# Patient Record
Sex: FEMALE | Race: WHITE | NOT HISPANIC OR LATINO | URBAN - METROPOLITAN AREA
[De-identification: names, ages, dates, MRNs, and addresses within clinical notes are randomized per-mention and may not be internally consistent; named-entity substitution may affect disease eponyms.]

---

## 2017-04-09 ENCOUNTER — EMERGENCY (EMERGENCY)
Facility: HOSPITAL | Age: 77
LOS: 1 days | Discharge: HOME | End: 2017-04-09
Attending: EMERGENCY MEDICINE
Payer: MEDICARE

## 2017-04-09 VITALS
HEART RATE: 54 BPM | DIASTOLIC BLOOD PRESSURE: 66 MMHG | OXYGEN SATURATION: 97 % | HEIGHT: 67 IN | RESPIRATION RATE: 20 BRPM | SYSTOLIC BLOOD PRESSURE: 108 MMHG | WEIGHT: 179.9 LBS

## 2017-04-09 DIAGNOSIS — J44.9 CHRONIC OBSTRUCTIVE PULMONARY DISEASE, UNSPECIFIED: ICD-10-CM
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
## 2017-04-09 DIAGNOSIS — Z98.51 TUBAL LIGATION STATUS: ICD-10-CM

## 2017-04-09 DIAGNOSIS — I10 ESSENTIAL (PRIMARY) HYPERTENSION: ICD-10-CM

## 2017-04-09 DIAGNOSIS — Z88.0 ALLERGY STATUS TO PENICILLIN: ICD-10-CM
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
## 2017-04-09 DIAGNOSIS — S40.811A ABRASION OF RIGHT UPPER ARM, INITIAL ENCOUNTER: ICD-10-CM

## 2017-04-09 DIAGNOSIS — E78.5 HYPERLIPIDEMIA, UNSPECIFIED: ICD-10-CM
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
## 2017-04-09 DIAGNOSIS — Z23 ENCOUNTER FOR IMMUNIZATION: ICD-10-CM

## 2017-04-09 DIAGNOSIS — Z88.5 ALLERGY STATUS TO NARCOTIC AGENT: ICD-10-CM

## 2017-04-09 DIAGNOSIS — Z95.2 PRESENCE OF PROSTHETIC HEART VALVE: Chronic | ICD-10-CM

## 2017-04-09 DIAGNOSIS — I25.10 ATHEROSCLEROTIC HEART DISEASE OF NATIVE CORONARY ARTERY WITHOUT ANGINA PECTORIS: ICD-10-CM

## 2017-04-09 DIAGNOSIS — S51.812A LACERATION WITHOUT FOREIGN BODY OF LEFT FOREARM, INITIAL ENCOUNTER: ICD-10-CM

## 2017-04-09 PROCEDURE — 99284 EMERGENCY DEPT VISIT MOD MDM: CPT

## 2017-04-09 PROCEDURE — 73090 X-RAY EXAM OF FOREARM: CPT | Mod: 26,LT

## 2017-04-09 PROCEDURE — 73060 X-RAY EXAM OF HUMERUS: CPT | Mod: 26,LT

## 2017-04-09 RX ORDER — TETANUS TOXOID, REDUCED DIPHTHERIA TOXOID AND ACELLULAR PERTUSSIS VACCINE, ADSORBED 5; 2.5; 8; 8; 2.5 [IU]/.5ML; [IU]/.5ML; UG/.5ML; UG/.5ML; UG/.5ML
0.5 SUSPENSION INTRAMUSCULAR ONCE
Qty: 0 | Refills: 0 | Status: COMPLETED | OUTPATIENT
Start: 2017-04-09 | End: 2017-04-09

## 2017-04-09 RX ORDER — BACITRACIN ZINC 500 UNIT/G
1 OINTMENT IN PACKET (EA) TOPICAL ONCE
Qty: 0 | Refills: 0 | Status: COMPLETED | OUTPATIENT
Start: 2017-04-09 | End: 2017-04-09

## 2017-04-09 RX ADMIN — Medication 1 APPLICATION(S): at 23:32

## 2017-04-09 RX ADMIN — TETANUS TOXOID, REDUCED DIPHTHERIA TOXOID AND ACELLULAR PERTUSSIS VACCINE, ADSORBED 0.5 MILLILITER(S): 5; 2.5; 8; 8; 2.5 SUSPENSION INTRAMUSCULAR at 23:31

## 2017-04-09 NOTE — ED PROVIDER NOTE - OBJECTIVE STATEMENT
77 y/o F on coumadin presents to ED c/o abrasion to left arm s/p witnessed mechanical fall. Pt states she tripped and fell today at 1927 today. She states she hit her left arm against the counter. Pt denies head trauma and LOC. She denies preceding symptoms but reports a 3 day hx of intermittent epistaxis causing nausea and decreased PO. She denies head trauma. Pt states her INR is ~1.5 and was last checked one month ago. PMHx includes pulmonary HTN. PSHx includes CABG 2012. No further complaints at this time.

## 2017-04-09 NOTE — ED ADULT NURSE NOTE - PMH
Anemia    Atrial fibrillation    CAD (coronary artery disease)    COPD (chronic obstructive pulmonary disease)    Hyperlipidemia    Hypertension    Pneumonia due to organism    Pulmonary hypertension    Sinusitis    Spinal fusion failure, initial encounter    Spinal stenosis    Tubal ligation status

## 2017-04-09 NOTE — ED PROVIDER NOTE - NS ED MD SCRIBE ATTENDING SCRIBE SECTIONS
HISTORY OF PRESENT ILLNESS/REVIEW OF SYSTEMS/PHYSICAL EXAM/HIV/PAST MEDICAL/SURGICAL/SOCIAL HISTORY/VITAL SIGNS( Pullset)/DISPOSITION

## 2017-04-09 NOTE — ED ADULT NURSE NOTE - OBJECTIVE STATEMENT
Pt care assumed at this time, no apparent distress noted at this time. Pt states she felt off balance while walking in the kitchen and fell. Pt is currently on coumadin. Pt denies hitting her head, negative LOC. Pt has 2 skin tears on her left arm, the entire arm is bruised. Pt also states she has a bloody nose for 2 weeks. Lung sounds are clear b/l, abd is soft and nontender with positive bowel sounds in all four quadrants.

## 2017-04-09 NOTE — ED ADULT NURSE NOTE - PSH
Cystocele    H/O aortic valve replacement    H/O cataract    H/O mitral valve repair    H/O spinal fusion    H/O tricuspid valve annuloplasty

## 2017-04-11 ENCOUNTER — INPATIENT (INPATIENT)
Facility: HOSPITAL | Age: 77
LOS: 17 days | Discharge: HOME HEALTH | DRG: 150 | End: 2017-04-29
Attending: HOSPITALIST | Admitting: SURGERY
Payer: MEDICARE

## 2017-04-11 VITALS — WEIGHT: 179.9 LBS
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
## 2017-04-11 DIAGNOSIS — Z95.2 PRESENCE OF PROSTHETIC HEART VALVE: Chronic | ICD-10-CM

## 2017-04-11 LAB
ALBUMIN SERPL ELPH-MCNC: 3.5 G/DL — SIGNIFICANT CHANGE UP (ref 3.3–5.2)
ALP SERPL-CCNC: 113 U/L — SIGNIFICANT CHANGE UP (ref 40–120)
ALT FLD-CCNC: 19 U/L — SIGNIFICANT CHANGE UP
ANION GAP SERPL CALC-SCNC: 29 MMOL/L — HIGH (ref 5–17)
ANION GAP SERPL CALC-SCNC: 31 MMOL/L — HIGH (ref 5–17)
ANION GAP SERPL CALC-SCNC: 32 MMOL/L — HIGH (ref 5–17)
ANISOCYTOSIS BLD QL: SLIGHT — SIGNIFICANT CHANGE UP
APTT BLD: 38.7 SEC — HIGH (ref 27.5–37.4)
APTT BLD: 43.8 SEC — HIGH (ref 27.5–37.4)
APTT BLD: 84.1 SEC — HIGH (ref 27.5–37.4)
AST SERPL-CCNC: 22 U/L — SIGNIFICANT CHANGE UP
BASE EXCESS BLDV CALC-SCNC: -9.1 MMOL/L — LOW (ref -3–3)
BASOPHILS # BLD AUTO: 0 K/UL — SIGNIFICANT CHANGE UP (ref 0–0.2)
BILIRUB SERPL-MCNC: 1.3 MG/DL — SIGNIFICANT CHANGE UP (ref 0.4–2)
BLD GP AB SCN SERPL QL: SIGNIFICANT CHANGE UP
BUN SERPL-MCNC: 190 MG/DL — HIGH (ref 8–20)
BUN SERPL-MCNC: 200 MG/DL — HIGH (ref 8–20)
BUN SERPL-MCNC: 200 MG/DL — HIGH (ref 8–20)
CA-I SERPL-SCNC: 0.7 MMOL/L — LOW (ref 1.15–1.29)
CALCIUM SERPL-MCNC: 5.9 MG/DL — CRITICAL LOW (ref 8.6–10.2)
CALCIUM SERPL-MCNC: 6 MG/DL — CRITICAL LOW (ref 8.6–10.2)
CALCIUM SERPL-MCNC: 6.1 MG/DL — CRITICAL LOW (ref 8.6–10.2)
CHLORIDE BLDV-SCNC: 93 MMOL/L — LOW (ref 98–106)
CHLORIDE SERPL-SCNC: 85 MMOL/L — LOW (ref 98–107)
CHLORIDE SERPL-SCNC: 86 MMOL/L — LOW (ref 98–107)
CHLORIDE SERPL-SCNC: 88 MMOL/L — LOW (ref 98–107)
CO2 SERPL-SCNC: 15 MMOL/L — LOW (ref 22–29)
CREAT SERPL-MCNC: 6.53 MG/DL — HIGH (ref 0.5–1.3)
CREAT SERPL-MCNC: 6.94 MG/DL — HIGH (ref 0.5–1.3)
CREAT SERPL-MCNC: 7.16 MG/DL — HIGH (ref 0.5–1.3)
ELLIPTOCYTES BLD QL SMEAR: SLIGHT — SIGNIFICANT CHANGE UP
EOSINOPHIL # BLD AUTO: 0 K/UL — SIGNIFICANT CHANGE UP (ref 0–0.5)
GAS PNL BLDV: 130 MMOL/L — LOW (ref 136–146)
GAS PNL BLDV: SIGNIFICANT CHANGE UP
GAS PNL BLDV: SIGNIFICANT CHANGE UP
GLUCOSE BLDV-MCNC: 125 MG/DL — HIGH (ref 70–99)
GLUCOSE SERPL-MCNC: 134 MG/DL — HIGH (ref 70–115)
GLUCOSE SERPL-MCNC: 147 MG/DL — HIGH (ref 70–115)
GLUCOSE SERPL-MCNC: 160 MG/DL — HIGH (ref 70–115)
HCO3 BLDV-SCNC: 17 MMOL/L — LOW (ref 20–26)
HCT VFR BLD CALC: 19.5 % — CRITICAL LOW (ref 37–47)
HCT VFR BLD CALC: 21.2 % — LOW (ref 37–47)
HCT VFR BLD CALC: 22.8 % — LOW (ref 37–47)
HCT VFR BLDA CALC: 23 — LOW (ref 39–50)
HGB BLD CALC-MCNC: 7.3 G/DL — LOW (ref 11.5–15.5)
HGB BLD-MCNC: 6.5 G/DL — CRITICAL LOW (ref 12–16)
HGB BLD-MCNC: 7.2 G/DL — LOW (ref 12–16)
HGB BLD-MCNC: 7.9 G/DL — LOW (ref 12–16)
HYPOCHROMIA BLD QL: SLIGHT — SIGNIFICANT CHANGE UP
INR BLD: 1.48 RATIO — HIGH (ref 0.88–1.16)
INR BLD: 1.58 RATIO — HIGH (ref 0.88–1.16)
INR BLD: 15.08 RATIO — CRITICAL HIGH (ref 0.88–1.16)
LACTATE BLDV-MCNC: 2.3 MMOL/L — HIGH (ref 0.5–2)
LYMPHOCYTES # BLD AUTO: 0.4 K/UL — LOW (ref 1–4.8)
LYMPHOCYTES # BLD AUTO: 8 % — LOW (ref 20–55)
MACROCYTES BLD QL: SLIGHT — SIGNIFICANT CHANGE UP
MAGNESIUM SERPL-MCNC: 2.1 MG/DL — SIGNIFICANT CHANGE UP (ref 1.8–2.5)
MAGNESIUM SERPL-MCNC: 2.1 MG/DL — SIGNIFICANT CHANGE UP (ref 1.8–2.5)
MCHC RBC-ENTMCNC: 32.5 PG — HIGH (ref 27–31)
MCHC RBC-ENTMCNC: 32.9 PG — HIGH (ref 27–31)
MCHC RBC-ENTMCNC: 33.3 G/DL — SIGNIFICANT CHANGE UP (ref 32–36)
MCHC RBC-ENTMCNC: 34 G/DL — SIGNIFICANT CHANGE UP (ref 32–36)
MCHC RBC-ENTMCNC: 34.2 PG — HIGH (ref 27–31)
MCHC RBC-ENTMCNC: 34.6 G/DL — SIGNIFICANT CHANGE UP (ref 32–36)
MCV RBC AUTO: 102.6 FL — HIGH (ref 81–99)
MCV RBC AUTO: 93.8 FL — SIGNIFICANT CHANGE UP (ref 81–99)
MCV RBC AUTO: 96.8 FL — SIGNIFICANT CHANGE UP (ref 81–99)
MONOCYTES # BLD AUTO: 0.5 K/UL — SIGNIFICANT CHANGE UP (ref 0–0.8)
MONOCYTES NFR BLD AUTO: 10 % — SIGNIFICANT CHANGE UP (ref 3–10)
NEUTROPHILS # BLD AUTO: 4 K/UL — SIGNIFICANT CHANGE UP (ref 1.8–8)
NEUTROPHILS NFR BLD AUTO: 80 % — HIGH (ref 37–73)
NEUTS BAND # BLD: 2 % — SIGNIFICANT CHANGE UP (ref 0–8)
NT-PROBNP SERPL-SCNC: HIGH PG/ML (ref 0–300)
OTHER CELLS CSF MANUAL: 9 ML/DL — LOW (ref 18–22)
OVALOCYTES BLD QL SMEAR: SLIGHT — SIGNIFICANT CHANGE UP
PCO2 BLDV: 31 MMHG — LOW (ref 35–50)
PH BLDV: 7.32 — LOW (ref 7.35–7.45)
PLAT MORPH BLD: NORMAL — SIGNIFICANT CHANGE UP
PLATELET # BLD AUTO: 68 K/UL — LOW (ref 150–400)
PLATELET # BLD AUTO: 74 K/UL — LOW (ref 150–400)
PLATELET # BLD AUTO: 86 K/UL — LOW (ref 150–400)
PO2 BLDV: 56 MMHG — HIGH (ref 25–45)
POIKILOCYTOSIS BLD QL AUTO: SLIGHT — SIGNIFICANT CHANGE UP
POTASSIUM BLDV-SCNC: 3.2 MMOL/L — LOW (ref 3.4–4.5)
POTASSIUM SERPL-MCNC: 3.2 MMOL/L — LOW (ref 3.5–5.3)
POTASSIUM SERPL-MCNC: 3.3 MMOL/L — LOW (ref 3.5–5.3)
POTASSIUM SERPL-MCNC: 3.5 MMOL/L — SIGNIFICANT CHANGE UP (ref 3.5–5.3)
POTASSIUM SERPL-SCNC: 3.2 MMOL/L — LOW (ref 3.5–5.3)
POTASSIUM SERPL-SCNC: 3.3 MMOL/L — LOW (ref 3.5–5.3)
POTASSIUM SERPL-SCNC: 3.5 MMOL/L — SIGNIFICANT CHANGE UP (ref 3.5–5.3)
PROT SERPL-MCNC: 5.9 G/DL — LOW (ref 6.6–8.7)
PROTHROM AB SERPL-ACNC: 16.4 SEC — HIGH (ref 9.8–12.7)
PROTHROM AB SERPL-ACNC: 17.5 SEC — HIGH (ref 9.8–12.7)
PROTHROM AB SERPL-ACNC: 175.3 SEC — HIGH (ref 9.8–12.7)
RBC # BLD: 1.9 M/UL — LOW (ref 4.4–5.2)
RBC # BLD: 2.19 M/UL — LOW (ref 4.4–5.2)
RBC # BLD: 2.43 M/UL — LOW (ref 4.4–5.2)
RBC # FLD: 14.9 % — SIGNIFICANT CHANGE UP (ref 11–15.6)
RBC # FLD: 16.7 % — HIGH (ref 11–15.6)
RBC # FLD: 18.5 % — HIGH (ref 11–15.6)
RBC BLD AUTO: ABNORMAL
SAO2 % BLDV: 88 % — SIGNIFICANT CHANGE UP (ref 67–88)
SODIUM SERPL-SCNC: 131 MMOL/L — LOW (ref 135–145)
SODIUM SERPL-SCNC: 132 MMOL/L — LOW (ref 135–145)
SODIUM SERPL-SCNC: 133 MMOL/L — LOW (ref 135–145)
TROPONIN T SERPL-MCNC: 0.08 NG/ML — HIGH (ref 0–0.06)
TROPONIN T SERPL-MCNC: 0.08 NG/ML — HIGH (ref 0–0.06)
TYPE + AB SCN PNL BLD: SIGNIFICANT CHANGE UP
WBC # BLD: 4.2 K/UL — LOW (ref 4.8–10.8)
WBC # BLD: 5 K/UL — SIGNIFICANT CHANGE UP (ref 4.8–10.8)
WBC # BLD: 6.1 K/UL — SIGNIFICANT CHANGE UP (ref 4.8–10.8)
WBC # FLD AUTO: 4.2 K/UL — LOW (ref 4.8–10.8)
WBC # FLD AUTO: 5 K/UL — SIGNIFICANT CHANGE UP (ref 4.8–10.8)
WBC # FLD AUTO: 6.1 K/UL — SIGNIFICANT CHANGE UP (ref 4.8–10.8)

## 2017-04-11 PROCEDURE — 71010: CPT | Mod: 26

## 2017-04-11 PROCEDURE — 99291 CRITICAL CARE FIRST HOUR: CPT

## 2017-04-11 PROCEDURE — 99285 EMERGENCY DEPT VISIT HI MDM: CPT

## 2017-04-11 PROCEDURE — 93010 ELECTROCARDIOGRAM REPORT: CPT

## 2017-04-11 PROCEDURE — 70450 CT HEAD/BRAIN W/O DYE: CPT | Mod: 26

## 2017-04-11 PROCEDURE — 74176 CT ABD & PELVIS W/O CONTRAST: CPT | Mod: 26

## 2017-04-11 PROCEDURE — 71010: CPT | Mod: 26,77

## 2017-04-11 RX ORDER — ONDANSETRON 8 MG/1
4 TABLET, FILM COATED ORAL ONCE
Qty: 0 | Refills: 0 | Status: COMPLETED | OUTPATIENT
Start: 2017-04-11 | End: 2017-04-11

## 2017-04-11 RX ORDER — PHYTONADIONE (VIT K1) 5 MG
10 TABLET ORAL ONCE
Qty: 0 | Refills: 0 | Status: DISCONTINUED | OUTPATIENT
Start: 2017-04-11 | End: 2017-04-11

## 2017-04-11 RX ORDER — SODIUM CHLORIDE 9 MG/ML
500 INJECTION INTRAMUSCULAR; INTRAVENOUS; SUBCUTANEOUS ONCE
Qty: 0 | Refills: 0 | Status: DISCONTINUED | OUTPATIENT
Start: 2017-04-11 | End: 2017-04-11

## 2017-04-11 RX ORDER — SODIUM CHLORIDE 9 MG/ML
250 INJECTION INTRAMUSCULAR; INTRAVENOUS; SUBCUTANEOUS ONCE
Qty: 0 | Refills: 0 | Status: COMPLETED | OUTPATIENT
Start: 2017-04-11 | End: 2017-04-11

## 2017-04-11 RX ORDER — PHYTONADIONE (VIT K1) 5 MG
5 TABLET ORAL ONCE
Qty: 0 | Refills: 0 | Status: COMPLETED | OUTPATIENT
Start: 2017-04-11 | End: 2017-04-11

## 2017-04-11 RX ORDER — CALCIUM GLUCONATE 100 MG/ML
2 VIAL (ML) INTRAVENOUS ONCE
Qty: 0 | Refills: 0 | Status: COMPLETED | OUTPATIENT
Start: 2017-04-11 | End: 2017-04-11

## 2017-04-11 RX ORDER — SODIUM CHLORIDE 9 MG/ML
3 INJECTION INTRAMUSCULAR; INTRAVENOUS; SUBCUTANEOUS ONCE
Qty: 0 | Refills: 0 | Status: COMPLETED | OUTPATIENT
Start: 2017-04-11 | End: 2017-04-11

## 2017-04-11 RX ORDER — DESMOPRESSIN ACETATE 0.1 MG/1
30 TABLET ORAL ONCE
Qty: 0 | Refills: 0 | Status: COMPLETED | OUTPATIENT
Start: 2017-04-11 | End: 2017-04-11

## 2017-04-11 RX ORDER — SODIUM CHLORIDE 9 MG/ML
1000 INJECTION INTRAMUSCULAR; INTRAVENOUS; SUBCUTANEOUS
Qty: 0 | Refills: 0 | Status: DISCONTINUED | OUTPATIENT
Start: 2017-04-11 | End: 2017-04-13

## 2017-04-11 RX ORDER — POTASSIUM CHLORIDE 20 MEQ
10 PACKET (EA) ORAL ONCE
Qty: 0 | Refills: 0 | Status: COMPLETED | OUTPATIENT
Start: 2017-04-11 | End: 2017-04-11

## 2017-04-11 RX ADMIN — SODIUM CHLORIDE 3 MILLILITER(S): 9 INJECTION INTRAMUSCULAR; INTRAVENOUS; SUBCUTANEOUS at 12:12

## 2017-04-11 RX ADMIN — DESMOPRESSIN ACETATE 230 MICROGRAM(S): 0.1 TABLET ORAL at 19:03

## 2017-04-11 RX ADMIN — SODIUM CHLORIDE 1000 MILLILITER(S): 9 INJECTION INTRAMUSCULAR; INTRAVENOUS; SUBCUTANEOUS at 23:11

## 2017-04-11 RX ADMIN — Medication 100 MILLIEQUIVALENT(S): at 16:16

## 2017-04-11 RX ADMIN — SODIUM CHLORIDE 75 MILLILITER(S): 9 INJECTION INTRAMUSCULAR; INTRAVENOUS; SUBCUTANEOUS at 23:51

## 2017-04-11 RX ADMIN — SODIUM CHLORIDE 75 MILLILITER(S): 9 INJECTION INTRAMUSCULAR; INTRAVENOUS; SUBCUTANEOUS at 18:16

## 2017-04-11 RX ADMIN — Medication 101 MILLIGRAM(S): at 14:55

## 2017-04-11 RX ADMIN — Medication 200 GRAM(S): at 17:23

## 2017-04-11 RX ADMIN — SODIUM CHLORIDE 1000 MILLILITER(S): 9 INJECTION INTRAMUSCULAR; INTRAVENOUS; SUBCUTANEOUS at 22:02

## 2017-04-11 RX ADMIN — SODIUM CHLORIDE 75 MILLILITER(S): 9 INJECTION INTRAMUSCULAR; INTRAVENOUS; SUBCUTANEOUS at 22:41

## 2017-04-11 RX ADMIN — ONDANSETRON 4 MILLIGRAM(S): 8 TABLET, FILM COATED ORAL at 23:11

## 2017-04-11 RX ADMIN — Medication 5 MILLIGRAM(S): at 12:12

## 2017-04-11 NOTE — ED PROVIDER NOTE - PROGRESS NOTE DETAILS
surgery resident in ed to eval arm lac pts cardiologist called to see pt Dr Ashton Dr Begum in ed and will admit to micu on trauma service and direct further care

## 2017-04-11 NOTE — H&P ADULT - NSHPPHYSICALEXAM_GEN_ALL_CORE
Gen: AAOX3, NAD  Pulm: CTAB  CV: RRR, S1/S2, mid-sternotomy scar  Abd: S/ND/NT  Ext: 4cm and 5cm skin tear distal forearm and proximal arm, respectively, actively oozing, FROM, multiple ecchymosis on bilateral upper and lower extremities   Neuro: grossly intact

## 2017-04-11 NOTE — ED ADULT NURSE NOTE - OBJECTIVE STATEMENT
77yo female presents with complaints of uncontrollable bleeding to skin tear on left arm and epistaxis as well.  Pt was seen in ER sunday for skin tear as well. Patient saw PMD and blood work was sent monday showing elevated INR and low H/H.  Patient complaints of dizziness. Congestive cough noted as well and patient spitting up blood which she says is from the blood clots from her nose. Denies any head trauma or LOC. Left arm with 1+ pulse and bilateral arms with ecchymosis throughout. Large hematoma noted to left arm as well. Skin tears noted to left brachial aspect of arm and additional tear noted below left ac as well. Dressing applied and then reapplied by resident.

## 2017-04-11 NOTE — ED ADULT NURSE REASSESSMENT NOTE - NS ED NURSE REASSESS COMMENT FT1
Trauma MD Begum and Jamari at patient bedside. Unable to obtain oral or axillary temps despite multiple attempts.  As per Dr. Begum it's ok to hang blood without temp and not to do rectal temps due to acuity and likelihood of bleeding.  Right AC IV removed due to bleeding around site. 2 New IV's placed as documented. 1 IV attempt unsuccessful and patient has hematoma noted to right forearm. MD Begum also aware.  Patient stabilized then to be transported to CT scan then to ICU. RN Mitzy in ICU received report.

## 2017-04-11 NOTE — ED PROVIDER NOTE - CARE PLAN
Principal Discharge DX:	Fall, initial encounter  Secondary Diagnosis:	Atrial fibrillation  Secondary Diagnosis:	Anemia

## 2017-04-11 NOTE — CONSULT NOTE ADULT - ASSESSMENT
1. traumatic laceration of left arm with bleeding from fall.  2.epistaxis  3. coumadin toxicity with markedly prolonged INR-hold coumadin-Vit K+ consider FFP to reverse coagulopathy.  4. chronic a-fib  5. pulmonary hypertension w RV pressure overload.  6. Bio AVR with mitral and tricuspid rings and known mod. TR and MR.  7. anemia acute on chronic-transfuse to maintain hgb over 8.  8.chronic renal impairment ( creatinine was 2.5 in July and now is over 6. BUN was 88 and now is 200-some of which may reflect blood in GI tract form nosebleeding.  9. Probable intravasc dehydration-would support bp with careful IV hydration.

## 2017-04-11 NOTE — ED PROVIDER NOTE - MEDICAL DECISION MAKING DETAILS
fall and coumadin toxicity with bleeding from arm and nose with high inr  will reverse with ffp and get ct head and labs and admit and pts cardiologist Dr Jeanine Ashton

## 2017-04-11 NOTE — ED PROVIDER NOTE - OBJECTIVE STATEMENT
75 y/o female she and her daughter state she has a h/o valve replacements bovine and she is on coumadin and fell 2 nights 75 y/o female she and her daughter state she has a h/o valve replacements bovine and she is on coumadin and fell 2 nights ago when she tried to reach for a counter to steady her and she missed and fell and cut her arm and she came here and got an xray and no blood tests and her pmd did inr and it was 9 and she had low hgb 9 and sent back for eval Now pt has bleeding left arm with ecchymosis complete arm and 2 large skin tears and one upper arm and one lower and nosebleed no headache but feels a little dizzy she attributes to not having eaten anything today no cp or sob

## 2017-04-11 NOTE — H&P ADULT - ASSESSMENT
77 yo F s/p fall 3 days ago, had some skin tears in her arm trying to brace herself from the fall  comes to the ED bleeding from her wounds, epistaxis  Found to be anemic with a supratherapeutic INR also in acute renal failure  2uPRBC ordered, KCentra given  Primary survey intact  Secondary survey findings as stated above  HD stable  CXR: R sided effusion  Even thought she had a low-energy trauma, given her INR and age, we elected to do Head CT/Abd/P   Head CT/Abd CT: no bleeding or traumatic injury  -Admit to SICU  -Fluid resuscitation Transfuse 2 u PRBC  -Kcentra, Vit K, repeat INR; continue active reversal PRN  -Wound dressing: adaptic, 4x4, kerlix, ace  -Repeat labs in the afternoon  -Hold pharmacologic DVT ppx  -Med rec  -F/u Cardiology recs  -Will contact nephrologist for information regarding severity of kidney insufficiency and recommendations     Seen and examined with Dr. Begum

## 2017-04-11 NOTE — ED STATDOCS - PROGRESS NOTE DETAILS
75 y/o F pt w/ PMHx of A-fib and anemia presents to the ED c/o epistaxis, intermittent, x2 weeks. Pt also notes L arm bleeding s/p mechanical fall. Pt is on Coumadin, and had labs yesterday showing INR of 9.8, Hg of 8, and HCT of 24. Pt was told to come in by  for possible admission. Focused evaluation, orders entered, placed in main for further evaluation by another provider.

## 2017-04-11 NOTE — CONSULT NOTE ADULT - SUBJECTIVE AND OBJECTIVE BOX
Chief Complaint:skipped and fell/anemia    HPI: 77 yo f with multiple problems - on coumadin for afib- slipped at home and suffered skin tear with bleeding. She's also had a nose bleed for 2-3 weeks. In ER her hgb was markedly decreased and inr>15. PMH+ bio avr with mitral and tricuspid annuloplasty/pulmonary hpt follwed at [facility]/a-fib/hypertension/cri/hyperuricemia/anemia RV failure. Surgery-spinal fusion/ap. allergy to pcn codeine and methylprednisolone/allopurinol. Non smoker/etoh. ROS-very inactive a nd sob , +edema. OP meds include coumadin 2.5 mg qd/atenolol 50 mg qd/revatio 20 tid/albuterol/torsemide 20 bid vit c / fe bid/colchicine.    PAST MEDICAL & SURGICAL HISTORY:  Anemia  Pulmonary hypertension  COPD (chronic obstructive pulmonary disease)  CAD (coronary artery disease)  Atrial fibrillation  Hyperlipidemia  Hypertension  Spinal fusion failure, initial encounter  Tubal ligation status  Sinusitis  Spinal stenosis  Pneumonia due to organism  No pertinent past medical history  H/O aortic valve replacement  H/O spinal fusion  H/O cataract  Cystocele  H/O tricuspid valve annuloplasty  H/O mitral valve repair      PREVIOUS DIAGNOSTIC TESTING:      ECHO  FINDINGS: lvef 55% with dilated RV increased atria mild MS mod MR and TR RV pressure overload.    STRESS  FINDINGS:    CATHETERIZATION  FINDINGS: PASP 90    MEDICATIONS  (STANDING):  phytonadione  IVPB 5milliGRAM(s) IV Intermittent once  phytonadione  IVPB 10milliGRAM(s) IV Intermittent once    MEDICATIONS  (PRN):      FAMILY HISTORY:  CAD (coronary artery disease)      SOCIAL HISTORY: lives w family    CIGARETTES: 0    ALCOHOL:0    ROS: Negative other than as mentioned in HPI.    Vital Signs Last 24 Hrs  T(C): --  T(F): --  HR: 58 (58 - 58)  BP: 101/64 (101/64 - 101/64) No working manual cuffs in the ER  BP(mean): --  RR: 20 (20 - 20)  SpO2: 99% (99% - 99%)    PHYSICAL EXAM:  General: Appears well developed, well nourished alert and cooperative. Chronically ill appearing Female  HEENT: Head; normocephalic, atraumatic. fresh blood around nares.  Eyes;   Pupils reactive, cornea wnl.  Neck; Supple, no nodes adenopathy, no NVD or carotid bruit or thyromegaly.  CARDIOVASCULAR; No murmur, rub, gallop or lift. Normal S1 and S2. Irreg rhythm and distant heart tones  LUNGS; No rales, rhonchi or wheeze. Normal breath sounds bilaterally.  ABDOMEN ; Soft, nontender without mass or organomegaly. bowel sounds normoactive.  EXTREMITIES; No clubbing, cyanosis .. Distal pulses not felt ROM normal. Trace edema  SKIN; warm and dry with normal turgor.  NEURO; Alert/oriented x 3/normal motor exam. No pathologic reflexes.    PSYCH; normal affect.            INTERPRETATION OF TELEMETRY:    ECG: afib/rbbb  cxr: cardiomeg with pvh vs rll infiltrate.  I&O's Detail    pro bnp [id number]  LABS:  INR> 15                   6.5    5.0   )-----------( 86       ( 11 Apr 2017 12:08 )             19.5     04-11    133<L>  |  86<L>  |  200.0<H>  ----------------------------<  160<H>  3.3<L>   |  15.0<L>  |  6.94<H>    Ca    6.1<LL>      11 Apr 2017 12:09    TPro  5.9<L>  /  Alb  3.5  /  TBili  1.3  /  DBili  x   /  AST  22  /  ALT  19  /  AlkPhos  113  04-11    CARDIAC MARKERS ( 11 Apr 2017 12:09 )  x     / 0.08 ng/mL / x     / x     / x          PT/INR - ( 11 Apr 2017 12:09 )   PT: 175.3 sec;   INR: 15.08 ratio         PTT - ( 11 Apr 2017 12:09 )  PTT:84.1 sec    I&O's Summary      RADIOLOGY & ADDITIONAL STUDIES:

## 2017-04-11 NOTE — ED ADULT NURSE NOTE - CHIEF COMPLAINT QUOTE
was here Sunday night, scraped arm and on Coumadin. Had MD follow up yesterday and did labs, received lab report inr 9.8  HGB 8  HCT 24. No chest pain, no dyspnea.

## 2017-04-11 NOTE — ED ADULT NURSE REASSESSMENT NOTE - NS ED NURSE REASSESS COMMENT FT1
Per Dr. Kauffman FFP may not be given and more Catscans may be added. CT made aware of delay and FFP on hold until clarification of orders.

## 2017-04-11 NOTE — ED STATDOCS - DETAILS:
I, Meir Lambert, performed the initial face to face bedside interview with this patient regarding history of present illness, review of symptoms and relevant past medical, social and family history.  I completed an independent physical examination.  I was the initial provider who evaluated this patient.  The history, relevant review of systems, past medical and surgical history, medical decision making, and physical examination was documented by the scribe in my presence and I attest to the accuracy of the documentation.

## 2017-04-11 NOTE — INPATIENT CERTIFICATION FOR MEDICARE PATIENTS - RISKS OF ADVERSE EVENTS
Concern for renal deterioration/Concern for delay in diagnosis and treatment Concern for cardiopulmonary deterioration/Concern for renal deterioration/Concern for delay in diagnosis and treatment

## 2017-04-11 NOTE — CHART NOTE - NSCHARTNOTEFT_GEN_A_CORE
Pt evaluated    Improving chemically  Cr/BUN have peaked and now trending down (still elevated)  AG improving (still high, suspect 2/2 uremia)  Hgb 6.5--->7.2--->7.9  INR 1.5    Pt states she feels better overall  c/o of intermittent nausea, 1 episode vomiting  She attributed this to "the amount of blood I swallowed over past few days"  Given some zofran with relief  Pt reports greatly diminished PO intake over past few days  DENIES CP/SOB, abd pain  Well appearing in NAD  Abd soft NTND  Bleeding stopped from skin tears on extremities    UOP ~ 5-10cc/hr for past 3-4 hours (about 20-30 an hour prior)  Bedside official TTE: (Unofficial) No gross dyskinesia. RV enlarged  -----> this seems largely UNCHANGED from prior echo ~ 1 year prior showing LVEF 45-50%, RV enlargement.    I suspect this LOTTIE likely acute on chronic, suspect largely pre-renal in nature, given decreased PO intake and blood loss through skin tears and acquired hypercoagulable state  However, given Hx of HF (largely diastolic it seems....prior admission for this, on diuretics), will require judicious use of IVF    I will continue IVF @ 75cc/hr, given poor PO intake....given 250cc bolus x 2 with minimal effect on UOP  I will give an additional unit of blood, given high cardiac risk, anemia, need for volume  Serial BMP  Send urine lytes  f/u official echo read    Plan discussed with attending Holman, agrees Pt evaluated    Improving chemically  Cr/BUN have peaked and now trending down (still elevated)  AG improving (still high, suspect 2/2 uremia)  Hgb 6.5--->7.2--->7.9  INR 1.5    Pt states she feels better overall  c/o of intermittent nausea, 1 episode vomiting  She attributed this to "the amount of blood I swallowed over past few days"  Given some zofran with relief  Pt reports greatly diminished PO intake over past few days  DENIES CP/SOB, abd pain  Well appearing in NAD  Abd soft NTND  Bleeding stopped from skin tears on extremities    UOP ~ 5-10cc/hr for past 3-4 hours (about 20-30 an hour prior)  Bedside official TTE: (Unofficial) No gross dyskinesia. RV enlarged  -----> this seems largely UNCHANGED from prior echo ~ 1 year prior showing LVEF 45-50%, RV enlargement.    I suspect this LOTTIE likely acute on chronic, suspect largely pre-renal in nature, given decreased PO intake and blood loss through skin tears and acquired hypercoagulable state...also, uremia likely increased 2/2 intake of blood products. Mental status intact despite uremia.  However, given Hx of HF (largely diastolic it seems....prior admission for this, on diuretics), will require judicious use of IVF    I will continue IVF @ 75cc/hr, given poor PO intake....given 250cc bolus x 2 with minimal effect on UOP  I will give an additional unit of blood, given high cardiac risk, anemia, need for volume  Serial BMP  Send urine lytes  f/u official echo read    Plan discussed with attending Holman, agrees

## 2017-04-11 NOTE — ED ADULT NURSE REASSESSMENT NOTE - NS ED NURSE REASSESS COMMENT FT1
Patient transported to CT scan and then to ICU without incident.  Patient handed off to ILYA Hartmann. Patient stable.

## 2017-04-11 NOTE — ED ADULT TRIAGE NOTE - CHIEF COMPLAINT QUOTE
was here Sunday night, scraped arm and on Coumadin. Had MD follow up yesterday and did labs, received lab report inr 9.8  HGB 8  HCT 24. was here Sunday night, scraped arm and on Coumadin. Had MD follow up yesterday and did labs, received lab report inr 9.8  HGB 8  HCT 24. No chest pain, no dyspnea.

## 2017-04-11 NOTE — H&P ADULT - ATTENDING COMMENTS
Pt seen and examined in ED.  Several bleeding wounds to left upper extremity.  pt c/o dizziness, nausea, fatigue.  Repeated epistaxis  for several weeks.    Lacerations to upper extremity not amenable to sutures as skin too thin to hold a stich.  Significant contusions noted over both arms and upper back.  Poor cap refill.  Good distal pulses.      Given labs as listed above feel pt is in occult hemorrhagic shock from blood loss from wounds and epistaxis.  Suspect lactate will be elevate, however given the severe uremia, the increased gap metabolic acidosis could be explained by this.  Does report some back pain and given the excessively high INR retroperitoneal hematoma is in the differential dx.  Elevated trop and acute on chronic renal failure however does point to poor end organ perfusion.  As such, 2U PRBCs will be transfused.  Given he pt's cardiac hx which includes multiple valvular issues, do not feel she will be able to handle the large volume of FFP required to reverse this warfarin induced coagulopathy.  Will give K-Centra and IV vit K.  At risk for ICH given INR and fall.  Will CT head. Pt seen and examined in ED.  Several bleeding wounds to left upper extremity.  pt c/o dizziness, nausea, fatigue.  Repeated epistaxis  for several weeks.    Lacerations to upper extremity not amenable to sutures as skin too thin to hold a stich.  Significant contusions noted over both arms and upper back.  Poor cap refill.  Good distal pulses.      Given labs as listed above feel pt is in occult hemorrhagic shock from blood loss from wounds and epistaxis.  Suspect lactate will be elevate, however given the severe uremia, the increased gap metabolic acidosis could be explained by this.  Does report some back pain and given the excessively high INR retroperitoneal hematoma is in the differential dx.  Elevated trop and acute on chronic renal failure however does point to poor end organ perfusion and cardiac ischemia likely secondary to the acute anemia and ongoing blood loss.  As such, 2U PRBCs will be transfused.  Given he pt's cardiac hx which includes multiple valvular issues, do not feel she will be able to handle the large volume of FFP required to reverse this warfarin induced coagulopathy.  Will give K-Centra and IV vit K.  At risk for ICH given INR and fall.  Will CT head.

## 2017-04-12 DIAGNOSIS — N17.9 ACUTE KIDNEY FAILURE, UNSPECIFIED: ICD-10-CM

## 2017-04-12 DIAGNOSIS — I27.0 PRIMARY PULMONARY HYPERTENSION: ICD-10-CM

## 2017-04-12 DIAGNOSIS — D64.9 ANEMIA, UNSPECIFIED: ICD-10-CM

## 2017-04-12 DIAGNOSIS — D50.0 IRON DEFICIENCY ANEMIA SECONDARY TO BLOOD LOSS (CHRONIC): ICD-10-CM

## 2017-04-12 DIAGNOSIS — N17.0 ACUTE KIDNEY FAILURE WITH TUBULAR NECROSIS: ICD-10-CM

## 2017-04-12 DIAGNOSIS — E44.0 MODERATE PROTEIN-CALORIE MALNUTRITION: ICD-10-CM

## 2017-04-12 DIAGNOSIS — D68.9 COAGULATION DEFECT, UNSPECIFIED: ICD-10-CM

## 2017-04-12 LAB
ALBUMIN SERPL ELPH-MCNC: 3.1 G/DL — LOW (ref 3.3–5.2)
ALP SERPL-CCNC: 97 U/L — SIGNIFICANT CHANGE UP (ref 40–120)
ALT FLD-CCNC: 16 U/L — SIGNIFICANT CHANGE UP
ANION GAP SERPL CALC-SCNC: 32 MMOL/L — HIGH (ref 5–17)
ANISOCYTOSIS BLD QL: SLIGHT — SIGNIFICANT CHANGE UP
APTT BLD: 39.2 SEC — HIGH (ref 27.5–37.4)
AST SERPL-CCNC: 20 U/L — SIGNIFICANT CHANGE UP
BILIRUB SERPL-MCNC: 3.4 MG/DL — HIGH (ref 0.4–2)
BUN SERPL-MCNC: 190 MG/DL — HIGH (ref 8–20)
CALCIUM SERPL-MCNC: 6.3 MG/DL — CRITICAL LOW (ref 8.6–10.2)
CHLORIDE SERPL-SCNC: 87 MMOL/L — LOW (ref 98–107)
CO2 SERPL-SCNC: 15 MMOL/L — LOW (ref 22–29)
CREAT ?TM UR-MCNC: 126 MG/DL — SIGNIFICANT CHANGE UP
CREAT SERPL-MCNC: 6.93 MG/DL — HIGH (ref 0.5–1.3)
EOSINOPHIL # BLD AUTO: 0 K/UL — SIGNIFICANT CHANGE UP (ref 0–0.5)
EOSINOPHIL NFR BLD AUTO: 0.2 % — SIGNIFICANT CHANGE UP (ref 0–6)
GLUCOSE SERPL-MCNC: 134 MG/DL — HIGH (ref 70–115)
HCT VFR BLD CALC: 24.7 % — LOW (ref 37–47)
HGB BLD-MCNC: 8.5 G/DL — LOW (ref 12–16)
HYPOCHROMIA BLD QL: SIGNIFICANT CHANGE UP
INR BLD: 1.48 RATIO — HIGH (ref 0.88–1.16)
LYMPHOCYTES # BLD AUTO: 0.3 K/UL — LOW (ref 1–4.8)
LYMPHOCYTES # BLD AUTO: 4.4 % — LOW (ref 20–55)
MACROCYTES BLD QL: SLIGHT — SIGNIFICANT CHANGE UP
MAGNESIUM SERPL-MCNC: 2 MG/DL — SIGNIFICANT CHANGE UP (ref 1.8–2.5)
MCHC RBC-ENTMCNC: 31.8 PG — HIGH (ref 27–31)
MCHC RBC-ENTMCNC: 34.4 G/DL — SIGNIFICANT CHANGE UP (ref 32–36)
MCV RBC AUTO: 92.5 FL — SIGNIFICANT CHANGE UP (ref 81–99)
MONOCYTES # BLD AUTO: 0.6 K/UL — SIGNIFICANT CHANGE UP (ref 0–0.8)
MONOCYTES NFR BLD AUTO: 9.9 % — SIGNIFICANT CHANGE UP (ref 3–10)
NEUTROPHILS # BLD AUTO: 5.2 K/UL — SIGNIFICANT CHANGE UP (ref 1.8–8)
NEUTROPHILS NFR BLD AUTO: 84.7 % — HIGH (ref 37–73)
OVALOCYTES BLD QL SMEAR: SLIGHT — SIGNIFICANT CHANGE UP
PHOSPHATE SERPL-MCNC: 10.1 MG/DL — HIGH (ref 2.4–4.7)
PLAT MORPH BLD: NORMAL — SIGNIFICANT CHANGE UP
PLATELET # BLD AUTO: 60 K/UL — LOW (ref 150–400)
PLATELET COUNT - ESTIMATE: ABNORMAL
POIKILOCYTOSIS BLD QL AUTO: SLIGHT — SIGNIFICANT CHANGE UP
POTASSIUM SERPL-MCNC: 3.6 MMOL/L — SIGNIFICANT CHANGE UP (ref 3.5–5.3)
POTASSIUM SERPL-SCNC: 3.6 MMOL/L — SIGNIFICANT CHANGE UP (ref 3.5–5.3)
PROT SERPL-MCNC: 5.3 G/DL — LOW (ref 6.6–8.7)
PROTHROM AB SERPL-ACNC: 16.4 SEC — HIGH (ref 9.8–12.7)
RBC # BLD: 2.67 M/UL — LOW (ref 4.4–5.2)
RBC # FLD: 19.2 % — HIGH (ref 11–15.6)
RBC BLD AUTO: ABNORMAL
SODIUM SERPL-SCNC: 134 MMOL/L — LOW (ref 135–145)
SODIUM UR-SCNC: <30 MMOL/L — SIGNIFICANT CHANGE UP
TROPONIN T SERPL-MCNC: 0.07 NG/ML — HIGH (ref 0–0.06)
TROPONIN T SERPL-MCNC: 0.07 NG/ML — HIGH (ref 0–0.06)
WBC # BLD: 6.2 K/UL — SIGNIFICANT CHANGE UP (ref 4.8–10.8)
WBC # FLD AUTO: 6.2 K/UL — SIGNIFICANT CHANGE UP (ref 4.8–10.8)

## 2017-04-12 RX ORDER — IPRATROPIUM/ALBUTEROL SULFATE 18-103MCG
3 AEROSOL WITH ADAPTER (GRAM) INHALATION EVERY 6 HOURS
Qty: 0 | Refills: 0 | Status: DISCONTINUED | OUTPATIENT
Start: 2017-04-12 | End: 2017-04-18

## 2017-04-12 RX ORDER — IPRATROPIUM/ALBUTEROL SULFATE 18-103MCG
3 AEROSOL WITH ADAPTER (GRAM) INHALATION ONCE
Qty: 0 | Refills: 0 | Status: COMPLETED | OUTPATIENT
Start: 2017-04-12 | End: 2017-04-12

## 2017-04-12 RX ORDER — HEPARIN SODIUM 5000 [USP'U]/ML
5000 INJECTION INTRAVENOUS; SUBCUTANEOUS EVERY 8 HOURS
Qty: 0 | Refills: 0 | Status: DISCONTINUED | OUTPATIENT
Start: 2017-04-12 | End: 2017-04-16

## 2017-04-12 RX ORDER — TUBERCULIN PURIFIED PROTEIN DERIVATIVE 5 [IU]/.1ML
5 INJECTION, SOLUTION INTRADERMAL ONCE
Qty: 0 | Refills: 0 | Status: COMPLETED | OUTPATIENT
Start: 2017-04-12 | End: 2017-04-12

## 2017-04-12 RX ORDER — FOLIC ACID 0.8 MG
1 TABLET ORAL DAILY
Qty: 0 | Refills: 0 | Status: DISCONTINUED | OUTPATIENT
Start: 2017-04-12 | End: 2017-04-29

## 2017-04-12 RX ORDER — ATORVASTATIN CALCIUM 80 MG/1
40 TABLET, FILM COATED ORAL AT BEDTIME
Qty: 0 | Refills: 0 | Status: DISCONTINUED | OUTPATIENT
Start: 2017-04-12 | End: 2017-04-29

## 2017-04-12 RX ORDER — SODIUM CHLORIDE 9 MG/ML
250 INJECTION INTRAMUSCULAR; INTRAVENOUS; SUBCUTANEOUS ONCE
Qty: 0 | Refills: 0 | Status: DISCONTINUED | OUTPATIENT
Start: 2017-04-12 | End: 2017-04-12

## 2017-04-12 RX ORDER — ATENOLOL 25 MG/1
50 TABLET ORAL DAILY
Qty: 0 | Refills: 0 | Status: DISCONTINUED | OUTPATIENT
Start: 2017-04-12 | End: 2017-04-15

## 2017-04-12 RX ORDER — SODIUM CHLORIDE 9 MG/ML
250 INJECTION INTRAMUSCULAR; INTRAVENOUS; SUBCUTANEOUS ONCE
Qty: 0 | Refills: 0 | Status: COMPLETED | OUTPATIENT
Start: 2017-04-12 | End: 2017-04-12

## 2017-04-12 RX ORDER — CALCIUM ACETATE 667 MG
1334 TABLET ORAL
Qty: 0 | Refills: 0 | Status: DISCONTINUED | OUTPATIENT
Start: 2017-04-12 | End: 2017-04-26

## 2017-04-12 RX ORDER — LACTOBACILLUS ACIDOPHILUS 100MM CELL
1 CAPSULE ORAL DAILY
Qty: 0 | Refills: 0 | Status: DISCONTINUED | OUTPATIENT
Start: 2017-04-12 | End: 2017-04-29

## 2017-04-12 RX ORDER — ERYTHROPOIETIN 10000 [IU]/ML
20000 INJECTION, SOLUTION INTRAVENOUS; SUBCUTANEOUS ONCE
Qty: 0 | Refills: 0 | Status: COMPLETED | OUTPATIENT
Start: 2017-04-12 | End: 2017-04-12

## 2017-04-12 RX ADMIN — ERYTHROPOIETIN 20000 UNIT(S): 10000 INJECTION, SOLUTION INTRAVENOUS; SUBCUTANEOUS at 17:14

## 2017-04-12 RX ADMIN — SODIUM CHLORIDE 75 MILLILITER(S): 9 INJECTION INTRAMUSCULAR; INTRAVENOUS; SUBCUTANEOUS at 12:33

## 2017-04-12 RX ADMIN — SODIUM CHLORIDE 500 MILLILITER(S): 9 INJECTION INTRAMUSCULAR; INTRAVENOUS; SUBCUTANEOUS at 09:44

## 2017-04-12 RX ADMIN — Medication 1 MILLIGRAM(S): at 18:35

## 2017-04-12 RX ADMIN — SODIUM CHLORIDE 75 MILLILITER(S): 9 INJECTION INTRAMUSCULAR; INTRAVENOUS; SUBCUTANEOUS at 05:48

## 2017-04-12 RX ADMIN — ATORVASTATIN CALCIUM 40 MILLIGRAM(S): 80 TABLET, FILM COATED ORAL at 22:05

## 2017-04-12 RX ADMIN — TUBERCULIN PURIFIED PROTEIN DERIVATIVE 5 UNIT(S): 5 INJECTION, SOLUTION INTRADERMAL at 22:22

## 2017-04-12 RX ADMIN — Medication 20 MILLIGRAM(S): at 13:33

## 2017-04-12 RX ADMIN — Medication 20 MILLIGRAM(S): at 22:05

## 2017-04-12 RX ADMIN — Medication 200 GRAM(S): at 00:53

## 2017-04-12 NOTE — PROGRESS NOTE ADULT - SUBJECTIVE AND OBJECTIVE BOX
[place] [facility] - [facility], THE HEART CENTER                                   [address] [place] [address]                                                      PHONE: [phone]                                                         FAX: [phone]  [email or web address]  ---------------------------------------------------------------------------------------------------------------------------------    FU for  Pt seen and examined.     Overnight events/patient complaints:    allopurinol (Rash)  codeine (Nausea; Vomiting)  penicillin (Rash)  spironolactone (Unknown)    MEDICATIONS  (STANDING):  sodium chloride 0.9%. 1000milliLiter(s) IV Continuous <Continuous>  sodium chloride 0.9% Bolus 250milliLiter(s) IV Bolus once  sildenafil (REVATIO) 20milliGRAM(s) Oral three times a day  atorvastatin 40milliGRAM(s) Oral at bedtime    MEDICATIONS  (PRN):      Vital Signs Last 24 Hrs  T(C): 36.6, Max: 36.6 (04-12 @ 08:00)  T(F): 97.8, Max: 97.8 (04-12 @ 08:00)  HR: 60 (50 - 65)  BP: 138/62 (101/64 - 155/65)  BP(mean): 89 (80 - 107)  RR: 17 (14 - 21)  SpO2: 96% (93% - 100%)  ICU Vital Signs Last 24 Hrs  I&O's Summary    I & Os for current day (as of 12 Apr 2017 09:23)  =============================================  IN: 2882 ml / OUT: 210 ml / NET: 2672 ml      PHYSICAL EXAM:  HEENT: no JVD  CARDIOVASCULAR: Normal S1 and S2, No murmur, rub, gallop or lift.   LUNGS: No rales, rhonchi or wheeze. Normal breath sounds bilaterally.  ABDOMEN: Soft, nontender without mass or organomegaly. bowel sounds normoactive.  EXTREMITIES: no edema          LABS:                        8.5    6.2   )-----------( 60       ( 12 Apr 2017 05:31 )             24.7     04-12    134<L>  |  87<L>  |  190.0<H>  ----------------------------<  134<H>  3.6   |  15.0<L>  |  6.93<H>    Ca    6.3<LL>      12 Apr 2017 05:31  Phos  10.1     04-12  Mg     2.0     04-12    TPro  5.3<L>  /  Alb  3.1<L>  /  TBili  3.4<H>  /  DBili  x   /  AST  20  /  ALT  16  /  AlkPhos  97  04-12    DUSTIN HERNANDEZ  CARDIAC MARKERS ( 12 Apr 2017 05:31 )  x     / 0.07 ng/mL / x     / x     / x      CARDIAC MARKERS ( 11 Apr 2017 23:18 )  x     / 0.07 ng/mL / x     / x     / x      CARDIAC MARKERS ( 11 Apr 2017 15:57 )  x     / 0.08 ng/mL / x     / x     / x      CARDIAC MARKERS ( 11 Apr 2017 12:09 )  x     / 0.08 ng/mL / x     / x     / x          PT/INR - ( 12 Apr 2017 05:31 )   PT: 16.4 sec;   INR: 1.48 ratio         PTT - ( 12 Apr 2017 05:31 )  PTT:39.2 sec      RADIOLOGY & ADDITIONAL STUDIES:    LABS:                        8.5    6.2   )-----------( 60       ( 12 Apr 2017 05:31 )             24.7     04-12    134<L>  |  87<L>  |  190.0<H>  ----------------------------<  134<H>  3.6   |  15.0<L>  |  6.93<H>    Ca    6.3<LL>      12 Apr 2017 05:31  Phos  10.1     04-12  Mg     2.0     04-12    TPro  5.3<L>  /  Alb  3.1<L>  /  TBili  3.4<H>  /  DBili  x   /  AST  20  /  ALT  16  /  AlkPhos  97  04-12    76y  CARDIAC MARKERS ( 12 Apr 2017 05:31 )  x     / 0.07 ng/mL / x     / x     / x      CARDIAC MARKERS ( 11 Apr 2017 23:18 )  x     / 0.07 ng/mL / x     / x     / x      CARDIAC MARKERS ( 11 Apr 2017 15:57 )  x     / 0.08 ng/mL / x     / x     / x      CARDIAC MARKERS ( 11 Apr 2017 12:09 )  x     / 0.08 ng/mL / x     / x     / x          PT/INR - ( 12 Apr 2017 05:31 )   PT: 16.4 sec;   INR: 1.48 ratio         PTT - ( 12 Apr 2017 05:31 )  PTT:39.2 sec      Assessment and Plan: [place] [facility]ARDI[facility]ASCULAR - [facility], THE HEART CENTER                                   [address] [place] [address]                                                      PHONE: [phone]                                                         FAX: [phone]  [email or web address]  ---------------------------------------------------------------------------------------------------------------------------------    FU for  Pt seen and examined.     Overnight events/patient complaints: patient feeling better    allopurinol (Rash)  codeine (Nausea; Vomiting)  penicillin (Rash)  spironolactone (Unknown)    MEDICATIONS  (STANDING):  sodium chloride 0.9%. 1000milliLiter(s) IV Continuous <Continuous>  sodium chloride 0.9% Bolus 250milliLiter(s) IV Bolus once  sildenafil (REVATIO) 20milliGRAM(s) Oral three times a day  atorvastatin 40milliGRAM(s) Oral at bedtime    MEDICATIONS  (PRN):      Vital Signs Last 24 Hrs  T(C): 36.6, Max: 36.6 (04-12 @ 08:00)  T(F): 97.8, Max: 97.8 (04-12 @ 08:00)  HR: 60 (50 - 65)  BP: 138/62 (101/64 - 155/65)  BP(mean): 89 (80 - 107)  RR: 17 (14 - 21)  SpO2: 96% (93% - 100%)  ICU Vital Signs Last 24 Hrs  I&O's Summary    I & Os for current day (as of 12 Apr 2017 09:23)  =============================================  IN: 2882 ml / OUT: 210 ml / NET: 2672 ml      PHYSICAL EXAM:  HEENT: no JVD  CARDIOVASCULAR: Normal S1 and S2, No murmur, rub, gallop or lift.   LUNGS: No rales, rhonchi or wheeze. Normal breath sounds bilaterally.  ABDOMEN: Soft, nontender without mass or organomegaly. bowel sounds normoactive.  EXTREMITIES: no edema          LABS:                        8.5    6.2   )-----------( 60       ( 12 Apr 2017 05:31 )             24.7     04-12    134<L>  |  87<L>  |  190.0<H>  ----------------------------<  134<H>  3.6   |  15.0<L>  |  6.93<H>    Ca    6.3<LL>      12 Apr 2017 05:31  Phos  10.1     04-12  Mg     2.0     04-12    TPro  5.3<L>  /  Alb  3.1<L>  /  TBili  3.4<H>  /  DBili  x   /  AST  20  /  ALT  16  /  AlkPhos  97  04-12    DUSTIN HERNANDEZ  CARDIAC MARKERS ( 12 Apr 2017 05:31 )  x     / 0.07 ng/mL / x     / x     / x      CARDIAC MARKERS ( 11 Apr 2017 23:18 )  x     / 0.07 ng/mL / x     / x     / x      CARDIAC MARKERS ( 11 Apr 2017 15:57 )  x     / 0.08 ng/mL / x     / x     / x      CARDIAC MARKERS ( 11 Apr 2017 12:09 )  x     / 0.08 ng/mL / x     / x     / x          PT/INR - ( 12 Apr 2017 05:31 )   PT: 16.4 sec;   INR: 1.48 ratio         PTT - ( 12 Apr 2017 05:31 )  PTT:39.2 sec      RADIOLOGY & ADDITIONAL STUDIES:    LABS:                        8.5    6.2   )-----------( 60       ( 12 Apr 2017 05:31 )             24.7     04-12    134<L>  |  87<L>  |  190.0<H>  ----------------------------<  134<H>  3.6   |  15.0<L>  |  6.93<H>    Ca    6.3<LL>      12 Apr 2017 05:31  Phos  10.1     04-12  Mg     2.0     04-12    TPro  5.3<L>  /  Alb  3.1<L>  /  TBili  3.4<H>  /  DBili  x   /  AST  20  /  ALT  16  /  AlkPhos  97  04-12    76y  CARDIAC MARKERS ( 12 Apr 2017 05:31 )  x     / 0.07 ng/mL / x     / x     / x      CARDIAC MARKERS ( 11 Apr 2017 23:18 )  x     / 0.07 ng/mL / x     / x     / x      CARDIAC MARKERS ( 11 Apr 2017 15:57 )  x     / 0.08 ng/mL / x     / x     / x      CARDIAC MARKERS ( 11 Apr 2017 12:09 )  x     / 0.08 ng/mL / x     / x     / x          PT/INR - ( 12 Apr 2017 05:31 )   PT: 16.4 sec;   INR: 1.48 ratio         PTT - ( 12 Apr 2017 05:31 )  PTT:39.2 sec      Assessment and Plan:  77 yo f with multiple problems - on coumadin for afib- slipped at home and suffered skin tear with bleeding. She's also had a nose bleed for 2-3 weeks. In ER her hgb was markedly decreased and inr>15. PMH+ bio avr with mitral and tricuspid annuloplasty/pulmonary [facility]ollwed [facility]/-fib/hypertension/cri/hyperuricemia/anemia RV failure. Surgery-spinal fusion/ap. allergy to pcn codeine and methylprednisolone/allopurinol.   Bleed/coagulopathy/low platelet count: HB stable improved, continue to hold coumadin  AF: rate controlled, check echo to reassess current EF, that will help guide future plan for AC,   Acute on chronic renal failure: recommend renal consult, oliguria despite IV hydration, may need HD

## 2017-04-12 NOTE — CONSULT NOTE ADULT - SUBJECTIVE AND OBJECTIVE BOX
Patient is a 76y old  Female who presents with a chief complaint of s/p fall, supratherapeutic INR (11 Apr 2017 15:16)      HPI:  77 yo f with hx of bovine valve replacement, afib on coumadin, chronic kidney insufficiency, s/p fall from standing 3 days ago. Per patient, she slipped, and as she was bracing her fall went to grab the kitchen counter and missed, causing some skin tears to her LUE. She's also had a nose bleed for 2-3 weeks, now more frequent. She had been in the ED on Sunday, got an XR which showed no fracture and was sent home. She comes today due to worsening epistaxis and bleeding from her skin tears. c/o SOB, headache/dizziness. Denies hitting head or LOC. (11 Apr 2017 15:16)  Interim noted   Noncontrast CT abdomen without hydro  + Severe azotemia   Large bicarb defecit   Component increased BUN related to ingestion of epistaxis  = Anorexia and progressive malaise   In 2016 creat was 3.0   S/P multiple transfusions   Coumadin coagulopathy better     PAST MEDICAL & SURGICAL HISTORY:  Anemia  Pulmonary hypertension  COPD (chronic obstructive pulmonary disease)  CAD (coronary artery disease)  Atrial fibrillation  Hyperlipidemia  Hypertension  Spinal fusion failure, initial encounter  Tubal ligation status  Sinusitis  Spinal stenosis  Pneumonia due to organism  No pertinent past medical history  H/O aortic valve replacement  H/O spinal fusion  H/O cataract  Cystocele  H/O tricuspid valve annuloplasty  H/O mitral valve repair      FAMILY HISTORY:  CAD (coronary artery disease)      Social History:    MEDICATIONS  (STANDING):  sodium chloride 0.9%. 1000milliLiter(s) IV Continuous <Continuous>  sildenafil (REVATIO) 20milliGRAM(s) Oral three times a day  atorvastatin 40milliGRAM(s) Oral at bedtime    MEDICATIONS  (PRN):      Allergies    allopurinol (Rash)  codeine (Nausea; Vomiting)  penicillin (Rash)  spironolactone (Unknown)    Intolerances        Vital Signs Last 24 Hrs  T(C): 36, Max: 36.6 (04-12 @ 08:00)  T(F): 96.8, Max: 97.8 (04-12 @ 08:00)  HR: 57 (54 - 65)  BP: 164/66 (120/57 - 187/80)  BP(mean): 92 (80 - 115)  RR: 17 (13 - 35)  SpO2: 96% (93% - 100%)  Daily Height in cm: 170.18 (12 Apr 2017 05:00)    Daily   I&O's Detail  I & Os for 24h ending 12 Apr 2017 07:00  =============================================  IN:    Packed Red Blood Cells: 982 ml    sodium chloride 0.9%.: 800 ml    Sodium Chloride 0.9% IV Bolus: 750 ml    Solution: 200 ml    Solution: 100 ml    Solution: 50 ml    Total IN: 2882 ml  ---------------------------------------------  OUT:    Indwelling Catheter - Urethral: 210 ml    Total OUT: 210 ml  ---------------------------------------------  Total NET: 2672 ml    I & Os for current day (as of 12 Apr 2017 15:22)  =============================================  IN:    Sodium Chloride 0.9% IV Bolus: 500 ml    sodium chloride 0.9%.: 300 ml    Total IN: 800 ml  ---------------------------------------------  OUT:    Indwelling Catheter - Urethral: 60 ml    Total OUT: 60 ml  ---------------------------------------------  Total NET: 740 ml    I&O's Summary  I & Os for 24h ending 12 Apr 2017 07:00  =============================================  IN: 2882 ml / OUT: 210 ml / NET: 2672 ml    I & Os for current day (as of 12 Apr 2017 15:22)  =============================================  IN: 800 ml / OUT: 60 ml / NET: 740 ml      PHYSICAL EXAM:    GENERAL: NAD, Comfortable flat   HEAD:  No edema   NECK: Supple, No JVD,   CHEST/LUNG:EAE , No wheeze   HEART: Regular rate and rhythm; No rub   ABDOMEN: Soft, No rigidity   EXTREMITIES: min edema           LABS:                        8.5    6.2   )-----------( 60       ( 12 Apr 2017 05:31 )             24.7     04-12    134<L>  |  87<L>  |  190.0<H>  ----------------------------<  134<H>  3.6   |  15.0<L>  |  6.93<H>    Ca    6.3<LL>      12 Apr 2017 05:31  Phos  10.1     04-12  Mg     2.0     04-12    TPro  5.3<L>  /  Alb  3.1<L>  /  TBili  3.4<H>  /  DBili  x   /  AST  20  /  ALT  16  /  AlkPhos  97  04-12    PT/INR - ( 12 Apr 2017 05:31 )   PT: 16.4 sec;   INR: 1.48 ratio         PTT - ( 12 Apr 2017 05:31 )  PTT:39.2 sec    Magnesium, Serum: 2.0 mg/dL (04-12 @ 05:31)  Phosphorus Level, Serum: 10.1 mg/dL (04-12 @ 05:31)  Magnesium, Serum: 2.1 mg/dL (04-11 @ 23:18)  Magnesium, Serum: 2.1 mg/dL (04-11 @ 15:57)          RADIOLOGY & ADDITIONAL TESTS:

## 2017-04-12 NOTE — PROGRESS NOTE ADULT - SUBJECTIVE AND OBJECTIVE BOX
INTERVAL HPI/OVERNIGHT EVENTS: Received Kcentra and Vit K.  2 units PRBC for anemia secondary to blood loss.  Repeat INR 1.43.  R IJ TLC placed.  Grullon placed.  Pt still oozing heavily from skin tear sites.  DDAVP added for platelet dysfunction. O/N: TTE done at bedside, LVEF grossly normal, large dilated right ventricle; this was present on prior echo. Oliguria, given [id number] bolus with minimal improvement. Nausea, 1 episode vomiting, states she coughed up a blood clot; no further hemoptysis or nausea/vomiting, given zofran with resolustion.  Pt feels this is 2/2 to swallowing a good deal of blood past few days. Cr improving, Hgb 7.9; additional 1u pRBC given. Bleeding largely stopped from skin tears.     PRESSORS: [ ] YES [x] NO  WHICH:  DOSE:    ANTIBIOTICS:                  DATE STARTED:  ANTIBIOTICS:                  DATE STARTED:  ANTIBIOTICS:                  DATE STARTED:    MEDICATIONS  (STANDING):  sodium chloride 0.9%. 1000milliLiter(s) IV Continuous <Continuous>    MEDICATIONS  (PRN):      Drug Dosing Weight  Height (cm): 170.2 (09 Apr 2017 21:32)  Weight (kg): 81.6 (11 Apr 2017 10:09)  BMI (kg/m2): 28.2 (11 Apr 2017 10:09)  BSA (m2): 1.93 (11 Apr 2017 10:09)    CENTRAL LINE: [ ] YES [ ] NO  LOCATION:   DATE INSERTED:  REMOVE: [ ] YES [ ] NO  EXPLAIN:    GRULLON: [ ] YES [ ] NO    DATE INSERTED:  REMOVE: [ ] YES [ ] NO  EXPLAIN:    A-LINE: [ ] YES [ ] NO  LOCATION:   DATE INSERTED:  REMOVE: [ ] YES [ ] NO  EXPLAIN:    PAST MEDICAL & SURGICAL HISTORY:  Anemia  Pulmonary hypertension  COPD (chronic obstructive pulmonary disease)  CAD (coronary artery disease)  Atrial fibrillation  Hyperlipidemia  Hypertension  Spinal fusion failure, initial encounter  Tubal ligation status  Sinusitis  Spinal stenosis  Pneumonia due to organism  No pertinent past medical history  H/O aortic valve replacement  H/O spinal fusion  H/O cataract  Cystocele  H/O tricuspid valve annuloplasty  H/O mitral valve repair      ICU Vital Signs Last 24 Hrs  T(C): 36.4, Max: 36.4 (04-11 @ 16:09)  T(F): 97.5, Max: 97.5 (04-11 @ 16:09)  HR: 63 (50 - 65)  BP: 155/65 (101/64 - 155/65)  BP(mean): 94 (80 - 107)  ABP: --  ABP(mean): --  RR: 14 (14 - 21)  SpO2: 94% (93% - 100%)          I&O's Detail    I & Os for current day (as of 12 Apr 2017 05:40)  =============================================  IN:    Packed Red Blood Cells: 982 ml    Sodium Chloride 0.9% IV Bolus: 750 ml    sodium chloride 0.9%.: 725 ml    Solution: 200 ml    Solution: 100 ml    Solution: 50 ml    Total IN: 2807 ml  ---------------------------------------------  OUT:    Indwelling Catheter - Urethral: 210 ml    Total OUT: 210 ml  ---------------------------------------------  Total NET: 2597 ml          PHYSICAL EXAM:    Constitutional: Well appearing elderly F in NAD.   Neck: No JVD, trachea midline.   Respiratory: CTA B/L good BS B/L no W/R/R.   Cardiovascular: Irregular rhythm S1S2 no murmurs/R/G.  Gastrointestinal: Soft, NTND.  Extremities: No peripheral edema, No cyanosis, clubbing   Neurological: Awake and alert. No focal deficits.   Skin: LUE wrapped in pressure dressing, C/D/I. Multiple, numerous ecchymosis throughout all extremities and torso.          LABS:  CBC Full  -  ( 11 Apr 2017 23:18 )  WBC Count : 6.1 K/uL  Hemoglobin : 7.9 g/dL  Hematocrit : 22.8 %  Platelet Count - Automated : 68 K/uL  Mean Cell Volume : 93.8 fl  Mean Cell Hemoglobin : 32.5 pg  Mean Cell Hemoglobin Concentration : 34.6 g/dL  Auto Neutrophil # : x  Auto Lymphocyte # : x  Auto Monocyte # : x  Auto Eosinophil # : x  Auto Basophil # : x  Auto Neutrophil % : x  Auto Lymphocyte % : x  Auto Monocyte % : x  Auto Eosinophil % : x  Auto Basophil % : x    04-11    132<L>  |  88<L>  |  190.0<H>  ----------------------------<  147<H>  3.5   |  15.0<L>  |  6.53<H>    Ca    6.0<LL>      11 Apr 2017 23:18  Mg     2.1     04-11    TPro  5.9<L>  /  Alb  3.5  /  TBili  1.3  /  DBili  x   /  AST  22  /  ALT  19  /  AlkPhos  113  04-11    PT/INR - ( 11 Apr 2017 23:18 )   PT: 17.5 sec;   INR: 1.58 ratio         PTT - ( 11 Apr 2017 23:18 )  PTT:38.7 sec      Assessment and Plan:    A    76F  HD # 2    Here for:    1. Acute blood loss anemia  2. Acquired coagulapathy s/p reversal  3. ARF (on chronic)  4. Afib  5. Bradycardia  6. Valvular disease  7. Oliguria    P:          Neuro: Analgesia. Avoid deleriogenic agents.    CV: Continue hemodynamic monitoring. f/u echo.    Pulm: Pulmonary toilet.  Continue incentive spirometer.  Chest PT.  Encourage OOB to chair and ambulation     GI/Nutrition: Bowel Regimen, continue diet.    /Renal: Monitor UOP. Monitor BMP.  Replete Lytes as needed. Consider renal consult.    HEME- DVT prophylaxis, SCDs. Start heparin SC for    ID: No issues.    Lines/Tubes: Cont grullon.    Endo: No issues.    Skin: Skin care for skin tears.    Dispo: Cont critical care. f/u labs, endpoints.

## 2017-04-12 NOTE — CONSULT NOTE ADULT - ASSESSMENT
Advanced Oliguric renal failure   No hydro on CT   + Uremic sequelae with contributing diathesis   I had long talk with the pt and daughter Milagro  They consent for initiation of HD today ( consent signed by daughter )   HD today and in am ( see orders , no heparin , UF 1 Kg )   SICU team to place femoral gracia   Vascular Surgery called to place permacath in a few days   Check Hep B and C and PPD     Anemia - Coumadin held   S/P several transfusions   Iron stores and CBC in am   Add epogen with HD today   Add folate     RO - Add Phoslo 2 tid   Check PTH and 25 Vit D   Repeat in am

## 2017-04-13 LAB
ANION GAP SERPL CALC-SCNC: 19 MMOL/L — HIGH (ref 5–17)
ANION GAP SERPL CALC-SCNC: 26 MMOL/L — HIGH (ref 5–17)
ANISOCYTOSIS BLD QL: SLIGHT — SIGNIFICANT CHANGE UP
BLD GP AB SCN SERPL QL: SIGNIFICANT CHANGE UP
BUN SERPL-MCNC: 124 MG/DL — HIGH (ref 8–20)
BUN SERPL-MCNC: 66 MG/DL — HIGH (ref 8–20)
CALCIUM SERPL-MCNC: 6.4 MG/DL — CRITICAL LOW (ref 8.6–10.2)
CALCIUM SERPL-MCNC: 7.4 MG/DL — LOW (ref 8.6–10.2)
CHLORIDE SERPL-SCNC: 94 MMOL/L — LOW (ref 98–107)
CHLORIDE SERPL-SCNC: 97 MMOL/L — LOW (ref 98–107)
CO2 SERPL-SCNC: 18 MMOL/L — LOW (ref 22–29)
CO2 SERPL-SCNC: 23 MMOL/L — SIGNIFICANT CHANGE UP (ref 22–29)
CREAT SERPL-MCNC: 3.63 MG/DL — HIGH (ref 0.5–1.3)
CREAT SERPL-MCNC: 5.51 MG/DL — HIGH (ref 0.5–1.3)
DACRYOCYTES BLD QL SMEAR: SLIGHT — SIGNIFICANT CHANGE UP
ELLIPTOCYTES BLD QL SMEAR: SLIGHT — SIGNIFICANT CHANGE UP
FERRITIN SERPL-MCNC: 200.5 NG/ML — SIGNIFICANT CHANGE UP (ref 11–306)
GLUCOSE SERPL-MCNC: 172 MG/DL — HIGH (ref 70–115)
GLUCOSE SERPL-MCNC: 94 MG/DL — SIGNIFICANT CHANGE UP (ref 70–115)
HBV CORE AB SER-ACNC: SIGNIFICANT CHANGE UP
HBV CORE IGM SER-ACNC: SIGNIFICANT CHANGE UP
HBV SURFACE AB SER-ACNC: SIGNIFICANT CHANGE UP
HBV SURFACE AG SER-ACNC: SIGNIFICANT CHANGE UP
HCT VFR BLD CALC: 22.9 % — LOW (ref 37–47)
HCT VFR BLD CALC: 22.9 % — LOW (ref 37–47)
HCV AB S/CO SERPL IA: 0.06 S/CO — SIGNIFICANT CHANGE UP
HCV AB SERPL-IMP: SIGNIFICANT CHANGE UP
HGB BLD-MCNC: 7.6 G/DL — LOW (ref 12–16)
HGB BLD-MCNC: 7.6 G/DL — LOW (ref 12–16)
IRON SATN MFR SERPL: 121 UG/DL — SIGNIFICANT CHANGE UP (ref 37–145)
IRON SATN MFR SERPL: 35 % — SIGNIFICANT CHANGE UP (ref 14–50)
LYMPHOCYTES # BLD AUTO: 6 % — LOW (ref 20–55)
MACROCYTES BLD QL: SLIGHT — SIGNIFICANT CHANGE UP
MAGNESIUM SERPL-MCNC: 1.8 MG/DL — SIGNIFICANT CHANGE UP (ref 1.8–2.5)
MAGNESIUM SERPL-MCNC: 1.9 MG/DL — SIGNIFICANT CHANGE UP (ref 1.8–2.5)
MCHC RBC-ENTMCNC: 31.1 PG — HIGH (ref 27–31)
MCHC RBC-ENTMCNC: 31.7 PG — HIGH (ref 27–31)
MCHC RBC-ENTMCNC: 33.2 G/DL — SIGNIFICANT CHANGE UP (ref 32–36)
MCHC RBC-ENTMCNC: 33.2 G/DL — SIGNIFICANT CHANGE UP (ref 32–36)
MCV RBC AUTO: 93.9 FL — SIGNIFICANT CHANGE UP (ref 81–99)
MCV RBC AUTO: 95.4 FL — SIGNIFICANT CHANGE UP (ref 81–99)
MICROCYTES BLD QL: SLIGHT — SIGNIFICANT CHANGE UP
MONOCYTES NFR BLD AUTO: 5 % — SIGNIFICANT CHANGE UP (ref 3–10)
NEUTROPHILS NFR BLD AUTO: 88 % — HIGH (ref 37–73)
NEUTS BAND # BLD: 1 % — SIGNIFICANT CHANGE UP (ref 0–8)
OVALOCYTES BLD QL SMEAR: SLIGHT — SIGNIFICANT CHANGE UP
PHOSPHATE SERPL-MCNC: 3.4 MG/DL — SIGNIFICANT CHANGE UP (ref 2.4–4.7)
PHOSPHATE SERPL-MCNC: 6.7 MG/DL — HIGH (ref 2.4–4.7)
PLAT MORPH BLD: SIGNIFICANT CHANGE UP
PLATELET # BLD AUTO: 53 K/UL — LOW (ref 150–400)
PLATELET # BLD AUTO: 59 K/UL — LOW (ref 150–400)
POIKILOCYTOSIS BLD QL AUTO: SLIGHT — SIGNIFICANT CHANGE UP
POTASSIUM SERPL-MCNC: 3.3 MMOL/L — LOW (ref 3.5–5.3)
POTASSIUM SERPL-MCNC: 3.5 MMOL/L — SIGNIFICANT CHANGE UP (ref 3.5–5.3)
POTASSIUM SERPL-SCNC: 3.3 MMOL/L — LOW (ref 3.5–5.3)
POTASSIUM SERPL-SCNC: 3.5 MMOL/L — SIGNIFICANT CHANGE UP (ref 3.5–5.3)
PTH-INTACT FLD-MCNC: 323 PG/ML — HIGH (ref 15–65)
RBC # BLD: 2.4 M/UL — LOW (ref 4.4–5.2)
RBC # BLD: 2.44 M/UL — LOW (ref 4.4–5.2)
RBC # FLD: 20.1 % — HIGH (ref 11–15.6)
RBC # FLD: 20.8 % — HIGH (ref 11–15.6)
RBC BLD AUTO: ABNORMAL
SCHISTOCYTES BLD QL AUTO: SLIGHT — SIGNIFICANT CHANGE UP
SODIUM SERPL-SCNC: 138 MMOL/L — SIGNIFICANT CHANGE UP (ref 135–145)
SODIUM SERPL-SCNC: 139 MMOL/L — SIGNIFICANT CHANGE UP (ref 135–145)
TIBC SERPL-MCNC: 345 UG/DL — SIGNIFICANT CHANGE UP (ref 220–430)
TRANSFERRIN SERPL-MCNC: 241 MG/DL — SIGNIFICANT CHANGE UP (ref 192–382)
TYPE + AB SCN PNL BLD: SIGNIFICANT CHANGE UP
WBC # BLD: 4.4 K/UL — LOW (ref 4.8–10.8)
WBC # BLD: 5.2 K/UL — SIGNIFICANT CHANGE UP (ref 4.8–10.8)
WBC # FLD AUTO: 4.4 K/UL — LOW (ref 4.8–10.8)
WBC # FLD AUTO: 5.2 K/UL — SIGNIFICANT CHANGE UP (ref 4.8–10.8)

## 2017-04-13 PROCEDURE — 99233 SBSQ HOSP IP/OBS HIGH 50: CPT

## 2017-04-13 RX ORDER — ERYTHROPOIETIN 10000 [IU]/ML
10000 INJECTION, SOLUTION INTRAVENOUS; SUBCUTANEOUS
Qty: 0 | Refills: 0 | Status: DISCONTINUED | OUTPATIENT
Start: 2017-04-13 | End: 2017-04-18

## 2017-04-13 RX ORDER — POTASSIUM CHLORIDE 20 MEQ
20 PACKET (EA) ORAL
Qty: 0 | Refills: 0 | Status: COMPLETED | OUTPATIENT
Start: 2017-04-13 | End: 2017-04-13

## 2017-04-13 RX ORDER — POTASSIUM CHLORIDE 20 MEQ
40 PACKET (EA) ORAL EVERY 4 HOURS
Qty: 0 | Refills: 0 | Status: DISCONTINUED | OUTPATIENT
Start: 2017-04-13 | End: 2017-04-13

## 2017-04-13 RX ORDER — POTASSIUM CHLORIDE 20 MEQ
40 PACKET (EA) ORAL ONCE
Qty: 0 | Refills: 0 | Status: COMPLETED | OUTPATIENT
Start: 2017-04-13 | End: 2017-04-13

## 2017-04-13 RX ORDER — CALCIUM GLUCONATE 100 MG/ML
2 VIAL (ML) INTRAVENOUS
Qty: 0 | Refills: 0 | Status: DISCONTINUED | OUTPATIENT
Start: 2017-04-13 | End: 2017-04-13

## 2017-04-13 RX ORDER — ALBUMIN HUMAN 25 %
50 VIAL (ML) INTRAVENOUS ONCE
Qty: 0 | Refills: 0 | Status: COMPLETED | OUTPATIENT
Start: 2017-04-13 | End: 2017-04-13

## 2017-04-13 RX ORDER — CALCIUM GLUCONATE 100 MG/ML
2 VIAL (ML) INTRAVENOUS ONCE
Qty: 0 | Refills: 0 | Status: COMPLETED | OUTPATIENT
Start: 2017-04-13 | End: 2017-04-13

## 2017-04-13 RX ORDER — ALBUMIN HUMAN 25 %
50 VIAL (ML) INTRAVENOUS
Qty: 0 | Refills: 0 | Status: DISCONTINUED | OUTPATIENT
Start: 2017-04-13 | End: 2017-04-29

## 2017-04-13 RX ADMIN — HEPARIN SODIUM 5000 UNIT(S): 5000 INJECTION INTRAVENOUS; SUBCUTANEOUS at 06:08

## 2017-04-13 RX ADMIN — Medication 200 GRAM(S): at 07:09

## 2017-04-13 RX ADMIN — Medication 20 MILLIGRAM(S): at 06:08

## 2017-04-13 RX ADMIN — Medication 100 MILLIEQUIVALENT(S): at 07:10

## 2017-04-13 RX ADMIN — SODIUM CHLORIDE 75 MILLILITER(S): 9 INJECTION INTRAMUSCULAR; INTRAVENOUS; SUBCUTANEOUS at 06:08

## 2017-04-13 RX ADMIN — HEPARIN SODIUM 5000 UNIT(S): 5000 INJECTION INTRAVENOUS; SUBCUTANEOUS at 21:31

## 2017-04-13 RX ADMIN — Medication 3 MILLILITER(S): at 15:14

## 2017-04-13 RX ADMIN — Medication 3 MILLILITER(S): at 09:06

## 2017-04-13 RX ADMIN — Medication 1 MILLIGRAM(S): at 11:59

## 2017-04-13 RX ADMIN — Medication 40 MILLIEQUIVALENT(S): at 21:30

## 2017-04-13 RX ADMIN — Medication 1334 MILLIGRAM(S): at 08:08

## 2017-04-13 RX ADMIN — Medication 1334 MILLIGRAM(S): at 11:59

## 2017-04-13 RX ADMIN — Medication 3 MILLILITER(S): at 21:23

## 2017-04-13 RX ADMIN — Medication 3 MILLILITER(S): at 00:39

## 2017-04-13 RX ADMIN — Medication 100 MILLIEQUIVALENT(S): at 08:14

## 2017-04-13 RX ADMIN — ATORVASTATIN CALCIUM 40 MILLIGRAM(S): 80 TABLET, FILM COATED ORAL at 21:30

## 2017-04-13 RX ADMIN — Medication 20 MILLIGRAM(S): at 21:31

## 2017-04-13 RX ADMIN — Medication 3 MILLILITER(S): at 04:04

## 2017-04-13 RX ADMIN — Medication 1334 MILLIGRAM(S): at 16:43

## 2017-04-13 RX ADMIN — ATENOLOL 50 MILLIGRAM(S): 25 TABLET ORAL at 08:08

## 2017-04-13 RX ADMIN — Medication 100 MILLIEQUIVALENT(S): at 06:08

## 2017-04-13 RX ADMIN — Medication 50 MILLILITER(S): at 10:59

## 2017-04-13 RX ADMIN — HEPARIN SODIUM 5000 UNIT(S): 5000 INJECTION INTRAVENOUS; SUBCUTANEOUS at 00:28

## 2017-04-13 RX ADMIN — Medication 1 TABLET(S): at 11:59

## 2017-04-13 RX ADMIN — HEPARIN SODIUM 5000 UNIT(S): 5000 INJECTION INTRAVENOUS; SUBCUTANEOUS at 14:05

## 2017-04-13 RX ADMIN — Medication 20 MILLIGRAM(S): at 14:06

## 2017-04-13 RX ADMIN — ERYTHROPOIETIN 10000 UNIT(S): 10000 INJECTION, SOLUTION INTRAVENOUS; SUBCUTANEOUS at 11:41

## 2017-04-13 NOTE — PROGRESS NOTE ADULT - SUBJECTIVE AND OBJECTIVE BOX
NEPHROLOGY INTERVAL HPI/OVERNIGHT EVENTS:    Feels much better post first Hd   Possible permacath tomorrow   Cardio followup noted     MEDICATIONS  (STANDING):  sodium chloride 0.9%. 1000milliLiter(s) IV Continuous <Continuous>  sildenafil (REVATIO) 20milliGRAM(s) Oral three times a day  atorvastatin 40milliGRAM(s) Oral at bedtime  calcium acetate 1334milliGRAM(s) Oral three times a day with meals  folic acid 1milliGRAM(s) Oral daily  ALBUTerol/ipratropium for Nebulization 3milliLiter(s) Nebulizer every 6 hours  lactobacillus acidophilus 1Tablet(s) Oral daily  ATENolol  Tablet 50milliGRAM(s) Oral daily  heparin  Injectable 5000Unit(s) SubCutaneous every 8 hours  albumin human 25% IVPB 50milliLiter(s) IV Intermittent once  albumin human 25% IVPB 50milliLiter(s) IV Intermittent every 1 hour    MEDICATIONS  (PRN):      Allergies    allopurinol (Rash)  codeine (Nausea; Vomiting)  penicillin (Rash)  spironolactone (Unknown)    Intolerances          Vital Signs Last 24 Hrs  T(C): 35.9, Max: 36.6 (04-12 @ 16:20)  T(F): 96.6, Max: 97.8 (04-12 @ 16:20)  HR: 72 (53 - 78)  BP: 107/51 (90/50 - 183/78)  BP(mean): 69 (53 - 112)  RR: 18 (11 - 25)  SpO2: 99% (95% - 100%)  Daily     Daily Weight in k.5 (2017 08:50)  I&O's Detail  I & Os for 24h ending 2017 07:00  =============================================  IN:    sodium chloride 0.9%.: 1875 ml    Sodium Chloride 0.9% IV Bolus: 500 ml    Solution: 200 ml    Oral Fluid: 114 ml    Solution: 100 ml    Total IN: 2789 ml  ---------------------------------------------  OUT:    Other: 1000 ml    Indwelling Catheter - Urethral: 310 ml    Total OUT: 1310 ml  ---------------------------------------------  Total NET: 1479 ml    I & Os for current day (as of 2017 11:01)  =============================================  IN:    Oral Fluid: 240 ml    Solution: 100 ml    Total IN: 340 ml  ---------------------------------------------  OUT:    Indwelling Catheter - Urethral: 50 ml    Total OUT: 50 ml  ---------------------------------------------  Total NET: 290 ml    I&O's Summary  I & Os for 24h ending 2017 07:00  =============================================  IN: 2789 ml / OUT: 1310 ml / NET: 1479 ml    I & Os for current day (as of 2017 11:01)  =============================================  IN: 340 ml / OUT: 50 ml / NET: 290 ml      PHYSICAL EXAM:  GENERAL: NAD, Comfortable flat   HEAD:  No edema   NECK: Supple, No JVD,   CHEST/LUNG:EAE , No wheeze   HEART: Regular rate and rhythm; No rub   ABDOMEN: Soft, No rigidity   EXTREMITIES: min edema , + femoral gracia     LABS:                        7.6    5.2   )-----------( 53       ( 2017 04:52 )             22.9         138  |  94<L>  |  124.0<H>  ----------------------------<  94  3.3<L>   |  18.0<L>  |  5.51<H>    Ca    6.4<LL>      2017 04:52  Phos  6.7       Mg     1.9         TPro  5.3<L>  /  Alb  3.1<L>  /  TBili  3.4<H>  /  DBili  x   /  AST  20  /  ALT  16  /  AlkPhos  97  04-12    PT/INR - ( 2017 05:31 )   PT: 16.4 sec;   INR: 1.48 ratio         PTT - ( 2017 05:31 )  PTT:39.2 sec    Magnesium, Serum: 1.9 mg/dL ( @ 04:52)  Phosphorus Level, Serum: 6.7 mg/dL ( @ 04:52)          RADIOLOGY & ADDITIONAL TESTS:

## 2017-04-13 NOTE — PROGRESS NOTE ADULT - SUBJECTIVE AND OBJECTIVE BOX
INTERVAL HPI/OVERNIGHT EVENTS: Pt remains with oliguria. Crea 7 and . Dressing with significant improvement.  Renal consulted:  Recommend dialysis.  Left Femoral Shiley placed.  Vascular consulted and plan to place permacath on 4/14. O/N: 1L removed with HD today. Pt c/o of chest congestion; duonebs, chest PT ordered.     PRESSORS: [ ] YES [x] NO  WHICH:  DOSE:    ANTIBIOTICS:                  DATE STARTED:  ANTIBIOTICS:                  DATE STARTED:  ANTIBIOTICS:                  DATE STARTED:    MEDICATIONS  (STANDING):  sodium chloride 0.9%. 1000milliLiter(s) IV Continuous <Continuous>  sildenafil (REVATIO) 20milliGRAM(s) Oral three times a day  atorvastatin 40milliGRAM(s) Oral at bedtime  calcium acetate 1334milliGRAM(s) Oral three times a day with meals  folic acid 1milliGRAM(s) Oral daily  ALBUTerol/ipratropium for Nebulization 3milliLiter(s) Nebulizer every 6 hours  lactobacillus acidophilus 1Tablet(s) Oral daily  ATENolol  Tablet 50milliGRAM(s) Oral daily  heparin  Injectable 5000Unit(s) SubCutaneous every 8 hours    MEDICATIONS  (PRN):      Drug Dosing Weight  Height (cm): 170.2 (12 Apr 2017 05:00)  Weight (kg): 85.1 (12 Apr 2017 05:00)  BMI (kg/m2): 29.4 (12 Apr 2017 05:00)  BSA (m2): 1.97 (12 Apr 2017 05:00)    CENTRAL LINE: [x] YES [ ] NO  LOCATION:   DATE INSERTED:  REMOVE: [ ] YES [x] NO  EXPLAIN:    GRULLON: [x] YES [ ] NO    DATE INSERTED:  REMOVE: [ ] YES [x] NO  EXPLAIN:    A-LINE: [ ] YES [x] NO  LOCATION:   DATE INSERTED:  REMOVE: [ ] YES [ ] NO  EXPLAIN:    PAST MEDICAL & SURGICAL HISTORY:  Anemia  Pulmonary hypertension  COPD (chronic obstructive pulmonary disease)  CAD (coronary artery disease)  Atrial fibrillation  Hyperlipidemia  Hypertension  Spinal fusion failure, initial encounter  Tubal ligation status  Sinusitis  Spinal stenosis  Pneumonia due to organism  No pertinent past medical history  H/O aortic valve replacement  H/O spinal fusion  H/O cataract  Cystocele  H/O tricuspid valve annuloplasty  H/O mitral valve repair      ICU Vital Signs Last 24 Hrs  T(C): 36.3, Max: 36.6 (04-12 @ 08:00)  T(F): 97.3, Max: 97.8 (04-12 @ 08:00)  HR: 58 (56 - 65)  BP: 96/48 (96/48 - 187/80)  BP(mean): 66 (66 - 115)  ABP: --  ABP(mean): --  RR: 20 (11 - 35)  SpO2: 100% (93% - 100%)          I&O's Detail  I & Os for 24h ending 12 Apr 2017 07:00  =============================================  IN:    Packed Red Blood Cells: 982 ml    sodium chloride 0.9%.: 800 ml    Sodium Chloride 0.9% IV Bolus: 750 ml    Solution: 200 ml    Solution: 100 ml    Solution: 50 ml    Total IN: 2882 ml  ---------------------------------------------  OUT:    Indwelling Catheter - Urethral: 210 ml    Total OUT: 210 ml  ---------------------------------------------  Total NET: 2672 ml    I & Os for current day (as of 13 Apr 2017 01:49)  =============================================  IN:    sodium chloride 0.9%.: 1500 ml    Sodium Chloride 0.9% IV Bolus: 500 ml    Oral Fluid: 114 ml    Total IN: 2114 ml  ---------------------------------------------  OUT:    Other: 1000 ml    Indwelling Catheter - Urethral: 250 ml    Total OUT: 1250 ml  ---------------------------------------------  Total NET: 864 ml          PHYSICAL EXAM:    Constitutional: Well appearing elderly F in NAD.   Neck: No JVD, trachea midline.   Respiratory: CTA B/L good BS B/L no W/R/R.   Cardiovascular: Irregular rhythm S1S2 no murmurs/R/G.  Gastrointestinal: Soft, NTND. L femoral shiley in place.  Extremities: No peripheral edema, No cyanosis, clubbing   Neurological: Awake and alert. No focal deficits.   Skin: LUE wrapped in pressure dressing, C/D/I. Multiple, numerous ecchymosis throughout all extremities and torso.          LABS:  CBC Full  -  ( 12 Apr 2017 05:31 )  WBC Count : 6.2 K/uL  Hemoglobin : 8.5 g/dL  Hematocrit : 24.7 %  Platelet Count - Automated : 60 K/uL  Mean Cell Volume : 92.5 fl  Mean Cell Hemoglobin : 31.8 pg  Mean Cell Hemoglobin Concentration : 34.4 g/dL  Auto Neutrophil # : 5.2 K/uL  Auto Lymphocyte # : 0.3 K/uL  Auto Monocyte # : 0.6 K/uL  Auto Eosinophil # : 0.0 K/uL  Auto Basophil # : x  Auto Neutrophil % : 84.7 %  Auto Lymphocyte % : 4.4 %  Auto Monocyte % : 9.9 %  Auto Eosinophil % : 0.2 %  Auto Basophil % : x    04-12    134<L>  |  87<L>  |  190.0<H>  ----------------------------<  134<H>  3.6   |  15.0<L>  |  6.93<H>    Ca    6.3<LL>      12 Apr 2017 05:31  Phos  10.1     04-12  Mg     2.0     04-12    TPro  5.3<L>  /  Alb  3.1<L>  /  TBili  3.4<H>  /  DBili  x   /  AST  20  /  ALT  16  /  AlkPhos  97  04-12    PT/INR - ( 12 Apr 2017 05:31 )   PT: 16.4 sec;   INR: 1.48 ratio         PTT - ( 12 Apr 2017 05:31 )  PTT:39.2 sec      Assessment and Plan:    A    76F  HD # 3    Here for:    1. Acute blood loss anemia  2. Acquired coagulapathy s/p reversal  3. ARF (on chronic)  4. Afib  5. Bradycardia  6. Valvular disease  7. Oliguria    P:    Neuro: Analgesia. Avoid deleriogenic agents.    CV: Continue hemodynamic monitoring. f/u echo.    Pulm: Pulmonary toilet.  Continue incentive spirometer.  Chest PT.  Encourage OOB to chair and ambulation     GI/Nutrition: Bowel Regimen, continue diet.    /Renal: Monitor UOP. Monitor BMP.  Replete Lytes as needed. Consider renal consult.    HEME- DVT prophylaxis, SCDs. Start heparin SC for    ID: No issues.    Lines/Tubes: Cont grullon.    Endo: No issues.    Skin: Skin care for skin tears.    Dispo: Cont critical care. f/u labs, endpoints. INTERVAL HPI/OVERNIGHT EVENTS: Pt remains with oliguria. Crea 7 and . Dressing with significant improvement.  Renal consulted:  Recommend dialysis.  Left Femoral Shiley placed.  Vascular consulted and plan to place permacath on 4/14. O/N: 1L removed with HD today. Pt c/o of chest congestion; duonebs, chest PT ordered.     PRESSORS: [ ] YES [x] NO  WHICH:  DOSE:    ANTIBIOTICS:                  DATE STARTED:  ANTIBIOTICS:                  DATE STARTED:  ANTIBIOTICS:                  DATE STARTED:    MEDICATIONS  (STANDING):  sodium chloride 0.9%. 1000milliLiter(s) IV Continuous <Continuous>  sildenafil (REVATIO) 20milliGRAM(s) Oral three times a day  atorvastatin 40milliGRAM(s) Oral at bedtime  calcium acetate 1334milliGRAM(s) Oral three times a day with meals  folic acid 1milliGRAM(s) Oral daily  ALBUTerol/ipratropium for Nebulization 3milliLiter(s) Nebulizer every 6 hours  lactobacillus acidophilus 1Tablet(s) Oral daily  ATENolol  Tablet 50milliGRAM(s) Oral daily  heparin  Injectable 5000Unit(s) SubCutaneous every 8 hours    MEDICATIONS  (PRN):      Drug Dosing Weight  Height (cm): 170.2 (12 Apr 2017 05:00)  Weight (kg): 85.1 (12 Apr 2017 05:00)  BMI (kg/m2): 29.4 (12 Apr 2017 05:00)  BSA (m2): 1.97 (12 Apr 2017 05:00)    CENTRAL LINE: [x] YES [ ] NO  LOCATION:   DATE INSERTED:  REMOVE: [ ] YES [x] NO  EXPLAIN:    GRULLON: [x] YES [ ] NO    DATE INSERTED:  REMOVE: [ ] YES [x] NO  EXPLAIN:    A-LINE: [ ] YES [x] NO  LOCATION:   DATE INSERTED:  REMOVE: [ ] YES [ ] NO  EXPLAIN:    PAST MEDICAL & SURGICAL HISTORY:  Anemia  Pulmonary hypertension  COPD (chronic obstructive pulmonary disease)  CAD (coronary artery disease)  Atrial fibrillation  Hyperlipidemia  Hypertension  Spinal fusion failure, initial encounter  Tubal ligation status  Sinusitis  Spinal stenosis  Pneumonia due to organism  No pertinent past medical history  H/O aortic valve replacement  H/O spinal fusion  H/O cataract  Cystocele  H/O tricuspid valve annuloplasty  H/O mitral valve repair      ICU Vital Signs Last 24 Hrs  T(C): 36.3, Max: 36.6 (04-12 @ 08:00)  T(F): 97.3, Max: 97.8 (04-12 @ 08:00)  HR: 58 (56 - 65)  BP: 96/48 (96/48 - 187/80)  BP(mean): 66 (66 - 115)  ABP: --  ABP(mean): --  RR: 20 (11 - 35)  SpO2: 100% (93% - 100%)          I&O's Detail  I & Os for 24h ending 12 Apr 2017 07:00  =============================================  IN:    Packed Red Blood Cells: 982 ml    sodium chloride 0.9%.: 800 ml    Sodium Chloride 0.9% IV Bolus: 750 ml    Solution: 200 ml    Solution: 100 ml    Solution: 50 ml    Total IN: 2882 ml  ---------------------------------------------  OUT:    Indwelling Catheter - Urethral: 210 ml    Total OUT: 210 ml  ---------------------------------------------  Total NET: 2672 ml    I & Os for current day (as of 13 Apr 2017 01:49)  =============================================  IN:    sodium chloride 0.9%.: 1500 ml    Sodium Chloride 0.9% IV Bolus: 500 ml    Oral Fluid: 114 ml    Total IN: 2114 ml  ---------------------------------------------  OUT:    Other: 1000 ml    Indwelling Catheter - Urethral: 250 ml    Total OUT: 1250 ml  ---------------------------------------------  Total NET: 864 ml          PHYSICAL EXAM:    Constitutional: Well appearing elderly F in NAD.   Neck: No JVD, trachea midline.   Respiratory: CTA B/L good BS B/L no W/R/R.   Cardiovascular: Irregular rhythm S1S2 no murmurs/R/G.  Gastrointestinal: Soft, NTND. L femoral shiley in place.  Extremities: No peripheral edema, No cyanosis, clubbing   Neurological: Awake and alert. No focal deficits.   Skin: LUE wrapped in pressure dressing, C/D/I. Multiple, numerous ecchymosis throughout all extremities and torso.          LABS:                        7.6    5.2   )-----------( 53       ( 13 Apr 2017 04:52 )             22.9   04-13    138  |  94<L>  |  124.0<H>  ----------------------------<  94  3.3<L>   |  18.0<L>  |  5.51<H>    Ca    6.4<LL>      13 Apr 2017 04:52  Phos  6.7     04-13  Mg     1.9     04-13    TPro  5.3<L>  /  Alb  3.1<L>  /  TBili  3.4<H>  /  DBili  x   /  AST  20  /  ALT  16  /  AlkPhos  97  04-12   PT/INR - ( 12 Apr 2017 05:31 )   PT: 16.4 sec;   INR: 1.48 ratio         PTT - ( 12 Apr 2017 05:31 )  PTT:39.2 sec      Assessment and Plan:    A    76F  HD # 3    Here for:    1. Acute blood loss anemia  2. Acquired coagulapathy s/p reversal  3. ARF (on chronic)  4. Afib  5. Bradycardia  6. Valvular disease  7. Oliguria    P:    Neuro: Analgesia. Avoid deleriogenic agents.    CV: Continue hemodynamic monitoring. f/u echo.    Pulm: Pulmonary toilet.  Continue incentive spirometer.  Chest PT.  Encourage OOB to chair and ambulation     GI/Nutrition: Bowel Regimen, continue diet.    /Renal: Monitor UOP. Monitor BMP.  Replete Lytes as needed. Consider renal consult.    HEME- DVT prophylaxis, SCDs. Start heparin SC for    ID: No issues.    Lines/Tubes: Cont grullon.    Endo: No issues.    Skin: Skin care for skin tears.    Dispo: Cont critical care. f/u labs, endpoints.

## 2017-04-13 NOTE — PROGRESS NOTE ADULT - ASSESSMENT
This is 76 year old woman with PMH of Afib on coumadin admitted under ACS due to fall and hemorrhagic shock, on admission INR was very high, anemic,  Acute on CKD, resuscitated with PRBC, IVF, PCC, renal function remain abnormal started on HD.     A/P    >ESRD - newly started on HD  appreciate renal input, HD as per renal  schedule for permacath in am     >h/o A.fib  appreciate cardiology input, holding AC as per cardiology, also schedule for permacath in am   cont. atenolol    >Anemia - s/p total 3 unit prbc, hg is still low, transfuse PRBC during hd  f/u cbc, normal ferritin   epo as per renal     >Skin tear in left arm- management as per surgery     >HTN- stable,c ont, home medication    >HLD- cont. statin    >Pulmonary HTN - cont. sildnafil     >DVT PPX - heparin

## 2017-04-13 NOTE — PROGRESS NOTE ADULT - SUBJECTIVE AND OBJECTIVE BOX
Internal Medicine Hospitalist - Dr. Smith - Medicine transfer note     DUSTIN HERNANDEZ    [id number]    76y      Female    Patient is a 76y old  Female who presents with a chief complaint of s/p fall, supratherapeutic INR (11 Apr 2017 15:16)    HPI:  77 yo f with hx of bovine valve replacement, afib on coumadin, chronic kidney insufficiency, s/p fall from standing 3 days ago. Per patient, she slipped, and as she was bracing her fall went to grab the kitchen counter and missed, causing some skin tears to her LUE. She's also had a nose bleed for 2-3 weeks, now more frequent. She had been in the ED on Sunday, got an XR which showed no fracture and was sent home. She comes today due to worsening epistaxis and bleeding from her skin tears. c/o SOB, headache/dizziness. Denies hitting head or LOC. (11 Apr 2017 15:16)    INTERVAL HPI/ OVERNIGHT EVENTS: Patient is seen and examined, this is a 76 year old woman with PMH of Afib on coumadin admitted under ACS due to fall and hemorrhagic shock, on admission INR was very high, anemic,  Acute on CKD, started on HD. Pt report she is feeling better, denied chest pain, SOB, abd. pain, nausea and vomiting     REVIEW OF SYSTEMS:    Denied fever, chills, abd. pain, nausea, vomiting, chest pain, SOB, headache, dizziness    PAST MEDICAL & SURGICAL HISTORY:  Anemia  Pulmonary hypertension  COPD (chronic obstructive pulmonary disease)  CAD (coronary artery disease)  Atrial fibrillation  Hyperlipidemia  Hypertension  Spinal fusion failure, initial encounter  Tubal ligation status  Sinusitis  Spinal stenosis  Pneumonia due to organism  No pertinent past medical history  H/O aortic valve replacement  H/O spinal fusion  H/O cataract  Cystocele  H/O tricuspid valve annuloplasty  H/O mitral valve repair      PHYSICAL EXAM:    Vital Signs Last 24 Hrs  T(C): 37.1, Max: 37.1 (04-13 @ 16:00)  T(F): 98.8, Max: 98.8 (04-13 @ 16:00)  HR: 75 (53 - 78)  BP: 96/46 (90/50 - 124/60)  BP(mean): 66 (53 - 86)  RR: 17 (13 - 30)  SpO2: 97% (96% - 100%)    GENERAL: NAD  HEENT: EOMI  Neck: supple  CHEST/LUNG: positive air entry   HEART: S1S2+ audible  ABDOMEN: Soft, Nontender, Nondistended; Bowel sounds present  EXTREMITIES:  left arm- dressing intact for skin tear   CNS: AAO X 3  Psychiatry: normal mood    LABS:                        7.6    4.4   )-----------( x        ( 13 Apr 2017 18:40 )             22.9     04-13    138  |  94<L>  |  124.0<H>  ----------------------------<  94  3.3<L>   |  18.0<L>  |  5.51<H>    Ca    6.4<LL>      13 Apr 2017 04:52  Phos  6.7     04-13  Mg     1.9     04-13    TPro  5.3<L>  /  Alb  3.1<L>  /  TBili  3.4<H>  /  DBili  x   /  AST  20  /  ALT  16  /  AlkPhos  97  04-12    PT/INR - ( 12 Apr 2017 05:31 )   PT: 16.4 sec;   INR: 1.48 ratio         PTT - ( 12 Apr 2017 05:31 )  PTT:39.2 sec        MEDICATIONS  (STANDING):  sildenafil (REVATIO) 20milliGRAM(s) Oral three times a day  atorvastatin 40milliGRAM(s) Oral at bedtime  calcium acetate 1334milliGRAM(s) Oral three times a day with meals  folic acid 1milliGRAM(s) Oral daily  ALBUTerol/ipratropium for Nebulization 3milliLiter(s) Nebulizer every 6 hours  lactobacillus acidophilus 1Tablet(s) Oral daily  ATENolol  Tablet 50milliGRAM(s) Oral daily  heparin  Injectable 5000Unit(s) SubCutaneous every 8 hours  epoetin jaswinder Injectable 21641Udqi(s) IV Push <User Schedule>    MEDICATIONS  (PRN):  albumin human 25% IVPB 50milliLiter(s) IV Intermittent every 1 hour PRN for sbp < 100      RADIOLOGY & ADDITIONAL TEST  EXAM:  ECHO TRANSTHORACIC COMP W DOPP    PROCEDURE DATE:  Apr 11 2017   Summary:   1. Left ventricular ejection fraction, by visual estimation, is 55 to   60%.   2. Normal global left ventricular systolic function.   3. Spectral Doppler shows restrictive pattern of left ventricular   myocardial filling (Grade III diastolic dysfunction). Elevated left   atrial and left ventricular end-diastolic pressures.   4. Normal left ventricular internal cavity size.   5. Severely enlarged right ventricle. Severely reduced RV systolic   function.   6. Severely enlarged left atrium.   7. Severely dilated right atrium.   8. Status-post mitral annular ring insertion.   9. Thickening and calcification of the anterior and posterior mitral   valve leaflets.  10. Mitral valve mean gradient is 4.4 mmHg consistent with mild mitral   stenosis.  11. Mild to moderate mitral valve regurgitation.  12. Bioprosthesis in the aortic position demonstrating normal function.  13. Mild aortic regurgitation.  14. Status post tricuspid valve repair.  15. Moderate-severe tricuspid regurgitation.  16. Moderate pulmonic valve regurgitation.  17. Estimated pulmonary artery systolic pressure is 56.8 mmHg assuming a   right atrial pressure of 15 mmHg, which is consistent with moderate   pulmonary hypertension.  18. There is no evidence of pericardial effusion.     EXAM:  CT BRAIN                        PROCEDURE DATE:  04/11/2017  IMPRESSION:  Mild chronic microvascular changes without evidence of an   acute transcortical infarction or hemorrhage. MR is a more sensitive   imaging modality for the evaluation of an acute infarction. Moderate   bilateral facial venous air and bilateral cavernous carotid artery air   are noted, etiology unknown, possibly iatrogenic.     EXAM:  CT ABDOMEN AND PELVIS                        PROCEDURE DATE:  04/11/2017    IMPRESSION:  Cholelithiasis.  Moderate right pleural effusion.  Fibroid uterus.  No evidence of intra-abdominal or retroperitoneal bleed..    EXAM:  CT CHEST W O CON                        PROCEDURE DATE:  05/24/2016  IMPRESSION: Bilateral airspace opacities, favor pneumonia, although may   be was superimposed mild pulmonary edema and small effusions. Follow-up   to resolution is recommended. 6 mm right lung base nodule, 3-6 months   follow-up is recommended. Other incidental comments as above.

## 2017-04-13 NOTE — PROGRESS NOTE ADULT - SUBJECTIVE AND OBJECTIVE BOX
[facility]                                   [address] [place] [address]                                                      PHONE: [phone]                                                         FAX: [phone]  [email or web address]  ---------------------------------------------------------------------------------------------------------------------------------    Overnight events/patient complaints:  Pt feels well, currently getting dialysis    allopurinol (Rash)  codeine (Nausea; Vomiting)  penicillin (Rash)  spironolactone (Unknown)    MEDICATIONS  (STANDING):  sodium chloride 0.9%. 1000milliLiter(s) IV Continuous <Continuous>  sildenafil (REVATIO) 20milliGRAM(s) Oral three times a day  atorvastatin 40milliGRAM(s) Oral at bedtime  calcium acetate 1334milliGRAM(s) Oral three times a day with meals  folic acid 1milliGRAM(s) Oral daily  ALBUTerol/ipratropium for Nebulization 3milliLiter(s) Nebulizer every 6 hours  lactobacillus acidophilus 1Tablet(s) Oral daily  ATENolol  Tablet 50milliGRAM(s) Oral daily  heparin  Injectable 5000Unit(s) SubCutaneous every 8 hours    MEDICATIONS  (PRN):      Vital Signs Last 24 Hrs  T(C): 36.2, Max: 36.6 (04-12 @ 16:20)  T(F): 97.2, Max: 97.8 (04-12 @ 16:20)  HR: 61 (53 - 66)  BP: 99/47 (90/50 - 187/80)  BP(mean): 68 (53 - 115)  RR: 16 (11 - 35)  SpO2: 100% (93% - 100%)  ICU Vital Signs Last 24 Hrs  DUSTIN HERNANDEZ  I&O's Detail    I & Os for current day (as of 13 Apr 2017 08:33)  =============================================  IN:    sodium chloride 0.9%.: 1875 ml    Sodium Chloride 0.9% IV Bolus: 500 ml    Solution: 200 ml    Oral Fluid: 114 ml    Solution: 100 ml    Total IN: 2789 ml  ---------------------------------------------  OUT:    Other: 1000 ml    Indwelling Catheter - Urethral: 310 ml    Total OUT: 1310 ml  ---------------------------------------------  Total NET: 1479 ml    Drug Dosing Weight  DUSTIN HERNANDEZ      PHYSICAL EXAM:  General: Appears well developed, well nourished alert and cooperative.  HEENT: Head; normocephalic, atraumatic.  Eyes: Pupils reactive, cornea wnl.  Neck: Supple, no nodes adenopathy, no NVD or carotid bruit or thyromegaly.  CARDIOVASCULAR: Normal S1 and S2, No murmur, rub, gallop or lift.   LUNGS: No rales, rhonchi or wheeze. Normal breath sounds bilaterally.  ABDOMEN: Soft, nontender without mass or organomegaly. bowel sounds normoactive.  EXTREMITIES: No clubbing, cyanosis or edema. Distal pulses wnl.   SKIN: warm and dry with normal turgor.  NEURO: Alert/oriented x 3/normal motor exam. No pathologic reflexes.    PSYCH: normal affect.        LABS:                        7.6    5.2   )-----------( 53       ( 13 Apr 2017 04:52 )             22.9     04-13    138  |  94<L>  |  124.0<H>  ----------------------------<  94  3.3<L>   |  18.0<L>  |  5.51<H>    Ca    6.4<LL>      13 Apr 2017 04:52  Phos  6.7     04-13  Mg     1.9     04-13    TPro  5.3<L>  /  Alb  3.1<L>  /  TBili  3.4<H>  /  DBili  x   /  AST  20  /  ALT  16  /  AlkPhos  97  04-12    DUSTIN HERNANDEZ  CARDIAC MARKERS ( 12 Apr 2017 05:31 )  x     / 0.07 ng/mL / x     / x     / x      CARDIAC MARKERS ( 11 Apr 2017 23:18 )  x     / 0.07 ng/mL / x     / x     / x      CARDIAC MARKERS ( 11 Apr 2017 15:57 )  x     / 0.08 ng/mL / x     / x     / x      CARDIAC MARKERS ( 11 Apr 2017 12:09 )  x     / 0.08 ng/mL / x     / x     / x          PT/INR - ( 12 Apr 2017 05:31 )   PT: 16.4 sec;   INR: 1.48 ratio         PTT - ( 12 Apr 2017 05:31 )  PTT:39.2 sec      RADIOLOGY & ADDITIONAL STUDIES:    INTERPRETATION OF TELEMETRY (personally reviewed): afib      ECHO: Summary:   1. Left ventricular ejection fraction, by visual estimation, is 55 to   60%.   2. Normal global left ventricular systolic function.   3. Spectral Doppler shows restrictive pattern of left ventricular   myocardial filling (Grade III diastolic dysfunction). Elevated left   atrial and left ventricular end-diastolic pressures.   4. Normal left ventricular internal cavity size.   5. Severely enlarged right ventricle. Severely reduced RV systolic   function.   6. Severely enlarged left atrium.   7. Severely dilated right atrium.   8. Status-post mitral annular ring insertion.   9. Thickening and calcification of the anterior and posterior mitral   valve leaflets.  10. Mitral valve mean gradient is 4.4 mmHg consistent with mild mitral   stenosis.  11. Mild to moderate mitral valve regurgitation.  12. Bioprosthesis in the aortic position demonstrating normal function.  13. Mild aortic regurgitation.  14. Status post tricuspid valve repair.  15. Moderate-severe tricuspid regurgitation.  16. Moderate pulmonic valve regurgitation.  17. Estimated pulmonary artery systolic pressure is 56.8 mmHg assuming a   right atrial pressure of 15 mmHg, which is consistent with moderate   pulmonary hypertension.  18. There is no evidence of pericardial effusion.     MD Clara Electronically signed on 4/12/2017 at 9:29:19 AM         ASSESSMENT AND PLAN:  In summary, DUSTIN HERNANDEZ is an 75 yo f with multiple problems - on coumadin for afib- slipped at home and suffered skin tear with bleeding. She's also had a nose bleed for 2-3 weeks. In ER her hgb was markedly decreased and inr>15. PMH+ bio avr with mitral and tricuspid annuloplasty/pulmonary hpt follwed at [facility]/a-fib/hypertension/cri/hyperuricemia/anemia RV failure.     Bleed/coagulopathy/low platelet count: HB stable improved, continue to hold coumadin  Acute on chronic renal failure:getting HD

## 2017-04-13 NOTE — PROGRESS NOTE ADULT - ASSESSMENT
Advanced Oliguric renal failure   No hydro on CT   + Uremic sequelae with contributing diathesis   Vascular Surgery to place permacath possibly tomorrow   F/U Hep B and C and PPD   Add SPA with HD as needed for SBP < 100 during HD     Anemia - Coumadin held   S/P several transfusions   Iron stores OK    Added epogen with HD   Added folate     RO - Added Phoslo 2 tid   Check PTH and 25 Vit D   Repeat in am

## 2017-04-13 NOTE — CHART NOTE - NSCHARTNOTEFT_GEN_A_CORE
Upon Nutritional Assessment by the Registered Dietitian your patient was determined to meet criteria / has evidence of the following diagnosis/diagnoses:          [ ]  Mild Protein Calorie Malnutrition        [x ]  Moderate Protein Calorie Malnutrition        [ ] Severe Protein Calorie Malnutrition        [ ] Unspecified Protein Calorie Malnutrition        [ ] Underweight / BMI <19        [ ] Morbid Obesity / BMI > 40      Findings as based on:  •  Comprehensive nutrition assessment and consultation  •  Calorie counts (nutrient intake analysis)  •  Food acceptance and intake status from observations by staff  •  Follow up  •  Patient education  •  Intervention secondary to interdisciplinary rounds  •   concerns      Treatment:    The following diet has been recommended:  Cristianro BID    PROVIDER Section:     By signing this assessment you are acknowledging and agree with the diagnosis/diagnoses assigned by the Registered Dietitian    Comments:

## 2017-04-14 ENCOUNTER — TRANSCRIPTION ENCOUNTER (OUTPATIENT)
Age: 77
End: 2017-04-14

## 2017-04-14 LAB
ANION GAP SERPL CALC-SCNC: 14 MMOL/L — SIGNIFICANT CHANGE UP (ref 5–17)
ANISOCYTOSIS BLD QL: SLIGHT — SIGNIFICANT CHANGE UP
APTT BLD: 33.7 SEC — SIGNIFICANT CHANGE UP (ref 27.5–37.4)
BUN SERPL-MCNC: 69 MG/DL — HIGH (ref 8–20)
CALCIUM SERPL-MCNC: 6.2 MG/DL — CRITICAL LOW (ref 8.4–10.5)
CALCIUM SERPL-MCNC: 7.6 MG/DL — LOW (ref 8.6–10.2)
CHLORIDE SERPL-SCNC: 99 MMOL/L — SIGNIFICANT CHANGE UP (ref 98–107)
CO2 SERPL-SCNC: 25 MMOL/L — SIGNIFICANT CHANGE UP (ref 22–29)
CREAT SERPL-MCNC: 3.97 MG/DL — HIGH (ref 0.5–1.3)
ELLIPTOCYTES BLD QL SMEAR: SLIGHT — SIGNIFICANT CHANGE UP
EOSINOPHIL NFR BLD AUTO: 2 % — SIGNIFICANT CHANGE UP (ref 0–5)
GLUCOSE SERPL-MCNC: 139 MG/DL — HIGH (ref 70–115)
HCT VFR BLD CALC: 22.1 % — LOW (ref 37–47)
HGB BLD-MCNC: 7.2 G/DL — LOW (ref 12–16)
HYPOCHROMIA BLD QL: SLIGHT — SIGNIFICANT CHANGE UP
INR BLD: 1.18 RATIO — HIGH (ref 0.88–1.16)
LYMPHOCYTES # BLD AUTO: 5 % — LOW (ref 20–55)
MAGNESIUM SERPL-MCNC: 1.8 MG/DL — SIGNIFICANT CHANGE UP (ref 1.8–2.5)
MCHC RBC-ENTMCNC: 31.6 PG — HIGH (ref 27–31)
MCHC RBC-ENTMCNC: 32.6 G/DL — SIGNIFICANT CHANGE UP (ref 32–36)
MCV RBC AUTO: 96.9 FL — SIGNIFICANT CHANGE UP (ref 81–99)
MONOCYTES NFR BLD AUTO: 7 % — SIGNIFICANT CHANGE UP (ref 3–10)
NEUTROPHILS NFR BLD AUTO: 85 % — HIGH (ref 37–73)
NRBC # BLD: 3 /100 — HIGH (ref 0–0)
OVALOCYTES BLD QL SMEAR: SLIGHT — SIGNIFICANT CHANGE UP
PHOSPHATE SERPL-MCNC: 3.3 MG/DL — SIGNIFICANT CHANGE UP (ref 2.4–4.7)
PLAT MORPH BLD: NORMAL — SIGNIFICANT CHANGE UP
PLATELET # BLD AUTO: 55 K/UL — LOW (ref 150–400)
POIKILOCYTOSIS BLD QL AUTO: SLIGHT — SIGNIFICANT CHANGE UP
POLYCHROMASIA BLD QL SMEAR: SLIGHT — SIGNIFICANT CHANGE UP
POTASSIUM SERPL-MCNC: 3.8 MMOL/L — SIGNIFICANT CHANGE UP (ref 3.5–5.3)
POTASSIUM SERPL-SCNC: 3.8 MMOL/L — SIGNIFICANT CHANGE UP (ref 3.5–5.3)
PROTHROM AB SERPL-ACNC: 13 SEC — HIGH (ref 9.8–12.7)
RBC # BLD: 2.28 M/UL — LOW (ref 4.4–5.2)
RBC # FLD: 20.6 % — HIGH (ref 11–15.6)
RBC BLD AUTO: ABNORMAL
SODIUM SERPL-SCNC: 138 MMOL/L — SIGNIFICANT CHANGE UP (ref 135–145)
VARIANT LYMPHS # BLD: 1 % — SIGNIFICANT CHANGE UP (ref 0–6)
WBC # BLD: 4.8 K/UL — SIGNIFICANT CHANGE UP (ref 4.8–10.8)
WBC # FLD AUTO: 4.8 K/UL — SIGNIFICANT CHANGE UP (ref 4.8–10.8)

## 2017-04-14 PROCEDURE — 99233 SBSQ HOSP IP/OBS HIGH 50: CPT

## 2017-04-14 PROCEDURE — 36558 INSERT TUNNELED CV CATH: CPT

## 2017-04-14 PROCEDURE — 99152 MOD SED SAME PHYS/QHP 5/>YRS: CPT

## 2017-04-14 PROCEDURE — 77001 FLUOROGUIDE FOR VEIN DEVICE: CPT | Mod: 26

## 2017-04-14 PROCEDURE — 76937 US GUIDE VASCULAR ACCESS: CPT | Mod: 26

## 2017-04-14 PROCEDURE — 99153 MOD SED SAME PHYS/QHP EA: CPT

## 2017-04-14 RX ADMIN — Medication 1 TABLET(S): at 15:45

## 2017-04-14 RX ADMIN — Medication 1334 MILLIGRAM(S): at 12:28

## 2017-04-14 RX ADMIN — Medication 3 MILLILITER(S): at 08:23

## 2017-04-14 RX ADMIN — Medication 1 MILLIGRAM(S): at 12:33

## 2017-04-14 RX ADMIN — Medication 3 MILLILITER(S): at 20:34

## 2017-04-14 RX ADMIN — Medication 3 MILLILITER(S): at 14:20

## 2017-04-14 RX ADMIN — ATENOLOL 50 MILLIGRAM(S): 25 TABLET ORAL at 05:38

## 2017-04-14 RX ADMIN — Medication 20 MILLIGRAM(S): at 05:38

## 2017-04-14 RX ADMIN — Medication 1334 MILLIGRAM(S): at 17:48

## 2017-04-14 RX ADMIN — Medication 3 MILLILITER(S): at 02:45

## 2017-04-14 RX ADMIN — ATORVASTATIN CALCIUM 40 MILLIGRAM(S): 80 TABLET, FILM COATED ORAL at 22:08

## 2017-04-14 RX ADMIN — Medication 20 MILLIGRAM(S): at 22:08

## 2017-04-14 RX ADMIN — Medication 20 MILLIGRAM(S): at 15:44

## 2017-04-14 RX ADMIN — HEPARIN SODIUM 5000 UNIT(S): 5000 INJECTION INTRAVENOUS; SUBCUTANEOUS at 05:39

## 2017-04-14 NOTE — PHYSICAL THERAPY INITIAL EVALUATION ADULT - GAIT DEVIATIONS NOTED, PT EVAL
decreased step length/decreased cornel/decreased stride length/decreased weight-shifting ability/increased time in double stance

## 2017-04-14 NOTE — PROGRESS NOTE ADULT - SUBJECTIVE AND OBJECTIVE BOX
JORGE Woodward removed  Pressure applied manually x 15 minutes until hemostasis achieved  DSD dressing applied  Pt nora well.

## 2017-04-14 NOTE — PROGRESS NOTE ADULT - ASSESSMENT
Advanced Oliguric renal failure now on HD had two treatments 3rd tommorow  No hydro on CT   + Uremic symptoms prior to initiation of HD  Vascular Surgery to place permacath   F/U Hep B and C and PPD   Add SPA with HD as needed for SBP < 100 during HD     Anemia - Coumadin held   S/P several transfusions   Iron stores OK    Cont epogen with HD   Added folate     RO - Cont Phoslo 2 tabs tid   Check PTH and 25 Vit D   HypoCalcemia replaced IV yest 2gm  Repeat in am

## 2017-04-14 NOTE — PROGRESS NOTE ADULT - SUBJECTIVE AND OBJECTIVE BOX
INTERVAL HPI/OVERNIGHT EVENTS/SUBJECTIVE: 75yo Female presents this morning with no acute complaints last night, pain is well controlled, tolerating current diet, Denies fever, chills, n/v/d, SOB    ICU Vital Signs Last 24 Hrs  T(C): 36.1, Max: 37.1 (04-13 @ 16:00)  T(F): 97, Max: 98.8 (04-13 @ 16:00)  HR: 67 (67 - 77)  BP: 119/65 (92/49 - 119/65)  BP(mean): 74 (62 - 74)  RR: 19 (17 - 34)  SpO2: 97% (96% - 100%)    I&O's Detail  I & Os for 24h ending 14 Apr 2017 07:00  =============================================  IN:    Oral Fluid: 660 ml    Solution: 100 ml    Albumin 5% - 50 mL: 50 ml    Total IN: 810 ml  ---------------------------------------------  OUT:    Other: 1500 ml    Indwelling Catheter - Urethral: 170 ml    Total OUT: 1670 ml  ---------------------------------------------  Total NET: -860 ml    I & Os for current day (as of 14 Apr 2017 15:43)  =============================================  IN:    Total IN: 0 ml  ---------------------------------------------  OUT:    Indwelling Catheter - Urethral: 100 ml    Total OUT: 100 ml  ---------------------------------------------  Total NET: -100 ml    MEDICATIONS  (STANDING):  sildenafil (REVATIO) 20milliGRAM(s) Oral three times a day  atorvastatin 40milliGRAM(s) Oral at bedtime  calcium acetate 1334milliGRAM(s) Oral three times a day with meals  folic acid 1milliGRAM(s) Oral daily  ALBUTerol/ipratropium for Nebulization 3milliLiter(s) Nebulizer every 6 hours  lactobacillus acidophilus 1Tablet(s) Oral daily  ATENolol  Tablet 50milliGRAM(s) Oral daily  heparin  Injectable 5000Unit(s) SubCutaneous every 8 hours  epoetin jaswinder Injectable 11296Vume(s) IV Push <User Schedule>    MEDICATIONS  (PRN):  albumin human 25% IVPB 50milliLiter(s) IV Intermittent every 1 hour PRN for sbp < 100      NUTRITION/IVF: Renal    PHYSICAL EXAM:    Gen: Alert, NAD    Eyes: EOMI    Neurological: A&Ox3, at baseline    Pulmonary: CTA BL, no RRW, no respiratory distress    Cardiovascular: S1S2, RRR, VSS    Gastrointestinal: abdomen is soft NTND    Genitourinary: no grullon    Extremities: no gross deformities skin tear right upper arm and proximal forearm    Skin: skin tear right upper arm and proximal forearm    Musculoskeletal: baseline ROM     LABS:  CBC Full  -  ( 14 Apr 2017 05:25 )  WBC Count : 4.8 K/uL  Hemoglobin : 7.2 g/dL  Hematocrit : 22.1 %  Platelet Count - Automated : 55 K/uL  Mean Cell Volume : 96.9 fl  Mean Cell Hemoglobin : 31.6 pg  Mean Cell Hemoglobin Concentration : 32.6 g/dL  Auto Neutrophil % : 85.0 %  Auto Lymphocyte % : 5.0 %  Auto Monocyte % : 7.0 %  Auto Eosinophil % : 2.0 %    04-14    138  |  99  |  69.0<H>  ----------------------------<  139<H>  3.8   |  25.0  |  3.97<H>    Ca    7.6<L>      14 Apr 2017 05:25  Phos  3.3     04-14  Mg     1.8     04-14    PT/INR - ( 14 Apr 2017 05:25 )   PT: 13.0 sec;   INR: 1.18 ratio       PTT - ( 14 Apr 2017 05:25 )  PTT:33.7 sec    ASSESSMENT/PLAN:  76yFemale presenting with: transfer from the SICU to hospital service. Wound still oozing     Neuro: follow current regimen for pain controll    CV: VSS, continue BP meds    Pulm: encourage incentive spirometry, OOBA    GI/Nutrition: continue renal diet    /Renal: permacath to be placed ?    Skin: daily dress changes with xeroform and curlex    Proph: HSQ    Dispo: Daily dressing changes, possible debridement of necrotic tissue in future.      CRITICAL CARE TIME SPENT:

## 2017-04-14 NOTE — PROGRESS NOTE ADULT - SUBJECTIVE AND OBJECTIVE BOX
NEPHROLOGY INTERVAL HPI/OVERNIGHT EVENTS:    Examined earlier   Feeling better    MEDICATIONS  (STANDING):  sildenafil (REVATIO) 20milliGRAM(s) Oral three times a day  atorvastatin 40milliGRAM(s) Oral at bedtime  calcium acetate 1334milliGRAM(s) Oral three times a day with meals  folic acid 1milliGRAM(s) Oral daily  ALBUTerol/ipratropium for Nebulization 3milliLiter(s) Nebulizer every 6 hours  lactobacillus acidophilus 1Tablet(s) Oral daily  ATENolol  Tablet 50milliGRAM(s) Oral daily  heparin  Injectable 5000Unit(s) SubCutaneous every 8 hours  epoetin jaswinder Injectable 35490Srdg(s) IV Push <User Schedule>    MEDICATIONS  (PRN):  albumin human 25% IVPB 50milliLiter(s) IV Intermittent every 1 hour PRN for sbp < 100      Allergies    allopurinol (Rash)  codeine (Nausea; Vomiting)  penicillin (Rash)  spironolactone (Unknown)    Intolerances        Vital Signs Last 24 Hrs  T(C): 36.1, Max: 37.1 (04-13 @ 16:00)  T(F): 97, Max: 98.8 (04-13 @ 16:00)  HR: 67 (67 - 77)  BP: 119/65 (92/49 - 119/65)  BP(mean): 74 (62 - 74)  RR: 19 (17 - 34)  SpO2: 97% (96% - 100%)      PHYSICAL EXAM:  GENERAL: NAD, Comfortable flat   HEAD:  No edema   NECK: Supple, No JVD,   CHEST/LUNG:EAE , No wheeze   HEART: Regular rate and rhythm; No rub   ABDOMEN: Soft, No rigidity   EXTREMITIES: min edema , + femoral gracia       LABS:                        7.2    4.8   )-----------( 55       ( 14 Apr 2017 05:25 )             22.1     04-14    138  |  99  |  69.0<H>  ----------------------------<  139<H>  3.8   |  25.0  |  3.97<H>    Ca    7.6<L>      14 Apr 2017 05:25  Phos  3.3     04-14  Mg     1.8     04-14      PT/INR - ( 14 Apr 2017 05:25 )   PT: 13.0 sec;   INR: 1.18 ratio         PTT - ( 14 Apr 2017 05:25 )  PTT:33.7 sec    Magnesium, Serum: 1.8 mg/dL (04-14 @ 05:25)  Phosphorus Level, Serum: 3.3 mg/dL (04-14 @ 05:25)  Intact PTH: 323 pg/mL (04-13 @ 20:05)  Magnesium, Serum: 1.8 mg/dL (04-13 @ 18:40)  Phosphorus Level, Serum: 3.4 mg/dL (04-13 @ 18:40)          RADIOLOGY & ADDITIONAL TESTS:

## 2017-04-14 NOTE — PROGRESS NOTE ADULT - ASSESSMENT
This is 76 year old woman with PMH of Afib on coumadin admitted under ACS due to fall and hemorrhagic shock, on admission INR was very high, anemic,  Acute on CKD, resuscitated with PRBC, IVF, PCC, renal function remain abnormal started on HD. Hospitalist team was called to transfer patient care.     A/P    >ESRD - newly started on HD  appreciate renal input, HD as per renal  schedule for permacath today     >h/o A.fib  appreciate cardiology input, holding AC as per cardiology, also schedule for permacath in am   cont. atenolol    >Anemia - s/p total 3 unit prbc, hg is still low, transfuse PRBC during hd - defer to renal   f/u cbc, normal ferritin   epo as per renal     >Skin tear in left arm- management as per surgery     >HTN- stable, cont, home medication    >HLD- cont. statin    >Pulmonary HTN - cont. sildnafil     >DVT PPX - heparin      Pt daughter present bedside updated .

## 2017-04-14 NOTE — PROGRESS NOTE ADULT - SUBJECTIVE AND OBJECTIVE BOX
Internal Medicine Hospitalist - Dr. Sarah HERNANDEZ    [id number]    76y      Female    Patient is a 76y old  Female who presents with a chief complaint of s/p fall, supratherapeutic INR (11 Apr 2017 15:16)    HPI:  75 yo f with hx of bovine valve replacement, afib on coumadin, chronic kidney insufficiency, s/p fall from standing 3 days ago. Per patient, she slipped, and as she was bracing her fall went to grab the kitchen counter and missed, causing some skin tears to her LUE. She's also had a nose bleed for 2-3 weeks, now more frequent. She had been in the ED on Sunday, got an XR which showed no fracture and was sent home. She comes today due to worsening epistaxis and bleeding from her skin tears. c/o SOB, headache/dizziness. Denies hitting head or LOC. (11 Apr 2017 15:16)      INTERVAL HPI/ OVERNIGHT EVENTS: Patient is seen and examined, pt is schedule for permacath today, feeling better, denied chest pain, SOB     REVIEW OF SYSTEMS:    Denied fever, chills, abd. pain, nausea, vomiting, chest pain, headache, dizziness    PHYSICAL EXAM:    Vital Signs Last 24 Hrs  T(C): 36.1, Max: 37.1 (04-13 @ 16:00)  T(F): 97, Max: 98.8 (04-13 @ 16:00)  HR: 67 (67 - 77)  BP: 119/65 (92/49 - 123/55)  BP(mean): 74 (62 - 77)  RR: 19 (17 - 34)  SpO2: 97% (96% - 100%)    GENERAL: NAD  HEENT: EOMI  Neck: supple  CHEST/LUNG: positive air entry   HEART: S1S2+ audible  ABDOMEN: Soft, Nontender, Nondistended; Bowel sounds present  EXTREMITIES:  left arm- dressing intact   CNS: AAO X 3  Psychiatry: normal mood    LABS:                        7.2    4.8   )-----------( 55       ( 14 Apr 2017 05:25 )             22.1     04-14    138  |  99  |  69.0<H>  ----------------------------<  139<H>  3.8   |  25.0  |  3.97<H>    Ca    7.6<L>      14 Apr 2017 05:25  Phos  3.3     04-14  Mg     1.8     04-14      PT/INR - ( 14 Apr 2017 05:25 )   PT: 13.0 sec;   INR: 1.18 ratio         PTT - ( 14 Apr 2017 05:25 )  PTT:33.7 sec        MEDICATIONS  (STANDING):  sildenafil (REVATIO) 20milliGRAM(s) Oral three times a day  atorvastatin 40milliGRAM(s) Oral at bedtime  calcium acetate 1334milliGRAM(s) Oral three times a day with meals  folic acid 1milliGRAM(s) Oral daily  ALBUTerol/ipratropium for Nebulization 3milliLiter(s) Nebulizer every 6 hours  lactobacillus acidophilus 1Tablet(s) Oral daily  ATENolol  Tablet 50milliGRAM(s) Oral daily  heparin  Injectable 5000Unit(s) SubCutaneous every 8 hours  epoetin jaswinder Injectable 86618Apdb(s) IV Push <User Schedule>    MEDICATIONS  (PRN):  albumin human 25% IVPB 50milliLiter(s) IV Intermittent every 1 hour PRN for sbp < 100      RADIOLOGY & ADDITIONAL TEST

## 2017-04-14 NOTE — PROGRESS NOTE ADULT - SUBJECTIVE AND OBJECTIVE BOX
[facility] - [facility]                                   [address] [place] [address]                                                      PHONE: [phone]                                                         FAX: [phone]  [email or web address]  ---------------------------------------------------------------------------------------------------------------------------------    FU for  Pt seen and examined.     Overnight events/patient complaints:    allopurinol (Rash)  codeine (Nausea; Vomiting)  penicillin (Rash)  spironolactone (Unknown)    MEDICATIONS  (STANDING):  sildenafil (REVATIO) 20milliGRAM(s) Oral three times a day  atorvastatin 40milliGRAM(s) Oral at bedtime  calcium acetate 1334milliGRAM(s) Oral three times a day with meals  folic acid 1milliGRAM(s) Oral daily  ALBUTerol/ipratropium for Nebulization 3milliLiter(s) Nebulizer every 6 hours  lactobacillus acidophilus 1Tablet(s) Oral daily  ATENolol  Tablet 50milliGRAM(s) Oral daily  heparin  Injectable 5000Unit(s) SubCutaneous every 8 hours  epoetin jaswinder Injectable 03384Jzxe(s) IV Push <User Schedule>    MEDICATIONS  (PRN):  albumin human 25% IVPB 50milliLiter(s) IV Intermittent every 1 hour PRN for sbp < 100      Vital Signs Last 24 Hrs  T(C): 36.7, Max: 37.1 (04-13 @ 16:00)  T(F): 98, Max: 98.8 (04-13 @ 16:00)  HR: 71 (58 - 78)  BP: 92/49 (92/49 - 123/55)  BP(mean): 74 (62 - 77)  RR: 20 (17 - 34)  SpO2: 97% (96% - 100%)  ICU Vital Signs Last 24 Hrs  I&O's Summary    I & Os for current day (as of 14 Apr 2017 09:06)  =============================================  IN: 810 ml / OUT: 1670 ml / NET: -860 ml      PHYSICAL EXAM:  HEENT: no JVD  CARDIOVASCULAR: Normal S1 and S2, No murmur, rub, gallop or lift.   LUNGS: No rales, rhonchi or wheeze. Normal breath sounds bilaterally.  ABDOMEN: Soft, nontender without mass or organomegaly. bowel sounds normoactive.  EXTREMITIES: no edema          LABS:                        7.2    4.8   )-----------( 55       ( 14 Apr 2017 05:25 )             22.1     04-14    138  |  99  |  69.0<H>  ----------------------------<  139<H>  3.8   |  25.0  |  3.97<H>    Ca    7.6<L>      14 Apr 2017 05:25  Phos  3.3     04-14  Mg     1.8     04-14      DUSTIN HERNANDEZ      PT/INR - ( 14 Apr 2017 05:25 )   PT: 13.0 sec;   INR: 1.18 ratio         PTT - ( 14 Apr 2017 05:25 )  PTT:33.7 sec      RADIOLOGY & ADDITIONAL STUDIES:    LABS:                        7.2    4.8   )-----------( 55       ( 14 Apr 2017 05:25 )             22.1     04-14    138  |  99  |  69.0<H>  ----------------------------<  139<H>  3.8   |  25.0  |  3.97<H>    Ca    7.6<L>      14 Apr 2017 05:25  Phos  3.3     04-14  Mg     1.8     04-14      76y      PT/INR - ( 14 Apr 2017 05:25 )   PT: 13.0 sec;   INR: 1.18 ratio         PTT - ( 14 Apr 2017 05:25 )  PTT:33.7 sec      Assessment and Plan:

## 2017-04-15 LAB
ANION GAP SERPL CALC-SCNC: 14 MMOL/L — SIGNIFICANT CHANGE UP (ref 5–17)
BUN SERPL-MCNC: 73 MG/DL — HIGH (ref 8–20)
CALCIUM SERPL-MCNC: 7.3 MG/DL — LOW (ref 8.6–10.2)
CHLORIDE SERPL-SCNC: 96 MMOL/L — LOW (ref 98–107)
CO2 SERPL-SCNC: 24 MMOL/L — SIGNIFICANT CHANGE UP (ref 22–29)
CREAT SERPL-MCNC: 4.32 MG/DL — HIGH (ref 0.5–1.3)
GLUCOSE SERPL-MCNC: 122 MG/DL — HIGH (ref 70–115)
HCT VFR BLD CALC: 21.1 % — LOW (ref 37–47)
HGB BLD-MCNC: 6.7 G/DL — CRITICAL LOW (ref 12–16)
MCHC RBC-ENTMCNC: 31.8 G/DL — LOW (ref 32–36)
MCHC RBC-ENTMCNC: 31.9 PG — HIGH (ref 27–31)
MCV RBC AUTO: 100.5 FL — HIGH (ref 81–99)
PLATELET # BLD AUTO: 55 K/UL — LOW (ref 150–400)
POTASSIUM SERPL-MCNC: 4 MMOL/L — SIGNIFICANT CHANGE UP (ref 3.5–5.3)
POTASSIUM SERPL-SCNC: 4 MMOL/L — SIGNIFICANT CHANGE UP (ref 3.5–5.3)
RBC # BLD: 2.1 M/UL — LOW (ref 4.4–5.2)
RBC # FLD: 20.5 % — HIGH (ref 11–15.6)
SODIUM SERPL-SCNC: 134 MMOL/L — LOW (ref 135–145)
WBC # BLD: 5.1 K/UL — SIGNIFICANT CHANGE UP (ref 4.8–10.8)
WBC # FLD AUTO: 5.1 K/UL — SIGNIFICANT CHANGE UP (ref 4.8–10.8)

## 2017-04-15 PROCEDURE — 99233 SBSQ HOSP IP/OBS HIGH 50: CPT

## 2017-04-15 RX ORDER — ALBUMIN HUMAN 25 %
100 VIAL (ML) INTRAVENOUS ONCE
Qty: 0 | Refills: 0 | Status: COMPLETED | OUTPATIENT
Start: 2017-04-15 | End: 2017-04-15

## 2017-04-15 RX ORDER — SODIUM CHLORIDE 9 MG/ML
500 INJECTION INTRAMUSCULAR; INTRAVENOUS; SUBCUTANEOUS ONCE
Qty: 0 | Refills: 0 | Status: COMPLETED | OUTPATIENT
Start: 2017-04-15 | End: 2017-04-15

## 2017-04-15 RX ORDER — ATENOLOL 25 MG/1
25 TABLET ORAL DAILY
Qty: 0 | Refills: 0 | Status: DISCONTINUED | OUTPATIENT
Start: 2017-04-16 | End: 2017-04-18

## 2017-04-15 RX ADMIN — ATENOLOL 50 MILLIGRAM(S): 25 TABLET ORAL at 06:21

## 2017-04-15 RX ADMIN — Medication 1334 MILLIGRAM(S): at 13:04

## 2017-04-15 RX ADMIN — Medication 3 MILLILITER(S): at 14:56

## 2017-04-15 RX ADMIN — ATORVASTATIN CALCIUM 40 MILLIGRAM(S): 80 TABLET, FILM COATED ORAL at 21:57

## 2017-04-15 RX ADMIN — ERYTHROPOIETIN 10000 UNIT(S): 10000 INJECTION, SOLUTION INTRAVENOUS; SUBCUTANEOUS at 18:29

## 2017-04-15 RX ADMIN — Medication 1 MILLIGRAM(S): at 13:04

## 2017-04-15 RX ADMIN — Medication 1334 MILLIGRAM(S): at 08:15

## 2017-04-15 RX ADMIN — SODIUM CHLORIDE 500 MILLILITER(S): 9 INJECTION INTRAMUSCULAR; INTRAVENOUS; SUBCUTANEOUS at 01:30

## 2017-04-15 RX ADMIN — Medication 50 MILLILITER(S): at 18:27

## 2017-04-15 RX ADMIN — Medication 20 MILLIGRAM(S): at 06:21

## 2017-04-15 RX ADMIN — Medication 3 MILLILITER(S): at 20:58

## 2017-04-15 RX ADMIN — Medication 3 MILLILITER(S): at 08:07

## 2017-04-15 RX ADMIN — TUBERCULIN PURIFIED PROTEIN DERIVATIVE 5 UNIT(S): 5 INJECTION, SOLUTION INTRADERMAL at 00:04

## 2017-04-15 RX ADMIN — Medication 3 MILLILITER(S): at 03:16

## 2017-04-15 RX ADMIN — Medication 1 TABLET(S): at 13:04

## 2017-04-15 NOTE — PROGRESS NOTE ADULT - SUBJECTIVE AND OBJECTIVE BOX
INTERVAL HPI/OVERNIGHT EVENTS/SUBJECTIVE: No issues overnight, pain is well controlled, tolerating diet, no complaints in am.    ICU Vital Signs Last 24 Hrs  T(C): 36.1, Max: 37.1 (04-13 @ 16:00)  T(F): 97, Max: 98.8 (04-13 @ 16:00)  HR: 67 (67 - 77)  BP: 119/65 (92/49 - 119/65)  BP(mean): 74 (62 - 74)  RR: 19 (17 - 34)  SpO2: 97% (96% - 100%)    I&O's Detail  I & Os for 24h ending 14 Apr 2017 07:00  =============================================  IN:    Oral Fluid: 660 ml    Solution: 100 ml    Albumin 5% - 50 mL: 50 ml    Total IN: 810 ml  ---------------------------------------------  OUT:    Other: 1500 ml    Indwelling Catheter - Urethral: 170 ml    Total OUT: 1670 ml  ---------------------------------------------  Total NET: -860 ml    I & Os for current day (as of 14 Apr 2017 15:43)  =============================================  IN:    Total IN: 0 ml  ---------------------------------------------  OUT:    Indwelling Catheter - Urethral: 100 ml    Total OUT: 100 ml  ---------------------------------------------  Total NET: -100 ml    MEDICATIONS  (STANDING):  sildenafil (REVATIO) 20milliGRAM(s) Oral three times a day  atorvastatin 40milliGRAM(s) Oral at bedtime  calcium acetate 1334milliGRAM(s) Oral three times a day with meals  folic acid 1milliGRAM(s) Oral daily  ALBUTerol/ipratropium for Nebulization 3milliLiter(s) Nebulizer every 6 hours  lactobacillus acidophilus 1Tablet(s) Oral daily  ATENolol  Tablet 50milliGRAM(s) Oral daily  heparin  Injectable 5000Unit(s) SubCutaneous every 8 hours  epoetin jaswinder Injectable 09461Ajqz(s) IV Push <User Schedule>    MEDICATIONS  (PRN):  albumin human 25% IVPB 50milliLiter(s) IV Intermittent every 1 hour PRN for sbp < 100      NUTRITION/IVF: Renal    PHYSICAL EXAM:    Gen: Alert, NAD    Eyes: EOMI    Neurological: A&Ox3, at baseline    Pulmonary: CTA BL, no RRW, no respiratory distress    Cardiovascular: S1S2, RRR, VSS    Gastrointestinal: abdomen is soft NTND    Genitourinary: no grullon    Extremities: no gross deformities skin tear right upper arm and proximal forearm, dressing on top, CDI    Skin: skin tear right upper arm and proximal forearm    Musculoskeletal: baseline ROM     LABS:  CBC Full  -  ( 14 Apr 2017 05:25 )  WBC Count : 4.8 K/uL  Hemoglobin : 7.2 g/dL  Hematocrit : 22.1 %  Platelet Count - Automated : 55 K/uL  Mean Cell Volume : 96.9 fl  Mean Cell Hemoglobin : 31.6 pg  Mean Cell Hemoglobin Concentration : 32.6 g/dL  Auto Neutrophil % : 85.0 %  Auto Lymphocyte % : 5.0 %  Auto Monocyte % : 7.0 %  Auto Eosinophil % : 2.0 %    04-14    138  |  99  |  69.0<H>  ----------------------------<  139<H>  3.8   |  25.0  |  3.97<H>    Ca    7.6<L>      14 Apr 2017 05:25  Phos  3.3     04-14  Mg     1.8     04-14    PT/INR - ( 14 Apr 2017 05:25 )   PT: 13.0 sec;   INR: 1.18 ratio       PTT - ( 14 Apr 2017 05:25 )  PTT:33.7 sec    ASSESSMENT/PLAN:  76yFemale presenting with: transfer from the SICU to hospital service. Wound still oozing     Neuro: follow current regimen for pain controll    CV: VSS, continue BP meds    Pulm: encourage incentive spirometry, OOBA    GI/Nutrition: continue renal diet    /Renal: permacath to be placed ?    Skin: daily dress changes with xeroform and curlex    Proph: HSQ    Dispo: Daily dressing changes, possible debridement of necrotic tissue in future.      CRITICAL CARE TIME SPENT:

## 2017-04-15 NOTE — PROGRESS NOTE ADULT - ASSESSMENT
Advanced Oliguric renal failure   No hydro on CT   + Uremic sequelae with contributing diathesis ----> Much better with HD   HD again today .   F/U Hep B and C and PPD       Anemia - Coumadin held   S/P several transfusions   Iron stores OK    Added epogen with HD   Added folate   Will transfuse 2 units with HD today   Will transfuse again with HD today     RO - Added Phoslo 2 tid   PTH OK   Repeat in am

## 2017-04-15 NOTE — PROGRESS NOTE ADULT - SUBJECTIVE AND OBJECTIVE BOX
Internal Medicine Hospitalist - Dr. Sarah HERNANDEZ    [id number]    76y      Female    Patient is a 76y old  Female who presents with a chief complaint of s/p fall, supratherapeutic INR (11 Apr 2017 15:16)    HPI:  77 yo f with hx of bovine valve replacement, afib on coumadin, chronic kidney insufficiency, s/p fall from standing 3 days ago. Per patient, she slipped, and as she was bracing her fall went to grab the kitchen counter and missed, causing some skin tears to her LUE. She's also had a nose bleed for 2-3 weeks, now more frequent. She had been in the ED on Sunday, got an XR which showed no fracture and was sent home. She comes today due to worsening epistaxis and bleeding from her skin tears. c/o SOB, headache/dizziness. Denies hitting head or LOC. (11 Apr 2017 15:16)      INTERVAL HPI/ OVERNIGHT EVENTS: Patient is seen and examined, d/p permacath yesterday, feeling weak, denied chest pain, SOB, abd. pain, nausea and vomiting.     REVIEW OF SYSTEMS:    Denied fever, chills, abd. pain, nausea, vomiting, chest pain, SOB, headache, dizziness    PHYSICAL EXAM:    Vital Signs Last 24 Hrs  T(C): 36.3, Max: 36.7 (04-14 @ 09:03)  T(F): 97.3, Max: 98 (04-14 @ 09:03)  HR: 68 (60 - 87)  BP: 98/50 (86/40 - 119/65)  BP(mean): --  RR: 20 (19 - 20)  SpO2: 98% (97% - 100%)    GENERAL: NAD  HEENT: EOMI  Neck: supple  CHEST/LUNG: occasional wheezes  HEART: S1S2+ audible  ABDOMEN: Soft, Nontender, Nondistended; Bowel sounds present  EXTREMITIES:  left arm dressing   CNS: AAO X 3  Psychiatry: normal mood    LABS:                        7.2    4.8   )-----------( 55       ( 14 Apr 2017 05:25 )             22.1     04-15    134<L>  |  96<L>  |  73.0<H>  ----------------------------<  122<H>  4.0   |  24.0  |  4.32<H>    Ca    7.3<L>      15 Apr 2017 06:53  Phos  3.3     04-14  Mg     1.8     04-14      PT/INR - ( 14 Apr 2017 05:25 )   PT: 13.0 sec;   INR: 1.18 ratio         PTT - ( 14 Apr 2017 05:25 )  PTT:33.7 sec        MEDICATIONS  (STANDING):  sildenafil (REVATIO) 20milliGRAM(s) Oral three times a day  atorvastatin 40milliGRAM(s) Oral at bedtime  calcium acetate 1334milliGRAM(s) Oral three times a day with meals  folic acid 1milliGRAM(s) Oral daily  ALBUTerol/ipratropium for Nebulization 3milliLiter(s) Nebulizer every 6 hours  lactobacillus acidophilus 1Tablet(s) Oral daily  heparin  Injectable 5000Unit(s) SubCutaneous every 8 hours  epoetin jaswinder Injectable 51201Glbz(s) IV Push <User Schedule>    MEDICATIONS  (PRN):  albumin human 25% IVPB 50milliLiter(s) IV Intermittent every 1 hour PRN for sbp < 100      RADIOLOGY & ADDITIONAL TEST

## 2017-04-15 NOTE — PROGRESS NOTE ADULT - ASSESSMENT
77 yo f s/p fall with lacerations on the upper extremities, wounds with dressings on top, CDI. no issues.    - daily dressing changes  - rest of care per primary team  - will discuss with attending

## 2017-04-15 NOTE — PROGRESS NOTE ADULT - SUBJECTIVE AND OBJECTIVE BOX
NEPHROLOGY INTERVAL HPI/OVERNIGHT EVENTS:    feels better   Permacath placed   Femoral gracia out     MEDICATIONS  (STANDING):  sildenafil (REVATIO) 20milliGRAM(s) Oral three times a day  atorvastatin 40milliGRAM(s) Oral at bedtime  calcium acetate 1334milliGRAM(s) Oral three times a day with meals  folic acid 1milliGRAM(s) Oral daily  ALBUTerol/ipratropium for Nebulization 3milliLiter(s) Nebulizer every 6 hours  lactobacillus acidophilus 1Tablet(s) Oral daily  ATENolol  Tablet 50milliGRAM(s) Oral daily  heparin  Injectable 5000Unit(s) SubCutaneous every 8 hours  epoetin jaswinder Injectable 60359Ropu(s) IV Push <User Schedule>    MEDICATIONS  (PRN):  albumin human 25% IVPB 50milliLiter(s) IV Intermittent every 1 hour PRN for sbp < 100      Allergies    allopurinol (Rash)  codeine (Nausea; Vomiting)  penicillin (Rash)  spironolactone (Unknown)    Intolerances          Vital Signs Last 24 Hrs  T(C): 36.3, Max: 36.7 (04-14 @ 09:03)  T(F): 97.3, Max: 98 (04-14 @ 09:03)  HR: 68 (60 - 87)  BP: 98/50 (86/40 - 119/65)  BP(mean): --  RR: 20 (19 - 20)  SpO2: 98% (97% - 100%)  Daily     Daily   I&O's Detail    I & Os for current day (as of 15 Apr 2017 07:34)  =============================================  IN:    Sodium Chloride 0.9% IV Bolus: 500 ml    Total IN: 500 ml  ---------------------------------------------  OUT:    Indwelling Catheter - Urethral: 250 ml    Voided: 1 ml    Total OUT: 251 ml  ---------------------------------------------  Total NET: 249 ml    I&O's Summary    I & Os for current day (as of 15 Apr 2017 07:34)  =============================================  IN: 500 ml / OUT: 251 ml / NET: 249 ml      PHYSICAL EXAM:    GENERAL: NAD, Comfortable flat   HEAD:  No edema   NECK: Supple, No JVD, right permacath clean   CHEST/LUNG:EAE , No wheeze   HEART: Regular rate and rhythm; No rub   ABDOMEN: Soft, No rigidity   EXTREMITIES: min edema , + femoral gracia out now , no hematoma   LABS:                        7.2    4.8   )-----------( 55       ( 14 Apr 2017 05:25 )             22.1     04-15    134<L>  |  96<L>  |  73.0<H>  ----------------------------<  122<H>  4.0   |  24.0  |  4.32<H>    Ca    7.3<L>      15 Apr 2017 06:53  Phos  3.3     04-14  Mg     1.8     04-14      PT/INR - ( 14 Apr 2017 05:25 )   PT: 13.0 sec;   INR: 1.18 ratio         PTT - ( 14 Apr 2017 05:25 )  PTT:33.7 sec            RADIOLOGY & ADDITIONAL TESTS:     EXAM:  CHEST SINGLE VIEW FRONTAL                          PROCEDURE DATE:  04/11/2017        INTERPRETATION:  EXAMINATION DATE: April 11, 2017 at 1859 hours.  CLINICAL INFORMATION: Line placement.    TECHNIQUE: Frontal view of the chest   COMPARISON: April 11, 2017 at 1211 hours.    FINDINGS:    LINES/TUBES: New right internal jugular central venous catheter with tip   overlying the superior vena cava.  LUNGS/PLEURA: Small right pleural effusion with basilar atelectasis or   pneumonia, unchanged. No pneumothorax.  MEDIASTINUM: Cardiac silhouette is enlarged.  OTHER: Sternotomy. Aortic valve replacement.

## 2017-04-15 NOTE — PROVIDER CONTACT NOTE (CRITICAL VALUE NOTIFICATION) - ACTION/TREATMENT ORDERED:
Ill supplemented.
No new order received
Will supplement with Ca
replacement
pt to receive 2U Prbc at HD today

## 2017-04-15 NOTE — PROGRESS NOTE ADULT - SUBJECTIVE AND OBJECTIVE BOX
[facility]                                   [address] [place] [address]                                                      PHONE: [phone]                                                         FAX: [phone]  [email or web address]  ---------------------------------------------------------------------------------------------------------------------------------    Overnight events/patient complaints:  NAD feeling ok today awaiting PRBC and HD     allopurinol (Rash)  codeine (Nausea; Vomiting)  penicillin (Rash)  spironolactone (Unknown)    MEDICATIONS  (STANDING):  sildenafil (REVATIO) 20milliGRAM(s) Oral three times a day  atorvastatin 40milliGRAM(s) Oral at bedtime  calcium acetate 1334milliGRAM(s) Oral three times a day with meals  folic acid 1milliGRAM(s) Oral daily  ALBUTerol/ipratropium for Nebulization 3milliLiter(s) Nebulizer every 6 hours  lactobacillus acidophilus 1Tablet(s) Oral daily  heparin  Injectable 5000Unit(s) SubCutaneous every 8 hours  epoetin jaswinder Injectable 10756Iygx(s) IV Push <User Schedule>    MEDICATIONS  (PRN):  albumin human 25% IVPB 50milliLiter(s) IV Intermittent every 1 hour PRN for sbp < 100      Vital Signs Last 24 Hrs  T(C): 36.3, Max: 36.7 (04-15 @ 00:58)  T(F): 97.3, Max: 98 (04-15 @ 00:58)  HR: 68 (60 - 87)  BP: 98/50 (86/40 - 102/52)  BP(mean): --  RR: 20 (20 - 20)  SpO2: 98% (98% - 100%)  ICU Vital Signs Last 24 Hrs  DUSTIN HERNANDEZ  I&O's Detail    I & Os for current day (as of 15 Apr 2017 11:49)  =============================================  IN:    Sodium Chloride 0.9% IV Bolus: 500 ml    Total IN: 500 ml  ---------------------------------------------  OUT:    Indwelling Catheter - Urethral: 250 ml    Voided: 1 ml    Total OUT: 251 ml  ---------------------------------------------  Total NET: 249 ml    I&O's Summary    I & Os for current day (as of 15 Apr 2017 11:49)  =============================================  IN: 500 ml / OUT: 251 ml / NET: 249 ml    Drug Dosing Weight  DUSTIN HERNANDEZ      PHYSICAL EXAM:  General: Appears well developed, well nourished alert and cooperative.  HEENT: Head; normocephalic, atraumatic.  Eyes: Pupils reactive, cornea wnl.  Neck: Supple, no nodes adenopathy, no NVD or carotid bruit or thyromegaly.  CARDIOVASCULAR: Normal S1 and S2, 2/6 murmur, rub, gallop or lift.   LUNGS: Decrease BS B/L   ABDOMEN: Soft, nontender without mass or organomegaly. bowel sounds normoactive.  EXTREMITIES: No clubbing, cyanosis  edema. Distal pulses wnl.   SKIN: warm and dry with normal turgor.  NEURO: Alert/oriented x 3/normal motor exam. No pathologic reflexes.    PSYCH: normal affect.        LABS:                        6.7    5.1   )-----------( 55       ( 15 Apr 2017 06:53 )             21.1     04-15    134<L>  |  96<L>  |  73.0<H>  ----------------------------<  122<H>  4.0   |  24.0  |  4.32<H>    Ca    7.3<L>      15 Apr 2017 06:53  Phos  3.3     04-14  Mg     1.8     04-14      DUSTIN HERNANDEZ      PT/INR - ( 14 Apr 2017 05:25 )   PT: 13.0 sec;   INR: 1.18 ratio         PTT - ( 14 Apr 2017 05:25 )  PTT:33.7 sec      RADIOLOGY & ADDITIONAL STUDIES:    INTERPRETATION OF TELEMETRY (personally reviewed):    ECG:  Diagnosis Line a-fib  Right bundle branch block  Possible Inferior infarct ,age undetermined  NSST&T abnl  Abnormal ECG      ECHO:   Summary:   1. Left ventricular ejection fraction, by visual estimation, is 55 to   60%.   2. Normal global left ventricular systolic function.   3. Spectral Doppler shows restrictive pattern of left ventricular   myocardial filling (Grade III diastolic dysfunction). Elevated left   atrial and left ventricular end-diastolic pressures.   4. Normal left ventricular internal cavity size.   5. Severely enlarged right ventricle. Severely reduced RV systolic   function.   6. Severely enlarged left atrium.   7. Severely dilated right atrium.   8. Status-post mitral annular ring insertion.   9. Thickening and calcification of the anterior and posterior mitral   valve leaflets.  10. Mitral valve mean gradient is 4.4 mmHg consistent with mild mitral   stenosis.  11. Mild to moderate mitral valve regurgitation.  12. Bioprosthesis in the aortic position demonstrating normal function.  13. Mild aortic regurgitation.  14. Status post tricuspid valve repair.  15. Moderate-severe tricuspid regurgitation.  16. Moderate pulmonic valve regurgitation.  17. Estimated pulmonary artery systolic pressure is 56.8 mmHg assuming a   right atrial pressure of 15 mmHg, which is consistent with moderate   pulmonary hypertension.  18. There is no evidence of pericardial effusion.    ASSESSMENT AND PLAN:    In summary, DUSTIN HERNANDEZ is an 76y Female with past medical history significant for HTN ESRD on HD anemia s/p fall suffered skin tear with bleeding INR was 15 hx Bio AVR with MV/TV annuloplasty severe PHTN; not an AC candidate due to bleeding and fall risk       Plan  1.  PRBC with HD today   2.  Continue current CV medications

## 2017-04-15 NOTE — PROVIDER CONTACT NOTE (CRITICAL VALUE NOTIFICATION) - NAME OF MD/NP/PA/DO NOTIFIED:
Dr. JENNIFER Arellano
Emerson Alexandra
MARGARITO Purcell
MARGARITO Ram
Sarah
Trauma MARGARITO Faustin
dr joyner
Dr. JENNIFER Arellano
Dr. JENNIFER Arellano.

## 2017-04-15 NOTE — PROGRESS NOTE ADULT - ASSESSMENT
This is 76 year old woman with PMH of Afib on coumadin admitted under ACS due to fall and hemorrhagic shock, on admission INR was very high, anemic,  Acute on CKD, resuscitated with PRBC, IVF, PCC, renal function remain abnormal started on HD. Pt is s/p permacath on 04/14/2017. Hospitalist team was called to transfer patient care. Pt is currently hemodynamically stable.     A/P    >ESRD - newly started on HD  appreciate renal input, s/p permacath - 04/14/2017  schedule for Hd today and will also receive 2 units of prbc   s/p grullon cathter, urine output is very low     >h/o A.fib  appreciate cardiology input, holding AC as per cardiology  cont. atenolol    >Anemia - s/p total 3 unit prbc, hg is still low, will be receiving 2 more units of prbc today with hd  f/u cbc, normal ferritin   epo as per renal     >Skin tear in left arm- improving   management as per surgery     >HTN- low normal   decraese atenolol 25 daily     >HLD- cont. statin    >Pulmonary HTN - cont. sildnafil     >DVT PPX - heparin This is 76 year old woman with PMH of Afib on coumadin admitted under ACS due to fall and hemorrhagic shock, on admission INR was very high, anemic,  Acute on CKD, resuscitated with PRBC, IVF, PCC, renal function remain abnormal started on HD. Pt is s/p permacath on 04/14/2017. Hospitalist team was called to transfer patient care. Pt is currently hemodynamically stable.     A/P    >Acute on CKD -ESRD - newly started on HD  appreciate renal input, s/p permacath - 04/14/2017  schedule for Hd today and will also receive 2 units of prbc   s/p grullon cathter, urine output is very low   hepatitis panel negative     >h/o A.fib  appreciate cardiology input, holding AC as per cardiology  cont. atenolol    >Anemia - s/p total 3 unit prbc, hg is still low, will be receiving 2 more units of prbc today with hd  f/u cbc, normal ferritin   epo as per renal     >Skin tear in left arm- improving   management as per surgery     >HTN- low normal   decraese atenolol 25 daily     >HLD- cont. statin    >Pulmonary HTN - cont. sildnafil     >DVT PPX - heparin

## 2017-04-16 LAB
ANION GAP SERPL CALC-SCNC: 13 MMOL/L — SIGNIFICANT CHANGE UP (ref 5–17)
BUN SERPL-MCNC: 44 MG/DL — HIGH (ref 8–20)
CALCIUM SERPL-MCNC: 7.6 MG/DL — LOW (ref 8.6–10.2)
CHLORIDE SERPL-SCNC: 96 MMOL/L — LOW (ref 98–107)
CO2 SERPL-SCNC: 25 MMOL/L — SIGNIFICANT CHANGE UP (ref 22–29)
CREAT SERPL-MCNC: 3.06 MG/DL — HIGH (ref 0.5–1.3)
GLUCOSE SERPL-MCNC: 112 MG/DL — SIGNIFICANT CHANGE UP (ref 70–115)
HCT VFR BLD CALC: 24.7 % — LOW (ref 37–47)
HGB BLD-MCNC: 8 G/DL — LOW (ref 12–16)
MCHC RBC-ENTMCNC: 31.3 PG — HIGH (ref 27–31)
MCHC RBC-ENTMCNC: 32.4 G/DL — SIGNIFICANT CHANGE UP (ref 32–36)
MCV RBC AUTO: 96.5 FL — SIGNIFICANT CHANGE UP (ref 81–99)
PHOSPHATE SERPL-MCNC: 3.1 MG/DL — SIGNIFICANT CHANGE UP (ref 2.4–4.7)
PLATELET # BLD AUTO: 41 K/UL — LOW (ref 150–400)
POTASSIUM SERPL-MCNC: 4.2 MMOL/L — SIGNIFICANT CHANGE UP (ref 3.5–5.3)
POTASSIUM SERPL-SCNC: 4.2 MMOL/L — SIGNIFICANT CHANGE UP (ref 3.5–5.3)
RBC # BLD: 2.56 M/UL — LOW (ref 4.4–5.2)
RBC # FLD: 21.3 % — HIGH (ref 11–15.6)
SODIUM SERPL-SCNC: 134 MMOL/L — LOW (ref 135–145)
WBC # BLD: 3.8 K/UL — LOW (ref 4.8–10.8)
WBC # FLD AUTO: 3.8 K/UL — LOW (ref 4.8–10.8)

## 2017-04-16 PROCEDURE — 99233 SBSQ HOSP IP/OBS HIGH 50: CPT

## 2017-04-16 RX ADMIN — Medication 1 TABLET(S): at 12:58

## 2017-04-16 RX ADMIN — ATENOLOL 25 MILLIGRAM(S): 25 TABLET ORAL at 05:53

## 2017-04-16 RX ADMIN — Medication 1334 MILLIGRAM(S): at 16:49

## 2017-04-16 RX ADMIN — Medication 20 MILLIGRAM(S): at 22:51

## 2017-04-16 RX ADMIN — Medication 1 MILLIGRAM(S): at 12:58

## 2017-04-16 RX ADMIN — Medication 1334 MILLIGRAM(S): at 08:23

## 2017-04-16 RX ADMIN — Medication 3 MILLILITER(S): at 15:49

## 2017-04-16 RX ADMIN — Medication 3 MILLILITER(S): at 03:53

## 2017-04-16 RX ADMIN — Medication 1334 MILLIGRAM(S): at 12:58

## 2017-04-16 RX ADMIN — Medication 20 MILLIGRAM(S): at 14:27

## 2017-04-16 RX ADMIN — ATORVASTATIN CALCIUM 40 MILLIGRAM(S): 80 TABLET, FILM COATED ORAL at 22:49

## 2017-04-16 RX ADMIN — Medication 3 MILLILITER(S): at 08:17

## 2017-04-16 RX ADMIN — Medication 3 MILLILITER(S): at 20:41

## 2017-04-16 NOTE — PROGRESS NOTE ADULT - ASSESSMENT
76 year old woman with PMH of Afib on coumadin admitted under ACS due to fall and hemorrhagic shock, on admission INR was very high, anemic,  Acute on CKD, resuscitated with PRBC, IVF, PCC, renal function remain abnormal started on HD. Pt is s/p permacath on 04/14/2017. Medicine consulted called to transfer patient care. Pt is currently hemodynamically stable.     A/P    >Acute on CKD -ESRD - newly started on HD  appreciate renal input, s/p permacath - 04/14/2017  received 2 units of prbc for severe anemia  s/p grullon cathter, urine output is very low - ct today - will discuss with renal   hepatitis panel negative     >h/o A.fib  appreciate cardiology input, holding AC as per cardiology  cont. atenolol    >Anemia - s/p total 5 unit prbc,  f/u cbc, normal ferritin   epo as per renal     >Skin tear in left arm- improving   management as per surgery     >HTN- low normal   decrease atenolol 25 daily     >HLD- cont. statin    >Pulmonary HTN - cont. sildnafil     >DVT PPX - heparin

## 2017-04-16 NOTE — PROGRESS NOTE ADULT - SUBJECTIVE AND OBJECTIVE BOX
chief complaint of s/p fall, supratherapeutic INR     HPI:  75 yo f with hx of bovine valve replacement, afib on coumadin, chronic kidney insufficiency, s/p fall from standing 3 days ago. Per patient, she slipped, and as she was bracing her fall went to grab the kitchen counter and missed, causing some skin tears to her LUE. She's also had a nose bleed for 2-3 weeks, now more frequent. She had been in the ED on Sunday, got an XR which showed no fracture and was sent home. She comes today due to worsening epistaxis and bleeding from her skin tears. c/o SOB, headache/dizziness.      INTERVAL HPI/ OVERNIGHT EVENTS: Patient is seen and examined, s/p permacath 4/14,   feeling weak    REVIEW OF SYSTEMS:    Denied fever, chills, abd. pain, nausea, vomiting, chest pain, SOB, headache, dizziness    PHYSICAL EXAM:    Vital Signs Last 24 Hrs  T(C): 36.7, Max: 36.8 (04-15 @ 21:30)  T(F): 98, Max: 98.3 (04-15 @ 21:30)  HR: 68 (61 - 71)  BP: 90/48 (90/48 - 100/36)  BP(mean): --  RR: 18 (16 - 18)  SpO2: 96% (96% - 96%)    GENERAL: NAD  HEENT: EOMI  Neck: supple  CHEST/LUNG: CTAB  HEART: S1S2+ audible  ABDOMEN: Soft, Nontender, Nondistended; Bowel sounds present  EXTREMITIES:  left arm dressing   CNS: AAO X 3  Psychiatry: normal mood    LABS:                                   8.0    3.8   )-----------( 41       ( 16 Apr 2017 06:29 )             24.7   04-16    134<L>  |  96<L>  |  44.0<H>  ----------------------------<  112  4.2   |  25.0  |  3.06<H>    Ca    7.6<L>      16 Apr 2017 06:29  Phos  3.1     04-16            MEDICATIONS  : reviewed     RADIOLOGY & ADDITIONAL TEST

## 2017-04-16 NOTE — PROGRESS NOTE ADULT - ASSESSMENT
Advanced Oliguric renal failure   No hydro on CT   + Uremic sequelae with contributing diathesis ----> Much better with HD   HD again Tuesday   F/U Hep B and C and PPD       Anemia - Coumadin held   S/P several transfusions , last yesterday   Iron stores OK    Added epogen with HD   Added folate  CBC in am   Coumadin held          RO - Added Phoslo 2 tid , phos better   PTH OK

## 2017-04-16 NOTE — PROGRESS NOTE ADULT - SUBJECTIVE AND OBJECTIVE BOX
NEPHROLOGY INTERVAL HPI/OVERNIGHT EVENTS:  Feels better   Cardiology follow up noted   transfused 2 units PRBC with HD yesterday   ECHO noted     MEDICATIONS  (STANDING):  sildenafil (REVATIO) 20milliGRAM(s) Oral three times a day  atorvastatin 40milliGRAM(s) Oral at bedtime  calcium acetate 1334milliGRAM(s) Oral three times a day with meals  folic acid 1milliGRAM(s) Oral daily  ALBUTerol/ipratropium for Nebulization 3milliLiter(s) Nebulizer every 6 hours  lactobacillus acidophilus 1Tablet(s) Oral daily  heparin  Injectable 5000Unit(s) SubCutaneous every 8 hours  epoetin ajswinder Injectable 26562Jecn(s) IV Push <User Schedule>  ATENolol  Tablet 25milliGRAM(s) Oral daily    MEDICATIONS  (PRN):  albumin human 25% IVPB 50milliLiter(s) IV Intermittent every 1 hour PRN for sbp < 100      Allergies    allopurinol (Rash)  codeine (Nausea; Vomiting)  penicillin (Rash)  spironolactone (Unknown)    Intolerances          Vital Signs Last 24 Hrs  T(C): 36.6, Max: 36.8 (-15 @ 21:30)  T(F): 97.9, Max: 98.3 (04-15 @ 21:30)  HR: 71 (61 - 71)  BP: 93/53 (92/45 - 100/36)  BP(mean): --  RR: 16 (16 - 18)  SpO2: 96% (96% - 96%)  Daily     Daily Weight in k.7 (15 Apr 2017 20:06)  I&O's Detail    I & Os for current day (as of 2017 08:38)  =============================================  IN:    Packed Red Blood Cells: 600 ml    Albumin 25%: 100 ml    Total IN: 700 ml  ---------------------------------------------  OUT:    Other: 1800 ml    Indwelling Catheter - Urethral: 250 ml    Total OUT: 2050 ml  ---------------------------------------------  Total NET: -1350 ml    I&O's Summary    I & Os for current day (as of 2017 08:38)  =============================================  IN: 700 ml / OUT: 2050 ml / NET: -1350 ml      PHYSICAL EXAM:  GENERAL: NAD, Comfortable flat   HEAD:  No edema   NECK: Supple, No JVD, right permacath clean   CHEST/LUNG:EAE , No wheeze   HEART: Regular rate and rhythm; No rub   ABDOMEN: Soft, No rigidity   EXTREMITIES: min edema     LABS:                        8.0    3.8   )-----------( 41       ( 2017 06:29 )             24.7     -16    134<L>  |  96<L>  |  44.0<H>  ----------------------------<  112  4.2   |  25.0  |  3.06<H>    Ca    7.6<L>      2017 06:29  Phos  3.1               Phosphorus Level, Serum: 3.1 mg/dL ( @ 06:29)          RADIOLOGY & ADDITIONAL TESTS:    EXAM:  ECHO TRANSTHORACIC COMP W DOPP      PROCEDURE DATE:  2017   .      INTERPRETATION:  REPORT:    TRANSTHORACIC ECHOCARDIOGRAM REPORT           Patient Name:   DUSTIN HERNANDEZ Patient Location: Inpatient  Medical Rec #:  [id number]   Accession #:      [id number]  Account #:                        Height:           66.9 in 170.0 cm  YOB: 1940         Weight:           180.8 lb 82.00 kg  Patient Age:    76 years          BSA:              1.94 m²  Patient Gender: F                BP:               100/70 mmHg        Date of Exam: 2017 9:44:26 PM  Sonographer:  Viviana Abdul     Procedure:     2D Echo/Doppler/Color Doppler Complete.  Indications:   Unspecified combined systolic (congestive) and diastolic                (congestive) heart failure - I50.40  Diagnosis:     Unspecified combined systolic (congestive) and diastolic                 (congestive) heart failure - I50.40  Study Details: Technically adequate study.           2D AND M-MODE MEASUREMENTS (normal ranges within parentheses):  Left Ventricle:                 Normal    Aorta/Left Atrium:              Normal  IVSd (2D):              0.88 cm (0.7-1.1) LA Volume Index    59.8 ml/m²  LVPWd (2D):             0.99 cm (0.7-1.1) Right Ventricle:  LVIDd (2D):             4.68 cm (3.4-5.7) TAPSE:           1.20 cm  LVIDs (2D):             3.00 cm  LV FS (2D):             35.9 %  (>25%)  Relative Wall Thickness 0.42    (<0.42)     LV SYSTOLIC FUNCTION BY 2D PLANIMETRY (MOD):  EF-Biplane: 57.8 %     LV DIASTOLIC FUNCTION:  MV Peak E: 1.50 m/s e', MV Shabana: 0.03 m/s                      E/e' Ratio: 51.86                      Decel Time: 91 msec     SPECTRAL DOPPLER ANALYSIS (where applicable):  Mitral Valve:  MV Mean Grad: 4.4 mmHg MV P1/2Time: 26.50 msec                         MV Area, PHT: 8.30 cm²     Mitral Insufficiency by PISA:  MR Volume: 45.57 ml MR Flow Rate: 136.72 ml/s MR EROA: 40.85 mm²     Aortic Valve: AoV Max Trung: 2.06 m/s AoV Peak P.9 mmHg AoV Mean P.0 mmHg     LVOT Vmax: 0.91 m/s LVOT VTI: 0.221 m        Ao VTI: 0.511  Tricuspid Valve and PA/RV Systolic Pressure: TR Max Velocity: 3.23 m/s RA   Pressure: 15 mmHg RVSP/PASP: 56.8 mmHg        PHYSICIAN INTERPRETATION:  Left Ventricle: The left ventricular internal cavity size is normal. Left   ventricular wall thickness is normal.  Global LV systolic function was normal. Left ventricular ejection   fraction, by visual estimation, is 55 to 60%. The interventricular septum   is flattened in systole and diastole, consistent with right ventricular   pressure and volume overload. Spectral Doppler shows restrictive pattern   of left ventricular myocardial filling (Grade III diastolic dysfunction).   Elevated left atrial and left ventricular end-diastolicpressures.  Right Ventricle: The right ventricular size is severely enlarged. RV   systolic function is severely reduced. TV S' 0.1 m/s.  Left Atrium: Severely enlarged left atrium.  Right Atrium: The right atrium is severely dilated.  Pericardium: There is no evidence of pericardial effusion.  Mitral Valve: Thickening and calcification of the anterior and posterior   mitral valve leaflets. Status-post mitral annular ring insertion. Peak   transmitral mean gradient equals 4.4 mmHg, calculated mitral valve area   by pressure half time equals 8.30 cm² consistent with No evidence of   mitral stenosis. No evidence of mitral stenosis. Mild to moderate mitral   valve regurgitation is seen. The MR jet is centrally-directed. Mitral   valve mean gradient is 4.4 mmHg consistent with mild mitral stenosis.  Tricuspid Valve: Structurally normal tricuspid valve, with normal leaflet   excursion. Moderate-severe tricuspid regurgitation is visualized.   Estimated pulmonary artery systolic pressure is 56.8mmHg assuming a   right atrial pressure of 15 mmHg, which is consistent with moderate   pulmonary hypertension. Status post tricuspid valve repair.  Aortic Valve: Echo findings are consistent with normal structure and   function, allowing for the limitations of transthoracic echo techniques   of the aortic prosthesis. Mild aortic valve regurgitation is seen. The   Dimesionless Index value is 0.45.  Pulmonic Valve: Moderate pulmonic valve regurgitation.  Aorta: The aorta was not well visualized.  Venous: The inferior vena cava was dilated, with respiratory size   variation less than 50%.        Summary:   1. Left ventricular ejection fraction, by visual estimation, is 55 to   60%.   2. Normal global left ventricular systolic function.   3. Spectral Doppler shows restrictive pattern of left ventricular   myocardial filling (Grade III diastolic dysfunction). Elevated left   atrial and left ventricular end-diastolic pressures.   4. Normal left ventricular internal cavity size.   5. Severely enlarged right ventricle. Severely reduced RV systolic   function.   6. Severely enlarged left atrium.   7. Severely dilated right atrium.   8. Status-post mitral annular ring insertion.   9. Thickening and calcification of the anterior and posterior mitral   valve leaflets.  10. Mitral valve mean gradient is 4.4 mmHg consistent with mild mitral   stenosis.  11. Mild to moderate mitral valve regurgitation.  12. Bioprosthesis in the aortic position demonstrating normal function.  13. Mild aortic regurgitation.  14. Status post tricuspid valve repair.  15. Moderate-severe tricuspid regurgitation.  16. Moderate pulmonic valve regurgitation.  17. Estimated pulmonary artery systolic pressure is 56.8 mmHg assuming a   right atrial pressure of 15 mmHg, which is consistent with moderate   pulmonary hypertension.  18. There is no evidence of pericardial effusion.     MD Clara Electronically signed on 2017 at 9:29:19 AM          *** Final ***                  PAZ PICKARD   This document has been electronically signed. 2017  9:44PM

## 2017-04-17 LAB
ANION GAP SERPL CALC-SCNC: 13 MMOL/L — SIGNIFICANT CHANGE UP (ref 5–17)
BUN SERPL-MCNC: 54 MG/DL — HIGH (ref 8–20)
CALCIUM SERPL-MCNC: 7.6 MG/DL — LOW (ref 8.6–10.2)
CHLORIDE SERPL-SCNC: 95 MMOL/L — LOW (ref 98–107)
CO2 SERPL-SCNC: 26 MMOL/L — SIGNIFICANT CHANGE UP (ref 22–29)
CREAT SERPL-MCNC: 3.77 MG/DL — HIGH (ref 0.5–1.3)
GLUCOSE SERPL-MCNC: 124 MG/DL — HIGH (ref 70–115)
HCT VFR BLD CALC: 23.8 % — LOW (ref 37–47)
HGB BLD-MCNC: 7.6 G/DL — LOW (ref 12–16)
MCHC RBC-ENTMCNC: 31.4 PG — HIGH (ref 27–31)
MCHC RBC-ENTMCNC: 31.9 G/DL — LOW (ref 32–36)
MCV RBC AUTO: 98.3 FL — SIGNIFICANT CHANGE UP (ref 81–99)
PHOSPHATE SERPL-MCNC: 3.4 MG/DL — SIGNIFICANT CHANGE UP (ref 2.4–4.7)
PLATELET # BLD AUTO: 48 K/UL — LOW (ref 150–400)
POTASSIUM SERPL-MCNC: 4.3 MMOL/L — SIGNIFICANT CHANGE UP (ref 3.5–5.3)
POTASSIUM SERPL-SCNC: 4.3 MMOL/L — SIGNIFICANT CHANGE UP (ref 3.5–5.3)
RBC # BLD: 2.42 M/UL — LOW (ref 4.4–5.2)
RBC # FLD: 21.7 % — HIGH (ref 11–15.6)
SODIUM SERPL-SCNC: 134 MMOL/L — LOW (ref 135–145)
WBC # BLD: 3.9 K/UL — LOW (ref 4.8–10.8)
WBC # FLD AUTO: 3.9 K/UL — LOW (ref 4.8–10.8)

## 2017-04-17 PROCEDURE — 99233 SBSQ HOSP IP/OBS HIGH 50: CPT

## 2017-04-17 RX ORDER — POLYETHYLENE GLYCOL 3350 17 G/17G
17 POWDER, FOR SOLUTION ORAL DAILY
Qty: 0 | Refills: 0 | Status: DISCONTINUED | OUTPATIENT
Start: 2017-04-17 | End: 2017-04-29

## 2017-04-17 RX ORDER — ALBUMIN HUMAN 25 %
100 VIAL (ML) INTRAVENOUS ONCE
Qty: 0 | Refills: 0 | Status: COMPLETED | OUTPATIENT
Start: 2017-04-17 | End: 2017-04-17

## 2017-04-17 RX ADMIN — Medication 200 MILLILITER(S): at 13:33

## 2017-04-17 RX ADMIN — Medication 3 MILLILITER(S): at 03:32

## 2017-04-17 RX ADMIN — Medication 1 MILLIGRAM(S): at 11:30

## 2017-04-17 RX ADMIN — Medication 20 MILLIGRAM(S): at 06:09

## 2017-04-17 RX ADMIN — Medication 20 MILLIGRAM(S): at 22:31

## 2017-04-17 RX ADMIN — Medication 3 MILLILITER(S): at 08:48

## 2017-04-17 RX ADMIN — Medication 1334 MILLIGRAM(S): at 17:00

## 2017-04-17 RX ADMIN — Medication 1334 MILLIGRAM(S): at 08:50

## 2017-04-17 RX ADMIN — Medication 1 TABLET(S): at 11:30

## 2017-04-17 RX ADMIN — Medication 3 MILLILITER(S): at 21:24

## 2017-04-17 RX ADMIN — ERYTHROPOIETIN 10000 UNIT(S): 10000 INJECTION, SOLUTION INTRAVENOUS; SUBCUTANEOUS at 16:41

## 2017-04-17 RX ADMIN — ATORVASTATIN CALCIUM 40 MILLIGRAM(S): 80 TABLET, FILM COATED ORAL at 22:31

## 2017-04-17 NOTE — PROGRESS NOTE ADULT - SUBJECTIVE AND OBJECTIVE BOX
Patient was seen and evaluated on dialysis.   No c/o CP  NV but still with SOB though less  no F/C  no swelling feet    T(C): 36.2, Max: 36.6 (04-16 @ 19:00)  HR: 75 (64 - 75)  BP: 94/40 (86/54 - 94/48)  Wt(kg): --  PE ;  NAD  lungs - CTA; BS decreased bases  CV gr  murmer, No gallop or rub  Abd : soft, NT BS +, No masses  Ext- tr edema  Neuro : Grossly intact, moving extremities                             7.6    3.9   )-----------( 48       ( 17 Apr 2017 07:45 )             23.8        04-17    134<L>  |  95<L>  |  54.0<H>  ----------------------------<  124<H>  4.3   |  26.0  |  3.77<H>    Ca    7.6<L>      17 Apr 2017 07:45  Phos  3.4     04-17        MEDICATIONS  (STANDING):  sildenafil (REVATIO)  atorvastatin  calcium acetate  folic acid  ALBUTerol/ipratropium for Nebulization  lactobacillus acidophilus  albumin human 25% IVPB PRN  epoetin jaswinder Injectable  ATENolol  Tablet  polyethylene glycol 3350      Patient stable  HD arranged for today  [facility] HD easily  Continue to watch; Has significant PAH

## 2017-04-17 NOTE — PROGRESS NOTE ADULT - ASSESSMENT
76 year old woman with PMH of Afib on coumadin admitted under ACS due to fall and hemorrhagic shock, on admission INR was very high, anemic,  Acute on CKD, resuscitated with PRBC, IVF, PCC, renal function remain abnormal started on HD. Pt is s/p permacath on 04/14/2017. Medicine consulted called to transfer patient care. Pt is currently hemodynamically stable.     A/P    >Acute on CKD -ESRD - newly started on HD  appreciate renal input, s/p permacath - 04/14/2017  received 2 units of prbc for severe anemia  s/p grullon cathter, urine output is very low - ct today - will discuss with renal   hepatitis panel negative     >h/o A.fib  appreciate cardiology input, holding AC as per cardiology  cont. atenolol    >Anemia - acute on chronic - 2/2 hemorrhage    s/p total 5 unit prbc,  f/u cbc, normal ferritin   epo as per renal     >Skin tear in left arm- improving     >HTN- low normal   decrease atenolol 25 daily     >HLD- cont. statin    >Pulmonary HTN - cont. sildnafil     >DVT PPX - heparin

## 2017-04-17 NOTE — PROGRESS NOTE ADULT - SUBJECTIVE AND OBJECTIVE BOX
Chief Complaint: Recent course and chart reviewed.    HPI: Currently started on dialysis. Presented with fall and nose bleeds-found to be significantly anemic. Hx of AVR and mitral and tricuspid valvuloplasties. Known pulmonary hypertension.    PAST MEDICAL & SURGICAL HISTORY:  Anemia  Pulmonary hypertension  COPD (chronic obstructive pulmonary disease)  CAD (coronary artery disease)  Atrial fibrillation  Hyperlipidemia  Hypertension  Spinal fusion failure, initial encounter  Tubal ligation status  Sinusitis  Spinal stenosis  Pneumonia due to organism  No pertinent past medical history  H/O aortic valve replacement  H/O spinal fusion  H/O cataract  Cystocele  H/O tricuspid valve annuloplasty  H/O mitral valve repair      PREVIOUS DIAGNOSTIC TESTING:      ECHO  FINDINGS: LVEF nl with diastolic dysfunction/RV enlargement and hypokinesia. Pulmonary pressure approx 60.    STRESS  FINDINGS:    CATHETERIZATION  FINDINGS:    MEDICATIONS  (STANDING):  sildenafil (REVATIO) 20milliGRAM(s) Oral three times a day  atorvastatin 40milliGRAM(s) Oral at bedtime  calcium acetate 1334milliGRAM(s) Oral three times a day with meals  folic acid 1milliGRAM(s) Oral daily  ALBUTerol/ipratropium for Nebulization 3milliLiter(s) Nebulizer every 6 hours  lactobacillus acidophilus 1Tablet(s) Oral daily  epoetin jaswinder Injectable 73282Gdqn(s) IV Push <User Schedule>  ATENolol  Tablet 25milliGRAM(s) Oral daily  polyethylene glycol 3350 17Gram(s) Oral daily    MEDICATIONS  (PRN):  albumin human 25% IVPB 50milliLiter(s) IV Intermittent every 1 hour PRN for sbp < 100      FAMILY HISTORY:  CAD (coronary artery disease)      ROS: Negative other than as mentioned in HPI.    Vital Signs Last 24 Hrs  T(C): 36.4, Max: 36.6 (04-16 @ 19:00)  T(F): 97.5, Max: 97.9 (04-16 @ 19:00)  HR: 71 (64 - 72)  BP: 90/39 (86/54 - 94/48)  BP(mean): --  RR: 18 (18 - 18)  SpO2: 100% (98% - 100%)    PHYSICAL EXAM:  General: Appears well developed, well nourished alert and cooperative. elderly alert female currently on dialysis.  HEENT: Head; normocephalic, atraumatic.  Eyes;   Pupils reactive, cornea wnl.  Neck; Supple, no nodes adenopathy, no NVD or carotid bruit or thyromegaly.  CARDIOVASCULAR; No murmur, rub, gallop or lift. Normal S1 and S2. Dialysis catheter right chest.  LUNGS; No rales, rhonchi or wheeze. Normal breath sounds bilaterally.  ABDOMEN ; Soft, nontender without mass or organomegaly. bowel sounds normoactive.  EXTREMITIES; No clubbing, cyanosis or edema.   SKIN; warm and dry with normal turgor.  NEURO; Alert/oriented x 3/normal motor exam.   PSYCH; normal affect.            INTERPRETATION OF TELEMETRY:    ECG: Regularized a-fb-RBBB    I&O's Detail    I & Os for current day (as of 17 Apr 2017 13:37)  =============================================  IN:    Oral Fluid: 100 ml    Total IN: 100 ml  ---------------------------------------------  OUT:    Total OUT: 0 ml  ---------------------------------------------  Total NET: 100 ml      LABS:                        7.6    3.9   )-----------( 48       ( 17 Apr 2017 07:45 )             23.8     04-17    134<L>  |  95<L>  |  54.0<H>  ----------------------------<  124<H>  4.3   |  26.0  |  3.77<H>    Ca    7.6<L>      17 Apr 2017 07:45  Phos  3.4     04-17              I&O's Summary    I & Os for current day (as of 17 Apr 2017 13:37)  =============================================  IN: 100 ml / OUT: 0 ml / NET: 100 ml      RADIOLOGY & ADDITIONAL STUDIES:

## 2017-04-17 NOTE — PROGRESS NOTE ADULT - SUBJECTIVE AND OBJECTIVE BOX
chief complaint of s/p fall, supratherapeutic INR     HPI:  75 yo f with hx of bovine valve replacement, afib on coumadin, chronic kidney insufficiency, s/p fall from standing 3 days ago. Per patient, she slipped, and as she was bracing her fall went to grab the kitchen counter and missed, causing some skin tears to her LUE. She's also had a nose bleed for 2-3 weeks, now more frequent. She had been in the ED on Sunday, got an XR which showed no fracture and was sent home. She comes today due to worsening epistaxis and bleeding from her skin tears. c/o SOB, headache/dizziness.      INTERVAL HPI/ OVERNIGHT EVENTS: Patient is seen and examined   feeling weak, no new complaints    REVIEW OF SYSTEMS:    Denied fever, chills, abd. pain, nausea, vomiting, chest pain, SOB, headache, dizziness    PHYSICAL EXAM:    Vital Signs Last 24 Hrs  T(C): 36.4, Max: 36.6 (04-16 @ 19:00)  T(F): 97.5, Max: 97.9 (04-16 @ 19:00)  HR: 71 (64 - 72)  BP: 90/39 (86/54 - 94/48)  BP(mean): --  RR: 18 (18 - 18)  SpO2: 100% (98% - 100%)    GENERAL: NAD  HEENT: EOMI  Neck: supple  CHEST/LUNG: CTAB  HEART: S1S2+ audible  ABDOMEN: Soft, Nontender, Nondistended; Bowel sounds present  EXTREMITIES:  left arm dressing,  ecchymoses noted on rt anterior chest wall extending into RUE.  CNS: AAO X 3  Psychiatry: normal mood    LABS:                                              7.6    3.9   )-----------( 48       ( 17 Apr 2017 07:45 )             23.8   04-17    134<L>  |  95<L>  |  54.0<H>  ----------------------------<  124<H>  4.3   |  26.0  |  3.77<H>    Ca    7.6<L>      17 Apr 2017 07:45  Phos  3.4     04-17                MEDICATIONS  : reviewed     RADIOLOGY & ADDITIONAL TEST

## 2017-04-17 NOTE — PROGRESS NOTE ADULT - ASSESSMENT
1. renal failure-acute on chronic-currently started on dialysis.  2. pulmonary hpt-likely multifactorial but with AVR and mitral and tricuspid repairs left sided heart disease is playing a role.  3. afib-was ac'd  4.anemia-has required transfusions.

## 2017-04-18 LAB
BLD GP AB SCN SERPL QL: SIGNIFICANT CHANGE UP
TYPE + AB SCN PNL BLD: SIGNIFICANT CHANGE UP

## 2017-04-18 PROCEDURE — 99233 SBSQ HOSP IP/OBS HIGH 50: CPT

## 2017-04-18 RX ORDER — ERYTHROPOIETIN 10000 [IU]/ML
10000 INJECTION, SOLUTION INTRAVENOUS; SUBCUTANEOUS
Qty: 0 | Refills: 0 | Status: DISCONTINUED | OUTPATIENT
Start: 2017-04-18 | End: 2017-04-29

## 2017-04-18 RX ORDER — MIDODRINE HYDROCHLORIDE 2.5 MG/1
2.5 TABLET ORAL
Qty: 0 | Refills: 0 | Status: DISCONTINUED | OUTPATIENT
Start: 2017-04-18 | End: 2017-04-23

## 2017-04-18 RX ORDER — SODIUM CHLORIDE 0.65 %
1 AEROSOL, SPRAY (ML) NASAL THREE TIMES A DAY
Qty: 0 | Refills: 0 | Status: DISCONTINUED | OUTPATIENT
Start: 2017-04-18 | End: 2017-04-29

## 2017-04-18 RX ORDER — ALBUTEROL 90 UG/1
2.5 AEROSOL, METERED ORAL EVERY 6 HOURS
Qty: 0 | Refills: 0 | Status: DISCONTINUED | OUTPATIENT
Start: 2017-04-18 | End: 2017-04-29

## 2017-04-18 RX ORDER — ATENOLOL 25 MG/1
25 TABLET ORAL DAILY
Qty: 0 | Refills: 0 | Status: DISCONTINUED | OUTPATIENT
Start: 2017-04-18 | End: 2017-04-19

## 2017-04-18 RX ADMIN — MIDODRINE HYDROCHLORIDE 2.5 MILLIGRAM(S): 2.5 TABLET ORAL at 18:06

## 2017-04-18 RX ADMIN — Medication 3 MILLILITER(S): at 04:04

## 2017-04-18 RX ADMIN — Medication 20 MILLIGRAM(S): at 13:17

## 2017-04-18 RX ADMIN — Medication 1334 MILLIGRAM(S): at 18:06

## 2017-04-18 RX ADMIN — Medication 20 MILLIGRAM(S): at 05:23

## 2017-04-18 RX ADMIN — Medication 1 SPRAY(S): at 13:17

## 2017-04-18 RX ADMIN — Medication 1 MILLIGRAM(S): at 13:16

## 2017-04-18 RX ADMIN — Medication 1334 MILLIGRAM(S): at 13:16

## 2017-04-18 RX ADMIN — Medication 20 MILLIGRAM(S): at 23:00

## 2017-04-18 RX ADMIN — Medication 3 MILLILITER(S): at 08:27

## 2017-04-18 RX ADMIN — Medication 1334 MILLIGRAM(S): at 09:11

## 2017-04-18 RX ADMIN — ATORVASTATIN CALCIUM 40 MILLIGRAM(S): 80 TABLET, FILM COATED ORAL at 22:56

## 2017-04-18 RX ADMIN — Medication 1 TABLET(S): at 13:16

## 2017-04-18 NOTE — PROGRESS NOTE ADULT - ASSESSMENT
76 year old woman with PMH of Afib on coumadin admitted under ACS due to fall and hemorrhagic shock, on admission INR was very high, anemic,  Acute on CKD, resuscitated with PRBC, IVF, PCC, renal function remain abnormal started on HD. Pt is s/p permacath on 04/14/2017. Medicine consulted called to transfer patient care. pt now awaiting for dailysis - ?need for long term . has required transfusion for drop in her hemoglobin.     A/P    >Acute on CKD -ESRD - newly started on HD  appreciate renal input, s/p permacath - 04/14/2017  received 5 units of prbc for severe anemia so far, receiving 2 units more 4/18  Oliguric - DC grullon - Bladder scan 48hrs to ensure complete emptying  hepatitis panel negative     >h/o A.fib  appreciate cardiology input, holding AC as per cardiology  cont. atenolol    >Anemia - acute on chronic - 2/2 hemorrhage + CKD    s/p total 5 unit prbc  f/u cbc, normal ferritin   epo as per renal     >Skin tear in left arm- improving     >HTN- low  now   Started on midodrine per cardio but advised to ct atenolol 25 daily     >HLD- cont. statin    >Pulmonary HTN - cont. sildnafil     >DVT PPX - heparin   PT

## 2017-04-18 NOTE — PROGRESS NOTE ADULT - SUBJECTIVE AND OBJECTIVE BOX
[place] [facility] - [facility], THE HEART CENTER                                   [address] [place] [address]                                                      PHONE: [phone]                                                         FAX: [phone]  [email or web address]  ---------------------------------------------------------------------------------------------------------------------------------    FU for  Pt seen and examined.     Overnight events/patient complaints: denies cp sob    allopurinol (Rash)  codeine (Nausea; Vomiting)  penicillin (Rash)  spironolactone (Unknown)    MEDICATIONS  (STANDING):  sildenafil (REVATIO) 20milliGRAM(s) Oral three times a day  atorvastatin 40milliGRAM(s) Oral at bedtime  calcium acetate 1334milliGRAM(s) Oral three times a day with meals  folic acid 1milliGRAM(s) Oral daily  ALBUTerol/ipratropium for Nebulization 3milliLiter(s) Nebulizer every 6 hours  lactobacillus acidophilus 1Tablet(s) Oral daily  epoetin jaswinder Injectable 69606Kbrm(s) IV Push <User Schedule>  ATENolol  Tablet 25milliGRAM(s) Oral daily  polyethylene glycol 3350 17Gram(s) Oral daily    MEDICATIONS  (PRN):  albumin human 25% IVPB 50milliLiter(s) IV Intermittent every 1 hour PRN for sbp < 100      Vital Signs Last 24 Hrs  T(C): 36.3, Max: 36.8 (04-17 @ 22:22)  T(F): 97.3, Max: 98.2 (04-17 @ 22:22)  HR: 79 (71 - 83)  BP: 86/44 (86/44 - 94/40)  BP(mean): --  RR: 20 (16 - 20)  SpO2: 99% (98% - 100%)  ICU Vital Signs Last 24 Hrs  I&O's Summary    I & Os for current day (as of 18 Apr 2017 08:31)  =============================================  IN: 100 ml / OUT: 300 ml / NET: -200 ml      PHYSICAL EXAM:  HEENT: no JVD  CARDIOVASCULAR: Normal S1 and S2, No murmur, rub, gallop or lift.   LUNGS: decreased air entry B/l  ABDOMEN: Soft, nontender without mass or organomegaly. bowel sounds normoactive.  EXTREMITIES:chronic changes+          LABS:                        7.6    3.9   )-----------( 48       ( 17 Apr 2017 07:45 )             23.8     04-17    134<L>  |  95<L>  |  54.0<H>  ----------------------------<  124<H>  4.3   |  26.0  |  3.77<H>    Ca    7.6<L>      17 Apr 2017 07:45  Phos  3.4     04-17      DUSTIN HERNANDEZ            RADIOLOGY & ADDITIONAL STUDIES:    LABS:                        7.6    3.9   )-----------( 48       ( 17 Apr 2017 07:45 )             23.8     04-17    134<L>  |  95<L>  |  54.0<H>  ----------------------------<  124<H>  4.3   |  26.0  |  3.77<H>    Ca    7.6<L>      17 Apr 2017 07:45  Phos  3.4     04-17    Assessment and Plan:    Patient started on dialysis. Presented with fall and nose bleeds-found to be significantly anemic. Hx of AVR and mitral and tricuspid valvuloplasties. Known pulmonary hypertension.  AFIb: AC on hold due recent events, Hb still low, recommend transfusing one more unit.   Hypotension: asymptomatic, recommend start on midodrine for BP support  ESRD on HD

## 2017-04-18 NOTE — PROGRESS NOTE ADULT - SUBJECTIVE AND OBJECTIVE BOX
chief complaint of s/p fall, supratherapeutic INR     HPI:  75 yo f with hx of bovine valve replacement, afib on coumadin, chronic kidney insufficiency, s/p fall from standing 3 days ago. Per patient, she slipped, and as she was bracing her fall went to grab the kitchen counter and missed, causing some skin tears to her LUE. She's also had a nose bleed for 2-3 weeks, now more frequent. She had been in the ED on Sunday, got an XR which showed no fracture and was sent home. She comes today due to worsening epistaxis and bleeding from her skin tears. c/o SOB, headache/dizziness.         INTERVAL HPI/ OVERNIGHT EVENTS: Patient is seen and examined  feels weak, lethargic.     REVIEW OF SYSTEMS:    Denied fever, chills, abd. pain, nausea, vomiting, chest pain, headache, dizziness    PHYSICAL EXAM:    Vital Signs Last 24 Hrs  T(C): 36.3, Max: 36.8 (04-17 @ 22:22)  T(F): 97.3, Max: 98.2 (04-17 @ 22:22)  HR: 79 (75 - 83)  BP: 86/44 (86/44 - 94/40)  BP(mean): --  RR: 20 (16 - 20)  SpO2: 99% (99% - 100%)    GENERAL: NAD  HEENT: EOMI  Neck: supple  CHEST/LUNG: CTAB  HEART: S1S2+ audible  ABDOMEN: Soft, Nontender, Nondistended; Bowel sounds present  EXTREMITIES:  left arm dressing,  ecchymoses noted on rt anterior chest wall extending into RUE.  CNS: AAO X 3  Psychiatry: normal mood    LABS:                                                         7.6    3.9   )-----------( 48       ( 17 Apr 2017 07:45 )             23.8   04-17    134<L>  |  95<L>  |  54.0<H>  ----------------------------<  124<H>  4.3   |  26.0  |  3.77<H>    Ca    7.6<L>      17 Apr 2017 07:45  Phos  3.4     04-17          MEDICATIONS  : reviewed     RADIOLOGY & ADDITIONAL TEST

## 2017-04-19 LAB
ANION GAP SERPL CALC-SCNC: 15 MMOL/L — SIGNIFICANT CHANGE UP (ref 5–17)
BUN SERPL-MCNC: 48 MG/DL — HIGH (ref 8–20)
CALCIUM SERPL-MCNC: 8 MG/DL — LOW (ref 8.6–10.2)
CHLORIDE SERPL-SCNC: 94 MMOL/L — LOW (ref 98–107)
CO2 SERPL-SCNC: 25 MMOL/L — SIGNIFICANT CHANGE UP (ref 22–29)
CREAT SERPL-MCNC: 3.11 MG/DL — HIGH (ref 0.5–1.3)
GLUCOSE SERPL-MCNC: 135 MG/DL — HIGH (ref 70–115)
HCT VFR BLD CALC: 28.7 % — LOW (ref 37–47)
HGB BLD-MCNC: 9.2 G/DL — LOW (ref 12–16)
MCHC RBC-ENTMCNC: 31.5 PG — HIGH (ref 27–31)
MCHC RBC-ENTMCNC: 32.1 G/DL — SIGNIFICANT CHANGE UP (ref 32–36)
MCV RBC AUTO: 98.3 FL — SIGNIFICANT CHANGE UP (ref 81–99)
PLATELET # BLD AUTO: 26 K/UL — CRITICAL LOW (ref 150–400)
POTASSIUM SERPL-MCNC: 4.3 MMOL/L — SIGNIFICANT CHANGE UP (ref 3.5–5.3)
POTASSIUM SERPL-SCNC: 4.3 MMOL/L — SIGNIFICANT CHANGE UP (ref 3.5–5.3)
RBC # BLD: 2.92 M/UL — LOW (ref 4.4–5.2)
RBC # FLD: 22.4 % — HIGH (ref 11–15.6)
SODIUM SERPL-SCNC: 134 MMOL/L — LOW (ref 135–145)
TSH SERPL-MCNC: 2.43 UIU/ML — SIGNIFICANT CHANGE UP (ref 0.27–4.2)
WBC # BLD: 4.1 K/UL — LOW (ref 4.8–10.8)
WBC # FLD AUTO: 4.1 K/UL — LOW (ref 4.8–10.8)

## 2017-04-19 PROCEDURE — 99233 SBSQ HOSP IP/OBS HIGH 50: CPT

## 2017-04-19 RX ORDER — ALBUMIN HUMAN 25 %
100 VIAL (ML) INTRAVENOUS ONCE
Qty: 0 | Refills: 0 | Status: COMPLETED | OUTPATIENT
Start: 2017-04-19 | End: 2017-04-19

## 2017-04-19 RX ORDER — ALTEPLASE 100 MG
2 KIT INTRAVENOUS ONCE
Qty: 0 | Refills: 0 | Status: COMPLETED | OUTPATIENT
Start: 2017-04-19 | End: 2017-04-19

## 2017-04-19 RX ORDER — ATENOLOL 25 MG/1
12.5 TABLET ORAL DAILY
Qty: 0 | Refills: 0 | Status: DISCONTINUED | OUTPATIENT
Start: 2017-04-19 | End: 2017-04-23

## 2017-04-19 RX ORDER — MIDODRINE HYDROCHLORIDE 2.5 MG/1
5 TABLET ORAL
Qty: 0 | Refills: 0 | Status: CANCELLED | OUTPATIENT
Start: 2018-03-18 | End: 2017-04-29

## 2017-04-19 RX ORDER — MIDODRINE HYDROCHLORIDE 2.5 MG/1
5 TABLET ORAL ONCE
Qty: 0 | Refills: 0 | Status: COMPLETED | OUTPATIENT
Start: 2017-04-19 | End: 2017-04-19

## 2017-04-19 RX ADMIN — MIDODRINE HYDROCHLORIDE 2.5 MILLIGRAM(S): 2.5 TABLET ORAL at 17:34

## 2017-04-19 RX ADMIN — Medication 1 TABLET(S): at 13:28

## 2017-04-19 RX ADMIN — Medication 1 MILLIGRAM(S): at 13:29

## 2017-04-19 RX ADMIN — MIDODRINE HYDROCHLORIDE 5 MILLIGRAM(S): 2.5 TABLET ORAL at 10:31

## 2017-04-19 RX ADMIN — ALTEPLASE 2 MILLIGRAM(S): KIT at 12:13

## 2017-04-19 RX ADMIN — Medication 200 MILLILITER(S): at 09:49

## 2017-04-19 RX ADMIN — Medication 20 MILLIGRAM(S): at 05:30

## 2017-04-19 RX ADMIN — Medication 1 SPRAY(S): at 21:46

## 2017-04-19 RX ADMIN — Medication 20 MILLIGRAM(S): at 21:46

## 2017-04-19 RX ADMIN — Medication 1334 MILLIGRAM(S): at 13:28

## 2017-04-19 RX ADMIN — Medication 1 SPRAY(S): at 05:30

## 2017-04-19 RX ADMIN — ERYTHROPOIETIN 10000 UNIT(S): 10000 INJECTION, SOLUTION INTRAVENOUS; SUBCUTANEOUS at 12:10

## 2017-04-19 RX ADMIN — ATORVASTATIN CALCIUM 40 MILLIGRAM(S): 80 TABLET, FILM COATED ORAL at 21:46

## 2017-04-19 RX ADMIN — ATENOLOL 25 MILLIGRAM(S): 25 TABLET ORAL at 05:30

## 2017-04-19 RX ADMIN — MIDODRINE HYDROCHLORIDE 2.5 MILLIGRAM(S): 2.5 TABLET ORAL at 05:30

## 2017-04-19 RX ADMIN — Medication 20 MILLIGRAM(S): at 13:29

## 2017-04-19 RX ADMIN — Medication 1334 MILLIGRAM(S): at 17:34

## 2017-04-19 RX ADMIN — Medication 1 SPRAY(S): at 13:29

## 2017-04-19 NOTE — PROGRESS NOTE ADULT - ASSESSMENT
76 year old woman with PMH of Afib on coumadin admitted under ACS due to fall and hemorrhagic shock, on admission INR was very high, anemic,  Acute on CKD, resuscitated with PRBC, IVF, PCC, renal function remain abnormal started on HD. Pt is s/p permacath on 04/14/2017. Medicine consulted called to transfer patient care. pt now awaiting for dailysis - ?need for long term . has required transfusion for drop in her hemoglobin.     A/P    >Acute on CKD -ESRD - newly started on HD  appreciate renal input, s/p permacath - 04/14/2017  received 5 units of prbc for severe anemia so far, receiving 2 units more 4/18  Oliguric - DC grullon - Bladder scan 48hrs to ensure complete emptying  hepatitis panel negative     >h/o A.fib  appreciate cardiology input, holding AC as per cardiology  cont. atenolol    >Anemia - acute on chronic - 2/2 hemorrhage + CKD    s/p total 5 unit prbc  f/u cbc, normal ferritin   epo as per renal     >Skin tear in left arm- improving     >HTN- low  now   Started on midodrine per cardio but advised to ct atenolol 25 daily     >HLD- cont. statin    >Pulmonary HTN - cont. sildnafil     >DVT PPX - heparin   PT 76 year old woman with PMH of Afib on coumadin admitted under ACS due to fall and hemorrhagic shock, on admission INR was very high, anemic,  Acute on CKD, resuscitated with PRBC, IVF, PCC, renal function remain abnormal started on HD. Pt is s/p permacatrei on 04/14/2017. Medicine consulted called to transfer patient care. pt now awaiting for dailysis - ?need for long term . has required transfusion for drop in her hemoglobin.  Fluid overloaded, but has been difficult to dialyze 2/2 low BP. Started on midodrine.     A/P    >Acute on CKD -ESRD - newly started on HD  appreciate renal input, s/p permacath - 04/14/2017  received 7 units of prbc for severe anemia so far,   Oliguric - needs long term dilaysis  hepatitis panel negative       > hypotension , not tolerating HD  most likely 2/2 Sildenefil for pulm HTN contributing, CHF   on midodrine  agreee with checking cortisol in am     >thrombocytopenia   ? chronic per daughter - saw hematologist per pt - cannot receollcect name  ?2/2 CKD. not on any Heparin/loenox since last 4 days -[ less liekly HIT  no aspirin          >h/o A.fib  appreciate cardiology input, holding AC as per cardiology  cont. atenolol    >Anemia - acute on chronic - 2/2 hemorrhage + CKD    s/p total 7 unit prbc  f/u cbc, normal ferritin   epo as per renal     >Skin tear in left arm- improving     >HLD- cont. statin    >Pulmonary HTN - cont. sildnafil     >DVT PPX - heparin   PT

## 2017-04-19 NOTE — PROGRESS NOTE ADULT - SUBJECTIVE AND OBJECTIVE BOX
[place] [facility] - [facility], THE HEART CENTER                                   [address] [place] [address]                                                      PHONE: [phone]                                                         FAX: [phone]  [email or web address]  ---------------------------------------------------------------------------------------------------------------------------------    FU for  Pt seen and examined.     Overnight events/patient complaints:    allopurinol (Rash)  codeine (Nausea; Vomiting)  penicillin (Rash)  spironolactone (Unknown)    MEDICATIONS  (STANDING):  sildenafil (REVATIO) 20milliGRAM(s) Oral three times a day  atorvastatin 40milliGRAM(s) Oral at bedtime  calcium acetate 1334milliGRAM(s) Oral three times a day with meals  folic acid 1milliGRAM(s) Oral daily  lactobacillus acidophilus 1Tablet(s) Oral daily  polyethylene glycol 3350 17Gram(s) Oral daily  midodrine 2.5milliGRAM(s) Oral two times a day  sodium chloride 0.65% Nasal 1Spray(s) Both Nostrils three times a day  ATENolol  Tablet 25milliGRAM(s) Oral daily  epoetin jaswinder Injectable 65877Eydt(s) IV Push <User Schedule>    MEDICATIONS  (PRN):  albumin human 25% IVPB 50milliLiter(s) IV Intermittent every 1 hour PRN for sbp < 100  ALBUTerol    0.083% 2.5milliGRAM(s) Nebulizer every 6 hours PRN Bronchospasm      Vital Signs Last 24 Hrs  T(C): 36.6, Max: 36.6 (04-18 @ 22:37)  T(F): 97.8, Max: 97.9 (04-19 @ 04:00)  HR: 66 (66 - 74)  BP: 92/54 (89/55 - 102/50)  BP(mean): --  RR: 18 (18 - 20)  SpO2: 100% (92% - 100%)  ICU Vital Signs Last 24 Hrs  I&O's Summary    I & Os for current day (as of 19 Apr 2017 09:15)  =============================================  IN: 916 ml / OUT: 70 ml / NET: 846 ml      PHYSICAL EXAM:  HEENT: no JVD  CARDIOVASCULAR: Normal S1 and S2, No murmur, rub, gallop or lift.   LUNGS: No rales, rhonchi or wheeze. Normal breath sounds bilaterally.  ABDOMEN: Soft, nontender without mass or organomegaly. bowel sounds normoactive.  EXTREMITIES: no edema          LABS:        Assessment and Plan:  Patient started on dialysis. Presented with fall and nose bleeds-found to be significantly anemic. Hx of AVR and mitral and tricuspid valvuloplasties. Known pulmonary hypertension.  AFIb: AC on hold due recent events, Hb still low, recommend transfusing one more unit.   Hypotension: asymptomatic, recommend start on midodrine for BP support  ESRD on HD [place] [facility] - [facility], THE HEART CENTER                                   [address] [place] [address]                                                      PHONE: [phone]                                                         FAX: [phone]  [email or web address]  ---------------------------------------------------------------------------------------------------------------------------------    FU for  Pt seen and examined.     Overnight events/patient complaints: sob marginal improvement    allopurinol (Rash)  codeine (Nausea; Vomiting)  penicillin (Rash)  spironolactone (Unknown)    MEDICATIONS  (STANDING):  sildenafil (REVATIO) 20milliGRAM(s) Oral three times a day  atorvastatin 40milliGRAM(s) Oral at bedtime  calcium acetate 1334milliGRAM(s) Oral three times a day with meals  folic acid 1milliGRAM(s) Oral daily  lactobacillus acidophilus 1Tablet(s) Oral daily  polyethylene glycol 3350 17Gram(s) Oral daily  midodrine 2.5milliGRAM(s) Oral two times a day  sodium chloride 0.65% Nasal 1Spray(s) Both Nostrils three times a day  ATENolol  Tablet 25milliGRAM(s) Oral daily  epoetin jaswinder Injectable 78912Oooq(s) IV Push <User Schedule>    MEDICATIONS  (PRN):  albumin human 25% IVPB 50milliLiter(s) IV Intermittent every 1 hour PRN for sbp < 100  ALBUTerol    0.083% 2.5milliGRAM(s) Nebulizer every 6 hours PRN Bronchospasm      Vital Signs Last 24 Hrs  T(C): 36.6, Max: 36.6 (04-18 @ 22:37)  T(F): 97.8, Max: 97.9 (04-19 @ 04:00)  HR: 66 (66 - 74)  BP: 92/54 (89/55 - 102/50)  BP(mean): --  RR: 18 (18 - 20)  SpO2: 100% (92% - 100%)  ICU Vital Signs Last 24 Hrs  I&O's Summary    I & Os for current day (as of 19 Apr 2017 09:15)  =============================================  IN: 916 ml / OUT: 70 ml / NET: 846 ml      PHYSICAL EXAM:  HEENT: no JVD  CARDIOVASCULAR: Normal S1 and S2, No murmur, rub, gallop or lift.   LUNGS: decreased air entry at bases  ABDOMEN: Soft, nontender without mass or organomegaly. bowel sounds normoactive.  EXTREMITIES: edema+        LABS:        Assessment and Plan:  Patient started on dialysis. Presented with fall and nose bleeds-found to be significantly anemic. Hx of AVR and mitral and tricuspid valvuloplasties. Known pulmonary hypertension.  AFIb: AC on hold due recent events  Hypotension: bp still low, agree with increasing midodrine and decreasing atenolol  Diastolic HF: unable to achieve negative diuresis during dialysis because of low bp, check cortisol and tsh level  ESRD on HD

## 2017-04-19 NOTE — PROGRESS NOTE ADULT - SUBJECTIVE AND OBJECTIVE BOX
DUSTIN HERNANDEZ    [id number]    76y      Female    INTERVAL HPI/OVERNIGHT EVENTS:    REVIEW OF SYSTEMS:    CONSTITUTIONAL: No fever, weight loss, or fatigue  RESPIRATORY: No cough, wheezing, hemoptysis; No shortness of breath  CARDIOVASCULAR: No chest pain, palpitations  GASTROINTESTINAL: No abdominal or epigastric pain. No nausea, vomiting  NEUROLOGICAL: No headaches, memory loss, loss of strength.  MISCELLANEOUS:      Vital Signs Last 24 Hrs  T(C): 36.7, Max: 36.7 (04-19 @ 12:51)  T(F): 98, Max: 98 (04-19 @ 12:51)  HR: 67 (66 - 74)  BP: 108/56 (89/55 - 108/56)  BP(mean): --  RR: 18 (18 - 20)  SpO2: 100% (92% - 100%)    PHYSICAL EXAM:    GENERAL: NAD, well-groomed  HEENT: PERRL, +EOMI  NECK: soft, Supple, No JVD,   CHEST/LUNG: Clear to percussion bilaterally; No wheezing  HEART: S1S2+, Regular rate and rhythm; No murmurs, rubs, or gallops  ABDOMEN: Soft, Nontender, Nondistended; Bowel sounds present  EXTREMITIES:  2+ Peripheral Pulses, No clubbing, cyanosis, or edema  SKIN: No rashes or lesions  NEURO: AAOX3, no focal deficits, no motor r sensory loss  PSYCH: normal mood      LABS:                        9.2    4.1   )-----------( 26       ( 19 Apr 2017 09:40 )             28.7     04-19    134<L>  |  94<L>  |  48.0<H>  ----------------------------<  135<H>  4.3   |  25.0  |  3.11<H>    Ca    8.0<L>      19 Apr 2017 09:40              MEDICATIONS  (STANDING):  sildenafil (REVATIO) 20milliGRAM(s) Oral three times a day  atorvastatin 40milliGRAM(s) Oral at bedtime  calcium acetate 1334milliGRAM(s) Oral three times a day with meals  folic acid 1milliGRAM(s) Oral daily  lactobacillus acidophilus 1Tablet(s) Oral daily  polyethylene glycol 3350 17Gram(s) Oral daily  midodrine 2.5milliGRAM(s) Oral two times a day  sodium chloride 0.65% Nasal 1Spray(s) Both Nostrils three times a day  ATENolol  Tablet 25milliGRAM(s) Oral daily  epoetin jaswinder Injectable 20111Satc(s) IV Push <User Schedule>  ATENolol  Tablet 12.5milliGRAM(s) Oral daily    MEDICATIONS  (PRN):  albumin human 25% IVPB 50milliLiter(s) IV Intermittent every 1 hour PRN for sbp < 100  ALBUTerol    0.083% 2.5milliGRAM(s) Nebulizer every 6 hours PRN Bronchospasm      RADIOLOGY & ADDITIONAL TESTS: DUSTIN HERNANDEZ    [id number]    76y      Female    INTERVAL HPI/OVERNIGHT EVENTS: no acute events    no complaints. SOB on exertion.     REVIEW OF SYSTEMS:    CONSTITUTIONAL: No fever, weight loss, or fatigue  RESPIRATORY: No cough, wheezing, hemoptysis  CARDIOVASCULAR: No chest pain, palpitations  GASTROINTESTINAL: No abdominal or epigastric pain. No nausea, vomiting  NEUROLOGICAL: No headaches, memory loss, loss of strength.  MISCELLANEOUS:      Vital Signs Last 24 Hrs  T(C): 36.7, Max: 36.7 (04-19 @ 12:51)  T(F): 98, Max: 98 (04-19 @ 12:51)  HR: 67 (66 - 74)  BP: 108/56 (89/55 - 108/56)  BP(mean): --  RR: 18 (18 - 20)  SpO2: 100% (92% - 100%)    PHYSICAL EXAM:    GENERAL: NAD, well-groomed  HEENT: PERRL, +EOMI  NECK: soft, Supple, No JVD,   CHEST/LUNG: Rt LOWER base crackles+ No wheezing  HEART: S1S2+, Regular rate and rhythm; No murmurs, rubs, or gallops  ABDOMEN: Soft, Nontender, Nondistended; Bowel sounds present  EXTREMITIES:  2+ Peripheral Pulses, No clubbing, cyanosis. pedal edema 1+  SKIN: No rashes or lesions  NEURO: AAOX3, no focal deficits, no motor r sensory loss  PSYCH: normal mood      LABS:                        9.2    4.1   )-----------( 26       ( 19 Apr 2017 09:40 )             28.7     04-19    134<L>  |  94<L>  |  48.0<H>  ----------------------------<  135<H>  4.3   |  25.0  |  3.11<H>    Ca    8.0<L>      19 Apr 2017 09:40              MEDICATIONS  (STANDING):  sildenafil (REVATIO) 20milliGRAM(s) Oral three times a day  atorvastatin 40milliGRAM(s) Oral at bedtime  calcium acetate 1334milliGRAM(s) Oral three times a day with meals  folic acid 1milliGRAM(s) Oral daily  lactobacillus acidophilus 1Tablet(s) Oral daily  polyethylene glycol 3350 17Gram(s) Oral daily  midodrine 2.5milliGRAM(s) Oral two times a day  sodium chloride 0.65% Nasal 1Spray(s) Both Nostrils three times a day  ATENolol  Tablet 25milliGRAM(s) Oral daily  epoetin jaswidner Injectable 34950Dfbj(s) IV Push <User Schedule>  ATENolol  Tablet 12.5milliGRAM(s) Oral daily    MEDICATIONS  (PRN):  albumin human 25% IVPB 50milliLiter(s) IV Intermittent every 1 hour PRN for sbp < 100  ALBUTerol    0.083% 2.5milliGRAM(s) Nebulizer every 6 hours PRN Bronchospasm      RADIOLOGY & ADDITIONAL TESTS:

## 2017-04-19 NOTE — PROGRESS NOTE ADULT - ASSESSMENT
ESRD on HD TTS schedule HD today  Low BP on HD will inc midodrine and give albumin  Edematous likely due to HF will give Albumin w HD today  Anemia iron stores ok cont EPO w HD ESRD on HD TTS schedule HD today  Low BP on HD will inc midodrine and give albumin will also dec albumin to 12.5 mg Q day discussed w cardiology  Edematous likely due to HF will give Albumin w HD today  Anemia iron stores ok cont EPO w HD ESRD on HD TTS schedule HD today  Low BP on HD will inc midodrine and give albumin will also dec albumin to 12.5 mg Q day discussed w cardiology  Edematous likely due to HF will give Albumin w HD today  Anemia iron stores ok cont EPO w HD   Discussed plans with pt dtr on phone Milagro [phone] ESRD on HD TTS schedule HD today  Low BP on HD will inc midodrine and give albumin will also dec albumin to 12.5 mg Q day discussed w cardiology  Edematous likely due to HF CKD will give Albumin w HD today will give additional HD estephania primarily for UF  Anemia iron stores ok cont EPO w HD   Discussed plans with pt dtr on phone Milagro [phone] ESRD on HD TTS schedule HD today  Low BP on HD will inc midodrine and give albumin will also dec atenolol to 12.5 mg Q day discussed w cardiology  Edematous likely due to HF CKD will give Albumin w HD today will give additional HD estephania primarily for UF  Anemia iron stores ok cont EPO w HD   Discussed plans with pt dtr on phone Milagro [phone]

## 2017-04-19 NOTE — PROGRESS NOTE ADULT - SUBJECTIVE AND OBJECTIVE BOX
NEPHROLOGY INTERVAL HPI/OVERNIGHT EVENTS:    Examined on HD   BP Low gave albumin   LE edematous    MEDICATIONS  (STANDING):  sildenafil (REVATIO) 20milliGRAM(s) Oral three times a day  atorvastatin 40milliGRAM(s) Oral at bedtime  calcium acetate 1334milliGRAM(s) Oral three times a day with meals  folic acid 1milliGRAM(s) Oral daily  lactobacillus acidophilus 1Tablet(s) Oral daily  polyethylene glycol 3350 17Gram(s) Oral daily  midodrine 2.5milliGRAM(s) Oral two times a day  sodium chloride 0.65% Nasal 1Spray(s) Both Nostrils three times a day  ATENolol  Tablet 25milliGRAM(s) Oral daily  epoetin jaswinder Injectable 01449Pbav(s) IV Push <User Schedule>  albumin human 25% IVPB 100milliLiter(s) IV Intermittent once    MEDICATIONS  (PRN):  albumin human 25% IVPB 50milliLiter(s) IV Intermittent every 1 hour PRN for sbp < 100  ALBUTerol    0.083% 2.5milliGRAM(s) Nebulizer every 6 hours PRN Bronchospasm      Allergies    allopurinol (Rash)  codeine (Nausea; Vomiting)  penicillin (Rash)  spironolactone (Unknown)    Intolerances        Vital Signs Last 24 Hrs  T(C): 36.6, Max: 36.6 (04-18 @ 22:37)  T(F): 97.8, Max: 97.9 (04-19 @ 04:00)  HR: 66 (66 - 74)  BP: 92/54 (89/55 - 102/50)  BP(mean): --  RR: 18 (18 - 20)  SpO2: 100% (92% - 100%)  Daily     Daily Weight in k.3 (2017 09:00)    PHYSICAL EXAM:  Awake/alert; NAD  chest Clear  No JVD  No murmer  abd soft, NT BS +  1-2 + edema linh thighs                        RADIOLOGY & ADDITIONAL TESTS: NEPHROLOGY INTERVAL HPI/OVERNIGHT EVENTS:    Examined on HD   BP Low gave albumin and midodrine  LE edematous    MEDICATIONS  (STANDING):  sildenafil (REVATIO) 20milliGRAM(s) Oral three times a day  atorvastatin 40milliGRAM(s) Oral at bedtime  calcium acetate 1334milliGRAM(s) Oral three times a day with meals  folic acid 1milliGRAM(s) Oral daily  lactobacillus acidophilus 1Tablet(s) Oral daily  polyethylene glycol 3350 17Gram(s) Oral daily  midodrine 2.5milliGRAM(s) Oral two times a day  sodium chloride 0.65% Nasal 1Spray(s) Both Nostrils three times a day  ATENolol  Tablet 25milliGRAM(s) Oral daily  epoetin jaswinder Injectable 14556Dqsm(s) IV Push <User Schedule>  albumin human 25% IVPB 100milliLiter(s) IV Intermittent once    MEDICATIONS  (PRN):  albumin human 25% IVPB 50milliLiter(s) IV Intermittent every 1 hour PRN for sbp < 100  ALBUTerol    0.083% 2.5milliGRAM(s) Nebulizer every 6 hours PRN Bronchospasm      Allergies    allopurinol (Rash)  codeine (Nausea; Vomiting)  penicillin (Rash)  spironolactone (Unknown)    Intolerances        Vital Signs Last 24 Hrs  T(C): 36.6, Max: 36.6 (04-18 @ 22:37)  T(F): 97.8, Max: 97.9 (04-19 @ 04:00)  HR: 66 (66 - 74)  BP: 92/54 (89/55 - 102/50)  BP(mean): --  RR: 18 (18 - 20)  SpO2: 100% (92% - 100%)  Daily     Daily Weight in k.3 (2017 09:00)    PHYSICAL EXAM:  Awake/alert; NAD  chest Clear  No JVD  No murmer  abd soft, NT BS +  1-2 + edema linh thighs                        RADIOLOGY & ADDITIONAL TESTS: NEPHROLOGY INTERVAL HPI/OVERNIGHT EVENTS:    Examined on HD   BP Low gave albumin and midodrine  LE edematous worsening  Has gained approx 19Lbs per dtr past 3 weeks    MEDICATIONS  (STANDING):  sildenafil (REVATIO) 20milliGRAM(s) Oral three times a day  atorvastatin 40milliGRAM(s) Oral at bedtime  calcium acetate 1334milliGRAM(s) Oral three times a day with meals  folic acid 1milliGRAM(s) Oral daily  lactobacillus acidophilus 1Tablet(s) Oral daily  polyethylene glycol 3350 17Gram(s) Oral daily  midodrine 2.5milliGRAM(s) Oral two times a day  sodium chloride 0.65% Nasal 1Spray(s) Both Nostrils three times a day  ATENolol  Tablet 25milliGRAM(s) Oral daily  epoetin jaswinder Injectable 23863Bmzo(s) IV Push <User Schedule>  albumin human 25% IVPB 100milliLiter(s) IV Intermittent once    MEDICATIONS  (PRN):  albumin human 25% IVPB 50milliLiter(s) IV Intermittent every 1 hour PRN for sbp < 100  ALBUTerol    0.083% 2.5milliGRAM(s) Nebulizer every 6 hours PRN Bronchospasm      Allergies    allopurinol (Rash)  codeine (Nausea; Vomiting)  penicillin (Rash)  spironolactone (Unknown)    Intolerances        Vital Signs Last 24 Hrs  T(C): 36.6, Max: 36.6 (04-18 @ 22:37)  T(F): 97.8, Max: 97.9 (04-19 @ 04:00)  HR: 66 (66 - 74)  BP: 92/54 (89/55 - 102/50)  BP(mean): --  RR: 18 (18 - 20)  SpO2: 100% (92% - 100%)  Daily     Daily Weight in k.3 (2017 09:00)    PHYSICAL EXAM:  Awake/alert; NAD  chest Clear  No JVD  No murmer  abd soft, NT BS +  1-2 + edema linh thighs                        RADIOLOGY & ADDITIONAL TESTS:

## 2017-04-20 LAB
ANION GAP SERPL CALC-SCNC: 11 MMOL/L — SIGNIFICANT CHANGE UP (ref 5–17)
ANISOCYTOSIS BLD QL: SLIGHT — SIGNIFICANT CHANGE UP
BUN SERPL-MCNC: 41 MG/DL — HIGH (ref 8–20)
CALCIUM SERPL-MCNC: 8.5 MG/DL — LOW (ref 8.6–10.2)
CHLORIDE SERPL-SCNC: 97 MMOL/L — LOW (ref 98–107)
CO2 SERPL-SCNC: 27 MMOL/L — SIGNIFICANT CHANGE UP (ref 22–29)
CORTIS AM PEAK SERPL-MCNC: 16.9 UG/DL — SIGNIFICANT CHANGE UP (ref 6–18.4)
CREAT SERPL-MCNC: 2.74 MG/DL — HIGH (ref 0.5–1.3)
EOSINOPHIL NFR BLD AUTO: 1 % — SIGNIFICANT CHANGE UP (ref 0–5)
GLUCOSE SERPL-MCNC: 144 MG/DL — HIGH (ref 70–115)
HCT VFR BLD CALC: 29.2 % — LOW (ref 37–47)
HGB BLD-MCNC: 9.1 G/DL — LOW (ref 12–16)
HYPOCHROMIA BLD QL: SLIGHT — SIGNIFICANT CHANGE UP
LYMPHOCYTES # BLD AUTO: 3 % — LOW (ref 20–55)
MACROCYTES BLD QL: SLIGHT — SIGNIFICANT CHANGE UP
MCHC RBC-ENTMCNC: 31.2 G/DL — LOW (ref 32–36)
MCHC RBC-ENTMCNC: 31.4 PG — HIGH (ref 27–31)
MCV RBC AUTO: 100.7 FL — HIGH (ref 81–99)
MICROCYTES BLD QL: SLIGHT — SIGNIFICANT CHANGE UP
MONOCYTES NFR BLD AUTO: 21 % — HIGH (ref 3–10)
NEUTROPHILS NFR BLD AUTO: 75 % — HIGH (ref 37–73)
OVALOCYTES BLD QL SMEAR: SLIGHT — SIGNIFICANT CHANGE UP
PLAT MORPH BLD: NORMAL — SIGNIFICANT CHANGE UP
PLATELET # BLD AUTO: 64 K/UL — LOW (ref 150–400)
POIKILOCYTOSIS BLD QL AUTO: SLIGHT — SIGNIFICANT CHANGE UP
POTASSIUM SERPL-MCNC: 4.2 MMOL/L — SIGNIFICANT CHANGE UP (ref 3.5–5.3)
POTASSIUM SERPL-SCNC: 4.2 MMOL/L — SIGNIFICANT CHANGE UP (ref 3.5–5.3)
RBC # BLD: 2.9 M/UL — LOW (ref 4.4–5.2)
RBC # FLD: 22.6 % — HIGH (ref 11–15.6)
RBC BLD AUTO: ABNORMAL
SODIUM SERPL-SCNC: 135 MMOL/L — SIGNIFICANT CHANGE UP (ref 135–145)
WBC # BLD: 3.4 K/UL — LOW (ref 4.8–10.8)
WBC # FLD AUTO: 3.4 K/UL — LOW (ref 4.8–10.8)

## 2017-04-20 PROCEDURE — 76775 US EXAM ABDO BACK WALL LIM: CPT | Mod: 26

## 2017-04-20 PROCEDURE — 99233 SBSQ HOSP IP/OBS HIGH 50: CPT

## 2017-04-20 RX ORDER — ACETAMINOPHEN 500 MG
650 TABLET ORAL ONCE
Qty: 0 | Refills: 0 | Status: COMPLETED | OUTPATIENT
Start: 2017-04-20 | End: 2017-04-20

## 2017-04-20 RX ADMIN — Medication 1334 MILLIGRAM(S): at 09:16

## 2017-04-20 RX ADMIN — Medication 20 MILLIGRAM(S): at 14:55

## 2017-04-20 RX ADMIN — MIDODRINE HYDROCHLORIDE 2.5 MILLIGRAM(S): 2.5 TABLET ORAL at 05:28

## 2017-04-20 RX ADMIN — ATORVASTATIN CALCIUM 40 MILLIGRAM(S): 80 TABLET, FILM COATED ORAL at 21:37

## 2017-04-20 RX ADMIN — Medication 650 MILLIGRAM(S): at 22:18

## 2017-04-20 RX ADMIN — Medication 20 MILLIGRAM(S): at 21:38

## 2017-04-20 RX ADMIN — Medication 20 MILLIGRAM(S): at 05:28

## 2017-04-20 RX ADMIN — Medication 1 SPRAY(S): at 05:29

## 2017-04-20 RX ADMIN — Medication 650 MILLIGRAM(S): at 23:00

## 2017-04-20 RX ADMIN — MIDODRINE HYDROCHLORIDE 2.5 MILLIGRAM(S): 2.5 TABLET ORAL at 18:35

## 2017-04-20 RX ADMIN — Medication 50 MILLILITER(S): at 11:34

## 2017-04-20 RX ADMIN — Medication 1334 MILLIGRAM(S): at 18:35

## 2017-04-20 RX ADMIN — ATENOLOL 12.5 MILLIGRAM(S): 25 TABLET ORAL at 05:28

## 2017-04-20 RX ADMIN — Medication 1 SPRAY(S): at 21:38

## 2017-04-20 RX ADMIN — Medication 1 SPRAY(S): at 14:55

## 2017-04-20 NOTE — PROGRESS NOTE ADULT - SUBJECTIVE AND OBJECTIVE BOX
NEPHROLOGY INTERVAL HPI/OVERNIGHT EVENTS:    MEDICATIONS  (STANDING):  sildenafil (REVATIO) 20milliGRAM(s) Oral three times a day  atorvastatin 40milliGRAM(s) Oral at bedtime  calcium acetate 1334milliGRAM(s) Oral three times a day with meals  folic acid 1milliGRAM(s) Oral daily  lactobacillus acidophilus 1Tablet(s) Oral daily  polyethylene glycol 3350 17Gram(s) Oral daily  midodrine 2.5milliGRAM(s) Oral two times a day  sodium chloride 0.65% Nasal 1Spray(s) Both Nostrils three times a day  epoetin jaswinder Injectable 20278Thpx(s) IV Push <User Schedule>  ATENolol  Tablet 12.5milliGRAM(s) Oral daily    MEDICATIONS  (PRN):  albumin human 25% IVPB 50milliLiter(s) IV Intermittent every 1 hour PRN for sbp < 100  ALBUTerol    0.083% 2.5milliGRAM(s) Nebulizer every 6 hours PRN Bronchospasm      Allergies    allopurinol (Rash)  codeine (Nausea; Vomiting)  penicillin (Rash)  spironolactone (Unknown)    Intolerances        Vital Signs Last 24 Hrs  T(C): 36.4, Max: 36.9 (04-19 @ 17:00)  T(F): 97.5, Max: 98.5 (04-19 @ 17:00)  HR: 67 (64 - 69)  BP: 113/51 (83/45 - 113/51)  BP(mean): --  RR: 18 (17 - 18)  SpO2: 95% (95% - 100%)  Daily     Daily Weight in k.7 (2017 14:25)    PHYSICAL EXAM:    GENERAL: same  HEAD:    EYES:   ENMT:   NECK:   NERVOUS SYSTEM:  alert, oriented  CHEST/LUNG: no wheezes, nasal 02; right permacath site clean  HEART: no rub  ABDOMEN:   EXTREMITIES:  leg edema better  LYMPH:   SKIN: multiple areas of ecchymosis and erythema    LABS:                        9.1    3.4   )-----------( 64       ( 2017 08:03 )             29.2     04-20    135  |  97<L>  |  41.0<H>  ----------------------------<  144<H>  4.2   |  27.0  |  2.74<H>    Ca    8.5<L>      2017 08:03                  RADIOLOGY & ADDITIONAL TESTS:

## 2017-04-20 NOTE — PROGRESS NOTE ADULT - SUBJECTIVE AND OBJECTIVE BOX
DUSTIN HERNANDEZ    [id number]    76y      Female    INTERVAL HPI/OVERNIGHT EVENTS: no acute events overnight    CC: hemorrhagic shock - SOB, ARF on CKD  - no new complaints today -    REVIEW OF SYSTEMS:    CONSTITUTIONAL: No fever, weight loss, or fatigue  RESPIRATORY: No cough, wheezing, hemoptysis; No shortness of breath  CARDIOVASCULAR: No chest pain, palpitations  GASTROINTESTINAL: No abdominal or epigastric pain. No nausea, vomiting  NEUROLOGICAL: No headaches, memory loss, loss of strength.  MISCELLANEOUS:      Vital Signs Last 24 Hrs  T(C): 36.4, Max: 36.9 (04-19 @ 17:00)  T(F): 97.5, Max: 98.5 (04-19 @ 17:00)  HR: 67 (64 - 69)  BP: 113/51 (83/45 - 113/51)  BP(mean): --  RR: 18 (17 - 18)  SpO2: 95% (95% - 100%)    PHYSICAL EXAM:    GENERAL: NAD, well-groomed  HEENT: PERRL, +EOMI  NECK: soft, Supple, No JVD,   CHEST/LUNG: Clear to percussion bilaterally; No wheezing, b/l basal rales  HEART: S1S2+, Regular rate and rhythm; No murmurs, rubs, or gallops  ABDOMEN: Soft, Nontender, Nondistended; Bowel sounds present  EXTREMITIES:  2+ Peripheral Pulses, No clubbing, cyanosis, or edema  SKIN: No rashes or lesions  NEURO: AAOX3, no focal deficits, no motor r sensory loss  PSYCH: normal mood      LABS:                        9.1    3.4   )-----------( 64       ( 20 Apr 2017 08:03 )             29.2     04-20    135  |  97<L>  |  41.0<H>  ----------------------------<  144<H>  4.2   |  27.0  |  2.74<H>    Ca    8.5<L>      20 Apr 2017 08:03              MEDICATIONS  (STANDING):  sildenafil (REVATIO) 20milliGRAM(s) Oral three times a day  atorvastatin 40milliGRAM(s) Oral at bedtime  calcium acetate 1334milliGRAM(s) Oral three times a day with meals  folic acid 1milliGRAM(s) Oral daily  lactobacillus acidophilus 1Tablet(s) Oral daily  polyethylene glycol 3350 17Gram(s) Oral daily  midodrine 2.5milliGRAM(s) Oral two times a day  sodium chloride 0.65% Nasal 1Spray(s) Both Nostrils three times a day  epoetin jaswinder Injectable 31960Ydry(s) IV Push <User Schedule>  ATENolol  Tablet 12.5milliGRAM(s) Oral daily    MEDICATIONS  (PRN):  albumin human 25% IVPB 50milliLiter(s) IV Intermittent every 1 hour PRN for sbp < 100  ALBUTerol    0.083% 2.5milliGRAM(s) Nebulizer every 6 hours PRN Bronchospasm      RADIOLOGY & ADDITIONAL TESTS:

## 2017-04-20 NOTE — PROGRESS NOTE ADULT - SUBJECTIVE AND OBJECTIVE BOX
[facility] - [facility]                                   [address] [place] [address]                                                      PHONE: [phone]                                                         FAX: [phone]  [email or web address]  ---------------------------------------------------------------------------------------------------------------------------------    FU for  Pt seen and examined.     Overnight events/patient complaints:    allopurinol (Rash)  codeine (Nausea; Vomiting)  penicillin (Rash)  spironolactone (Unknown)    MEDICATIONS  (STANDING):  sildenafil (REVATIO) 20milliGRAM(s) Oral three times a day  atorvastatin 40milliGRAM(s) Oral at bedtime  calcium acetate 1334milliGRAM(s) Oral three times a day with meals  folic acid 1milliGRAM(s) Oral daily  lactobacillus acidophilus 1Tablet(s) Oral daily  polyethylene glycol 3350 17Gram(s) Oral daily  midodrine 2.5milliGRAM(s) Oral two times a day  sodium chloride 0.65% Nasal 1Spray(s) Both Nostrils three times a day  epoetin jaswinder Injectable 26763Kmer(s) IV Push <User Schedule>  ATENolol  Tablet 12.5milliGRAM(s) Oral daily    MEDICATIONS  (PRN):  albumin human 25% IVPB 50milliLiter(s) IV Intermittent every 1 hour PRN for sbp < 100  ALBUTerol    0.083% 2.5milliGRAM(s) Nebulizer every 6 hours PRN Bronchospasm      Vital Signs Last 24 Hrs  T(C): 36.8, Max: 36.9 (04-19 @ 17:00)  T(F): 98.2, Max: 98.5 (04-19 @ 17:00)  HR: 64 (64 - 67)  BP: 110/66 (92/54 - 110/66)  BP(mean): --  RR: 18 (18 - 18)  SpO2: 99% (99% - 100%)  ICU Vital Signs Last 24 Hrs  I&O's Summary    I & Os for current day (as of 20 Apr 2017 08:35)  =============================================  IN: 0 ml / OUT: 1300 ml / NET: -1300 ml      PHYSICAL EXAM:  HEENT: no JVD  CARDIOVASCULAR: Normal S1 and S2, No murmur, rub, gallop or lift.   LUNGS: No rales, rhonchi or wheeze. Normal breath sounds bilaterally.  ABDOMEN: Soft, nontender without mass or organomegaly. bowel sounds normoactive.  EXTREMITIES: no edema          LABS:                        9.2    4.1   )-----------( 26       ( 19 Apr 2017 09:40 )             28.7     04-19    134<L>  |  94<L>  |  48.0<H>  ----------------------------<  135<H>  4.3   |  25.0  |  3.11<H>    Ca    8.0<L>      19 Apr 2017 09:40      DUSTIN HERNANDEZ            RADIOLOGY & ADDITIONAL STUDIES:    LABS:                        9.2    4.1   )-----------( 26       ( 19 Apr 2017 09:40 )             28.7     04-19    134<L>  |  94<L>  |  48.0<H>  ----------------------------<  135<H>  4.3   |  25.0  |  3.11<H>    Ca    8.0<L>      19 Apr 2017 09:40      Assessment and Plan:  Patient started on dialysis. Presented with fall and nose bleeds-found to be significantly anemic. Hx of AVR and mitral and tricuspid valvuloplasties. Known pulmonary hypertension.  AFIb: AC on hold due recent events  Hypotension: bp still low, agree with increasing midodrine and decreasing atenolol  Diastolic HF: unable to achieve negative diuresis during dialysis because of low bp, check cortisol and tsh level  ESRD on HD [facility] - [facility], [facility]                                   [address] [place] [address]                                                      PHONE: [phone]                                                         FAX: [phone]  [email or web address]  ---------------------------------------------------------------------------------------------------------------------------------    FU for  Pt seen and examined.     Overnight events/patient complaints: SOB fluctuating    allopurinol (Rash)  codeine (Nausea; Vomiting)  penicillin (Rash)  spironolactone (Unknown)    MEDICATIONS  (STANDING):  sildenafil (REVATIO) 20milliGRAM(s) Oral three times a day  atorvastatin 40milliGRAM(s) Oral at bedtime  calcium acetate 1334milliGRAM(s) Oral three times a day with meals  folic acid 1milliGRAM(s) Oral daily  lactobacillus acidophilus 1Tablet(s) Oral daily  polyethylene glycol 3350 17Gram(s) Oral daily  midodrine 2.5milliGRAM(s) Oral two times a day  sodium chloride 0.65% Nasal 1Spray(s) Both Nostrils three times a day  epoetin jaswinder Injectable 06893Sseh(s) IV Push <User Schedule>  ATENolol  Tablet 12.5milliGRAM(s) Oral daily    MEDICATIONS  (PRN):  albumin human 25% IVPB 50milliLiter(s) IV Intermittent every 1 hour PRN for sbp < 100  ALBUTerol    0.083% 2.5milliGRAM(s) Nebulizer every 6 hours PRN Bronchospasm      Vital Signs Last 24 Hrs  T(C): 36.8, Max: 36.9 (04-19 @ 17:00)  T(F): 98.2, Max: 98.5 (04-19 @ 17:00)  HR: 64 (64 - 67)  BP: 110/66 (92/54 - 110/66)  BP(mean): --  RR: 18 (18 - 18)  SpO2: 99% (99% - 100%)  ICU Vital Signs Last 24 Hrs  I&O's Summary    I & Os for current day (as of 20 Apr 2017 08:35)  =============================================  IN: 0 ml / OUT: 1300 ml / NET: -1300 ml      PHYSICAL EXAM:  HEENT: no JVD  CARDIOVASCULAR: Normal S1 and S2, No murmur, rub, gallop or lift.   LUNGS: decreased air entry at bases  ABDOMEN: Soft, nontender without mass or organomegaly. bowel sounds normoactive.  EXTREMITIES: no edema          LABS:                        9.2    4.1   )-----------( 26       ( 19 Apr 2017 09:40 )             28.7     04-19    134<L>  |  94<L>  |  48.0<H>  ----------------------------<  135<H>  4.3   |  25.0  |  3.11<H>    Ca    8.0<L>      19 Apr 2017 09:40      DUSTIN HERNANDEZ            RADIOLOGY & ADDITIONAL STUDIES:    LABS:                        9.2    4.1   )-----------( 26       ( 19 Apr 2017 09:40 )             28.7     04-19    134<L>  |  94<L>  |  48.0<H>  ----------------------------<  135<H>  4.3   |  25.0  |  3.11<H>    Ca    8.0<L>      19 Apr 2017 09:40      Assessment and Plan:  Patient started on dialysis. Presented with fall and nose bleeds-found to be significantly anemic. Hx of AVR and mitral and tricuspid valvuloplasties. Known pulmonary hypertension.  AFIb: AC on hold due recent events  Hypotension: bP better on midodrine  Diastolic HF: will attempt negative fluid balance with next HD now that BP improved. check cortisol and tsh level  ESRD on HD

## 2017-04-20 NOTE — PROGRESS NOTE ADULT - ASSESSMENT
76 year old woman with PMH of Afib on coumadin admitted under ACS due to fall and hemorrhagic shock, on admission INR was very high, anemic,  Acute on CKD, resuscitated with PRBC, IVF, PCC, renal function remain abnormal started on HD. Pt is s/p permacatrei on 04/14/2017. Medicine consulted called to transfer patient care. pt now awaiting for dailysis - ?need for long term . has required transfusion for drop in her hemoglobin.  Fluid overloaded, but has been difficult to dialyze 2/2 low BP. Started on midodrine.     A/P    >Acute on CKD -ESRD - newly started on HD  appreciate renal input, s/p permacath - 04/14/2017  received 7 units of prbc for severe anemia so far,   Oliguric - needs long term dilaysis  hepatitis panel negative       > hypotension , not tolerating HD  most likely 2/2 Sildenefil for pulm HTN contributing, CHF   on midodrine  agreee with checking cortisol in am     >thrombocytopenia   ? chronic per daughter - saw hematologist per pt - cannot receollcect name  ?2/2 CKD. not on any Heparin/loenox since last 4 days -[ less liekly HIT  no aspirin          >h/o A.fib  appreciate cardiology input, holding AC as per cardiology  cont. atenolol    >Anemia - acute on chronic - 2/2 hemorrhage + CKD    s/p total 7 unit prbc  f/u cbc, normal ferritin   epo as per renal     >Skin tear in left arm- improving     >HLD- cont. statin    >Pulmonary HTN - cont. sildnafil     >DVT PPX - heparin   PT 76 year old woman with PMH of Afib on coumadin admitted under ACS due to fall and hemorrhagic shock, on admission INR was very high, anemic,  Acute on CKD, resuscitated with PRBC, IVF, PCC, renal function remain abnormal started on HD. Pt is s/p permacath on 04/14/2017. Medicine consulted called to transfer patient care. pt now awaiting for dailysis - ?need for long term . has required transfusion for drop in her hemoglobin.  Fluid overloaded, but has been difficult to dialyze 2/2 low BP. Started on midodrine. BP - some improvement    A/P    >Acute on CKD -ESRD - newly started on HD  appreciate renal input, s/p permacath - 04/14/2017  received 7 units of prbc for severe anemia so far,   Oliguric - needs long term dilaysis  hepatitis panel negative       > hypotension , not tolerating HD  most likely 2/2 Sildenefil for pulm HTN contributing, CHF   on midodrine  agreee with checking cortisol in am     >thrombocytopenia   ? chronic per daughter - saw hematologist per pt - cannot receollcect name  ?2/2 CKD. not on any Heparin/loenox since last 4 days -[ less liekly HIT  no aspirin - plt count improved today - ct to monitor           >h/o A.fib  appreciate cardiology input, holding AC as per cardiology  cont. atenolol    >Anemia - acute on chronic - 2/2 hemorrhage + CKD    s/p total 7 unit prbc  f/u cbc, normal ferritin   epo as per renal     >Skin tear in left arm- improving     >HLD- cont. statin    >Pulmonary HTN - cont. sildnafil     >DVT PPX - heparin   PT - has Dialysis seat in community once stable for DC

## 2017-04-21 LAB
HCT VFR BLD CALC: 30.5 % — LOW (ref 37–47)
HGB BLD-MCNC: 9.4 G/DL — LOW (ref 12–16)
MCHC RBC-ENTMCNC: 30.8 G/DL — LOW (ref 32–36)
MCHC RBC-ENTMCNC: 31.3 PG — HIGH (ref 27–31)
MCV RBC AUTO: 101.7 FL — HIGH (ref 81–99)
PLATELET # BLD AUTO: 68 K/UL — LOW (ref 150–400)
RBC # BLD: 3 M/UL — LOW (ref 4.4–5.2)
RBC # FLD: 23 % — HIGH (ref 11–15.6)
WBC # BLD: 2.6 K/UL — LOW (ref 4.8–10.8)
WBC # FLD AUTO: 2.6 K/UL — LOW (ref 4.8–10.8)

## 2017-04-21 PROCEDURE — 99233 SBSQ HOSP IP/OBS HIGH 50: CPT

## 2017-04-21 RX ORDER — WARFARIN SODIUM 2.5 MG/1
2.5 TABLET ORAL ONCE
Qty: 0 | Refills: 0 | Status: COMPLETED | OUTPATIENT
Start: 2017-04-21 | End: 2017-04-21

## 2017-04-21 RX ADMIN — Medication 20 MILLIGRAM(S): at 05:41

## 2017-04-21 RX ADMIN — Medication 1 SPRAY(S): at 21:17

## 2017-04-21 RX ADMIN — Medication 1334 MILLIGRAM(S): at 18:39

## 2017-04-21 RX ADMIN — Medication 1 MILLIGRAM(S): at 11:47

## 2017-04-21 RX ADMIN — MIDODRINE HYDROCHLORIDE 2.5 MILLIGRAM(S): 2.5 TABLET ORAL at 18:39

## 2017-04-21 RX ADMIN — Medication 1 TABLET(S): at 11:47

## 2017-04-21 RX ADMIN — Medication 50 MILLILITER(S): at 15:05

## 2017-04-21 RX ADMIN — Medication 1 SPRAY(S): at 05:41

## 2017-04-21 RX ADMIN — Medication 1334 MILLIGRAM(S): at 11:47

## 2017-04-21 RX ADMIN — Medication 1334 MILLIGRAM(S): at 09:15

## 2017-04-21 RX ADMIN — ATORVASTATIN CALCIUM 40 MILLIGRAM(S): 80 TABLET, FILM COATED ORAL at 21:15

## 2017-04-21 RX ADMIN — ERYTHROPOIETIN 10000 UNIT(S): 10000 INJECTION, SOLUTION INTRAVENOUS; SUBCUTANEOUS at 15:10

## 2017-04-21 RX ADMIN — MIDODRINE HYDROCHLORIDE 2.5 MILLIGRAM(S): 2.5 TABLET ORAL at 05:41

## 2017-04-21 RX ADMIN — WARFARIN SODIUM 2.5 MILLIGRAM(S): 2.5 TABLET ORAL at 21:15

## 2017-04-21 NOTE — PROGRESS NOTE ADULT - ASSESSMENT
ESRD Severe edema B/L LE on HD TTS schedule HD again today primarily for UF    Low BP on HD cont midodrine and will give albumin w HD   Edematous likely due to HF CKD will give Albumin w HD today   will HD estephania to keep on schedule  Anemia iron stores ok cont EPO w HD

## 2017-04-21 NOTE — PROGRESS NOTE ADULT - SUBJECTIVE AND OBJECTIVE BOX
NEPHROLOGY INTERVAL HPI/OVERNIGHT EVENTS:    Examined earlier  BP Low now on midodrine  LE edematous   Has gained approx 19Lbs per dtr past 3 weeks    MEDICATIONS  (STANDING):  atorvastatin 40milliGRAM(s) Oral at bedtime  calcium acetate 1334milliGRAM(s) Oral three times a day with meals  folic acid 1milliGRAM(s) Oral daily  lactobacillus acidophilus 1Tablet(s) Oral daily  polyethylene glycol 3350 17Gram(s) Oral daily  midodrine 2.5milliGRAM(s) Oral two times a day  sodium chloride 0.65% Nasal 1Spray(s) Both Nostrils three times a day  epoetin jaswinder Injectable 19818Zymc(s) IV Push <User Schedule>  ATENolol  Tablet 12.5milliGRAM(s) Oral daily  sildenafil (REVATIO) 20milliGRAM(s) Oral two times a day  warfarin 2.5milliGRAM(s) Oral once    MEDICATIONS  (PRN):  albumin human 25% IVPB 50milliLiter(s) IV Intermittent every 1 hour PRN for sbp < 100  ALBUTerol    0.083% 2.5milliGRAM(s) Nebulizer every 6 hours PRN Bronchospasm      Allergies    allopurinol (Rash)  codeine (Nausea; Vomiting)  penicillin (Rash)  spironolactone (Unknown)    Intolerances        Vital Signs Last 24 Hrs  T(C): 36.3, Max: 37.2 (04-20 @ 21:56)  T(F): 97.3, Max: 99 (04-20 @ 21:56)  HR: 69 (67 - 77)  BP: 89/52 (89/4 - 113/51)  BP(mean): --  RR: 17 (17 - 18)  SpO2: 98% (95% - 99%)  Daily     Daily Weight in k.7 (2017 14:25)    PHYSICAL EXAM:  Awake/alert; NAD  chest Clear  No JVD  No murmer  abd soft, NT BS +  1-2 + edema B/L LE       LABS:                        9.4    2.6   )-----------( 68       ( 2017 09:10 )             30.5     04-20    135  |  97<L>  |  41.0<H>  ----------------------------<  144<H>  4.2   |  27.0  |  2.74<H>    Ca    8.5<L>      2017 08:03                  RADIOLOGY & ADDITIONAL TESTS:

## 2017-04-21 NOTE — PROGRESS NOTE ADULT - ASSESSMENT
76 year old woman with PMH of Afib on coumadin admitted under ACS due to fall and hemorrhagic shock, on admission INR was very high, anemic,  Acute on CKD, resuscitated with PRBC, IVF, PCC, renal function remain abnormal started on HD. Pt is s/p permacath on 04/14/2017. Medicine consulted called to transfer patient care. pt now awaiting for dialysis - ?need for long term . has required transfusion for drop in her hemoglobin.  Fluid overloaded, but has been difficult to dialyze 2/2 low BP. Started on midodrine. BP - some improvement - reducing sildenefil to assist BP, Resuming warfarin 4/21 as counts stable, needs for Afib and pulm HTN,.  Has outpt dialysis seat if tolerates HD - possible DC in next 2-3 days. PT - May need GIULIANO.     A/P    >Acute on CKD -ESRD - newly started on HD  appreciate renal input, s/p permacath - 04/14/2017  received 7 units of prbc for severe anemia so far,   Oliguric - needs long term dilaysis  hepatitis panel negative       > hypotension , tolerated HD 4/20 for first time   hypotension most likely 2/2 Sildenefil for pulm HTN contributing, CHF   on midodrine  am cortisol - normal   Decrease sildenefil dose 20 tid --> bid today to improve BP.     >thrombocytopenia   ? chronic per daughter - saw hematologist per pt - cannot receollcect name  ?2/2 CKD. not on any Heparin/loenox since last 4 days -[ less liekly HIT  no aspirin - plt count improved  - ct to monitor             >h/o A.fib  appreciate cardiology input - resume AC now as she has been stable from H&H standpoint  cont. atenolol  start warfarin - daughter aware and agreeable    >Anemia - acute on chronic - 2/2 hemorrhage + CKD    s/p total 7 unit prbc  f/u cbc, normal ferritin   epo as per renal     >Skin tear in left arm- improving     >HLD- cont. statin    >Pulmonary HTN - cont. sildnafil     >DVT PPX - heparin   PT - has Dialysis seat in community once stable for DC

## 2017-04-21 NOTE — PROGRESS NOTE ADULT - ASSESSMENT
Assessment  CRF HD initiated with improvement of volume status  AF with CVR on AC, admitted with elevated INR  s/p Bio AVR, MV repair with moderate MR, TVA and normal EF with chronic RH failure and pul htn    Rec  Cont dialysis  Cont current meds  In terms of AC if no evidence of bleeding with therapeutic INR then would maintain on coumadin with INR 2-3 given AF and pul htn  Please recall as needed

## 2017-04-21 NOTE — PROGRESS NOTE ADULT - SUBJECTIVE AND OBJECTIVE BOX
DUSTIN HERNANDEZ    [id number]    76y      Female    INTERVAL HPI/OVERNIGHT EVENTS: no acute events  Tolerated dialysis yeterday.  BP again borderline low today    no new complaints      REVIEW OF SYSTEMS:    CONSTITUTIONAL: No fever, weight loss, or fatigue  RESPIRATORY: No cough, wheezing, hemoptysis  CARDIOVASCULAR: No chest pain, palpitations  GASTROINTESTINAL: No abdominal or epigastric pain. No nausea, vomiting  NEUROLOGICAL: No headaches, memory loss, loss of strength.  MISCELLANEOUS:      Vital Signs Last 24 Hrs  T(C): 36.3, Max: 37.2 (04-20 @ 21:56)  T(F): 97.3, Max: 99 (04-20 @ 21:56)  HR: 69 (65 - 77)  BP: 89/52 (83/45 - 113/51)  BP(mean): --  RR: 17 (17 - 18)  SpO2: 98% (95% - 100%)    PHYSICAL EXAM:    GENERAL: NAD, well-groomed  HEENT: PERRL, +EOMI  NECK: soft, Supple, No JVD,   CHEST/LUNG: B/l basal rales + Right>left  HEART: S1S2+, Regular rate and rhythm; No murmurs, rubs, or gallops  ABDOMEN: Soft, Nontender, Nondistended  EXTREMITIES:  2+ Peripheral Pulses, No clubbing, cyanosis. edema +, b/l arm bruises  Lt arm skin tear in dressing.   SKIN: No rashes or lesions  NEURO: AAOX3, no focal deficits, no motor r sensory loss  PSYCH: normal mood      LABS:                        9.4    2.6   )-----------( 68       ( 21 Apr 2017 09:10 )             30.5     04-20    135  |  97<L>  |  41.0<H>  ----------------------------<  144<H>  4.2   |  27.0  |  2.74<H>    Ca    8.5<L>      20 Apr 2017 08:03              MEDICATIONS  (STANDING):  atorvastatin 40milliGRAM(s) Oral at bedtime  calcium acetate 1334milliGRAM(s) Oral three times a day with meals  folic acid 1milliGRAM(s) Oral daily  lactobacillus acidophilus 1Tablet(s) Oral daily  polyethylene glycol 3350 17Gram(s) Oral daily  midodrine 2.5milliGRAM(s) Oral two times a day  sodium chloride 0.65% Nasal 1Spray(s) Both Nostrils three times a day  epoetin jaswinder Injectable [id number](s) IV Push <User Schedule>  ATENolol  Tablet 12.5milliGRAM(s) Oral daily  sildenafil (REVATIO) 20milliGRAM(s) Oral two times a day    MEDICATIONS  (PRN):  albumin human 25% IVPB 50milliLiter(s) IV Intermittent every 1 hour PRN for sbp < 100  ALBUTerol    0.083% 2.5milliGRAM(s) Nebulizer every 6 hours PRN Bronchospasm      RADIOLOGY & ADDITIONAL TESTS:  US renal - reviewed

## 2017-04-22 LAB
HCT VFR BLD CALC: 29.8 % — LOW (ref 37–47)
HGB BLD-MCNC: 9.1 G/DL — LOW (ref 12–16)
INR BLD: 1.08 RATIO — SIGNIFICANT CHANGE UP (ref 0.88–1.16)
MCHC RBC-ENTMCNC: 30.5 G/DL — LOW (ref 32–36)
MCHC RBC-ENTMCNC: 31.2 PG — HIGH (ref 27–31)
MCV RBC AUTO: 102.1 FL — HIGH (ref 81–99)
PLATELET # BLD AUTO: 76 K/UL — LOW (ref 150–400)
PROTHROM AB SERPL-ACNC: 11.9 SEC — SIGNIFICANT CHANGE UP (ref 9.8–12.7)
RBC # BLD: 2.92 M/UL — LOW (ref 4.4–5.2)
RBC # FLD: 22.7 % — HIGH (ref 11–15.6)
WBC # BLD: 2.6 K/UL — LOW (ref 4.8–10.8)
WBC # FLD AUTO: 2.6 K/UL — LOW (ref 4.8–10.8)

## 2017-04-22 PROCEDURE — 99233 SBSQ HOSP IP/OBS HIGH 50: CPT

## 2017-04-22 RX ORDER — FLUDROCORTISONE ACETATE 0.1 MG/1
0.1 TABLET ORAL DAILY
Qty: 0 | Refills: 0 | Status: DISCONTINUED | OUTPATIENT
Start: 2017-04-22 | End: 2017-04-29

## 2017-04-22 RX ORDER — MIDODRINE HYDROCHLORIDE 2.5 MG/1
5 TABLET ORAL
Qty: 0 | Refills: 0 | Status: DISCONTINUED | OUTPATIENT
Start: 2017-04-22 | End: 2017-04-25

## 2017-04-22 RX ADMIN — Medication 1 SPRAY(S): at 22:22

## 2017-04-22 RX ADMIN — Medication 50 MILLILITER(S): at 19:36

## 2017-04-22 RX ADMIN — ATORVASTATIN CALCIUM 40 MILLIGRAM(S): 80 TABLET, FILM COATED ORAL at 22:22

## 2017-04-22 RX ADMIN — Medication 1 SPRAY(S): at 13:22

## 2017-04-22 RX ADMIN — Medication 1334 MILLIGRAM(S): at 17:36

## 2017-04-22 RX ADMIN — Medication 1334 MILLIGRAM(S): at 12:31

## 2017-04-22 RX ADMIN — Medication 20 MILLIGRAM(S): at 17:35

## 2017-04-22 RX ADMIN — Medication 20 MILLIGRAM(S): at 05:44

## 2017-04-22 RX ADMIN — Medication 1334 MILLIGRAM(S): at 09:25

## 2017-04-22 RX ADMIN — FLUDROCORTISONE ACETATE 0.1 MILLIGRAM(S): 0.1 TABLET ORAL at 15:08

## 2017-04-22 RX ADMIN — Medication 1 TABLET(S): at 12:32

## 2017-04-22 RX ADMIN — MIDODRINE HYDROCHLORIDE 2.5 MILLIGRAM(S): 2.5 TABLET ORAL at 17:36

## 2017-04-22 RX ADMIN — MIDODRINE HYDROCHLORIDE 5 MILLIGRAM(S): 2.5 TABLET ORAL at 15:08

## 2017-04-22 RX ADMIN — Medication 1 SPRAY(S): at 05:48

## 2017-04-22 RX ADMIN — Medication 1 MILLIGRAM(S): at 12:32

## 2017-04-22 RX ADMIN — MIDODRINE HYDROCHLORIDE 2.5 MILLIGRAM(S): 2.5 TABLET ORAL at 05:45

## 2017-04-22 NOTE — PROGRESS NOTE ADULT - ASSESSMENT
76 year old woman with PMH of Afib on coumadin admitted under ACS due to fall and hemorrhagic shock, on admission INR was very high, anemic,  Acute on CKD, resuscitated with PRBC, IVF, PCC, renal function remain abnormal started on HD. Pt is s/p permacath on 04/14/2017. Medicine consulted called to transfer patient care. pt now awaiting for dialysis - ?need for long term . has required transfusion for drop in her hemoglobin.  Fluid overloaded, but has been difficult to dialyze 2/2 low BP. Started on midodrine. BP - some improvement - reducing sildenefil to assist BP, Resuming warfarin 4/21 as counts stable, needs for Afib and pulm HTN,.  Has outpt dialysis seat if tolerates HD - possible DC in next 2-3 days. PT - May need GIULIANO.     A/P    >Acute on CKD -ESRD - newly started on HD  appreciate renal input, s/p permacath - 04/14/2017  received 7 units of prbc for severe anemia so far,   Oliguric - needs long term dialysis, not decided about this yet  hepatitis panel negative; PPD negative      > hypotension , tolerated HD 4/20 for first time   hypotension most likely 2/2 Sildenefil for pulm HTN contributing, CHF   on midodrine  am cortisol - normal   Decrease sildenefil dose 20 tid --> bid today to improve BP.     >thrombocytopenia   ? chronic per daughter - saw hematologist per pt - cannot receollcect name  ?2/2 CKD. not on any Heparin/loenox since last 4 days -[ less liekly HIT  no aspirin - plt count improved  - ct to monitor  but due to pancytopenia will get Heme consult here.    >h/o A.fib  appreciate cardiology input - per Cardiology recommendation resumed AC now as she has been stable from H&H standpoint  cont. atenolol  start warfarin - daughter aware and agreeable    >Anemia - acute on chronic - 2/2 hemorrhage + CKD    s/p total 7 unit prbc  f/u cbc, normal ferritin   epo as per renal     >Skin tear in left arm- improving     >HLD- cont. statin    >Pulmonary HTN - cont. sildnafil     >DVT PPX - heparin   PT - has Dialysis seat in community once stable for DC

## 2017-04-22 NOTE — PROGRESS NOTE ADULT - SUBJECTIVE AND OBJECTIVE BOX
NEPHROLOGY INTERVAL HPI/OVERNIGHT EVENTS:    MEDICATIONS  (STANDING):  atorvastatin 40milliGRAM(s) Oral at bedtime  calcium acetate 1334milliGRAM(s) Oral three times a day with meals  folic acid 1milliGRAM(s) Oral daily  lactobacillus acidophilus 1Tablet(s) Oral daily  polyethylene glycol 3350 17Gram(s) Oral daily  midodrine 2.5milliGRAM(s) Oral two times a day  sodium chloride 0.65% Nasal 1Spray(s) Both Nostrils three times a day  epoetin jaswinder Injectable 42349Huos(s) IV Push <User Schedule>  ATENolol  Tablet 12.5milliGRAM(s) Oral daily  sildenafil (REVATIO) 20milliGRAM(s) Oral two times a day  midodrine 5milliGRAM(s) Oral <User Schedule>    MEDICATIONS  (PRN):  albumin human 25% IVPB 50milliLiter(s) IV Intermittent every 1 hour PRN for sbp < 100  ALBUTerol    0.083% 2.5milliGRAM(s) Nebulizer every 6 hours PRN Bronchospasm      Allergies    allopurinol (Rash)  codeine (Nausea; Vomiting)  penicillin (Rash)  spironolactone (Unknown)    Intolerances        Vital Signs Last 24 Hrs  T(C): 36.1, Max: 36.6 ( @ 14:30)  T(F): 97, Max: 97.8 ( @ 14:30)  HR: 66 (63 - 74)  BP: 87/51 (87/51 - 98/54)  BP(mean): --  RR: 16 (16 - 18)  SpO2: 95% (95% - 100%)  Daily     Daily Weight in k.9 (2017 17:00)    PHYSICAL EXAM:    GENERAL: same  HEAD:    EYES:   ENMT:   NECK: central line for dialysis noted  NERVOUS SYSTEM: same   CHEST/LUNG: decreased bs bases  HEART: no rub  ABDOMEN: soft  EXTREMITIES:  erythema with edema same  LYMPH:   SKIN: no rash    LABS:                        9.1    2.6   )-----------( 76       ( 2017 07:08 )             29.8           PT/INR - ( 2017 07:08 )   PT: 11.9 sec;   INR: 1.08 ratio                     RADIOLOGY & ADDITIONAL TESTS:

## 2017-04-22 NOTE — PROGRESS NOTE ADULT - ASSESSMENT
CRF with low Bp and persistent leg edema; Valvular Heart disease with secondary pulmonary Hypertension

## 2017-04-22 NOTE — PROGRESS NOTE ADULT - SUBJECTIVE AND OBJECTIVE BOX
[facility]                                   [address] [place] [address]                                                      PHONE: [phone]                                                         FAX: [phone]  [email or web address]  ---------------------------------------------------------------------------------------------------------------------------------    FU for  Pt seen and examined.     Overnight events/patient complaints: sob improved    allopurinol (Rash)  codeine (Nausea; Vomiting)  penicillin (Rash)  spironolactone (Unknown)    MEDICATIONS  (STANDING):  atorvastatin 40milliGRAM(s) Oral at bedtime  calcium acetate 1334milliGRAM(s) Oral three times a day with meals  folic acid 1milliGRAM(s) Oral daily  lactobacillus acidophilus 1Tablet(s) Oral daily  polyethylene glycol 3350 17Gram(s) Oral daily  midodrine 2.5milliGRAM(s) Oral two times a day  sodium chloride 0.65% Nasal 1Spray(s) Both Nostrils three times a day  epoetin jaswinder Injectable [id number](s) IV Push <User Schedule>  ATENolol  Tablet 12.5milliGRAM(s) Oral daily  sildenafil (REVATIO) 20milliGRAM(s) Oral two times a day    MEDICATIONS  (PRN):  albumin human 25% IVPB 50milliLiter(s) IV Intermittent every 1 hour PRN for sbp < 100  ALBUTerol    0.083% 2.5milliGRAM(s) Nebulizer every 6 hours PRN Bronchospasm      Vital Signs Last 24 Hrs  T(C): 36.1, Max: 36.6 (04-21 @ 14:30)  T(F): 97, Max: 97.8 (04-21 @ 14:30)  HR: 66 (63 - 74)  BP: 87/51 (87/51 - 98/54)  BP(mean): --  RR: 16 (16 - 18)  SpO2: 95% (95% - 100%)  ICU Vital Signs Last 24 Hrs  I&O's Summary    I & Os for current day (as of 22 Apr 2017 11:31)  =============================================  IN: 640 ml / OUT: 2200 ml / NET: -1560 ml      PHYSICAL EXAM:  HEENT: no JVD  CARDIOVASCULAR: Normal S1 and S2, No murmur, rub, gallop or lift.   LUNGS decreased air entry b/l  ABDOMEN: Soft, nontender without mass or organomegaly. bowel sounds normoactive.  EXTREMITIES: edema+          LABS:                        9.1    2.6   )-----------( 76       ( 22 Apr 2017 07:08 )             29.8           DUSTIN HERNANDEZ      PT/INR - ( 22 Apr 2017 07:08 )   PT: 11.9 sec;   INR: 1.08 ratio               RADIOLOGY & ADDITIONAL STUDIES:    LABS:                        9.1    2.6   )-----------( 76       ( 22 Apr 2017 07:08 )             29.8           76y      PT/INR - ( 22 Apr 2017 07:08 )   PT: 11.9 sec;   INR: 1.08 ratio               Assessment and Plan:  Patient started on dialysis. Presented with fall and nose bleeds-found to be significantly anemic. Hx of AVR and mitral and tricuspid valvuloplasties. Known pulmonary hypertension.  AFIb: AC on hold due recent events  Hypotension:increase midodrine to 5 mg bid  Diastolic HF: will attempt negative fluid balance with dialysis if bp stable  ESRD on HD

## 2017-04-23 DIAGNOSIS — R91.1 SOLITARY PULMONARY NODULE: ICD-10-CM

## 2017-04-23 DIAGNOSIS — G47.30 SLEEP APNEA, UNSPECIFIED: ICD-10-CM

## 2017-04-23 DIAGNOSIS — I27.2 OTHER SECONDARY PULMONARY HYPERTENSION: ICD-10-CM

## 2017-04-23 DIAGNOSIS — E66.9 OBESITY, UNSPECIFIED: ICD-10-CM

## 2017-04-23 DIAGNOSIS — I38 ENDOCARDITIS, VALVE UNSPECIFIED: ICD-10-CM

## 2017-04-23 DIAGNOSIS — N18.6 END STAGE RENAL DISEASE: ICD-10-CM

## 2017-04-23 LAB
FOLATE SERPL-MCNC: >20 NG/ML — SIGNIFICANT CHANGE UP (ref 4–16)
LDH SERPL L TO P-CCNC: 275 U/L — HIGH (ref 98–192)
VIT B12 SERPL-MCNC: 1385 PG/ML — HIGH (ref 180–914)

## 2017-04-23 PROCEDURE — 99233 SBSQ HOSP IP/OBS HIGH 50: CPT

## 2017-04-23 RX ORDER — SODIUM CHLORIDE 9 MG/ML
500 INJECTION INTRAMUSCULAR; INTRAVENOUS; SUBCUTANEOUS ONCE
Qty: 0 | Refills: 0 | Status: COMPLETED | OUTPATIENT
Start: 2017-04-23 | End: 2017-04-23

## 2017-04-23 RX ORDER — WARFARIN SODIUM 2.5 MG/1
5 TABLET ORAL ONCE
Qty: 0 | Refills: 0 | Status: COMPLETED | OUTPATIENT
Start: 2017-04-23 | End: 2017-04-23

## 2017-04-23 RX ADMIN — Medication 1 SPRAY(S): at 06:15

## 2017-04-23 RX ADMIN — Medication 1 MILLIGRAM(S): at 12:13

## 2017-04-23 RX ADMIN — Medication 1334 MILLIGRAM(S): at 08:35

## 2017-04-23 RX ADMIN — Medication 1 TABLET(S): at 12:13

## 2017-04-23 RX ADMIN — Medication 1 SPRAY(S): at 21:27

## 2017-04-23 RX ADMIN — Medication 1334 MILLIGRAM(S): at 17:23

## 2017-04-23 RX ADMIN — Medication 20 MILLIGRAM(S): at 06:19

## 2017-04-23 RX ADMIN — Medication 1334 MILLIGRAM(S): at 12:13

## 2017-04-23 RX ADMIN — ATORVASTATIN CALCIUM 40 MILLIGRAM(S): 80 TABLET, FILM COATED ORAL at 21:27

## 2017-04-23 RX ADMIN — MIDODRINE HYDROCHLORIDE 2.5 MILLIGRAM(S): 2.5 TABLET ORAL at 06:15

## 2017-04-23 RX ADMIN — FLUDROCORTISONE ACETATE 0.1 MILLIGRAM(S): 0.1 TABLET ORAL at 09:29

## 2017-04-23 RX ADMIN — Medication 1 SPRAY(S): at 13:28

## 2017-04-23 NOTE — CONSULT NOTE ADULT - PROBLEM SELECTOR RECOMMENDATION 6
Moderate RIAZ noted on oupt home study  She does not want to pursue further w/u with CPAP  Trying to lose weight  Little else to add

## 2017-04-23 NOTE — CONSULT NOTE ADULT - ASSESSMENT
Ms. Crespo is a very pleasant 77 yo with ESRD on HD this admission, who was found to be pancytopenic.  She likely has MDS, however her counts are good enough that she may not treatment yet.  I would recommend an out patient bone marrow biopsy.  She is already on an ESPERANZA for anemia of chronic renal insufficiency and that is first line treatment for low grade MDS with anemia.  While she is in the hospital she can be supported with transfusions as needed.  Please transfuse platelet >10k or 15k if fever, or bleeding.  Transfuse HGb >7.  Will send hemolysis labs, LDH, haptoblogin, direct bakari.    Would consider antibiotics for possible lower extremity cellulitis.    Please trend daily CBC with diff.    Will follow.    Thank you for allowing me to participate in her care.

## 2017-04-23 NOTE — PROGRESS NOTE ADULT - SUBJECTIVE AND OBJECTIVE BOX
HEALTH ISSUES - PROBLEM Dx:  HPI:  77 yo f with hx of bovine valve replacement, afib on coumadin, chronic kidney insufficiency, s/p fall from standing 3 days ago. Per patient, she slipped, and as she was bracing her fall went to grab the kitchen counter and missed, causing some skin tears to her LUE. She's also had a nose bleed for 2-3 weeks, now more frequent. She had been in the ED on Sunday, got an XR which showed no fracture and was sent home. She comes today due to worsening epistaxis and bleeding from her skin tears. c/o SOB, headache/dizziness. Denies hitting head or LOC. (11 Apr 2017 15:16)      NOW  Pulm HTN, newly initiated HD, complicated by Hypotension      INTERVAL HPI/ OVERNIGHT EVENTS:  sitting OOB to chair with recorded hypotension and refusing IVF; wants to go home on MOnday and go to HD center daily.  denies chest pain, nausea, vomits, cough, SOB, fever, HA    MEDICATIONS  (STANDING):  atorvastatin 40milliGRAM(s) Oral at bedtime  calcium acetate 1334milliGRAM(s) Oral three times a day with meals  folic acid 1milliGRAM(s) Oral daily  lactobacillus acidophilus 1Tablet(s) Oral daily  polyethylene glycol 3350 17Gram(s) Oral daily  sodium chloride 0.65% Nasal 1Spray(s) Both Nostrils three times a day  epoetin jaswinder Injectable [id number](s) IV Push <User Schedule>  sildenafil (REVATIO) 20milliGRAM(s) Oral two times a day  midodrine 5milliGRAM(s) Oral <User Schedule>  fludroCORTISONE 0.1milliGRAM(s) Oral daily    MEDICATIONS  (PRN):  albumin human 25% IVPB 50milliLiter(s) IV Intermittent every 1 hour PRN for sbp < 100  ALBUTerol    0.083% 2.5milliGRAM(s) Nebulizer every 6 hours PRN Bronchospasm      Allergies    allopurinol (Rash)  codeine (Nausea; Vomiting)  penicillin (Rash)  spironolactone (Unknown)    Intolerances        Vital Signs Last 24 Hrs  T(C): 36.1, Max: 37.2 (04-22 @ 22:00)  T(F): 97, Max: 99 (04-22 @ 22:00)  HR: 65 (65 - 77)  BP: 75/47 (75/47 - 97/40)  BP(mean): --  RR: 16 (16 - 18)  SpO2: 99% (99% - 100%)    ACCUCHECKS    PHYSICAL EXAM-  GENERAL: mild SOB on NC oxygen well-groomed, well-developed  HEAD:  Atraumatic, Normocephalic  EYES: EOMI, PERRLA, conjunctiva and sclera clear  ENMT:  Moist mucous membranes,  NECK: Supple, No JVD, Normal thyroid  NERVOUS SYSTEM:  Alert & Oriented X 3, Motor Strength 4/5 B/L upper and lower extremities;   CHEST/LUNG: No rales, rhonchi, wheezing, or rubs  HEART: Regular rate and rhythm; No murmurs, rubs, or gallops  ABDOMEN: Soft, Nontender, Nondistended; Bowel sounds present  EXTREMITIES:  2+ Peripheral Pulses, No clubbing, cyanosis; james LE edema with some erythema, no cellulitis  LYMPH: No lymphadenopathy noted  SKIN: No rashes or lesions      LABS:                        9.1    2.6   )-----------( 76       ( 22 Apr 2017 07:08 )             29.8           PT/INR - ( 22 Apr 2017 07:08 )   PT: 11.9 sec;   INR: 1.08 ratio             RADIOLOGY & ADDITIONAL TESTS:    Assessment and Plan  DVT Prophylaxis    Discussed with: Patient, family, RN, CM, Consultants  Plan of care/ Discharge planning discussed.    Visit Time:

## 2017-04-23 NOTE — CONSULT NOTE ADULT - SUBJECTIVE AND OBJECTIVE BOX
PULMONARY CONSULT NOTE      DUSTIN HERNANDEZ  MRN-[id number]    Patient is a 76y old  Female who presents with a chief complaint of s/p fall, supratherapeutic INR (2017 15:16)      HISTORY OF PRESENT ILLNESS:    77 yo f with hx of bovine valve replacement, afib on coumadin, chronic kidney insufficiency, s/p fall from standing 3 days ago. Per patient, she slipped, and as she was bracing her fall went to grab the kitchen counter and missed, causing some skin tears to her LUE. She's also had a nose bleed for 2-3 weeks, now more frequent. She had been in the ED got an XR which showed no fracture and was sent home. She was subsequently admitted with worsening epistaxis and bleeding from her skin tears. Her renal function has worsened and is now on HD.     She has a h/o pulmonary HTN thought to be due to her underlying VHD s/p AVR and h/o MR. She was followed in our office for pulmonary nodule which was thought to be benign as it has been stable on repeated chest CTs. She also has RIAZ but has not wanted to pursue CPAP therapy and has been trying to lose weight.      MEDICATIONS  (STANDING):  atorvastatin 40milliGRAM(s) Oral at bedtime  calcium acetate 1334milliGRAM(s) Oral three times a day with meals  folic acid 1milliGRAM(s) Oral daily  lactobacillus acidophilus 1Tablet(s) Oral daily  polyethylene glycol 3350 17Gram(s) Oral daily  sodium chloride 0.65% Nasal 1Spray(s) Both Nostrils three times a day  epoetin jaswinder Injectable [id number](s) IV Push <User Schedule>  sildenafil (REVATIO) 20milliGRAM(s) Oral two times a day  midodrine 5milliGRAM(s) Oral <User Schedule>  fludroCORTISONE 0.1milliGRAM(s) Oral daily      MEDICATIONS  (PRN):  albumin human 25% IVPB 50milliLiter(s) IV Intermittent every 1 hour PRN for sbp < 100  ALBUTerol    0.083% 2.5milliGRAM(s) Nebulizer every 6 hours PRN Bronchospasm      Allergies    allopurinol (Rash)  codeine (Nausea; Vomiting)  penicillin (Rash)  spironolactone (Unknown)    Intolerances        PAST MEDICAL & SURGICAL HISTORY:  Anemia  Pulmonary hypertension  COPD (chronic obstructive pulmonary disease)  CAD (coronary artery disease)  Atrial fibrillation  Hyperlipidemia  Hypertension  Spinal fusion failure, initial encounter  Tubal ligation status  Sinusitis  Spinal stenosis  Pneumonia due to organism  No pertinent past medical history  H/O aortic valve replacement  H/O spinal fusion  H/O cataract  Cystocele  H/O tricuspid valve annuloplasty  H/O mitral valve repair      FAMILY HISTORY:  CAD (coronary artery disease)      SOCIAL HISTORY  Smoking History: Remote smoking hx    REVIEW OF SYSTEMS:    CONSTITUTIONAL:  No fevers, chills, sweats    HEENT:  Eyes:  No diplopia or blurred vision. ENT:  No earache, sore throat or runny nose.    CARDIOVASCULAR:  No pressure, squeezing, tightness, or heaviness about the chest; no palpitations.    RESPIRATORY:  Per HPI    GASTROINTESTINAL:  No abdominal pain, nausea, vomiting or diarrhea.    GENITOURINARY:  No dysuria, frequency or urgency.    NEUROLOGIC:  No paresthesias, fasciculations, seizures or weakness.    PSYCHIATRIC:  No disorder of thought or mood.    Vital Signs Last 24 Hrs  T(C): 36.1, Max: 37.2 ( @ 22:00)  T(F): 97, Max: 99 ( @ 22:00)  HR: 65 (65 - 77)  BP: 75/47 (75/47 - 97/40)  BP(mean): --  RR: 16 (16 - 18)  SpO2: 99% (99% - 100%)    PHYSICAL EXAMINATION:    GENERAL: The patient is a well-developed, well-nourished obese female in no apparent distress.     HEENT: Head is normocephalic and atraumatic. Extraocular muscles are intact. Mucous membranes are moist.     NECK: Supple.     LUNGS: Clear to auscultation without wheezing, rales, or rhonchi. Respirations unlabored    HEART: Regular rate and rhythm without murmur.    ABDOMEN: Soft, nontender, and nondistended.  No hepatosplenomegaly is noted.    EXTREMITIES: Without any cyanosis, clubbing, rash, lesions or edema.    NEUROLOGIC: Grossly intact.      LABS:                        9.1    2.6   )-----------( 76       ( 2017 07:08 )             29.8           PT/INR - ( 2017 07:08 )   PT: 11.9 sec;   INR: 1.08 ratio         RADIOLOGY & ADDITIONAL STUDIES:    EXAM:  CHEST SINGLE VIEW FRONTAL                          PROCEDURE DATE:  2017        INTERPRETATION:  EXAMINATION DATE: 2017 at 1859 hours.  CLINICAL INFORMATION: Line placement.    TECHNIQUE: Frontal view of the chest   COMPARISON: 2017 at 1211 hours.    FINDINGS:    LINES/TUBES: New right internal jugular central venous catheter with tip   overlying the superior vena cava.  LUNGS/PLEURA: Small right pleural effusion with basilar atelectasis or   pneumonia, unchanged. No pneumothorax.  MEDIASTINUM: Cardiac silhouette is enlarged.  OTHER: Sternotomy. Aortic valve replacement.    IMPRESSION:     Since 2017, unchanged small right pleural effusion with basilar   atelectasis or pneumonia.    VENKATESH BRAUN M.D., ATTENDING RADIOLOGIST  This document has been electronically signed. 2017 11:12AM      ECHO:    EXAM:  ECHO TRANSTHORACIC COMP W DOPP      PROCEDURE DATE:  2017   .      INTERPRETATION:  REPORT:    TRANSTHORACIC ECHOCARDIOGRAM REPORT           Patient Name:   DUSTIN HERNANDEZ Patient Location: Inpatient  Medical Rec #:  [id number]   Accession #:      [id number]  Account #:                        Height:           66.9 in 170.0 cm  YOB: 1940         Weight:           180.8 lb 82.00 kg  Patient Age:    76 years          BSA:              1.94 m²  Patient Gender: F                BP:               100/70 mmHg        Date of Exam: 2017 9:44:26 PM  Sonographer:  Viviana Abdul     Procedure:     2D Echo/Doppler/Color Doppler Complete.  Indications:   Unspecified combined systolic (congestive) and diastolic                (congestive) heart failure - I50.40  Diagnosis:     Unspecified combined systolic (congestive) and diastolic                 (congestive) heart failure - I50.40  Study Details: Technically adequate study.           2D AND M-MODE MEASUREMENTS (normal ranges within parentheses):  Left Ventricle:                 Normal    Aorta/Left Atrium:              Normal  IVSd (2D):              0.88 cm (0.7-1.1) LA Volume Index    59.8 ml/m²  LVPWd (2D):             0.99 cm (0.7-1.1) Right Ventricle:  LVIDd (2D):             4.68 cm (3.4-5.7) TAPSE:           1.20 cm  LVIDs (2D):             3.00 cm  LV FS (2D):             35.9 %  (>25%)  Relative Wall Thickness 0.42    (<0.42)     LV SYSTOLIC FUNCTION BY 2D PLANIMETRY (MOD):  EF-Biplane: 57.8 %     LV DIASTOLIC FUNCTION:  MV Peak E: 1.50 m/s e', MV Shabana: 0.03 m/s                      E/e' Ratio: 51.86                      Decel Time: 91 msec     SPECTRAL DOPPLER ANALYSIS (where applicable):  Mitral Valve:  MV Mean Grad: 4.4 mmHg MV P1/2Time: 26.50 msec                         MV Area, PHT: 8.30 cm²     Mitral Insufficiency by PISA:  MR Volume: 45.57 ml MR Flow Rate: 136.72 ml/s MR EROA: 40.85 mm²     Aortic Valve: AoV Max Trung: 2.06 m/s AoV Peak P.9 mmHg AoV Mean P.0 mmHg     LVOT Vmax: 0.91 m/s LVOT VTI: 0.221 m        Ao VTI: 0.511  Tricuspid Valve and PA/RV Systolic Pressure: TR Max Velocity: 3.23 m/s RA   Pressure: 15 mmHg RVSP/PASP: 56.8 mmHg        PHYSICIAN INTERPRETATION:  Left Ventricle: The left ventricular internal cavity size is normal. Left   ventricular wall thickness is normal.  Global LV systolic function was normal. Left ventricular ejection   fraction, by visual estimation, is 55 to 60%. The interventricular septum   is flattened in systole and diastole, consistent with right ventricular   pressure and volume overload. Spectral Doppler shows restrictive pattern   of left ventricular myocardial filling (Grade III diastolic dysfunction).   Elevated left atrial and left ventricular end-diastolicpressures.  Right Ventricle: The right ventricular size is severely enlarged. RV   systolic function is severely reduced. TV S' 0.1 m/s.  Left Atrium: Severely enlarged left atrium.  Right Atrium: The right atrium is severely dilated.  Pericardium: There is no evidence of pericardial effusion.  Mitral Valve: Thickening and calcification of the anterior and posterior   mitral valve leaflets. Status-post mitral annular ring insertion. Peak   transmitral mean gradient equals 4.4 mmHg, calculated mitral valve area   by pressure half time equals 8.30 cm² consistent with No evidence of   mitral stenosis. No evidence of mitral stenosis. Mild to moderate mitral   valve regurgitation is seen. The MR jet is centrally-directed. Mitral   valve mean gradient is 4.4 mmHg consistent with mild mitral stenosis.  Tricuspid Valve: Structurally normal tricuspid valve, with normal leaflet   excursion. Moderate-severe tricuspid regurgitation is visualized.   Estimated pulmonary artery systolic pressure is 56.8mmHg assuming a   right atrial pressure of 15 mmHg, which is consistent with moderate   pulmonary hypertension. Status post tricuspid valve repair.  Aortic Valve: Echo findings are consistent with normal structure and   function, allowing for the limitations of transthoracic echo techniques   of the aortic prosthesis. Mild aortic valve regurgitation is seen. The   Dimesionless Index value is 0.45.  Pulmonic Valve: Moderate pulmonic valve regurgitation.  Aorta: The aorta was not well visualized.  Venous: The inferior vena cava was dilated, with respiratory size   variation less than 50%.        Summary:   1. Left ventricular ejection fraction, by visual estimation, is 55 to   60%.   2. Normal global left ventricular systolic function.   3. Spectral Doppler shows restrictive pattern of left ventricular   myocardial filling (Grade III diastolic dysfunction). Elevated left   atrial and left ventricular end-diastolic pressures.   4. Normal left ventricular internal cavity size.   5. Severely enlarged right ventricle. Severely reduced RV systolic   function.   6. Severely enlarged left atrium.   7. Severely dilated right atrium.   8. Status-post mitral annular ring insertion.   9. Thickening and calcification of the anterior and posterior mitral   valve leaflets.  10. Mitral valve mean gradient is 4.4 mmHg consistent with mild mitral   stenosis.  11. Mild to moderate mitral valve regurgitation.  12. Bioprosthesis in the aortic position demonstrating normal function.  13. Mild aortic regurgitation.  14. Status post tricuspid valve repair.  15. Moderate-severe tricuspid regurgitation.  16. Moderate pulmonic valve regurgitation.  17. Estimated pulmonary artery systolic pressure is 56.8 mmHg assuming a   right atrial pressure of 15 mmHg, which is consistent with moderate   pulmonary hypertension.  18. There is no evidence of pericardial effusion.     MD Clara Electronically signed on 2017 at 9:29:19 AM          *** Final ***        PAZ PICKARD   This document has been electronically signed. 2017  9:44PM PULMONARY CONSULT NOTE      DUSTIN HERNANDEZ  MRN-[id number]    Patient is a 76y old  Female who presents with a chief complaint of s/p fall, supratherapeutic INR (2017 15:16)      HISTORY OF PRESENT ILLNESS:    77 yo f with hx of bovine valve replacement, afib on coumadin, chronic kidney insufficiency, s/p fall from standing 3 days ago. Per patient, she slipped, and as she was bracing her fall went to grab the kitchen counter and missed, causing some skin tears to her LUE. She's also had a nose bleed for 2-3 weeks, now more frequent. She had been in the ED got an XR which showed no fracture and was sent home. She was subsequently admitted with worsening epistaxis and bleeding from her skin tears. Her renal function has worsened and is now on HD.     She has a h/o pulmonary HTN thought to be due to her underlying VHD s/p AVR and h/o MR. She was followed in our office for pulmonary nodule which was thought to be benign as it has been stable on repeated chest CTs. She also has RIAZ but has not wanted to pursue CPAP therapy and has been trying to lose weight.      MEDICATIONS  (STANDING):  atorvastatin 40milliGRAM(s) Oral at bedtime  calcium acetate 1334milliGRAM(s) Oral three times a day with meals  folic acid 1milliGRAM(s) Oral daily  lactobacillus acidophilus 1Tablet(s) Oral daily  polyethylene glycol 3350 17Gram(s) Oral daily  sodium chloride 0.65% Nasal 1Spray(s) Both Nostrils three times a day  epoetin jaswinder Injectable [id number](s) IV Push <User Schedule>  sildenafil (REVATIO) 20milliGRAM(s) Oral two times a day  midodrine 5milliGRAM(s) Oral <User Schedule>  fludroCORTISONE 0.1milliGRAM(s) Oral daily      MEDICATIONS  (PRN):  albumin human 25% IVPB 50milliLiter(s) IV Intermittent every 1 hour PRN for sbp < 100  ALBUTerol    0.083% 2.5milliGRAM(s) Nebulizer every 6 hours PRN Bronchospasm      Allergies    allopurinol (Rash)  codeine (Nausea; Vomiting)  penicillin (Rash)  spironolactone (Unknown)    Intolerances        PAST MEDICAL & SURGICAL HISTORY:  Anemia  Pulmonary hypertension  COPD (chronic obstructive pulmonary disease)  CAD (coronary artery disease)  Atrial fibrillation  Hyperlipidemia  Hypertension  Spinal fusion failure, initial encounter  Tubal ligation status  Sinusitis  Spinal stenosis  Pneumonia due to organism  No pertinent past medical history  H/O aortic valve replacement  H/O spinal fusion  H/O cataract  Cystocele  H/O tricuspid valve annuloplasty  H/O mitral valve repair      FAMILY HISTORY:  CAD (coronary artery disease)      SOCIAL HISTORY  Smoking History: Remote smoking hx    REVIEW OF SYSTEMS:    CONSTITUTIONAL:  No fevers, chills, sweats    HEENT:  Eyes:  No diplopia or blurred vision. ENT:  No earache, sore throat or runny nose.    CARDIOVASCULAR:  No pressure, squeezing, tightness, or heaviness about the chest; no palpitations.    RESPIRATORY:  Per HPI    GASTROINTESTINAL:  No abdominal pain, nausea, vomiting or diarrhea.    GENITOURINARY:  No dysuria, frequency or urgency.    NEUROLOGIC:  No paresthesias, fasciculations, seizures or weakness.    PSYCHIATRIC:  No disorder of thought or mood.    Vital Signs Last 24 Hrs  T(C): 36.1, Max: 37.2 ( @ 22:00)  T(F): 97, Max: 99 (- @ 22:00)  HR: 65 (65 - 77)  BP: 75/47 (75/47 - 97/40)  BP(mean): --  RR: 16 (16 - 18)  SpO2: 99% (99% - 100%)    PHYSICAL EXAMINATION:    GENERAL: The patient is a well-developed, well-nourished obese female in no apparent distress.     HEENT: Head is normocephalic and atraumatic. Extraocular muscles are intact. Mucous membranes are moist.     NECK: Supple.     LUNGS: Clear to auscultation without wheezing, rales, or rhonchi. Respirations unlabored    HEART: Regular rate and rhythm without murmur.    ABDOMEN: Soft, nontender, and nondistended.  No hepatosplenomegaly is noted.    EXTREMITIES: Without any cyanosis, clubbing, rash, lesions but with b/l edema.    NEUROLOGIC: Grossly intact.      LABS:                        9.1    2.6   )-----------( 76       ( 2017 07:08 )             29.8           PT/INR - ( 2017 07:08 )   PT: 11.9 sec;   INR: 1.08 ratio         RADIOLOGY & ADDITIONAL STUDIES:    EXAM:  CHEST SINGLE VIEW FRONTAL                          PROCEDURE DATE:  2017        INTERPRETATION:  EXAMINATION DATE: 2017 at 1859 hours.  CLINICAL INFORMATION: Line placement.    TECHNIQUE: Frontal view of the chest   COMPARISON: 2017 at 1211 hours.    FINDINGS:    LINES/TUBES: New right internal jugular central venous catheter with tip   overlying the superior vena cava.  LUNGS/PLEURA: Small right pleural effusion with basilar atelectasis or   pneumonia, unchanged. No pneumothorax.  MEDIASTINUM: Cardiac silhouette is enlarged.  OTHER: Sternotomy. Aortic valve replacement.    IMPRESSION:     Since 2017, unchanged small right pleural effusion with basilar   atelectasis or pneumonia.    VENKATESH BRAUN M.D., ATTENDING RADIOLOGIST  This document has been electronically signed. 2017 11:12AM      ECHO:    EXAM:  ECHO TRANSTHORACIC COMP W DOPP      PROCEDURE DATE:  2017   .      INTERPRETATION:  REPORT:    TRANSTHORACIC ECHOCARDIOGRAM REPORT           Patient Name:   DUSTIN HERNANDEZ Patient Location: [facility] Rec #:  [id number]   Accession #:      [id number]  Account #:                        Height:           66.9 in 170.0 cm  YOB: 1940         Weight:           180.8 lb 82.00 kg  Patient Age:    76 years          BSA:              1.94 m²  Patient Gender: F                BP:               100/70 mmHg        Date of Exam: 2017 9:44:26 PM  Sonographer:  Viviana Abdul     Procedure:     2D Echo/Doppler/Color Doppler Complete.  Indications:   Unspecified combined systolic (congestive) and diastolic                (congestive) heart failure - I50.40  Diagnosis:     Unspecified combined systolic (congestive) and diastolic                 (congestive) heart failure - I50.40  Study Details: Technically adequate study.           2D AND M-MODE MEASUREMENTS (normal ranges within parentheses):  Left Ventricle:                 Normal    Aorta/Left Atrium:              Normal  IVSd (2D):              0.88 cm (0.7-1.1) LA Volume Index    59.8 ml/m²  LVPWd (2D):             0.99 cm (0.7-1.1) Right Ventricle:  LVIDd (2D):             4.68 cm (3.4-5.7) TAPSE:           1.20 cm  LVIDs (2D):             3.00 cm  LV FS (2D):             35.9 %  (>25%)  Relative Wall Thickness 0.42    (<0.42)     LV SYSTOLIC FUNCTION BY 2D PLANIMETRY (MOD):  EF-Biplane: 57.8 %     LV DIASTOLIC FUNCTION:  MV Peak E: 1.50 m/s e', MV Shabana: 0.03 m/s                      E/e' Ratio: 51.86                      Decel Time: 91 msec     SPECTRAL DOPPLER ANALYSIS (where applicable):  Mitral Valve:  MV Mean Grad: 4.4 mmHg MV P1/2Time: 26.50 msec                         MV Area, PHT: 8.30 cm²     Mitral Insufficiency by PISA:  MR Volume: 45.57 ml MR Flow Rate: 136.72 ml/s MR EROA: 40.85 mm²     Aortic Valve: AoV Max Trugn: 2.06 m/s AoV Peak P.9 mmHg AoV Mean P.0 mmHg     LVOT Vmax: 0.91 m/s LVOT VTI: 0.221 m        Ao VTI: 0.511  Tricuspid Valve and PA/RV Systolic Pressure: TR Max Velocity: 3.23 m/s RA   Pressure: 15 mmHg RVSP/PASP: 56.8 mmHg        PHYSICIAN INTERPRETATION:  Left Ventricle: The left ventricular internal cavity size is normal. Left   ventricular wall thickness is normal.  Global LV systolic function was normal. Left ventricular ejection   fraction, by visual estimation, is 55 to 60%. The interventricular septum   is flattened in systole and diastole, consistent with right ventricular   pressure and volume overload. Spectral Doppler shows restrictive pattern   of left ventricular myocardial filling (Grade III diastolic dysfunction).   Elevated left atrial and left ventricular end-diastolicpressures.  Right Ventricle: The right ventricular size is severely enlarged. RV   systolic function is severely reduced. TV S' 0.1 m/s.  Left Atrium: Severely enlarged left atrium.  Right Atrium: The right atrium is severely dilated.  Pericardium: There is no evidence of pericardial effusion.  Mitral Valve: Thickening and calcification of the anterior and posterior   mitral valve leaflets. Status-post mitral annular ring insertion. Peak   transmitral mean gradient equals 4.4 mmHg, calculated mitral valve area   by pressure half time equals 8.30 cm² consistent with No evidence of   mitral stenosis. No evidence of mitral stenosis. Mild to moderate mitral   valve regurgitation is seen. The MR jet is centrally-directed. Mitral   valve mean gradient is 4.4 mmHg consistent with mild mitral stenosis.  Tricuspid Valve: Structurally normal tricuspid valve, with normal leaflet   excursion. Moderate-severe tricuspid regurgitation is visualized.   Estimated pulmonary artery systolic pressure is 56.8mmHg assuming a   right atrial pressure of 15 mmHg, which is consistent with moderate   pulmonary hypertension. Status post tricuspid valve repair.  Aortic Valve: Echo findings are consistent with normal structure and   function, allowing for the limitations of transthoracic echo techniques   of the aortic prosthesis. Mild aortic valve regurgitation is seen. The   Dimesionless Index value is 0.45.  Pulmonic Valve: Moderate pulmonic valve regurgitation.  Aorta: The aorta was not well visualized.  Venous: The inferior vena cava was dilated, with respiratory size   variation less than 50%.        Summary:   1. Left ventricular ejection fraction, by visual estimation, is 55 to   60%.   2. Normal global left ventricular systolic function.   3. Spectral Doppler shows restrictive pattern of left ventricular   myocardial filling (Grade III diastolic dysfunction). Elevated left   atrial and left ventricular end-diastolic pressures.   4. Normal left ventricular internal cavity size.   5. Severely enlarged right ventricle. Severely reduced RV systolic   function.   6. Severely enlarged left atrium.   7. Severely dilated right atrium.   8. Status-post mitral annular ring insertion.   9. Thickening and calcification of the anterior and posterior mitral   valve leaflets.  10. Mitral valve mean gradient is 4.4 mmHg consistent with mild mitral   stenosis.  11. Mild to moderate mitral valve regurgitation.  12. Bioprosthesis in the aortic position demonstrating normal function.  13. Mild aortic regurgitation.  14. Status post tricuspid valve repair.  15. Moderate-severe tricuspid regurgitation.  16. Moderate pulmonic valve regurgitation.  17. Estimated pulmonary artery systolic pressure is 56.8 mmHg assuming a   right atrial pressure of 15 mmHg, which is consistent with moderate   pulmonary hypertension.  18. There is no evidence of pericardial effusion.     MD Clara Electronically signed on 2017 at 9:29:19 AM          *** Final ***        PAZ PICKARD   This document has been electronically signed. 2017  9:44PM

## 2017-04-23 NOTE — PROGRESS NOTE ADULT - SUBJECTIVE AND OBJECTIVE BOX
NEPHROLOGY INTERVAL HPI/OVERNIGHT EVENTS: No new events.    MEDICATIONS  (STANDING):  atorvastatin 40milliGRAM(s) Oral at bedtime  calcium acetate 1334milliGRAM(s) Oral three times a day with meals  folic acid 1milliGRAM(s) Oral daily  lactobacillus acidophilus 1Tablet(s) Oral daily  polyethylene glycol 3350 17Gram(s) Oral daily  sodium chloride 0.65% Nasal 1Spray(s) Both Nostrils three times a day  epoetin jaswinder Injectable [id number](s) IV Push <User Schedule>  midodrine 5milliGRAM(s) Oral <User Schedule>  fludroCORTISONE 0.1milliGRAM(s) Oral daily  warfarin 5milliGRAM(s) Oral once    MEDICATIONS  (PRN):  albumin human 25% IVPB 50milliLiter(s) IV Intermittent every 1 hour PRN for sbp < 100  ALBUTerol    0.083% 2.5milliGRAM(s) Nebulizer every 6 hours PRN Bronchospasm      Allergies    allopurinol (Rash)  codeine (Nausea; Vomiting)  penicillin (Rash)  spironolactone (Unknown)    Intolerances        Vital Signs Last 24 Hrs  T(C): 36.1, Max: 37.2 (04-22 @ 22:00)  T(F): 97, Max: 99 (04-22 @ 22:00)  HR: 65 (65 - 77)  BP: 75/47 (75/47 - 97/40)  BP(mean): --  RR: 16 (16 - 18)  SpO2: 99% (99% - 100%)  Daily     Daily Weight in k (2017 22:00)    PHYSICAL EXAM:    GENERAL: appears chronically ill  HEAD:    EYES: same  ENMT:   NECK: veins  NERVOUS SYSTEM:  awake, oriented  CHEST/LUNG: clear  HEART: no rub  ABDOMEN: not tender  EXTREMITIES:  erythema, edema same  LYMPH:   SKIN: erythema   no pain  LABS:                        9.1    2.6   )-----------( 76       ( 2017 07:08 )             29.8           PT/INR - ( 2017 07:08 )   PT: 11.9 sec;   INR: 1.08 ratio                     RADIOLOGY & ADDITIONAL TESTS:

## 2017-04-23 NOTE — PROGRESS NOTE ADULT - SUBJECTIVE AND OBJECTIVE BOX
[place] [facility], THE HEART CENTER                                   [address] [place] [address]                                                      PHONE: [phone]                                                         FAX: [phone]  [email or web address]  ---------------------------------------------------------------------------------------------------------------------------------    FU for  Pt seen and examined.     Overnight events/patient complaints: sob, denies dizziness    allopurinol (Rash)  codeine (Nausea; Vomiting)  penicillin (Rash)  spironolactone (Unknown)    MEDICATIONS  (STANDING):  atorvastatin 40milliGRAM(s) Oral at bedtime  calcium acetate 1334milliGRAM(s) Oral three times a day with meals  folic acid 1milliGRAM(s) Oral daily  lactobacillus acidophilus 1Tablet(s) Oral daily  polyethylene glycol 3350 17Gram(s) Oral daily  sodium chloride 0.65% Nasal 1Spray(s) Both Nostrils three times a day  epoetin jaswinder Injectable 45441Zsli(s) IV Push <User Schedule>  sildenafil (REVATIO) 20milliGRAM(s) Oral two times a day  midodrine 5milliGRAM(s) Oral <User Schedule>  fludroCORTISONE 0.1milliGRAM(s) Oral daily    MEDICATIONS  (PRN):  albumin human 25% IVPB 50milliLiter(s) IV Intermittent every 1 hour PRN for sbp < 100  ALBUTerol    0.083% 2.5milliGRAM(s) Nebulizer every 6 hours PRN Bronchospasm      Vital Signs Last 24 Hrs  T(C): 36.1, Max: 37.2 (04-22 @ 22:00)  T(F): 97, Max: 99 (04-22 @ 22:00)  HR: 65 (65 - 77)  BP: 75/47 (75/47 - 97/40)  BP(mean): --  RR: 16 (16 - 18)  SpO2: 99% (99% - 100%)  ICU Vital Signs Last 24 Hrs  I&O's Summary    I & Os for current day (as of 23 Apr 2017 12:07)  =============================================  IN: 0 ml / OUT: 1800 ml / NET: -1800 ml      PHYSICAL EXAM:  HEENT: no JVD  CARDIOVASCULAR: Normal S1 and S2, No murmur, rub, gallop or lift.   LUNGS: No rales, rhonchi or wheeze. Normal breath sounds bilaterally.  ABDOMEN: Soft, nontender without mass or organomegaly. bowel sounds normoactive.  EXTREMITIES: no edema          LABS:                        9.1    2.6   )-----------( 75       ( 22 Apr 2017 07:08 )             29.8           DUSTIN HERNANDEZ      PT/INR - ( 22 Apr 2017 07:08 )   PT: 11.9 sec;   INR: 1.08 ratio               RADIOLOGY & ADDITIONAL STUDIES:    LABS:                        9.1    2.6   )-----------( 75       ( 22 Apr 2017 07:08 )             29.8           76y      PT/INR - ( 22 Apr 2017 07:08 )   PT: 11.9 sec;   INR: 1.08 ratio               Assessment and Plan:  Patient started on dialysis. Presented with fall and nose bleeds-found to be significantly anemic. Hx of AVR and mitral and tricuspid valvuloplasties. Known pulmonary hypertension.  AFIb: AC on hold due recent events  Hypotension: midodrine to 5 mg bid, will d/c revatio, BP in 70's this morning, refusing IVF, advised increased PO intake  Diastolic HF: negative 6 kg since dialysis  ESRD on HD

## 2017-04-23 NOTE — PROGRESS NOTE ADULT - ASSESSMENT
76 year old woman with PMH of Afib on coumadin admitted under ACS due to fall and hemorrhagic shock, on admission INR was very high, anemic,  Acute on CKD, resuscitated with PRBC, IVF, PCC, renal function remain abnormal started on HD. Pt is s/p permacath on 04/14/2017. Medicine consulted called to transfer patient care. pt now initiated on HD. has required transfusion for drop in her hemoglobin.  Fluid overloaded, but has been difficult to dialyze 2/2 low BP. Started on midodrine and albumin infusions. BP - some improvement - reducing sildenefil to assist BP. D/w cardio and agreed to d/c sildanefil. d/w Pulm too. Resumed warfarin 4/21 as counts stable, needs for Afib and pulm HTN,.  Has outpt dialysis seat if tolerates HD.      A/P    >Acute on CKD -ESRD - newly started on HD   s/p permacath - 04/14/2017  received 7 units of prbc for severe anemia so far,   Oliguric - needs long term dialysis, not decided about this yet  hepatitis panel negative; PPD negative  Optimizing HD- still not tolerating full length due to hypotension, and thus needing daily HD.     > hypotension , tolerated HD 4/20 for first time   on midodrine  am cortisol - normal   sildanefil d/chandrika    >thrombocytopenia   ? chronic per daughter - saw hematologist per pt - cannot receollcect name  ?2/2 CKD. not on any Heparin/loenox since last 4 days -[ less liekly HIT  no aspirin - plt count improved  - ct to monitor  but due to pancytopenia will get Heme consult here- await consult    >h/o A.fib  appreciate cardiology input - per Cardiology recommendation resumed AC now as she has been stable from H&H standpoint  cont. atenolol  start warfarin - daughter aware and agreeable    >Anemia - acute on chronic - 2/2 hemorrhage + CKD   s/p total 7 unit prbc  f/u cbc, normal ferritin   epo as per renal     >Skin tear in left arm- improving     >HLD- cont. statin    >Pulmonary HTN - stable    >DVT PPX - heparin     PT - has Dialysis seat in community once stable for DC

## 2017-04-23 NOTE — CONSULT NOTE ADULT - SUBJECTIVE AND OBJECTIVE BOX
Ms. Crespo is a very pleasant 77 yo F with bovine valve replacement, afib on coumadin, CKD, presents s/p fall 3 days ago.  She slipped in her kitchen.  She has been having more frequent nose bleeds.  She did not sustain any fractures and was sent home from the ER.  She then returned for worsening nose bleeds.  Her kidney functions has worsened and is now on HD    PAST MEDICAL & SURGICAL HISTORY:  Anemia  Pulmonary hypertension  COPD (chronic obstructive pulmonary disease)  CAD (coronary artery disease)  Atrial fibrillation  Hyperlipidemia  Hypertension  Spinal fusion failure, initial encounter  Tubal ligation status  Sinusitis  Spinal stenosis  Pneumonia due to organism  No pertinent past medical history  H/O aortic valve replacement  H/O spinal fusion  H/O cataract  Cystocele  H/O tricuspid valve annuloplasty  H/O mitral valve repair    FAMILY HISTORY:  CAD (coronary artery disease)    MEDICATIONS  (STANDING):  atorvastatin 40milliGRAM(s) Oral at bedtime  calcium acetate 1334milliGRAM(s) Oral three times a day with meals  folic acid 1milliGRAM(s) Oral daily  lactobacillus acidophilus 1Tablet(s) Oral daily  polyethylene glycol 3350 17Gram(s) Oral daily  sodium chloride 0.65% Nasal 1Spray(s) Both Nostrils three times a day  epoetin jaswinder Injectable 35752Cpza(s) IV Push <User Schedule>  midodrine 5milliGRAM(s) Oral <User Schedule>  fludroCORTISONE 0.1milliGRAM(s) Oral daily  warfarin 5milliGRAM(s) Oral once    ROS:  As per HPI, otherwise negative in detail    Vital Signs Last 24 Hrs  T(C): 36.1, Max: 37.2 (04-22 @ 22:00)  T(F): 97, Max: 99 (04-22 @ 22:00)  HR: 65 (65 - 77)  BP: 75/47 (75/47 - 97/40)  BP(mean): --  RR: 16 (16 - 18)  SpO2: 99% (99% - 100%)    PHYSICAL EXAM:  Constitutional: NAD  ENMT: NC/AT  Neck: supple  Respiratory: CTAB, no wheezing  Cardiovascular: RRR, no murmurs  Gastrointestinal: soft, NT/ND  Extremities: arms are wrapped in gauze from skin tears from fall.  Lower extremities appear red.  Neurological: Alert  Psychiatric: Normal affect    Labs                        9.1    2.6   )-----------( 75       ( 22 Apr 2017 07:08 )             29.8

## 2017-04-23 NOTE — CONSULT NOTE ADULT - PROBLEM SELECTOR RECOMMENDATION 9
Thought to be secondary to VHD  On Revatio per cardiology  Continue management of pulm HTN per cardiology

## 2017-04-24 LAB
ANION GAP SERPL CALC-SCNC: 14 MMOL/L — SIGNIFICANT CHANGE UP (ref 5–17)
BUN SERPL-MCNC: 58 MG/DL — HIGH (ref 8–20)
CALCIUM SERPL-MCNC: 9.3 MG/DL — SIGNIFICANT CHANGE UP (ref 8.6–10.2)
CHLORIDE SERPL-SCNC: 95 MMOL/L — LOW (ref 98–107)
CO2 SERPL-SCNC: 28 MMOL/L — SIGNIFICANT CHANGE UP (ref 22–29)
CREAT SERPL-MCNC: 3.09 MG/DL — HIGH (ref 0.5–1.3)
GLUCOSE SERPL-MCNC: 148 MG/DL — HIGH (ref 70–115)
INR BLD: 1.04 RATIO — SIGNIFICANT CHANGE UP (ref 0.88–1.16)
POTASSIUM SERPL-MCNC: 4.1 MMOL/L — SIGNIFICANT CHANGE UP (ref 3.5–5.3)
POTASSIUM SERPL-SCNC: 4.1 MMOL/L — SIGNIFICANT CHANGE UP (ref 3.5–5.3)
PROTHROM AB SERPL-ACNC: 11.4 SEC — SIGNIFICANT CHANGE UP (ref 9.8–12.7)
SODIUM SERPL-SCNC: 137 MMOL/L — SIGNIFICANT CHANGE UP (ref 135–145)

## 2017-04-24 PROCEDURE — 99233 SBSQ HOSP IP/OBS HIGH 50: CPT

## 2017-04-24 RX ORDER — ALBUMIN HUMAN 25 %
100 VIAL (ML) INTRAVENOUS ONCE
Qty: 0 | Refills: 0 | Status: COMPLETED | OUTPATIENT
Start: 2017-04-24 | End: 2017-04-24

## 2017-04-24 RX ORDER — WARFARIN SODIUM 2.5 MG/1
5 TABLET ORAL ONCE
Qty: 0 | Refills: 0 | Status: COMPLETED | OUTPATIENT
Start: 2017-04-24 | End: 2017-04-24

## 2017-04-24 RX ADMIN — ERYTHROPOIETIN 10000 UNIT(S): 10000 INJECTION, SOLUTION INTRAVENOUS; SUBCUTANEOUS at 11:21

## 2017-04-24 RX ADMIN — Medication 1 SPRAY(S): at 13:51

## 2017-04-24 RX ADMIN — Medication 1 SPRAY(S): at 21:19

## 2017-04-24 RX ADMIN — Medication 1334 MILLIGRAM(S): at 12:12

## 2017-04-24 RX ADMIN — FLUDROCORTISONE ACETATE 0.1 MILLIGRAM(S): 0.1 TABLET ORAL at 05:49

## 2017-04-24 RX ADMIN — Medication 1 SPRAY(S): at 05:49

## 2017-04-24 RX ADMIN — Medication 1334 MILLIGRAM(S): at 17:59

## 2017-04-24 RX ADMIN — Medication 1 TABLET(S): at 12:12

## 2017-04-24 RX ADMIN — ATORVASTATIN CALCIUM 40 MILLIGRAM(S): 80 TABLET, FILM COATED ORAL at 21:19

## 2017-04-24 RX ADMIN — Medication 1334 MILLIGRAM(S): at 07:52

## 2017-04-24 RX ADMIN — WARFARIN SODIUM 5 MILLIGRAM(S): 2.5 TABLET ORAL at 21:21

## 2017-04-24 RX ADMIN — Medication 50 MILLILITER(S): at 10:19

## 2017-04-24 RX ADMIN — Medication 1 MILLIGRAM(S): at 12:12

## 2017-04-24 NOTE — PROGRESS NOTE ADULT - SUBJECTIVE AND OBJECTIVE BOX
[place] [facility], THE HEART CENTER                                   [address] [place] [address]                                                      PHONE: [phone]                                                         FAX: [phone]  [email or web address]  ---------------------------------------------------------------------------------------------------------------------------------    Overnight events/patient complaints:      PAST MEDICAL & SURGICAL HISTORY:  Anemia  Pulmonary hypertension  COPD (chronic obstructive pulmonary disease)  CAD (coronary artery disease)  Atrial fibrillation  Hyperlipidemia  Hypertension  Spinal fusion failure, initial encounter  Tubal ligation status  Sinusitis  Spinal stenosis  Pneumonia due to organism  No pertinent past medical history  H/O aortic valve replacement  H/O spinal fusion  H/O cataract  Cystocele  H/O tricuspid valve annuloplasty  H/O mitral valve repair      allopurinol (Rash)  codeine (Nausea; Vomiting)  penicillin (Rash)  spironolactone (Unknown)    MEDICATIONS  (STANDING):  atorvastatin 40milliGRAM(s) Oral at bedtime  calcium acetate 1334milliGRAM(s) Oral three times a day with meals  folic acid 1milliGRAM(s) Oral daily  lactobacillus acidophilus 1Tablet(s) Oral daily  polyethylene glycol 3350 17Gram(s) Oral daily  sodium chloride 0.65% Nasal 1Spray(s) Both Nostrils three times a day  epoetin jaswinder Injectable 53600Ggdj(s) IV Push <User Schedule>  midodrine 5milliGRAM(s) Oral <User Schedule>  fludroCORTISONE 0.1milliGRAM(s) Oral daily  albumin human 25% IVPB 100milliLiter(s) IV Intermittent once    MEDICATIONS  (PRN):  albumin human 25% IVPB 50milliLiter(s) IV Intermittent every 1 hour PRN for sbp < 100  ALBUTerol    0.083% 2.5milliGRAM(s) Nebulizer every 6 hours PRN Bronchospasm      Vital Signs Last 24 Hrs  T(C): 37.1, Max: 37.1 (04-24 @ 07:55)  T(F): 98.7, Max: 98.7 (04-24 @ 07:55)  HR: 67 (62 - 75)  BP: 105/50 (75/47 - 105/50)  BP(mean): --  RR: 22 (16 - 22)  SpO2: 97% (94% - 99%)  ICU Vital Signs Last 24 Hrs  DUSTIN HERNANDEZ  I&O's Detail    I&O's Summary    Drug Dosing Weight  DUSTIN HERNANDEZ      PHYSICAL EXAM:  General: Appears well developed, well nourished alert and cooperative.  HEENT: Head; normocephalic, atraumatic.  Eyes: Pupils reactive, cornea wnl.  Neck: Supple, no nodes adenopathy, no NVD or carotid bruit or thyromegaly.  CARDIOVASCULAR: Normal S1 and S2, No murmur, rub, gallop or lift.   LUNGS: No rales, rhonchi or wheeze. Normal breath sounds bilaterally.  ABDOMEN: Soft, nontender without mass or organomegaly. bowel sounds normoactive.  EXTREMITIES: No clubbing, cyanosis or edema. Distal pulses wnl.   SKIN: warm and dry with normal turgor.  NEURO: Alert/oriented x 3/normal motor exam. No pathologic reflexes.    PSYCH: normal affect.        LABS:    04-24    137  |  95<L>  |  58.0<H>  ----------------------------<  148<H>  4.1   |  28.0  |  3.09<H>    Ca    9.3      24 Apr 2017 07:21      DUSTIN HERNANDEZ      PT/INR - ( 24 Apr 2017 07:21 )   PT: 11.4 sec;   INR: 1.04 ratio               RADIOLOGY & ADDITIONAL STUDIES:    INTERPRETATION OF TELEMETRY (personally reviewed):       ASSESSMENT AND PLAN:  In summary, DUSTIN HERNANDEZ is an 76y Female with past medical history significant for         Thank you for allowing [facility] to participate in the care of this patient.  Please feel free to call with any questions or concerns. [place] [facility], THE HEART CENTER                                   [address] [place] [address]                                                      PHONE: [phone]                                                         FAX: [phone]  [email or web address]  ---------------------------------------------------------------------------------------------------------------------------------    Overnight events/patient complaints:  pt seen in dialysis  pt c/o fatigue  no c/o cp, sob    PAST MEDICAL & SURGICAL HISTORY:  Anemia  Pulmonary hypertension  COPD (chronic obstructive pulmonary disease)  CAD (coronary artery disease)  Atrial fibrillation  Hyperlipidemia  Hypertension  Spinal fusion failure, initial encounter  Tubal ligation status  Sinusitis  Spinal stenosis  Pneumonia due to organism  No pertinent past medical history  H/O aortic valve replacement  H/O spinal fusion  H/O cataract  Cystocele  H/O tricuspid valve annuloplasty  H/O mitral valve repair      allopurinol (Rash)  codeine (Nausea; Vomiting)  penicillin (Rash)  spironolactone (Unknown)    MEDICATIONS  (STANDING):  atorvastatin 40milliGRAM(s) Oral at bedtime  calcium acetate 1334milliGRAM(s) Oral three times a day with meals  folic acid 1milliGRAM(s) Oral daily  lactobacillus acidophilus 1Tablet(s) Oral daily  polyethylene glycol 3350 17Gram(s) Oral daily  sodium chloride 0.65% Nasal 1Spray(s) Both Nostrils three times a day  epoetin jaswinder Injectable 46337Tlom(s) IV Push <User Schedule>  midodrine 5milliGRAM(s) Oral <User Schedule>  fludroCORTISONE 0.1milliGRAM(s) Oral daily  albumin human 25% IVPB 100milliLiter(s) IV Intermittent once    MEDICATIONS  (PRN):  albumin human 25% IVPB 50milliLiter(s) IV Intermittent every 1 hour PRN for sbp < 100  ALBUTerol    0.083% 2.5milliGRAM(s) Nebulizer every 6 hours PRN Bronchospasm      Vital Signs Last 24 Hrs  T(C): 37.1, Max: 37.1 (04-24 @ 07:55)  T(F): 98.7, Max: 98.7 (04-24 @ 07:55)  HR: 67 (62 - 75)  BP: 105/50 (75/47 - 105/50)  BP(mean): --  RR: 22 (16 - 22)  SpO2: 97% (94% - 99%)  ICU Vital Signs Last 24 Hrs  DUSTIN HERNANDEZ  I&O's Detail    I&O's Summary    Drug Dosing Weight  DUSTIN HERNANDEZ      PHYSICAL EXAM:  General: Appears well developed, well nourished alert and cooperative.  HEENT: Head; normocephalic, atraumatic.  Eyes: Pupils reactive, cornea wnl.  Neck: Supple, no nodes adenopathy, no NVD or carotid bruit or thyromegaly.  CARDIOVASCULAR: Normal S1 and S2, No murmur, rub, gallop or lift.   LUNGS: No rales, rhonchi or wheeze. Normal breath sounds bilaterally.  ABDOMEN: Soft, nontender without mass or organomegaly. bowel sounds normoactive.  EXTREMITIES: erythematous b/l LE, + eccymosis of right le   SKIN: warm and dry with normal turgor.  NEURO: Alert/oriented x 3/normal motor exam. No pathologic reflexes.    PSYCH: normal affect.        LABS:    04-24    137  |  95<L>  |  58.0<H>  ----------------------------<  148<H>  4.1   |  28.0  |  3.09<H>    Ca    9.3      24 Apr 2017 07:21      DUSTIN HERNANDEZ      PT/INR - ( 24 Apr 2017 07:21 )   PT: 11.4 sec;   INR: 1.04 ratio               RADIOLOGY & ADDITIONAL STUDIES:    INTERPRETATION OF TELEMETRY (personally reviewed):       ASSESSMENT AND PLAN:  In summary, DUSTIN HERNANDEZ is an 76y Female Patient started on dialysis. Presented with fall and nose bleeds-found to be significantly anemic. Hx of AVR and mitral and tricuspid valvuloplasties. Known pulmonary hypertension.    AFIb:   AC on hold due recent events    Hypotension:   -improved  midodrine to 5 mg bid,     Diastolic HF:   negative fluid balance since dialysis  ESRD on HD    Erythematous LE :  etiology- vascular congestion vs early cellulitis  -monitor for now but would have low threshold for starting abx and ID eval for cellulitis.     Thank you for allowing [facility] to participate in the care of this patient.  Please feel free to call with any questions or concerns.

## 2017-04-24 NOTE — PROGRESS NOTE ADULT - SUBJECTIVE AND OBJECTIVE BOX
PULMONARY PROGRESS NOTE      DUSTIN HERNANDEZKADE-[id number]    Patient is a 76y old  Female who presents with a chief complaint of s/p fall, supratherapeutic INR (11 Apr 2017 15:16)  75 yo f with hx of bovine valve replacement, afib on coumadin, chronic kidney insufficiency, s/p fall from standing 3 days ago. Per patient, she slipped, and as she was bracing her fall went to grab the kitchen counter and missed, causing some skin tears to her LUE. She's also had a nose bleed for 2-3 weeks, now more frequent. She had been in the ED got an XR which showed no fracture and was sent home. She was subsequently admitted with worsening epistaxis and bleeding from her skin tears. Her renal function has worsened and is now on HD.     She has a h/o pulmonary HTN thought to be due to her underlying VHD s/p AVR and h/o MR. She was followed in our office for pulmonary nodule which was thought to be benign as it has been stable on repeated chest CTs. She also has RIAZ but has not wanted to pursue CPAP therapy and has been trying to lose weight.      INTERVAL HPI/OVERNIGHT EVENTS:  Improving with HD    MEDICATIONS  (STANDING):  atorvastatin 40milliGRAM(s) Oral at bedtime  calcium acetate 1334milliGRAM(s) Oral three times a day with meals  folic acid 1milliGRAM(s) Oral daily  lactobacillus acidophilus 1Tablet(s) Oral daily  polyethylene glycol 3350 17Gram(s) Oral daily  sodium chloride 0.65% Nasal 1Spray(s) Both Nostrils three times a day  epoetin jaswinder Injectable 12934Bamw(s) IV Push <User Schedule>  midodrine 5milliGRAM(s) Oral <User Schedule>  fludroCORTISONE 0.1milliGRAM(s) Oral daily  albumin human 25% IVPB 100milliLiter(s) IV Intermittent once      MEDICATIONS  (PRN):  albumin human 25% IVPB 50milliLiter(s) IV Intermittent every 1 hour PRN for sbp < 100  ALBUTerol    0.083% 2.5milliGRAM(s) Nebulizer every 6 hours PRN Bronchospasm      Allergies    allopurinol (Rash)  codeine (Nausea; Vomiting)  penicillin (Rash)  spironolactone (Unknown)    Intolerances        PAST MEDICAL & SURGICAL HISTORY:  Anemia  Pulmonary hypertension  COPD (chronic obstructive pulmonary disease)  CAD (coronary artery disease)  Atrial fibrillation  Hyperlipidemia  Hypertension  Spinal fusion failure, initial encounter  Tubal ligation status  Sinusitis  Spinal stenosis  Pneumonia due to organism  No pertinent past medical history  H/O aortic valve replacement  H/O spinal fusion  H/O cataract  Cystocele  H/O tricuspid valve annuloplasty  H/O mitral valve repair      SOCIAL HISTORY  Smoking History:       REVIEW OF SYSTEMS:    CONSTITUTIONAL:  No distress    HEENT:  Eyes:  No diplopia or blurred vision. ENT:  No earache, sore throat or runny nose.    CARDIOVASCULAR:  No pressure, squeezing, tightness, heaviness or aching about the chest; no palpitations.    RESPIRATORY:  No cough, shortness of breath, PND or orthopnea. Mild SOBOE    GASTROINTESTINAL:  No nausea, vomiting or diarrhea.    GENITOURINARY:  No dysuria, frequency or urgency.    NEUROLOGIC:  No paresthesias, fasciculations, seizures or weakness.    PSYCHIATRIC:  No disorder of thought or mood.    Vital Signs Last 24 Hrs  T(C): 36.4, Max: 37.1 (04-24 @ 07:55)  T(F): 97.6, Max: 98.7 (04-24 @ 07:55)  HR: 77 (62 - 77)  BP: 116/64 (94/46 - 116/64)  BP(mean): --  RR: 18 (16 - 22)  SpO2: 94% (94% - 97%)    PHYSICAL EXAMINATION:    GENERAL: The patient is awake and alert in no apparent distress.     HEENT: Head is normocephalic and atraumatic. Extraocular muscles are intact. Mucous membranes are moist.    NECK: Supple.    LUNGS: Clear to auscultation without wheezing, rales or rhonchi; respirations unlabored    HEART: Regular rate and rhythm without murmur.    ABDOMEN: Soft, nontender, and nondistended.      EXTREMITIES: Without any cyanosis, clubbing, rash, lesions or edema.    NEUROLOGIC: Grossly intact.    LABS:    04-24    137  |  95<L>  |  58.0<H>  ----------------------------<  148<H>  4.1   |  28.0  |  3.09<H>    Ca    9.3      24 Apr 2017 07:21      PT/INR - ( 24 Apr 2017 07:21 )   PT: 11.4 sec;   INR: 1.04 ratio             RADIOLOGY & ADDITIONAL STUDIES:    CXR on 4/11/17  IMPRESSION:     Since April 11, 2017, unchanged small right pleural effusion with basilar   atelectasis or pneumonia.    VENKATESH BRAUN M.D., ATTENDING RADIOLOGIST  This document has been electronically signed. Apr 12 2017 11:12AM    Echo on 4/11/17  [id number]   Echo on 4/11/17  Summary:   1. Left ventricular ejection fraction, by visual estimation, is 55 to   60%.   2. Normal global left ventricular systolic function.   3. Spectral Doppler shows restrictive pattern of left ventricular   myocardial filling (Grade III diastolic dysfunction). Elevated left   atrial and left ventricular end-diastolic pressures.   4. Normal left ventricular internal cavity size.   5. Severely enlarged right ventricle. Severely reduced RV systolic   function.  E

## 2017-04-24 NOTE — PROGRESS NOTE ADULT - SUBJECTIVE AND OBJECTIVE BOX
NEPHROLOGY INTERVAL HPI/OVERNIGHT EVENTS:    MEDICATIONS  (STANDING):  atorvastatin 40milliGRAM(s) Oral at bedtime  calcium acetate 1334milliGRAM(s) Oral three times a day with meals  folic acid 1milliGRAM(s) Oral daily  lactobacillus acidophilus 1Tablet(s) Oral daily  polyethylene glycol 3350 17Gram(s) Oral daily  sodium chloride 0.65% Nasal 1Spray(s) Both Nostrils three times a day  epoetin jaswinder Injectable 56085Ftzb(s) IV Push <User Schedule>  midodrine 5milliGRAM(s) Oral <User Schedule>  fludroCORTISONE 0.1milliGRAM(s) Oral daily    MEDICATIONS  (PRN):  albumin human 25% IVPB 50milliLiter(s) IV Intermittent every 1 hour PRN for sbp < 100  ALBUTerol    0.083% 2.5milliGRAM(s) Nebulizer every 6 hours PRN Bronchospasm      Allergies    allopurinol (Rash)  codeine (Nausea; Vomiting)  penicillin (Rash)  spironolactone (Unknown)    Intolerances        Vital Signs Last 24 Hrs  T(C): 36.4, Max: 37.1 ( @ 07:55)  T(F): 97.6, Max: 98.7 ( @ 07:55)  HR: 77 (62 - 77)  BP: 116/64 (94/46 - 116/64)  BP(mean): --  RR: 18 (16 - 22)  SpO2: 94% (94% - 97%)  Daily     Daily Weight in k.4 (2017 08:30)    PHYSICAL EXAM:    GENERAL: awake, alert  HEAD:    EYES: wnl  ENMT:   NECK: right  permacath  NERVOUS SYSTEM: same   CHEST/LUNG: clear  HEART: no rub  ABDOMEN: not tender   EXTREMITIES:  edema legs  LYMPH:   SKIN: ecchymosis diffuse    LABS:        137  |  95<L>  |  58.0<H>  ----------------------------<  148<H>  4.1   |  28.0  |  3.09<H>    Ca    9.3      2017 07:21      PT/INR - ( 2017 07:21 )   PT: 11.4 sec;   INR: 1.04 ratio                     RADIOLOGY & ADDITIONAL TESTS:

## 2017-04-24 NOTE — PROGRESS NOTE ADULT - SUBJECTIVE AND OBJECTIVE BOX
Patient is 75 yo F presently on dialysis with known H/O pancytopenia for > one year now progressive. She has long list of comorbidities including valvular heart disease with bovine replacemnt, COPD, A. Fib, gout, and HTN. Appears stable, presently on daily dialysis for fluid removal. Labs sent to R/O hemolysis. Plan: proceed with ESPERANZA support and transfusions of both RBC and Platelets as clinically required. Will Follow up on recent lab tests.

## 2017-04-24 NOTE — PROGRESS NOTE ADULT - SUBJECTIVE AND OBJECTIVE BOX
HEALTH ISSUES - PROBLEM Dx:  HPI:  77 yo f with hx of bovine valve replacement, afib on coumadin, chronic kidney insufficiency, s/p fall from standing 3 days ago. Per patient, she slipped, and as she was bracing her fall went to grab the kitchen counter and missed, causing some skin tears to her LUE. She's also had a nose bleed for 2-3 weeks, now more frequent. She had been in the ED on Sunday, got an XR which showed no fracture and was sent home. She comes today due to worsening epistaxis and bleeding from her skin tears. c/o SOB, headache/dizziness. Denies hitting head or LOC. (11 Apr 2017 15:16)    NOW  Pulm HTN, newly initiated HD, complicated by Hypotension    INTERVAL HPI/ OVERNIGHT EVENTS:  at HD now, leg edema, hypotension persists.  denies chest pain, nausea, vomits, cough, SOB, fever, HA    MEDICATIONS  (STANDING):  atorvastatin 40milliGRAM(s) Oral at bedtime  calcium acetate 1334milliGRAM(s) Oral three times a day with meals  folic acid 1milliGRAM(s) Oral daily  lactobacillus acidophilus 1Tablet(s) Oral daily  polyethylene glycol 3350 17Gram(s) Oral daily  sodium chloride 0.65% Nasal 1Spray(s) Both Nostrils three times a day  epoetin jaswinder Injectable [id number](s) IV Push <User Schedule>  midodrine 5milliGRAM(s) Oral <User Schedule>  fludroCORTISONE 0.1milliGRAM(s) Oral daily    MEDICATIONS  (PRN):  albumin human 25% IVPB 50milliLiter(s) IV Intermittent every 1 hour PRN for sbp < 100  ALBUTerol    0.083% 2.5milliGRAM(s) Nebulizer every 6 hours PRN Bronchospasm      Allergies    allopurinol (Rash)  codeine (Nausea; Vomiting)  penicillin (Rash)  spironolactone (Unknown)    Intolerances        Vital Signs Last 24 Hrs  T(C): 36.5, Max: 37.1 (04-24 @ 07:55)  T(F): 97.7, Max: 98.7 (04-24 @ 07:55)  HR: 68 (62 - 77)  BP: 102/60 (94/46 - 116/64)  BP(mean): --  RR: 18 (16 - 22)  SpO2: 100% (94% - 100%)    ACCUCHECKS    PHYSICAL EXAM-  GENERAL: mild SOB on NC oxygen well-groomed, well-developed  HEAD:  Atraumatic, Normocephalic  EYES: EOMI, PERRLA, conjunctiva and sclera clear  ENMT:  Moist mucous membranes,  NECK: Supple, No JVD, Normal thyroid  NERVOUS SYSTEM:  Alert & Oriented X 3, Motor Strength 4/5 B/L upper and lower extremities;   CHEST/LUNG: No rales, rhonchi, wheezing, or rubs  HEART: Regular rate and rhythm; No murmurs, rubs, or gallops  ABDOMEN: Soft, Nontender, Nondistended; Bowel sounds present  EXTREMITIES:  2+ Peripheral Pulses, No clubbing, cyanosis; james LE edema with some erythema, no cellulitis  LYMPH: No lymphadenopathy noted  SKIN: No rashes or lesions    LABS:    04-24    137  |  95<L>  |  58.0<H>  ----------------------------<  148<H>  4.1   |  28.0  |  3.09<H>    Ca    9.3      24 Apr 2017 07:21      PT/INR - ( 24 Apr 2017 07:21 )   PT: 11.4 sec;   INR: 1.04 ratio             RADIOLOGY & ADDITIONAL TESTS:    Assessment and Plan  DVT Prophylaxis    Discussed with: Patient, family, RN, CM, Consultants  Plan of care/ Discharge planning discussed.    Visit Time: HEALTH ISSUES - PROBLEM Dx:  HPI:  77 yo f with hx of bovine valve replacement, afib on coumadin, chronic kidney insufficiency, s/p fall from standing 3 days ago. Per patient, she slipped, and as she was bracing her fall went to grab the kitchen counter and missed, causing some skin tears to her LUE. She's also had a nose bleed for 2-3 weeks, now more frequent. She had been in the ED on Sunday, got an XR which showed no fracture and was sent home. She comes today due to worsening epistaxis and bleeding from her skin tears. c/o SOB, headache/dizziness. Denies hitting head or LOC. (11 Apr 2017 15:16)    NOW  Pulm HTN, newly initiated HD, complicated by Hypotension    INTERVAL HPI/ OVERNIGHT EVENTS:  at HD now, leg edema, hypotension persists.  denies chest pain, nausea, vomits, cough, SOB, fever, HA    MEDICATIONS  (STANDING):  atorvastatin 40milliGRAM(s) Oral at bedtime  calcium acetate 1334milliGRAM(s) Oral three times a day with meals  folic acid 1milliGRAM(s) Oral daily  lactobacillus acidophilus 1Tablet(s) Oral daily  polyethylene glycol 3350 17Gram(s) Oral daily  sodium chloride 0.65% Nasal 1Spray(s) Both Nostrils three times a day  epoetin jaswinder Injectable [id number](s) IV Push <User Schedule>  midodrine 5milliGRAM(s) Oral <User Schedule>  fludroCORTISONE 0.1milliGRAM(s) Oral daily    MEDICATIONS  (PRN):  albumin human 25% IVPB 50milliLiter(s) IV Intermittent every 1 hour PRN for sbp < 100  ALBUTerol    0.083% 2.5milliGRAM(s) Nebulizer every 6 hours PRN Bronchospasm      Allergies    allopurinol (Rash)  codeine (Nausea; Vomiting)  penicillin (Rash)  spironolactone (Unknown)    Intolerances        Vital Signs Last 24 Hrs  T(C): 36.5, Max: 37.1 (04-24 @ 07:55)  T(F): 97.7, Max: 98.7 (04-24 @ 07:55)  HR: 68 (62 - 77)  BP: 102/60 (94/46 - 116/64)  BP(mean): --  RR: 18 (16 - 22)  SpO2: 100% (94% - 100%)    ACCUCHECKS    PHYSICAL EXAM-  GENERAL: mild SOB on NC oxygen well-groomed, well-developed  HEAD:  Atraumatic, Normocephalic  EYES: EOMI, PERRLA, conjunctiva and sclera clear  ENMT:  Moist mucous membranes,  NECK: Supple, No JVD, Normal thyroid  NERVOUS SYSTEM:  Alert & Oriented X 3, Motor Strength 4/5 B/L upper and lower extremities;   CHEST/LUNG: No rales, rhonchi, wheezing, or rubs  HEART: Regular rate and rhythm; No murmurs, rubs, or gallops  ABDOMEN: Soft, Nontender, Nondistended; Bowel sounds present  EXTREMITIES:  2+ Peripheral Pulses, No clubbing, cyanosis; james LE edema with some erythema, no cellulitis  LYMPH: No lymphadenopathy noted  SKIN: james leg lower erythema noted since past 2 days without change, non tender, no warmth. pt doesn't know if she had it before. no evidence of infection.     LABS:    04-24    137  |  95<L>  |  58.0<H>  ----------------------------<  148<H>  4.1   |  28.0  |  3.09<H>    Ca    9.3      24 Apr 2017 07:21      PT/INR - ( 24 Apr 2017 07:21 )   PT: 11.4 sec;   INR: 1.04 ratio             RADIOLOGY & ADDITIONAL TESTS:    Assessment and Plan  DVT Prophylaxis    Discussed with: Patient, family, RN, CM, Consultants  Plan of care/ Discharge planning discussed.    Visit Time:

## 2017-04-24 NOTE — PROGRESS NOTE ADULT - ASSESSMENT
ESRD  Post AVR  Myelodysplastic Syndrome  RIAZ on diet and positional Rx alone at her insistence  Multifactorial secondary pulmonary HTN  Likely benign, known, pulmonary nodule

## 2017-04-24 NOTE — PROGRESS NOTE ADULT - ASSESSMENT
76 year old woman with PMH of Afib on coumadin admitted under ACS due to fall and hemorrhagic shock, on admission INR was very high, anemic,  Acute on CKD, resuscitated with PRBC, IVF, PCC, renal function remain abnormal started on HD. Pt is s/p permacath on 04/14/2017. Medicine consulted called to transfer patient care. pt now initiated on HD. has required transfusion for drop in her hemoglobin.  Fluid overloaded, but has been difficult to dialyze 2/2 low BP. Started on midodrine and albumin infusions. BP - some improvement - reducing sildenefil to assist BP. D/w cardio and agreed to d/c sildanefil. d/w Pulm too. Resumed warfarin 4/21 as counts stable, needs for Afib and pulm HTN,.  Has outpt dialysis seat if tolerates HD.      A/P    >Acute on CKD -ESRD - newly started on HD   s/p permacath - 04/14/2017  received 7 units of prbc for severe anemia so far,   Oliguric - needs long term dialysis, not decided about this yet  hepatitis panel negative; PPD negative  Optimizing HD- still not tolerating full length due to hypotension, and thus needing daily HD.     > hypotension , tolerated HD 4/20 for first time   on midodrine  am cortisol - normal   sildanefil d/chandrika    >thrombocytopenia   ? chronic per daughter - saw hematologist per pt - cannot receollcect name  ?2/2 CKD. not on any Heparin/loenox since last 4 days -[ less liekly HIT  no aspirin - plt count improved  - ct to monitor  but due to pancytopenia will get Heme consult here- await consult    >h/o A.fib  appreciate cardiology input - per Cardiology recommendation resumed AC now as she has been stable from H&H standpoint  cont. atenolol  start warfarin - daughter aware and agreeable    >Anemia - acute on chronic - 2/2 hemorrhage + CKD   s/p total 7 unit prbc  f/u cbc, normal ferritin   epo as per renal     >Skin tear in left arm- improving     >HLD- cont. statin    >Pulmonary HTN - stable    >DVT PPX - heparin     Not optimized renally yet. Once optimized- has Dialysis seat in community once stable for DC 76 year old woman with PMH of Afib on coumadin admitted under ACS due to fall and hemorrhagic shock, on admission INR was very high, anemic,  Acute on CKD, resuscitated with PRBC, IVF, PCC, renal function remain abnormal started on HD. Pt is s/p permacath on 04/14/2017. Medicine consulted called to transfer patient care. pt now initiated on HD. has required transfusion for drop in her hemoglobin.  Fluid overloaded, but has been difficult to dialyze 2/2 low BP. Started on midodrine and albumin infusions. BP - some improvement - reducing sildenefil to assist BP. D/w cardio and agreed to d/c sildanefil. d/w Pulm too. Resumed warfarin 4/21 as counts stable, needs for Afib and pulm HTN,.  Has outpt dialysis seat if tolerates HD.      A/P    >Acute on CKD -ESRD - newly started on HD   s/p permacath - 04/14/2017  received 7 units of prbc for severe anemia so far,   Oliguric - needs long term dialysis, not decided about this yet  hepatitis panel negative; PPD negative  Optimizing HD- still not tolerating full length due to hypotension, and thus needing daily HD.     > hypotension , tolerated HD 4/20 for first time   on midodrine  am cortisol - normal   sildanefil d/chandrika    >thrombocytopenia   ? chronic per daughter - saw hematologist per pt - cannot receollcect name  ?2/2 CKD. not on any Heparin/loenox since last 4 days -[ less liekly HIT  no aspirin - plt count improved  - ct to monitor  but due to pancytopenia will get Heme consult here- await consult    >h/o A.fib  appreciate cardiology input - per Cardiology recommendation resumed AC now as she has been stable from H&H standpoint  cont. atenolol  start warfarin - daughter aware and agreeable    >Anemia - acute on chronic - 2/2 hemorrhage + CKD   s/p total 7 unit prbc  f/u cbc, normal ferritin   epo as per renal     >Skin tear in left arm- improving   leg erythema- will monitor if in case it turns to cellulitis.    >HLD- cont. statin    >Pulmonary HTN - stable    >DVT PPX - heparin     Not optimized renally yet. Once optimized- has Dialysis seat in community once stable for DC

## 2017-04-25 LAB
HAPTOGLOB SERPL-MCNC: 52 MG/DL — SIGNIFICANT CHANGE UP (ref 34–200)
INR BLD: 1.04 RATIO — SIGNIFICANT CHANGE UP (ref 0.88–1.16)
PROTHROM AB SERPL-ACNC: 11.5 SEC — SIGNIFICANT CHANGE UP (ref 9.8–12.7)

## 2017-04-25 PROCEDURE — 99233 SBSQ HOSP IP/OBS HIGH 50: CPT

## 2017-04-25 RX ORDER — MIDODRINE HYDROCHLORIDE 2.5 MG/1
5 TABLET ORAL
Qty: 0 | Refills: 0 | Status: DISCONTINUED | OUTPATIENT
Start: 2017-04-25 | End: 2017-04-29

## 2017-04-25 RX ADMIN — Medication 1 TABLET(S): at 11:35

## 2017-04-25 RX ADMIN — Medication 1334 MILLIGRAM(S): at 13:03

## 2017-04-25 RX ADMIN — ATORVASTATIN CALCIUM 40 MILLIGRAM(S): 80 TABLET, FILM COATED ORAL at 21:20

## 2017-04-25 RX ADMIN — Medication 1 SPRAY(S): at 05:09

## 2017-04-25 RX ADMIN — FLUDROCORTISONE ACETATE 0.1 MILLIGRAM(S): 0.1 TABLET ORAL at 05:09

## 2017-04-25 RX ADMIN — Medication 1334 MILLIGRAM(S): at 17:27

## 2017-04-25 RX ADMIN — Medication 1334 MILLIGRAM(S): at 08:45

## 2017-04-25 RX ADMIN — Medication 1 MILLIGRAM(S): at 11:35

## 2017-04-25 RX ADMIN — Medication 1 SPRAY(S): at 21:21

## 2017-04-25 RX ADMIN — Medication 1 SPRAY(S): at 14:40

## 2017-04-25 NOTE — PROGRESS NOTE ADULT - SUBJECTIVE AND OBJECTIVE BOX
NEPHROLOGY INTERVAL HPI/OVERNIGHT EVENTS: No new events.    MEDICATIONS  (STANDING):  atorvastatin 40milliGRAM(s) Oral at bedtime  calcium acetate 1334milliGRAM(s) Oral three times a day with meals  folic acid 1milliGRAM(s) Oral daily  lactobacillus acidophilus 1Tablet(s) Oral daily  polyethylene glycol 3350 17Gram(s) Oral daily  sodium chloride 0.65% Nasal 1Spray(s) Both Nostrils three times a day  epoetin jawsinder Injectable 56677Cjle(s) IV Push <User Schedule>  midodrine 5milliGRAM(s) Oral <User Schedule>  fludroCORTISONE 0.1milliGRAM(s) Oral daily    MEDICATIONS  (PRN):  albumin human 25% IVPB 50milliLiter(s) IV Intermittent every 1 hour PRN for sbp < 100  ALBUTerol    0.083% 2.5milliGRAM(s) Nebulizer every 6 hours PRN Bronchospasm      Allergies    allopurinol (Rash)  codeine (Nausea; Vomiting)  penicillin (Rash)  spironolactone (Unknown)    Intolerances        Vital Signs Last 24 Hrs  T(C): 36.4, Max: 37.1 ( @ 21:17)  T(F): 97.6, Max: 98.7 ( @ 21:17)  HR: 67 (67 - 74)  BP: 96/57 (96/57 - 109/54)  BP(mean): --  RR: 16 (16 - 18)  SpO2: 96% (96% - 100%)  Daily     Daily Weight in k.6 (2017 11:45)    PHYSICAL EXAM:    GENERAL: same  HEAD:    EYES: wnl  ENMT:   NECK:  veins wnl, permacath noted  NERVOUS SYSTEM:  awake, oriented   CHEST/LUNG: clear but for right base  HEART: no rub  ABDOMEN: not tender  EXTREMITIES:  leg edema less, erythema noted  LYMPH:   SKIN: same   neg.  LABS:        137  |  95<L>  |  58.0<H>  ----------------------------<  148<H>  4.1   |  28.0  |  3.09<H>    Ca    9.3      2017 07:21      PT/INR - ( 2017 05:48 )   PT: 11.5 sec;   INR: 1.04 ratio                     RADIOLOGY & ADDITIONAL TESTS:

## 2017-04-25 NOTE — PROGRESS NOTE ADULT - SUBJECTIVE AND OBJECTIVE BOX
Ms. Crespo is a very pleasant 75 yo F with bovine valve replacement, afib on coumadin, CKD, presents s/p fall 3 days ago.  She slipped in her kitchen.  She has been having more frequent nose bleeds.  She did not sustain any fractures and was sent home from the ER.  She then returned for worsening nose bleeds.  Her kidney functions has worsened and is now on HD.    She is feeling better and will be discharged to rehab with HD.    Vital Signs Last 24 Hrs  T(C): 36.4, Max: 37.1 (04-24 @ 21:17)  T(F): 97.6, Max: 98.7 (04-24 @ 21:17)  HR: 67 (67 - 74)  BP: 96/57 (96/57 - 102/62)  BP(mean): --  RR: 16 (16 - 18)  SpO2: 96% (96% - 97%)    PHYSICAL EXAM:  Constitutional: NAD  ENMT: NC/AT  Neck: supple  Respiratory: CTAB, no wheezing  Cardiovascular: RRR, no murmurs  Gastrointestinal: soft, NT/ND  Extremities: arms are wrapped in gauze from skin tears from fall.  Lower extremities appear red.  Neurological: Alert  Psychiatric: Normal affect    Labs  04-24    137  |  95<L>  |  58.0<H>  ----------------------------<  148<H>  4.1   |  28.0  |  3.09<H>    Ca    9.3      24 Apr 2017 07:21    MEDICATIONS  (STANDING):  atorvastatin 40milliGRAM(s) Oral at bedtime  calcium acetate 1334milliGRAM(s) Oral three times a day with meals  folic acid 1milliGRAM(s) Oral daily  lactobacillus acidophilus 1Tablet(s) Oral daily  polyethylene glycol 3350 17Gram(s) Oral daily  sodium chloride 0.65% Nasal 1Spray(s) Both Nostrils three times a day  epoetin jaswinder Injectable 87889Ehho(s) IV Push <User Schedule>  midodrine 5milliGRAM(s) Oral <User Schedule>  fludroCORTISONE 0.1milliGRAM(s) Oral daily

## 2017-04-25 NOTE — PROGRESS NOTE ADULT - ASSESSMENT
Assessment  Diastolic HF improving with HD  Chronic Af with CVR'  s/p Bio AVr with mild AI, MV repair with mild to mod MR, normal EF      Rec  Cont midodrine and HD  Ac on hold with admission bleeding  pls recall as needed Assessment  Diastolic HF improving with HD  Chronic Af with CVR'  s/p Bio AVr with mild AI, MV repair with mild to mod MR, normal EF      Rec  Cont midodrine and HD  Ac on hold with admission bleeding  pls recall as needed    Addendum:  Ac reintroduced however pt had sig epistaxis.  Admitted after fall as well, in light of above would not AC, therefore would rec asa 81 and plavix 75.  Will fu in office

## 2017-04-25 NOTE — PROGRESS NOTE ADULT - SUBJECTIVE AND OBJECTIVE BOX
[facility]                                   [address] [place] [address]                                                      PHONE: [phone]                                                         FAX: [phone]  [email or web address]  ---------------------------------------------------------------------------------------------------------------------------------    Overnight events/patient complaints:tolerating dialysis, BP stable      allopurinol (Rash)  codeine (Nausea; Vomiting)  penicillin (Rash)  spironolactone (Unknown)    MEDICATIONS  (STANDING):  atorvastatin 40milliGRAM(s) Oral at bedtime  calcium acetate 1334milliGRAM(s) Oral three times a day with meals  folic acid 1milliGRAM(s) Oral daily  lactobacillus acidophilus 1Tablet(s) Oral daily  polyethylene glycol 3350 17Gram(s) Oral daily  sodium chloride 0.65% Nasal 1Spray(s) Both Nostrils three times a day  epoetin jaswinder Injectable 86356Ujan(s) IV Push <User Schedule>  midodrine 5milliGRAM(s) Oral <User Schedule>  fludroCORTISONE 0.1milliGRAM(s) Oral daily    MEDICATIONS  (PRN):  albumin human 25% IVPB 50milliLiter(s) IV Intermittent every 1 hour PRN for sbp < 100  ALBUTerol    0.083% 2.5milliGRAM(s) Nebulizer every 6 hours PRN Bronchospasm      Vital Signs Last 24 Hrs  T(C): 37.1, Max: 37.1 (-24 @ 21:17)  T(F): 98.7, Max: 98.7 (-24 @ 21:17)  HR: 74 (68 - 74)  BP: 102/62 (96/58 - 109/54)  BP(mean): --  RR: 17 (17 - 18)  SpO2: 97% (97% - 100%)  Daily     Daily Weight in k.6 (2017 11:45)  ICU Vital Signs Last 24 Hrs  DUSTIN HERNANDEZ  I&O's Detail    I & Os for current day (as of 2017 08:33)  =============================================  IN:    Oral Fluid: 240 ml    Total IN: 240 ml  ---------------------------------------------  OUT:    Total OUT: 0 ml  ---------------------------------------------  Total NET: 240 ml    I&O's Summary    I & Os for current day (as of 2017 08:33)  =============================================  IN: 240 ml / OUT: 0 ml / NET: 240 ml    Drug Dosing Weight  DUSTIN MARY      PHYSICAL EXAM:  General: Appears well developed, well nourished alert and cooperative.  HEENT: Head; normocephalic, atraumatic.  Eyes: Pupils reactive, cornea wnl.  Neck: Supple, no nodes adenopathy, no NVD or carotid bruit or thyromegaly.  CARDIOVASCULAR: Normal S1 and S2, No murmur, rub, gallop or lift.   LUNGS: diminahed BS right base.  ABDOMEN: Soft, nontender without mass or organomegaly. bowel sounds normoactive.  EXTREMITIES: No clubbing, cyanosis 2+ edema. Distal pulses wnl.   SKIN: warm and dry with normal turgor.  NEURO: Alert/oriented x 3/normal motor exam. No pathologic reflexes.    PSYCH: normal affect.        LABS:        137  |  95<L>  |  58.0<H>  ----------------------------<  148<H>  4.1   |  28.0  |  3.09<H>    Ca    9.3      2017 07:21      DUSTIN HERNANDEZ      PT/INR - ( 2017 05:48 )   PT: 11.5 sec;   INR: 1.04 ratio               RADIOLOGY & ADDITIONAL STUDIES:IMPRESSION:     Since 2017, unchanged small right pleural effusion with basilar   atelectasis or pneumonia.    INTERPRETATION OF TELEMETRY (personally reviewed):    ECG:    ECHO: Summary:   1. Left ventricular ejection fraction, by visual estimation, is 55 to   60%.   2. Normal global left ventricular systolic function.   3. Spectral Doppler shows restrictive pattern of left ventricular   myocardial filling (Grade III diastolic dysfunction). Elevated left   atrial and left ventricular end-diastolic pressures.   4. Normal left ventricular internal cavity size.   5. Severely enlarged right ventricle. Severely reduced RV systolic   function.   6. Severely enlarged left atrium.   7. Severely dilated right atrium.   8. Status-post mitral annular ring insertion.   9. Thickening and calcification of the anterior and posterior mitral   valve leaflets.  10. Mitral valve mean gradient is 4.4 mmHg consistent with mild mitral   stenosis.  11. Mild to moderate mitral valve regurgitation.  12. Bioprosthesis in the aortic position demonstrating normal function.  13. Mild aortic regurgitation.  14. Status post tricuspid valve repair.  15. Moderate-severe tricuspid regurgitation.  16. Moderate pulmonic valve regurgitation.  17. Estimated pulmonary artery systolic pressure is 56.8 mmHg assuming a   right atrial pressure of 15 mmHg, which is consistent with moderate   pulmonary hypertension.  18. There is no evidence of pericardial effusion.     MD Clara Electronically signed on 2017 at 9:29:19 AM

## 2017-04-25 NOTE — PROGRESS NOTE ADULT - ASSESSMENT
Patient is 77 yo F presently on dialysis with known H/O pancytopenia for > one year now progressive. She has long list of comorbidities including valvular heart disease with bovine replacemnt, COPD, A. Fib, gout, and HTN. Appears stable, presently on daily dialysis for fluid removal. Labs sent to R/O hemolysis, awaiting haptoglobin, ordered again. Proceed with ESPERANZA support and transfusions of both RBC and Platelets as clinically required.    She should follow up with us as an out patient.

## 2017-04-25 NOTE — PROGRESS NOTE ADULT - SUBJECTIVE AND OBJECTIVE BOX
HEALTH ISSUES - PROBLEM Dx:  HPI:  75 yo f with hx of bovine valve replacement, afib on coumadin, chronic kidney insufficiency, s/p fall from standing 3 days ago. Per patient, she slipped, and as she was bracing her fall went to grab the kitchen counter and missed, causing some skin tears to her LUE. She's also had a nose bleed for 2-3 weeks, now more frequent. She had been in the ED on Sunday, got an XR which showed no fracture and was sent home. She comes today due to worsening epistaxis and bleeding from her skin tears. c/o SOB, headache/dizziness. Denies hitting head or LOC. (11 Apr 2017 15:16)    NOW  Pulm HTN, newly initiated HD, complicated by Hypotension    INTERVAL HPI/ OVERNIGHT EVENTS:  sitting OOB to C, james leg edema unchanged but patient feels its reducing, erythema without cellulitis +,   denies chest pain, nausea, vomits, cough, SOB, fever, HA    MEDICATIONS  (STANDING):  atorvastatin 40milliGRAM(s) Oral at bedtime  calcium acetate 1334milliGRAM(s) Oral three times a day with meals  folic acid 1milliGRAM(s) Oral daily  lactobacillus acidophilus 1Tablet(s) Oral daily  polyethylene glycol 3350 17Gram(s) Oral daily  sodium chloride 0.65% Nasal 1Spray(s) Both Nostrils three times a day  epoetin jaswinder Injectable 93161Qodo(s) IV Push <User Schedule>  midodrine 5milliGRAM(s) Oral <User Schedule>  fludroCORTISONE 0.1milliGRAM(s) Oral daily    MEDICATIONS  (PRN):  albumin human 25% IVPB 50milliLiter(s) IV Intermittent every 1 hour PRN for sbp < 100  ALBUTerol    0.083% 2.5milliGRAM(s) Nebulizer every 6 hours PRN Bronchospasm      Allergies    allopurinol (Rash)  codeine (Nausea; Vomiting)  penicillin (Rash)  spironolactone (Unknown)    Intolerances        Vital Signs Last 24 Hrs  T(C): 36.4, Max: 37.1 (04-24 @ 21:17)  T(F): 97.6, Max: 98.7 (04-24 @ 21:17)  HR: 67 (67 - 74)  BP: 96/57 (96/57 - 109/54)  BP(mean): --  RR: 16 (16 - 18)  SpO2: 96% (96% - 100%)    ACCUCHECKS    PHYSICAL EXAM-  GENERAL: mild SOB on NC oxygen well-groomed, well-developed  HEAD:  Atraumatic, Normocephalic  EYES: EOMI, PERRLA, conjunctiva and sclera clear  ENMT:  Moist mucous membranes,  NECK: Supple, No JVD, Normal thyroid  NERVOUS SYSTEM:  Alert & Oriented X 3, Motor Strength 4/5 B/L upper and lower extremities;   CHEST/LUNG: No rhonchi, wheezing, or rubs; right basal rales +  HEART: Regular rate and rhythm; No murmurs, rubs, or gallops  ABDOMEN: Soft, Nontender, Nondistended; Bowel sounds present  EXTREMITIES:  2+ Peripheral Pulses, No clubbing, cyanosis; james LE edema with some erythema, no cellulitis  LYMPH: No lymphadenopathy noted  SKIN: james leg lower erythema noted since past 2 days without change, non tender, no warmth. pt doesn't know if she had it before. no evidence of infection.     LABS:    04-24    137  |  95<L>  |  58.0<H>  ----------------------------<  148<H>  4.1   |  28.0  |  3.09<H>    Ca    9.3      24 Apr 2017 07:21      PT/INR - ( 25 Apr 2017 05:48 )   PT: 11.5 sec;   INR: 1.04 ratio             RADIOLOGY & ADDITIONAL TESTS:    Assessment and Plan  DVT Prophylaxis    Discussed with: Patient, family, RN, CM, Consultants  Plan of care/ Discharge planning discussed.    Visit Time:

## 2017-04-25 NOTE — PROGRESS NOTE ADULT - ASSESSMENT
76 year old woman with PMH of Afib on coumadin admitted under ACS due to fall and hemorrhagic shock, on admission INR was very high, anemic, Acute on CKD, resuscitated with PRBC, IVF, PCC, renal function remain abnormal started on HD. Pt is s/p permacath on 04/14/2017. Medicine consulted called to transfer patient care. pt now initiated on HD. has required transfusion for drop in her hemoglobin.  Fluid overloaded, but has been difficult to dialyze 2/2 low BP. Started on midodrine and albumin infusions. BP - some improvement - reducing sildenefil to assist BP. D/w cardio and agreed to d/c sildanefil. d/w Pulm too. Resumed warfarin 4/21 as counts stable, needs for Afib and pulm HTN,.  Has outpt dialysis seat if tolerates HD.      A/P    >Acute on CKD -ESRD - newly started on HD   s/p permacath - 04/14/2017  received 7 units of prbc for severe anemia so far,   Oliguric - needs long term dialysis, not decided about this yet  hepatitis panel negative; PPD negative  Optimizing HD- still not tolerating full length due to hypotension, and thus needing daily HD.     > hypotension , tolerated HD 4/20 for first time   on midodrine  am cortisol - normal   sildanefil d/chandrika and coreg d/chandrika too.    >thrombocytopenia   ? chronic per daughter - saw hematologist per pt - cannot receollcect name  ?2/2 CKD. not on any Heparin/loenox since last 4 days -[ less liekly HIT  no aspirin - plt count improved  - ct to monitor  Heme consult appreciated. no further intervention. transfuse PRN    >h/o A.fib  appreciate cardiology input - per Cardiology recommendation resumed AC now as she has been stable from H&H standpoint. Plan- if any bleed noted while here then reassess use of A/C     start warfarin - daughter aware and agreeable    >Anemia - acute on chronic - 2/2 hemorrhage + CKD   s/p total 7 unit prbc  f/u cbc, normal ferritin   epo as per renal     >Skin tear in left arm- improving   leg erythema- will monitor if in case it turns to cellulitis.  multiple ecchymosis and bruises in multiple healing stages    >HLD- cont. statin    >Pulmonary HTN - stable    >DVT PPX - heparin     Not optimized renally yet. Once optimized- has Dialysis seat in community once stable for DC

## 2017-04-26 DIAGNOSIS — D64.9 ANEMIA, UNSPECIFIED: ICD-10-CM

## 2017-04-26 DIAGNOSIS — I25.10 ATHEROSCLEROTIC HEART DISEASE OF NATIVE CORONARY ARTERY WITHOUT ANGINA PECTORIS: ICD-10-CM

## 2017-04-26 DIAGNOSIS — I48.0 PAROXYSMAL ATRIAL FIBRILLATION: ICD-10-CM

## 2017-04-26 DIAGNOSIS — R04.0 EPISTAXIS: ICD-10-CM
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
## 2017-04-26 LAB
ANION GAP SERPL CALC-SCNC: 20 MMOL/L — HIGH (ref 5–17)
BUN SERPL-MCNC: 67 MG/DL — HIGH (ref 8–20)
CALCIUM SERPL-MCNC: 8.7 MG/DL — SIGNIFICANT CHANGE UP (ref 8.6–10.2)
CHLORIDE SERPL-SCNC: 96 MMOL/L — LOW (ref 98–107)
CO2 SERPL-SCNC: 28 MMOL/L — SIGNIFICANT CHANGE UP (ref 22–29)
CREAT SERPL-MCNC: 3.06 MG/DL — HIGH (ref 0.5–1.3)
GLUCOSE SERPL-MCNC: 121 MG/DL — HIGH (ref 70–115)
HCT VFR BLD CALC: 30.6 % — LOW (ref 37–47)
HGB BLD-MCNC: 9.5 G/DL — LOW (ref 12–16)
INR BLD: 1.04 RATIO — SIGNIFICANT CHANGE UP (ref 0.88–1.16)
MCHC RBC-ENTMCNC: 31 G/DL — LOW (ref 32–36)
MCHC RBC-ENTMCNC: 32.1 PG — HIGH (ref 27–31)
MCV RBC AUTO: 103.4 FL — HIGH (ref 81–99)
PHOSPHATE SERPL-MCNC: 1.5 MG/DL — LOW (ref 2.4–4.7)
PLATELET # BLD AUTO: 97 K/UL — LOW (ref 150–400)
POTASSIUM SERPL-MCNC: 3.8 MMOL/L — SIGNIFICANT CHANGE UP (ref 3.5–5.3)
POTASSIUM SERPL-SCNC: 3.8 MMOL/L — SIGNIFICANT CHANGE UP (ref 3.5–5.3)
PROTHROM AB SERPL-ACNC: 11.4 SEC — SIGNIFICANT CHANGE UP (ref 9.8–12.7)
RBC # BLD: 2.96 M/UL — LOW (ref 4.4–5.2)
RBC # FLD: 23.6 % — HIGH (ref 11–15.6)
SODIUM SERPL-SCNC: 144 MMOL/L — SIGNIFICANT CHANGE UP (ref 135–145)
WBC # BLD: 2.4 K/UL — LOW (ref 4.8–10.8)
WBC # FLD AUTO: 2.4 K/UL — LOW (ref 4.8–10.8)

## 2017-04-26 PROCEDURE — 99233 SBSQ HOSP IP/OBS HIGH 50: CPT

## 2017-04-26 RX ORDER — ASPIRIN/CALCIUM CARB/MAGNESIUM 324 MG
81 TABLET ORAL DAILY
Qty: 0 | Refills: 0 | Status: DISCONTINUED | OUTPATIENT
Start: 2017-04-26 | End: 2017-04-29

## 2017-04-26 RX ORDER — ALBUMIN HUMAN 25 %
100 VIAL (ML) INTRAVENOUS ONCE
Qty: 0 | Refills: 0 | Status: COMPLETED | OUTPATIENT
Start: 2017-04-26 | End: 2017-04-26

## 2017-04-26 RX ORDER — CLOPIDOGREL BISULFATE 75 MG/1
75 TABLET, FILM COATED ORAL DAILY
Qty: 0 | Refills: 0 | Status: DISCONTINUED | OUTPATIENT
Start: 2017-04-26 | End: 2017-04-29

## 2017-04-26 RX ORDER — SODIUM,POTASSIUM PHOSPHATES 278-250MG
1 POWDER IN PACKET (EA) ORAL
Qty: 0 | Refills: 0 | Status: COMPLETED | OUTPATIENT
Start: 2017-04-26 | End: 2017-04-27

## 2017-04-26 RX ADMIN — Medication 81 MILLIGRAM(S): at 19:04

## 2017-04-26 RX ADMIN — CLOPIDOGREL BISULFATE 75 MILLIGRAM(S): 75 TABLET, FILM COATED ORAL at 19:04

## 2017-04-26 RX ADMIN — Medication 1 SPRAY(S): at 21:43

## 2017-04-26 RX ADMIN — Medication 1 TABLET(S): at 12:17

## 2017-04-26 RX ADMIN — Medication 1 TABLET(S): at 19:04

## 2017-04-26 RX ADMIN — Medication 1334 MILLIGRAM(S): at 12:18

## 2017-04-26 RX ADMIN — Medication 1 SPRAY(S): at 05:33

## 2017-04-26 RX ADMIN — Medication 1 SPRAY(S): at 12:18

## 2017-04-26 RX ADMIN — Medication 50 MILLILITER(S): at 13:31

## 2017-04-26 RX ADMIN — Medication 1 MILLIGRAM(S): at 12:17

## 2017-04-26 RX ADMIN — MIDODRINE HYDROCHLORIDE 5 MILLIGRAM(S): 2.5 TABLET ORAL at 10:03

## 2017-04-26 RX ADMIN — Medication 1334 MILLIGRAM(S): at 10:03

## 2017-04-26 RX ADMIN — ATORVASTATIN CALCIUM 40 MILLIGRAM(S): 80 TABLET, FILM COATED ORAL at 21:43

## 2017-04-26 RX ADMIN — ERYTHROPOIETIN 10000 UNIT(S): 10000 INJECTION, SOLUTION INTRAVENOUS; SUBCUTANEOUS at 13:31

## 2017-04-26 RX ADMIN — FLUDROCORTISONE ACETATE 0.1 MILLIGRAM(S): 0.1 TABLET ORAL at 05:33

## 2017-04-26 NOTE — PROGRESS NOTE ADULT - ATTENDING COMMENTS
Discussed with daughter Milagro - who is very upset that Melissa is gaining weight , explained this is 2/2 hypotension and she is unable to tolerate HD. she feels that Melissa should be under ICU care as she is getting worse. Tried to explain the role of ICU but she is upset. Asked her about hematologist - She will provide this after she checks later today.
HD per renal  Revatio per cardiology if they want and BP allows  Cardiac regiment as tolerated  O2 as needed  Truncal elevation at night  Weight loss via diet and exercise  I will continue following in the office   Nothing to add
HD today with spa fluid removal. Added florinef.
HD today, Renal sono.
HD today, epogen.
Labs in am, Hematology called to see pt tomorrow.
NAS in Am with labs.
Pt seen and examined.  Reports feeling better today.  Still mild oozing from [place] but no epistaxis.  Tolerating HD.      AAOx4.  Soft NT abdomen.  No expanding hematomas.      Lytes and uremia improving with HD.  Hemoglobin drifting.  Could be continued loss from wounds, could be hemodilution from low UOP.  Will recheck post dialysis.    Eating poorly.  Likely mild to moderate protein calorie malnutrition.  Have counselled regarding need for appropriate nutrition.  Will add protein supplements to diet.    Likely need for long term dialysis.  Vascular involved.  Planned perma cath for tomorrow.  Then remove groin dialysis catheter.  Peripheral acces then remove IJ TLC.  Stable for floor transfer.  Best suited for medicine service.  Will discuss transfer with hospitalist service.  ACS will need to continue to follow for wound care.
PT eval
Awaiting arrangements for GIULIANO
see my amendment to problem list; patient has multiple problems related to coagulopathy, blood loss anemia, acute on chronic kidney failure that now warrants dialysis. Her skin tear on right arm is significant and continues to require aggressive local wound care but I would not recommend STSG.

## 2017-04-26 NOTE — PROGRESS NOTE ADULT - SUBJECTIVE AND OBJECTIVE BOX
Patient was seen and evaluated on dialysis.   No c/o CP; much less SOB, no NV  no F/C  swelling feet better    T(C): 36.4, Max: 36.6 (04-25 @ 21:18)  HR: 75 (64 - 82)  BP: 120/53 (91/64 - 120/53)  Wt(kg): --  PE ;  NAD  lungs - CTA  CV gr  murmer, No gallop or rub  Abd : soft, NT BS +, No masses  Ext- + edema, better; mild erythema  Neuro : Grossly intact, moving extremities                             9.5    2.4   )-----------( 97       ( 26 Apr 2017 08:53 )             30.6        04-26    144  |  96<L>  |  67.0<H>  ----------------------------<  121<H>  3.8   |  28.0  |  3.06<H>    Ca    8.7      26 Apr 2017 13:38  Phos  1.5     04-26        MEDICATIONS  (STANDING):  atorvastatin  calcium acetate  folic acid  lactobacillus acidophilus  albumin human 25% IVPB PRN  polyethylene glycol 3350  sodium chloride 0.65% Nasal  ALBUTerol    0.083% PRN  epoetin jaswinder Injectable  fludroCORTISONE  midodrine  aspirin  chewable  clopidogrel Tablet      Patient stable  Aida HD easily  Phos low- adj meds  Continue

## 2017-04-26 NOTE — PROGRESS NOTE ADULT - SUBJECTIVE AND OBJECTIVE BOX
CC: Fall, epistaxis    HPI: 76 y.o. female with PMHx of bioprosthetic AVR, MV repair, Afib on VKA, CKD s/p recent fall p/w epistaxis with elevated INR and LUE hematoma/laceration.  Hospital course significant for progression of renal dx with need in HD s/p permacath placement. HD was difficult to tolerate due to hypotension - improved with midodrine. Sildenafil for PAH stopped due to hypotension as well. AC was restarted - only one dose of coumadin was given on 4/24 - never continued    INTERVAL HPI/ OVERNIGHT EVENTS: comfortable in the chair, comfortable  Other ROS reviewed and neg     Vital Signs Last 24 Hrs  T(C): 36.4, Max: 36.6 (04-25 @ 16:51)  T(F): 97.5, Max: 97.8 (04-25 @ 16:51)  HR: 75 (64 - 82)  BP: 120/53 (91/64 - 120/53)  RR: 18 (18 - 18)  SpO2: 93% (93% - 99%)                        9.5    2.4   )-----------( 97       ( 26 Apr 2017 08:53 )             30.6     26 Apr 2017 13:38    144    |  96     |  67.0   ----------------------------<  121    3.8     |  28.0   |  3.06     Ca    8.7        26 Apr 2017 13:38  Phos  1.5       26 Apr 2017 13:38    PT/INR - ( 26 Apr 2017 07:59 )   PT: 11.4 sec;   INR: 1.04 ratio      PHYSICAL EXAM:    General: Well developed; well nourished; in no acute distress  Eyes: PERRLA, EOMI; conjunctiva and sclera clear  Head: Normocephalic; atraumatic  ENMT: No nasal discharge; airway clear  Neck: Supple; non tender; no masses  Respiratory: No wheezes, rales or rhonchi, diminished at bases  Cardiovascular: Regular rate and rhythm. S1 and S2 Normal; No murmurs, gallops or rubs, Right upper chest permacath in place - C/D/I  Gastrointestinal: Soft non-tender non-distended; Normal bowel sounds  Genitourinary: No costovertebral angle tenderness  Extremities: Normal range of motion, No clubbing, cyanosis, + LE BL edema +1, LUE healing laceration  Vascular: Peripheral pulses palpable 2+ bilaterally  Neurological: Alert and oriented x4  Skin: Warm and dry.   Musculoskeletal: Normal tone, without deformities  Psychiatric: Cooperative and appropriate    RADIOLOGY & ADDITIONAL TESTS: personally visualized

## 2017-04-26 NOTE — PROGRESS NOTE ADULT - NSHPATTENDINGPLANDISCUSS_GEN_ALL_CORE
cardiology, RN, Family
daughter over phone, Pt at bedside, RN
pt, RN, Cardiology
pt, daughter, RN
pt, family
pt, daughter -Milagro
Dr. Tee

## 2017-04-26 NOTE — PROGRESS NOTE ADULT - ASSESSMENT
76 y.o. female with PMHx of bioprosthetic AVR, MV repair, Afib on VKA, CKD s/p recent fall p/w epistaxis with elevated INR and LUE hematoma/laceration.  Hospital course significant for progression of renal dx with need in HD s/p permacath placement. HD was difficult to tolerate due to hypotension - improved with midodrine. Sildenafil for PAH stopped due to hypotension as well. AC was restarted - only one dose of coumadin was given on 4/24 - never continued

## 2017-04-27 ENCOUNTER — TRANSCRIPTION ENCOUNTER (OUTPATIENT)
Age: 77
End: 2017-04-27

## 2017-04-27 LAB
HAPTOGLOB SERPL-MCNC: 45 MG/DL — SIGNIFICANT CHANGE UP (ref 34–200)
HCT VFR BLD CALC: 32.1 % — LOW (ref 37–47)
HGB BLD-MCNC: 9.4 G/DL — LOW (ref 12–16)
INR BLD: 1.11 RATIO — SIGNIFICANT CHANGE UP (ref 0.88–1.16)
MCHC RBC-ENTMCNC: 29.3 G/DL — LOW (ref 32–36)
MCHC RBC-ENTMCNC: 31.5 PG — HIGH (ref 27–31)
MCV RBC AUTO: 107.7 FL — HIGH (ref 81–99)
PLATELET # BLD AUTO: 112 K/UL — LOW (ref 150–400)
PROTHROM AB SERPL-ACNC: 12.2 SEC — SIGNIFICANT CHANGE UP (ref 9.8–12.7)
RBC # BLD: 2.98 M/UL — LOW (ref 4.4–5.2)
RBC # FLD: 24 % — HIGH (ref 11–15.6)
WBC # BLD: 2.9 K/UL — LOW (ref 4.8–10.8)
WBC # FLD AUTO: 2.9 K/UL — LOW (ref 4.8–10.8)

## 2017-04-27 PROCEDURE — 99233 SBSQ HOSP IP/OBS HIGH 50: CPT

## 2017-04-27 RX ORDER — MIDODRINE HYDROCHLORIDE 2.5 MG/1
1 TABLET ORAL
Qty: 60 | Refills: 0 | OUTPATIENT
Start: 2017-04-27 | End: 2017-05-27

## 2017-04-27 RX ORDER — FOLIC ACID 0.8 MG
1 TABLET ORAL
Qty: 30 | Refills: 0 | OUTPATIENT
Start: 2017-04-27 | End: 2017-05-27

## 2017-04-27 RX ORDER — MIDODRINE HYDROCHLORIDE 2.5 MG/1
1 TABLET ORAL
Qty: 0 | Refills: 0 | COMMUNITY
Start: 2017-04-27

## 2017-04-27 RX ORDER — SODIUM CHLORIDE 0.65 %
1 AEROSOL, SPRAY (ML) NASAL
Qty: 1 | Refills: 0 | OUTPATIENT
Start: 2017-04-27 | End: 2017-05-27

## 2017-04-27 RX ORDER — CLOPIDOGREL BISULFATE 75 MG/1
1 TABLET, FILM COATED ORAL
Qty: 30 | Refills: 0 | OUTPATIENT
Start: 2017-04-27 | End: 2017-05-27

## 2017-04-27 RX ORDER — FLUDROCORTISONE ACETATE 0.1 MG/1
1 TABLET ORAL
Qty: 30 | Refills: 0 | OUTPATIENT
Start: 2017-04-27 | End: 2017-05-27

## 2017-04-27 RX ORDER — ASPIRIN/CALCIUM CARB/MAGNESIUM 324 MG
1 TABLET ORAL
Qty: 30 | Refills: 0 | OUTPATIENT
Start: 2017-04-27 | End: 2017-05-27

## 2017-04-27 RX ADMIN — Medication 81 MILLIGRAM(S): at 13:10

## 2017-04-27 RX ADMIN — ATORVASTATIN CALCIUM 40 MILLIGRAM(S): 80 TABLET, FILM COATED ORAL at 21:29

## 2017-04-27 RX ADMIN — FLUDROCORTISONE ACETATE 0.1 MILLIGRAM(S): 0.1 TABLET ORAL at 05:56

## 2017-04-27 RX ADMIN — Medication 1 SPRAY(S): at 05:56

## 2017-04-27 RX ADMIN — Medication 1 MILLIGRAM(S): at 13:11

## 2017-04-27 RX ADMIN — Medication 1 TABLET(S): at 13:11

## 2017-04-27 RX ADMIN — Medication 1 SPRAY(S): at 13:11

## 2017-04-27 RX ADMIN — CLOPIDOGREL BISULFATE 75 MILLIGRAM(S): 75 TABLET, FILM COATED ORAL at 13:10

## 2017-04-27 RX ADMIN — Medication 1 TABLET(S): at 09:09

## 2017-04-27 NOTE — DISCHARGE NOTE ADULT - MEDICATION SUMMARY - MEDICATIONS TO TAKE
I will START or STAY ON the medications listed below when I get home from the hospital:    fludrocortisone 0.1 mg oral tablet  -- 1 tab(s) by mouth once a day  -- Indication: For Hypotension    aspirin 81 mg oral tablet, chewable  -- 1 tab(s) by mouth once a day  -- Indication: For Afib    Lipitor 40 mg oral tablet  -- 1 tab(s) by mouth once a day (at bedtime)  -- Indication: For HLD    clopidogrel 75 mg oral tablet  -- 1 tab(s) by mouth once a day, hold if epistaxis starts  -- Indication: For Afib    albuterol CFC free 90 mcg/inh inhalation aerosol  -- 1 puff(s) inhaled every 4 hours  -- Indication: For SOB    midodrine 5 mg oral tablet  -- 1 tab(s) by mouth 2 times a day  -- Indication: For Hypotension    sodium chloride 0.65% nasal spray  -- 1 spray(s) into nose 2 times a day  -- Indication: For Mucosal dryness    folic acid 1 mg oral tablet  -- 1 tab(s) by mouth once a day  -- Indication: For Anemia I will START or STAY ON the medications listed below when I get home from the hospital:    fludrocortisone 0.1 mg oral tablet  -- 1 tab(s) by mouth once a day  -- Indication: For Adrena insufficiency    aspirin 81 mg oral tablet, chewable  -- 1 tab(s) by mouth once a day  -- Indication: For Coronary artery disease involving native coronary artery of native heart without angina pectoris    Lipitor 40 mg oral tablet  -- 1 tab(s) by mouth once a day (at bedtime)  -- Indication: For Hyperlipidemia, unspecified hyperlipidemia type    clopidogrel 75 mg oral tablet  -- 1 tab(s) by mouth once a day, hold if epistaxis starts  -- Indication: For Coronary artery disease involving native coronary artery of native heart without angina pectoris    albuterol CFC free 90 mcg/inh inhalation aerosol  -- 1 puff(s) inhaled every 4 hours  -- Indication: For Dyspnea    midodrine 5 mg oral tablet  -- 1 tab(s) by mouth 2 times a day  -- Indication: For Hypotension    sodium chloride 0.65% nasal spray  -- 1 spray(s) into nose 2 times a day  -- Indication: For Nasal spray    folic acid 1 mg oral tablet  -- 1 tab(s) by mouth once a day  -- Indication: For Anemia

## 2017-04-27 NOTE — DISCHARGE NOTE ADULT - PATIENT PORTAL LINK FT
“You can access the [employer] Patient Portal, offered by [facility], by registering with the following website: [email or web address]”

## 2017-04-27 NOTE — DISCHARGE NOTE ADULT - MEDICATION SUMMARY - MEDICATIONS TO STOP TAKING
I will STOP taking the medications listed below when I get home from the hospital:    atenolol 50 mg oral tablet  -- 1 tab(s) by mouth once a day    Coumadin 2.5 mg oral tablet  -- 1 tab(s) by mouth once a day    vitamin c  -- 1000 milligram(s)  once a day    cetrezine  -- 10 milligram(s)  once a day, As Needed    sildenafil 20 mg oral tablet  -- 1 tab(s) by mouth 3 times a day    potassium chloride 20 mEq oral granule, extended release  --  by mouth once a day    torsemide 20 mg oral tablet  -- 1 tab(s) by mouth 2 times a day    predniSONE 5 mg oral tablet  -- 6 tab(s) by mouth once a day x 2 days  5 tab(s) by mouth once a day x 2 days  4 tab(s) by mouth once a day x 2 days  3 tab(s) by mouth once a day x 2 days  2 tab(s) by mouth once a day x 2 days  1 tab(s) by mouth once a day x 2 days  -- It is very important that you take or use this exactly as directed.  Do not skip doses or discontinue unless directed by your doctor.  Obtain medical advice before taking any non-prescription drugs as some may affect the action of this medication.  Take with food or milk.    colchicine  -- 0.6 milligram(s) by mouth every 48 hours    lactobacillus acidophilus oral capsule  -- 1 tab(s) by mouth 2 times a day    levofloxacin 250 mg oral tablet  -- 1 tab(s) by mouth every 24 hours    ferrous sulfate 324 mg (65 mg elemental iron) oral delayed release tablet  -- 1 tab(s) by mouth 2 times a day I will STOP taking the medications listed below when I get home from the hospital:    sildenafil 20 mg oral tablet  -- 1 tab(s) by mouth 3 times a day    torsemide 20 mg oral tablet  -- 1 tab(s) by mouth 2 times a day    predniSONE 5 mg oral tablet  -- 6 tab(s) by mouth once a day x 2 days  5 tab(s) by mouth once a day x 2 days  4 tab(s) by mouth once a day x 2 days  3 tab(s) by mouth once a day x 2 days  2 tab(s) by mouth once a day x 2 days  1 tab(s) by mouth once a day x 2 days  -- It is very important that you take or use this exactly as directed.  Do not skip doses or discontinue unless directed by your doctor.  Obtain medical advice before taking any non-prescription drugs as some may affect the action of this medication.  Take with food or milk.    levofloxacin 250 mg oral tablet  -- 1 tab(s) by mouth every 24 hours

## 2017-04-27 NOTE — PROGRESS NOTE ADULT - SUBJECTIVE AND OBJECTIVE BOX
Patient seen and examined    Feels fine but anxious with d/c plans  no c/o CP; SOB Less; no NV   Much less swelling feet    Vital Signs Last 24 Hrs  T(C): 36.7, Max: 37 (04-27 @ 05:54)  T(F): 98.1, Max: 98.6 (04-27 @ 05:54)  HR: 70 (70 - 82)  BP: 110/59 (101/66 - 110/59)  BP(mean): --  RR: 20 (18 - 20)  SpO2: 97% (94% - 97%)  Awake/alert; NAD  chest Clear; occ rhonchi  No JVD  No murmer  abd soft, NT BS +  Mild edema      26 Apr 2017 13:38    144    |  96     |  67.0   ----------------------------<  121    3.8     |  28.0   |  3.06     Ca    8.7        26 Apr 2017 13:38  Phos  1.5       26 Apr 2017 13:38                            9.4    2.9   )-----------( 112      ( 27 Apr 2017 06:56 )             32.1       Impression  patient stable  HD am  Pt reassured that OK to get HD on  Tuesday after d/c home  D/w Dr. Tristan    Continue same treatment

## 2017-04-27 NOTE — DISCHARGE NOTE ADULT - CARE PLAN
Principal Discharge DX:	Epistaxis  Goal:	prevention  Instructions for follow-up, activity and diet:	no coumadin, only AA + plavix - if recurred stop plavix  Secondary Diagnosis:	Fall, initial encounter  Instructions for follow-up, activity and diet:	prevention  Secondary Diagnosis:	LOTTIE (acute kidney injury)  Instructions for follow-up, activity and diet:	with ESRD now on HD - f/u with renal  Secondary Diagnosis:	Anemia due to blood loss  Instructions for follow-up, activity and diet:	+ anemia of chronic dx with ESRD  + pancytopenia - ESPERANZA, f/u with Hematology, no VKA  Secondary Diagnosis:	Atrial fibrillation, unspecified type  Instructions for follow-up, activity and diet:	no coumadin  Secondary Diagnosis:	Coronary artery disease involving native coronary artery of native heart without angina pectoris  Instructions for follow-up, activity and diet:	medical management  Secondary Diagnosis:	Hyperlipidemia, unspecified hyperlipidemia type  Instructions for follow-up, activity and diet:	statin

## 2017-04-27 NOTE — DISCHARGE NOTE ADULT - SECONDARY DIAGNOSIS.
Fall, initial encounter LOTTIE (acute kidney injury) Anemia due to blood loss Atrial fibrillation, unspecified type Coronary artery disease involving native coronary artery of native heart without angina pectoris Hyperlipidemia, unspecified hyperlipidemia type

## 2017-04-27 NOTE — DISCHARGE NOTE ADULT - CARE PROVIDER_API CALL
Tayo Boyer), Internal Medicine  [address] [place] [id number]  Phone: [phone]  Fax: [phone]    Fabio Reinoso), Internal Medicine; Nephrology  [address] [place] [address]  Phone: [phone]  Fax: [phone]    Nneka Goldsmith), Internal Medicine  [address] [place] [address]  Phone: [phone]  Fax: [phone]    Lashonda Henrdix), Cardiovascular Disease; Internal Medicine  [address] [place] [id number]  Phone: [phone]  Fax: [phone]

## 2017-04-27 NOTE — DISCHARGE NOTE ADULT - PLAN OF CARE
prevention no coumadin, only AA + plavix - if recurred stop plavix with ESRD now on HD - f/u with renal + anemia of chronic dx with ESRD  + pancytopenia - ESPERANZA, f/u with Hematology, no VKA no coumadin medical management statin

## 2017-04-27 NOTE — DISCHARGE NOTE ADULT - HOSPITAL COURSE
CC: Fall, epistaxis  HPI: 76 y.o. female with PMHx of bioprosthetic AVR, MV repair, Afib on VKA, CKD s/p recent fall p/w epistaxis with elevated INR and LUE hematoma/laceration.  Hospital course significant for progression of renal dx with need in HD s/p permacath placement. HD was difficult to tolerate due to hypotension - improved with midodrine. Sildenafil for PAH stopped due to hypotension as well. AC was restarted - only one dose of coumadin was given on 4/24 - never continued    INTERVAL HPI/ OVERNIGHT EVENTS: comfortable in the chair, comfortable  Other ROS reviewed and neg     Vital Signs Last 24 Hrs  T(C): 36.8, Max: 37 (04-27 @ 05:54)  T(F): 98.2, Max: 98.6 (04-27 @ 05:54)  HR: 80 (75 - 82)  BP: 103/64 (101/66 - 120/53)  RR: 18 (16 - 20)  SpO2: 97% (93% - 97%)                       9.4    2.9   )-----------( 112      ( 27 Apr 2017 06:56 )             32.1     26 Apr 2017 13:38    144    |  96     |  67.0   ----------------------------<  121    3.8     |  28.0   |  3.06     Ca    8.7        26 Apr 2017 13:38  Phos  1.5       26 Apr 2017 13:38    PT/INR - ( 27 Apr 2017 06:56 )   PT: 12.2 sec;   INR: 1.11 ratio      PHYSICAL EXAM:    General: Well developed; well nourished; in no acute distress  Eyes: PERRLA, EOMI; conjunctiva and sclera clear  Head: Normocephalic; atraumatic  ENMT: No nasal discharge; airway clear  Neck: Supple; non tender; no masses  Respiratory: No wheezes, rales or rhonchi, diminished at bases  Cardiovascular: Regular rate and rhythm. S1 and S2 Normal; No murmurs, gallops or rubs, Right upper chest permacath in place - C/D/I  Gastrointestinal: Soft non-tender non-distended; Normal bowel sounds  Genitourinary: No costovertebral angle tenderness  Extremities: Normal range of motion, No clubbing, cyanosis, + LE BL edema +1, LUE healing laceration, + venosus stasis dermatosis BL  Vascular: Peripheral pulses palpable 2+ bilaterally  Neurological: Alert and oriented x4  Skin: Warm and dry.   Musculoskeletal: Normal tone, without deformities  Psychiatric: Cooperative and appropriate    RADIOLOGY & ADDITIONAL TESTS: personally visualized  MEDICATIONS reviewed      Assessment and Plan:   · Assessment	  76 y.o. female with PMHx of bioprosthetic AVR, MV repair, Afib on VKA, CKD s/p recent fall p/w epistaxis with elevated INR and LUE hematoma/laceration.  Hospital course significant for progression of renal dx with need in HD s/p permacath placement. HD was difficult to tolerate due to hypotension - improved with midodrine. Sildenafil for PAH stopped due to hypotension as well. AC was restarted - only one dose of coumadin was given on 4/24 - never continued      Problem/Plan - 1:  ·  Problem: Fall, initial encounter.  Plan: mechanical, complicated by skin laceration LUE - stable.     Problem/Plan - 2:  ·  Problem: Epistaxis.  Plan: in the settings of coagulopathy due to not monitoring INR - resolved, off VKA.   - if epistaxis restarts stop plavix  - cont nasal saline     Problem/Plan - 3:  ·  Problem: Coagulopathy.  Plan: off VKA.     Problem/Plan - 4:  ·  Problem: Paroxysmal atrial fibrillation.  Plan: no AC as risk of hemorrhage high based on ATRIA and HAS-BLED criterias - cont ASA + plavix as per my discussion with cardiology.     Problem/Plan - 5:  ·  Problem: ESRD (end stage renal disease).  Plan: on HD s/p permacath, complicated by hypotension - improved with stopped sildenafil and start of midodrine/florinef.     Problem/Plan - 6:  Problem: Primary pulmonary hypertension. Plan: off sildenafil, O2.    Problem/Plan - 7:  ·  Problem: Coronary artery disease involving native coronary artery of native heart without angina pectoris.  Plan: medical management.     Problem/Plan - 8:  ·  Problem: Anemia, unspecified type.  Plan: of chronic Dx with CKD and ABL due to epistaxis with known pancytopenia - followed by hematology - outpt f/u, monitor HH and transfuse as needed + cont ESPERANZA.     Problem/Plan - 9:  ·  Problem: Valvular heart disease.  Plan: s/p bioprosthetic AVR and MV repair.     Attending Attestation: Medically stable for DC home with home care. PMD notified. Time spent 65 min CC: Fall, epistaxis  HPI: 76 y.o. female with PMHx of bioprosthetic AVR, MV repair, Afib on VKA, CKD s/p recent fall p/w epistaxis with elevated INR and LUE hematoma/laceration.  Hospital course significant for progression of renal dx with need in HD s/p permacath placement. HD was difficult to tolerate due to hypotension - improved with midodrine. Sildenafil for PAH stopped due to hypotension as well. AC was restarted - only one dose of coumadin was given on 4/24 - never continued    INTERVAL HPI/ OVERNIGHT EVENTS: comfortable in the chair, comfortable  Other ROS reviewed and neg     Vital Signs Last 24 Hrs  T(C): 36.8, Max: 37 (04-27 @ 05:54)  T(F): 98.2, Max: 98.6 (04-27 @ 05:54)  HR: 80 (75 - 82)  BP: 103/64 (101/66 - 120/53)  RR: 18 (16 - 20)  SpO2: 97% (93% - 97%)                       9.4    2.9   )-----------( 112      ( 27 Apr 2017 06:56 )             32.1     26 Apr 2017 13:38    144    |  96     |  67.0   ----------------------------<  121    3.8     |  28.0   |  3.06     Ca    8.7        26 Apr 2017 13:38  Phos  1.5       26 Apr 2017 13:38    PT/INR - ( 27 Apr 2017 06:56 )   PT: 12.2 sec;   INR: 1.11 ratio      PHYSICAL EXAM:    General: Well developed; well nourished; in no acute distress  Eyes: PERRLA, EOMI; conjunctiva and sclera clear  Head: Normocephalic; atraumatic  ENMT: No nasal discharge; airway clear  Neck: Supple; non tender; no masses  Respiratory: No wheezes, rales or rhonchi, diminished at bases  Cardiovascular: Regular rate and rhythm. S1 and S2 Normal; No murmurs, gallops or rubs, Right upper chest permacath in place - C/D/I  Gastrointestinal: Soft non-tender non-distended; Normal bowel sounds  Genitourinary: No costovertebral angle tenderness  Extremities: Normal range of motion, No clubbing, cyanosis, + LE BL edema +1, LUE healing laceration, + venosus stasis dermatosis BL  Vascular: Peripheral pulses palpable 2+ bilaterally  Neurological: Alert and oriented x4  Skin: Warm and dry.   Musculoskeletal: Normal tone, without deformities  Psychiatric: Cooperative and appropriate    RADIOLOGY & ADDITIONAL TESTS: personally visualized  MEDICATIONS reviewed      Assessment and Plan:   · Assessment	  76 y.o. female with PMHx of bioprosthetic AVR, MV repair, Afib on VKA, CKD s/p recent fall p/w epistaxis with elevated INR and LUE hematoma/laceration.  Hospital course significant for progression of renal dx with need in HD s/p permacath placement. HD was difficult to tolerate due to hypotension - improved with midodrine. Sildenafil for PAH stopped due to hypotension as well. AC was restarted - only one dose of coumadin was given on 4/24 - never continued      Problem/Plan - 1:  ·  Problem: Fall, initial encounter.  Plan: mechanical, complicated by skin laceration LUE - stable.     Problem/Plan - 2:  ·  Problem: Epistaxis.  Plan: in the settings of coagulopathy due to not monitoring INR - resolved, off VKA.   - if epistaxis restarts stop plavix  - cont nasal saline     Problem/Plan - 3:  ·  Problem: Coagulopathy.  Plan: off VKA.     Problem/Plan - 4:  ·  Problem: Paroxysmal atrial fibrillation.  Plan: no AC as risk of hemorrhage high based on ATRIA and HAS-BLED criterias - cont ASA + plavix as per my discussion with cardiology.     Problem/Plan - 5:  ·  Problem: ESRD (end stage renal disease).  Plan: on HD s/p permacath, complicated by hypotension - improved with stopped sildenafil and start of midodrine/florinef.     Problem/Plan - 6:  Problem: Primary pulmonary hypertension. Plan: off sildenafil, O2.    Problem/Plan - 7:  ·  Problem: Coronary artery disease involving native coronary artery of native heart without angina pectoris.  Plan: medical management.     Problem/Plan - 8:  ·  Problem: Anemia, unspecified type.  Plan: of chronic Dx with CKD and ABL due to epistaxis with known pancytopenia - followed by hematology - outpt f/u, monitor HH and transfuse as needed + cont ESPERANZA.     Problem/Plan - 9:  ·  Problem: Valvular heart disease.  Plan: s/p bioprosthetic AVR and MV repair.     Attending Attestation: Medically stable for DC home with home care. PMD notified. Time spent 65 min    Will require home O2 for chronic but stable CHF and end stage renal disease  O2 sats 88% on room air at rest.

## 2017-04-28 LAB
INR BLD: 1.14 RATIO — SIGNIFICANT CHANGE UP (ref 0.88–1.16)
PROTHROM AB SERPL-ACNC: 12.6 SEC — SIGNIFICANT CHANGE UP (ref 9.8–12.7)

## 2017-04-28 PROCEDURE — 99232 SBSQ HOSP IP/OBS MODERATE 35: CPT

## 2017-04-28 RX ADMIN — CLOPIDOGREL BISULFATE 75 MILLIGRAM(S): 75 TABLET, FILM COATED ORAL at 13:12

## 2017-04-28 RX ADMIN — Medication 1 MILLIGRAM(S): at 13:12

## 2017-04-28 RX ADMIN — Medication 1 TABLET(S): at 13:13

## 2017-04-28 RX ADMIN — FLUDROCORTISONE ACETATE 0.1 MILLIGRAM(S): 0.1 TABLET ORAL at 05:58

## 2017-04-28 RX ADMIN — Medication 1 SPRAY(S): at 21:39

## 2017-04-28 RX ADMIN — Medication 50 MILLILITER(S): at 13:59

## 2017-04-28 RX ADMIN — MIDODRINE HYDROCHLORIDE 5 MILLIGRAM(S): 2.5 TABLET ORAL at 13:45

## 2017-04-28 RX ADMIN — ERYTHROPOIETIN 10000 UNIT(S): 10000 INJECTION, SOLUTION INTRAVENOUS; SUBCUTANEOUS at 15:01

## 2017-04-28 RX ADMIN — ATORVASTATIN CALCIUM 40 MILLIGRAM(S): 80 TABLET, FILM COATED ORAL at 21:39

## 2017-04-28 RX ADMIN — Medication 81 MILLIGRAM(S): at 13:13

## 2017-04-28 NOTE — PROGRESS NOTE ADULT - SUBJECTIVE AND OBJECTIVE BOX
NEPHROLOGY INTERVAL HPI/OVERNIGHT EVENTS:    Examined earlier  Looks comfortable    MEDICATIONS  (STANDING):  atorvastatin 40milliGRAM(s) Oral at bedtime  folic acid 1milliGRAM(s) Oral daily  lactobacillus acidophilus 1Tablet(s) Oral daily  polyethylene glycol 3350 17Gram(s) Oral daily  sodium chloride 0.65% Nasal 1Spray(s) Both Nostrils three times a day  epoetin jaswinder Injectable 17371Nwnc(s) IV Push <User Schedule>  fludroCORTISONE 0.1milliGRAM(s) Oral daily  midodrine 5milliGRAM(s) Oral <User Schedule>  aspirin  chewable 81milliGRAM(s) Oral daily  clopidogrel Tablet 75milliGRAM(s) Oral daily    MEDICATIONS  (PRN):  albumin human 25% IVPB 50milliLiter(s) IV Intermittent every 1 hour PRN for sbp < 100  ALBUTerol    0.083% 2.5milliGRAM(s) Nebulizer every 6 hours PRN Bronchospasm      Allergies    allopurinol (Rash)  codeine (Nausea; Vomiting)  penicillin (Rash)  spironolactone (Unknown)    Intolerances        Vital Signs Last 24 Hrs  T(C): 36.4, Max: 36.8 (04-28 @ 01:18)  T(F): 97.5, Max: 98.3 (04-28 @ 01:18)  HR: 81 (70 - 86)  BP: 110/56 (97/65 - 110/59)  BP(mean): --  RR: 18 (18 - 20)  SpO2: 96% (96% - 96%)  Daily     Daily     PHYSICAL EXAM:  Awake/alert; NAD  chest Clear  No JVD  No murmer  abd soft, NT BS +  1-2 + edema B/L LE         LABS:                        9.4    2.9   )-----------( 112      ( 27 Apr 2017 06:56 )             32.1           PT/INR - ( 28 Apr 2017 05:28 )   PT: 12.6 sec;   INR: 1.14 ratio                     RADIOLOGY & ADDITIONAL TESTS:

## 2017-04-28 NOTE — PROGRESS NOTE ADULT - ASSESSMENT
76 y.o. female with PMHx of bioprosthetic AVR, MV repair, Afib on VKA, CKD s/p recent fall p/w epistaxis. Plan for d/c today after HD.

## 2017-04-28 NOTE — PROGRESS NOTE ADULT - ASSESSMENT
ESRD Severe edema B/L LE on HD MWF schedule   HD again today primarily for UF    (note  out pt schedule TTS)  Low BP on HD cont midodrine   Edematous likely due to HF CKD   Anemia 2/2 CKD cont EPO w HD    Anemia iron stores ok cont EPO w HD

## 2017-04-28 NOTE — PROGRESS NOTE ADULT - SUBJECTIVE AND OBJECTIVE BOX
DUSTIN HERNANDEZ    [id number]    76y      Female    INTERVAL HPI/OVERNIGHT EVENTS: No events on. Feels well today. Awaiting dialysis.    Hospital course:  76 y.o. female with PMHx of bioprosthetic AVR, MV repair, Afib on VKA, CKD s/p recent fall p/w epistaxis with elevated INR and LUE hematoma/laceration.  Hospital course significant for progression of renal dx with need in HD s/p permacath placement. HD was difficult to tolerate due to hypotension - improved with midodrine. Sildenafil for PAH stopped due to hypotension as well. AC was restarted - only one dose of coumadin was given on 4/24 - never continued    REVIEW OF SYSTEMS:    CONSTITUTIONAL: No fever   RESPIRATORY:  No shortness of breath  CARDIOVASCULAR: No chest pain     Vital Signs Last 24 Hrs  T(C): 36.8, Max: 36.8 (04-28 @ 01:18)  T(F): 98.3, Max: 98.3 (04-28 @ 01:18)  HR: 86 (70 - 86)  BP: 97/65 (97/65 - 110/59)  BP(mean): --  RR: 20 (20 - 20)  SpO2: --    PHYSICAL EXAM:    GENERAL: NAD, sitting in chair, eating breakfast   HEENT: MMM  NECK: soft, Supple   CHEST/LUNG: Clear to percussion bilaterally; No wheezing  HEART: S1S2+, Regular rate and rhythm   ABDOMEN: Soft, Nontender, Nondistended; Bowel sounds present        LABS:                        9.4    2.9   )-----------( 112      ( 27 Apr 2017 06:56 )             32.1     04-26    144  |  96<L>  |  67.0<H>  ----------------------------<  121<H>  3.8   |  28.0  |  3.06<H>    Ca    8.7      26 Apr 2017 13:38  Phos  1.5     04-26      PT/INR - ( 28 Apr 2017 05:28 )   PT: 12.6 sec;   INR: 1.14 ratio                 MEDICATIONS  (STANDING):  atorvastatin 40milliGRAM(s) Oral at bedtime  folic acid 1milliGRAM(s) Oral daily  lactobacillus acidophilus 1Tablet(s) Oral daily  polyethylene glycol 3350 17Gram(s) Oral daily  sodium chloride 0.65% Nasal 1Spray(s) Both Nostrils three times a day  epoetin jaswinder Injectable 13403Rozy(s) IV Push <User Schedule>  fludroCORTISONE 0.1milliGRAM(s) Oral daily  midodrine 5milliGRAM(s) Oral <User Schedule>  aspirin  chewable 81milliGRAM(s) Oral daily  clopidogrel Tablet 75milliGRAM(s) Oral daily    MEDICATIONS  (PRN):  albumin human 25% IVPB 50milliLiter(s) IV Intermittent every 1 hour PRN for sbp < 100  ALBUTerol    0.083% 2.5milliGRAM(s) Nebulizer every 6 hours PRN Bronchospasm      RADIOLOGY & ADDITIONAL TESTS:

## 2017-04-29 VITALS
DIASTOLIC BLOOD PRESSURE: 58 MMHG | TEMPERATURE: 98 F | OXYGEN SATURATION: 98 % | RESPIRATION RATE: 20 BRPM | HEART RATE: 67 BPM | SYSTOLIC BLOOD PRESSURE: 102 MMHG

## 2017-04-29 LAB
ANION GAP SERPL CALC-SCNC: 12 MMOL/L — SIGNIFICANT CHANGE UP (ref 5–17)
BUN SERPL-MCNC: 47 MG/DL — HIGH (ref 8–20)
CALCIUM SERPL-MCNC: 8.6 MG/DL — SIGNIFICANT CHANGE UP (ref 8.6–10.2)
CHLORIDE SERPL-SCNC: 99 MMOL/L — SIGNIFICANT CHANGE UP (ref 98–107)
CO2 SERPL-SCNC: 29 MMOL/L — SIGNIFICANT CHANGE UP (ref 22–29)
CREAT SERPL-MCNC: 2.7 MG/DL — HIGH (ref 0.5–1.3)
GLUCOSE SERPL-MCNC: 95 MG/DL — SIGNIFICANT CHANGE UP (ref 70–115)
HCT VFR BLD CALC: 29.9 % — LOW (ref 37–47)
HGB BLD-MCNC: 9.1 G/DL — LOW (ref 12–16)
MCHC RBC-ENTMCNC: 30.4 G/DL — LOW (ref 32–36)
MCHC RBC-ENTMCNC: 32 PG — HIGH (ref 27–31)
MCV RBC AUTO: 105.3 FL — HIGH (ref 81–99)
PLATELET # BLD AUTO: 152 K/UL — SIGNIFICANT CHANGE UP (ref 150–400)
POTASSIUM SERPL-MCNC: 4.2 MMOL/L — SIGNIFICANT CHANGE UP (ref 3.5–5.3)
POTASSIUM SERPL-SCNC: 4.2 MMOL/L — SIGNIFICANT CHANGE UP (ref 3.5–5.3)
RBC # BLD: 2.84 M/UL — LOW (ref 4.4–5.2)
RBC # FLD: 23.9 % — HIGH (ref 11–15.6)
SODIUM SERPL-SCNC: 140 MMOL/L — SIGNIFICANT CHANGE UP (ref 135–145)
WBC # BLD: 2.5 K/UL — LOW (ref 4.8–10.8)
WBC # FLD AUTO: 2.5 K/UL — LOW (ref 4.8–10.8)

## 2017-04-29 PROCEDURE — 85014 HEMATOCRIT: CPT

## 2017-04-29 PROCEDURE — 80053 COMPREHEN METABOLIC PANEL: CPT

## 2017-04-29 PROCEDURE — 90471 IMMUNIZATION ADMIN: CPT

## 2017-04-29 PROCEDURE — 83735 ASSAY OF MAGNESIUM: CPT

## 2017-04-29 PROCEDURE — 93306 TTE W/DOPPLER COMPLETE: CPT

## 2017-04-29 PROCEDURE — C1769: CPT

## 2017-04-29 PROCEDURE — 83970 ASSAY OF PARATHORMONE: CPT

## 2017-04-29 PROCEDURE — 83880 ASSAY OF NATRIURETIC PEPTIDE: CPT

## 2017-04-29 PROCEDURE — 85730 THROMBOPLASTIN TIME PARTIAL: CPT

## 2017-04-29 PROCEDURE — 73090 X-RAY EXAM OF FOREARM: CPT

## 2017-04-29 PROCEDURE — 86901 BLOOD TYPING SEROLOGIC RH(D): CPT

## 2017-04-29 PROCEDURE — P9047: CPT

## 2017-04-29 PROCEDURE — 71045 X-RAY EXAM CHEST 1 VIEW: CPT

## 2017-04-29 PROCEDURE — 83605 ASSAY OF LACTIC ACID: CPT

## 2017-04-29 PROCEDURE — 97163 PT EVAL HIGH COMPLEX 45 MIN: CPT

## 2017-04-29 PROCEDURE — 84466 ASSAY OF TRANSFERRIN: CPT

## 2017-04-29 PROCEDURE — 94760 N-INVAS EAR/PLS OXIMETRY 1: CPT

## 2017-04-29 PROCEDURE — 99284 EMERGENCY DEPT VISIT MOD MDM: CPT | Mod: 25

## 2017-04-29 PROCEDURE — P9016: CPT

## 2017-04-29 PROCEDURE — 83010 ASSAY OF HAPTOGLOBIN QUANT: CPT

## 2017-04-29 PROCEDURE — 82310 ASSAY OF CALCIUM: CPT

## 2017-04-29 PROCEDURE — 70450 CT HEAD/BRAIN W/O DYE: CPT

## 2017-04-29 PROCEDURE — 87340 HEPATITIS B SURFACE AG IA: CPT

## 2017-04-29 PROCEDURE — 84300 ASSAY OF URINE SODIUM: CPT

## 2017-04-29 PROCEDURE — 84443 ASSAY THYROID STIM HORMONE: CPT

## 2017-04-29 PROCEDURE — 86704 HEP B CORE ANTIBODY TOTAL: CPT

## 2017-04-29 PROCEDURE — 36415 COLL VENOUS BLD VENIPUNCTURE: CPT

## 2017-04-29 PROCEDURE — 82435 ASSAY OF BLOOD CHLORIDE: CPT

## 2017-04-29 PROCEDURE — C1750: CPT

## 2017-04-29 PROCEDURE — 76775 US EXAM ABDO BACK WALL LIM: CPT

## 2017-04-29 PROCEDURE — 94640 AIRWAY INHALATION TREATMENT: CPT

## 2017-04-29 PROCEDURE — 84100 ASSAY OF PHOSPHORUS: CPT

## 2017-04-29 PROCEDURE — 86920 COMPATIBILITY TEST SPIN: CPT

## 2017-04-29 PROCEDURE — C9132: CPT

## 2017-04-29 PROCEDURE — 85610 PROTHROMBIN TIME: CPT

## 2017-04-29 PROCEDURE — 82607 VITAMIN B-12: CPT

## 2017-04-29 PROCEDURE — 84132 ASSAY OF SERUM POTASSIUM: CPT

## 2017-04-29 PROCEDURE — 82746 ASSAY OF FOLIC ACID SERUM: CPT

## 2017-04-29 PROCEDURE — 36430 TRANSFUSION BLD/BLD COMPNT: CPT

## 2017-04-29 PROCEDURE — 77001 FLUOROGUIDE FOR VEIN DEVICE: CPT

## 2017-04-29 PROCEDURE — 97110 THERAPEUTIC EXERCISES: CPT

## 2017-04-29 PROCEDURE — 90715 TDAP VACCINE 7 YRS/> IM: CPT

## 2017-04-29 PROCEDURE — 73060 X-RAY EXAM OF HUMERUS: CPT

## 2017-04-29 PROCEDURE — 99285 EMERGENCY DEPT VISIT HI MDM: CPT | Mod: 25

## 2017-04-29 PROCEDURE — 85027 COMPLETE CBC AUTOMATED: CPT

## 2017-04-29 PROCEDURE — 80048 BASIC METABOLIC PNL TOTAL CA: CPT

## 2017-04-29 PROCEDURE — 99261: CPT

## 2017-04-29 PROCEDURE — 99239 HOSP IP/OBS DSCHRG MGMT >30: CPT

## 2017-04-29 PROCEDURE — 86803 HEPATITIS C AB TEST: CPT

## 2017-04-29 PROCEDURE — 93005 ELECTROCARDIOGRAM TRACING: CPT

## 2017-04-29 PROCEDURE — 82728 ASSAY OF FERRITIN: CPT

## 2017-04-29 PROCEDURE — 86706 HEP B SURFACE ANTIBODY: CPT

## 2017-04-29 PROCEDURE — 74176 CT ABD & PELVIS W/O CONTRAST: CPT

## 2017-04-29 PROCEDURE — 86850 RBC ANTIBODY SCREEN: CPT

## 2017-04-29 PROCEDURE — C1887: CPT

## 2017-04-29 PROCEDURE — 80162 ASSAY OF DIGOXIN TOTAL: CPT

## 2017-04-29 PROCEDURE — 83550 IRON BINDING TEST: CPT

## 2017-04-29 PROCEDURE — 82533 TOTAL CORTISOL: CPT

## 2017-04-29 PROCEDURE — 82947 ASSAY GLUCOSE BLOOD QUANT: CPT

## 2017-04-29 PROCEDURE — 86900 BLOOD TYPING SEROLOGIC ABO: CPT

## 2017-04-29 PROCEDURE — C1894: CPT

## 2017-04-29 PROCEDURE — 83615 LACTATE (LD) (LDH) ENZYME: CPT

## 2017-04-29 PROCEDURE — 97116 GAIT TRAINING THERAPY: CPT

## 2017-04-29 PROCEDURE — 84484 ASSAY OF TROPONIN QUANT: CPT

## 2017-04-29 PROCEDURE — 84295 ASSAY OF SERUM SODIUM: CPT

## 2017-04-29 PROCEDURE — 86705 HEP B CORE ANTIBODY IGM: CPT

## 2017-04-29 PROCEDURE — 82570 ASSAY OF URINE CREATININE: CPT

## 2017-04-29 PROCEDURE — 82330 ASSAY OF CALCIUM: CPT

## 2017-04-29 PROCEDURE — 82803 BLOOD GASES ANY COMBINATION: CPT

## 2017-04-29 RX ADMIN — Medication 1 SPRAY(S): at 06:23

## 2017-04-29 RX ADMIN — Medication 1 SPRAY(S): at 13:44

## 2017-04-29 RX ADMIN — Medication 1 TABLET(S): at 12:39

## 2017-04-29 RX ADMIN — Medication 1 MILLIGRAM(S): at 12:39

## 2017-04-29 RX ADMIN — Medication 81 MILLIGRAM(S): at 12:39

## 2017-04-29 RX ADMIN — FLUDROCORTISONE ACETATE 0.1 MILLIGRAM(S): 0.1 TABLET ORAL at 06:23

## 2017-04-29 RX ADMIN — CLOPIDOGREL BISULFATE 75 MILLIGRAM(S): 75 TABLET, FILM COATED ORAL at 12:39

## 2017-04-29 NOTE — PROGRESS NOTE ADULT - ASSESSMENT
ESRD Severe edema B/L LE on HD MWF schedule had HD yest will cont on TTS   as out pt therefore will HD today short treatment   (note  out pt schedule TTS)  Low BP on HD cont midodrine   Edematous likely due to HF CKD   Anemia 2/2 CKD cont EPO w HD    Anemia iron stores ok cont EPO w HD

## 2017-04-29 NOTE — PROGRESS NOTE ADULT - PROBLEM SELECTOR PLAN 2
in the settings of coagulopathy due to not monitoring INR - resolved, off VKA

## 2017-04-29 NOTE — PROGRESS NOTE ADULT - PROBLEM SELECTOR PLAN 1
mechanical, complicated by skin laceration LUE - stable

## 2017-04-29 NOTE — PROGRESS NOTE ADULT - PROBLEM SELECTOR PROBLEM 6
Primary pulmonary hypertension
Primary pulmonary hypertension
Anemia due to blood loss
Primary pulmonary hypertension

## 2017-04-29 NOTE — PROGRESS NOTE ADULT - PROBLEM SELECTOR PLAN 9
s/p bioprosthetic AVR and MV repair

## 2017-04-29 NOTE — PROGRESS NOTE ADULT - SUBJECTIVE AND OBJECTIVE BOX
NEPHROLOGY INTERVAL HPI/OVERNIGHT EVENTS:    Examined earlier looks comfortable  Short HD treatment today    MEDICATIONS  (STANDING):  atorvastatin 40milliGRAM(s) Oral at bedtime  folic acid 1milliGRAM(s) Oral daily  lactobacillus acidophilus 1Tablet(s) Oral daily  polyethylene glycol 3350 17Gram(s) Oral daily  sodium chloride 0.65% Nasal 1Spray(s) Both Nostrils three times a day  epoetin jaswinder Injectable 76048Wyst(s) IV Push <User Schedule>  fludroCORTISONE 0.1milliGRAM(s) Oral daily  midodrine 5milliGRAM(s) Oral <User Schedule>  aspirin  chewable 81milliGRAM(s) Oral daily  clopidogrel Tablet 75milliGRAM(s) Oral daily    MEDICATIONS  (PRN):  albumin human 25% IVPB 50milliLiter(s) IV Intermittent every 1 hour PRN for sbp < 100  ALBUTerol    0.083% 2.5milliGRAM(s) Nebulizer every 6 hours PRN Bronchospasm      Allergies    allopurinol (Rash)  codeine (Nausea; Vomiting)  penicillin (Rash)  spironolactone (Unknown)    Intolerances        Vital Signs Last 24 Hrs  T(C): 36.6, Max: 36.9 ( @ 06:21)  T(F): 97.8, Max: 98.4 ( @ 06:21)  HR: 77 (70 - 87)  BP: 95/43 (95/43 - 112/49)  BP(mean): --  RR: 20 (18 - 20)  SpO2: 96% (94% - 100%)  Daily     Daily Weight in k.7 (2017 10:30)    PHYSICAL EXAM:  Awake/alert; NAD  chest Clear  No JVD  No murmer  abd soft, NT BS +  1-2 + edema B/L LE       LABS:                        9.1    2.5   )-----------( 152      ( 2017 09:39 )             29.9     -    140  |  99  |  47.0<H>  ----------------------------<  95  4.2   |  29.0  |  2.70<H>    Ca    8.6      2017 09:39      PT/INR - ( 2017 05:28 )   PT: 12.6 sec;   INR: 1.14 ratio                     RADIOLOGY & ADDITIONAL TESTS:

## 2017-04-29 NOTE — PROGRESS NOTE ADULT - PROBLEM SELECTOR PLAN 5
on HD s/p permacath, complicated by hypotension - improved with stopped sildenafil and start of midodrine
on HD s/p permacath, complicated by hypotension - improved with stopped sildenafil and start of midodrine  Will require home O2 for chronic but stable CHF and end stage renal disease  O2 sats 88% on room air at rest
on HD s/p permacath, complicated by hypotension - improved with stopped sildenafil and start of midodrine

## 2017-04-29 NOTE — PROGRESS NOTE ADULT - PROVIDER SPECIALTY LIST ADULT
Cardiology
Heme/Onc
Heme/Onc
Hospitalist
Internal Medicine
Nephrology
Pulmonology
SICU
SICU
Trauma Surgery
Trauma Surgery
Vascular Surgery
Hospitalist
Nephrology

## 2017-04-29 NOTE — PROGRESS NOTE ADULT - PROBLEM SELECTOR PROBLEM 1
Fall, initial encounter
Fall, initial encounter
Primary pulmonary hypertension
Fall, initial encounter

## 2017-04-29 NOTE — PROGRESS NOTE ADULT - PROBLEM SELECTOR PLAN 4
no AC as risk of hemorrhage high based on ATRIA and HAS-BLED criterias   C/w asa and plavix
no AC as risk of hemorrhage high based on ATRIA and HAS-BLED criterias   C/w asa and plavix
no AC as risk of hemorrhage high based on ATRIA and HAS-BLED criterias - cont ASA + plavix as per my discussion with cardiology

## 2017-04-29 NOTE — PROGRESS NOTE ADULT - PROBLEM SELECTOR PLAN 8
of chronic Dx with CKD and ABL due to epistaxis with known pancytopenia - followed by hematology - outpt f/u, monitor HH and transfuse as needed + cont ESPERANZA

## 2017-04-29 NOTE — PROGRESS NOTE ADULT - SUBJECTIVE AND OBJECTIVE BOX
DUSTIN HERNANDEZ    [id number]    76y      Female    INTERVAL HPI/OVERNIGHT EVENTS: No events on. Doing well today.    Hospital course:  76 y.o. female with PMHx of bioprosthetic AVR, MV repair, Afib on VKA, CKD s/p recent fall p/w epistaxis with elevated INR and LUE hematoma/laceration.  Hospital course significant for progression of renal dx with need in HD s/p permacath placement. HD was difficult to tolerate due to hypotension - improved with midodrine. Sildenafil for PAH stopped due to hypotension as well. AC was restarted - only one dose of coumadin was given on 4/24 - never continued    REVIEW OF SYSTEMS:    CONSTITUTIONAL: No fever   RESPIRATORY: No cough; No shortness of breath  CARDIOVASCULAR: No chest pain      Vital Signs Last 24 Hrs  T(C): 36.6, Max: 36.9 (04-29 @ 06:21)  T(F): 97.8, Max: 98.4 (04-29 @ 06:21)  HR: 77 (70 - 87)  BP: 95/43 (95/43 - 112/49)  BP(mean): --  RR: 20 (18 - 20)  SpO2: 96% (94% - 100%) RA    PHYSICAL EXAM:    GENERAL: NAD   HEENT: PERRL, +EOMI, MMM  CHEST/LUNG: Clear to percussion bilaterally  HEART: S1S2+, Regular rate and rhythm   ABDOMEN: Soft, Nontender, Nondistended; Bowel sounds present      LABS:                        9.1    2.5   )-----------( 152      ( 29 Apr 2017 09:39 )             29.9     04-29    140  |  99  |  47.0<H>  ----------------------------<  95  4.2   |  29.0  |  2.70<H>    Ca    8.6      29 Apr 2017 09:39      PT/INR - ( 28 Apr 2017 05:28 )   PT: 12.6 sec;   INR: 1.14 ratio                 MEDICATIONS  (STANDING):  atorvastatin 40milliGRAM(s) Oral at bedtime  folic acid 1milliGRAM(s) Oral daily  lactobacillus acidophilus 1Tablet(s) Oral daily  polyethylene glycol 3350 17Gram(s) Oral daily  sodium chloride 0.65% Nasal 1Spray(s) Both Nostrils three times a day  epoetin jaswinder Injectable 88919Difj(s) IV Push <User Schedule>  fludroCORTISONE 0.1milliGRAM(s) Oral daily  midodrine 5milliGRAM(s) Oral <User Schedule>  aspirin  chewable 81milliGRAM(s) Oral daily  clopidogrel Tablet 75milliGRAM(s) Oral daily    MEDICATIONS  (PRN):  albumin human 25% IVPB 50milliLiter(s) IV Intermittent every 1 hour PRN for sbp < 100  ALBUTerol    0.083% 2.5milliGRAM(s) Nebulizer every 6 hours PRN Bronchospasm      RADIOLOGY & ADDITIONAL TESTS:

## 2017-04-29 NOTE — PROGRESS NOTE ADULT - PROBLEM SELECTOR PROBLEM 4
Paroxysmal atrial fibrillation
Paroxysmal atrial fibrillation
Moderate protein-calorie malnutrition
Paroxysmal atrial fibrillation

## 2017-04-30 ENCOUNTER — EMERGENCY (EMERGENCY)
Facility: HOSPITAL | Age: 77
LOS: 1 days | Discharge: HOME | End: 2017-04-30
Attending: EMERGENCY MEDICINE
Payer: MEDICARE

## 2017-04-30 VITALS
SYSTOLIC BLOOD PRESSURE: 117 MMHG | HEART RATE: 66 BPM | OXYGEN SATURATION: 96 % | DIASTOLIC BLOOD PRESSURE: 67 MMHG | RESPIRATION RATE: 20 BRPM | WEIGHT: 177.03 LBS | TEMPERATURE: 97 F

## 2017-04-30 DIAGNOSIS — I48.91 UNSPECIFIED ATRIAL FIBRILLATION: ICD-10-CM

## 2017-04-30 DIAGNOSIS — I25.10 ATHEROSCLEROTIC HEART DISEASE OF NATIVE CORONARY ARTERY WITHOUT ANGINA PECTORIS: ICD-10-CM

## 2017-04-30 DIAGNOSIS — Z95.2 PRESENCE OF PROSTHETIC HEART VALVE: ICD-10-CM

## 2017-04-30 DIAGNOSIS — Z95.2 PRESENCE OF PROSTHETIC HEART VALVE: Chronic | ICD-10-CM

## 2017-04-30 DIAGNOSIS — Z88.8 ALLERGY STATUS TO OTHER DRUGS, MEDICAMENTS AND BIOLOGICAL SUBSTANCES STATUS: ICD-10-CM

## 2017-04-30 DIAGNOSIS — J44.9 CHRONIC OBSTRUCTIVE PULMONARY DISEASE, UNSPECIFIED: ICD-10-CM

## 2017-04-30 DIAGNOSIS — R04.0 EPISTAXIS: ICD-10-CM

## 2017-04-30 DIAGNOSIS — Z98.890 OTHER SPECIFIED POSTPROCEDURAL STATES: ICD-10-CM

## 2017-04-30 DIAGNOSIS — I10 ESSENTIAL (PRIMARY) HYPERTENSION: ICD-10-CM

## 2017-04-30 DIAGNOSIS — Z88.0 ALLERGY STATUS TO PENICILLIN: ICD-10-CM

## 2017-04-30 DIAGNOSIS — Z79.82 LONG TERM (CURRENT) USE OF ASPIRIN: ICD-10-CM

## 2017-04-30 DIAGNOSIS — Z88.5 ALLERGY STATUS TO NARCOTIC AGENT: ICD-10-CM

## 2017-04-30 DIAGNOSIS — E78.5 HYPERLIPIDEMIA, UNSPECIFIED: ICD-10-CM

## 2017-04-30 LAB
APTT BLD: 35 SEC — SIGNIFICANT CHANGE UP (ref 27.5–37.4)
HCT VFR BLD CALC: 32.9 % — LOW (ref 37–47)
HGB BLD-MCNC: 10.2 G/DL — LOW (ref 12–16)
INR BLD: 1.07 RATIO — SIGNIFICANT CHANGE UP (ref 0.88–1.16)
MCHC RBC-ENTMCNC: 31 G/DL — LOW (ref 32–36)
MCHC RBC-ENTMCNC: 31.9 PG — HIGH (ref 27–31)
MCV RBC AUTO: 102.8 FL — HIGH (ref 81–99)
PLATELET # BLD AUTO: 174 K/UL — SIGNIFICANT CHANGE UP (ref 150–400)
PROTHROM AB SERPL-ACNC: 11.8 SEC — SIGNIFICANT CHANGE UP (ref 9.8–12.7)
RBC # BLD: 3.2 M/UL — LOW (ref 4.4–5.2)
RBC # FLD: 23.4 % — HIGH (ref 11–15.6)
WBC # BLD: 2.9 K/UL — LOW (ref 4.8–10.8)
WBC # FLD AUTO: 2.9 K/UL — LOW (ref 4.8–10.8)

## 2017-04-30 PROCEDURE — 85730 THROMBOPLASTIN TIME PARTIAL: CPT

## 2017-04-30 PROCEDURE — 99284 EMERGENCY DEPT VISIT MOD MDM: CPT

## 2017-04-30 PROCEDURE — 85610 PROTHROMBIN TIME: CPT

## 2017-04-30 PROCEDURE — 85027 COMPLETE CBC AUTOMATED: CPT

## 2017-04-30 PROCEDURE — 99283 EMERGENCY DEPT VISIT LOW MDM: CPT

## 2017-04-30 NOTE — ED ADULT TRIAGE NOTE - CHIEF COMPLAINT QUOTE
has nose bleed. was Dc' d from [facility] recently. pt now on Plavix and nose bleeding. No pain, no nose injury. Pt had nose bleed on discharge, but pt returns for PT/ INR. Renal patient. no complaints other than nose bleed and wants lab check.

## 2017-04-30 NOTE — ED ADULT NURSE NOTE - CHPI ED SYMPTOMS NEG
no vomiting/no pain/no fever/no tingling/no numbness/no weakness/no nausea/no decreased eating/drinking/no chills/no dizziness

## 2017-04-30 NOTE — ED STATDOCS - MEDICAL DECISION MAKING DETAILS
75 y/o F pt with epistaxis since this morning s/p lengthy hospitalization. PMD requesting PT INR and CBC.

## 2017-04-30 NOTE — ED STATDOCS - PROGRESS NOTE DETAILS
patient re-evaluated c/o nose bleed x 1 day, was seen by home care RN and case d/w PMD who referred patient to ED to obtain labs, patient was recently discharged from [facility] and admitted to ED for R arm injury, in ICU, found to have supratherapuetic INR, CBC reviewed with patient H&H improved from last date, pending coags resulted, spoke to lab informed specimen clotted labs WNL, advised to f/u with ENT, has f/u with [facility] in past

## 2017-04-30 NOTE — ED STATDOCS - ATTENDING CONTRIBUTION TO CARE
I, Debbie Agosto, performed the initial face to face bedside interview with this patient regarding history of present illness, review of symptoms and relevant past medical, social and family history.  I completed an independent physical examination.  I was the initial provider who evaluated this patient. I have signed out the follow up of any pending tests (i.e. labs, radiological studies) to the ACP.  I have communicated the patient’s plan of care and disposition with the ACP.  The history, relevant review of systems, past medical and surgical history, medical decision making, and physical examination was documented by the scribe in my presence and I attest to the accuracy of the documentation.

## 2017-04-30 NOTE — ED STATDOCS - OBJECTIVE STATEMENT
75 y/o F pt with PMHx of anemia, CAD, COPD, AFib, HLD, and HTN presents to ED c/o epistaxis since yesterday. Pt reports she was admitted here for 18 days for a traumatic arm injury and was discharged home last night on oxygen. Her PMD told her to come back to the ED. As per daughter, she was in the ICU for 4 days while she was admitted when she was on coumadin. She is now currently on Plavix and aspirin daily. Pt denies headache, nausea, vomiting, abdominal pain, CP, and SOB. No further complaints at this time. Allergy to penicillin, allopurinol, codeine, and spironolactone.   PMD: Dr. Degroot 77 y/o F pt with PMHx of anemia, CAD, COPD, AFib, HLD, and HTN presents to ED c/o epistaxis since this morning. Pt reports she was admitted here for 18 days for a traumatic arm injury and was discharged home last night on oxygen. Her PMD told her to come back to the ED. As per daughter, she was in the ICU for 4 days while she was admitted when she was on coumadin. She is now currently on Plavix and aspirin daily. Pt denies headache, nausea, vomiting, abdominal pain, CP, and SOB. No further complaints at this time. Allergy to penicillin, allopurinol, codeine, and spironolactone.   PMD: Dr. Degroot

## 2017-05-17 ENCOUNTER — APPOINTMENT (OUTPATIENT)
Dept: VASCULAR SURGERY | Facility: CLINIC | Age: 77
End: 2017-05-17

## 2017-05-17 VITALS
HEART RATE: 76 BPM | RESPIRATION RATE: 16 BRPM | TEMPERATURE: 98.7 F | DIASTOLIC BLOOD PRESSURE: 52 MMHG | OXYGEN SATURATION: 98 % | SYSTOLIC BLOOD PRESSURE: 102 MMHG

## 2017-05-17 RX ORDER — ATENOLOL 25 MG/1
25 TABLET ORAL
Qty: 180 | Refills: 0 | Status: DISCONTINUED | COMMUNITY
Start: 2016-11-23 | End: 2017-05-17

## 2017-05-17 RX ORDER — NAPROXEN AND ESOMEPRAZOLE MAGNESIUM 500; 20 MG/1; MG/1
500-20 TABLET, DELAYED RELEASE ORAL
Refills: 0 | Status: ACTIVE | COMMUNITY
Start: 2017-05-17

## 2017-05-17 RX ORDER — SODIUM POLYSTYRENE SULFONATE 4.1 MEQ/G
POWDER, FOR SUSPENSION ORAL; RECTAL
Qty: 454 | Refills: 0 | Status: DISCONTINUED | COMMUNITY
Start: 2017-01-19 | End: 2017-05-17

## 2017-05-17 RX ORDER — MIDODRINE HYDROCHLORIDE 5 MG/1
5 TABLET ORAL
Qty: 60 | Refills: 0 | Status: ACTIVE | COMMUNITY
Start: 2017-04-29

## 2017-05-17 RX ORDER — METHYLPREDNISOLONE 4 MG/1
4 TABLET ORAL
Qty: 21 | Refills: 0 | Status: DISCONTINUED | COMMUNITY
Start: 2017-03-28 | End: 2017-05-17

## 2017-05-17 RX ORDER — AZITHROMYCIN 250 MG/1
250 TABLET, FILM COATED ORAL
Qty: 6 | Refills: 0 | Status: DISCONTINUED | COMMUNITY
Start: 2017-01-23 | End: 2017-05-17

## 2017-05-17 RX ORDER — ALBUTEROL SULFATE 90 UG/1
108 (90 BASE) AEROSOL, METERED RESPIRATORY (INHALATION)
Qty: 8 | Refills: 0 | Status: ACTIVE | COMMUNITY
Start: 2017-02-08

## 2017-05-17 RX ORDER — FLUDROCORTISONE ACETATE 0.1 MG/1
0.1 TABLET ORAL
Qty: 30 | Refills: 0 | Status: ACTIVE | COMMUNITY
Start: 2017-04-27

## 2017-05-17 RX ORDER — ASPIRIN 81 MG/1
81 TABLET, DELAYED RELEASE ORAL
Refills: 0 | Status: ACTIVE | COMMUNITY
Start: 2017-05-17

## 2017-05-17 RX ORDER — METOLAZONE 5 MG/1
5 TABLET ORAL
Qty: 90 | Refills: 0 | Status: DISCONTINUED | COMMUNITY
Start: 2017-02-20 | End: 2017-05-17

## 2017-05-17 RX ORDER — FOLIC ACID/VIT BCOMP,C/CU/ZINC 5-1.5-25MG
5 TABLET ORAL
Refills: 0 | Status: ACTIVE | COMMUNITY
Start: 2017-05-17

## 2017-05-17 RX ORDER — MULTIVIT-MIN/IRON/FOLIC ACID/K 18-600-40
CAPSULE ORAL
Refills: 0 | Status: ACTIVE | COMMUNITY
Start: 2017-05-17

## 2017-05-18 RX ORDER — ALCOHOL ANTISEPTIC PADS
PADS, MEDICATED (EA) TOPICAL
Refills: 0 | Status: ACTIVE | COMMUNITY

## 2017-05-18 RX ORDER — CETIRIZINE HYDROCHLORIDE 10 MG/1
10 TABLET, COATED ORAL
Refills: 0 | Status: ACTIVE | COMMUNITY

## 2017-05-18 RX ORDER — MULTIVIT-MIN/FOLIC/VIT K/LYCOP 400-300MCG
1000 TABLET ORAL
Refills: 0 | Status: ACTIVE | COMMUNITY

## 2017-05-18 RX ORDER — FOLIC ACID 1 MG/1
1 TABLET ORAL
Refills: 0 | Status: ACTIVE | COMMUNITY

## 2017-05-18 RX ORDER — PNV NO.95/FERROUS FUM/FOLIC AC 28MG-0.8MG
325 TABLET ORAL
Refills: 0 | Status: ACTIVE | COMMUNITY

## 2017-05-25 ENCOUNTER — EMERGENCY (EMERGENCY)
Facility: HOSPITAL | Age: 77
LOS: 1 days | Discharge: HOME | End: 2017-05-25
Attending: EMERGENCY MEDICINE
Payer: MEDICARE

## 2017-05-25 VITALS
DIASTOLIC BLOOD PRESSURE: 64 MMHG | HEART RATE: 75 BPM | TEMPERATURE: 98 F | RESPIRATION RATE: 18 BRPM | OXYGEN SATURATION: 96 % | SYSTOLIC BLOOD PRESSURE: 86 MMHG

## 2017-05-25 VITALS
HEIGHT: 66 IN | DIASTOLIC BLOOD PRESSURE: 51 MMHG | RESPIRATION RATE: 20 BRPM | HEART RATE: 88 BPM | TEMPERATURE: 97 F | WEIGHT: 164.91 LBS | OXYGEN SATURATION: 85 % | SYSTOLIC BLOOD PRESSURE: 85 MMHG

## 2017-05-25 DIAGNOSIS — Z95.2 PRESENCE OF PROSTHETIC HEART VALVE: Chronic | ICD-10-CM

## 2017-05-25 DIAGNOSIS — D64.9 ANEMIA, UNSPECIFIED: ICD-10-CM

## 2017-05-25 DIAGNOSIS — E78.5 HYPERLIPIDEMIA, UNSPECIFIED: ICD-10-CM

## 2017-05-25 DIAGNOSIS — I10 ESSENTIAL (PRIMARY) HYPERTENSION: ICD-10-CM

## 2017-05-25 DIAGNOSIS — Z88.5 ALLERGY STATUS TO NARCOTIC AGENT: ICD-10-CM

## 2017-05-25 DIAGNOSIS — E78.00 PURE HYPERCHOLESTEROLEMIA, UNSPECIFIED: ICD-10-CM

## 2017-05-25 DIAGNOSIS — Z79.82 LONG TERM (CURRENT) USE OF ASPIRIN: ICD-10-CM

## 2017-05-25 DIAGNOSIS — Z79.899 OTHER LONG TERM (CURRENT) DRUG THERAPY: ICD-10-CM

## 2017-05-25 DIAGNOSIS — I25.10 ATHEROSCLEROTIC HEART DISEASE OF NATIVE CORONARY ARTERY WITHOUT ANGINA PECTORIS: ICD-10-CM

## 2017-05-25 DIAGNOSIS — J44.9 CHRONIC OBSTRUCTIVE PULMONARY DISEASE, UNSPECIFIED: ICD-10-CM

## 2017-05-25 DIAGNOSIS — Z88.0 ALLERGY STATUS TO PENICILLIN: ICD-10-CM

## 2017-05-25 LAB
ALBUMIN SERPL ELPH-MCNC: 3.6 G/DL — SIGNIFICANT CHANGE UP (ref 3.3–5.2)
ALLERGY+IMMUNOLOGY DIAG STUDY NOTE: SIGNIFICANT CHANGE UP
ALP SERPL-CCNC: 471 U/L — HIGH (ref 40–120)
ALT FLD-CCNC: 20 U/L — SIGNIFICANT CHANGE UP
ANION GAP SERPL CALC-SCNC: 16 MMOL/L — SIGNIFICANT CHANGE UP (ref 5–17)
ANISOCYTOSIS BLD QL: SLIGHT — SIGNIFICANT CHANGE UP
APTT BLD: 33.5 SEC — SIGNIFICANT CHANGE UP (ref 27.5–37.4)
AST SERPL-CCNC: 23 U/L — SIGNIFICANT CHANGE UP
BASOPHILS # BLD AUTO: 0 K/UL — SIGNIFICANT CHANGE UP (ref 0–0.2)
BILIRUB SERPL-MCNC: 5.4 MG/DL — HIGH (ref 0.4–2)
BLD GP AB SCN SERPL QL: ABNORMAL
BUN SERPL-MCNC: 19 MG/DL — SIGNIFICANT CHANGE UP (ref 8–20)
CALCIUM SERPL-MCNC: 9 MG/DL — SIGNIFICANT CHANGE UP (ref 8.6–10.2)
CHLORIDE SERPL-SCNC: 91 MMOL/L — LOW (ref 98–107)
CO2 SERPL-SCNC: 32 MMOL/L — HIGH (ref 22–29)
CREAT SERPL-MCNC: 1.85 MG/DL — HIGH (ref 0.5–1.3)
DACRYOCYTES BLD QL SMEAR: SLIGHT — SIGNIFICANT CHANGE UP
DIR ANTIGLOB POLYSPECIFIC INTERPRETATION: SIGNIFICANT CHANGE UP
EOSINOPHIL # BLD AUTO: 0.1 K/UL — SIGNIFICANT CHANGE UP (ref 0–0.5)
GLUCOSE SERPL-MCNC: 140 MG/DL — HIGH (ref 70–115)
HCT VFR BLD CALC: 24 % — LOW (ref 37–47)
HGB BLD-MCNC: 7.6 G/DL — LOW (ref 12–16)
HYPOCHROMIA BLD QL: SLIGHT — SIGNIFICANT CHANGE UP
INR BLD: 1.09 RATIO — SIGNIFICANT CHANGE UP (ref 0.88–1.16)
LYMPHOCYTES # BLD AUTO: 0.7 K/UL — LOW (ref 1–4.8)
LYMPHOCYTES # BLD AUTO: 6 % — LOW (ref 20–55)
MACROCYTES BLD QL: SLIGHT — SIGNIFICANT CHANGE UP
MCHC RBC-ENTMCNC: 31.7 G/DL — LOW (ref 32–36)
MCHC RBC-ENTMCNC: 36.4 PG — HIGH (ref 27–31)
MCV RBC AUTO: 114.8 FL — HIGH (ref 81–99)
MONOCYTES # BLD AUTO: 1.1 K/UL — HIGH (ref 0–0.8)
MONOCYTES NFR BLD AUTO: 9 % — SIGNIFICANT CHANGE UP (ref 3–10)
NEUTROPHILS # BLD AUTO: 8.2 K/UL — HIGH (ref 1.8–8)
NEUTROPHILS NFR BLD AUTO: 83 % — HIGH (ref 37–73)
NT-PROBNP SERPL-SCNC: HIGH PG/ML (ref 0–300)
OVALOCYTES BLD QL SMEAR: SLIGHT — SIGNIFICANT CHANGE UP
PLAT MORPH BLD: NORMAL — SIGNIFICANT CHANGE UP
PLATELET # BLD AUTO: 159 K/UL — SIGNIFICANT CHANGE UP (ref 150–400)
POIKILOCYTOSIS BLD QL AUTO: SLIGHT — SIGNIFICANT CHANGE UP
POTASSIUM SERPL-MCNC: 3 MMOL/L — LOW (ref 3.5–5.3)
POTASSIUM SERPL-SCNC: 3 MMOL/L — LOW (ref 3.5–5.3)
PROT SERPL-MCNC: 6.7 G/DL — SIGNIFICANT CHANGE UP (ref 6.6–8.7)
PROTHROM AB SERPL-ACNC: 12 SEC — SIGNIFICANT CHANGE UP (ref 9.8–12.7)
RBC # BLD: 2.09 M/UL — LOW (ref 4.4–5.2)
RBC # FLD: 20.1 % — HIGH (ref 11–15.6)
RBC BLD AUTO: ABNORMAL
SODIUM SERPL-SCNC: 139 MMOL/L — SIGNIFICANT CHANGE UP (ref 135–145)
TROPONIN T SERPL-MCNC: 0.06 NG/ML — SIGNIFICANT CHANGE UP (ref 0–0.06)
TYPE + AB SCN PNL BLD: SIGNIFICANT CHANGE UP
VARIANT LYMPHS # BLD: 2 % — SIGNIFICANT CHANGE UP (ref 0–6)
WBC # BLD: 10.1 K/UL — SIGNIFICANT CHANGE UP (ref 4.8–10.8)
WBC # FLD AUTO: 10.1 K/UL — SIGNIFICANT CHANGE UP (ref 4.8–10.8)

## 2017-05-25 PROCEDURE — 93010 ELECTROCARDIOGRAM REPORT: CPT

## 2017-05-25 PROCEDURE — 99284 EMERGENCY DEPT VISIT MOD MDM: CPT

## 2017-05-25 PROCEDURE — 86077 PHYS BLOOD BANK SERV XMATCH: CPT

## 2017-05-25 RX ORDER — SODIUM CHLORIDE 9 MG/ML
3 INJECTION INTRAMUSCULAR; INTRAVENOUS; SUBCUTANEOUS ONCE
Qty: 0 | Refills: 0 | Status: COMPLETED | OUTPATIENT
Start: 2017-05-25 | End: 2017-05-25

## 2017-05-25 RX ADMIN — SODIUM CHLORIDE 3 MILLILITER(S): 9 INJECTION INTRAMUSCULAR; INTRAVENOUS; SUBCUTANEOUS at 11:39

## 2017-05-25 NOTE — ED ADULT NURSE NOTE - OBJECTIVE STATEMENT
pt AOX3 C/O of labs showing low hemoglobin, pt has a hx of low hemoglobin. placed on cardiac monitor, pt denies any symptoms, no weakness, SOB, chest pain, and type of discomfort. pt AOX3 C/O of labs showing low hemoglobin, pt has a hx of low hemoglobin. placed on cardiac monitor, pt denies any symptoms, no weakness, SOB, chest pain, and type of discomfort. pt has AV fistula in right chest wall.

## 2017-05-25 NOTE — ED ADULT NURSE NOTE - DISCHARGE TEACHING
discharged by MD, patient left with daughter in stable condition ambulated with steady gait, aware to follow up with PMD,

## 2017-05-25 NOTE — ED ADULT NURSE REASSESSMENT NOTE - NS ED NURSE REASSESS COMMENT FT1
Report received from off going RN, chart as noted, pt a&ox3 on cm with 2l/min via nc. Limb restriction noted to RUE, Right chest wall AV Fistula noted. pt states "I want to go home I should have refused the blood and left", pt updated on POC and blood bank status by RN danielle, pt verbalized understanding. Pt reports feeling asymptomatic and states "the oxygen made me feel better". Pt with rosaura bruising to ROSAURA Upper extremities. Resp even and unlabored. MAEx4.  @ bedside, will continue to monitor and reassess. RN to f/u with blood bank regarding PRBC

## 2017-05-25 NOTE — ED ADULT NURSE REASSESSMENT NOTE - NS ED NURSE REASSESS COMMENT FT1
This RN spoke to blood bank @ this time, blood bank currently searching for compatible PRBC's for pt use. Blood bank to notify this RN when PRBC is ready, will continue to monitor and reassess

## 2017-05-25 NOTE — ED PROVIDER NOTE - PROGRESS NOTE DETAILS
case discussed with Dr Caballero of nephrology; pt noted with hct 20 with repeat today of 24 ; pt presently asymptomatic; pt to be discharged home with repeat blood work at dialysis in 3 days discussed with patient, with antibodies  for blood products; pt does not want to wait for transfusion; pt to be discharged home with close follow up renal

## 2017-05-25 NOTE — ED PROVIDER NOTE - OBJECTIVE STATEMENT
77yo female recently started dialysis comes to ed with low hct; pt sent into ed for blood transfusion

## 2017-05-25 NOTE — ED ADULT TRIAGE NOTE - CHIEF COMPLAINT QUOTE
Patient arrived to ED today with c/o low hemoglobin.  Patient states while at hemodialysis today her hemoglobin level was low.  Patient denies complaints or symptoms.  Patient states she always has a low oxygen level.

## 2017-05-27 ENCOUNTER — TRANSCRIPTION ENCOUNTER (OUTPATIENT)
Age: 77
End: 2017-05-27

## 2017-05-27 ENCOUNTER — INPATIENT (INPATIENT)
Facility: HOSPITAL | Age: 77
LOS: 0 days | Discharge: HOME | DRG: 811 | End: 2017-05-27
Attending: HOSPITALIST | Admitting: HOSPITALIST
Payer: MEDICARE

## 2017-05-27 VITALS
TEMPERATURE: 98 F | RESPIRATION RATE: 20 BRPM | HEART RATE: 85 BPM | OXYGEN SATURATION: 91 % | SYSTOLIC BLOOD PRESSURE: 101 MMHG | HEIGHT: 65.35 IN | DIASTOLIC BLOOD PRESSURE: 59 MMHG

## 2017-05-27 VITALS — WEIGHT: 186.51 LBS

## 2017-05-27 DIAGNOSIS — D64.9 ANEMIA, UNSPECIFIED: ICD-10-CM

## 2017-05-27 DIAGNOSIS — Z95.2 PRESENCE OF PROSTHETIC HEART VALVE: Chronic | ICD-10-CM

## 2017-05-27 LAB
ANION GAP SERPL CALC-SCNC: 20 MMOL/L — HIGH (ref 5–17)
APTT BLD: 36.5 SEC — SIGNIFICANT CHANGE UP (ref 27.5–37.4)
BUN SERPL-MCNC: 51 MG/DL — HIGH (ref 8–20)
CALCIUM SERPL-MCNC: 9.5 MG/DL — SIGNIFICANT CHANGE UP (ref 8.6–10.2)
CHLORIDE SERPL-SCNC: 90 MMOL/L — LOW (ref 98–107)
CO2 SERPL-SCNC: 29 MMOL/L — SIGNIFICANT CHANGE UP (ref 22–29)
CREAT SERPL-MCNC: 4.2 MG/DL — HIGH (ref 0.5–1.3)
GLUCOSE SERPL-MCNC: 93 MG/DL — SIGNIFICANT CHANGE UP (ref 70–115)
HCT VFR BLD CALC: 24.9 % — LOW (ref 37–47)
HGB BLD-MCNC: 7.6 G/DL — LOW (ref 12–16)
INR BLD: 1.06 RATIO — SIGNIFICANT CHANGE UP (ref 0.88–1.16)
MCHC RBC-ENTMCNC: 30.5 G/DL — LOW (ref 32–36)
MCHC RBC-ENTMCNC: 35.8 PG — HIGH (ref 27–31)
MCV RBC AUTO: 117.5 FL — HIGH (ref 81–99)
PLATELET # BLD AUTO: 164 K/UL — SIGNIFICANT CHANGE UP (ref 150–400)
POTASSIUM SERPL-MCNC: 3.5 MMOL/L — SIGNIFICANT CHANGE UP (ref 3.5–5.3)
POTASSIUM SERPL-SCNC: 3.5 MMOL/L — SIGNIFICANT CHANGE UP (ref 3.5–5.3)
PROTHROM AB SERPL-ACNC: 11.7 SEC — SIGNIFICANT CHANGE UP (ref 9.8–12.7)
RBC # BLD: 2.12 M/UL — LOW (ref 4.4–5.2)
RBC # FLD: 19 % — HIGH (ref 11–15.6)
SODIUM SERPL-SCNC: 139 MMOL/L — SIGNIFICANT CHANGE UP (ref 135–145)
WBC # BLD: 5.8 K/UL — SIGNIFICANT CHANGE UP (ref 4.8–10.8)
WBC # FLD AUTO: 5.8 K/UL — SIGNIFICANT CHANGE UP (ref 4.8–10.8)

## 2017-05-27 PROCEDURE — 99222 1ST HOSP IP/OBS MODERATE 55: CPT | Mod: AI

## 2017-05-27 PROCEDURE — 99285 EMERGENCY DEPT VISIT HI MDM: CPT

## 2017-05-27 RX ORDER — CLOPIDOGREL BISULFATE 75 MG/1
75 TABLET, FILM COATED ORAL DAILY
Qty: 0 | Refills: 0 | Status: DISCONTINUED | OUTPATIENT
Start: 2017-05-27 | End: 2017-05-27

## 2017-05-27 RX ORDER — ATORVASTATIN CALCIUM 80 MG/1
40 TABLET, FILM COATED ORAL AT BEDTIME
Qty: 0 | Refills: 0 | Status: DISCONTINUED | OUTPATIENT
Start: 2017-05-27 | End: 2017-05-27

## 2017-05-27 RX ORDER — ASPIRIN/CALCIUM CARB/MAGNESIUM 324 MG
81 TABLET ORAL DAILY
Qty: 0 | Refills: 0 | Status: DISCONTINUED | OUTPATIENT
Start: 2017-05-27 | End: 2017-05-27

## 2017-05-27 RX ORDER — MIDODRINE HYDROCHLORIDE 2.5 MG/1
5 TABLET ORAL
Qty: 0 | Refills: 0 | Status: DISCONTINUED | OUTPATIENT
Start: 2017-05-27 | End: 2017-05-27

## 2017-05-27 RX ORDER — ALBUTEROL 90 UG/1
1 AEROSOL, METERED ORAL EVERY 4 HOURS
Qty: 0 | Refills: 0 | Status: DISCONTINUED | OUTPATIENT
Start: 2017-05-27 | End: 2017-05-27

## 2017-05-27 RX ORDER — FOLIC ACID 0.8 MG
1 TABLET ORAL DAILY
Qty: 0 | Refills: 0 | Status: DISCONTINUED | OUTPATIENT
Start: 2017-05-27 | End: 2017-05-27

## 2017-05-27 RX ORDER — FLUDROCORTISONE ACETATE 0.1 MG/1
0.1 TABLET ORAL DAILY
Qty: 0 | Refills: 0 | Status: DISCONTINUED | OUTPATIENT
Start: 2017-05-27 | End: 2017-05-27

## 2017-05-27 RX ORDER — SODIUM CHLORIDE 9 MG/ML
3 INJECTION INTRAMUSCULAR; INTRAVENOUS; SUBCUTANEOUS ONCE
Qty: 0 | Refills: 0 | Status: COMPLETED | OUTPATIENT
Start: 2017-05-27 | End: 2017-05-27

## 2017-05-27 RX ADMIN — SODIUM CHLORIDE 3 MILLILITER(S): 9 INJECTION INTRAMUSCULAR; INTRAVENOUS; SUBCUTANEOUS at 09:59

## 2017-05-27 NOTE — ED PROVIDER NOTE - OBJECTIVE STATEMENT
PATIENT PRESENT S WITH FEELING WEAK AND TIRED. PT HERE THURSDAY FOR TRANSFUSION BUT LEFT BECAUSE SHE HAD ANTIBODIES AND IT TOOK TOO LONG TO FIND A COMPATIBLE UNIT. SHE GOT FRUSTRATED AND LEFT. SHE RETURNS TODAY BECAUSE OF ABOVE SYMPTOMS AND BECAUSE SHE CANNOT GET DIALYSIS UNLESS HER H/H IS HIGHER

## 2017-05-27 NOTE — ED ADULT TRIAGE NOTE - CHIEF COMPLAINT QUOTE
pt states she had blood work and her hgb is low . pt unable to have dialysis as a result and was directed to ED by nephrologist DR Nolan. pt with oxygen sat aof 91% in triage

## 2017-05-27 NOTE — H&P ADULT - NSHPPHYSICALEXAM_GEN_ALL_CORE
Vital Signs   T(C): 36.6  HR: 88   BP: 104/62   RR: 22   SpO2: 96%     PHYSICAL EXAM:    GENERAL: Elderly female looking comfortable   HEENT: PERRL, +EOMI  NECK: soft, Supple, No JVD,   CHEST/LUNG: Clear to auscultate bilaterally; No wheezing, right upper chest permacath, no bleeding.   HEART: S1S2+, Regular rate and rhythm; No murmurs  ABDOMEN: Soft, Nontender, Nondistended; Bowel sounds present  EXTREMITIES:  2+ Peripheral Pulses, No clubbing, cyanosis, or edema  SKIN: No rashes or lesions  NEURO: AAOX3, no focal deficits, no motor r sensory loss  PSYCH: normal mood

## 2017-05-27 NOTE — CONSULT NOTE ADULT - SUBJECTIVE AND OBJECTIVE BOX
NEPHROLOGY INTERVAL HPI/OVERNIGHT EVENTS:    77 yo f with hx of bovine valve replacement, afib on coumadin, ESRD on HD TTS at [place] recent low Hg 6.9 at out pt facility today Hg 7.6 no dark stool or   bleeding reported by pt. Last HD at out pt center thurs tolerated ok. I sent pt to ED for transfusion on Thurs she came to [facility] but did not stay grew inpatient.  Now she is back Hg still 7.6 she feels weak.     ROS: +weakness + LINDSAY, denies Melena CP N/V/D no SOB     MEDICATIONS  (STANDING): pending     MEDICATIONS  (PRN): pending      Allergies    allopurinol (Rash)  codeine (Nausea; Vomiting)  penicillin (Rash)  spironolactone (Unknown)    Intolerances        Vital Signs Last 24 Hrs  T(C): 36.4, Max: 36.4 (05-27 @ 07:20)  T(F): 97.5, Max: 97.5 (05-27 @ 07:20)  HR: 85 (85 - 85)  BP: 101/59 (101/59 - 101/59)  BP(mean): --  RR: 20 (20 - 20)  SpO2: 91% (91% - 91%)  Daily Height in cm: 166 (27 May 2017 07:20)    Daily     PHYSICAL EXAM:    GENERAL: NAD  HEAD:  Atraumatic, Normocephalic  EYES: EOMI  NECK: Supple, No JVD  NERVOUS SYSTEM:  Alert & Oriented X3  CHEST/LUNG: CTA B/L  HEART: RRR +S1S2  ABDOMEN: Soft, Nontender, Nondistended; Bowel sounds present      LABS:                        7.6    5.8   )-----------( 164      ( 27 May 2017 08:56 )             24.9     05-27    139  |  90<L>  |  51.0<H>  ----------------------------<  93  3.5   |  29.0  |  4.20<H>    Ca    9.5      27 May 2017 08:56    TPro  6.7  /  Alb  3.6  /  TBili  5.4<H>  /  DBili  x   /  AST  23  /  ALT  20  /  AlkPhos  471<H>  05-25    PT/INR - ( 27 May 2017 08:56 )   PT: 11.7 sec;   INR: 1.06 ratio         PTT - ( 27 May 2017 08:56 )  PTT:36.5 sec            RADIOLOGY & ADDITIONAL TESTS:

## 2017-05-27 NOTE — DISCHARGE NOTE ADULT - CARE PLAN
Principal Discharge DX:	Anemia, unspecified type  Goal:	Got blood transfusion  Instructions for follow-up, activity and diet:	need to get CBC by the primary medical doctor in couple of days.  Secondary Diagnosis:	ESRD (end stage renal disease)  Goal:	HD  as per schedule.

## 2017-05-27 NOTE — DISCHARGE NOTE ADULT - PATIENT PORTAL LINK FT
“You can access the [employer] Patient Portal, offered by [facility], by registering with the following website: [email or web address]”

## 2017-05-27 NOTE — ED PROVIDER NOTE - CHPI ED SYMPTOMS NEG
no nausea/no loss of consciousness/no pain/no back pain/no fever/no decreased eating/drinking/no headache/no vomiting/no chills/no dizziness

## 2017-05-27 NOTE — ED ADULT NURSE NOTE - OBJECTIVE STATEMENT
Pt A&OX3, amb ad sonali, states she was here in ED on Thursday and she was told she was anemic.  Pt needed to receive a blood transfusion but she did not want to wait for transfusion.  Pt went to dialysis and was told she could not receive dialysis because hemoglobin is too low.  Pt states she has been feeling very weak and tired.  Pt appears jaundice.  Clear bsb, abd soft nondistended, nontender, moving all ext well.  CM in place.  NSR.  VSS.  RCW permacath in place, dressing dry and intact.

## 2017-05-27 NOTE — H&P ADULT - HISTORY OF PRESENT ILLNESS
77 y/o f with hx of HTN, CAD, ESRD on HD, anemia requiring frequent transfusion as per patient has been feeling tiredness and some SOB as her blood count is low, she was here in the ER, couple of days ago for the transfsuion but was inpatient nad left ER, comes back as she could not get dialysis with low blood count and come here for getting blood and HD, she has feels little bit dizziness and some sob, denies fever, chills, chest pain, nausea, vomiting, black or bloody stools, she has no bleeding from any where, no black or bloody stools, has no blood in the urine as well.

## 2017-05-27 NOTE — DISCHARGE NOTE ADULT - CARE PROVIDER_API CALL
Dr Flores,   in couple of days, please ask him to check your CBC  Phone: (   )    -  Fax: (   )    -

## 2017-05-27 NOTE — DISCHARGE NOTE ADULT - PROVIDER TOKENS
FREE:[LAST:[Dr Flores],PHONE:[(   )    -],FAX:[(   )    -],ADDRESS:[in couple of days, please ask him to check your CBC]]

## 2017-05-27 NOTE — ED ADULT NURSE REASSESSMENT NOTE - NS ED NURSE REASSESS COMMENT FT1
back from dialysis pt states she is scheduled to go home, pt denies complaints, skin color jaundiced resp shallow denies feeling sob.

## 2017-05-27 NOTE — DISCHARGE NOTE ADULT - MEDICATION SUMMARY - MEDICATIONS TO TAKE
I will START or STAY ON the medications listed below when I get home from the hospital:    Oxygen 2L nasal cannula  -- ICD: I50.22  Dispense: 99  -- Indication: For supplement    Three in one commode  -- Indication: For support    fludrocortisone 0.1 mg oral tablet  -- 1 tab(s) by mouth once a day  -- Indication: For Steriods    aspirin 81 mg oral tablet, chewable  -- 1 tab(s) by mouth once a day  -- Indication: For CAD    Lipitor 40 mg oral tablet  -- 1 tab(s) by mouth once a day (at bedtime)  -- Indication: For HLD    clopidogrel 75 mg oral tablet  -- 1 tab(s) by mouth once a day, hold if epistaxis starts  -- Indication: For CAD    albuterol CFC free 90 mcg/inh inhalation aerosol  -- 1 puff(s) inhaled every 4 hours  -- Indication: For SOB    midodrine 5 mg oral tablet  -- 1 tab(s) by mouth 2 times a day  -- Indication: For Low BP    sodium chloride 0.65% nasal spray  -- 1 spray(s) into nose 2 times a day  -- Indication: For Nasal congestion.     folic acid 1 mg oral tablet  -- 1 tab(s) by mouth once a day  -- Indication: For Supplement

## 2017-05-27 NOTE — H&P ADULT - NSHPLABSRESULTS_GEN_ALL_CORE
7.6    5.8   )-----------( 164      ( 27 May 2017 08:56 )             24.9     05-27    139  |  90<L>  |  51.0<H>  ----------------------------<  93  3.5   |  29.0  |  4.20<H>    Ca    9.5      27 May 2017 08:56      PT/INR - ( 27 May 2017 08:56 )   PT: 11.7 sec;   INR: 1.06 ratio         PTT - ( 27 May 2017 08:56 )  PTT:36.5 sec

## 2017-05-27 NOTE — CONSULT NOTE ADULT - ASSESSMENT
ESRD on HD TTS schedule will HD today   I obtained consent from pt at bedside    Anemia Hg 7.6 etiology CKD ? will check iron stores and transfuse 1U prbc today    BP on low side will be cautious w UF removal

## 2017-05-27 NOTE — H&P ADULT - NSHPREVIEWOFSYSTEMS_GEN_ALL_CORE
CONSTITUTIONAL: No fever, some fatigue  RESPIRATORY: No cough, some shortness of breath  CARDIOVASCULAR: No chest pain, palpitations  GASTROINTESTINAL: No abdominal, No nausea, vomiting  NEUROLOGICAL: No headaches,  loss of strength.  MISCELLANEOUS: No joint swelling or pain

## 2017-05-27 NOTE — DISCHARGE NOTE ADULT - HOSPITAL COURSE
Patient was admitted under medicine, she was typed and screened, Nephrology consulted and got blood transfusion, her Hb was 7.6, she feels improvement in her symptoms after transfusion, she does not wanted to stay for further monitoring.  patient is being discharged home in a stable condition, need to have CBC in couple of days, and she can resume her HD as scheduled.     Vital Signs   T(C): 36.6  HR: 88  BP: 104/62   RR: 22   SpO2: 96%     PHYSICAL EXAM:  GENERAL: Elderly female looking comfortable   HEENT: PERRL, +EOMI  NECK: soft, Supple, No JVD,   CHEST/LUNG: Clear to auscultate bilaterally; No wheezing, right upper chest permacath, no bleeding.   HEART: S1S2+, Regular rate and rhythm; No murmurs  ABDOMEN: Soft, Nontender, Nondistended; Bowel sounds present  EXTREMITIES:  2+ Peripheral Pulses, No clubbing, cyanosis, or edema  SKIN: No rashes or lesions  NEURO: AAOX3, no focal deficits, no motor r sensory loss  PSYCH: normal mood  total time spent 35 minutes.

## 2017-05-27 NOTE — DISCHARGE NOTE ADULT - PLAN OF CARE
Got blood transfusion need to get CBC by the primary medical doctor in couple of days. HD  as per schedule.

## 2017-05-27 NOTE — ED PROVIDER NOTE - MEDICAL DECISION MAKING DETAILS
PT WITH ANEMIA NEEDS TRANSFUSION PRIOR TO DIALYSIS. ADMIT. SEEN BY DR LONGO NEPHROLOGY PT WITH ANEMIA NEEDS TRANSFUSION PRIOR TO DIALYSIS. ADMIT. SEEN BY DR LONGO NEPHROLOGY  DISCUSSED WITH DR OLIVA WHO WILL ADMIT

## 2017-06-01 ENCOUNTER — APPOINTMENT (OUTPATIENT)
Dept: VASCULAR SURGERY | Facility: CLINIC | Age: 77
End: 2017-06-01

## 2017-06-01 VITALS
SYSTOLIC BLOOD PRESSURE: 98 MMHG | OXYGEN SATURATION: 92 % | DIASTOLIC BLOOD PRESSURE: 60 MMHG | HEIGHT: 66 IN | WEIGHT: 183 LBS | BODY MASS INDEX: 29.41 KG/M2 | RESPIRATION RATE: 16 BRPM | TEMPERATURE: 97.9 F | HEART RATE: 85 BPM

## 2017-06-01 DIAGNOSIS — Z87.39 PERSONAL HISTORY OF OTHER DISEASES OF THE MUSCULOSKELETAL SYSTEM AND CONNECTIVE TISSUE: ICD-10-CM

## 2017-06-01 DIAGNOSIS — Z82.49 FAMILY HISTORY OF ISCHEMIC HEART DISEASE AND OTHER DISEASES OF THE CIRCULATORY SYSTEM: ICD-10-CM

## 2017-06-01 DIAGNOSIS — E78.00 PURE HYPERCHOLESTEROLEMIA, UNSPECIFIED: ICD-10-CM

## 2017-06-01 DIAGNOSIS — Z83.42 FAMILY HISTORY OF FAMILIAL HYPERCHOLESTEROLEMIA: ICD-10-CM

## 2017-06-01 DIAGNOSIS — Z87.448 PERSONAL HISTORY OF OTHER DISEASES OF URINARY SYSTEM: ICD-10-CM

## 2017-06-01 DIAGNOSIS — Z86.2 PERSONAL HISTORY OF DISEASES OF THE BLOOD AND BLOOD-FORMING ORGANS AND CERTAIN DISORDERS INVOLVING THE IMMUNE MECHANISM: ICD-10-CM

## 2017-06-01 DIAGNOSIS — Z86.79 PERSONAL HISTORY OF OTHER DISEASES OF THE CIRCULATORY SYSTEM: ICD-10-CM

## 2017-06-02 PROBLEM — E78.00 HIGH CHOLESTEROL: Status: RESOLVED | Noted: 2017-05-17 | Resolved: 2017-06-02

## 2017-06-02 PROBLEM — Z82.49 FAMILY HISTORY OF HEART DISEASE: Status: ACTIVE | Noted: 2017-05-17

## 2017-06-02 PROBLEM — Z86.2 HISTORY OF ANEMIA: Status: RESOLVED | Noted: 2017-05-17 | Resolved: 2017-06-02

## 2017-06-02 PROBLEM — Z87.39 HISTORY OF GOUT: Status: RESOLVED | Noted: 2017-05-17 | Resolved: 2017-06-02

## 2017-06-02 PROBLEM — Z82.49 FAMILY HISTORY OF HYPERTENSION: Status: ACTIVE | Noted: 2017-05-17

## 2017-06-02 PROBLEM — Z87.39 HISTORY OF ARTHRITIS: Status: RESOLVED | Noted: 2017-05-17 | Resolved: 2017-06-02

## 2017-06-02 PROBLEM — Z83.42 FAMILY HISTORY OF HIGH CHOLESTEROL: Status: ACTIVE | Noted: 2017-05-17

## 2017-06-02 PROBLEM — Z87.448 HISTORY OF KIDNEY DISEASE: Status: RESOLVED | Noted: 2017-05-17 | Resolved: 2017-06-02

## 2017-06-02 PROBLEM — Z86.79 HISTORY OF CARDIAC DISORDER: Status: RESOLVED | Noted: 2017-05-17 | Resolved: 2017-06-02

## 2017-06-09 ENCOUNTER — OUTPATIENT (OUTPATIENT)
Dept: OUTPATIENT SERVICES | Facility: HOSPITAL | Age: 77
LOS: 1 days | End: 2017-06-09
Payer: MEDICARE

## 2017-06-09 VITALS
WEIGHT: 167.99 LBS | TEMPERATURE: 99 F | RESPIRATION RATE: 20 BRPM | SYSTOLIC BLOOD PRESSURE: 110 MMHG | HEIGHT: 66 IN | HEART RATE: 74 BPM | DIASTOLIC BLOOD PRESSURE: 50 MMHG

## 2017-06-09 DIAGNOSIS — Z95.2 PRESENCE OF PROSTHETIC HEART VALVE: Chronic | ICD-10-CM

## 2017-06-09 DIAGNOSIS — D64.9 ANEMIA, UNSPECIFIED: ICD-10-CM

## 2017-06-09 DIAGNOSIS — N18.6 END STAGE RENAL DISEASE: ICD-10-CM

## 2017-06-09 DIAGNOSIS — I48.91 UNSPECIFIED ATRIAL FIBRILLATION: ICD-10-CM

## 2017-06-09 DIAGNOSIS — Z90.49 ACQUIRED ABSENCE OF OTHER SPECIFIED PARTS OF DIGESTIVE TRACT: Chronic | ICD-10-CM

## 2017-06-09 DIAGNOSIS — Z90.89 ACQUIRED ABSENCE OF OTHER ORGANS: Chronic | ICD-10-CM

## 2017-06-09 DIAGNOSIS — Z01.818 ENCOUNTER FOR OTHER PREPROCEDURAL EXAMINATION: ICD-10-CM

## 2017-06-09 LAB
ALLERGY+IMMUNOLOGY DIAG STUDY NOTE: SIGNIFICANT CHANGE UP
ANION GAP SERPL CALC-SCNC: 21 MMOL/L — HIGH (ref 5–17)
ANISOCYTOSIS BLD QL: SLIGHT — SIGNIFICANT CHANGE UP
APTT BLD: 42.9 SEC — HIGH (ref 27.5–37.4)
BASOPHILS # BLD AUTO: 0 K/UL — SIGNIFICANT CHANGE UP (ref 0–0.2)
BASOPHILS NFR BLD AUTO: 0.7 % — SIGNIFICANT CHANGE UP (ref 0–2)
BLD GP AB SCN SERPL QL: ABNORMAL
BUN SERPL-MCNC: 60 MG/DL — HIGH (ref 8–20)
CALCIUM SERPL-MCNC: 9.2 MG/DL — SIGNIFICANT CHANGE UP (ref 8.6–10.2)
CHLORIDE SERPL-SCNC: 92 MMOL/L — LOW (ref 98–107)
CO2 SERPL-SCNC: 29 MMOL/L — SIGNIFICANT CHANGE UP (ref 22–29)
COMMENT - BLOOD BANK: SIGNIFICANT CHANGE UP
CREAT SERPL-MCNC: 3.9 MG/DL — HIGH (ref 0.5–1.3)
DIR ANTIGLOB POLYSPECIFIC INTERPRETATION: SIGNIFICANT CHANGE UP
ELLIPTOCYTES BLD QL SMEAR: SLIGHT — SIGNIFICANT CHANGE UP
EOSINOPHIL # BLD AUTO: 0.1 K/UL — SIGNIFICANT CHANGE UP (ref 0–0.5)
EOSINOPHIL NFR BLD AUTO: 2.1 % — SIGNIFICANT CHANGE UP (ref 0–6)
GLUCOSE SERPL-MCNC: 69 MG/DL — LOW (ref 70–115)
HCT VFR BLD CALC: 25.8 % — LOW (ref 37–47)
HGB BLD-MCNC: 8.2 G/DL — LOW (ref 12–16)
INR BLD: 1 RATIO — SIGNIFICANT CHANGE UP (ref 0.88–1.16)
LYMPHOCYTES # BLD AUTO: 0.8 K/UL — LOW (ref 1–4.8)
LYMPHOCYTES # BLD AUTO: 13.5 % — LOW (ref 20–55)
MACROCYTES BLD QL: SLIGHT — SIGNIFICANT CHANGE UP
MCHC RBC-ENTMCNC: 31.8 G/DL — LOW (ref 32–36)
MCHC RBC-ENTMCNC: 38 PG — HIGH (ref 27–31)
MCV RBC AUTO: 119.4 FL — HIGH (ref 81–99)
MONOCYTES # BLD AUTO: 0.9 K/UL — HIGH (ref 0–0.8)
MONOCYTES NFR BLD AUTO: 14.9 % — HIGH (ref 3–10)
NEUTROPHILS # BLD AUTO: 4 K/UL — SIGNIFICANT CHANGE UP (ref 1.8–8)
NEUTROPHILS NFR BLD AUTO: 68.5 % — SIGNIFICANT CHANGE UP (ref 37–73)
PLAT MORPH BLD: NORMAL — SIGNIFICANT CHANGE UP
PLATELET # BLD AUTO: 226 K/UL — SIGNIFICANT CHANGE UP (ref 150–400)
POTASSIUM SERPL-MCNC: 3.5 MMOL/L — SIGNIFICANT CHANGE UP (ref 3.5–5.3)
POTASSIUM SERPL-SCNC: 3.5 MMOL/L — SIGNIFICANT CHANGE UP (ref 3.5–5.3)
PROTHROM AB SERPL-ACNC: 11 SEC — SIGNIFICANT CHANGE UP (ref 9.8–12.7)
RBC # BLD: 2.16 M/UL — LOW (ref 4.4–5.2)
RBC # FLD: 18.9 % — HIGH (ref 11–15.6)
RBC BLD AUTO: ABNORMAL
SODIUM SERPL-SCNC: 142 MMOL/L — SIGNIFICANT CHANGE UP (ref 135–145)
TYPE + AB SCN PNL BLD: SIGNIFICANT CHANGE UP
WBC # BLD: 5.8 K/UL — SIGNIFICANT CHANGE UP (ref 4.8–10.8)
WBC # FLD AUTO: 5.8 K/UL — SIGNIFICANT CHANGE UP (ref 4.8–10.8)

## 2017-06-09 PROCEDURE — 86077 PHYS BLOOD BANK SERV XMATCH: CPT

## 2017-06-09 PROCEDURE — 71020: CPT | Mod: 26

## 2017-06-09 NOTE — H&P PST ADULT - ASSESSMENT
76 year old female with multiple commodities, with extensive cardiac and renal history, Patient presents to PST for right upper extremity arteriovenous fistula, possible graft.

## 2017-06-09 NOTE — H&P PST ADULT - PSH
Cystocele    H/O aortic valve replacement    H/O cataract    H/O mitral valve repair    H/O spinal fusion    H/O tricuspid valve annuloplasty    S/P appendectomy    S/P tonsillectomy

## 2017-06-09 NOTE — H&P PST ADULT - HISTORY OF PRESENT ILLNESS
76 year old female with multiple commodities presents to PST for right upper extremity arteriovenous fistula, possible graft.   HD: [facility] and Sat   nocturnal supplement 02 76 year old female with multiple commodities, with extensive cardiac and renal history Patient presents to PST for right upper extremity arteriovenous fistula, possible graft.   She currently has HD: Tuesday Thursday and Saturday and have nocturnal supplement 02.

## 2017-06-09 NOTE — H&P PST ADULT - NSANTHOSAYNRD_GEN_A_CORE
No. RIAZ screening performed.  STOP BANG Legend: 0-2 = LOW Risk; 3-4 = INTERMEDIATE Risk; 5-8 = HIGH Risk

## 2017-06-09 NOTE — H&P PST ADULT - PMH
Anemia    Atrial fibrillation    CAD (coronary artery disease)    COPD (chronic obstructive pulmonary disease)    Gall stones    Gout    Hyperlipidemia    Hypertension    Pneumonia    Pulmonary hypertension    Sinusitis    Spinal fusion failure, initial encounter    Spinal stenosis    Tubal ligation status

## 2017-06-14 ENCOUNTER — INPATIENT (INPATIENT)
Facility: HOSPITAL | Age: 77
LOS: 12 days | Discharge: NURSING, REHAB OR LONG-TERM CARE | DRG: 82 | End: 2017-06-27
Attending: SURGERY | Admitting: SURGERY
Payer: MEDICARE

## 2017-06-14 VITALS
TEMPERATURE: 99 F | DIASTOLIC BLOOD PRESSURE: 62 MMHG | SYSTOLIC BLOOD PRESSURE: 117 MMHG | HEART RATE: 88 BPM | RESPIRATION RATE: 20 BRPM | OXYGEN SATURATION: 98 %

## 2017-06-14 DIAGNOSIS — N18.6 END STAGE RENAL DISEASE: ICD-10-CM

## 2017-06-14 DIAGNOSIS — I10 ESSENTIAL (PRIMARY) HYPERTENSION: ICD-10-CM

## 2017-06-14 DIAGNOSIS — J44.9 CHRONIC OBSTRUCTIVE PULMONARY DISEASE, UNSPECIFIED: ICD-10-CM

## 2017-06-14 DIAGNOSIS — E78.5 HYPERLIPIDEMIA, UNSPECIFIED: ICD-10-CM

## 2017-06-14 DIAGNOSIS — Z95.2 PRESENCE OF PROSTHETIC HEART VALVE: Chronic | ICD-10-CM
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
## 2017-06-14 DIAGNOSIS — I25.10 ATHEROSCLEROTIC HEART DISEASE OF NATIVE CORONARY ARTERY WITHOUT ANGINA PECTORIS: ICD-10-CM

## 2017-06-14 DIAGNOSIS — I60.9 NONTRAUMATIC SUBARACHNOID HEMORRHAGE, UNSPECIFIED: ICD-10-CM

## 2017-06-14 DIAGNOSIS — J32.9 CHRONIC SINUSITIS, UNSPECIFIED: ICD-10-CM

## 2017-06-14 DIAGNOSIS — M1A.9XX0 CHRONIC GOUT, UNSPECIFIED, WITHOUT TOPHUS (TOPHI): ICD-10-CM

## 2017-06-14 DIAGNOSIS — I27.2 OTHER SECONDARY PULMONARY HYPERTENSION: ICD-10-CM

## 2017-06-14 DIAGNOSIS — Z90.49 ACQUIRED ABSENCE OF OTHER SPECIFIED PARTS OF DIGESTIVE TRACT: Chronic | ICD-10-CM

## 2017-06-14 DIAGNOSIS — Z90.89 ACQUIRED ABSENCE OF OTHER ORGANS: Chronic | ICD-10-CM

## 2017-06-14 LAB
ALBUMIN SERPL ELPH-MCNC: 3.7 G/DL — SIGNIFICANT CHANGE UP (ref 3.3–5.2)
ALLERGY+IMMUNOLOGY DIAG STUDY NOTE: SIGNIFICANT CHANGE UP
ALP SERPL-CCNC: 579 U/L — HIGH (ref 40–120)
ALT FLD-CCNC: 44 U/L — HIGH
ANION GAP SERPL CALC-SCNC: 21 MMOL/L — HIGH (ref 5–17)
ANISOCYTOSIS BLD QL: SLIGHT — SIGNIFICANT CHANGE UP
APTT BLD: 37.8 SEC — HIGH (ref 27.5–37.4)
AST SERPL-CCNC: 33 U/L — HIGH
BASOPHILS # BLD AUTO: 0 K/UL — SIGNIFICANT CHANGE UP (ref 0–0.2)
BILIRUB SERPL-MCNC: 2.1 MG/DL — HIGH (ref 0.4–2)
BLD GP AB SCN SERPL QL: ABNORMAL
BUN SERPL-MCNC: 39 MG/DL — HIGH (ref 8–20)
CALCIUM SERPL-MCNC: 8.9 MG/DL — SIGNIFICANT CHANGE UP (ref 8.6–10.2)
CHLORIDE SERPL-SCNC: 93 MMOL/L — LOW (ref 98–107)
CO2 SERPL-SCNC: 26 MMOL/L — SIGNIFICANT CHANGE UP (ref 22–29)
CREAT SERPL-MCNC: 3.94 MG/DL — HIGH (ref 0.5–1.3)
DACRYOCYTES BLD QL SMEAR: SLIGHT — SIGNIFICANT CHANGE UP
DIR ANTIGLOB POLYSPECIFIC INTERPRETATION: SIGNIFICANT CHANGE UP
ELLIPTOCYTES BLD QL SMEAR: SLIGHT — SIGNIFICANT CHANGE UP
EOSINOPHIL # BLD AUTO: 0.1 K/UL — SIGNIFICANT CHANGE UP (ref 0–0.5)
EOSINOPHIL NFR BLD AUTO: 1 % — SIGNIFICANT CHANGE UP (ref 0–5)
GLUCOSE SERPL-MCNC: 127 MG/DL — HIGH (ref 70–115)
HCT VFR BLD CALC: 25.2 % — LOW (ref 37–47)
HGB BLD-MCNC: 8 G/DL — LOW (ref 12–16)
HYPOCHROMIA BLD QL: SLIGHT — SIGNIFICANT CHANGE UP
INR BLD: 1.14 RATIO — SIGNIFICANT CHANGE UP (ref 0.88–1.16)
LACTATE BLDV-MCNC: 2.3 MMOL/L — HIGH (ref 0.5–2)
LYMPHOCYTES # BLD AUTO: 1 K/UL — SIGNIFICANT CHANGE UP (ref 1–4.8)
LYMPHOCYTES # BLD AUTO: 14 % — LOW (ref 20–55)
MACROCYTES BLD QL: SIGNIFICANT CHANGE UP
MCHC RBC-ENTMCNC: 31.7 G/DL — LOW (ref 32–36)
MCHC RBC-ENTMCNC: 36.4 PG — HIGH (ref 27–31)
MCV RBC AUTO: 114.5 FL — HIGH (ref 81–99)
MONOCYTES # BLD AUTO: 0.8 K/UL — SIGNIFICANT CHANGE UP (ref 0–0.8)
MONOCYTES NFR BLD AUTO: 10 % — SIGNIFICANT CHANGE UP (ref 3–10)
NEUTROPHILS # BLD AUTO: 5.2 K/UL — SIGNIFICANT CHANGE UP (ref 1.8–8)
NEUTROPHILS NFR BLD AUTO: 73 % — SIGNIFICANT CHANGE UP (ref 37–73)
NEUTS BAND # BLD: 2 % — SIGNIFICANT CHANGE UP (ref 0–8)
OVALOCYTES BLD QL SMEAR: SLIGHT — SIGNIFICANT CHANGE UP
PA ADP PRP-ACNC: 142 PRU — SIGNIFICANT CHANGE UP (ref 14–435)
PLAT MORPH BLD: NORMAL — SIGNIFICANT CHANGE UP
PLATELET # BLD AUTO: 182 K/UL — SIGNIFICANT CHANGE UP (ref 150–400)
POIKILOCYTOSIS BLD QL AUTO: SLIGHT — SIGNIFICANT CHANGE UP
POTASSIUM SERPL-MCNC: 3.8 MMOL/L — SIGNIFICANT CHANGE UP (ref 3.5–5.3)
POTASSIUM SERPL-SCNC: 3.8 MMOL/L — SIGNIFICANT CHANGE UP (ref 3.5–5.3)
PROT SERPL-MCNC: 6.7 G/DL — SIGNIFICANT CHANGE UP (ref 6.6–8.7)
PROTHROM AB SERPL-ACNC: 12.6 SEC — SIGNIFICANT CHANGE UP (ref 9.8–12.7)
RBC # BLD: 2.2 M/UL — LOW (ref 4.4–5.2)
RBC # FLD: 18.2 % — HIGH (ref 11–15.6)
RBC BLD AUTO: ABNORMAL
SODIUM SERPL-SCNC: 140 MMOL/L — SIGNIFICANT CHANGE UP (ref 135–145)
TYPE + AB SCN PNL BLD: SIGNIFICANT CHANGE UP
WBC # BLD: 7.1 K/UL — SIGNIFICANT CHANGE UP (ref 4.8–10.8)
WBC # FLD AUTO: 7.1 K/UL — SIGNIFICANT CHANGE UP (ref 4.8–10.8)

## 2017-06-14 PROCEDURE — 71250 CT THORAX DX C-: CPT | Mod: 26

## 2017-06-14 PROCEDURE — 71010: CPT | Mod: 26

## 2017-06-14 PROCEDURE — 70450 CT HEAD/BRAIN W/O DYE: CPT | Mod: 26

## 2017-06-14 PROCEDURE — 86077 PHYS BLOOD BANK SERV XMATCH: CPT

## 2017-06-14 PROCEDURE — 70450 CT HEAD/BRAIN W/O DYE: CPT | Mod: 26,77

## 2017-06-14 PROCEDURE — 99291 CRITICAL CARE FIRST HOUR: CPT

## 2017-06-14 PROCEDURE — 93010 ELECTROCARDIOGRAM REPORT: CPT

## 2017-06-14 PROCEDURE — 72125 CT NECK SPINE W/O DYE: CPT | Mod: 26

## 2017-06-14 PROCEDURE — 74176 CT ABD & PELVIS W/O CONTRAST: CPT | Mod: 26

## 2017-06-14 PROCEDURE — 99284 EMERGENCY DEPT VISIT MOD MDM: CPT

## 2017-06-14 RX ORDER — MIDODRINE HYDROCHLORIDE 2.5 MG/1
5 TABLET ORAL
Qty: 0 | Refills: 0 | Status: DISCONTINUED | OUTPATIENT
Start: 2017-06-14 | End: 2017-06-27

## 2017-06-14 RX ORDER — DESMOPRESSIN ACETATE 0.1 MG/1
23 TABLET ORAL ONCE
Qty: 0 | Refills: 0 | Status: COMPLETED | OUTPATIENT
Start: 2017-06-14 | End: 2017-06-14

## 2017-06-14 RX ORDER — IPRATROPIUM/ALBUTEROL SULFATE 18-103MCG
3 AEROSOL WITH ADAPTER (GRAM) INHALATION EVERY 6 HOURS
Qty: 0 | Refills: 0 | Status: DISCONTINUED | OUTPATIENT
Start: 2017-06-14 | End: 2017-06-25

## 2017-06-14 RX ORDER — ONDANSETRON 8 MG/1
4 TABLET, FILM COATED ORAL ONCE
Qty: 0 | Refills: 0 | Status: COMPLETED | OUTPATIENT
Start: 2017-06-14 | End: 2017-06-14

## 2017-06-14 RX ORDER — TETANUS AND DIPHTHERIA TOXOIDS ADSORBED 2; 2 [LF]/.5ML; [LF]/.5ML
0.5 INJECTION INTRAMUSCULAR ONCE
Qty: 0 | Refills: 0 | Status: DISCONTINUED | OUTPATIENT
Start: 2017-06-14 | End: 2017-06-14

## 2017-06-14 RX ORDER — ACETAMINOPHEN 500 MG
1000 TABLET ORAL ONCE
Qty: 0 | Refills: 0 | Status: COMPLETED | OUTPATIENT
Start: 2017-06-14 | End: 2017-06-14

## 2017-06-14 RX ORDER — LIDOCAINE 4 G/100G
2 CREAM TOPICAL DAILY
Qty: 0 | Refills: 0 | Status: DISCONTINUED | OUTPATIENT
Start: 2017-06-14 | End: 2017-06-27

## 2017-06-14 RX ORDER — ATORVASTATIN CALCIUM 80 MG/1
40 TABLET, FILM COATED ORAL AT BEDTIME
Qty: 0 | Refills: 0 | Status: DISCONTINUED | OUTPATIENT
Start: 2017-06-14 | End: 2017-06-27

## 2017-06-14 RX ORDER — ACETAMINOPHEN 500 MG
650 TABLET ORAL EVERY 6 HOURS
Qty: 0 | Refills: 0 | Status: COMPLETED | OUTPATIENT
Start: 2017-06-14 | End: 2017-06-17

## 2017-06-14 RX ORDER — FOLIC ACID 0.8 MG
1 TABLET ORAL DAILY
Qty: 0 | Refills: 0 | Status: DISCONTINUED | OUTPATIENT
Start: 2017-06-14 | End: 2017-06-27

## 2017-06-14 RX ORDER — TETANUS TOXOID, REDUCED DIPHTHERIA TOXOID AND ACELLULAR PERTUSSIS VACCINE, ADSORBED 5; 2.5; 8; 8; 2.5 [IU]/.5ML; [IU]/.5ML; UG/.5ML; UG/.5ML; UG/.5ML
0.5 SUSPENSION INTRAMUSCULAR ONCE
Qty: 0 | Refills: 0 | Status: COMPLETED | OUTPATIENT
Start: 2017-06-14 | End: 2017-06-14

## 2017-06-14 RX ORDER — GABAPENTIN 400 MG/1
300 CAPSULE ORAL THREE TIMES A DAY
Qty: 0 | Refills: 0 | Status: DISCONTINUED | OUTPATIENT
Start: 2017-06-14 | End: 2017-06-19

## 2017-06-14 RX ORDER — ONDANSETRON 8 MG/1
4 TABLET, FILM COATED ORAL EVERY 4 HOURS
Qty: 0 | Refills: 0 | Status: DISCONTINUED | OUTPATIENT
Start: 2017-06-14 | End: 2017-06-14

## 2017-06-14 RX ADMIN — TETANUS TOXOID, REDUCED DIPHTHERIA TOXOID AND ACELLULAR PERTUSSIS VACCINE, ADSORBED 0.5 MILLILITER(S): 5; 2.5; 8; 8; 2.5 SUSPENSION INTRAMUSCULAR at 18:17

## 2017-06-14 RX ADMIN — ATORVASTATIN CALCIUM 40 MILLIGRAM(S): 80 TABLET, FILM COATED ORAL at 23:11

## 2017-06-14 RX ADMIN — Medication 3 MILLILITER(S): at 20:30

## 2017-06-14 RX ADMIN — Medication 650 MILLIGRAM(S): at 23:13

## 2017-06-14 RX ADMIN — MIDODRINE HYDROCHLORIDE 5 MILLIGRAM(S): 2.5 TABLET ORAL at 18:16

## 2017-06-14 RX ADMIN — DESMOPRESSIN ACETATE 223 MICROGRAM(S): 0.1 TABLET ORAL at 23:14

## 2017-06-14 RX ADMIN — Medication 400 MILLIGRAM(S): at 14:21

## 2017-06-14 RX ADMIN — ONDANSETRON 4 MILLIGRAM(S): 8 TABLET, FILM COATED ORAL at 15:55

## 2017-06-14 RX ADMIN — LIDOCAINE 2 PATCH: 4 CREAM TOPICAL at 18:16

## 2017-06-14 RX ADMIN — GABAPENTIN 300 MILLIGRAM(S): 400 CAPSULE ORAL at 23:12

## 2017-06-14 RX ADMIN — Medication 1000 MILLIGRAM(S): at 15:00

## 2017-06-14 NOTE — H&P ADULT - MUSCULOSKELETAL
detailed exam no joint warmth/normal/no joint swelling/ROM intact/no joint erythema/no calf tenderness

## 2017-06-14 NOTE — ED PROVIDER NOTE - MEDICAL DECISION MAKING DETAILS
head laceration, bruising and ecchymosis s/p fall.  Patient on plavix code trauma called and trauma team at bedside.  Will check CT and care as per trauma team

## 2017-06-14 NOTE — H&P ADULT - HISTORY OF PRESENT ILLNESS
77yo female with extensive medical hx known to surgical service from prior fall BIBEMS after mechanical fall from stand.  Patient reports tripping and falling on the sidewalk while walking. Denies LOC.  Endorses mild pain in right chest.  Denies sternal chest pain, dyspnea.      Primary: Airway intact, trachea midline, CTAB, 2+ Femoral pulses bilaterally, GCS 15/A&Ox3/PERRL 3mm Brisk/Gross motor and sensory function intact, C/T/L spine nontender and without stepoffs   CXR: evidence of middle to lower lobar opacity on right on (consolidation vs atelectasis) though no evidence of PTX     A: PCN, Allopurinol, Spironolactone, Codeine  M: Plavix, others unknown  P: Anemia, Afib, CAD, COPD on home O2 2L at night, HLD, HTN, PNA, PulmHTN, Sinusitis, Spinal Stenosis s/p lumbar fusion, ESRD on HD MWF via R SC Permacath (last 6/13 1.5L off), Aortic Valve replacement/Mitral Valve Repair/Tricuspid Annuloplasty  L: breakfast  E: Denies 75yo female with extensive medical hx known to surgical service from prior fall BIBEMS after mechanical fall from stand.  Patient reports tripping and falling on the sidewalk while walking. Denies LOC.   CO head pain and mild pain in right chest.  Denies sternal chest pain, dyspnea.      Primary: Airway intact, trachea midline, CTAB, 2+ Femoral pulses bilaterally, GCS 15/A&Ox3/PERRL 3mm Brisk/Gross motor and sensory function intact, C/T/L spine nontender and without stepoffs   CXR: evidence of middle to lower lobar opacity on right on (consolidation vs atelectasis) though no evidence of PTX     A: PCN, Allopurinol, Spironolactone, Codeine  M: Plavix, others unknown  P: Anemia, Afib, CAD, COPD on home O2 2L at night, HLD, HTN, PNA, PulmHTN, Sinusitis, Spinal Stenosis s/p lumbar fusion, ESRD on HD MWF via R SC Permacath (last 6/13 1.5L off), Aortic Valve replacement/Mitral Valve Repair/Tricuspid Annuloplasty  L: breakfast  E: Denies

## 2017-06-14 NOTE — H&P ADULT - ASSESSMENT
77yo female with complex medical hx s/p mechanical fall now with L SAH in the temporoparietal region, R pleural effusion (likely chronic)   Admit to SICU, Trauma Surgery, Dr. Begum  NPO at this time, limit IVF due to renal disease  Neurosurgery Dr. Castro consulted  Primary Cardiologist Office of Dr. Mc ([phone]) , Pulmonologist Office of Dr. Ansari ([phone]), Nephrologist Office Dr. Weber ([phone]), PCP Dr. Metcalf ([phone]) all contacted and reported would see patient 6/15/17.    Further Management by ICU team

## 2017-06-14 NOTE — H&P ADULT - SKIN COMMENTS
1cm laceration over right eyebrow, Superficial abrasions to right elbow/bilateral knees, Small scattered hematomas to bilateral forearms/bilateral shins/right breast at 11oclock, R SC permacatheter in place, Ecchymosis of right buttock

## 2017-06-14 NOTE — CONSULT NOTE ADULT - SUBJECTIVE AND OBJECTIVE BOX
HPI:    PAST MEDICAL & SURGICAL HISTORY:  Gout  Gall stones  Pneumonia  Anemia  Pulmonary hypertension  COPD (chronic obstructive pulmonary disease)  CAD (coronary artery disease)  Atrial fibrillation  Hyperlipidemia  Hypertension  Spinal fusion failure, initial encounter  Tubal ligation status  Sinusitis  Spinal stenosis  Pneumonia due to organism  No pertinent past medical history  S/P tonsillectomy  S/P appendectomy  H/O aortic valve replacement  H/O spinal fusion  H/O cataract  Cystocele  H/O tricuspid valve annuloplasty  H/O mitral valve repair      REVIEW OF SYSTEMS:    CONSTITUTIONAL: No fever, weight loss, or fatigue  EYES: No eye pain, visual disturbances, or discharge  ENMT:  No difficulty hearing, tinnitus, vertigo; No sinus or throat pain  NECK: No pain or stiffness  BREASTS: No pain, masses, or nipple discharge  RESPIRATORY: No cough, wheezing, chills or hemoptysis; No shortness of breath  CARDIOVASCULAR: No chest pain, palpitations, dizziness, or leg swelling  GASTROINTESTINAL: No abdominal or epigastric pain. No nausea, vomiting, or hematemesis; No diarrhea or constipation. No melena or hematochezia.  GENITOURINARY: No dysuria, frequency, hematuria, or incontinence  NEUROLOGICAL: No headaches, memory loss, loss of strength, numbness, or tremors  SKIN: No itching, burning, rashes, or lesions   LYMPH NODES: No enlarged glands  ENDOCRINE: No heat or cold intolerance; No hair loss  MUSCULOSKELETAL: No joint pain or swelling; No muscle, back, or extremity pain  PSYCHIATRIC: No depression, anxiety, mood swings, or difficulty sleeping  HEME/LYMPH: No easy bruising, or bleeding gums  ALLERY AND IMMUNOLOGIC: No hives or eczema    Allergies    allopurinol (Rash)  codeine (Nausea; Vomiting)  penicillin (Rash)  spironolactone (Unknown)    Intolerances      MEDICATIONS  (STANDING):  ondansetron Injectable 4milliGRAM(s) IV Push once  acetaminophen   Tablet. 650milliGRAM(s) Oral every 6 hours  gabapentin 300milliGRAM(s) Oral three times a day  lidocaine   Patch 2Patch Transdermal daily    MEDICATIONS  (PRN):    SOCIAL HISTORY:  FAMILY HISTORY:  CAD (coronary artery disease)    Vital Signs Last 24 Hrs  T(C): 36.7, Max: 37.1 (06-14 @ 13:10)  T(F): 98, Max: 98.7 (06-14 @ 13:10)  HR: 98 (88 - 98)  BP: 107/67 (107/67 - 117/62)  BP(mean): 82 (82 - 82)  RR: 25 (20 - 25)  SpO2: 98% (98% - 98%)    PHYSICAL EXAM:    GENERAL: NAD, well-groomed, well-developed  HEAD:  Atraumatic, Normocephalic  EYES: EOMI, PERRLA, conjunctiva and sclera clear  ENMT: No tonsillar erythema, exudates, or enlargement; Moist mucous membranes, Good dentition, No lesions  NECK: Supple, No JVD, Normal thyroid  NERVOUS SYSTEM:  Alert & Oriented X3, Good concentration; Motor Strength 5/5 B/L upper and lower extremities; DTRs 2+ intact and symmetric  CHEST/LUNG: Clear to percussion bilaterally; No rales, rhonchi, wheezing, or rubs  HEART: Regular rate and rhythm; No murmurs, rubs, or gallops  ABDOMEN: Soft, Nontender, Nondistended; Bowel sounds present  EXTREMITIES:  2+ Peripheral Pulses, No clubbing, cyanosis, or edema  LYMPH: No lymphadenopathy noted  SKIN: No rashes or lesions    LABS:                        8.0    7.1   )-----------( 182      ( 14 Jun 2017 12:50 )             25.2     06-14    140  |  93<L>  |  39.0<H>  ----------------------------<  127<H>  3.8   |  26.0  |  3.94<H>    Ca    8.9      14 Jun 2017 12:50    TPro  6.7  /  Alb  3.7  /  TBili  2.1<H>  /  DBili  x   /  AST  33<H>  /  ALT  44<H>  /  AlkPhos  579<H>  06-14    PT/INR - ( 14 Jun 2017 12:50 )   PT: 12.6 sec;   INR: 1.14 ratio         PTT - ( 14 Jun 2017 12:50 )  PTT:37.8 sec      RADIOLOGY & ADDITIONAL STUDIES:  ~~~~~~~~~~~~~~~~~~~~~~~~~~~~~~   EXAM:  CT BRAIN                          PROCEDURE DATE:  06/14/2017  IMPRESSION:      Mild to moderate posttraumatic subarachnoidhemorrhage.    EXAM:  CT CERVICAL SPINE                          PROCEDURE DATE:  06/14/2017  IMPRESSION:        Mild to moderate multilevel spondylosis, as above. No acute fracture   dislocation involving the cervical spine     EXAM:  CT ABDOMEN AND PELVIS                         EXAM:  CT CHEST HPI:  · HPI Objective Statement: This patient is a 76 year old woman hx of HTN, ESRD on dialysis who presents to the ED with head wound and bruising s/p mechanical fall.  Patient denies LOC.  Patient c/o pain at site of head injury.  She denies chest pain, SOB, and abdominal pain.    PAST MEDICAL & SURGICAL HISTORY:  Gout  Gall stones  Pneumonia  Anemia  Pulmonary hypertension  COPD (chronic obstructive pulmonary disease)  CAD (coronary artery disease)  Atrial fibrillation  Hyperlipidemia  Hypertension  Spinal fusion failure, initial encounter  Tubal ligation status  Sinusitis  Spinal stenosis  Pneumonia due to organism  No pertinent past medical history  S/P tonsillectomy  S/P appendectomy  H/O aortic valve replacement  H/O spinal fusion  H/O cataract  Cystocele  H/O tricuspid valve annuloplasty  H/O mitral valve repair      REVIEW OF SYSTEMS:    CONSTITUTIONAL: No fever, weight loss, or fatigue  EYES: No eye pain, visual disturbances, or discharge  ENMT:  No difficulty hearing, tinnitus, vertigo; No sinus or throat pain  NECK: No pain or stiffness  BREASTS: No pain, masses, or nipple discharge  RESPIRATORY: No cough, wheezing, chills or hemoptysis; No shortness of breath  CARDIOVASCULAR: No chest pain, palpitations, dizziness, or leg swelling  GASTROINTESTINAL: No abdominal or epigastric pain. No nausea, vomiting, or hematemesis; No diarrhea or constipation. No melena or hematochezia.  GENITOURINARY: No dysuria, frequency, hematuria, or incontinence  NEUROLOGICAL: No headaches, memory loss, loss of strength, numbness, or tremors  SKIN: No itching, burning, rashes, or lesions   LYMPH NODES: No enlarged glands  ENDOCRINE: No heat or cold intolerance; No hair loss  MUSCULOSKELETAL: No joint pain or swelling; No muscle, back, or extremity pain  PSYCHIATRIC: No depression, anxiety, mood swings, or difficulty sleeping  HEME/LYMPH: No easy bruising, or bleeding gums  ALLERY AND IMMUNOLOGIC: No hives or eczema    Allergies    allopurinol (Rash)  codeine (Nausea; Vomiting)  penicillin (Rash)  spironolactone (Unknown)    Intolerances      MEDICATIONS  (STANDING):  ondansetron Injectable 4milliGRAM(s) IV Push once  acetaminophen   Tablet. 650milliGRAM(s) Oral every 6 hours  gabapentin 300milliGRAM(s) Oral three times a day  lidocaine   Patch 2Patch Transdermal daily    MEDICATIONS  (PRN):    SOCIAL HISTORY:  FAMILY HISTORY:  CAD (coronary artery disease)    Vital Signs Last 24 Hrs  T(C): 36.7, Max: 37.1 (06-14 @ 13:10)  T(F): 98, Max: 98.7 (06-14 @ 13:10)  HR: 98 (88 - 98)  BP: 107/67 (107/67 - 117/62)  BP(mean): 82 (82 - 82)  RR: 25 (20 - 25)  SpO2: 98% (98% - 98%)    PHYSICAL EXAM:    GENERAL: NAD, well-groomed, well-developed  HEAD:  Atraumatic, Normocephalic  EYES: EOMI, PERRLA, conjunctiva and sclera clear  ENMT: No tonsillar erythema, exudates, or enlargement; Moist mucous membranes, Good dentition, No lesions  NECK: Supple, No JVD, Normal thyroid  NERVOUS SYSTEM:  Alert & Oriented X3, Good concentration; Motor Strength 5/5 B/L upper and lower extremities; DTRs 2+ intact and symmetric  CHEST/LUNG: Clear to percussion bilaterally; No rales, rhonchi, wheezing, or rubs  HEART: Regular rate and rhythm; No murmurs, rubs, or gallops  ABDOMEN: Soft, Nontender, Nondistended; Bowel sounds present  EXTREMITIES:  2+ Peripheral Pulses, No clubbing, cyanosis, or edema  LYMPH: No lymphadenopathy noted  SKIN: No rashes or lesions    LABS:                        8.0    7.1   )-----------( 182      ( 14 Jun 2017 12:50 )             25.2     06-14    140  |  93<L>  |  39.0<H>  ----------------------------<  127<H>  3.8   |  26.0  |  3.94<H>    Ca    8.9      14 Jun 2017 12:50    TPro  6.7  /  Alb  3.7  /  TBili  2.1<H>  /  DBili  x   /  AST  33<H>  /  ALT  44<H>  /  AlkPhos  579<H>  06-14    PT/INR - ( 14 Jun 2017 12:50 )   PT: 12.6 sec;   INR: 1.14 ratio         PTT - ( 14 Jun 2017 12:50 )  PTT:37.8 sec      RADIOLOGY & ADDITIONAL STUDIES:  ~~~~~~~~~~~~~~~~~~~~~~~~~~~~~~   EXAM:  CT BRAIN                          PROCEDURE DATE:  06/14/2017  IMPRESSION:      Mild to moderate posttraumatic subarachnoidhemorrhage.    EXAM:  CT CERVICAL SPINE                          PROCEDURE DATE:  06/14/2017  IMPRESSION:        Mild to moderate multilevel spondylosis, as above. No acute fracture   dislocation involving the cervical spine     EXAM:  CT ABDOMEN AND PELVIS                         EXAM:  CT CHEST HPI:  · HPI Objective Statement: This patient is a 76 year old woman hx of HTN, ESRD on dialysis who presents to the ED with head wound and bruising s/p mechanical fall.  Patient denies LOC.  Patient c/o pain at site of head injury.  She denies chest pain, SOB, and abdominal pain.  Pt states that she fell. Pt denies dizziness stating that fall resulted from dragging her feet.  Pt states that she was here in April 2017 post fall at which time she was dx with renal insuff. Pt has a frontal right sided laceration and mild frontal headache.     PAST MEDICAL & SURGICAL HISTORY:  Gout  Gall stones  Pneumonia  Anemia  Pulmonary hypertension  COPD (chronic obstructive pulmonary disease)  CAD (coronary artery disease)  Atrial fibrillation  Hyperlipidemia  Hypertension  Spinal fusion failure, initial encounter  Tubal ligation status  Sinusitis  Spinal stenosis  Pneumonia due to organism  No pertinent past medical history  S/P tonsillectomy  S/P appendectomy  H/O aortic valve replacement  H/O spinal fusion  H/O cataract  Cystocele  H/O tricuspid valve annuloplasty  H/O mitral valve repair      REVIEW OF SYSTEMS:    CONSTITUTIONAL: No fever, weight loss 40lbs since April 2017 attributed to Dialysis, or fatigue  EYES: No eye pain, visual disturbances, or discharge  ENMT:  No difficulty hearing, tinnitus, vertigo; Pos sinus, pos post nasal drip on levaquin due to nonproductive cough or throat pain  NECK: No pain or stiffness  BREASTS: No pain, masses, or nipple discharge  RESPIRATORY: pos cough, No wheezing, chills or hemoptysis; No shortness of breath  CARDIOVASCULAR: No chest pain, palpitations, dizziness, or Pos leg swelling improved with dialysis.  GASTROINTESTINAL: No abdominal or epigastric pain. No nausea, vomiting, or hematemesis; No diarrhea or constipation. No melena or hematochezia.  GENITOURINARY: No dysuria, frequency, hematuria, or incontinence  NEUROLOGICAL: Frontal  headaches since struck head this afternoon, memory loss, loss of strength, numbness, or tremors  SKIN: No itching, burning, rashes, or lesions   LYMPH NODES: No enlarged glands  ENDOCRINE: No heat or cold intolerance; No hair loss  MUSCULOSKELETAL: No joint pain or swelling; No muscle, back, or extremity pain, pos hx of spinal stenosis  PSYCHIATRIC: No depression, anxiety, mood swings, or difficulty sleeping  HEME/LYMPH: No easy bruising, or bleeding gums  ALLERY AND IMMUNOLOGIC: No hives or eczema    Allergies    allopurinol (Rash)  codeine (Nausea; Vomiting)  penicillin (Rash)  spironolactone (Unknown)    Intolerances      MEDICATIONS  (STANDING):  ondansetron Injectable 4milliGRAM(s) IV Push once  acetaminophen   Tablet. 650milliGRAM(s) Oral every 6 hours  gabapentin 300milliGRAM(s) Oral three times a day  lidocaine   Patch 2Patch Transdermal daily    MEDICATIONS  (PRN):    SOCIAL HISTORY:  FAMILY HISTORY:  CAD (coronary artery disease)    Vital Signs Last 24 Hrs  T(C): 36.7, Max: 37.1 (06-14 @ 13:10)  T(F): 98, Max: 98.7 (06-14 @ 13:10)  HR: 98 (88 - 98)  BP: 107/67 (107/67 - 117/62)  BP(mean): 82 (82 - 82)  RR: 25 (20 - 25)  SpO2: 98% (98% - 98%)    PHYSICAL EXAM:    GENERAL: NAD, well-groomed, well-developed  HEAD: Traumatic, Normocephalic, Dressing inplace.   EYES: EOMI, PERRLA, conjunctiva and sclera clear, small pupils 4mm bilat   ENMT:  Moist mucous membranes, wet consistent cough nonproductive.   NECK: Supple, No JVD, Normal thyroid  NERVOUS SYSTEM:  Alert & Oriented X3, Good concentration; Motor Strength 5/5 B/L upper and lower extremities; neg pronators, neg facial, midline tongue. no cerebellar findings  CHEST/LUNG: Coarse BS  bilaterally;   HEART: Regular rate and rhythm; No murmurs, rubs, or gallops  ABDOMEN: Soft, Nontender, Nondistended; Bowel sounds present  EXTREMITIES: Pos  minedema  LYMPH: No lymphadenopathy noted  SKIN: thinned shiny skin easily bruised with ecchymosis noted and tears from fall  LABS:                        8.0    7.1   )-----------( 182      ( 14 Jun 2017 12:50 )             25.2     06-14    140  |  93<L>  |  39.0<H>  ----------------------------<  127<H>  3.8   |  26.0  |  3.94<H>    Ca    8.9      14 Jun 2017 12:50    TPro  6.7  /  Alb  3.7  /  TBili  2.1<H>  /  DBili  x   /  AST  33<H>  /  ALT  44<H>  /  AlkPhos  579<H>  06-14    PT/INR - ( 14 Jun 2017 12:50 )   PT: 12.6 sec;   INR: 1.14 ratio         PTT - ( 14 Jun 2017 12:50 )  PTT:37.8 sec      RADIOLOGY & ADDITIONAL STUDIES:  ~~~~~~~~~~~~~~~~~~~~~~~~~~~~~~   EXAM:  CT BRAIN                          PROCEDURE DATE:  06/14/2017  IMPRESSION:      Mild to moderate posttraumatic subarachnoidhemorrhage.    EXAM:  CT CERVICAL SPINE                          PROCEDURE DATE:  06/14/2017  IMPRESSION:        Mild to moderate multilevel spondylosis, as above. No acute fracture   dislocation involving the cervical spine     EXAM:  CT ABDOMEN AND PELVIS                         EXAM:  CT CHEST

## 2017-06-14 NOTE — H&P ADULT - RS GEN PE MLT RESP DETAILS PC
no intercostal retractions/airway patent/no subcutaneous emphysema/no wheezes/respirations non-labored/rhonchi/good air movement/chest wall tenderness/no rales/breath sounds equal

## 2017-06-14 NOTE — CONSULT NOTE ADULT - ATTENDING COMMENTS
HD tomorrow without heparin, slow flow, cool bath
NSGY Attg:    repeat CT stable  hold ASA, Plavix, and heparin for now

## 2017-06-14 NOTE — H&P ADULT - PMH
Anemia    Atrial fibrillation    CAD (coronary artery disease)    COPD (chronic obstructive pulmonary disease)    ESRD (end stage renal disease) on dialysis  MWF via [facility] Permacath  planned AVF creation  Gall stones    Gout    Hyperlipidemia    Hypertension    Pneumonia    Pulmonary hypertension    Sinusitis    Sinusitis, unspecified chronicity, unspecified location    Spinal fusion failure, initial encounter    Spinal stenosis    Tubal ligation status

## 2017-06-14 NOTE — H&P ADULT - NEUROLOGICAL DETAILS
superficial reflexes intact/normal strength/alert and oriented x 3/responds to verbal commands/responds to pain/sensation intact/cranial nerves intact

## 2017-06-14 NOTE — ED ADULT NURSE NOTE - OBJECTIVE STATEMENT
Code trauma s/p fall on Plavix  Pt A&Ox4 c/o right rib pain at this time. Pt resting comfortably, VSS, no signs of distress at this time, CM in place, #18 LFA, blood sent, Head ct done, Safety maintained, call bell in reach.

## 2017-06-14 NOTE — H&P ADULT - NSHPOUTPATIENTPROVIDERS_GEN_ALL_CORE
Cardiology Cori  CT Surgery - Lo DAMON - Carlo  Nephrology - Li  [facility] - Challengehector  Vascular Surgery - Curry

## 2017-06-14 NOTE — H&P ADULT - ATTENDING COMMENTS
Pt seen and examined in trauma bay.      Mechanical fall.  Head and right chest wall pain.  On plavix.  No LOC.      Exam with lac to forehead, right lateral chest tenderness, abrasion to right elbow.    CT head with small left posterior SAH.  Multiple non displaced right rib fxs.      I/P:  1. Traumatic SAH w/o LOC.  Admit to ICU, close neuro obs.  Repeat head CT.  NS consult.  2. Rib fxs.  Pain control, pulm toilet, IS.

## 2017-06-14 NOTE — CONSULT NOTE ADULT - SUBJECTIVE AND OBJECTIVE BOX
Patient is a 76y old  Female who presents with a chief complaint of Mechanical Fall (14 Jun 2017 14:29)    HPI:  Pt  fell while walking. Denies LOC. Hx of CRF 5 on HD TTS with last treatment yesterday. Hx of recent antibiotic for cough.    PAST MEDICAL & SURGICAL HISTORY:  Gout  Gall stones  Pneumonia  Anemia  Pulmonary hypertension  COPD (chronic obstructive pulmonary disease)  CAD (coronary artery disease)  Atrial fibrillation  Hyperlipidemia  Hypertension  Spinal fusion failure, initial encounter  Tubal ligation status  Sinusitis  Spinal stenosis  Pneumonia due to organism  No pertinent past medical history  S/P tonsillectomy  S/P appendectomy  H/O aortic valve replacement  H/O spinal fusion  H/O cataract  Cystocele  H/O tricuspid valve annuloplasty  H/O mitral valve repair      FAMILY HISTORY:  CAD (coronary artery disease)  [place]    Social History:Non smoker    MEDICATIONS  (STANDING):  acetaminophen   Tablet. 650milliGRAM(s) Oral every 6 hours  gabapentin 300milliGRAM(s) Oral three times a day  lidocaine   Patch 2Patch Transdermal daily  ALBUTerol/ipratropium for Nebulization 3milliLiter(s) Nebulizer every 6 hours  folic acid 1milliGRAM(s) Oral daily  atorvastatin 40milliGRAM(s) Oral at bedtime  midodrine 5milliGRAM(s) Oral two times a day  diphtheria/tetanus/pertussis (acellular) Vaccine (ADAcel) 0.5milliLiter(s) IntraMuscular once    MEDICATIONS  (PRN):   Meds reviewed    Allergies    allopurinol (Rash)  codeine (Nausea; Vomiting)  penicillin (Rash)  spironolactone (Unknown)    Intolerances         REVIEW OF SYSTEMS:    CONSTITUTIONAL:  feels pain head  EYES: No eye pain, visual disturbances, or discharge  ENMT:  No difficulty hearing, tinnitus, vertigo; No sinus or throat pain  NECK: No pain or stiffness  BREASTS: No pain, masses, or nipple discharge  RESPIRATORY: cough, no bleed  CARDIOVASCULAR: chest wall  pain, no palpitations, dizziness,   GASTROINTESTINAL: No abdominal or epigastric pain. No nausea, vomiting, or hematemesis; No diarrhea or constipation. obesity  GENITOURINARY: No dysuria, frequency, hematuria, or incontinence  NEUROLOGICAL: + headaches,  nomemory loss, loss of strength, numbness, or tremors  SKIN: Ecchymosis right arm  LYMPH NODES: No enlarged glands  ENDOCRINE: No heat or cold intolerance; No hair loss  MUSCULOSKELETAL: Chronic leg edema legs   PSYCHIATRIC: No depression, anxiety, mood swings, or difficulty sleeping  HEME/LYMPH: No easy bruising, or bleeding gums  ALLERY AND IMMUNOLOGIC: No hives or eczema      Vital Signs Last 24 Hrs  T(C): 36.7, Max: 37.1 (06-14 @ 13:10)  T(F): 98, Max: 98.7 (06-14 @ 13:10)  HR: 79 (79 - 98)  BP: 112/53 (107/67 - 117/62)  BP(mean): 77 (77 - 82)  RR: 27 (20 - 27)  SpO2: 99% (98% - 99%)  Daily Height in cm: 167.64 (14 Jun 2017 15:00)    Daily     PHYSICAL EXAM:    GENERAL: appears acutely  ill, oriented.  HEAD:  large dressing scalp, abrasion right  EYES: EOMI, PERRLA, conjunctiva and sclera clear  ENMT: No tonsillar erythema, exudates, or enlargement; Moist mucous  membranes,  NECK: Supple, neck  with right permacath  NERVOUS SYSTEM:  Alert & Oriented X3, Good concentration; Motor Strength wnl upper and lower extremities;  CHEST/LUNG: Rhonchi  bilaterally; No rales,  wheezing, or rubs  HEART: Regular rate and rhythm; No  rubs, or gallops; sternal scar  ABDOMEN: Soft, Nontender, Nondistended; Bowel sounds present,   EXTREMITIES:  +2 - leg edema   LYMPH: No lymphadenopathy noted  SKIN: ecchymosis arms,face   neg    LABS:                        8.0    7.1   )-----------( 182      ( 14 Jun 2017 12:50 )             25.2     06-14    140  |  93<L>  |  39.0<H>  ----------------------------<  127<H>  3.8   |  26.0  |  3.94<H>    Ca    8.9      14 Jun 2017 12:50    TPro  6.7  /  Alb  3.7  /  TBili  2.1<H>  /  DBili  x   /  AST  33<H>  /  ALT  44<H>  /  AlkPhos  579<H>  06-14    PT/INR - ( 14 Jun 2017 12:50 )   PT: 12.6 sec;   INR: 1.14 ratio         PTT - ( 14 Jun 2017 12:50 )  PTT:37.8 sec            RADIOLOGY & ADDITIONAL TESTS:

## 2017-06-15 LAB
ANION GAP SERPL CALC-SCNC: 22 MMOL/L — HIGH (ref 5–17)
BUN SERPL-MCNC: 53 MG/DL — HIGH (ref 8–20)
CALCIUM SERPL-MCNC: 8.7 MG/DL — SIGNIFICANT CHANGE UP (ref 8.6–10.2)
CHLORIDE SERPL-SCNC: 92 MMOL/L — LOW (ref 98–107)
CO2 SERPL-SCNC: 29 MMOL/L — SIGNIFICANT CHANGE UP (ref 22–29)
CREAT SERPL-MCNC: 5.09 MG/DL — HIGH (ref 0.5–1.3)
FERRITIN SERPL-MCNC: 922.4 NG/ML — HIGH (ref 11–306)
GLUCOSE SERPL-MCNC: 132 MG/DL — HIGH (ref 70–115)
HCT VFR BLD CALC: 23.3 % — LOW (ref 37–47)
HGB BLD-MCNC: 7.1 G/DL — LOW (ref 12–16)
IRON SATN MFR SERPL: 18 % — SIGNIFICANT CHANGE UP (ref 14–50)
IRON SATN MFR SERPL: 63 UG/DL — SIGNIFICANT CHANGE UP (ref 37–145)
MCHC RBC-ENTMCNC: 30.5 G/DL — LOW (ref 32–36)
MCHC RBC-ENTMCNC: 37.4 PG — HIGH (ref 27–31)
MCV RBC AUTO: 122.6 FL — HIGH (ref 81–99)
PLATELET # BLD AUTO: 153 K/UL — SIGNIFICANT CHANGE UP (ref 150–400)
POTASSIUM SERPL-MCNC: 4 MMOL/L — SIGNIFICANT CHANGE UP (ref 3.5–5.3)
POTASSIUM SERPL-SCNC: 4 MMOL/L — SIGNIFICANT CHANGE UP (ref 3.5–5.3)
RBC # BLD: 1.9 M/UL — LOW (ref 4.4–5.2)
RBC # FLD: 18.2 % — HIGH (ref 11–15.6)
SODIUM SERPL-SCNC: 143 MMOL/L — SIGNIFICANT CHANGE UP (ref 135–145)
TIBC SERPL-MCNC: 352 UG/DL — SIGNIFICANT CHANGE UP (ref 220–430)
TRANSFERRIN SERPL-MCNC: 246 MG/DL — SIGNIFICANT CHANGE UP (ref 192–382)
WBC # BLD: 6.2 K/UL — SIGNIFICANT CHANGE UP (ref 4.8–10.8)
WBC # FLD AUTO: 6.2 K/UL — SIGNIFICANT CHANGE UP (ref 4.8–10.8)

## 2017-06-15 PROCEDURE — 99232 SBSQ HOSP IP/OBS MODERATE 35: CPT

## 2017-06-15 PROCEDURE — 71010: CPT | Mod: 26

## 2017-06-15 RX ORDER — ERYTHROPOIETIN 10000 [IU]/ML
10000 INJECTION, SOLUTION INTRAVENOUS; SUBCUTANEOUS
Qty: 0 | Refills: 0 | Status: DISCONTINUED | OUTPATIENT
Start: 2017-06-15 | End: 2017-06-27

## 2017-06-15 RX ADMIN — Medication 3 MILLILITER(S): at 03:37

## 2017-06-15 RX ADMIN — Medication 650 MILLIGRAM(S): at 06:13

## 2017-06-15 RX ADMIN — GABAPENTIN 300 MILLIGRAM(S): 400 CAPSULE ORAL at 06:13

## 2017-06-15 RX ADMIN — Medication 3 MILLILITER(S): at 19:48

## 2017-06-15 RX ADMIN — Medication 650 MILLIGRAM(S): at 17:10

## 2017-06-15 RX ADMIN — ERYTHROPOIETIN 10000 UNIT(S): 10000 INJECTION, SOLUTION INTRAVENOUS; SUBCUTANEOUS at 15:09

## 2017-06-15 RX ADMIN — MIDODRINE HYDROCHLORIDE 5 MILLIGRAM(S): 2.5 TABLET ORAL at 17:10

## 2017-06-15 RX ADMIN — Medication 650 MILLIGRAM(S): at 07:18

## 2017-06-15 RX ADMIN — Medication 650 MILLIGRAM(S): at 11:28

## 2017-06-15 RX ADMIN — Medication 650 MILLIGRAM(S): at 12:00

## 2017-06-15 RX ADMIN — Medication 3 MILLILITER(S): at 08:23

## 2017-06-15 RX ADMIN — LIDOCAINE 2 PATCH: 4 CREAM TOPICAL at 11:28

## 2017-06-15 RX ADMIN — GABAPENTIN 300 MILLIGRAM(S): 400 CAPSULE ORAL at 22:04

## 2017-06-15 RX ADMIN — MIDODRINE HYDROCHLORIDE 5 MILLIGRAM(S): 2.5 TABLET ORAL at 06:13

## 2017-06-15 RX ADMIN — Medication 650 MILLIGRAM(S): at 00:14

## 2017-06-15 RX ADMIN — Medication 1 MILLIGRAM(S): at 11:28

## 2017-06-15 RX ADMIN — LIDOCAINE 2 PATCH: 4 CREAM TOPICAL at 23:14

## 2017-06-15 RX ADMIN — Medication 3 MILLILITER(S): at 15:25

## 2017-06-15 RX ADMIN — Medication 650 MILLIGRAM(S): at 18:00

## 2017-06-15 RX ADMIN — LIDOCAINE 2 PATCH: 4 CREAM TOPICAL at 07:18

## 2017-06-15 RX ADMIN — ATORVASTATIN CALCIUM 40 MILLIGRAM(S): 80 TABLET, FILM COATED ORAL at 22:04

## 2017-06-15 NOTE — PROGRESS NOTE ADULT - ASSESSMENT
ESRD on HD TTS schedule will HD today  Electrolytes volume status volume status ok  SAH no heparin w HD  Anemia 2/2 CKD will check iron studies today  h/p bio AVR and MV/ TR repair

## 2017-06-15 NOTE — PROGRESS NOTE ADULT - SUBJECTIVE AND OBJECTIVE BOX
INTERVAL HPI/OVERNIGHT EVENTS:  no issues overnight      PRESSORS: [ ] YES [ x] NO  WHICH:  DOSE:    ANTIBIOTICS:        x          DATE STARTED:  ANTIBIOTICS:                  DATE STARTED:  ANTIBIOTICS:                  DATE STARTED:    MEDICATIONS  (STANDING):  acetaminophen   Tablet. 650milliGRAM(s) Oral every 6 hours  gabapentin 300milliGRAM(s) Oral three times a day  lidocaine   Patch 2Patch Transdermal daily  ALBUTerol/ipratropium for Nebulization 3milliLiter(s) Nebulizer every 6 hours  folic acid 1milliGRAM(s) Oral daily  atorvastatin 40milliGRAM(s) Oral at bedtime  midodrine 5milliGRAM(s) Oral two times a day  epoetin jaswinder Injectable [id number](s) IV Push <User Schedule>    MEDICATIONS  (PRN):      Drug Dosing Weight  Height (cm): 167.6 (14 Jun 2017 15:00)  Weight (kg): 75.9 (14 Jun 2017 15:00)  BMI (kg/m2): 27 (14 Jun 2017 15:00)  BSA (m2): 1.85 (14 Jun 2017 15:00)    CENTRAL LINE: [ ] YES [ x] NO  LOCATION:   DATE INSERTED:  REMOVE: [ ] YES [ ] NO  EXPLAIN:    BOOKER: [ ] YES [x ] NO    DATE INSERTED:  REMOVE: [ ] YES [ ] NO  EXPLAIN:    A-LINE: [ ] YES [x ] NO  LOCATION:   DATE INSERTED:  REMOVE: [ ] YES [ ] NO  EXPLAIN:    PAST MEDICAL & SURGICAL HISTORY:  ESRD (end stage renal disease) on dialysis: MWF via R SC Permacath  planned AVF creation  Sinusitis, unspecified chronicity, unspecified location  Gout  Gall stones  Pneumonia  Anemia  Pulmonary hypertension  COPD (chronic obstructive pulmonary disease)  CAD (coronary artery disease)  Atrial fibrillation  Hyperlipidemia  Hypertension  Spinal fusion failure, initial encounter  Tubal ligation status  Sinusitis  Spinal stenosis  Pneumonia due to organism  No pertinent past medical history  S/P tonsillectomy  S/P appendectomy  H/O aortic valve replacement  H/O spinal fusion  H/O cataract  Cystocele  H/O tricuspid valve annuloplasty  H/O mitral valve repair      ICU Vital Signs Last 24 Hrs  T(C): 36.4, Max: 37 (06-15 @ 04:00)  T(F): 97.5, Max: 98.6 (06-15 @ 04:00)  HR: 92 (64 - 99)  BP: 112/71 (81/43 - 123/73)  BP(mean): 69 (56 - 79)  ABP: --  ABP(mean): --  RR: 28 (14 - 48)  SpO2: 96% (92% - 100%)          I&O's Detail  I & Os for 24h ending 15 Magdaleno 2017 07:00  =============================================  IN:    Oral Fluid: 240 ml    Solution: 50 ml    Total IN: 290 ml  ---------------------------------------------  OUT:    Total OUT: 0 ml  ---------------------------------------------  Total NET: 290 ml    I & Os for current day (as of 15 Magdaleno 2017 19:30)  =============================================  IN:    Total IN: 0 ml  ---------------------------------------------  OUT:    Other: 1500 ml    Total OUT: 1500 ml  ---------------------------------------------  Total NET: -1500 ml          Physical Exam:    Neurological:  No sensory/motor deficits    HEENT: PERRLA, EOMI, no drainage or redness    Neck: No bruits; no thyromegaly or nodules,  No JVD    Back: Normal spine flexure, No CVA tenderness, No deformity or limitation of movement    Respiratory: Breath Sounds equal & clear to auscultation, no accessory muscle use    Cardiovascular: Regular rate & rhythm, normal S1, S2; no murmurs, gallops or rubs    Gastrointestinal: Soft, non-tender, normal bowel sounds    Extremities: No peripheral edema, No cyanosis, clubbing     Vascular: Equal and normal pulses: 2+ peripheral pulses throughout    Musculoskeletal: No joint pain, swelling or deformity; no limitation of movement    Skin: No rashes    LABS:  CBC Full  -  ( 15 Magdaleno 2017 13:58 )  WBC Count : 6.2 K/uL  Hemoglobin : 7.1 g/dL  Hematocrit : 23.3 %  Platelet Count - Automated : 153 K/uL  Mean Cell Volume : 122.6 fl  Mean Cell Hemoglobin : 37.4 pg  Mean Cell Hemoglobin Concentration : 30.5 g/dL  Auto Neutrophil # : x  Auto Lymphocyte # : x  Auto Monocyte # : x  Auto Eosinophil # : x  Auto Basophil # : x  Auto Neutrophil % : x  Auto Lymphocyte % : x  Auto Monocyte % : x  Auto Eosinophil % : x  Auto Basophil % : x    06-15    143  |  92<L>  |  53.0<H>  ----------------------------<  132<H>  4.0   |  29.0  |  5.09<H>    Ca    8.7      15 Magdaleno 2017 13:58    TPro  6.7  /  Alb  3.7  /  TBili  2.1<H>  /  DBili  x   /  AST  33<H>  /  ALT  44<H>  /  AlkPhos  579<H>  06-14    PT/INR - ( 14 Jun 2017 12:50 )   PT: 12.6 sec;   INR: 1.14 ratio         PTT - ( 14 Jun 2017 12:50 )  PTT:37.8 sec      Assessment and Plan:    Neuro: GCS 15. Monitor for delirium.  repeat ct head , stable, Serial Neurologic assessments q 2hours.    HEENT: no issues    CV: Continue hemodynamic monitoring    Pulm: Pulmonary toilet.  Continue incentive spirometer.  Chest PT.  Encourage OOB to chair and ambulation     GI/Nutrition: Bowel Regimen    /Renal: HD today      HEME- DVT prophylaxis, SCDs, 1 u prbc today with HDD    ID: x    Lines/Tubes: x    Endo: x  x    Dispo: sicu

## 2017-06-15 NOTE — PROGRESS NOTE ADULT - SUBJECTIVE AND OBJECTIVE BOX
NEPHROLOGY INTERVAL HPI/OVERNIGHT EVENTS:    examined sitting in chair   awake no complaints  HD today    MEDICATIONS  (STANDING):  acetaminophen   Tablet. 650milliGRAM(s) Oral every 6 hours  gabapentin 300milliGRAM(s) Oral three times a day  lidocaine   Patch 2Patch Transdermal daily  ALBUTerol/ipratropium for Nebulization 3milliLiter(s) Nebulizer every 6 hours  folic acid 1milliGRAM(s) Oral daily  atorvastatin 40milliGRAM(s) Oral at bedtime  midodrine 5milliGRAM(s) Oral two times a day    MEDICATIONS  (PRN):      Allergies    allopurinol (Rash)  codeine (Nausea; Vomiting)  penicillin (Rash)  spironolactone (Unknown)    Intolerances        Vital Signs Last 24 Hrs  T(C): 36.4, Max: 37.1 (06-14 @ 13:10)  T(F): 97.6, Max: 98.7 (06-14 @ 13:10)  HR: 80 (75 - 98)  BP: 110/55 (81/43 - 117/62)  BP(mean): 79 (56 - 87)  RR: 21 (14 - 42)  SpO2: 100% (95% - 100%)  Daily Height in cm: 167.64 (14 Jun 2017 15:00)    Daily     PHYSICAL EXAM:  GENERAL: NAD  HEENT: ecchymosis forhead  NECK: Supple.   LUNGS: Good air entry CTA B/L  HEART: Regular rate and rhythm + murmur.  ABDOMEN: Soft, nontender, and nondistended  EXTREMITIES: no LE edema   NEUROLOGIC: Grossly intact.    LABS:                        8.0    7.1   )-----------( 182      ( 14 Jun 2017 12:50 )             25.2     06-14    140  |  93<L>  |  39.0<H>  ----------------------------<  127<H>  3.8   |  26.0  |  3.94<H>    Ca    8.9      14 Jun 2017 12:50    TPro  6.7  /  Alb  3.7  /  TBili  2.1<H>  /  DBili  x   /  AST  33<H>  /  ALT  44<H>  /  AlkPhos  579<H>  06-14    PT/INR - ( 14 Jun 2017 12:50 )   PT: 12.6 sec;   INR: 1.14 ratio         PTT - ( 14 Jun 2017 12:50 )  PTT:37.8 sec            RADIOLOGY & ADDITIONAL TESTS:

## 2017-06-15 NOTE — PROGRESS NOTE ADULT - ASSESSMENT
IMP: 76yf, right hand dominant, presents after fall w tSAH to left parietal/occipital lobes, + plavix/ASA  pt doing well today, has no acute complains   f/u CTB from last night: stable   PLAN: con;t current care  no NSx intervention at this time  reconsult as needed, F.u. as needed   hold ASA/ plavix at this time - will f/u w attending    case and plan d/w Attending

## 2017-06-15 NOTE — CONSULT NOTE ADULT - SUBJECTIVE AND OBJECTIVE BOX
[facility]                                           [facility]                                   [address] [address]                                                      PHONE: [phone]                                                         FAX: [phone]  -------------------------------------------------------------------------------------------------------------------------------    76y Female with past medical history as under presented after mechanical fall from stand.  Patient reports tripping and falling on the sidewalk while walking. Denies LOC. She was most recently admitted in 5/2017 after a fall. Coumadin was discontinued at that time and pt was started on ASA and Plavix. Pt reports significant bruising. Denies chest pain or dyspnea. Tolerating HD well via HD catheter. At the time of evaluation, pt reports no symptoms.    PAST MEDICAL & SURGICAL HISTORY:  ESRD (end stage renal disease) on dialysis: MWF via R SC Permacath  planned AVF creation  Sinusitis, unspecified chronicity, unspecified location  Gout  Gall stones  Pneumonia  Anemia  Pulmonary hypertension  COPD (chronic obstructive pulmonary disease)  CAD (coronary artery disease)  Atrial fibrillation  Hyperlipidemia  Hypertension  Spinal fusion failure, initial encounter  Tubal ligation status  Sinusitis  Spinal stenosis  Pneumonia due to organism  No pertinent past medical history  S/P tonsillectomy  S/P appendectomy  H/O aortic valve replacement  H/O spinal fusion  H/O cataract  Cystocele  H/O tricuspid valve annuloplasty  H/O mitral valve repair      allopurinol (Rash)  codeine (Nausea; Vomiting)  penicillin (Rash)  spironolactone (Unknown)      Review of Systems:   Positive for fall, bruising  Rest of the systems were reviewed and was negative.     Family history reviewed and non-contributory    Social History:  No smoking   No alcohol  No other drug use    Vital Signs Last 24 Hrs  T(C): 36.9, Max: 37.1 (06-14 @ 13:10)  T(F): 98.5, Max: 98.7 (06-14 @ 13:10)  HR: 82 (75 - 98)  BP: 89/48 (81/43 - 117/62)  BP(mean): 64 (56 - 87)  RR: 18 (14 - 32)  SpO2: 96% (95% - 100%)    PHYSICAL EXAM:  Constitutional: Oriented to time, place and person. Appears well developed, well nourished; alert and co-operative  HEENT:     Conjunctiva normal, Normal oral mucosa, No JVD	  Cardiovascular: S1, S2 irregular  Respiratory: Lungs clear to auscultation; no crepitations, no wheeze  Gastrointestinal:  Soft, Non-tender, + BS	  Extremities: No cyanosis, clubbing or edema  Skin: + bruising  Neurologic: Alert oriented to time place and person  Psychiatric: affect was normal        LABS:                        8.0    7.1   )-----------( 182      ( 14 Jun 2017 12:50 )             25.2     06-14    140  |  93<L>  |  39.0<H>  ----------------------------<  127<H>  3.8   |  26.0  |  3.94<H>    Ca    8.9      14 Jun 2017 12:50    TPro  6.7  /  Alb  3.7  /  TBili  2.1<H>  /  DBili  x   /  AST  33<H>  /  ALT  44<H>  /  AlkPhos  579<H>  06-14      PT/INR - ( 14 Jun 2017 12:50 )   PT: 12.6 sec;   INR: 1.14 ratio       PTT - ( 14 Jun 2017 12:50 )  PTT:37.8 sec    RADIOLOGY & ADDITIONAL STUDIES:   EXAM:  CT BRAIN                          PROCEDURE DATE:  06/14/2017        INTERPRETATION:  Head CT without contrast   COMPARISON: 6/14/2017 taken 12:55.  CLINICAL INFORMATION: A subarachnoid hemorrhage. Follow-up..  TECHNIQUE: Contiguous axial 2.5 mm slice thickness images of the head   were obtained without the use of intravenous contrast media.  FINDINGS:  There is a stable left parietal lobe and left temporal occipital lobe   subarachnoid hemorrhage without epidural or subdural hemorrhageor. No   shift midline structures identified.   Moderate cerebral volume loss is present with secondary proportional   prominence of the sulci and ventricles.    The posterior fossa structures and basal cisterns appear normal.    Vascular calcification seen within carotid siphon vessels.  There is no intracranial mass or midline shift.    There is no sulcal effacement to suggest acute stroke.    There are scattered white matter hypodensities consistent with small   vessel disease.    The basal ganglia and thalami appear normal in morphology and attenuation.    The visualized portions of the optic globes and paranasal sinuses are   normal.    There are no skull fractures.    IMPRESSION:  Stable left posterior parietal left temporal occipital lobe subarachnoid   hemorrhage as described. No midline shift. No other interval change.   Continued surveillance recommended.    CARDIOLOGY TESTING:     ECG: AF with controlled response    Echocardiogram:   1. Left ventricular ejection fraction, by visual estimation, is 55 to   60%.   2. Normal global left ventricular systolic function.   3. Spectral Doppler shows restrictive pattern of left ventricular   myocardial filling (Grade III diastolic dysfunction). Elevated left   atrial and left ventricular end-diastolic pressures.   4. Normal left ventricular internal cavity size.   5. Severely enlarged right ventricle. Severely reduced RV systolic   function.   6. Severely enlarged left atrium.   7. Severely dilated right atrium.   8. Status-post mitral annular ring insertion.   9. Thickening and calcification of the anterior and posterior mitral   valve leaflets.  10. Mitral valve mean gradient is 4.4 mmHg consistent with mild mitral   stenosis.  11. Mild to moderate mitral valve regurgitation.  12. Bioprosthesis in the aortic position demonstrating normal function.  13. Mild aortic regurgitation.  14. Status post tricuspid valve repair.  15. Moderate-severe tricuspid regurgitation.  16. Moderate pulmonic valve regurgitation.  17. Estimated pulmonary artery systolic pressure is 56.8 mmHg assuming a   right atrial pressure of 15 mmHg, which is consistent with moderate   pulmonary hypertension.  18. There is no evidence of pericardial effusion.    MEDICATIONS:  MEDICATIONS  (STANDING):  acetaminophen   Tablet. 650milliGRAM(s) Oral every 6 hours  gabapentin 300milliGRAM(s) Oral three times a day  lidocaine   Patch 2Patch Transdermal daily  ALBUTerol/ipratropium for Nebulization 3milliLiter(s) Nebulizer every 6 hours  folic acid 1milliGRAM(s) Oral daily  atorvastatin 40milliGRAM(s) Oral at bedtime  midodrine 5milliGRAM(s) Oral two times a day    MEDICATIONS  (PRN):    ASSESSMENT AND PLAN:    76y Female with prior history of HTN ESRD on HD anemia, hx Bio AVR with MV/TV annuloplasty,  PHTN; not an AC candidate due to bleeding and fall risk maintained on ASA and Plavix who presents after a mechanical fall. CT with  left posterior parietal left temporal occipital lobe subarachnoid  hemorrhage    -  Off ASA and Plavix at this time. Will likely keep off DAPT and restart ASA when OK with neurosurgery  -  Fluid status managed with HD.  -  On midodrine for low BP  -  AF rate controlled

## 2017-06-15 NOTE — CONSULT NOTE ADULT - ASSESSMENT
Patient post op.  Some rhonchi but good gas exchange.  May be related to need for dialysis but does have mild COPD (FEV1/FVC-70%) and there may be some element of reactive airway dysfunction post op.    RIAZ>not on CPAP.      Plan:  1.Discussed with ICU team  2.Titrate O2 but would continue with sleep  3.Albuterol PRN  4.Will f/u PRN  5.Needs f/u with  post d/c
s/p fall without LOC and with SDH left; CRF 5, Anemia, COPD, s/p valve surgeries
76yf presents after fall.  This the 2nd fall that has needed admission since, April 2017. Pt has a hx of CABG with valve replacement of bovine, Pt is normally on plavix and asa. Pt is HTN, ESRD and now with mild resp distress which she is attributing to post nasal drip yet, chest sounds coarse.  Pt will need to be maintained off all anticoag therapy.  Pt will need to have no heparin  during dialysis which normally takes place on Tue, Thursday and Saturday. Pt will need a repeat scan 6 hours post original scan.  Heparin and dialysis d/w Dr Saldivar  All recommendations discussed with Dr Castro

## 2017-06-15 NOTE — CONSULT NOTE ADULT - SUBJECTIVE AND OBJECTIVE BOX
PULMONARY CONSULT NOTE      DUSTIN HERNANDEZDEBBI-[id number]    Patient is a 76y old  Female who presents with a chief complaint of Mechanical Fall (14 Jun 2017 14:29)  Patient suffered a subdural and fall>post op.  Currently awake and alert.  No respiratory distress on nasal O2.  Has very mild COPD on no maintenance therapy>uses albuterol PRN.  Also with mild RIAZ for which she does not use CPAP but has oxygen that she uses with sleep.  Currently with minor cough>due for dialysis today.  Denies dyspnea or chest pain.     INTERVAL HPI/OVERNIGHT EVENTS:    MEDICATIONS  (STANDING):  acetaminophen   Tablet. 650milliGRAM(s) Oral every 6 hours  gabapentin 300milliGRAM(s) Oral three times a day  lidocaine   Patch 2Patch Transdermal daily  ALBUTerol/ipratropium for Nebulization 3milliLiter(s) Nebulizer every 6 hours  folic acid 1milliGRAM(s) Oral daily  atorvastatin 40milliGRAM(s) Oral at bedtime  midodrine 5milliGRAM(s) Oral two times a day      MEDICATIONS  (PRN):      Allergies    allopurinol (Rash)  codeine (Nausea; Vomiting)  penicillin (Rash)  spironolactone (Unknown)    Intolerances        PAST MEDICAL & SURGICAL HISTORY:  ESRD (end stage renal disease) on dialysis: MWF via R SC Permacath  planned AVF creation  Sinusitis, unspecified chronicity, unspecified location  Gout  Gall stones  Pneumonia  Anemia  Pulmonary hypertension  COPD (chronic obstructive pulmonary disease)  CAD (coronary artery disease)  Atrial fibrillation  Hyperlipidemia  Hypertension  Spinal fusion failure, initial encounter  Tubal ligation status  Sinusitis  Spinal stenosis  Pneumonia due to organism  No pertinent past medical history  S/P tonsillectomy  S/P appendectomy  H/O aortic valve replacement  H/O spinal fusion  H/O cataract  Cystocele  H/O tricuspid valve annuloplasty  H/O mitral valve repair      FAMILY HISTORY:  CAD (coronary artery disease)      SOCIAL HISTORY  Smoking History:     REVIEW OF SYSTEMS:    CONSTITUTIONAL:  As per HPI.    HEENT:  Eyes:  No diplopia or blurred vision. ENT:  No earache, sore throat or runny nose.    CARDIOVASCULAR:  No pressure, squeezing, tightness, heaviness or aching about the chest; no palpitations.    RESPIRATORY:  Per HPI    GASTROINTESTINAL:  No nausea, vomiting or diarrhea.    GENITOURINARY:  No dysuria, frequency or urgency.    MUSCULOSKELETAL:  No joint pains    SKIN:  No new lesions.    NEUROLOGIC:  No paresthesias, fasciculations, seizures or weakness.    PSYCHIATRIC:  No disorder of thought or mood.    ENDOCRINE:  No heat or cold intolerance, polyuria or polydipsia.    HEMATOLOGICAL:  No easy bruising or bleeding.     Vital Signs Last 24 Hrs  T(C): 36.9, Max: 37.1 (06-14 @ 13:10)  T(F): 98.5, Max: 98.7 (06-14 @ 13:10)  HR: 80 (75 - 98)  BP: 110/55 (81/43 - 117/62)  BP(mean): 79 (56 - 87)  RR: 21 (14 - 42)  SpO2: 100% (95% - 100%)    PHYSICAL EXAMINATION:    GENERAL: The patient is a well-developed, well-nourished _____in no apparent distress.     HEENT: Head is normocephalic and atraumatic. Extraocular muscles are intact. Mucous membranes are moist.     NECK: Supple.     LUNGS: Good air entry.  Bilateral rhonchi    HEART: Regular rate and rhythm without murmur.    ABDOMEN: Soft, nontender, and nondistended.  No hepatosplenomegaly is noted.    EXTREMITIES: Without any cyanosis, clubbing, rash, lesions or edema.    NEUROLOGIC: Grossly intact.    SKIN: No ulceration or induration present.      LABS:                        8.0    7.1   )-----------( 182      ( 14 Jun 2017 12:50 )             25.2     06-14    140  |  93<L>  |  39.0<H>  ----------------------------<  127<H>  3.8   |  26.0  |  3.94<H>    Ca    8.9      14 Jun 2017 12:50    TPro  6.7  /  Alb  3.7  /  TBili  2.1<H>  /  DBili  x   /  AST  33<H>  /  ALT  44<H>  /  AlkPhos  579<H>  06-14    PT/INR - ( 14 Jun 2017 12:50 )   PT: 12.6 sec;   INR: 1.14 ratio         PTT - ( 14 Jun 2017 12:50 )  PTT:37.8 sec                    MICROBIOLOGY:    RADIOLOGY & ADDITIONAL STUDIES:

## 2017-06-15 NOTE — PROGRESS NOTE ADULT - SUBJECTIVE AND OBJECTIVE BOX
NEUROSURGERY PROGRESS NOTE:     76yf, right hand dominant, presents after fall w tSAH to left parietal/occipital lobes, + plavix/ASA - currently on hold   pt doing well today, has no acute complains   pt c/o + headache   - Nausea / - Vomiting  denies weakness  denies numbness/ tingling  denies visual changes  denies C/T/LS  Spine pain  +  void   - BM  + OOB  + diet  - grullon cath  + venodynes b/l when in bed       MEDICATIONS  (STANDING):  acetaminophen   Tablet. 650milliGRAM(s) Oral every 6 hours  gabapentin 300milliGRAM(s) Oral three times a day  lidocaine   Patch 2Patch Transdermal daily  ALBUTerol/ipratropium for Nebulization 3milliLiter(s) Nebulizer every 6 hours  folic acid 1milliGRAM(s) Oral daily  atorvastatin 40milliGRAM(s) Oral at bedtime  midodrine 5milliGRAM(s) Oral two times a day    MEDICATIONS  (PRN):      Allergies    allopurinol (Rash)  codeine (Nausea; Vomiting)  penicillin (Rash)  spironolactone (Unknown)    Intolerances        Vital Signs Last 24 Hrs  T(C): 36.9, Max: 37.1 (06-14 @ 13:10)  T(F): 98.5, Max: 98.7 (06-14 @ 13:10)  HR: 80 (75 - 98)  BP: 110/55 (81/43 - 117/62)  BP(mean): 79 (56 - 87)  RR: 21 (14 - 42)  SpO2: 100% (95% - 100%)    PHYSICAL EXAM:  GENERAL: NAD, well-groomed, well-developed  HEAD: Normocephalic, + traumatic w periorbital ecchymosis   EYES: b/l EOMI, PERRLA, conjunctiva and sclera clear  NECK: Supple, nontender on palpation   NERVOUS SYSTEM:  Alert & Oriented X3, Good concentration, speech clear;  Motor Strength 5/5 B/L upper and lower extremities; sensation at baseline and no sensory changes reported. No pronators/ frank's appreciated b/l, no ankle clonus appreciated b/l.  EXTREMITIES:  2+ Peripheral Pulses, No clubbing, cyanosis, or edema appreciated b/l         LABS:                        8.0    7.1   )-----------( 182      ( 14 Jun 2017 12:50 )             25.2     06-14    140  |  93<L>  |  39.0<H>  ----------------------------<  127<H>  3.8   |  26.0  |  3.94<H>    Ca    8.9      14 Jun 2017 12:50    TPro  6.7  /  Alb  3.7  /  TBili  2.1<H>  /  DBili  x   /  AST  33<H>  /  ALT  44<H>  /  AlkPhos  579<H>  06-14  PT/INR - ( 14 Jun 2017 12:50 )   PT: 12.6 sec;   INR: 1.14 ratio    PTT - ( 14 Jun 2017 12:50 )  PTT:37.8 sec      RADIOLOGY & ADDITIONAL TESTS:     EXAM:  CT BRAIN                          PROCEDURE DATE:  06/14/2017        INTERPRETATION:  Head CT without contrast   COMPARISON: 6/14/2017 taken 12:55.  CLINICAL INFORMATION: A subarachnoid hemorrhage. Follow-up..  TECHNIQUE: Contiguous axial 2.5 mm slice thickness images of the head   were obtained without the use of intravenous contrast media.  FINDINGS:  There is a stable left parietal lobe and left temporal occipital lobe   subarachnoid hemorrhage without epidural or subdural hemorrhageor. No   shift midline structures identified.   Moderate cerebral volume loss is present with secondary proportional   prominence of the sulci and ventricles.    The posterior fossa structures and basal cisterns appear normal.    Vascular calcification seen within carotid siphon vessels.  There is no intracranial mass or midline shift.    There is no sulcal effacement to suggest acute stroke.    There are scattered white matter hypodensities consistent with small   vessel disease.    The basal ganglia and thalami appear normal in morphology and attenuation.    The visualized portions of the optic globes and paranasal sinuses are   normal.    There are no skull fractures.    IMPRESSION:  Stable left posterior parietal left temporal occipital lobe subarachnoid   hemorrhage as described. No midline shift. No other interval change.   Continued surveillance recommended.  .                MICHAEL SLAUGHTER M.D., ATTENDING RADIOLOGIST  This document has been electronically signed. Jun 14 2017  9:23PM

## 2017-06-16 LAB
ALBUMIN SERPL ELPH-MCNC: 3.5 G/DL — SIGNIFICANT CHANGE UP (ref 3.3–5.2)
ALP SERPL-CCNC: 482 U/L — HIGH (ref 40–120)
ALT FLD-CCNC: 31 U/L — SIGNIFICANT CHANGE UP
ANION GAP SERPL CALC-SCNC: 16 MMOL/L — SIGNIFICANT CHANGE UP (ref 5–17)
ANISOCYTOSIS BLD QL: SLIGHT — SIGNIFICANT CHANGE UP
AST SERPL-CCNC: 27 U/L — SIGNIFICANT CHANGE UP
BILIRUB DIRECT SERPL-MCNC: 0.9 MG/DL — HIGH (ref 0–0.3)
BILIRUB INDIRECT FLD-MCNC: 0.7 MG/DL — SIGNIFICANT CHANGE UP (ref 0.2–1)
BILIRUB SERPL-MCNC: 1.6 MG/DL — SIGNIFICANT CHANGE UP (ref 0.4–2)
BUN SERPL-MCNC: 38 MG/DL — HIGH (ref 8–20)
CALCIUM SERPL-MCNC: 8.7 MG/DL — SIGNIFICANT CHANGE UP (ref 8.6–10.2)
CHLORIDE SERPL-SCNC: 97 MMOL/L — LOW (ref 98–107)
CO2 SERPL-SCNC: 25 MMOL/L — SIGNIFICANT CHANGE UP (ref 22–29)
CREAT SERPL-MCNC: 3.34 MG/DL — HIGH (ref 0.5–1.3)
ELLIPTOCYTES BLD QL SMEAR: SLIGHT — SIGNIFICANT CHANGE UP
EOSINOPHIL NFR BLD AUTO: 2 % — SIGNIFICANT CHANGE UP (ref 0–5)
GLUCOSE SERPL-MCNC: 133 MG/DL — HIGH (ref 70–115)
HCT VFR BLD CALC: 24.8 % — LOW (ref 37–47)
HGB BLD-MCNC: 7.8 G/DL — LOW (ref 12–16)
HYPERCHROMIA BLD QL AUTO: SLIGHT — SIGNIFICANT CHANGE UP
HYPOCHROMIA BLD QL: SLIGHT — SIGNIFICANT CHANGE UP
LACTATE SERPL-SCNC: 0.9 MMOL/L — SIGNIFICANT CHANGE UP (ref 0.5–2)
LYMPHOCYTES # BLD AUTO: 2 % — LOW (ref 20–55)
MACROCYTES BLD QL: SLIGHT — SIGNIFICANT CHANGE UP
MAGNESIUM SERPL-MCNC: 1.9 MG/DL — SIGNIFICANT CHANGE UP (ref 1.6–2.6)
MCHC RBC-ENTMCNC: 31.5 G/DL — LOW (ref 32–36)
MCHC RBC-ENTMCNC: 35.9 PG — HIGH (ref 27–31)
MCV RBC AUTO: 114.3 FL — HIGH (ref 81–99)
MONOCYTES NFR BLD AUTO: 10 % — SIGNIFICANT CHANGE UP (ref 3–10)
NEUTROPHILS NFR BLD AUTO: 86 % — HIGH (ref 37–73)
OVALOCYTES BLD QL SMEAR: SLIGHT — SIGNIFICANT CHANGE UP
PHOSPHATE SERPL-MCNC: 3.9 MG/DL — SIGNIFICANT CHANGE UP (ref 2.4–4.7)
PLAT MORPH BLD: NORMAL — SIGNIFICANT CHANGE UP
PLATELET # BLD AUTO: 168 K/UL — SIGNIFICANT CHANGE UP (ref 150–400)
POIKILOCYTOSIS BLD QL AUTO: SLIGHT — SIGNIFICANT CHANGE UP
POTASSIUM SERPL-MCNC: 4.6 MMOL/L — SIGNIFICANT CHANGE UP (ref 3.5–5.3)
POTASSIUM SERPL-SCNC: 4.6 MMOL/L — SIGNIFICANT CHANGE UP (ref 3.5–5.3)
PROT SERPL-MCNC: 6.3 G/DL — LOW (ref 6.6–8.7)
RBC # BLD: 2.17 M/UL — LOW (ref 4.4–5.2)
RBC # FLD: 22.2 % — HIGH (ref 11–15.6)
RBC BLD AUTO: ABNORMAL
SODIUM SERPL-SCNC: 138 MMOL/L — SIGNIFICANT CHANGE UP (ref 135–145)
WBC # BLD: 7.8 K/UL — SIGNIFICANT CHANGE UP (ref 4.8–10.8)
WBC # FLD AUTO: 7.8 K/UL — SIGNIFICANT CHANGE UP (ref 4.8–10.8)

## 2017-06-16 RX ORDER — ENOXAPARIN SODIUM 100 MG/ML
30 INJECTION SUBCUTANEOUS ONCE
Qty: 0 | Refills: 0 | Status: COMPLETED | OUTPATIENT
Start: 2017-06-16 | End: 2017-06-16

## 2017-06-16 RX ORDER — ENOXAPARIN SODIUM 100 MG/ML
40 INJECTION SUBCUTANEOUS DAILY
Qty: 0 | Refills: 0 | Status: DISCONTINUED | OUTPATIENT
Start: 2017-06-16 | End: 2017-06-16

## 2017-06-16 RX ORDER — IRON SUCROSE 20 MG/ML
200 INJECTION, SOLUTION INTRAVENOUS DAILY
Qty: 0 | Refills: 0 | Status: COMPLETED | OUTPATIENT
Start: 2017-06-16 | End: 2017-06-17

## 2017-06-16 RX ORDER — SODIUM CHLORIDE 0.65 %
1 AEROSOL, SPRAY (ML) NASAL EVERY 6 HOURS
Qty: 0 | Refills: 0 | Status: DISCONTINUED | OUTPATIENT
Start: 2017-06-16 | End: 2017-06-27

## 2017-06-16 RX ORDER — SENNA PLUS 8.6 MG/1
2 TABLET ORAL AT BEDTIME
Qty: 0 | Refills: 0 | Status: DISCONTINUED | OUTPATIENT
Start: 2017-06-16 | End: 2017-06-27

## 2017-06-16 RX ORDER — DOCUSATE SODIUM 100 MG
100 CAPSULE ORAL THREE TIMES A DAY
Qty: 0 | Refills: 0 | Status: DISCONTINUED | OUTPATIENT
Start: 2017-06-16 | End: 2017-06-27

## 2017-06-16 RX ADMIN — MIDODRINE HYDROCHLORIDE 5 MILLIGRAM(S): 2.5 TABLET ORAL at 17:25

## 2017-06-16 RX ADMIN — ATORVASTATIN CALCIUM 40 MILLIGRAM(S): 80 TABLET, FILM COATED ORAL at 22:54

## 2017-06-16 RX ADMIN — ENOXAPARIN SODIUM 30 MILLIGRAM(S): 100 INJECTION SUBCUTANEOUS at 12:21

## 2017-06-16 RX ADMIN — GABAPENTIN 300 MILLIGRAM(S): 400 CAPSULE ORAL at 06:27

## 2017-06-16 RX ADMIN — Medication 100 MILLIGRAM(S): at 22:55

## 2017-06-16 RX ADMIN — Medication 650 MILLIGRAM(S): at 17:25

## 2017-06-16 RX ADMIN — Medication 100 MILLIGRAM(S): at 14:01

## 2017-06-16 RX ADMIN — Medication 3 MILLILITER(S): at 21:01

## 2017-06-16 RX ADMIN — Medication 3 MILLILITER(S): at 03:44

## 2017-06-16 RX ADMIN — Medication 650 MILLIGRAM(S): at 18:10

## 2017-06-16 RX ADMIN — Medication 650 MILLIGRAM(S): at 07:17

## 2017-06-16 RX ADMIN — LIDOCAINE 2 PATCH: 4 CREAM TOPICAL at 12:21

## 2017-06-16 RX ADMIN — Medication 3 MILLILITER(S): at 16:05

## 2017-06-16 RX ADMIN — Medication 650 MILLIGRAM(S): at 06:27

## 2017-06-16 RX ADMIN — SENNA PLUS 2 TABLET(S): 8.6 TABLET ORAL at 22:55

## 2017-06-16 RX ADMIN — Medication 650 MILLIGRAM(S): at 13:21

## 2017-06-16 RX ADMIN — GABAPENTIN 300 MILLIGRAM(S): 400 CAPSULE ORAL at 14:01

## 2017-06-16 RX ADMIN — Medication 100 MILLIGRAM(S): at 06:27

## 2017-06-16 RX ADMIN — Medication 650 MILLIGRAM(S): at 12:21

## 2017-06-16 RX ADMIN — GABAPENTIN 300 MILLIGRAM(S): 400 CAPSULE ORAL at 22:55

## 2017-06-16 RX ADMIN — Medication 1 MILLIGRAM(S): at 12:21

## 2017-06-16 RX ADMIN — IRON SUCROSE 110 MILLIGRAM(S): 20 INJECTION, SOLUTION INTRAVENOUS at 12:21

## 2017-06-16 RX ADMIN — MIDODRINE HYDROCHLORIDE 5 MILLIGRAM(S): 2.5 TABLET ORAL at 06:27

## 2017-06-16 RX ADMIN — Medication 3 MILLILITER(S): at 08:59

## 2017-06-16 NOTE — DISCHARGE NOTE ADULT - INSTRUCTIONS
Follow up: Please call and make an appointment with [facility] for one week after discharge. Also, please call and make an appointment with your primary care physician as per your usual schedule.   Activity: May return to normal activities as tolerated, Please, limit activity and rest until follow up appointment.   Diet: May continue consistent carb, renal diet.  Medications: Please take all home medications as prescribed by your primary care doctor. Pain medication has been prescribed for you. Please, take it as it has been prescribed, do not drive or operate heavy machinery while taking narcotics.   If confusion, altered mental status, fever, chest pain, shortness of breath, new or worsening head pain, vomiting, change or worsening of medical status, please come back to the emergency room, and in case of emergency call 911.

## 2017-06-16 NOTE — DISCHARGE NOTE ADULT - PLAN OF CARE
Alleviation of pain and symptoms Follow up: Please call and make an appointment with [facility] for one week after discharge. Also, please call and make an appointment with your primary care physician as per your usual schedule.   Activity: May return to normal activities as tolerated, Please, limit activity and rest until follow up appointment.   Diet: May continue consistent carb, renal diet.  Medications: Please take all home medications as prescribed by your primary care doctor. Pain medication has been prescribed for you. Please, take it as it has been prescribed, do not drive or operate heavy machinery while taking narcotics.   If confusion, altered mental status, fever, chest pain, shortness of breath, new or worsening head pain, vomiting, change or worsening of medical status, please come back to the emergency room, and in case of emergency call 911. Chest PT  oxygen prn  Mucomyst + albuterol q 6 hours Rate control HD as per nephrology Dr. Desai from opthalmology evaluated patient at bedside on 06/26 @ 12:42  Recommendations:  Pt noted to have 20/50 vision and having diffculty with peripheral view, eye pressure is normal  Pt may follow up as outpatient to Dr. Desai [facility] ([phone]) for further evalaution  -Artifical tears QID  Lacrilube at bedtime

## 2017-06-16 NOTE — DISCHARGE NOTE ADULT - CARE PROVIDER_API CALL
acute care surgery,   [address] [facility] [address]st [place] [address]  Phone: [phone]  Fax: (   )    -

## 2017-06-16 NOTE — DISCHARGE NOTE ADULT - CARE PLAN
Principal Discharge DX:	Subarachnoid hemorrhage  Goal:	Alleviation of pain and symptoms  Instructions for follow-up, activity and diet:	Follow up: Please call and make an appointment with [facility] for one week after discharge. Also, please call and make an appointment with your primary care physician as per your usual schedule.   Activity: May return to normal activities as tolerated, Please, limit activity and rest until follow up appointment.   Diet: May continue consistent carb, renal diet.  Medications: Please take all home medications as prescribed by your primary care doctor. Pain medication has been prescribed for you. Please, take it as it has been prescribed, do not drive or operate heavy machinery while taking narcotics.   If confusion, altered mental status, fever, chest pain, shortness of breath, new or worsening head pain, vomiting, change or worsening of medical status, please come back to the emergency room, and in case of emergency call 911.  Secondary Diagnosis:	Closed fracture of multiple ribs of right side with routine healing  Goal:	Alleviation of pain and symptoms  Instructions for follow-up, activity and diet:	Follow up: Please call and make an appointment with [facility] for one week after discharge. Also, please call and make an appointment with your primary care physician as per your usual schedule.   Activity: May return to normal activities as tolerated, Please, limit activity and rest until follow up appointment.   Diet: May continue consistent carb, renal diet.  Medications: Please take all home medications as prescribed by your primary care doctor. Pain medication has been prescribed for you. Please, take it as it has been prescribed, do not drive or operate heavy machinery while taking narcotics.   If confusion, altered mental status, fever, chest pain, shortness of breath, new or worsening head pain, vomiting, change or worsening of medical status, please come back to the emergency room, and in case of emergency call 911.  Secondary Diagnosis:	Chronic obstructive pulmonary disease, unspecified COPD type  Goal:	Alleviation of pain and symptoms  Instructions for follow-up, activity and diet:	Chest PT  oxygen prn  Mucomyst + albuterol q 6 hours  Secondary Diagnosis:	Atrial fibrillation  Goal:	Alleviation of pain and symptoms  Instructions for follow-up, activity and diet:	Rate control  Secondary Diagnosis:	ESRD (end stage renal disease) on dialysis  Goal:	Alleviation of pain and symptoms  Instructions for follow-up, activity and diet:	HD as per nephrology  Secondary Diagnosis:	Blurry vision, bilateral  Goal:	Alleviation of pain and symptoms  Instructions for follow-up, activity and diet:	Dr. Desai from opthalmology evaluated patient at bedside on 06/26 @ 12:42  Recommendations:  Pt noted to have 20/50 vision and having diffculty with peripheral view, eye pressure is normal  Pt may follow up as outpatient to Dr. Desai clinic ([phone]) for further evalaution  -Artifical tears QID  Lacrilube at bedtime Principal Discharge DX:	Subarachnoid hemorrhage  Goal:	Alleviation of pain and symptoms  Instructions for follow-up, activity and diet:	Follow up: Please call and make an appointment with [facility] for one week after discharge. Also, please call and make an appointment with your primary care physician as per your usual schedule.   Activity: May return to normal activities as tolerated, Please, limit activity and rest until follow up appointment.   Diet: May continue consistent carb, renal diet.  Medications: Please take all home medications as prescribed by your primary care doctor. Pain medication has been prescribed for you. Please, take it as it has been prescribed, do not drive or operate heavy machinery while taking narcotics.   If confusion, altered mental status, fever, chest pain, shortness of breath, new or worsening head pain, vomiting, change or worsening of medical status, please come back to the emergency room, and in case of emergency call 911.  Secondary Diagnosis:	Closed fracture of multiple ribs of right side with routine healing  Goal:	Alleviation of pain and symptoms  Instructions for follow-up, activity and diet:	Follow up: Please call and make an appointment with [facility] for one week after discharge. Also, please call and make an appointment with your primary care physician as per your usual schedule.   Activity: May return to normal activities as tolerated, Please, limit activity and rest until follow up appointment.   Diet: May continue consistent carb, renal diet.  Medications: Please take all home medications as prescribed by your primary care doctor. Pain medication has been prescribed for you. Please, take it as it has been prescribed, do not drive or operate heavy machinery while taking narcotics.   If confusion, altered mental status, fever, chest pain, shortness of breath, new or worsening head pain, vomiting, change or worsening of medical status, please come back to the emergency room, and in case of emergency call 911.  Secondary Diagnosis:	Chronic obstructive pulmonary disease, unspecified COPD type  Goal:	Alleviation of pain and symptoms  Instructions for follow-up, activity and diet:	Chest PT  oxygen prn  Mucomyst + albuterol q 6 hours  Secondary Diagnosis:	Atrial fibrillation  Goal:	Alleviation of pain and symptoms  Instructions for follow-up, activity and diet:	Rate control  Secondary Diagnosis:	ESRD (end stage renal disease) on dialysis  Goal:	Alleviation of pain and symptoms  Instructions for follow-up, activity and diet:	HD as per nephrology  Secondary Diagnosis:	Blurry vision, bilateral  Goal:	Alleviation of pain and symptoms  Instructions for follow-up, activity and diet:	Dr. Desai from opthalmology evaluated patient at bedside on 06/26 @ 12:42  Recommendations:  Pt noted to have 20/50 vision and having diffculty with peripheral view, eye pressure is normal  Pt may follow up as outpatient to Dr. Desai clinic ([phone]) for further evalaution  -Artifical tears QID  Lacrilube at bedtime Principal Discharge DX:	Subarachnoid hemorrhage  Goal:	Alleviation of pain and symptoms  Instructions for follow-up, activity and diet:	Follow up: Please call and make an appointment with [facility] for one week after discharge. Also, please call and make an appointment with your primary care physician as per your usual schedule.   Activity: May return to normal activities as tolerated, Please, limit activity and rest until follow up appointment.   Diet: May continue consistent carb, renal diet.  Medications: Please take all home medications as prescribed by your primary care doctor. Pain medication has been prescribed for you. Please, take it as it has been prescribed, do not drive or operate heavy machinery while taking narcotics.   If confusion, altered mental status, fever, chest pain, shortness of breath, new or worsening head pain, vomiting, change or worsening of medical status, please come back to the emergency room, and in case of emergency call 911.  Secondary Diagnosis:	Closed fracture of multiple ribs of right side with routine healing  Goal:	Alleviation of pain and symptoms  Instructions for follow-up, activity and diet:	Follow up: Please call and make an appointment with [facility] for one week after discharge. Also, please call and make an appointment with your primary care physician as per your usual schedule.   Activity: May return to normal activities as tolerated, Please, limit activity and rest until follow up appointment.   Diet: May continue consistent carb, renal diet.  Medications: Please take all home medications as prescribed by your primary care doctor. Pain medication has been prescribed for you. Please, take it as it has been prescribed, do not drive or operate heavy machinery while taking narcotics.   If confusion, altered mental status, fever, chest pain, shortness of breath, new or worsening head pain, vomiting, change or worsening of medical status, please come back to the emergency room, and in case of emergency call 911.  Secondary Diagnosis:	Chronic obstructive pulmonary disease, unspecified COPD type  Goal:	Alleviation of pain and symptoms  Instructions for follow-up, activity and diet:	Chest PT  oxygen prn  Mucomyst + albuterol q 6 hours  Secondary Diagnosis:	Atrial fibrillation  Goal:	Alleviation of pain and symptoms  Instructions for follow-up, activity and diet:	Rate control  Secondary Diagnosis:	ESRD (end stage renal disease) on dialysis  Goal:	Alleviation of pain and symptoms  Instructions for follow-up, activity and diet:	HD as per nephrology  Secondary Diagnosis:	Blurry vision, bilateral  Goal:	Alleviation of pain and symptoms  Instructions for follow-up, activity and diet:	Dr. Desai from opthalmology evaluated patient at bedside on 06/26 @ 12:42  Recommendations:  Pt noted to have 20/50 vision and having diffculty with peripheral view, eye pressure is normal  Pt may follow up as outpatient to Dr. Desai clinic ([phone]) for further evalaution  -Artifical tears QID  Lacrilube at bedtime Principal Discharge DX:	Subarachnoid hemorrhage  Goal:	Alleviation of pain and symptoms  Instructions for follow-up, activity and diet:	Follow up: Please call and make an appointment with [facility] for one week after discharge. Also, please call and make an appointment with your primary care physician as per your usual schedule.   Activity: May return to normal activities as tolerated, Please, limit activity and rest until follow up appointment.   Diet: May continue consistent carb, renal diet.  Medications: Please take all home medications as prescribed by your primary care doctor. Pain medication has been prescribed for you. Please, take it as it has been prescribed, do not drive or operate heavy machinery while taking narcotics.   If confusion, altered mental status, fever, chest pain, shortness of breath, new or worsening head pain, vomiting, change or worsening of medical status, please come back to the emergency room, and in case of emergency call 911.  Secondary Diagnosis:	Closed fracture of multiple ribs of right side with routine healing  Goal:	Alleviation of pain and symptoms  Instructions for follow-up, activity and diet:	Follow up: Please call and make an appointment with [facility] for one week after discharge. Also, please call and make an appointment with your primary care physician as per your usual schedule.   Activity: May return to normal activities as tolerated, Please, limit activity and rest until follow up appointment.   Diet: May continue consistent carb, renal diet.  Medications: Please take all home medications as prescribed by your primary care doctor. Pain medication has been prescribed for you. Please, take it as it has been prescribed, do not drive or operate heavy machinery while taking narcotics.   If confusion, altered mental status, fever, chest pain, shortness of breath, new or worsening head pain, vomiting, change or worsening of medical status, please come back to the emergency room, and in case of emergency call 911.  Secondary Diagnosis:	Chronic obstructive pulmonary disease, unspecified COPD type  Goal:	Alleviation of pain and symptoms  Instructions for follow-up, activity and diet:	Chest PT  oxygen prn  Mucomyst + albuterol q 6 hours  Secondary Diagnosis:	Atrial fibrillation  Goal:	Alleviation of pain and symptoms  Instructions for follow-up, activity and diet:	Rate control  Secondary Diagnosis:	ESRD (end stage renal disease) on dialysis  Goal:	Alleviation of pain and symptoms  Instructions for follow-up, activity and diet:	HD as per nephrology  Secondary Diagnosis:	Blurry vision, bilateral  Goal:	Alleviation of pain and symptoms  Instructions for follow-up, activity and diet:	Dr. Desai from opthalmology evaluated patient at bedside on 06/26 @ 12:42  Recommendations:  Pt noted to have 20/50 vision and having diffculty with peripheral view, eye pressure is normal  Pt may follow up as outpatient to Dr. Desai clinic ([phone]) for further evalaution  -Artifical tears QID  Lacrilube at bedtime Principal Discharge DX:	Subarachnoid hemorrhage  Goal:	Alleviation of pain and symptoms  Instructions for follow-up, activity and diet:	Follow up: Please call and make an appointment with [facility] for one week after discharge. Also, please call and make an appointment with your primary care physician as per your usual schedule.   Activity: May return to normal activities as tolerated, Please, limit activity and rest until follow up appointment.   Diet: May continue consistent carb, renal diet.  Medications: Please take all home medications as prescribed by your primary care doctor. Pain medication has been prescribed for you. Please, take it as it has been prescribed, do not drive or operate heavy machinery while taking narcotics.   If confusion, altered mental status, fever, chest pain, shortness of breath, new or worsening head pain, vomiting, change or worsening of medical status, please come back to the emergency room, and in case of emergency call 911.  Secondary Diagnosis:	Closed fracture of multiple ribs of right side with routine healing  Goal:	Alleviation of pain and symptoms  Instructions for follow-up, activity and diet:	Follow up: Please call and make an appointment with [facility] for one week after discharge. Also, please call and make an appointment with your primary care physician as per your usual schedule.   Activity: May return to normal activities as tolerated, Please, limit activity and rest until follow up appointment.   Diet: May continue consistent carb, renal diet.  Medications: Please take all home medications as prescribed by your primary care doctor. Pain medication has been prescribed for you. Please, take it as it has been prescribed, do not drive or operate heavy machinery while taking narcotics.   If confusion, altered mental status, fever, chest pain, shortness of breath, new or worsening head pain, vomiting, change or worsening of medical status, please come back to the emergency room, and in case of emergency call 911.  Secondary Diagnosis:	Chronic obstructive pulmonary disease, unspecified COPD type  Goal:	Alleviation of pain and symptoms  Instructions for follow-up, activity and diet:	Chest PT  oxygen prn  Mucomyst + albuterol q 6 hours  Secondary Diagnosis:	Atrial fibrillation  Goal:	Alleviation of pain and symptoms  Instructions for follow-up, activity and diet:	Rate control  Secondary Diagnosis:	ESRD (end stage renal disease) on dialysis  Goal:	Alleviation of pain and symptoms  Instructions for follow-up, activity and diet:	HD as per nephrology  Secondary Diagnosis:	Blurry vision, bilateral  Goal:	Alleviation of pain and symptoms  Instructions for follow-up, activity and diet:	Dr. Desai from opthalmology evaluated patient at bedside on 06/26 @ 12:42  Recommendations:  Pt noted to have 20/50 vision and having diffculty with peripheral view, eye pressure is normal  Pt may follow up as outpatient to Dr. Desai clinic ([phone]) for further evalaution  -Artifical tears QID  Lacrilube at bedtime

## 2017-06-16 NOTE — PROGRESS NOTE ADULT - SUBJECTIVE AND OBJECTIVE BOX
NEPHROLOGY INTERVAL HPI/OVERNIGHT EVENTS: no new events.    MEDICATIONS  (STANDING):  acetaminophen   Tablet. 650milliGRAM(s) Oral every 6 hours  gabapentin 300milliGRAM(s) Oral three times a day  lidocaine   Patch 2Patch Transdermal daily  ALBUTerol/ipratropium for Nebulization 3milliLiter(s) Nebulizer every 6 hours  folic acid 1milliGRAM(s) Oral daily  atorvastatin 40milliGRAM(s) Oral at bedtime  midodrine 5milliGRAM(s) Oral two times a day  epoetin jaswinder Injectable 69457Apzg(s) IV Push <User Schedule>  docusate sodium 100milliGRAM(s) Oral three times a day  senna 2Tablet(s) Oral at bedtime  enoxaparin Injectable 30milliGRAM(s) SubCutaneous once    MEDICATIONS  (PRN):  sodium chloride 0.65% Nasal 1Spray(s) Both Nostrils every 6 hours PRN Nasal Congestion      Allergies    allopurinol (Rash)  codeine (Nausea; Vomiting)  penicillin (Rash)  spironolactone (Unknown)    Intolerances        Vital Signs Last 24 Hrs  T(C): 36.8, Max: 36.8 (06-16 @ 04:00)  T(F): 98.2, Max: 98.2 (06-16 @ 04:00)  HR: 94 (64 - 99)  BP: 94/48 (91/53 - 123/73)  BP(mean): 69 (65 - 79)  RR: 20 (15 - 48)  SpO2: 96% (86% - 100%)  Daily     Daily     PHYSICAL EXAM:    GENERAL: oriented  HEAD:  dressing now off  EYES: same with right ecchymosis  ENMT:   NECK: right permacath site clean  NERVOUS SYSTEM:  no new findings, walking with walker  CHEST/LUNG: no wheezes  HEART: no rub noted  ABDOMEN: not tender  EXTREMITIES:  legs same with pedal edema  LYMPH:   SKIN: pale  ADAMA neg    LABS:                        7.8    7.8   )-----------( 168      ( 16 Jun 2017 05:40 )             24.8     06-16    138  |  97<L>  |  38.0<H>  ----------------------------<  133<H>  4.6   |  25.0  |  3.34<H>    Ca    8.7      16 Jun 2017 05:40  Phos  3.9     06-16  Mg     1.9     06-16    TPro  6.3<L>  /  Alb  3.5  /  TBili  1.6  /  DBili  0.9<H>  /  AST  27  /  ALT  31  /  AlkPhos  482<H>  06-16    PT/INR - ( 14 Jun 2017 12:50 )   PT: 12.6 sec;   INR: 1.14 ratio         PTT - ( 14 Jun 2017 12:50 )  PTT:37.8 sec    Magnesium, Serum: 1.9 mg/dL (06-16 @ 05:40)  Phosphorus Level, Serum: 3.9 mg/dL (06-16 @ 05:40)          RADIOLOGY & ADDITIONAL TESTS:

## 2017-06-16 NOTE — PHYSICAL THERAPY INITIAL EVALUATION ADULT - ADDITIONAL COMMENTS
Pt lives in a private home with 2 steps to enter (+) HR. She owns a cane and uses it occasionally outside the home.

## 2017-06-16 NOTE — DISCHARGE NOTE ADULT - MEDICATION SUMMARY - MEDICATIONS TO TAKE
I will START or STAY ON the medications listed below when I get home from the hospital:    Oxygen 2L nasal cannula  -- ICD: I50.22  Dispense: 99  -- Indication: For Home med    Three in one commode  -- Indication: For Home med    cetrezine  -- 10 milligram(s)  once a day, As Needed  -- Indication: For Home med    iron  -- 3250 milligram(s) by mouth 2 times a day  -- Indication: For Home med    fludrocortisone 0.1 mg oral tablet  -- 1 tab(s) by mouth once a day  -- Indication: For Home med    sildenafil 20 mg oral tablet  -- 1 tab(s) by mouth 3 times a day  -- Indication: For Home med    acetaminophen 325 mg oral tablet  -- 2 tab(s) by mouth every 6 hours, As needed, Mild Pain (1 - 3)  -- Indication: For Home med    Vimovo 500 mg-20 mg oral delayed release tablet  -- 1 tab(s) by mouth once a day  -- Indication: For Home med    heparin  --     -- Indication: For Home med    gabapentin 300 mg oral capsule  -- 1 cap(s) by mouth 3 times a day  -- Indication: For Home med    acetylcysteine 20% inhalation solution  -- 4 milliliter(s) inhaled every 6 hours  -- Indication: For Home med    Lipitor 40 mg oral tablet  -- 1 tab(s) by mouth once a day (at bedtime)  -- Indication: For Home med    albuterol CFC free 90 mcg/inh inhalation aerosol  -- 1 puff(s) inhaled every 4 hours  -- Indication: For Home med    cefepime  --     -- Indication: For Pneumonia    lidocaine 5% topical film  --  on skin   -- Indication: For Pain    epoetin jaswinder  --     -- Indication: For Home med    senna oral tablet  -- 2 tab(s) by mouth once a day (at bedtime)  -- Indication: For COnstipation    docusate sodium 100 mg oral capsule  -- 1 cap(s) by mouth 3 times a day  -- Indication: For COnsitpation    midodrine 5 mg oral tablet  -- 1 tab(s) by mouth 2 times a day  -- Indication: For Home emd    sodium chloride 0.65% nasal spray  -- 1 spray(s) into nose 2 times a day  -- Indication: For ivf    melatonin 3 mg oral tablet  -- 1 tab(s) by mouth once a day (at bedtime)  -- Indication: For insomnia    multivitamin  --   once a day  -- Indication: For Home med    cyanocobalamin 1000 mcg oral tablet  -- 1 tab(s) by mouth once a day  -- Indication: For Home med    Vitamin C 1000 mg oral tablet  -- 1 tab(s) by mouth once a day  -- Indication: For Home med    folic acid 1 mg oral tablet  -- 1 tab(s) by mouth once a day  -- Indication: For Home med I will START or STAY ON the medications listed below when I get home from the hospital:    Oxygen 2L nasal cannula  -- ICD: I50.22  Dispense: 99  -- Indication: For Home med    Three in one commode  -- Indication: For Home med    cetrezine  -- 10 milligram(s)  once a day, As Needed  -- Indication: For Home med    iron  -- 3250 milligram(s) by mouth 2 times a day  -- Indication: For Home med    fludrocortisone 0.1 mg oral tablet  -- 1 tab(s) by mouth once a day  -- Indication: For Home med    sildenafil 20 mg oral tablet  -- 1 tab(s) by mouth 3 times a day  -- Indication: For Home med    acetaminophen 325 mg oral tablet  -- 2 tab(s) by mouth every 6 hours, As needed, Mild Pain (1 - 3)  -- Indication: For Home med    Vimovo 500 mg-20 mg oral delayed release tablet  -- 1 tab(s) by mouth once a day  -- Indication: For Home med    heparin  --     -- Indication: For Home med    gabapentin 300 mg oral capsule  -- 1 cap(s) by mouth 3 times a day  -- Indication: For Home med    acetylcysteine 20% inhalation solution  -- 4 milliliter(s) inhaled every 6 hours  -- Indication: For Home med    Lipitor 40 mg oral tablet  -- 1 tab(s) by mouth once a day (at bedtime)  -- Indication: For Home med    albuterol CFC free 90 mcg/inh inhalation aerosol  -- 1 puff(s) inhaled every 4 hours  -- Indication: For Home med    cefepime  --     -- Indication: For Pneumonia    lidocaine 5% topical film  --  on skin   -- Indication: For Pain    epoetin jaswinder  --     -- Indication: For Home med    senna oral tablet  -- 2 tab(s) by mouth once a day (at bedtime)  -- Indication: For COnstipation    docusate sodium 100 mg oral capsule  -- 1 cap(s) by mouth 3 times a day  -- Indication: For COnsitpation    midodrine 5 mg oral tablet  -- 1 tab(s) by mouth 2 times a day  -- Indication: For Home emd    sodium chloride 0.65% nasal spray  -- 1 spray(s) into nose 2 times a day  -- Indication: For ivf    melatonin 3 mg oral tablet  -- 1 tab(s) by mouth once a day (at bedtime)  -- Indication: For insomnia    calcium acetate 667 mg oral tablet  -- 667 milligram(s) by mouth 3 times a day with meals  -- Indication: For Hyperphosphatemia    multivitamin  --   once a day  -- Indication: For Home med    cyanocobalamin 1000 mcg oral tablet  -- 1 tab(s) by mouth once a day  -- Indication: For Home med    Vitamin C 1000 mg oral tablet  -- 1 tab(s) by mouth once a day  -- Indication: For Home med    folic acid 1 mg oral tablet  -- 1 tab(s) by mouth once a day  -- Indication: For Home med

## 2017-06-16 NOTE — PROGRESS NOTE ADULT - SUBJECTIVE AND OBJECTIVE BOX
INTERVAL HPI/OVERNIGHT EVENTS:  no issues overnight      PRESSORS: [ ] YES [x ] NO  WHICH:  DOSE:    ANTIBIOTICS:         x         DATE STARTED:  ANTIBIOTICS:                  DATE STARTED:  ANTIBIOTICS:                  DATE STARTED:    MEDICATIONS  (STANDING):  acetaminophen   Tablet. 650milliGRAM(s) Oral every 6 hours  gabapentin 300milliGRAM(s) Oral three times a day  lidocaine   Patch 2Patch Transdermal daily  ALBUTerol/ipratropium for Nebulization 3milliLiter(s) Nebulizer every 6 hours  folic acid 1milliGRAM(s) Oral daily  atorvastatin 40milliGRAM(s) Oral at bedtime  midodrine 5milliGRAM(s) Oral two times a day  epoetin jaswinder Injectable [id number](s) IV Push <User Schedule>  docusate sodium 100milliGRAM(s) Oral three times a day  senna 2Tablet(s) Oral at bedtime  iron sucrose IVPB 200milliGRAM(s) IV Intermittent daily    MEDICATIONS  (PRN):  sodium chloride 0.65% Nasal 1Spray(s) Both Nostrils every 6 hours PRN Nasal Congestion      Drug Dosing Weight  Height (cm): 167.6 (14 Jun 2017 15:00)  Weight (kg): 75.9 (14 Jun 2017 15:00)  BMI (kg/m2): 27 (14 Jun 2017 15:00)  BSA (m2): 1.85 (14 Jun 2017 15:00)    CENTRAL LINE: [ ] YES [ x] NO  LOCATION:   DATE INSERTED:  REMOVE: [ ] YES [ ] NO  EXPLAIN:    BOOKER: [ ] YES [ ] NOx   DATE INSERTED:  REMOVE: [ ] YES [ ] NO  EXPLAIN:    A-LINE: [ ] YES [ ] NO x LOCATION:   DATE INSERTED:  REMOVE: [ ] YES [ ] NO  EXPLAIN:    PAST MEDICAL & SURGICAL HISTORY:  ESRD (end stage renal disease) on dialysis: MWF via R SC Permacath  planned AVF creation  Sinusitis, unspecified chronicity, unspecified location  Gout  Gall stones  Pneumonia  Anemia  Pulmonary hypertension  COPD (chronic obstructive pulmonary disease)  CAD (coronary artery disease)  Atrial fibrillation  Hyperlipidemia  Hypertension  Spinal fusion failure, initial encounter  Tubal ligation status  Sinusitis  Spinal stenosis  Pneumonia due to organism  No pertinent past medical history  S/P tonsillectomy  S/P appendectomy  H/O aortic valve replacement  H/O spinal fusion  H/O cataract  Cystocele  H/O tricuspid valve annuloplasty  H/O mitral valve repair      ICU Vital Signs Last 24 Hrs  T(C): 36.8, Max: 36.8 (06-16 @ 04:00)  T(F): 98.3, Max: 98.3 (06-16 @ 16:00)  HR: 85 (83 - 99)  BP: 86/51 (86/51 - 123/73)  BP(mean): 65 (65 - 78)  ABP: --  ABP(mean): --  RR: 20 (15 - 37)  SpO2: 95% (86% - 99%)          I&O's Detail  I & Os for 24h ending 16 Jun 2017 07:00  =============================================  IN:    Total IN: 0 ml  ---------------------------------------------  OUT:    Other: 1500 ml    Total OUT: 1500 ml  ---------------------------------------------  Total NET: -1500 ml    I & Os for current day (as of 16 Jun 2017 16:17)  =============================================  IN:    Oral Fluid: 590 ml    Total IN: 590 ml  ---------------------------------------------  OUT:    Voided: 2 ml    Total OUT: 2 ml  ---------------------------------------------  Total NET: 588 ml          Physical Exam:    Neurological:  No sensory/motor deficits    HEENT: LEIF ALCARAZ, no drainage or redness    Neck: No bruits; no thyromegaly or nodules,  No JVD    Back: Normal spine flexure, No CVA tenderness, No deformity or limitation of movement    Respiratory: Breath Sounds equal & clear to auscultation, no accessory muscle use    Cardiovascular: Regular rate & rhythm, normal S1, S2; no murmurs, gallops or rubs    Gastrointestinal: Soft, non-tender, normal bowel sounds    Extremities: No peripheral edema, No cyanosis, clubbing     Vascular: Equal and normal pulses: 2+ peripheral pulses throughout    Musculoskeletal: No joint pain, swelling or deformity; no limitation of movement    Skin: No rashes    LABS:  CBC Full  -  ( 16 Jun 2017 05:40 )  WBC Count : 7.8 K/uL  Hemoglobin : 7.8 g/dL  Hematocrit : 24.8 %  Platelet Count - Automated : 168 K/uL  Mean Cell Volume : 114.3 fl  Mean Cell Hemoglobin : 35.9 pg  Mean Cell Hemoglobin Concentration : 31.5 g/dL  Auto Neutrophil # : x  Auto Lymphocyte # : x  Auto Monocyte # : x  Auto Eosinophil # : x  Auto Basophil # : x  Auto Neutrophil % : 86.0 %  Auto Lymphocyte % : 2.0 %  Auto Monocyte % : 10.0 %  Auto Eosinophil % : 2.0 %  Auto Basophil % : x    06-16    138  |  97<L>  |  38.0<H>  ----------------------------<  133<H>  4.6   |  25.0  |  3.34<H>    Ca    8.7      16 Jun 2017 05:40  Phos  3.9     06-16  Mg     1.9     06-16    TPro  6.3<L>  /  Alb  3.5  /  TBili  1.6  /  DBili  0.9<H>  /  AST  27  /  ALT  31  /  AlkPhos  482<H>  06-16          Assessment and Plan:    Neuro: GCS 15. Monitor for delirium.  Continue to optimize pain control. Serial Neurologic assessments    HEENT: no issues    CV: Continue hemodynamic monitoring    Pulm: Pulmonary toilet.  Continue incentive spirometer.  Chest PT.  Encourage OOB to chair and ambulation . cont rib fx protocol    GI/Nutrition: Bowel Regimen    /Renal: monitor UOP. Monitor BMP.  Replete Lytes as needed      HEME- DVT prophylaxis, SCDs    ID: x    Lines/Tubes: x  x    Skin:   x  Dispo: may down grade to ASF, PT/OT eval HD on saturday.

## 2017-06-16 NOTE — DISCHARGE NOTE ADULT - PATIENT PORTAL LINK FT
“You can access the [employer] Patient Portal, offered by [facility], by registering with the following website: [email or web address]”

## 2017-06-16 NOTE — DISCHARGE NOTE ADULT - PROVIDER TOKENS
FREE:[LAST:[acute care surgery],PHONE:[phone]],FAX:[(   [phone]    -],ADDRESS:[[address] [place] [address]]]

## 2017-06-16 NOTE — PHYSICAL THERAPY INITIAL EVALUATION ADULT - GAIT DISTANCE, PT EVAL
x 2 with sitting rest break 2/2 SOB, SpO2 84% on RA following ambulation with increased to 94% on 3L NC O2./75 feet

## 2017-06-16 NOTE — DISCHARGE NOTE ADULT - HOSPITAL COURSE
H and P on admission: 75yo female with extensive medical hx known to surgical service from prior fall BIBEMS after mechanical fall from stand.  Patient reports tripping and falling on the sidewalk while walking. Denies LOC.   CO head pain and mild pain in right chest.  Denies sternal chest pain, dyspnea.      Primary: Airway intact, trachea midline, CTAB, 2+ Femoral pulses bilaterally, GCS 15/A&Ox3/PERRL 3mm Brisk/Gross motor and sensory function intact, C/T/L spine nontender and without stepoffs   CXR: evidence of middle to lower lobar opacity on right on (consolidation vs atelectasis) though no evidence of PTX     A: PCN, Allopurinol, Spironolactone, Codeine  M: Plavix, others unknown  P: Anemia, Afib, CAD, COPD on home O2 2L at night, HLD, HTN, PNA, PulmHTN, Sinusitis, Spinal Stenosis s/p lumbar fusion, ESRD on HD MWF via R SC Permacath (last 6/13 1.5L off), Aortic Valve replacement/Mitral Valve Repair/Tricuspid Annuloplasty  L: breakfast  E: Denies    Hospital course:   5 yo F Hx of chronic ESRD on HD TTS.   Admitted for fall with small SDH that was stable on repeat CT. She had transition from being transferred to ICU and floor and back to ICU due to acute respiratory distress and respiratory decompensation due to difficulty breathing secondary to pain from rib fracture and COPD. She was managed with aggressive chest PT, incentive spirometry and nebulizer treatment to stimulate  coughing reflex to facilitate secretion removal. She was evaluated by PT and PM & R who recommend acute inpatient rehab. She had Dr. Desai from ophthalmology evaluate her due to acute onset of blurry vision and problems viewing objects from the periphery on both eyes bilaterally. Recommendation as above discharge instructions.

## 2017-06-16 NOTE — DISCHARGE NOTE ADULT - SECONDARY DIAGNOSIS.
Closed fracture of multiple ribs of right side with routine healing Chronic obstructive pulmonary disease, unspecified COPD type Atrial fibrillation ESRD (end stage renal disease) on dialysis Blurry vision, bilateral

## 2017-06-17 LAB
ALBUMIN SERPL ELPH-MCNC: 3.6 G/DL — SIGNIFICANT CHANGE UP (ref 3.3–5.2)
ALP SERPL-CCNC: 474 U/L — HIGH (ref 40–120)
ALT FLD-CCNC: 29 U/L — SIGNIFICANT CHANGE UP
ANION GAP SERPL CALC-SCNC: 19 MMOL/L — HIGH (ref 5–17)
ANISOCYTOSIS BLD QL: SLIGHT — SIGNIFICANT CHANGE UP
AST SERPL-CCNC: 24 U/L — SIGNIFICANT CHANGE UP
BASO STIPL BLD QL SMEAR: SLIGHT — SIGNIFICANT CHANGE UP
BASOPHILS # BLD AUTO: 0 K/UL — SIGNIFICANT CHANGE UP (ref 0–0.2)
BASOPHILS NFR BLD AUTO: 0.3 % — SIGNIFICANT CHANGE UP (ref 0–2)
BILIRUB SERPL-MCNC: 1.6 MG/DL — SIGNIFICANT CHANGE UP (ref 0.4–2)
BUN SERPL-MCNC: 64 MG/DL — HIGH (ref 8–20)
CALCIUM SERPL-MCNC: 8.9 MG/DL — SIGNIFICANT CHANGE UP (ref 8.6–10.2)
CHLORIDE SERPL-SCNC: 95 MMOL/L — LOW (ref 98–107)
CO2 SERPL-SCNC: 23 MMOL/L — SIGNIFICANT CHANGE UP (ref 22–29)
CREAT SERPL-MCNC: 4.62 MG/DL — HIGH (ref 0.5–1.3)
DACRYOCYTES BLD QL SMEAR: SLIGHT — SIGNIFICANT CHANGE UP
ELLIPTOCYTES BLD QL SMEAR: SLIGHT — SIGNIFICANT CHANGE UP
EOSINOPHIL # BLD AUTO: 0.3 K/UL — SIGNIFICANT CHANGE UP (ref 0–0.5)
EOSINOPHIL NFR BLD AUTO: 3.6 % — SIGNIFICANT CHANGE UP (ref 0–5)
FERRITIN SERPL-MCNC: 900.8 NG/ML — HIGH (ref 11–306)
GLUCOSE SERPL-MCNC: 109 MG/DL — SIGNIFICANT CHANGE UP (ref 70–115)
HCT VFR BLD CALC: 24.8 % — LOW (ref 37–47)
HGB BLD-MCNC: 8 G/DL — LOW (ref 12–16)
HYPERCHROMIA BLD QL AUTO: SLIGHT — SIGNIFICANT CHANGE UP
HYPOCHROMIA BLD QL: SLIGHT — SIGNIFICANT CHANGE UP
IRON SATN MFR SERPL: 25 % — SIGNIFICANT CHANGE UP (ref 14–50)
IRON SATN MFR SERPL: 86 UG/DL — SIGNIFICANT CHANGE UP (ref 37–145)
LYMPHOCYTES # BLD AUTO: 0.8 K/UL — LOW (ref 1–4.8)
LYMPHOCYTES # BLD AUTO: 10.3 % — LOW (ref 20–55)
MACROCYTES BLD QL: SLIGHT — SIGNIFICANT CHANGE UP
MAGNESIUM SERPL-MCNC: 2.1 MG/DL — SIGNIFICANT CHANGE UP (ref 1.8–2.6)
MCHC RBC-ENTMCNC: 32.3 G/DL — SIGNIFICANT CHANGE UP (ref 32–36)
MCHC RBC-ENTMCNC: 36 PG — HIGH (ref 27–31)
MCV RBC AUTO: 111.7 FL — HIGH (ref 81–99)
MICROCYTES BLD QL: SLIGHT — SIGNIFICANT CHANGE UP
MONOCYTES # BLD AUTO: 0.7 K/UL — SIGNIFICANT CHANGE UP (ref 0–0.8)
MONOCYTES NFR BLD AUTO: 9.9 % — SIGNIFICANT CHANGE UP (ref 3–10)
NEUTROPHILS # BLD AUTO: 5.4 K/UL — SIGNIFICANT CHANGE UP (ref 1.8–8)
NEUTROPHILS NFR BLD AUTO: 73.7 % — HIGH (ref 37–73)
OVALOCYTES BLD QL SMEAR: SLIGHT — SIGNIFICANT CHANGE UP
PHOSPHATE SERPL-MCNC: 4.5 MG/DL — SIGNIFICANT CHANGE UP (ref 2.4–4.7)
PLAT MORPH BLD: NORMAL — SIGNIFICANT CHANGE UP
PLATELET # BLD AUTO: 166 K/UL — SIGNIFICANT CHANGE UP (ref 150–400)
POLYCHROMASIA BLD QL SMEAR: SLIGHT — SIGNIFICANT CHANGE UP
POTASSIUM SERPL-MCNC: 4.7 MMOL/L — SIGNIFICANT CHANGE UP (ref 3.5–5.3)
POTASSIUM SERPL-SCNC: 4.7 MMOL/L — SIGNIFICANT CHANGE UP (ref 3.5–5.3)
PROT SERPL-MCNC: 6.5 G/DL — LOW (ref 6.6–8.7)
RBC # BLD: 2.22 M/UL — LOW (ref 4.4–5.2)
RBC # FLD: 20.6 % — HIGH (ref 11–15.6)
RBC BLD AUTO: ABNORMAL
SODIUM SERPL-SCNC: 137 MMOL/L — SIGNIFICANT CHANGE UP (ref 135–145)
TIBC SERPL-MCNC: 347 UG/DL — SIGNIFICANT CHANGE UP (ref 220–430)
TRANSFERRIN SERPL-MCNC: 243 MG/DL — SIGNIFICANT CHANGE UP (ref 192–382)
WBC # BLD: 7.3 K/UL — SIGNIFICANT CHANGE UP (ref 4.8–10.8)
WBC # FLD AUTO: 7.3 K/UL — SIGNIFICANT CHANGE UP (ref 4.8–10.8)

## 2017-06-17 PROCEDURE — 99231 SBSQ HOSP IP/OBS SF/LOW 25: CPT

## 2017-06-17 RX ADMIN — MIDODRINE HYDROCHLORIDE 5 MILLIGRAM(S): 2.5 TABLET ORAL at 16:16

## 2017-06-17 RX ADMIN — LIDOCAINE 2 PATCH: 4 CREAM TOPICAL at 00:32

## 2017-06-17 RX ADMIN — ATORVASTATIN CALCIUM 40 MILLIGRAM(S): 80 TABLET, FILM COATED ORAL at 23:32

## 2017-06-17 RX ADMIN — SENNA PLUS 2 TABLET(S): 8.6 TABLET ORAL at 23:32

## 2017-06-17 RX ADMIN — Medication 3 MILLILITER(S): at 15:12

## 2017-06-17 RX ADMIN — Medication 650 MILLIGRAM(S): at 00:17

## 2017-06-17 RX ADMIN — Medication 3 MILLILITER(S): at 20:45

## 2017-06-17 RX ADMIN — GABAPENTIN 300 MILLIGRAM(S): 400 CAPSULE ORAL at 23:32

## 2017-06-17 RX ADMIN — Medication 3 MILLILITER(S): at 09:02

## 2017-06-17 RX ADMIN — LIDOCAINE 2 PATCH: 4 CREAM TOPICAL at 16:16

## 2017-06-17 RX ADMIN — Medication 3 MILLILITER(S): at 03:26

## 2017-06-17 RX ADMIN — ERYTHROPOIETIN 10000 UNIT(S): 10000 INJECTION, SOLUTION INTRAVENOUS; SUBCUTANEOUS at 12:45

## 2017-06-17 RX ADMIN — Medication 1 MILLIGRAM(S): at 11:37

## 2017-06-17 RX ADMIN — Medication 650 MILLIGRAM(S): at 01:16

## 2017-06-17 RX ADMIN — Medication 100 MILLIGRAM(S): at 06:34

## 2017-06-17 RX ADMIN — GABAPENTIN 300 MILLIGRAM(S): 400 CAPSULE ORAL at 06:34

## 2017-06-17 RX ADMIN — GABAPENTIN 300 MILLIGRAM(S): 400 CAPSULE ORAL at 16:16

## 2017-06-17 RX ADMIN — MIDODRINE HYDROCHLORIDE 5 MILLIGRAM(S): 2.5 TABLET ORAL at 06:34

## 2017-06-17 RX ADMIN — Medication 100 MILLIGRAM(S): at 23:32

## 2017-06-17 RX ADMIN — IRON SUCROSE 110 MILLIGRAM(S): 20 INJECTION, SOLUTION INTRAVENOUS at 12:47

## 2017-06-17 NOTE — PROGRESS NOTE ADULT - SUBJECTIVE AND OBJECTIVE BOX
NEPHROLOGY INTERVAL HPI/OVERNIGHT EVENTS:    Examined earlier  comfortable  HD today    MEDICATIONS  (STANDING):  gabapentin 300milliGRAM(s) Oral three times a day  lidocaine   Patch 2Patch Transdermal daily  ALBUTerol/ipratropium for Nebulization 3milliLiter(s) Nebulizer every 6 hours  folic acid 1milliGRAM(s) Oral daily  atorvastatin 40milliGRAM(s) Oral at bedtime  midodrine 5milliGRAM(s) Oral two times a day  epoetin jaswinder Injectable [id number](s) IV Push <User Schedule>  docusate sodium 100milliGRAM(s) Oral three times a day  senna 2Tablet(s) Oral at bedtime  iron sucrose IVPB 200milliGRAM(s) IV Intermittent daily    MEDICATIONS  (PRN):  sodium chloride 0.65% Nasal 1Spray(s) Both Nostrils every 6 hours PRN Nasal Congestion      Allergies    allopurinol (Rash)  codeine (Nausea; Vomiting)  penicillin (Rash)  spironolactone (Unknown)    Intolerances        Vital Signs Last 24 Hrs  T(C): 36.4, Max: 36.8 (06-16 @ 16:00)  T(F): 97.6, Max: 98.3 (06-16 @ 16:00)  HR: 82 (82 - 86)  BP: 98/64 (86/51 - 110/56)  BP(mean): 65 (65 - 65)  RR: 18 (18 - 20)  SpO2: 92% (92% - 96%)  Daily     Daily     PHYSICAL EXAM:  GENERAL: NAD  HEENT: ecchymosis forhead  NECK: Supple.   LUNGS: Good air entry CTA B/L  HEART: Regular rate and rhythm + murmur.  ABDOMEN: Soft, nontender, and nondistended  EXTREMITIES: no LE edema   NEUROLOGIC: Grossly intact.      LABS:                        8.0    7.3   )-----------( 166      ( 17 Jun 2017 09:11 )             24.8     06-17    137  |  95<L>  |  64.0<H>  ----------------------------<  109  4.7   |  23.0  |  4.62<H>    Ca    8.9      17 Jun 2017 09:11  Phos  4.5     06-17  Mg     2.1     06-17    TPro  6.5<L>  /  Alb  3.6  /  TBili  1.6  /  DBili  x   /  AST  24  /  ALT  29  /  AlkPhos  474<H>  06-17        Magnesium, Serum: 2.1 mg/dL (06-17 @ 09:11)  Phosphorus Level, Serum: 4.5 mg/dL (06-17 @ 09:11)          RADIOLOGY & ADDITIONAL TESTS:

## 2017-06-17 NOTE — PROGRESS NOTE ADULT - SUBJECTIVE AND OBJECTIVE BOX
INTERVAL HPI/OVERNIGHT EVENTS: Patient see mariposa evaluated at bedside, Nio acute overnight issues. Transferred fron the ICU to the floor yesterday. This morning reports feeling better. Se is currently pulling 500 ml on incentive spirometer. Will get HD today. PT recommends home w/ home PT      MEDICATIONS  (STANDING):  gabapentin 300milliGRAM(s) Oral three times a day  lidocaine   Patch 2Patch Transdermal daily  ALBUTerol/ipratropium for Nebulization 3milliLiter(s) Nebulizer every 6 hours  folic acid 1milliGRAM(s) Oral daily  atorvastatin 40milliGRAM(s) Oral at bedtime  midodrine 5milliGRAM(s) Oral two times a day  epoetin jaswinder Injectable [id number](s) IV Push <User Schedule>  docusate sodium 100milliGRAM(s) Oral three times a day  senna 2Tablet(s) Oral at bedtime  iron sucrose IVPB 200milliGRAM(s) IV Intermittent daily    MEDICATIONS  (PRN):  sodium chloride 0.65% Nasal 1Spray(s) Both Nostrils every 6 hours PRN Nasal Congestion      Vital Signs Last 24 Hrs  T(C): 36.8, Max: 36.8 (06-16 @ 08:00)  T(F): 98.3, Max: 98.3 (06-16 @ 16:00)  HR: 84 (83 - 94)  BP: 110/56 (86/51 - 115/58)  BP(mean): 65 (65 - 69)  RR: 18 (18 - 20)  SpO2: 92% (92% - 96%)    Physical Exam:    Neurological:  No sensory/motor deficits    HEENT: PERRLA, EOMI, no drainage or redness    Neck: No bruits; no thyromegaly or nodules,  No JVD    Back: Normal spine flexure, No CVA tenderness, No deformity or limitation of movement    Respiratory: Breath Sounds equal & clear to auscultation, no accessory muscle use    Cardiovascular: Regular rate & rhythm, normal S1, S2; no murmurs, gallops or rubs    Gastrointestinal: Soft, non-tender, normal bowel sounds    Extremities: No peripheral edema, No cyanosis, clubbing     Vascular: Equal and normal pulses: 2+ peripheral pulses throughout    Musculoskeletal: No joint pain, swelling or deformity; no limitation of movement    Skin: No rashes    I&O's Detail    I & Os for current day (as of 17 Jun 2017 07:13)  =============================================  IN:    Oral Fluid: 590 ml    Total IN: 590 ml  ---------------------------------------------  OUT:    Voided: 2 ml    Total OUT: 2 ml  ---------------------------------------------  Total NET: 588 ml      LABS:                        7.8    7.8   )-----------( 168      ( 16 Jun 2017 05:40 )             24.8     06-16    138  |  97<L>  |  38.0<H>  ----------------------------<  133<H>  4.6   |  25.0  |  3.34<H>    Ca    8.7      16 Jun 2017 05:40  Phos  3.9     06-16  Mg     1.9     06-16    TPro  6.3<L>  /  Alb  3.5  /  TBili  1.6  /  DBili  0.9<H>  /  AST  27  /  ALT  31  /  AlkPhos  482<H>  06-16          RADIOLOGY & ADDITIONAL STUDIES:

## 2017-06-17 NOTE — PROGRESS NOTE ADULT - SUBJECTIVE AND OBJECTIVE BOX
[facility]                                   [address] [place] [address]                                                      PHONE: [phone]                                                         FAX: [phone]  [email or web address]  ---------------------------------------------------------------------------------------------------------------------------------    Overnight events/patient complaints:  NAD feeling well today     allopurinol (Rash)  codeine (Nausea; Vomiting)  penicillin (Rash)  spironolactone (Unknown)    MEDICATIONS  (STANDING):  gabapentin 300milliGRAM(s) Oral three times a day  lidocaine   Patch 2Patch Transdermal daily  ALBUTerol/ipratropium for Nebulization 3milliLiter(s) Nebulizer every 6 hours  folic acid 1milliGRAM(s) Oral daily  atorvastatin 40milliGRAM(s) Oral at bedtime  midodrine 5milliGRAM(s) Oral two times a day  epoetin jaswinder Injectable [id number](s) IV Push <User Schedule>  docusate sodium 100milliGRAM(s) Oral three times a day  senna 2Tablet(s) Oral at bedtime  iron sucrose IVPB 200milliGRAM(s) IV Intermittent daily    MEDICATIONS  (PRN):  sodium chloride 0.65% Nasal 1Spray(s) Both Nostrils every 6 hours PRN Nasal Congestion      Vital Signs Last 24 Hrs  T(C): 36.4, Max: 36.8 (06-16 @ 12:00)  T(F): 97.6, Max: 98.3 (06-16 @ 16:00)  HR: 82 (82 - 90)  BP: 98/64 (86/51 - 115/58)  BP(mean): 65 (65 - 65)  RR: 18 (18 - 20)  SpO2: 92% (92% - 96%)  ICU Vital Signs Last 24 Hrs  DUSTIN HERNANDEZ  I&O's Detail    I & Os for current day (as of 17 Jun 2017 10:35)  =============================================  IN:    Oral Fluid: 590 ml    Total IN: 590 ml  ---------------------------------------------  OUT:    Voided: 2 ml    Total OUT: 2 ml  ---------------------------------------------  Total NET: 588 ml    I&O's Summary    I & Os for current day (as of 17 Jun 2017 10:35)  =============================================  IN: 590 ml / OUT: 2 ml / NET: 588 ml    Drug Dosing Weight  DUSTIN MARY      PHYSICAL EXAM:  General: Appears well developed, well nourished alert and cooperative.  HEENT: Head; normocephalic, atraumatic.  Eyes: Pupils reactive, cornea wnl.  Neck: Supple, no nodes adenopathy, no NVD or carotid bruit or thyromegaly.  CARDIOVASCULAR: Normal S1 and S2, 2/6 murmur, rub, gallop or lift.   LUNGS: Decrease BS B/L   ABDOMEN: Soft, nontender without mass or organomegaly. bowel sounds normoactive.  EXTREMITIES: No clubbing, cyanosis or edema. Distal pulses wnl.   SKIN: warm and dry with normal turgor.  NEURO: Alert/oriented x 3/normal motor exam. No pathologic reflexes.    PSYCH: normal affect.        LABS:                        8.0    7.3   )-----------( 166      ( 17 Jun 2017 09:11 )             24.8     06-17    137  |  95<L>  |  64.0<H>  ----------------------------<  109  4.7   |  23.0  |  4.62<H>    Ca    8.9      17 Jun 2017 09:11  Phos  4.5     06-17  Mg     2.1     06-17    TPro  6.5<L>  /  Alb  3.6  /  TBili  1.6  /  DBili  x   /  AST  24  /  ALT  29  /  AlkPhos  474<H>  06-17    DUSTIN HERNANDEZ            RADIOLOGY & ADDITIONAL STUDIES:    INTERPRETATION OF TELEMETRY (personally reviewed):    ECG:    ECHO:   Summary:   1. Left ventricular ejection fraction, by visual estimation, is 55 to   60%.   2. Normal global left ventricular systolic function.   3. Spectral Doppler shows restrictive pattern of left ventricular   myocardial filling (Grade III diastolic dysfunction). Elevated left   atrial and left ventricular end-diastolic pressures.   4. Normal left ventricular internal cavity size.   5. Severely enlarged right ventricle. Severely reduced RV systolic   function.   6. Severely enlarged left atrium.   7. Severely dilated right atrium.   8. Status-post mitral annular ring insertion.   9. Thickening and calcification of the anterior and posterior mitral   valve leaflets.  10. Mitral valve mean gradient is 4.4 mmHg consistent with mild mitral   stenosis.  11. Mild to moderate mitral valve regurgitation.  12. Bioprosthesis in the aortic position demonstrating normal function.  13. Mild aortic regurgitation.  14. Status post tricuspid valve repair.  15. Moderate-severe tricuspid regurgitation.  16. Moderate pulmonic valve regurgitation.  17. Estimated pulmonary artery systolic pressure is 56.8 mmHg assuming a   right atrial pressure of 15 mmHg, which is consistent with moderate   pulmonary hypertension.  18. There is no evidence of pericardial effusion.    ASSESSMENT AND PLAN:    76y Female with prior history of HTN ESRD on HD anemia, hx Bio AVR with MV/TV annuloplasty not an AC candidate due to bleeding and fall risk has been maintained on ASA and Plavix who presents after a mechanical fall. CT with  left posterior parietal left temporal occipital lobe subarachnoid  hemorrhage    Plan  1.  ASA when ok with Neurosurgery   2.  Awaiting HD today   3.  Continue Current CV medications

## 2017-06-17 NOTE — PROGRESS NOTE ADULT - PROBLEM SELECTOR PLAN 1
-Monitor for delirium.  Continue to optimize pain control. Serial Neurologic assessments  HEENT: no issues    CV: Continue hemodynamic monitoring    Pulm: Pulmonary toilet.  Continue incentive spirometer.  Chest PT.  Encourage OOB to chair and ambulation . cont rib fx protocol    GI/Nutrition: Bowel Regimen    /Renal: monitor UOP. Monitor BMP.  Replete Lytes as needed      HEME- DVT prophylaxis, SCDs

## 2017-06-17 NOTE — PROGRESS NOTE ADULT - ASSESSMENT
ESRD on HD TTS schedule   HD today  Electrolytes volume status volume status ok  SAH no heparin w HD  Anemia 2/2 CKD w\TS 18 % cont iV iron  h/p bio AVR and MV/ TR repair

## 2017-06-18 LAB
HBV CORE AB SER-ACNC: SIGNIFICANT CHANGE UP
HBV SURFACE AB SER-ACNC: SIGNIFICANT CHANGE UP
HBV SURFACE AG SER-ACNC: SIGNIFICANT CHANGE UP
HCV AB S/CO SERPL IA: 0.05 S/CO — SIGNIFICANT CHANGE UP
HCV AB SERPL-IMP: SIGNIFICANT CHANGE UP

## 2017-06-18 RX ORDER — ACETAMINOPHEN 500 MG
650 TABLET ORAL ONCE
Qty: 0 | Refills: 0 | Status: COMPLETED | OUTPATIENT
Start: 2017-06-18 | End: 2017-06-18

## 2017-06-18 RX ORDER — ASPIRIN/CALCIUM CARB/MAGNESIUM 324 MG
81 TABLET ORAL DAILY
Qty: 0 | Refills: 0 | Status: DISCONTINUED | OUTPATIENT
Start: 2017-06-18 | End: 2017-06-27

## 2017-06-18 RX ORDER — ACETAMINOPHEN 500 MG
650 TABLET ORAL EVERY 6 HOURS
Qty: 0 | Refills: 0 | Status: DISCONTINUED | OUTPATIENT
Start: 2017-06-18 | End: 2017-06-27

## 2017-06-18 RX ORDER — HEPARIN SODIUM 5000 [USP'U]/ML
5000 INJECTION INTRAVENOUS; SUBCUTANEOUS EVERY 8 HOURS
Qty: 0 | Refills: 0 | Status: DISCONTINUED | OUTPATIENT
Start: 2017-06-18 | End: 2017-06-27

## 2017-06-18 RX ADMIN — LIDOCAINE 2 PATCH: 4 CREAM TOPICAL at 03:58

## 2017-06-18 RX ADMIN — LIDOCAINE 2 PATCH: 4 CREAM TOPICAL at 12:31

## 2017-06-18 RX ADMIN — Medication 650 MILLIGRAM(S): at 04:08

## 2017-06-18 RX ADMIN — SENNA PLUS 2 TABLET(S): 8.6 TABLET ORAL at 21:12

## 2017-06-18 RX ADMIN — ATORVASTATIN CALCIUM 40 MILLIGRAM(S): 80 TABLET, FILM COATED ORAL at 21:12

## 2017-06-18 RX ADMIN — Medication 81 MILLIGRAM(S): at 16:37

## 2017-06-18 RX ADMIN — Medication 3 MILLILITER(S): at 10:03

## 2017-06-18 RX ADMIN — MIDODRINE HYDROCHLORIDE 5 MILLIGRAM(S): 2.5 TABLET ORAL at 17:36

## 2017-06-18 RX ADMIN — Medication 1 MILLIGRAM(S): at 12:31

## 2017-06-18 RX ADMIN — Medication 650 MILLIGRAM(S): at 23:57

## 2017-06-18 RX ADMIN — GABAPENTIN 300 MILLIGRAM(S): 400 CAPSULE ORAL at 13:47

## 2017-06-18 RX ADMIN — Medication 650 MILLIGRAM(S): at 23:27

## 2017-06-18 RX ADMIN — Medication 100 MILLIGRAM(S): at 05:02

## 2017-06-18 RX ADMIN — Medication 650 MILLIGRAM(S): at 03:08

## 2017-06-18 RX ADMIN — HEPARIN SODIUM 5000 UNIT(S): 5000 INJECTION INTRAVENOUS; SUBCUTANEOUS at 21:13

## 2017-06-18 RX ADMIN — GABAPENTIN 300 MILLIGRAM(S): 400 CAPSULE ORAL at 21:12

## 2017-06-18 RX ADMIN — Medication 3 MILLILITER(S): at 20:36

## 2017-06-18 RX ADMIN — Medication 3 MILLILITER(S): at 15:43

## 2017-06-18 RX ADMIN — Medication 650 MILLIGRAM(S): at 15:49

## 2017-06-18 RX ADMIN — GABAPENTIN 300 MILLIGRAM(S): 400 CAPSULE ORAL at 05:01

## 2017-06-18 RX ADMIN — Medication 100 MILLIGRAM(S): at 21:12

## 2017-06-18 RX ADMIN — MIDODRINE HYDROCHLORIDE 5 MILLIGRAM(S): 2.5 TABLET ORAL at 05:01

## 2017-06-18 RX ADMIN — Medication 650 MILLIGRAM(S): at 14:29

## 2017-06-18 RX ADMIN — Medication 100 MILLIGRAM(S): at 13:47

## 2017-06-18 RX ADMIN — Medication 100 MILLIGRAM(S): at 16:40

## 2017-06-18 RX ADMIN — Medication 3 MILLILITER(S): at 03:53

## 2017-06-18 NOTE — PROVIDER CONTACT NOTE (MEDICATION) - SITUATION
Pt complained of increased SOB. Noted increase in expiratory wheezes.  vss  t 98.4  129/71  hr 91   o2 sat 89%n on 4l

## 2017-06-18 NOTE — PROGRESS NOTE ADULT - SUBJECTIVE AND OBJECTIVE BOX
NEPHROLOGY INTERVAL HPI/OVERNIGHT EVENTS:    Examined earlier  Looks comfortable    MEDICATIONS  (STANDING):  gabapentin 300milliGRAM(s) Oral three times a day  lidocaine   Patch 2Patch Transdermal daily  ALBUTerol/ipratropium for Nebulization 3milliLiter(s) Nebulizer every 6 hours  folic acid 1milliGRAM(s) Oral daily  atorvastatin 40milliGRAM(s) Oral at bedtime  midodrine 5milliGRAM(s) Oral two times a day  epoetin jaswinder Injectable [id number](s) IV Push <User Schedule>  docusate sodium 100milliGRAM(s) Oral three times a day  senna 2Tablet(s) Oral at bedtime    MEDICATIONS  (PRN):  sodium chloride 0.65% Nasal 1Spray(s) Both Nostrils every 6 hours PRN Nasal Congestion      Allergies    allopurinol (Rash)  codeine (Nausea; Vomiting)  penicillin (Rash)  spironolactone (Unknown)    Intolerances        Vital Signs Last 24 Hrs  T(C): 37.1, Max: 37.1 ( @ 08:27)  T(F): 98.8, Max: 98.8 ( @ 08:27)  HR: 69 (69 - 85)  BP: 88/50 (88/50 - 99/50)  BP(mean): --  RR: 18 (18 - 19)  SpO2: 98% (95% - 98%)  Daily     Daily Weight in k.9 (2017 15:50)    PHYSICAL EXAM:  GENERAL: NAD  HEENT: ecchymosis forhead  NECK: Supple.   LUNGS: Good air entry CTA B/L  HEART: Regular rate and rhythm + murmur.  ABDOMEN: Soft, nontender, and nondistended  EXTREMITIES: no LE edema   NEUROLOGIC: Grossly intact.      LABS:                        8.0    7.3   )-----------( 166      ( 2017 09:11 )             24.8     -    137  |  95<L>  |  64.0<H>  ----------------------------<  109  4.7   |  23.0  |  4.62<H>    Ca    8.9      2017 09:11  Phos  4.5     -  Mg     2.1     -    TPro  6.5<L>  /  Alb  3.6  /  TBili  1.6  /  DBili  x   /  AST  24  /  ALT  29  /  AlkPhos  474<H>                  RADIOLOGY & ADDITIONAL TESTS:

## 2017-06-18 NOTE — PROGRESS NOTE ADULT - SUBJECTIVE AND OBJECTIVE BOX
SUBJECTIVE: Patient seen and examined. No acute events overnight. Patient states she is feeling better. Pulling 500cc on IS. OOB to chair. Tolerating diet. Had HD yesterday. Pt denies chest pain, shortness of breath, fever, chills, nausea or emesis.      MEDICATIONS  (STANDING):  gabapentin 300milliGRAM(s) Oral three times a day  lidocaine   Patch 2Patch Transdermal daily  ALBUTerol/ipratropium for Nebulization 3milliLiter(s) Nebulizer every 6 hours  folic acid 1milliGRAM(s) Oral daily  atorvastatin 40milliGRAM(s) Oral at bedtime  midodrine 5milliGRAM(s) Oral two times a day  epoetin jaswinder Injectable [id number](s) IV Push <User Schedule>  docusate sodium 100milliGRAM(s) Oral three times a day  senna 2Tablet(s) Oral at bedtime  aspirin  chewable 81milliGRAM(s) Oral daily  heparin  Injectable 5000Unit(s) SubCutaneous every 8 hours    MEDICATIONS  (PRN):  sodium chloride 0.65% Nasal 1Spray(s) Both Nostrils every 6 hours PRN Nasal Congestion  acetaminophen   Tablet. 650milliGRAM(s) Oral every 6 hours PRN Mild Pain (1 - 3)  guaiFENesin   Syrup  (Sugar-Free) 100milliGRAM(s) Oral every 4 hours PRN Cough      Vital Signs Last 24 Hrs  T(C): 37.1, Max: 37.1 (06-18 @ 08:27)  T(F): 98.8, Max: 98.8 (06-18 @ 08:27)  HR: 69 (69 - 85)  BP: 88/50 (88/50 - 99/50)  BP(mean): --  RR: 18 (18 - 18)  SpO2: 98% (98% - 98%)    PE  Gen: NAD, AAOx3  Pulm: CTAB  CV: RRR  Abd: soft, nontender, nondistended  Ext: moving all extremities  Vasc: 2+ peripheral pulses  Neuro: GCS 15, nonfocal      I&O's Detail    I & Os for current day (as of 18 Jun 2017 16:06)  =============================================  IN:    Total IN: 0 ml  ---------------------------------------------  OUT:    Other: 1500 ml    Total OUT: 1500 ml  ---------------------------------------------  Total NET: -1500 ml      LABS:                        8.0    7.3   )-----------( 166      ( 17 Jun 2017 09:11 )             24.8     06-17    137  |  95<L>  |  64.0<H>  ----------------------------<  109  4.7   |  23.0  |  4.62<H>    Ca    8.9      17 Jun 2017 09:11  Phos  4.5     06-17  Mg     2.1     06-17    TPro  6.5<L>  /  Alb  3.6  /  TBili  1.6  /  DBili  x   /  AST  24  /  ALT  29  /  AlkPhos  474<H>  06-17          RADIOLOGY & ADDITIONAL STUDIES:

## 2017-06-18 NOTE — PROGRESS NOTE ADULT - ASSESSMENT
ESRD on HD TTS schedule   HD yest tolerated ok  Electrolytes volume status volume status ok  SAH no heparin w HD  Anemia 2/2 CKD w\TS 18 % cont iV iron  h/p bio AVR and MV/ TR repair

## 2017-06-19 DIAGNOSIS — S22.41XD MULTIPLE FRACTURES OF RIBS, RIGHT SIDE, SUBSEQUENT ENCOUNTER FOR FRACTURE WITH ROUTINE HEALING: ICD-10-CM

## 2017-06-19 LAB
ALBUMIN SERPL ELPH-MCNC: 3.6 G/DL — SIGNIFICANT CHANGE UP (ref 3.3–5.2)
ALP SERPL-CCNC: 465 U/L — HIGH (ref 40–120)
ALP SERPL-CCNC: 528 U/L — HIGH (ref 40–120)
ALP SERPL-CCNC: 542 U/L — HIGH (ref 40–120)
ALT FLD-CCNC: 31 U/L — SIGNIFICANT CHANGE UP
ALT FLD-CCNC: 34 U/L — HIGH
ALT FLD-CCNC: 35 U/L — HIGH
ANION GAP SERPL CALC-SCNC: 18 MMOL/L — HIGH (ref 5–17)
ANION GAP SERPL CALC-SCNC: 18 MMOL/L — HIGH (ref 5–17)
ANISOCYTOSIS BLD QL: SLIGHT — SIGNIFICANT CHANGE UP
AST SERPL-CCNC: 27 U/L — SIGNIFICANT CHANGE UP
AST SERPL-CCNC: 33 U/L — HIGH
AST SERPL-CCNC: 33 U/L — HIGH
BILIRUB DIRECT SERPL-MCNC: 0.8 MG/DL — HIGH (ref 0–0.3)
BILIRUB DIRECT SERPL-MCNC: 0.9 MG/DL — HIGH (ref 0–0.3)
BILIRUB INDIRECT FLD-MCNC: 0.7 MG/DL — SIGNIFICANT CHANGE UP (ref 0.2–1)
BILIRUB INDIRECT FLD-MCNC: 0.7 MG/DL — SIGNIFICANT CHANGE UP (ref 0.2–1)
BILIRUB SERPL-MCNC: 1.5 MG/DL — SIGNIFICANT CHANGE UP (ref 0.4–2)
BILIRUB SERPL-MCNC: 1.6 MG/DL — SIGNIFICANT CHANGE UP (ref 0.4–2)
BILIRUB SERPL-MCNC: 1.6 MG/DL — SIGNIFICANT CHANGE UP (ref 0.4–2)
BUN SERPL-MCNC: 56 MG/DL — HIGH (ref 8–20)
BUN SERPL-MCNC: 61 MG/DL — HIGH (ref 8–20)
CALCIUM SERPL-MCNC: 8.9 MG/DL — SIGNIFICANT CHANGE UP (ref 8.6–10.2)
CALCIUM SERPL-MCNC: 9 MG/DL — SIGNIFICANT CHANGE UP (ref 8.6–10.2)
CHLORIDE SERPL-SCNC: 95 MMOL/L — LOW (ref 98–107)
CHLORIDE SERPL-SCNC: 95 MMOL/L — LOW (ref 98–107)
CO2 SERPL-SCNC: 22 MMOL/L — SIGNIFICANT CHANGE UP (ref 22–29)
CO2 SERPL-SCNC: 22 MMOL/L — SIGNIFICANT CHANGE UP (ref 22–29)
CREAT SERPL-MCNC: 4.5 MG/DL — HIGH (ref 0.5–1.3)
CREAT SERPL-MCNC: 4.82 MG/DL — HIGH (ref 0.5–1.3)
EOSINOPHIL NFR BLD AUTO: 1 % — SIGNIFICANT CHANGE UP (ref 0–5)
GAS PNL BLDA: SIGNIFICANT CHANGE UP
GAS PNL BLDA: SIGNIFICANT CHANGE UP
GLUCOSE SERPL-MCNC: 150 MG/DL — HIGH (ref 70–115)
GLUCOSE SERPL-MCNC: 157 MG/DL — HIGH (ref 70–115)
HCT VFR BLD CALC: 28.5 % — LOW (ref 37–47)
HGB BLD-MCNC: 8.9 G/DL — LOW (ref 12–16)
LACTATE SERPL-SCNC: 1.7 MMOL/L — SIGNIFICANT CHANGE UP (ref 0.5–2)
LYMPHOCYTES # BLD AUTO: 2 % — LOW (ref 20–55)
MACROCYTES BLD QL: SLIGHT — SIGNIFICANT CHANGE UP
MAGNESIUM SERPL-MCNC: 1.8 MG/DL — SIGNIFICANT CHANGE UP (ref 1.6–2.6)
MCHC RBC-ENTMCNC: 31.2 G/DL — LOW (ref 32–36)
MCHC RBC-ENTMCNC: 36 PG — HIGH (ref 27–31)
MCV RBC AUTO: 115.4 FL — HIGH (ref 81–99)
MICROCYTES BLD QL: SLIGHT — SIGNIFICANT CHANGE UP
MONOCYTES NFR BLD AUTO: 7 % — SIGNIFICANT CHANGE UP (ref 3–10)
NEUTROPHILS NFR BLD AUTO: 78 % — HIGH (ref 37–73)
NEUTS BAND # BLD: 12 % — HIGH (ref 0–8)
NT-PROBNP SERPL-SCNC: HIGH PG/ML (ref 0–300)
NT-PROBNP SERPL-SCNC: HIGH PG/ML (ref 0–300)
PHOSPHATE SERPL-MCNC: 4 MG/DL — SIGNIFICANT CHANGE UP (ref 2.4–4.7)
PLAT MORPH BLD: NORMAL — SIGNIFICANT CHANGE UP
PLATELET # BLD AUTO: 198 K/UL — SIGNIFICANT CHANGE UP (ref 150–400)
POLYCHROMASIA BLD QL SMEAR: SLIGHT — SIGNIFICANT CHANGE UP
POTASSIUM SERPL-MCNC: 5.2 MMOL/L — SIGNIFICANT CHANGE UP (ref 3.5–5.3)
POTASSIUM SERPL-MCNC: 5.5 MMOL/L — HIGH (ref 3.5–5.3)
POTASSIUM SERPL-SCNC: 5.2 MMOL/L — SIGNIFICANT CHANGE UP (ref 3.5–5.3)
POTASSIUM SERPL-SCNC: 5.5 MMOL/L — HIGH (ref 3.5–5.3)
PROT SERPL-MCNC: 6.4 G/DL — LOW (ref 6.6–8.7)
PROT SERPL-MCNC: 6.7 G/DL — SIGNIFICANT CHANGE UP (ref 6.6–8.7)
PROT SERPL-MCNC: 6.7 G/DL — SIGNIFICANT CHANGE UP (ref 6.6–8.7)
RBC # BLD: 2.47 M/UL — LOW (ref 4.4–5.2)
RBC # FLD: 20 % — HIGH (ref 11–15.6)
RBC BLD AUTO: ABNORMAL
SODIUM SERPL-SCNC: 135 MMOL/L — SIGNIFICANT CHANGE UP (ref 135–145)
SODIUM SERPL-SCNC: 135 MMOL/L — SIGNIFICANT CHANGE UP (ref 135–145)
WBC # BLD: 22.9 K/UL — HIGH (ref 4.8–10.8)
WBC # FLD AUTO: 22.9 K/UL — HIGH (ref 4.8–10.8)

## 2017-06-19 PROCEDURE — 71010: CPT | Mod: 26

## 2017-06-19 PROCEDURE — 76705 ECHO EXAM OF ABDOMEN: CPT | Mod: 26

## 2017-06-19 PROCEDURE — 99222 1ST HOSP IP/OBS MODERATE 55: CPT

## 2017-06-19 PROCEDURE — 99291 CRITICAL CARE FIRST HOUR: CPT

## 2017-06-19 PROCEDURE — 71250 CT THORAX DX C-: CPT | Mod: 26

## 2017-06-19 RX ORDER — CEFEPIME 1 G/1
500 INJECTION, POWDER, FOR SOLUTION INTRAMUSCULAR; INTRAVENOUS EVERY 24 HOURS
Qty: 0 | Refills: 0 | Status: DISCONTINUED | OUTPATIENT
Start: 2017-06-20 | End: 2017-06-27

## 2017-06-19 RX ORDER — CEFEPIME 1 G/1
500 INJECTION, POWDER, FOR SOLUTION INTRAMUSCULAR; INTRAVENOUS ONCE
Qty: 0 | Refills: 0 | Status: COMPLETED | OUTPATIENT
Start: 2017-06-19 | End: 2017-06-19

## 2017-06-19 RX ORDER — IPRATROPIUM/ALBUTEROL SULFATE 18-103MCG
3 AEROSOL WITH ADAPTER (GRAM) INHALATION ONCE
Qty: 0 | Refills: 0 | Status: COMPLETED | OUTPATIENT
Start: 2017-06-19 | End: 2017-06-19

## 2017-06-19 RX ORDER — FUROSEMIDE 40 MG
20 TABLET ORAL ONCE
Qty: 0 | Refills: 0 | Status: COMPLETED | OUTPATIENT
Start: 2017-06-19 | End: 2017-06-19

## 2017-06-19 RX ORDER — VANCOMYCIN HCL 1 G
1000 VIAL (EA) INTRAVENOUS ONCE
Qty: 0 | Refills: 0 | Status: COMPLETED | OUTPATIENT
Start: 2017-06-19 | End: 2017-06-19

## 2017-06-19 RX ORDER — CEFEPIME 1 G/1
INJECTION, POWDER, FOR SOLUTION INTRAMUSCULAR; INTRAVENOUS
Qty: 0 | Refills: 0 | Status: DISCONTINUED | OUTPATIENT
Start: 2017-06-19 | End: 2017-06-27

## 2017-06-19 RX ORDER — ALBUTEROL 90 UG/1
2.5 AEROSOL, METERED ORAL ONCE
Qty: 0 | Refills: 0 | Status: COMPLETED | OUTPATIENT
Start: 2017-06-19 | End: 2017-06-19

## 2017-06-19 RX ORDER — GABAPENTIN 400 MG/1
300 CAPSULE ORAL THREE TIMES A DAY
Qty: 0 | Refills: 0 | Status: DISCONTINUED | OUTPATIENT
Start: 2017-06-19 | End: 2017-06-27

## 2017-06-19 RX ORDER — GABAPENTIN 400 MG/1
400 CAPSULE ORAL THREE TIMES A DAY
Qty: 0 | Refills: 0 | Status: DISCONTINUED | OUTPATIENT
Start: 2017-06-19 | End: 2017-06-19

## 2017-06-19 RX ADMIN — Medication 81 MILLIGRAM(S): at 13:17

## 2017-06-19 RX ADMIN — Medication 250 MILLIGRAM(S): at 21:15

## 2017-06-19 RX ADMIN — GABAPENTIN 300 MILLIGRAM(S): 400 CAPSULE ORAL at 13:18

## 2017-06-19 RX ADMIN — HEPARIN SODIUM 5000 UNIT(S): 5000 INJECTION INTRAVENOUS; SUBCUTANEOUS at 21:29

## 2017-06-19 RX ADMIN — ALBUTEROL 2.5 MILLIGRAM(S): 90 AEROSOL, METERED ORAL at 23:29

## 2017-06-19 RX ADMIN — Medication 100 MILLIGRAM(S): at 21:29

## 2017-06-19 RX ADMIN — MIDODRINE HYDROCHLORIDE 5 MILLIGRAM(S): 2.5 TABLET ORAL at 05:38

## 2017-06-19 RX ADMIN — Medication 3 MILLILITER(S): at 08:32

## 2017-06-19 RX ADMIN — MIDODRINE HYDROCHLORIDE 5 MILLIGRAM(S): 2.5 TABLET ORAL at 16:07

## 2017-06-19 RX ADMIN — Medication 100 MILLIGRAM(S): at 13:18

## 2017-06-19 RX ADMIN — Medication 3 MILLILITER(S): at 00:25

## 2017-06-19 RX ADMIN — LIDOCAINE 2 PATCH: 4 CREAM TOPICAL at 13:18

## 2017-06-19 RX ADMIN — Medication 3 MILLILITER(S): at 15:46

## 2017-06-19 RX ADMIN — LIDOCAINE 2 PATCH: 4 CREAM TOPICAL at 03:47

## 2017-06-19 RX ADMIN — Medication 3 MILLILITER(S): at 19:54

## 2017-06-19 RX ADMIN — Medication 1 MILLIGRAM(S): at 13:17

## 2017-06-19 RX ADMIN — ATORVASTATIN CALCIUM 40 MILLIGRAM(S): 80 TABLET, FILM COATED ORAL at 21:29

## 2017-06-19 RX ADMIN — ERYTHROPOIETIN 10000 UNIT(S): 10000 INJECTION, SOLUTION INTRAVENOUS; SUBCUTANEOUS at 17:47

## 2017-06-19 RX ADMIN — CEFEPIME 100 MILLIGRAM(S): 1 INJECTION, POWDER, FOR SOLUTION INTRAMUSCULAR; INTRAVENOUS at 09:52

## 2017-06-19 RX ADMIN — GABAPENTIN 300 MILLIGRAM(S): 400 CAPSULE ORAL at 21:29

## 2017-06-19 RX ADMIN — HEPARIN SODIUM 5000 UNIT(S): 5000 INJECTION INTRAVENOUS; SUBCUTANEOUS at 13:18

## 2017-06-19 RX ADMIN — Medication 3 MILLILITER(S): at 03:39

## 2017-06-19 RX ADMIN — Medication 20 MILLIGRAM(S): at 06:26

## 2017-06-19 RX ADMIN — HEPARIN SODIUM 5000 UNIT(S): 5000 INJECTION INTRAVENOUS; SUBCUTANEOUS at 05:38
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
## 2017-06-19 NOTE — PROGRESS NOTE ADULT - SUBJECTIVE AND OBJECTIVE BOX
INTERVAL HPI/OVERNIGHT EVENTS: Patient seen nad evaluated at bedside. Surgery initially called @ approximately 10pm for patient c/o SOB and sating 89% on 4 L NC. Duoneb treatment as well as ABG STAT was ordered. ABG showed hypoxia PO2 55, however no decompensation noted. Rapid response was called around 3AM in which patient c/o increased work of breathing, by this time was on nonrebreather saturating at 93%. Decision was made to transfer patient to ICU for close monitoring and watch for acute respiratory failure.        MEDICATIONS  (STANDING):  gabapentin 300milliGRAM(s) Oral three times a day  lidocaine   Patch 2Patch Transdermal daily  ALBUTerol/ipratropium for Nebulization 3milliLiter(s) Nebulizer every 6 hours  folic acid 1milliGRAM(s) Oral daily  atorvastatin 40milliGRAM(s) Oral at bedtime  midodrine 5milliGRAM(s) Oral two times a day  epoetin jaswinder Injectable 62046Tflx(s) IV Push <User Schedule>  docusate sodium 100milliGRAM(s) Oral three times a day  senna 2Tablet(s) Oral at bedtime  aspirin  chewable 81milliGRAM(s) Oral daily  heparin  Injectable 5000Unit(s) SubCutaneous every 8 hours    MEDICATIONS  (PRN):  sodium chloride 0.65% Nasal 1Spray(s) Both Nostrils every 6 hours PRN Nasal Congestion  acetaminophen   Tablet. 650milliGRAM(s) Oral every 6 hours PRN Mild Pain (1 - 3)  guaiFENesin   Syrup  (Sugar-Free) 100milliGRAM(s) Oral every 4 hours PRN Cough      Vital Signs Last 24 Hrs  T(C): 36.9, Max: 37.1 (06-18 @ 08:27)  T(F): 98.4, Max: 997.3 (06-18 @ 16:00)  HR: 91 (69 - 91)  BP: 129/71 (88/50 - 129/71)  BP(mean): --  RR: 20 (18 - 20)  SpO2: 89% (89% - 92%)    Physical Exam:  General: Increased work of breathing, tachypneic, AAO x 3, in distress  Chest: Normal S1 and S2 sounds  Pulm: expiratory crackles noted in the upper lobes b/l  abd: Soft, nondistended  Ext: No peripheral edema noted     I&O's Detail    I & Os for current day (as of 19 Jun 2017 03:28)  =============================================  IN:    Total IN: 0 ml  ---------------------------------------------  OUT:    Other: 1500 ml    Total OUT: 1500 ml  ---------------------------------------------  Total NET: -1500 ml      LABS:                        8.9    22.9  )-----------( 198      ( 19 Jun 2017 03:01 )             28.5     06-17    137  |  95<L>  |  64.0<H>  ----------------------------<  109  4.7   |  23.0  |  4.62<H>    Ca    8.9      17 Jun 2017 09:11  Phos  4.5     06-17  Mg     2.1     06-17    TPro  6.5<L>  /  Alb  3.6  /  TBili  1.6  /  DBili  x   /  AST  24  /  ALT  29  /  AlkPhos  474<H>  06-17          RADIOLOGY & ADDITIONAL STUDIES:

## 2017-06-19 NOTE — PROGRESS NOTE ADULT - ASSESSMENT
77 y/o F w/ multiple rib fx and stable L SAH no in acute respiratory distress w/ hypoxia and right pleural effusion on CXR

## 2017-06-19 NOTE — PROGRESS NOTE ADULT - SUBJECTIVE AND OBJECTIVE BOX
NEPHROLOGY INTERVAL HPI/OVERNIGHT EVENTS:    Examined earlier  SOB over night on O2    MEDICATIONS  (STANDING):  lidocaine   Patch 2Patch Transdermal daily  ALBUTerol/ipratropium for Nebulization 3milliLiter(s) Nebulizer every 6 hours  folic acid 1milliGRAM(s) Oral daily  atorvastatin 40milliGRAM(s) Oral at bedtime  midodrine 5milliGRAM(s) Oral two times a day  epoetin jaswinder Injectable 67590Hhhd(s) IV Push <User Schedule>  docusate sodium 100milliGRAM(s) Oral three times a day  senna 2Tablet(s) Oral at bedtime  aspirin  chewable 81milliGRAM(s) Oral daily  heparin  Injectable 5000Unit(s) SubCutaneous every 8 hours  gabapentin 300milliGRAM(s) Oral three times a day  cefepime  IVPB  IV Intermittent     MEDICATIONS  (PRN):  sodium chloride 0.65% Nasal 1Spray(s) Both Nostrils every 6 hours PRN Nasal Congestion  acetaminophen   Tablet. 650milliGRAM(s) Oral every 6 hours PRN Mild Pain (1 - 3)      Allergies    allopurinol (Rash)  codeine (Nausea; Vomiting)  penicillin (Rash)  spironolactone (Unknown)    Intolerances        Vital Signs Last 24 Hrs  T(C): 38.1, Max: 38.1 ( @ 08:00)  T(F): 100.6, Max: 997.3 ( @ 16:00)  HR: 95 (79 - 116)  BP: 88/41 (88/41 - 129/71)  BP(mean): 59 (59 - 76)  RR: 27 (20 - 47)  SpO2: 96% (88% - 96%)  Daily     Daily Weight in k.2 (2017 03:46)    PHYSICAL EXAM:  GENERAL: NAD  HEENT: ecchymosis forhead  NECK: Supple.   LUNGS: Good air entry CTA B/L  HEART: Regular rate and rhythm + murmur.  ABDOMEN: Soft, nontender, and nondistended  EXTREMITIES: no LE edema   NEUROLOGIC: Grossly intact.        LABS:                        8.9    22.9  )-----------( 198      ( 2017 03:01 )             28.5     06-    135  |  95<L>  |  61.0<H>  ----------------------------<  150<H>  5.5<H>   |  22.0  |  4.82<H>    Ca    8.9      2017 09:57  Phos  4.0       Mg     1.8         TPro  6.4<L>  /  Alb  3.6  /  TBili  1.5  /  DBili  0.8<H>  /  AST  27  /  ALT  31  /  AlkPhos  465<H>          Magnesium, Serum: 1.8 mg/dL ( @ 03:01)  Phosphorus Level, Serum: 4.0 mg/dL ( @ 03:01)    ABG - ( 2017 05:16 )  pH: 7.37  /  pCO2: 42    /  pO2: 62    / HCO3: 24    / Base Excess: -0.6  /  SaO2: 94                    RADIOLOGY & ADDITIONAL TESTS:

## 2017-06-19 NOTE — CHART NOTE - NSCHARTNOTEFT_GEN_A_CORE
75 yo female pmh CAD, COPD, ESRD on HD, HTN, HLD, pulmonary HTN, admitted with SAH and multiple rib fractures s/p fall.  A rapid response was called for increasing shortness of breath with hypoxia.  Upon arrival of the RRT, the patient was noted to have increased work of breathing, with scalene retractions. She was satting in the mid 80's on 4L NC, and desatted to 76 on room air.  O2 sat increased to high 80's on non-rebreather. Physical exam revealed coarse crackles at bilaterally at lung bases, with transmitted upper airway sounds. She continued to have accessory muscle use on non-rebreather. The decision was made to initiate BIPAP to assist with ventilation and improve hypoxemia. O2 sat improved to 96%, with improvement in work of breathing. ABG performed prior to rapid response being called: 7.32 / pco2 47 / pO2 47.  CXR demonstrated new right sided pleural effusion. Trauma team was at bedside. Rapid response was taken over by trauma attending Dr. Muñoz, who stated medicine team no longer needed. RRT was supervised by SROC Dr. Gaming.

## 2017-06-19 NOTE — PROGRESS NOTE ADULT - SUBJECTIVE AND OBJECTIVE BOX
[facility]                                   [address] [place] [address]                                                      PHONE: [phone]                                                         FAX: [phone]  [email or web address]  ---------------------------------------------------------------------------------------------------------------------------------    Overnight events/patient complaints:events noted, still dyspneic on hi flow O2, positive cough, no urine output with IV lasix      allopurinol (Rash)  codeine (Nausea; Vomiting)  penicillin (Rash)  spironolactone (Unknown)    MEDICATIONS  (STANDING):  lidocaine   Patch 2Patch Transdermal daily  ALBUTerol/ipratropium for Nebulization 3milliLiter(s) Nebulizer every 6 hours  folic acid 1milliGRAM(s) Oral daily  atorvastatin 40milliGRAM(s) Oral at bedtime  midodrine 5milliGRAM(s) Oral two times a day  epoetin jaswinder Injectable 90088Ksqr(s) IV Push <User Schedule>  docusate sodium 100milliGRAM(s) Oral three times a day  senna 2Tablet(s) Oral at bedtime  aspirin  chewable 81milliGRAM(s) Oral daily  heparin  Injectable 5000Unit(s) SubCutaneous every 8 hours  gabapentin 300milliGRAM(s) Oral three times a day  cefepime  IVPB  IV Intermittent   cefepime  IVPB 500milliGRAM(s) IV Intermittent once    MEDICATIONS  (PRN):  sodium chloride 0.65% Nasal 1Spray(s) Both Nostrils every 6 hours PRN Nasal Congestion  acetaminophen   Tablet. 650milliGRAM(s) Oral every 6 hours PRN Mild Pain (1 - 3)      Vital Signs Last 24 Hrs  T(C): 38.1, Max: 38.1 ( @ 08:00)  T(F): 100.6, Max: 997.3 ( @ 16:00)  HR: 99 (79 - 116)  BP: 103/53 (95/47 - 129/71)  BP(mean): 74 (68 - 76)  RR: 47 (20 - 47)  SpO2: 95% (88% - 96%)  Daily     Daily Weight in k.2 (2017 03:46)  ICU Vital Signs Last 24 Hrs  DUSTIN HERNANDEZ  I&O's Detail  I & Os for 24h ending 2017 07:00  =============================================  IN:    Oral Fluid: 50 ml    Total IN: 50 ml  ---------------------------------------------  OUT:    Indwelling Catheter - Urethral: 30 ml    Total OUT: 30 ml  ---------------------------------------------  Total NET: 20 ml    I & Os for current day (as of 2017 09:21)  =============================================  IN:    Total IN: 0 ml  ---------------------------------------------  OUT:    Indwelling Catheter - Urethral: 15 ml    Total OUT: 15 ml  ---------------------------------------------  Total NET: -15 ml    I&O's Summary  I & Os for 24h ending 2017 07:00  =============================================  IN: 50 ml / OUT: 30 ml / NET: 20 ml    I & Os for current day (as of 2017 09:21)  =============================================  IN: 0 ml / OUT: 15 ml / NET: -15 ml    Drug Dosing Weight  DUSTIN HERNANDEZ      PHYSICAL EXAM:  General: Appears well developed, well nourished alert and cooperative.  HEENT: Head; normocephalic, atraumatic.  Eyes: Pupils reactive, cornea wnl.  Neck: Supple, no nodes adenopathy, no NVD or carotid bruit or thyromegaly.  CARDIOVASCULAR:coarse rhonchi bilaterally  LUNGS: No rales, rhonchi or wheeze. Normal breath sounds bilaterally.  ABDOMEN: Soft, nontender without mass or organomegaly. bowel sounds normoactive.  EXTREMITIES: No clubbing, cyanosis or edema. Distal pulses wnl.   SKIN: warm and dry with normal turgor. 2+ edema  NEURO: Alert/oriented x 3/normal motor exam. No pathologic reflexes.    PSYCH: normal affect.        LABS:                        8.9    22.9  )-----------( 198      ( 2017 03:01 )             28.5         135  |  95<L>  |  56.0<H>  ----------------------------<  157<H>  5.2   |  22.0  |  4.50<H>    Ca    9.0      2017 03:01  Phos  4.0       Mg     1.8         TPro  6.7  /  Alb  3.6  /  TBili  1.6  /  DBili  0.9<H>  /  AST  33<H>  /  ALT  35<H>  /  AlkPhos  542<H>      DUSTIN HERNANDEZ            RADIOLOGY & ADDITIONAL STUDIES:IMPRESSION:    Support Devices:  Unchanged  Heart: Cardiomegaly  Mediastinum:  Unremarkable  Lungs/Airways: Small pleural effusions, right perihilar airspace opacity   and mild pulmonary edema, stable. No visible pneumothorax.  Bones/Soft tissues:] Fractures not radiographicallyappreciated                                      HUGO DEVINE M.D., ATTENDING RADIOLOGIST  This document has been electronically signed. Magdaleno 15 2017 11:30AM                INTERPRETATION OF TELEMETRY (personally reviewed):Af with MVR    ECG:    ECHO:   Summary:   1. Left ventricular ejection fraction, by visual estimation, is 55 to   60%.   2. Normal global left ventricular systolic function.   3. Spectral Doppler shows restrictive pattern of left ventricular   myocardial filling (Grade III diastolic dysfunction). Elevated left   atrial and left ventricular end-diastolic pressures.   4. Normal left ventricular internal cavity size.   5. Severely enlarged right ventricle. Severely reduced RV systolic   function.   6. Severely enlarged left atrium.   7. Severely dilated right atrium.   8. Status-post mitral annular ring insertion.   9. Thickening and calcification of the anterior and posterior mitral   valve leaflets.  10. Mitral valve mean gradient is 4.4 mmHg consistent with mild mitral   stenosis.  11. Mild to moderate mitral valve regurgitation.  12. Bioprosthesis in the aortic position demonstrating normal function.  13. Mild aortic regurgitation.  14. Status post tricuspid valve repair.  15. Moderate-severe tricuspid regurgitation.  16. Moderate pulmonic valve regurgitation.  17. Estimated pulmonary artery systolic pressure is 56.8 mmHg assuming a   right atrial pressure of 15 mmHg, which is consistent with moderate   pulmonary hypertension.  18. There is no evidence of pericardial effusion.     MD Clara Electronically signed on 2017 at 9:29:19 AM          *** Final ***                  PAZ PICKARD   This document has been electronically signed. 2017  9:44PM

## 2017-06-19 NOTE — RESPIRATORY CARE EMERGENCY NOTE - [facility] RESPIRATORY COMMENTS
pt B/S Rhonchi sp02 88 on 4lpm, place on an oxymask sp02 94%, pt transferred to [facility]. unable to nasal suction, rhonchi B/S noted pt unable to cough up secretion

## 2017-06-19 NOTE — CONSULT NOTE ADULT - SUBJECTIVE AND OBJECTIVE BOX
76F presented on 6/14/17 after a fall from standing. No reported head injury. She complained of chest pain. GCS in ER=15.     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     Patient was initially seen by PT on 6/16 and was sueprvision, however had a clinical status change and required high flow O2 via NC for hypoxia and BIPAP. Patient with new WBC in 22. BNP 66K. Abdominal US ordered and CT chest to rue out PNA vs. hemo/pneumothorax. Patient also with temp 100.6.       REVIEW OF SYSTEMS  Constitutional - No fever, No weight loss, No fatigue  HEENT - No eye pain, No visual disturbances, No difficulty hearing, No tinnitus, No vertigo, No neck pain  Respiratory - No cough, No wheezing, No shortness of breath  Cardiovascular - No chest pain, No palpitations  Gastrointestinal - No abdominal pain, No nausea, No vomiting, No diarrhea, No constipation  Genitourinary - No dysuria, No frequency, No hematuria, No incontinence  Neurological - No headaches, No memory loss, No loss of strength, No numbness, No tremors  Skin - No itching, No rashes, No lesions   Endocrine - No temperature intolerance  Musculoskeletal - No joint pain, No joint swelling, No muscle pain  Psychiatric - No depression, No anxiety    PAST MEDICAL & SURGICAL HISTORY  ESRD (end stage renal disease) on dialysis  Sinusitis, unspecified chronicity, unspecified location  Gout  Gall stones  Pneumonia  Anemia  Pulmonary hypertension  COPD (chronic obstructive pulmonary disease)  CAD (coronary artery disease)  Atrial fibrillation  Hyperlipidemia  Hypertension  Spinal fusion failure, initial encounter  Tubal ligation status  Sinusitis  Spinal stenosis  Pneumonia due to organism  No pertinent past medical history  S/P tonsillectomy  S/P appendectomy  H/O aortic valve replacement  H/O spinal fusion  H/O cataract  Cystocele  H/O tricuspid valve annuloplasty  H/O mitral valve repair      SOCIAL HISTORY  Smoking - Denied  EtOH - Denied   Drugs - Denied    FUNCTIONAL HISTORY  Lives with spouse, 2STE, uses [place] occaisionally  Independent    CURRENT FUNCTIONAL STATUS  6/19 - PT  Bed Mobility  Bed Mobility Training Rehab Potential : good, to achieve stated therapy goals  Bed Mobility  Bed Mobility Training Supine-to-Sit : moderate assist (50% patient effort);  1 person assist;  bed rails  Bed Mobility  Bed Mobility Training Limitations : decreased strength;  impaired balance  Sit-Stand Transfer Training  Sit-to-Stand Transfer Training Rehab Potential : good, to achieve stated therapy goals  Sit-Stand Transfer Training  Transfer Training Sit-to-Stand Transfer : moderate assist (50% patient effort);  1 person assist;  rolling walker  Sit-Stand Transfer Training  Transfer Training Stand-to-Sit Transfer : moderate assist (50% patient effort);  1 person assist;  rolling walker  Sit-Stand Transfer Training  Sit-to-Stand Transfer Training Transfer Safety Analysis : decreased balance;  impaired balance;  decreased strength;  rolling walker  Gait Training  Gait Training Rehab Potential : good, to achieve stated therapy goals  Gait Training   Gait Training : minimum assist (75% patient effort);  1 person assist;  hand held;  bed to chair;  5 feet  Gait Training   Gait Analysis : 2-point gait   decreased strength;  impaired balance;  Bed to Chair;  5 feet;  hand held  Therapeutic Exercise  Therapeutic Exercise Rehab Effort : good      FAMILY HISTORY   CAD (coronary artery disease)      RECENT LABS/IMAGING  CBC Full  -  ( 19 Jun 2017 03:01 )  WBC Count : 22.9 K/uL  Hemoglobin : 8.9 g/dL  Hematocrit : 28.5 %  Platelet Count - Automated : 198 K/uL  Mean Cell Volume : 115.4 fl  Mean Cell Hemoglobin : 36.0 pg  Mean Cell Hemoglobin Concentration : 31.2 g/dL  Auto Neutrophil # : x  Auto Lymphocyte # : x  Auto Monocyte # : x  Auto Eosinophil # : x  Auto Basophil # : x  Auto Neutrophil % : 78.0 %  Auto Lymphocyte % : 2.0 %  Auto Monocyte % : 7.0 %  Auto Eosinophil % : 1.0 %  Auto Basophil % : x    06-19    135  |  95<L>  |  61.0<H>  ----------------------------<  150<H>  5.5<H>   |  22.0  |  4.82<H>    Ca    8.9      19 Jun 2017 09:57  Phos  4.0     06-19  Mg     1.8     06-19    TPro  6.4<L>  /  Alb  3.6  /  TBili  1.5  /  DBili  0.8<H>  /  AST  27  /  ALT  31  /  AlkPhos  465<H>  06-19        VITALS  T(C): 38.1, Max: 38.1 (06-19 @ 08:00)  HR: 95 (79 - 116)  BP: 88/41 (88/41 - 129/71)  RR: 27 (20 - 47)  SpO2: 96% (88% - 96%)  Wt(kg): --    ALLERGIES  allopurinol (Rash)  codeine (Nausea; Vomiting)  penicillin (Rash)  spironolactone (Unknown)      MEDICATIONS   lidocaine   Patch 2Patch Transdermal daily  ALBUTerol/ipratropium for Nebulization 3milliLiter(s) Nebulizer every 6 hours  folic acid 1milliGRAM(s) Oral daily  atorvastatin 40milliGRAM(s) Oral at bedtime  midodrine 5milliGRAM(s) Oral two times a day  epoetin jaswinder Injectable 31818Ftgf(s) IV Push <User Schedule>  sodium chloride 0.65% Nasal 1Spray(s) Both Nostrils every 6 hours PRN  docusate sodium 100milliGRAM(s) Oral three times a day  senna 2Tablet(s) Oral at bedtime  acetaminophen   Tablet. 650milliGRAM(s) Oral every 6 hours PRN  aspirin  chewable 81milliGRAM(s) Oral daily  heparin  Injectable 5000Unit(s) SubCutaneous every 8 hours  gabapentin 300milliGRAM(s) Oral three times a day  cefepime  IVPB  IV Intermittent       ----------------------------------------------------------------------------------------  PHYSICAL EXAM  Constitutional - NAD, Comfortable  HEENT - NCAT, EOMI  Neck - Supple, No limited ROM  Chest - CTA bilaterally, No wheeze, No rhonchi, No crackles  Cardiovascular - RRR, S1S2, No murmurs  Abdomen - BS+, Soft, NTND  Extremities - No C/C/E, No calf tenderness   Neurologic Exam -                    Cognitive - Awake, Alert, AAO to self, place, date, year, situation     Communication - Fluent, No dysarthria     Cranial Nerves - CN 2-12 intact     Motor - No focal deficits                    LEFT    UE - ShAB 5/5, EF 5/5, EE 5/5, WE 5/5,  5/5                    RIGHT UE - ShAB 5/5, EF 5/5, EE 5/5, WE 5/5,  5/5                    LEFT    LE - HF 5/5, KE 5/5, DF 5/5, PF 5/5                    RIGHT LE - HF 5/5, KE 5/5, DF 5/5, PF 5/5        Sensory - Intact to LT     Reflexes - DTR Intact, No primitive reflexive     Coordination - FTN intact     OculoVestibular - No saccades, No nystagmus, VOR         Balance - WNL Static  Psychiatric - Mood stable, Affect WNL  ----------------------------------------------------------------------------------------  ASSESSMENT/PLAN    Pain -  DVT PPX -   Rehab Dispo -    Recommend ACUTE inpatient rehabilitation for the functional deficits consisting of 3 hours of therapy/day & 24 hour RN/daily PMR physician for comorbid medical management. Will continue to follow for ongoing rehab needs and recommendations.   Recommend [facility], patient DOES NOT meet acute inpatient rehabilitation criteria   Recommend DC HOME with outpatient   Recommend DC HOME with homecare   Follow up with [facility] - Call [phone] for an appointment 76F presented on 6/14/17 after a fall from standing. No reported head injury. She complained of chest pain. GCS in ER=15. 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     Patient was initially seen by PT on 6/16 and was sueprvision, however had a clinical status change and required high flow O2 via NC for hypoxia and BIPAP. Patient with new WBC in 22. BNP 66K. Abdominal US ordered and CT chest to rue out PNA vs. hemo/pneumothorax. Patient also with temp 100.6.       REVIEW OF SYSTEMS  Constitutional - +fever, No weight loss, +fatigue  HEENT - No eye pain, No visual disturbances, No difficulty hearing, No tinnitus, No vertigo, No neck pain  Respiratory - +cough, +wheezing, +shortness of breath  Cardiovascular - No chest pain, No palpitations  Gastrointestinal - No abdominal pain, No nausea, No vomiting, No diarrhea, No constipation  Genitourinary - No dysuria, No frequency, No hematuria, No incontinence  Neurological - No headaches, No memory loss, +loss of strength, No numbness, No tremors  Skin - No itching, No rashes, +lesions   Endocrine - No temperature intolerance  Musculoskeletal - No joint pain, No joint swelling, No muscle pain  Psychiatric - No depression, +anxiety    PAST MEDICAL & SURGICAL HISTORY  ESRD (end stage renal disease) on dialysis  Sinusitis, unspecified chronicity, unspecified location  Gout  Gall stones  Pneumonia  Anemia  Pulmonary hypertension  COPD (chronic obstructive pulmonary disease)  CAD (coronary artery disease)  Atrial fibrillation  Hyperlipidemia  Hypertension  Spinal fusion failure, initial encounter  Tubal ligation status  Sinusitis  Spinal stenosis  Pneumonia due to organism  No pertinent past medical history  S/P tonsillectomy  S/P appendectomy  H/O aortic valve replacement  H/O spinal fusion  H/O cataract  Cystocele  H/O tricuspid valve annuloplasty  H/O mitral valve repair      SOCIAL HISTORY  Smoking - Quit  EtOH - Denied   Drugs - Denied    FUNCTIONAL HISTORY  Lives with spouse, 2STE, uses [place] occaisionally  Independent    CURRENT FUNCTIONAL STATUS  6/19 - PT  Bed Mobility  Bed Mobility Training Rehab Potential : good, to achieve stated therapy goals  Bed Mobility  Bed Mobility Training Supine-to-Sit : moderate assist (50% patient effort);  1 person assist;  bed rails  Bed Mobility  Bed Mobility Training Limitations : decreased strength;  impaired balance  Sit-Stand Transfer Training  Sit-to-Stand Transfer Training Rehab Potential : good, to achieve stated therapy goals  Sit-Stand Transfer Training  Transfer Training Sit-to-Stand Transfer : moderate assist (50% patient effort);  1 person assist;  rolling walker  Sit-Stand Transfer Training  Transfer Training Stand-to-Sit Transfer : moderate assist (50% patient effort);  1 person assist;  rolling walker  Sit-Stand Transfer Training  Sit-to-Stand Transfer Training Transfer Safety Analysis : decreased balance;  impaired balance;  decreased strength;  rolling walker  Gait Training  Gait Training Rehab Potential : good, to achieve stated therapy goals  Gait Training   Gait Training : minimum assist (75% patient effort);  1 person assist;  hand held;  bed to chair;  5 feet  Gait Training   Gait Analysis : 2-point gait   decreased strength;  impaired balance;  Bed to Chair;  5 feet;  hand held  Therapeutic Exercise  Therapeutic Exercise Rehab Effort : good      FAMILY HISTORY   CAD (coronary artery disease)      RECENT LABS/IMAGING  CBC Full  -  ( 19 Jun 2017 03:01 )  WBC Count : 22.9 K/uL  Hemoglobin : 8.9 g/dL  Hematocrit : 28.5 %  Platelet Count - Automated : 198 K/uL  Mean Cell Volume : 115.4 fl  Mean Cell Hemoglobin : 36.0 pg  Mean Cell Hemoglobin Concentration : 31.2 g/dL  Auto Neutrophil # : x  Auto Lymphocyte # : x  Auto Monocyte # : x  Auto Eosinophil # : x  Auto Basophil # : x  Auto Neutrophil % : 78.0 %  Auto Lymphocyte % : 2.0 %  Auto Monocyte % : 7.0 %  Auto Eosinophil % : 1.0 %  Auto Basophil % : x    06-19    135  |  95<L>  |  61.0<H>  ----------------------------<  150<H>  5.5<H>   |  22.0  |  4.82<H>    Ca    8.9      19 Jun 2017 09:57  Phos  4.0     06-19  Mg     1.8     06-19    TPro  6.4<L>  /  Alb  3.6  /  TBili  1.5  /  DBili  0.8<H>  /  AST  27  /  ALT  31  /  AlkPhos  465<H>  06-19        VITALS  T(C): 38.1, Max: 38.1 (06-19 @ 08:00)  HR: 95 (79 - 116)  BP: 88/41 (88/41 - 129/71)  RR: 27 (20 - 47)  SpO2: 96% (88% - 96%)  Wt(kg): --    ALLERGIES  allopurinol (Rash)  codeine (Nausea; Vomiting)  penicillin (Rash)  spironolactone (Unknown)      MEDICATIONS   lidocaine   Patch 2Patch Transdermal daily  ALBUTerol/ipratropium for Nebulization 3milliLiter(s) Nebulizer every 6 hours  folic acid 1milliGRAM(s) Oral daily  atorvastatin 40milliGRAM(s) Oral at bedtime  midodrine 5milliGRAM(s) Oral two times a day  epoetin jaswinder Injectable 46534Dmed(s) IV Push <User Schedule>  sodium chloride 0.65% Nasal 1Spray(s) Both Nostrils every 6 hours PRN  docusate sodium 100milliGRAM(s) Oral three times a day  senna 2Tablet(s) Oral at bedtime  acetaminophen   Tablet. 650milliGRAM(s) Oral every 6 hours PRN  aspirin  chewable 81milliGRAM(s) Oral daily  heparin  Injectable 5000Unit(s) SubCutaneous every 8 hours  gabapentin 300milliGRAM(s) Oral three times a day  cefepime  IVPB  IV Intermittent       ----------------------------------------------------------------------------------------  PHYSICAL EXAM  Constitutional - NAD, Uncomfortable due to bed chest PT  HEENT - Facial ecchymosis  Neck - Supple, No limited ROM  Chest - Coarse breath sounds rhonchi  Cardiovascular - S1S2  Abdomen - Soft  Extremities - No C/C, Edema of the LE, PVD skin changes, No calf tenderness   Neurologic Exam -                    Cognitive - Awake, Alert, AAO to self, place, date, year, situation     Communication - Fluent, No dysarthria     Motor - No focal deficits                    LEFT    UE - ShAB 5/5, EF 5/5, EE 5/5, WE 5/5,  5/5                    RIGHT UE - ShAB 5/5, EF 5/5, EE 5/5, WE 5/5,  5/5                    LEFT    LE - HF 5/5, KE 5/5, DF 5/5, PF 5/5                    RIGHT LE - HF 5/5, KE 5/5, DF 5/5, PF 5/5        Sensory - Intact to LT     Balance - WNL Static  Psychiatric - Mood anxiety  ----------------------------------------------------------------------------------------  ASSESSMENT/PLAN  76F sustaining a TBI, rib fractures after a fall now with pulmonary complications related   Pain - Tylenol, Neurontin, Lidoderm  DVT PPX - Heparin  Rehab Dispo - Patient acutely medically ill. Given she was mobile prior to these complications, may need acute rehab. Will continue to follow for ongoing rehab needs and recommendations.

## 2017-06-19 NOTE — PROGRESS NOTE ADULT - SUBJECTIVE AND OBJECTIVE BOX
INTERVAL HPI/OVERNIGHT EVENTS/SUBJECTIVE:  RRT overnight.  Resp distress/failure with hypoxia.  Return to ICU.      ICU Vital Signs Last 24 Hrs  T(C): 38.1, Max: 38.1 (06-19 @ 08:00)  T(F): 100.6, Max: 997.3 (06-18 @ 16:00)  HR: 95 (79 - 116)  BP: 88/41 (88/41 - 129/71)  BP(mean): 59 (59 - 76)  ABP: --  ABP(mean): --  RR: 27 (20 - 47)  SpO2: 96% (88% - 96%)      I&O's Detail  I & Os for 24h ending 19 Jun 2017 07:00  =============================================  IN:    Oral Fluid: 50 ml    Total IN: 50 ml  ---------------------------------------------  OUT:    Indwelling Catheter - Urethral: 30 ml    Total OUT: 30 ml  ---------------------------------------------  Total NET: 20 ml    I & Os for current day (as of 19 Jun 2017 10:56)  =============================================  IN:    Solution: 50 ml    Total IN: 50 ml  ---------------------------------------------  OUT:    Indwelling Catheter - Urethral: 15 ml    Total OUT: 15 ml  ---------------------------------------------  Total NET: 35 ml        ABG - ( 19 Jun 2017 05:16 )  pH: 7.37  /  pCO2: 42    /  pO2: 62    / HCO3: 24    / Base Excess: -0.6  /  SaO2: 94                  MEDICATIONS  (STANDING):  lidocaine   Patch 2Patch Transdermal daily  ALBUTerol/ipratropium for Nebulization 3milliLiter(s) Nebulizer every 6 hours  folic acid 1milliGRAM(s) Oral daily  atorvastatin 40milliGRAM(s) Oral at bedtime  midodrine 5milliGRAM(s) Oral two times a day  epoetin jaswinder Injectable 10592Qqyy(s) IV Push <User Schedule>  docusate sodium 100milliGRAM(s) Oral three times a day  senna 2Tablet(s) Oral at bedtime  aspirin  chewable 81milliGRAM(s) Oral daily  heparin  Injectable 5000Unit(s) SubCutaneous every 8 hours  gabapentin 300milliGRAM(s) Oral three times a day  cefepime  IVPB  IV Intermittent     MEDICATIONS  (PRN):  sodium chloride 0.65% Nasal 1Spray(s) Both Nostrils every 6 hours PRN Nasal Congestion  acetaminophen   Tablet. 650milliGRAM(s) Oral every 6 hours PRN Mild Pain (1 - 3)      NUTRITION/IVF: PO    CENTRAL LINE: Tunneled dyalisis catheter.       BOOKER: Y         PHYSICAL EXAM:    Gen: Mild distress.      Eyes: PERRLA    Neurological: AAOx4 non focal    Pulmonary: Mild resp distress with accessory muscle use and tachypnea.  BS coarse throughout.     Cardiovascular:  irregular    Gastrointestinal:  Soft NT/ND    Genitourinary: Booker    Extremities: No pitting edema        LABS:  CBC Full  -  ( 19 Jun 2017 03:01 )  WBC Count : 22.9 K/uL  Hemoglobin : 8.9 g/dL  Hematocrit : 28.5 %  Platelet Count - Automated : 198 K/uL  Mean Cell Volume : 115.4 fl  Mean Cell Hemoglobin : 36.0 pg  Mean Cell Hemoglobin Concentration : 31.2 g/dL  Auto Neutrophil # : x  Auto Lymphocyte # : x  Auto Monocyte # : x  Auto Eosinophil # : x  Auto Basophil # : x  Auto Neutrophil % : 78.0 %  Auto Lymphocyte % : 2.0 %  Auto Monocyte % : 7.0 %  Auto Eosinophil % : 1.0 %  Auto Basophil % : x    06-19    135  |  95<L>  |  61.0<H>  ----------------------------<  150<H>  5.5<H>   |  22.0  |  4.82<H>    Ca    8.9      19 Jun 2017 09:57  Phos  4.0     06-19  Mg     1.8     06-19    TPro  6.4<L>  /  Alb  3.6  /  TBili  1.5  /  DBili  0.8<H>  /  AST  27  /  ALT  31  /  AlkPhos  465<H>  06-19        RECENT CULTURES:      LIVER FUNCTIONS - ( 19 Jun 2017 09:57 )  Alb: 3.6 g/dL / Pro: 6.4 g/dL / ALK PHOS: 465 U/L / ALT: 31 U/L / AST: 27 U/L / GGT: x               RADIOLOGY & ADDITIONAL STUDIES:  CXR with increased right basilar density.      ASSESSMENT/PLAN:  76yFemale presenting with:    Neuro:  SAH - stable at last CT, no clinical change.      CV:  Borderline low BP this AM.  Unclear significance.  Closely observe in ICU.      Pulm: Possible HCAP fro rib fxs.  Empiric abx.  Could also be increased effusion/hemothorax.  CT chest today to determine pulmonary vs pleural pathology to direct therapy.  On high flow O2.  Continue supportive therapy.      GI/Nutrition:  Diet.  Question of cholecystitis from initial CT findings.  Possible RUQ tenderness but more likely from rib fxs.  F/u ultrasound.      /Renal: ESRD - hyperkalemic today.  Plan for HD.      ID: High leucocytosis, unclear source but HCAP possible.  Empiric therapy.  BCX, lactate.       Proph: DVT    Dispo: ICU      CRITICAL CARE TIME SPENT:  45 min

## 2017-06-19 NOTE — PROGRESS NOTE ADULT - ASSESSMENT
ESRD on HD TTS schedule   HD sat tolerated ok will HD today off schedule for SOB over night   Hypervolemic/ HyperKalemia HD on 2 K bath  SAH no heparin w HD  Anemia 2/2 CKD w\TS 18 % cont iV iron  h/o bio AVR and MV/ TR repair

## 2017-06-19 NOTE — PROGRESS NOTE ADULT - SUBJECTIVE AND OBJECTIVE BOX
INTERVAL HPI/OVERNIGHT EVENTS/SUBJECTIVE:  Pt RRT for hypoxia and increased WOB.  ABG w/ hypoxia and slight hypercarbia.  Pt is known to have a COPD hx with home O2 dependence of 2L/min.     ICU Vital Signs Last 24 Hrs  T(C): 37.6, Max: 37.6 (06-19 @ 05:00)  T(F): 99.7, Max: 997.3 (06-18 @ 16:00)  HR: 102 (69 - 116)  BP: 97/51 (88/50 - 129/71)  BP(mean): 69 (69 - 76)  ABP: --  ABP(mean): --  RR: 21 (18 - 37)  SpO2: 89% (88% - 92%)      I&O's Detail    I & Os for current day (as of 19 Jun 2017 05:51)  =============================================  IN:    Total IN: 0 ml  ---------------------------------------------  OUT:    Other: 1500 ml    Total OUT: 1500 ml  ---------------------------------------------  Total NET: -1500 ml        ABG - ( 19 Jun 2017 05:16 )  pH: 7.37  /  pCO2: 42    /  pO2: 62    / HCO3: 24    / Base Excess: -0.6  /  SaO2: 94                  MEDICATIONS  (STANDING):  lidocaine   Patch 2Patch Transdermal daily  ALBUTerol/ipratropium for Nebulization 3milliLiter(s) Nebulizer every 6 hours  folic acid 1milliGRAM(s) Oral daily  atorvastatin 40milliGRAM(s) Oral at bedtime  midodrine 5milliGRAM(s) Oral two times a day  epoetin jaswinder Injectable 38322Ihyj(s) IV Push <User Schedule>  docusate sodium 100milliGRAM(s) Oral three times a day  senna 2Tablet(s) Oral at bedtime  aspirin  chewable 81milliGRAM(s) Oral daily  heparin  Injectable 5000Unit(s) SubCutaneous every 8 hours  gabapentin 400milliGRAM(s) Oral three times a day    MEDICATIONS  (PRN):  sodium chloride 0.65% Nasal 1Spray(s) Both Nostrils every 6 hours PRN Nasal Congestion  acetaminophen   Tablet. 650milliGRAM(s) Oral every 6 hours PRN Mild Pain (1 - 3)      NUTRITION/IVF: NPO/IVL      PHYSICAL EXAM:    Gen:  Increased work of breathing noted, moderate respiratory distress    Neurological:  GCS 15    ENMT:  Dry mucous membranes    Neck:  No JVD    Pulmonary:  Coarse BS noted BL with decreased BS noted Rt base, scant wheezes noted BL throughout lung fields.  Able to speak in full sentences w/ difficulty    Cardiovascular:  Sinus tach    Gastrointestinal:  Softly distended, "tender" per pt in RUQ without rebound or guarding.  NEG Bloom's    Genitourinary:  Dialysis, voids    Ext:  No pedal edema BL    Skin:  Pink, dry    Musculoskeletal:  AFROM          LABS:  CBC Full  -  ( 19 Jun 2017 03:01 )  WBC Count : 22.9 K/uL  Hemoglobin : 8.9 g/dL  Hematocrit : 28.5 %  Platelet Count - Automated : 198 K/uL  Mean Cell Volume : 115.4 fl  Mean Cell Hemoglobin : 36.0 pg  Mean Cell Hemoglobin Concentration : 31.2 g/dL  Auto Neutrophil # : x  Auto Lymphocyte # : x  Auto Monocyte # : x  Auto Eosinophil # : x  Auto Basophil # : x  Auto Neutrophil % : 78.0 %  Auto Lymphocyte % : 2.0 %  Auto Monocyte % : 7.0 %  Auto Eosinophil % : 1.0 %  Auto Basophil % : x    06-19    135  |  95<L>  |  56.0<H>  ----------------------------<  157<H>  5.2   |  22.0  |  4.50<H>    Ca    9.0      19 Jun 2017 03:01  Phos  4.0     06-19  Mg     1.8     06-19    TPro  6.7  /  Alb  3.6  /  TBili  1.6  /  DBili  0.9<H>  /  AST  33<H>  /  ALT  35<H>  /  AlkPhos  542<H>  06-19        RECENT CULTURES:      LIVER FUNCTIONS - ( 19 Jun 2017 03:01 )  Alb: 3.6 g/dL / Pro: 6.7 g/dL / ALK PHOS: 542 U/L / ALT: 35 U/L / AST: 33 U/L / GGT: x               CAPILLARY BLOOD GLUCOSE      RADIOLOGY & ADDITIONAL STUDIES:    ASSESSMENT/PLAN:  76yFemale presenting with:  Acute hypoxic resp failure, pleural effusion, mult rib fxs:    Admit to ICU  Placed to Bipap with lessen work of breathing, improvement of PaO2.  Pt intolerant of Bipap, will try high flow nasal cannula.  Low threshold for intubation  Keep NPO, abd US ordered, pt w/ new leukocytosis, afebrile, suggestion of acute cholecystitis on admission CT A/P

## 2017-06-19 NOTE — PROGRESS NOTE ADULT - PROBLEM SELECTOR PLAN 1
Will transfer patient to ICU for close monitoring of respiratory status  Neuro- Neuro-checks q 2 hours, pain control prn  Respi: Continuos pulse ox, c/w Duonebs prn, chest PT, encourage incentive spirometer, if patient decompensates will require endotracheal intubation  Cardio: Remote telemetry, monitor HR and BP  Gastro- consistent carb diet  Genitourinary- Hemodialysis MWF  DVT ppx

## 2017-06-20 LAB
ALLERGY+IMMUNOLOGY DIAG STUDY NOTE: SIGNIFICANT CHANGE UP
ANISOCYTOSIS BLD QL: SLIGHT — SIGNIFICANT CHANGE UP
EOSINOPHIL NFR BLD AUTO: 2 % — SIGNIFICANT CHANGE UP (ref 0–5)
GAS PNL BLDA: SIGNIFICANT CHANGE UP
HCT VFR BLD CALC: 23.9 % — LOW (ref 37–47)
HGB BLD-MCNC: 7.7 G/DL — LOW (ref 12–16)
HYPOCHROMIA BLD QL: SLIGHT — SIGNIFICANT CHANGE UP
LYMPHOCYTES # BLD AUTO: 5 % — LOW (ref 20–55)
MACROCYTES BLD QL: SLIGHT — SIGNIFICANT CHANGE UP
MAGNESIUM SERPL-MCNC: 1.9 MG/DL — SIGNIFICANT CHANGE UP (ref 1.6–2.6)
MCHC RBC-ENTMCNC: 32.2 G/DL — SIGNIFICANT CHANGE UP (ref 32–36)
MCHC RBC-ENTMCNC: 36.7 PG — HIGH (ref 27–31)
MCV RBC AUTO: 113.8 FL — HIGH (ref 81–99)
MICROCYTES BLD QL: SLIGHT — SIGNIFICANT CHANGE UP
MONOCYTES NFR BLD AUTO: 7 % — SIGNIFICANT CHANGE UP (ref 3–10)
NEUTROPHILS NFR BLD AUTO: 86 % — HIGH (ref 37–73)
NT-PROBNP SERPL-SCNC: HIGH PG/ML (ref 0–300)
OVALOCYTES BLD QL SMEAR: SLIGHT — SIGNIFICANT CHANGE UP
PHOSPHATE SERPL-MCNC: 3.9 MG/DL — SIGNIFICANT CHANGE UP (ref 2.4–4.7)
PLAT MORPH BLD: NORMAL — SIGNIFICANT CHANGE UP
PLATELET # BLD AUTO: 153 K/UL — SIGNIFICANT CHANGE UP (ref 150–400)
POIKILOCYTOSIS BLD QL AUTO: SLIGHT — SIGNIFICANT CHANGE UP
POLYCHROMASIA BLD QL SMEAR: SLIGHT — SIGNIFICANT CHANGE UP
RBC # BLD: 2.1 M/UL — LOW (ref 4.4–5.2)
RBC # FLD: 20.1 % — HIGH (ref 11–15.6)
RBC BLD AUTO: ABNORMAL
VANCOMYCIN TROUGH SERPL-MCNC: 14.3 UG/ML — SIGNIFICANT CHANGE UP (ref 10–20)
WBC # BLD: 9.9 K/UL — SIGNIFICANT CHANGE UP (ref 4.8–10.8)
WBC # FLD AUTO: 9.9 K/UL — SIGNIFICANT CHANGE UP (ref 4.8–10.8)

## 2017-06-20 PROCEDURE — 86850 RBC ANTIBODY SCREEN: CPT

## 2017-06-20 PROCEDURE — 85730 THROMBOPLASTIN TIME PARTIAL: CPT

## 2017-06-20 PROCEDURE — 71010: CPT | Mod: 26,76

## 2017-06-20 PROCEDURE — 86900 BLOOD TYPING SEROLOGIC ABO: CPT

## 2017-06-20 PROCEDURE — 85027 COMPLETE CBC AUTOMATED: CPT

## 2017-06-20 PROCEDURE — 86901 BLOOD TYPING SEROLOGIC RH(D): CPT

## 2017-06-20 PROCEDURE — 86870 RBC ANTIBODY IDENTIFICATION: CPT

## 2017-06-20 PROCEDURE — G0463: CPT

## 2017-06-20 PROCEDURE — 80048 BASIC METABOLIC PNL TOTAL CA: CPT

## 2017-06-20 PROCEDURE — 85610 PROTHROMBIN TIME: CPT

## 2017-06-20 PROCEDURE — 71046 X-RAY EXAM CHEST 2 VIEWS: CPT

## 2017-06-20 PROCEDURE — 93306 TTE W/DOPPLER COMPLETE: CPT | Mod: 26

## 2017-06-20 PROCEDURE — 86880 COOMBS TEST DIRECT: CPT

## 2017-06-20 RX ORDER — ALBUTEROL 90 UG/1
1 AEROSOL, METERED ORAL EVERY 4 HOURS
Qty: 0 | Refills: 0 | Status: DISCONTINUED | OUTPATIENT
Start: 2017-06-20 | End: 2017-06-25

## 2017-06-20 RX ORDER — ALBUTEROL 90 UG/1
2.5 AEROSOL, METERED ORAL EVERY 6 HOURS
Qty: 0 | Refills: 0 | Status: DISCONTINUED | OUTPATIENT
Start: 2017-06-20 | End: 2017-06-23

## 2017-06-20 RX ADMIN — Medication 100 MILLIGRAM(S): at 06:40

## 2017-06-20 RX ADMIN — Medication 650 MILLIGRAM(S): at 00:48

## 2017-06-20 RX ADMIN — GABAPENTIN 300 MILLIGRAM(S): 400 CAPSULE ORAL at 22:12

## 2017-06-20 RX ADMIN — GABAPENTIN 300 MILLIGRAM(S): 400 CAPSULE ORAL at 13:10

## 2017-06-20 RX ADMIN — Medication 3 MILLILITER(S): at 19:51

## 2017-06-20 RX ADMIN — ATORVASTATIN CALCIUM 40 MILLIGRAM(S): 80 TABLET, FILM COATED ORAL at 22:12

## 2017-06-20 RX ADMIN — Medication 3 MILLILITER(S): at 08:52

## 2017-06-20 RX ADMIN — HEPARIN SODIUM 5000 UNIT(S): 5000 INJECTION INTRAVENOUS; SUBCUTANEOUS at 13:10

## 2017-06-20 RX ADMIN — LIDOCAINE 2 PATCH: 4 CREAM TOPICAL at 13:10

## 2017-06-20 RX ADMIN — HEPARIN SODIUM 5000 UNIT(S): 5000 INJECTION INTRAVENOUS; SUBCUTANEOUS at 06:40

## 2017-06-20 RX ADMIN — Medication 1 MILLIGRAM(S): at 13:10

## 2017-06-20 RX ADMIN — SENNA PLUS 2 TABLET(S): 8.6 TABLET ORAL at 22:12

## 2017-06-20 RX ADMIN — HEPARIN SODIUM 5000 UNIT(S): 5000 INJECTION INTRAVENOUS; SUBCUTANEOUS at 22:12

## 2017-06-20 RX ADMIN — Medication 3 MILLILITER(S): at 14:50

## 2017-06-20 RX ADMIN — Medication 3 MILLILITER(S): at 03:16

## 2017-06-20 RX ADMIN — GABAPENTIN 300 MILLIGRAM(S): 400 CAPSULE ORAL at 06:40

## 2017-06-20 RX ADMIN — Medication 81 MILLIGRAM(S): at 13:10

## 2017-06-20 RX ADMIN — MIDODRINE HYDROCHLORIDE 5 MILLIGRAM(S): 2.5 TABLET ORAL at 17:48

## 2017-06-20 RX ADMIN — LIDOCAINE 2 PATCH: 4 CREAM TOPICAL at 00:48

## 2017-06-20 RX ADMIN — CEFEPIME 100 MILLIGRAM(S): 1 INJECTION, POWDER, FOR SOLUTION INTRAMUSCULAR; INTRAVENOUS at 10:40

## 2017-06-20 RX ADMIN — MIDODRINE HYDROCHLORIDE 5 MILLIGRAM(S): 2.5 TABLET ORAL at 06:40

## 2017-06-20 RX ADMIN — Medication 650 MILLIGRAM(S): at 01:15

## 2017-06-20 RX ADMIN — Medication 100 MILLIGRAM(S): at 22:12

## 2017-06-20 RX ADMIN — Medication 100 MILLIGRAM(S): at 13:10

## 2017-06-20 NOTE — PROGRESS NOTE ADULT - SUBJECTIVE AND OBJECTIVE BOX
[place] [facility] - [facility], THE [facility]                                   [address] [place] [address]                                                      PHONE: [phone]                                                         FAX: [phone]  [email or web address]  ---------------------------------------------------------------------------------------------------------------------------------    Overnight events/patient complaints: less dyspneic, had HD yesterday      allopurinol (Rash)  codeine (Nausea; Vomiting)  penicillin (Rash)  spironolactone (Unknown)    MEDICATIONS  (STANDING):  lidocaine   Patch 2Patch Transdermal daily  ALBUTerol/ipratropium for Nebulization 3milliLiter(s) Nebulizer every 6 hours  folic acid 1milliGRAM(s) Oral daily  atorvastatin 40milliGRAM(s) Oral at bedtime  midodrine 5milliGRAM(s) Oral two times a day  epoetin jaswinder Injectable 15359Mxca(s) IV Push <User Schedule>  docusate sodium 100milliGRAM(s) Oral three times a day  senna 2Tablet(s) Oral at bedtime  aspirin  chewable 81milliGRAM(s) Oral daily  heparin  Injectable 5000Unit(s) SubCutaneous every 8 hours  gabapentin 300milliGRAM(s) Oral three times a day  cefepime  IVPB  IV Intermittent   cefepime  IVPB 500milliGRAM(s) IV Intermittent every 24 hours  ALBUTerol    90 MICROgram(s) HFA Inhaler 1Puff(s) Inhalation every 4 hours    MEDICATIONS  (PRN):  sodium chloride 0.65% Nasal 1Spray(s) Both Nostrils every 6 hours PRN Nasal Congestion  acetaminophen   Tablet. 650milliGRAM(s) Oral every 6 hours PRN Mild Pain (1 - 3)  ALBUTerol    0.083% 2.5milliGRAM(s) Nebulizer every 6 hours PRN Shortness of Breath and/or Wheezing      Vital Signs Last 24 Hrs  T(C): 36.3, Max: 37.6 (- @ 12:00)  T(F): 97.4, Max: 99.7 ( @ 12:00)  HR: 77 (66 - 110)  BP: 101/58 (84/48 - 111/54)  BP(mean): 72 (59 - 77)  RR: 32 (17 - 32)  SpO2: 99% (78% - 100%)  Daily     Daily Weight in k.6 (2017 16:00)  ICU Vital Signs Last 24 Hrs  DUSTIN HERNANDEZ  I&O's Detail    I & Os for current day (as of 2017 09:24)  =============================================  IN:    Solution: 250 ml    Solution: 50 ml    Total IN: 300 ml  ---------------------------------------------  OUT:    Other: 2000 ml    Indwelling Catheter - Urethral: 45 ml    Total OUT: 2045 ml  ---------------------------------------------  Total NET: -1745 ml    I&O's Summary    I & Os for current day (as of 2017 09:24)  =============================================  IN: 300 ml / OUT: 2045 ml / NET: -1745 ml    Drug Dosing Weight  DUSTIN HERNANDEZ      PHYSICAL EXAM:  General: Appears well developed, well nourished alert and cooperative.  HEENT: Head; normocephalic, atraumatic.  Eyes: Pupils reactive, cornea wnl.  Neck: Supple, no nodes adenopathy, no NVD or carotid bruit or thyromegaly.  CARDIOVASCULAR: Normal S1 and S2, No murmur, rub, gallop or lift.   LUNGS: No rales, rhonchi or wheeze. Normal breath sounds bilaterally.  ABDOMEN: Soft, nontender without mass or organomegaly. bowel sounds normoactive.  EXTREMITIES: No clubbing, cyanosis or edema. Distal pulses wnl.   SKIN: warm and dry with normal turgor.  NEURO: Alert/oriented x 3/normal motor exam. No pathologic reflexes.    PSYCH: normal affect.        LABS:                        7.7    9.9   )-----------( 153      ( 2017 04:51 )             23.9     -    135  |  95<L>  |  61.0<H>  ----------------------------<  150<H>  5.5<H>   |  22.0  |  4.82<H>    Ca    8.9      2017 09:57  Phos  3.9     -  Mg     1.9     -    TPro  6.4<L>  /  Alb  3.6  /  TBili  1.5  /  DBili  0.8<H>  /  AST  27  /  ALT  31  /  AlkPhos  465<H>      DUSTIN HERNANDEZ            RADIOLOGY & ADDITIONAL STUDIES:   IMPRESSION:     Right pleural effusion, larger since 2017. The density is consistent   with serous fluid. No evidence of hemothorax.    Increasing atelectasis of the right lower lobe and right middle lobe.   Consolidation in the right middle lobe and lingula, new findings since   the prior study. Left basilar atelectasis, a new finding.    On the current study acute fractures of the right fourth through eighth   ribs are noted. No displacement of fracture fragments..                 FADIA VALDIVIA M.D., ATTENDING RADIOLOGIST  This document has been electronically signed. 2017  1:16PM    INTERPRETATION OF TELEMETRY (personally reviewed):    ECG:

## 2017-06-20 NOTE — PROGRESS NOTE ADULT - SUBJECTIVE AND OBJECTIVE BOX
NEPHROLOGY INTERVAL HPI/OVERNIGHT EVENTS:    Seen earlier   OOB to chair and interactive   ABG noted   Still requiring high flow nasal O2   Cardio follow up noted     MEDICATIONS  (STANDING):  lidocaine   Patch 2Patch Transdermal daily  ALBUTerol/ipratropium for Nebulization 3milliLiter(s) Nebulizer every 6 hours  folic acid 1milliGRAM(s) Oral daily  atorvastatin 40milliGRAM(s) Oral at bedtime  midodrine 5milliGRAM(s) Oral two times a day  epoetin jaswinder Injectable 67585Vbpb(s) IV Push <User Schedule>  docusate sodium 100milliGRAM(s) Oral three times a day  senna 2Tablet(s) Oral at bedtime  aspirin  chewable 81milliGRAM(s) Oral daily  heparin  Injectable 5000Unit(s) SubCutaneous every 8 hours  gabapentin 300milliGRAM(s) Oral three times a day  cefepime  IVPB  IV Intermittent   cefepime  IVPB 500milliGRAM(s) IV Intermittent every 24 hours  ALBUTerol    90 MICROgram(s) HFA Inhaler 1Puff(s) Inhalation every 4 hours    MEDICATIONS  (PRN):  sodium chloride 0.65% Nasal 1Spray(s) Both Nostrils every 6 hours PRN Nasal Congestion  acetaminophen   Tablet. 650milliGRAM(s) Oral every 6 hours PRN Mild Pain (1 - 3)  ALBUTerol    0.083% 2.5milliGRAM(s) Nebulizer every 6 hours PRN Shortness of Breath and/or Wheezing      Allergies    allopurinol (Rash)  codeine (Nausea; Vomiting)  penicillin (Rash)  spironolactone (Unknown)    Intolerances            Vital Signs Last 24 Hrs  T(C): 36.3, Max: 37.6 (- @ 12:00)  T(F): 97.4, Max: 99.7 (-19 @ 12:00)  HR: 79 (66 - 110)  BP: 88/49 (84/48 - 111/54)  BP(mean): 72 (62 - 77)  RR: 28 (17 - 32)  SpO2: 97% (78% - 100%)  Daily     Daily Weight in k.6 (2017 16:00)  I&O's Detail    I & Os for current day (as of 2017 11:53)  =============================================  IN:    Solution: 250 ml    Solution: 50 ml    Total IN: 300 ml  ---------------------------------------------  OUT:    Other: 2000 ml    Indwelling Catheter - Urethral: 45 ml    Total OUT: 2045 ml  ---------------------------------------------  Total NET: -1745 ml    I&O's Summary    I & Os for current day (as of 2017 11:53)  =============================================  IN: 300 ml / OUT: 2045 ml / NET: -1745 ml      PHYSICAL EXAM:  PHYSICAL EXAM:  GENERAL: NAD  HEENT: ecchymosis forhead  NECK: Supple.   LUNGS: Good air entry CTA B/L  HEART: Regular rate and rhythm + murmur.  ABDOMEN: Soft, nontender, and nondistended  EXTREMITIES: no LE edema     LABS:                        7.7    9.9   )-----------( 153      ( 2017 04:51 )             23.9     -    135  |  95<L>  |  61.0<H>  ----------------------------<  150<H>  5.5<H>   |  22.0  |  4.82<H>    Ca    8.9      2017 09:57  Phos  3.9       Mg     1.9         TPro  6.4<L>  /  Alb  3.6  /  TBili  1.5  /  DBili  0.8<H>  /  AST  27  /  ALT  31  /  AlkPhos  465<H>          Magnesium, Serum: 1.9 mg/dL ( @ 04:51)  Phosphorus Level, Serum: 3.9 mg/dL ( @ 04:51)    ABG - ( 2017 11:15 )  pH: 7.34  /  pCO2: 56    /  pO2: 85    / HCO3: 28    / Base Excess: 3.9   /  SaO2: 98                    RADIOLOGY & ADDITIONAL TESTS:     EXAM:  CHEST SINGLE VIEW FRONTAL                          PROCEDURE DATE:  2017        INTERPRETATION:  TECHNIQUE: Single portable view of the chest.    COMPARISON: 2017    CLINICAL HISTORY: Shortness of breath, CHF, rule out pneumonia.    FINDINGS:    Single frontal view of the chest demonstrates right lower lobe   infiltrate/atelectasis with an adjacent effusion, unchanged. The   cardiomediastinal silhouette is large. No acute osseous abnormalities.   Overlying EKG leads and wires are noted. Right-sided osseous catheter tip   in the proximal SVC. Mediastinal sternotomy wires. The right   hemidiaphragm is moderately elevated, unchanged.    IMPRESSION: No interval change.

## 2017-06-20 NOTE — PROGRESS NOTE ADULT - ASSESSMENT
ESRD - Additional HD for fluid removal as tolerated today     S/O Traumatic SAH - ASA and Plavix held  No Heparin with HD   NeuroSx follow up noted     Anemia - continue iron and Epogen   Transfuse as needed    Right pleural effusion - Assess response to fluid removal with HD   May require drainage
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
## 2017-06-21 LAB
ANION GAP SERPL CALC-SCNC: 15 MMOL/L — SIGNIFICANT CHANGE UP (ref 5–17)
BUN SERPL-MCNC: 47 MG/DL — HIGH (ref 8–20)
CALCIUM SERPL-MCNC: 8.7 MG/DL — SIGNIFICANT CHANGE UP (ref 8.6–10.2)
CHLORIDE SERPL-SCNC: 95 MMOL/L — LOW (ref 98–107)
CO2 SERPL-SCNC: 27 MMOL/L — SIGNIFICANT CHANGE UP (ref 22–29)
CREAT SERPL-MCNC: 4.42 MG/DL — HIGH (ref 0.5–1.3)
GLUCOSE SERPL-MCNC: 116 MG/DL — HIGH (ref 70–115)
HCT VFR BLD CALC: 24.9 % — LOW (ref 37–47)
HGB BLD-MCNC: 7.9 G/DL — LOW (ref 12–16)
MAGNESIUM SERPL-MCNC: 2.1 MG/DL — SIGNIFICANT CHANGE UP (ref 1.6–2.6)
MCHC RBC-ENTMCNC: 31.7 G/DL — LOW (ref 32–36)
MCHC RBC-ENTMCNC: 36.7 PG — HIGH (ref 27–31)
MCV RBC AUTO: 115.8 FL — HIGH (ref 81–99)
NT-PROBNP SERPL-SCNC: HIGH PG/ML (ref 0–300)
PHOSPHATE SERPL-MCNC: 5.3 MG/DL — HIGH (ref 2.4–4.7)
PLATELET # BLD AUTO: 161 K/UL — SIGNIFICANT CHANGE UP (ref 150–400)
POTASSIUM SERPL-MCNC: 4.6 MMOL/L — SIGNIFICANT CHANGE UP (ref 3.5–5.3)
POTASSIUM SERPL-SCNC: 4.6 MMOL/L — SIGNIFICANT CHANGE UP (ref 3.5–5.3)
RBC # BLD: 2.15 M/UL — LOW (ref 4.4–5.2)
RBC # FLD: 20.1 % — HIGH (ref 11–15.6)
SODIUM SERPL-SCNC: 137 MMOL/L — SIGNIFICANT CHANGE UP (ref 135–145)
WBC # BLD: 7.5 K/UL — SIGNIFICANT CHANGE UP (ref 4.8–10.8)
WBC # FLD AUTO: 7.5 K/UL — SIGNIFICANT CHANGE UP (ref 4.8–10.8)

## 2017-06-21 PROCEDURE — 99232 SBSQ HOSP IP/OBS MODERATE 35: CPT

## 2017-06-21 RX ORDER — VANCOMYCIN HCL 1 G
1000 VIAL (EA) INTRAVENOUS ONCE
Qty: 0 | Refills: 0 | Status: COMPLETED | OUTPATIENT
Start: 2017-06-21 | End: 2017-06-21

## 2017-06-21 RX ORDER — LANOLIN ALCOHOL/MO/W.PET/CERES
3 CREAM (GRAM) TOPICAL AT BEDTIME
Qty: 0 | Refills: 0 | Status: DISCONTINUED | OUTPATIENT
Start: 2017-06-21 | End: 2017-06-27

## 2017-06-21 RX ADMIN — Medication 250 MILLIGRAM(S): at 17:09

## 2017-06-21 RX ADMIN — Medication 3 MILLILITER(S): at 16:00

## 2017-06-21 RX ADMIN — GABAPENTIN 300 MILLIGRAM(S): 400 CAPSULE ORAL at 21:52

## 2017-06-21 RX ADMIN — Medication 100 MILLIGRAM(S): at 05:24

## 2017-06-21 RX ADMIN — Medication 3 MILLILITER(S): at 03:28

## 2017-06-21 RX ADMIN — ATORVASTATIN CALCIUM 40 MILLIGRAM(S): 80 TABLET, FILM COATED ORAL at 21:52

## 2017-06-21 RX ADMIN — MIDODRINE HYDROCHLORIDE 5 MILLIGRAM(S): 2.5 TABLET ORAL at 05:24

## 2017-06-21 RX ADMIN — CEFEPIME 100 MILLIGRAM(S): 1 INJECTION, POWDER, FOR SOLUTION INTRAMUSCULAR; INTRAVENOUS at 13:12

## 2017-06-21 RX ADMIN — HEPARIN SODIUM 5000 UNIT(S): 5000 INJECTION INTRAVENOUS; SUBCUTANEOUS at 13:12

## 2017-06-21 RX ADMIN — LIDOCAINE 2 PATCH: 4 CREAM TOPICAL at 13:12

## 2017-06-21 RX ADMIN — GABAPENTIN 300 MILLIGRAM(S): 400 CAPSULE ORAL at 05:24

## 2017-06-21 RX ADMIN — LIDOCAINE 2 PATCH: 4 CREAM TOPICAL at 01:08

## 2017-06-21 RX ADMIN — GABAPENTIN 300 MILLIGRAM(S): 400 CAPSULE ORAL at 13:13

## 2017-06-21 RX ADMIN — SENNA PLUS 2 TABLET(S): 8.6 TABLET ORAL at 21:52

## 2017-06-21 RX ADMIN — Medication 1 MILLIGRAM(S): at 13:13

## 2017-06-21 RX ADMIN — Medication 3 MILLIGRAM(S): at 21:52

## 2017-06-21 RX ADMIN — Medication 650 MILLIGRAM(S): at 10:18

## 2017-06-21 RX ADMIN — Medication 3 MILLILITER(S): at 08:30

## 2017-06-21 RX ADMIN — Medication 81 MILLIGRAM(S): at 13:13

## 2017-06-21 RX ADMIN — Medication 3 MILLILITER(S): at 20:01

## 2017-06-21 RX ADMIN — HEPARIN SODIUM 5000 UNIT(S): 5000 INJECTION INTRAVENOUS; SUBCUTANEOUS at 21:52

## 2017-06-21 RX ADMIN — Medication 100 MILLIGRAM(S): at 13:13

## 2017-06-21 RX ADMIN — Medication 100 MILLIGRAM(S): at 21:52

## 2017-06-21 RX ADMIN — MIDODRINE HYDROCHLORIDE 5 MILLIGRAM(S): 2.5 TABLET ORAL at 17:09

## 2017-06-21 RX ADMIN — Medication 650 MILLIGRAM(S): at 09:19

## 2017-06-21 RX ADMIN — HEPARIN SODIUM 5000 UNIT(S): 5000 INJECTION INTRAVENOUS; SUBCUTANEOUS at 05:24

## 2017-06-21 NOTE — PROGRESS NOTE ADULT - SUBJECTIVE AND OBJECTIVE BOX
Patient seen and examined  oob/ch    I&O's Summary  I & Os for 24h ending 21 Jun 2017 07:00  =============================================  IN: 0 ml / OUT: 1401 ml / NET: -1401 ml    I & Os for current day (as of 21 Jun 2017 21:05)  =============================================  IN: 300 ml / OUT: 0 ml / NET: 300 ml      REVIEW OF SYSTEMS:    CONSTITUTIONAL: No F/C  Feels better  RESPIRATORY: No cough or SOB  CARDIOVASCULAR: No CP/palpitations,    GASTROINTESTINAL: No abdominal pain , NVD   GENITOURINARY: No UTI sx  NEUROLOGICAL: No headaches/wk/numbness  MUSCULOSKELETAL:  No joint pain/swelling; No LBP  EXTREMITIES : no swelling,    Vital Signs Last 24 Hrs  T(C): 36.4, Max: 36.6 (06-21 @ 08:01)  T(F): 97.6, Max: 97.9 (06-21 @ 08:01)  HR: 72 (69 - 85)  BP: 115/55 (80/41 - 115/55)  BP(mean): 79 (57 - 79)  RR: 22 (16 - 32)  SpO2: 96% (94% - 100%)    PHYSICAL EXAM:    GENERAL: NAD,   EYES:  conjunctiva and sclera clear  NECK: Supple, No JVD/Bruit  NERVOUS SYSTEM:  A/O x3,   CHEST:  CTA ,No rales, few rhonchi  HEART:  RRR, No murmurs  ABDOMEN: Soft, NT/ND BS+  EXTREMITIES:  No Edema;  SKIN: No rashes; mild ecchymosis present    LABS:                        7.9    7.5   )-----------( 161      ( 21 Jun 2017 03:50 )             24.9     06-21    137  |  95<L>  |  47.0<H>  ----------------------------<  116<H>  4.6   |  27.0  |  4.42<H>    Ca    8.7      21 Jun 2017 03:50  Phos  5.3     06-21  Mg     2.1     06-21        MEDICATIONS  (STANDING):  lidocaine   Patch  ALBUTerol/ipratropium for Nebulization  folic acid  atorvastatin  midodrine  epoetin jaswinder Injectable  sodium chloride 0.65% Nasal PRN  docusate sodium  senna  acetaminophen   Tablet. PRN  aspirin  chewable  heparin  Injectable  gabapentin  cefepime  IVPB  cefepime  IVPB  ALBUTerol    0.083% PRN  ALBUTerol    90 MICROgram(s) HFA Inhaler  melatonin
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
## 2017-06-21 NOTE — PROGRESS NOTE ADULT - ASSESSMENT
Had HD Monday and UF yesterday  Refusing HD today  labs better and SOB much less  Has SAH- stable  HD am

## 2017-06-21 NOTE — PROGRESS NOTE ADULT - SUBJECTIVE AND OBJECTIVE BOX
Chief Complaint: chart and hx reviewed. Pt admitted after fall and traumatic SAH.    HPI: Pt has hx of recent falling and AC dc'd. Recent fall result in rib fx's and SAH. PMH + Afib/Bio AVR and MV annuloplasty 2012 at [facility]. Hx CRF-on dialysis x 1 month. Prior hx of hpt but no MI/cabg or stents/hepatic/thyroid or prior cva. Surgery includes back/tubal ligation. Hx of pulmonary hpt and remote smoking. Allergy to pcn/zyloprim/codeine and aldactone. Denies cp o edema. + ledbetter. Current meds reviewed.    PAST MEDICAL & SURGICAL HISTORY:  ESRD (end stage renal disease) on dialysis: MWF via [facility] Permacath  planned AVF creation  Sinusitis, unspecified chronicity, unspecified location  Gout  Gall stones  Pneumonia  Anemia  Pulmonary hypertension  COPD (chronic obstructive pulmonary disease)  CAD (coronary artery disease)  Atrial fibrillation  Hyperlipidemia  Hypertension  Spinal fusion failure, initial encounter  Tubal ligation status  Sinusitis  Spinal stenosis  Pneumonia due to organism  No pertinent past medical history  S/P tonsillectomy  S/P appendectomy  H/O aortic valve replacement  H/O spinal fusion  H/O cataract  Cystocele  H/O tricuspid valve annuloplasty  H/O mitral valve repair      PREVIOUS DIAGNOSTIC TESTING:      ECHO  FINDINGS: Nl lvef but dilated and hypo rv with severe TR/bio AVR and MV ring. PASP 56.    STRESS  FINDINGS:    CATHETERIZATION  FINDINGS:    MEDICATIONS  (STANDING):  lidocaine   Patch 2Patch Transdermal daily  ALBUTerol/ipratropium for Nebulization 3milliLiter(s) Nebulizer every 6 hours  folic acid 1milliGRAM(s) Oral daily  atorvastatin 40milliGRAM(s) Oral at bedtime  midodrine 5milliGRAM(s) Oral two times a day  epoetin jaswinder Injectable 79709Slvh(s) IV Push <User Schedule>  docusate sodium 100milliGRAM(s) Oral three times a day  senna 2Tablet(s) Oral at bedtime  aspirin  chewable 81milliGRAM(s) Oral daily  heparin  Injectable 5000Unit(s) SubCutaneous every 8 hours  gabapentin 300milliGRAM(s) Oral three times a day  cefepime  IVPB  IV Intermittent   cefepime  IVPB 500milliGRAM(s) IV Intermittent every 24 hours  ALBUTerol    90 MICROgram(s) HFA Inhaler 1Puff(s) Inhalation every 4 hours    MEDICATIONS  (PRN):  sodium chloride 0.65% Nasal 1Spray(s) Both Nostrils every 6 hours PRN Nasal Congestion  acetaminophen   Tablet. 650milliGRAM(s) Oral every 6 hours PRN Mild Pain (1 - 3)  ALBUTerol    0.083% 2.5milliGRAM(s) Nebulizer every 6 hours PRN Shortness of Breath and/or Wheezing      FAMILY HISTORY:  CAD (coronary artery disease)      ROS: Negative other than as mentioned in HPI.    Vital Signs Last 24 Hrs  T(C): 36.6, Max: 36.8 (06-20 @ 16:00)  T(F): 97.9, Max: 98.3 (06-20 @ 16:00)  HR: 79 (69 - 87)  BP: 103/54 (80/41 - 119/55)  BP(mean): 77 (57 - 79)  RR: 27 (16 - 32) on O2.  SpO2: 95% (94% - 100%)    PHYSICAL EXAM:  General: Appears well developed, well nourished alert and cooperative. Elderly and afebrile  HEENT: Head; normocephalic, atraumatic.  Eyes;   Pupils reactive, cornea wnl.  Neck; Supple, no nodes adenopathy, no NVD or carotid bruit or thyromegaly.  CARDIOVASCULAR; No murmur, rub, gallop or lift. Normal S1 and S2. Distant heart tones and irreg rhythm.  LUNGS; coarse BS bilat with decreased BS right base.  ABDOMEN ; Soft, nontender without mass or organomegaly. bowel sounds normoactive.  EXTREMITIES; No clubbing, cyanosis or edema.  ROM normal.  SKIN; warm and dry with normal turgor.  NEURO; Alert/oriented x 3/normal motor exam. No pathologic reflexes.    PSYCH; normal affect.            INTERPRETATION OF TELEMETRY:    ECG:    I&O's Detail    I & Os for current day (as of 21 Jun 2017 08:45)  =============================================  IN:    Total IN: 0 ml  ---------------------------------------------  OUT:    Other: 1400 ml    Voided: 1 ml    Total OUT: 1401 ml  ---------------------------------------------  Total NET: -1401 ml      LABS:    pro bnp 50,000                    7.9    7.5   )-----------( 161      ( 21 Jun 2017 03:50 )             24.9     06-21    137  |  95<L>  |  47.0<H>  ----------------------------<  116<H>  4.6   |  27.0  |  4.42<H>    Ca    8.7      21 Jun 2017 03:50  Phos  5.3     06-21  Mg     2.1     06-21    TPro  6.4<L>  /  Alb  3.6  /  TBili  1.5  /  DBili  0.8<H>  /  AST  27  /  ALT  31  /  AlkPhos  465<H>  06-19            I&O's Summary    I & Os for current day (as of 21 Jun 2017 08:45)  =============================================  IN: 0 ml / OUT: 1401 ml / NET: -1401 ml      RADIOLOGY & ADDITIONAL STUDIES:

## 2017-06-22 LAB
ANION GAP SERPL CALC-SCNC: 19 MMOL/L — HIGH (ref 5–17)
ANION GAP SERPL CALC-SCNC: 20 MMOL/L — HIGH (ref 5–17)
ANISOCYTOSIS BLD QL: SLIGHT — SIGNIFICANT CHANGE UP
BUN SERPL-MCNC: 66 MG/DL — HIGH (ref 8–20)
BUN SERPL-MCNC: 67 MG/DL — HIGH (ref 8–20)
CALCIUM SERPL-MCNC: 8.6 MG/DL — SIGNIFICANT CHANGE UP (ref 8.6–10.2)
CALCIUM SERPL-MCNC: 8.7 MG/DL — SIGNIFICANT CHANGE UP (ref 8.6–10.2)
CHLORIDE SERPL-SCNC: 90 MMOL/L — LOW (ref 98–107)
CHLORIDE SERPL-SCNC: 93 MMOL/L — LOW (ref 98–107)
CO2 SERPL-SCNC: 24 MMOL/L — SIGNIFICANT CHANGE UP (ref 22–29)
CO2 SERPL-SCNC: 24 MMOL/L — SIGNIFICANT CHANGE UP (ref 22–29)
CREAT SERPL-MCNC: 5.06 MG/DL — HIGH (ref 0.5–1.3)
CREAT SERPL-MCNC: 5.1 MG/DL — HIGH (ref 0.5–1.3)
EOSINOPHIL # BLD AUTO: 0.2 K/UL — SIGNIFICANT CHANGE UP (ref 0–0.5)
EOSINOPHIL NFR BLD AUTO: 2 % — SIGNIFICANT CHANGE UP (ref 0–5)
GLUCOSE SERPL-MCNC: 121 MG/DL — HIGH (ref 70–115)
GLUCOSE SERPL-MCNC: 189 MG/DL — HIGH (ref 70–115)
HCT VFR BLD CALC: 24 % — LOW (ref 37–47)
HGB BLD-MCNC: 7.6 G/DL — LOW (ref 12–16)
HYPOCHROMIA BLD QL: SLIGHT — SIGNIFICANT CHANGE UP
LYMPHOCYTES # BLD AUTO: 0.6 K/UL — LOW (ref 1–4.8)
LYMPHOCYTES # BLD AUTO: 7 % — LOW (ref 20–55)
MACROCYTES BLD QL: SIGNIFICANT CHANGE UP
MAGNESIUM SERPL-MCNC: 2.2 MG/DL — SIGNIFICANT CHANGE UP (ref 1.6–2.6)
MCHC RBC-ENTMCNC: 31.7 G/DL — LOW (ref 32–36)
MCHC RBC-ENTMCNC: 36.4 PG — HIGH (ref 27–31)
MCV RBC AUTO: 114.8 FL — HIGH (ref 81–99)
MONOCYTES # BLD AUTO: 0.8 K/UL — SIGNIFICANT CHANGE UP (ref 0–0.8)
MONOCYTES NFR BLD AUTO: 9 % — SIGNIFICANT CHANGE UP (ref 3–10)
NEUTROPHILS # BLD AUTO: 6.5 K/UL — SIGNIFICANT CHANGE UP (ref 1.8–8)
NEUTROPHILS NFR BLD AUTO: 79 % — HIGH (ref 37–73)
NEUTS BAND # BLD: 3 % — SIGNIFICANT CHANGE UP (ref 0–8)
OVALOCYTES BLD QL SMEAR: SLIGHT — SIGNIFICANT CHANGE UP
PHOSPHATE SERPL-MCNC: 5.4 MG/DL — HIGH (ref 2.4–4.7)
PLAT MORPH BLD: NORMAL — SIGNIFICANT CHANGE UP
PLATELET # BLD AUTO: 165 K/UL — SIGNIFICANT CHANGE UP (ref 150–400)
POIKILOCYTOSIS BLD QL AUTO: SLIGHT — SIGNIFICANT CHANGE UP
POLYCHROMASIA BLD QL SMEAR: SLIGHT — SIGNIFICANT CHANGE UP
POTASSIUM SERPL-MCNC: 4.7 MMOL/L — SIGNIFICANT CHANGE UP (ref 3.5–5.3)
POTASSIUM SERPL-MCNC: 4.9 MMOL/L — SIGNIFICANT CHANGE UP (ref 3.5–5.3)
POTASSIUM SERPL-SCNC: 4.7 MMOL/L — SIGNIFICANT CHANGE UP (ref 3.5–5.3)
POTASSIUM SERPL-SCNC: 4.9 MMOL/L — SIGNIFICANT CHANGE UP (ref 3.5–5.3)
RBC # BLD: 2.09 M/UL — LOW (ref 4.4–5.2)
RBC # FLD: 19.5 % — HIGH (ref 11–15.6)
RBC BLD AUTO: ABNORMAL
SODIUM SERPL-SCNC: 134 MMOL/L — LOW (ref 135–145)
SODIUM SERPL-SCNC: 136 MMOL/L — SIGNIFICANT CHANGE UP (ref 135–145)
WBC # BLD: 8.1 K/UL — SIGNIFICANT CHANGE UP (ref 4.8–10.8)
WBC # FLD AUTO: 8.1 K/UL — SIGNIFICANT CHANGE UP (ref 4.8–10.8)

## 2017-06-22 RX ORDER — PREGABALIN 225 MG/1
1000 CAPSULE ORAL DAILY
Qty: 0 | Refills: 0 | Status: DISCONTINUED | OUTPATIENT
Start: 2017-06-22 | End: 2017-06-27

## 2017-06-22 RX ORDER — ALBUMIN HUMAN 25 %
100 VIAL (ML) INTRAVENOUS ONCE
Qty: 0 | Refills: 0 | Status: COMPLETED | OUTPATIENT
Start: 2017-06-22 | End: 2017-06-22

## 2017-06-22 RX ORDER — VANCOMYCIN HCL 1 G
1000 VIAL (EA) INTRAVENOUS ONCE
Qty: 0 | Refills: 0 | Status: COMPLETED | OUTPATIENT
Start: 2017-06-22 | End: 2017-06-22

## 2017-06-22 RX ADMIN — LIDOCAINE 2 PATCH: 4 CREAM TOPICAL at 08:26

## 2017-06-22 RX ADMIN — Medication 20 MILLIGRAM(S): at 21:53

## 2017-06-22 RX ADMIN — HEPARIN SODIUM 5000 UNIT(S): 5000 INJECTION INTRAVENOUS; SUBCUTANEOUS at 05:26

## 2017-06-22 RX ADMIN — GABAPENTIN 300 MILLIGRAM(S): 400 CAPSULE ORAL at 13:13

## 2017-06-22 RX ADMIN — SENNA PLUS 2 TABLET(S): 8.6 TABLET ORAL at 21:53

## 2017-06-22 RX ADMIN — MIDODRINE HYDROCHLORIDE 5 MILLIGRAM(S): 2.5 TABLET ORAL at 05:26

## 2017-06-22 RX ADMIN — Medication 650 MILLIGRAM(S): at 01:41

## 2017-06-22 RX ADMIN — Medication 3 MILLILITER(S): at 15:19

## 2017-06-22 RX ADMIN — Medication 100 MILLIGRAM(S): at 05:26

## 2017-06-22 RX ADMIN — Medication 100 MILLIGRAM(S): at 13:13

## 2017-06-22 RX ADMIN — MIDODRINE HYDROCHLORIDE 5 MILLIGRAM(S): 2.5 TABLET ORAL at 18:06

## 2017-06-22 RX ADMIN — Medication 650 MILLIGRAM(S): at 08:26

## 2017-06-22 RX ADMIN — LIDOCAINE 2 PATCH: 4 CREAM TOPICAL at 01:00

## 2017-06-22 RX ADMIN — Medication 650 MILLIGRAM(S): at 19:21

## 2017-06-22 RX ADMIN — ATORVASTATIN CALCIUM 40 MILLIGRAM(S): 80 TABLET, FILM COATED ORAL at 21:53

## 2017-06-22 RX ADMIN — Medication 100 MILLIGRAM(S): at 21:53

## 2017-06-22 RX ADMIN — Medication 50 MILLILITER(S): at 09:59

## 2017-06-22 RX ADMIN — HEPARIN SODIUM 5000 UNIT(S): 5000 INJECTION INTRAVENOUS; SUBCUTANEOUS at 13:15

## 2017-06-22 RX ADMIN — Medication 650 MILLIGRAM(S): at 09:30

## 2017-06-22 RX ADMIN — Medication 250 MILLIGRAM(S): at 13:36

## 2017-06-22 RX ADMIN — GABAPENTIN 300 MILLIGRAM(S): 400 CAPSULE ORAL at 21:53

## 2017-06-22 RX ADMIN — GABAPENTIN 300 MILLIGRAM(S): 400 CAPSULE ORAL at 05:26

## 2017-06-22 RX ADMIN — ERYTHROPOIETIN 10000 UNIT(S): 10000 INJECTION, SOLUTION INTRAVENOUS; SUBCUTANEOUS at 09:57

## 2017-06-22 RX ADMIN — HEPARIN SODIUM 5000 UNIT(S): 5000 INJECTION INTRAVENOUS; SUBCUTANEOUS at 21:53

## 2017-06-22 RX ADMIN — Medication 650 MILLIGRAM(S): at 00:21

## 2017-06-22 RX ADMIN — PREGABALIN 1000 MICROGRAM(S): 225 CAPSULE ORAL at 13:15

## 2017-06-22 RX ADMIN — Medication 3 MILLILITER(S): at 03:40

## 2017-06-22 RX ADMIN — Medication 81 MILLIGRAM(S): at 13:14

## 2017-06-22 RX ADMIN — CEFEPIME 100 MILLIGRAM(S): 1 INJECTION, POWDER, FOR SOLUTION INTRAMUSCULAR; INTRAVENOUS at 13:13

## 2017-06-22 RX ADMIN — Medication 3 MILLIGRAM(S): at 21:53

## 2017-06-22 RX ADMIN — Medication 20 MILLIGRAM(S): at 13:14

## 2017-06-22 RX ADMIN — Medication 3 MILLILITER(S): at 08:14

## 2017-06-22 RX ADMIN — Medication 3 MILLILITER(S): at 20:19

## 2017-06-22 RX ADMIN — Medication 1 MILLIGRAM(S): at 13:13

## 2017-06-22 RX ADMIN — Medication 650 MILLIGRAM(S): at 18:25

## 2017-06-22 RX ADMIN — LIDOCAINE 2 PATCH: 4 CREAM TOPICAL at 21:45

## 2017-06-22 NOTE — PROGRESS NOTE ADULT - SUBJECTIVE AND OBJECTIVE BOX
INTERVAL HPI/OVERNIGHT EVENTS:    PRESSORS: [ ] YES [ x] NO  WHICH:  DOSE:    ANTIBIOTICS:                  DATE STARTED:  ANTIBIOTICS:                  DATE STARTED:  ANTIBIOTICS:                  DATE STARTED:    MEDICATIONS  (STANDING):  lidocaine   Patch 2Patch Transdermal daily  ALBUTerol/ipratropium for Nebulization 3milliLiter(s) Nebulizer every 6 hours  folic acid 1milliGRAM(s) Oral daily  atorvastatin 40milliGRAM(s) Oral at bedtime  midodrine 5milliGRAM(s) Oral two times a day  epoetin jaswinder Injectable [id number](s) IV Push <User Schedule>  docusate sodium 100milliGRAM(s) Oral three times a day  senna 2Tablet(s) Oral at bedtime  aspirin  chewable 81milliGRAM(s) Oral daily  heparin  Injectable 5000Unit(s) SubCutaneous every 8 hours  gabapentin 300milliGRAM(s) Oral three times a day  cefepime  IVPB  IV Intermittent   cefepime  IVPB 500milliGRAM(s) IV Intermittent every 24 hours  ALBUTerol    90 MICROgram(s) HFA Inhaler 1Puff(s) Inhalation every 4 hours  melatonin 3milliGRAM(s) Oral at bedtime  sildenafil (REVATIO) 20milliGRAM(s) Oral three times a day  cyanocobalamin 1000MICROGram(s) Oral daily    MEDICATIONS  (PRN):  sodium chloride 0.65% Nasal 1Spray(s) Both Nostrils every 6 hours PRN Nasal Congestion  acetaminophen   Tablet. 650milliGRAM(s) Oral every 6 hours PRN Mild Pain (1 - 3)  ALBUTerol    0.083% 2.5milliGRAM(s) Nebulizer every 6 hours PRN Shortness of Breath and/or Wheezing      Drug Dosing Weight  Height (cm): 167.6 (14 Jun 2017 15:00)  Weight (kg): 75.9 (14 Jun 2017 15:00)  BMI (kg/m2): 27 (14 Jun 2017 15:00)  BSA (m2): 1.85 (14 Jun 2017 15:00)    CENTRAL LINE: [ ] YES [ ] NO  LOCATION:   DATE INSERTED:  REMOVE: [ ] YES [ ] NO  EXPLAIN:    BOOKER: [ ] YES [ ] NO    DATE INSERTED:  REMOVE: [ ] YES [ ] NO  EXPLAIN:    A-LINE: [ ] YES [ ] NO  LOCATION:   DATE INSERTED:  REMOVE: [ ] YES [ ] NO  EXPLAIN:    PAST MEDICAL & SURGICAL HISTORY:  ESRD (end stage renal disease) on dialysis: MWF via R SC Permacath  planned AVF creation  Sinusitis, unspecified chronicity, unspecified location  Gout  Gall stones  Pneumonia  Anemia  Pulmonary hypertension  COPD (chronic obstructive pulmonary disease)  CAD (coronary artery disease)  Atrial fibrillation  Hyperlipidemia  Hypertension  Spinal fusion failure, initial encounter  Tubal ligation status  Sinusitis  Spinal stenosis  Pneumonia due to organism  No pertinent past medical history  S/P tonsillectomy  S/P appendectomy  H/O aortic valve replacement  H/O spinal fusion  H/O cataract  Cystocele  H/O tricuspid valve annuloplasty  H/O mitral valve repair      ICU Vital Signs Last 24 Hrs  T(C): 36.1, Max: 36.6 (06-21 @ 16:00)  T(F): 97, Max: 97.8 (06-21 @ 16:00)  HR: 79 (72 - 85)  BP: 100/50 (83/50 - 115/55)  BP(mean): 75 (63 - 80)  ABP: --  ABP(mean): --  RR: 20 (16 - 27)  SpO2: 100% (92% - 100%)          I&O's Detail  I & Os for 24h ending 22 Jun 2017 07:00  =============================================  IN:    Solution: 250 ml    Solution: 50 ml    Total IN: 300 ml  ---------------------------------------------  OUT:    Voided: 1 ml    Total OUT: 1 ml  ---------------------------------------------  Total NET: 299 ml    I & Os for current day (as of 22 Jun 2017 13:04)  =============================================  IN:    Total IN: 0 ml  ---------------------------------------------  OUT:    Other: 1700 ml    Total OUT: 1700 ml  ---------------------------------------------  Total NET: -1700 ml            LABS:  CBC Full  -  ( 22 Jun 2017 06:01 )  WBC Count : 8.1 K/uL  Hemoglobin : 7.6 g/dL  Hematocrit : 24.0 %  Platelet Count - Automated : 165 K/uL  Mean Cell Volume : 114.8 fl  Mean Cell Hemoglobin : 36.4 pg  Mean Cell Hemoglobin Concentration : 31.7 g/dL  Auto Neutrophil # : 6.5 K/uL  Auto Lymphocyte # : 0.6 K/uL  Auto Monocyte # : 0.8 K/uL  Auto Eosinophil # : 0.2 K/uL  Auto Basophil # : x  Auto Neutrophil % : 79.0 %  Auto Lymphocyte % : 7.0 %  Auto Monocyte % : 9.0 %  Auto Eosinophil % : 2.0 %  Auto Basophil % : x    06-22    134<L>  |  90<L>  |  67.0<H>  ----------------------------<  189<H>  4.7   |  24.0  |  5.06<H>    Ca    8.6      22 Jun 2017 09:22  Phos  5.4     06-22  Mg     2.2     06-22            Assessment and Plan:    Neuro: GCS 15. Monitor for delirium.  Continue to optimize pain control. Serial Neurologic assessments    HEENT: no issues    CV: Continue hemodynamic monitoring    Pulm: Pulmonary toilet.  Continue incentive spirometer.  Chest PT.  Encourage OOB to chair and ambulation.  Need to assure adequate pain management.  Inhaled Beta-agonists.  Can consider a pulmonary vest.  CXR reviewed--pleural effusion noted--will monitor for now    GI/Nutrition: Bowel Regimen    /Renal: HD today.  Repeat labs after dialysis    HEME- DVT prophylaxis, SCDs

## 2017-06-22 NOTE — PROGRESS NOTE ADULT - SUBJECTIVE AND OBJECTIVE BOX
Patient seen and examined  Finished HD earlier; Aida easily  feels much better    I&O's Summary  I & Os for 24h ending 22 Jun 2017 07:00  =============================================  IN: 300 ml / OUT: 1 ml / NET: 299 ml    I & Os for current day (as of 22 Jun 2017 22:30)  =============================================  IN: 0 ml / OUT: 1700 ml / NET: -1700 ml      REVIEW OF SYSTEMS:    CONSTITUTIONAL: No F/C  Facial pain gone  RESPIRATORY: No cough or SOB; rib pain minimal  CARDIOVASCULAR: No CP/palpitations,    GASTROINTESTINAL: No abdominal pain , NVD   GENITOURINARY: No UTI sx  NEUROLOGICAL: No headaches/wk/numbness  MUSCULOSKELETAL:  No joint pain/swelling; No LBP  EXTREMITIES : no swelling,    Vital Signs Last 24 Hrs  T(C): 36.9, Max: 36.9 (06-22 @ 20:00)  T(F): 98.4, Max: 98.4 (06-22 @ 20:00)  HR: 73 (72 - 84)  BP: 108/51 (87/50 - 122/51)  BP(mean): 73 (63 - 79)  RR: 21 (16 - 27)  SpO2: 97% (92% - 100%)    PHYSICAL EXAM:    GENERAL: NAD,   EYES:  conjunctiva and sclera clear  NECK: Supple, No JVD/Bruit  NERVOUS SYSTEM:  A/O x3,   CHEST:  CTA ,No rales or rhonchi  HEART:  RRR, No murmurs  ABDOMEN: Soft, NT/ND BS+  EXTREMITIES:  No Edema;  SKIN: No rashes    LABS:                        7.6    8.1   )-----------( 165      ( 22 Jun 2017 06:01 )             24.0     06-22    134<L>  |  90<L>  |  67.0<H>  ----------------------------<  189<H>  4.7   |  24.0  |  5.06<H>    Ca    8.6      22 Jun 2017 09:22  Phos  5.4     06-22  Mg     2.2     06-22        MEDICATIONS  (STANDING):  lidocaine   Patch  ALBUTerol/ipratropium for Nebulization  folic acid  atorvastatin  midodrine  epoetin jaswinder Injectable  sodium chloride 0.65% Nasal PRN  docusate sodium  senna  acetaminophen   Tablet. PRN  aspirin  chewable  heparin  Injectable  gabapentin  cefepime  IVPB  cefepime  IVPB  ALBUTerol    0.083% PRN  ALBUTerol    90 MICROgram(s) HFA Inhaler  melatonin  sildenafil (REVATIO)  cyanocobalamin

## 2017-06-22 NOTE — PROGRESS NOTE ADULT - ASSESSMENT
Genesis HDeasily  Slowly improving  Hb Low- Procrit given; check stool  check iron panel- 2 doses of Venofer last week

## 2017-06-23 DIAGNOSIS — J90 PLEURAL EFFUSION, NOT ELSEWHERE CLASSIFIED: ICD-10-CM

## 2017-06-23 LAB
ANION GAP SERPL CALC-SCNC: 14 MMOL/L — SIGNIFICANT CHANGE UP (ref 5–17)
BUN SERPL-MCNC: 38 MG/DL — HIGH (ref 8–20)
CALCIUM SERPL-MCNC: 9 MG/DL — SIGNIFICANT CHANGE UP (ref 8.6–10.2)
CHLORIDE SERPL-SCNC: 94 MMOL/L — LOW (ref 98–107)
CO2 SERPL-SCNC: 27 MMOL/L — SIGNIFICANT CHANGE UP (ref 22–29)
CREAT SERPL-MCNC: 3.21 MG/DL — HIGH (ref 0.5–1.3)
FERRITIN SERPL-MCNC: 935.3 NG/ML — HIGH (ref 11–306)
GLUCOSE SERPL-MCNC: 123 MG/DL — HIGH (ref 70–115)
IRON SATN MFR SERPL: 30 % — SIGNIFICANT CHANGE UP (ref 14–50)
IRON SATN MFR SERPL: 97 UG/DL — SIGNIFICANT CHANGE UP (ref 37–145)
MAGNESIUM SERPL-MCNC: 2 MG/DL — SIGNIFICANT CHANGE UP (ref 1.6–2.6)
NT-PROBNP SERPL-SCNC: HIGH PG/ML (ref 0–300)
PHOSPHATE SERPL-MCNC: 4.2 MG/DL — SIGNIFICANT CHANGE UP (ref 2.4–4.7)
POTASSIUM SERPL-MCNC: 4.1 MMOL/L — SIGNIFICANT CHANGE UP (ref 3.5–5.3)
POTASSIUM SERPL-SCNC: 4.1 MMOL/L — SIGNIFICANT CHANGE UP (ref 3.5–5.3)
SODIUM SERPL-SCNC: 135 MMOL/L — SIGNIFICANT CHANGE UP (ref 135–145)
TIBC SERPL-MCNC: 325 UG/DL — SIGNIFICANT CHANGE UP (ref 220–430)
TRANSFERRIN SERPL-MCNC: 227 MG/DL — SIGNIFICANT CHANGE UP (ref 192–382)
VANCOMYCIN TROUGH SERPL-MCNC: 26 UG/ML — CRITICAL HIGH (ref 10–20)

## 2017-06-23 PROCEDURE — 99232 SBSQ HOSP IP/OBS MODERATE 35: CPT

## 2017-06-23 PROCEDURE — 71010: CPT | Mod: 26

## 2017-06-23 RX ORDER — ALBUTEROL 90 UG/1
1.25 AEROSOL, METERED ORAL EVERY 4 HOURS
Qty: 0 | Refills: 0 | Status: DISCONTINUED | OUTPATIENT
Start: 2017-06-23 | End: 2017-06-23

## 2017-06-23 RX ADMIN — Medication 20 MILLIGRAM(S): at 22:30

## 2017-06-23 RX ADMIN — LIDOCAINE 2 PATCH: 4 CREAM TOPICAL at 11:29

## 2017-06-23 RX ADMIN — Medication 20 MILLIGRAM(S): at 13:18

## 2017-06-23 RX ADMIN — MIDODRINE HYDROCHLORIDE 5 MILLIGRAM(S): 2.5 TABLET ORAL at 17:32

## 2017-06-23 RX ADMIN — GABAPENTIN 300 MILLIGRAM(S): 400 CAPSULE ORAL at 13:18

## 2017-06-23 RX ADMIN — Medication 100 MILLIGRAM(S): at 06:24

## 2017-06-23 RX ADMIN — Medication 3 MILLILITER(S): at 08:34

## 2017-06-23 RX ADMIN — Medication 3 MILLIGRAM(S): at 22:29

## 2017-06-23 RX ADMIN — HEPARIN SODIUM 5000 UNIT(S): 5000 INJECTION INTRAVENOUS; SUBCUTANEOUS at 22:29

## 2017-06-23 RX ADMIN — CEFEPIME 100 MILLIGRAM(S): 1 INJECTION, POWDER, FOR SOLUTION INTRAMUSCULAR; INTRAVENOUS at 08:39

## 2017-06-23 RX ADMIN — HEPARIN SODIUM 5000 UNIT(S): 5000 INJECTION INTRAVENOUS; SUBCUTANEOUS at 06:24

## 2017-06-23 RX ADMIN — GABAPENTIN 300 MILLIGRAM(S): 400 CAPSULE ORAL at 22:29

## 2017-06-23 RX ADMIN — MIDODRINE HYDROCHLORIDE 5 MILLIGRAM(S): 2.5 TABLET ORAL at 06:24

## 2017-06-23 RX ADMIN — ATORVASTATIN CALCIUM 40 MILLIGRAM(S): 80 TABLET, FILM COATED ORAL at 22:30

## 2017-06-23 RX ADMIN — Medication 100 MILLIGRAM(S): at 22:30

## 2017-06-23 RX ADMIN — SENNA PLUS 2 TABLET(S): 8.6 TABLET ORAL at 22:30

## 2017-06-23 RX ADMIN — Medication 650 MILLIGRAM(S): at 22:29

## 2017-06-23 RX ADMIN — PREGABALIN 1000 MICROGRAM(S): 225 CAPSULE ORAL at 13:18

## 2017-06-23 RX ADMIN — Medication 3 MILLILITER(S): at 20:33

## 2017-06-23 RX ADMIN — Medication 650 MILLIGRAM(S): at 22:59

## 2017-06-23 RX ADMIN — Medication 3 MILLILITER(S): at 14:19

## 2017-06-23 RX ADMIN — Medication 20 MILLIGRAM(S): at 06:23

## 2017-06-23 RX ADMIN — Medication 81 MILLIGRAM(S): at 11:29

## 2017-06-23 RX ADMIN — Medication 100 MILLIGRAM(S): at 13:19

## 2017-06-23 RX ADMIN — Medication 3 MILLILITER(S): at 03:53

## 2017-06-23 RX ADMIN — HEPARIN SODIUM 5000 UNIT(S): 5000 INJECTION INTRAVENOUS; SUBCUTANEOUS at 13:19

## 2017-06-23 RX ADMIN — GABAPENTIN 300 MILLIGRAM(S): 400 CAPSULE ORAL at 06:24

## 2017-06-23 RX ADMIN — Medication 650 MILLIGRAM(S): at 09:37

## 2017-06-23 RX ADMIN — Medication 650 MILLIGRAM(S): at 10:30

## 2017-06-23 RX ADMIN — Medication 1 MILLIGRAM(S): at 11:29

## 2017-06-23 NOTE — PROGRESS NOTE ADULT - SUBJECTIVE AND OBJECTIVE BOX
Patient doing well today. Laying in bed after being out of bed for 2 hours. Continues to have a cough. Denies SOB, off high flow NC. Has some pain in the chest at her rib fracture locations. Continued on the Cefepime for PNA. CPK down to 59K.    FUNCTIONAL PROGRESS  6/21 - PT  Sit-Stand Transfer Training  Sit-to-Stand Transfer Training Rehab Potential : good, to achieve stated therapy goals  Sit-Stand Transfer Training  Transfer Training Sit-to-Stand Transfer : minimum assist (75% patient effort);  1 person assist;  rolling walker  Sit-Stand Transfer Training  Transfer Training Stand-to-Sit Transfer : contact guard;  1 person assist;  rolling walker  Sit-Stand Transfer Training  Sit-to-Stand Transfer Training Transfer Safety Analysis : decreased balance;  impaired balance;  decreased strength;  rolling walker  Gait Training  Gait Training Rehab Potential : good, to achieve stated therapy goals  Gait Training   Gait Training : minimum assist (75% patient effort);  1 person assist;  rolling walker;  portable O2 ;  50 feet  Gait Training   Gait Analysis : 2-point gait   decreased strength;  impaired balance;  50 feet;  rolling walker  Therapeutic Exercise  Therapeutic Exercise Rehab Effort : good  Therapeutic Exercise  Therapeutic Exercise Detail : knee extension, hip flexion and ankle pumps bilat. in sitting.     REVIEW OF SYSTEMS  Constitutional - No fever,  +fatigue  Neurological - No headaches, No+memory loss, +loss of strength, No numbness, No tremors  Musculoskeletal - +joint pain, No joint swelling, No muscle pain  Psychiatric - +depression, +anxiety    VITALS  T(C): 36.4, Max: 36.9 (06-22 @ 20:00)  HR: 78 (70 - 84)  BP: 159/71 (80/40 - 159/71)  RR: 30 (17 - 42)  SpO2: 91% (81% - 100%)  Wt(kg): --    MEDICATIONS   lidocaine   Patch 2Patch daily  ALBUTerol/ipratropium for Nebulization 3milliLiter(s) every 6 hours  folic acid 1milliGRAM(s) daily  atorvastatin 40milliGRAM(s) at bedtime  midodrine 5milliGRAM(s) two times a day  epoetin jaswinder Injectable [id number](s) <User Schedule>  sodium chloride 0.65% Nasal 1Spray(s) every 6 hours PRN  docusate sodium 100milliGRAM(s) three times a day  senna 2Tablet(s) at bedtime  acetaminophen   Tablet. 650milliGRAM(s) every 6 hours PRN  aspirin  chewable 81milliGRAM(s) daily  heparin  Injectable 5000Unit(s) every 8 hours  gabapentin 300milliGRAM(s) three times a day  cefepime  IVPB    cefepime  IVPB 500milliGRAM(s) every 24 hours  ALBUTerol    0.083% 2.5milliGRAM(s) every 6 hours PRN  ALBUTerol    90 MICROgram(s) HFA Inhaler 1Puff(s) every 4 hours  melatonin 3milliGRAM(s) at bedtime  sildenafil (REVATIO) 20milliGRAM(s) three times a day  cyanocobalamin 1000MICROGram(s) daily      RECENT LABS/IMAGING  CBC Full  -  ( 22 Jun 2017 06:01 )  WBC Count : 8.1 K/uL  Hemoglobin : 7.6 g/dL  Hematocrit : 24.0 %  Platelet Count - Automated : 165 K/uL  Mean Cell Volume : 114.8 fl  Mean Cell Hemoglobin : 36.4 pg  Mean Cell Hemoglobin Concentration : 31.7 g/dL  Auto Neutrophil # : 6.5 K/uL  Auto Lymphocyte # : 0.6 K/uL  Auto Monocyte # : 0.8 K/uL  Auto Eosinophil # : 0.2 K/uL  Auto Basophil # : x  Auto Neutrophil % : 79.0 %  Auto Lymphocyte % : 7.0 %  Auto Monocyte % : 9.0 %  Auto Eosinophil % : 2.0 %  Auto Basophil % : x    06-23    135  |  94<L>  |  38.0<H>  ----------------------------<  123<H>  4.1   |  27.0  |  3.21<H>    Ca    9.0      23 Jun 2017 05:36  Phos  4.2     06-23  Mg     2.0     06-23        ----------------------------------------------------------------------------------------  PHYSICAL EXAM  Constitutional - NAD, Comfortable  HEENT - Facial ecchymosis  Abdomen - Soft, mildly distended  Extremities - No C/C/E, PVD skin changes, No calf tenderness   Neurologic Exam -                    Cognitive - AAOx2, not month and day, but is to day of week and year     Motor - No focal deficits     Sensory - Intact to LT     Coordination - Right inattention, poor fine motor   Psychiatric - Mood depressed  ----------------------------------------------------------------------------------------  ASSESSMENT/PLAN  76F sustaining a TBI, rib fractures after a fall now with pulmonary complications now with functional deficits  CAD - ASA  Sleep wake cycle - Melatonin as per ACS  Pain - Tylenol, Neurontin, Lidoderm  DVT PPX - Heparin  Rehab Dispo - Will continue to follow. Will likely need acute rehab.

## 2017-06-23 NOTE — PROGRESS NOTE ADULT - SUBJECTIVE AND OBJECTIVE BOX
INTERVAL HPI/OVERNIGHT EVENTS:    PRESSORS: [ ] YES [ ] NO  WHICH:  DOSE:    ANTIBIOTICS:                  DATE STARTED:  ANTIBIOTICS:                  DATE STARTED:  ANTIBIOTICS:                  DATE STARTED:    MEDICATIONS  (STANDING):  lidocaine   Patch 2Patch Transdermal daily  ALBUTerol/ipratropium for Nebulization 3milliLiter(s) Nebulizer every 6 hours  folic acid 1milliGRAM(s) Oral daily  atorvastatin 40milliGRAM(s) Oral at bedtime  midodrine 5milliGRAM(s) Oral two times a day  epoetin jaswinder Injectable [id number](s) IV Push <User Schedule>  docusate sodium 100milliGRAM(s) Oral three times a day  senna 2Tablet(s) Oral at bedtime  aspirin  chewable 81milliGRAM(s) Oral daily  heparin  Injectable 5000Unit(s) SubCutaneous every 8 hours  gabapentin 300milliGRAM(s) Oral three times a day  cefepime  IVPB  IV Intermittent   cefepime  IVPB 500milliGRAM(s) IV Intermittent every 24 hours  ALBUTerol    90 MICROgram(s) HFA Inhaler 1Puff(s) Inhalation every 4 hours  melatonin 3milliGRAM(s) Oral at bedtime  sildenafil (REVATIO) 20milliGRAM(s) Oral three times a day  cyanocobalamin 1000MICROGram(s) Oral daily    MEDICATIONS  (PRN):  sodium chloride 0.65% Nasal 1Spray(s) Both Nostrils every 6 hours PRN Nasal Congestion  acetaminophen   Tablet. 650milliGRAM(s) Oral every 6 hours PRN Mild Pain (1 - 3)  ALBUTerol    0.083% 2.5milliGRAM(s) Nebulizer every 6 hours PRN Shortness of Breath and/or Wheezing      Drug Dosing Weight  Height (cm): 167.6 (14 Jun 2017 15:00)  Weight (kg): 75.9 (14 Jun 2017 15:00)  BMI (kg/m2): 27 (14 Jun 2017 15:00)  BSA (m2): 1.85 (14 Jun 2017 15:00)    CENTRAL LINE: [ ] YES [ ] NO  LOCATION:   DATE INSERTED:  REMOVE: [ ] YES [ ] NO  EXPLAIN:    BOOKER: [ ] YES [ ] NO    DATE INSERTED:  REMOVE: [ ] YES [ ] NO  EXPLAIN:    A-LINE: [ ] YES [ ] NO  LOCATION:   DATE INSERTED:  REMOVE: [ ] YES [ ] NO  EXPLAIN:    PAST MEDICAL & SURGICAL HISTORY:  ESRD (end stage renal disease) on dialysis: MWF via R SC Permacath  planned AVF creation  Sinusitis, unspecified chronicity, unspecified location  Gout  Gall stones  Pneumonia  Anemia  Pulmonary hypertension  COPD (chronic obstructive pulmonary disease)  CAD (coronary artery disease)  Atrial fibrillation  Hyperlipidemia  Hypertension  Spinal fusion failure, initial encounter  Tubal ligation status  Sinusitis  Spinal stenosis  Pneumonia due to organism  No pertinent past medical history  S/P tonsillectomy  S/P appendectomy  H/O aortic valve replacement  H/O spinal fusion  H/O cataract  Cystocele  H/O tricuspid valve annuloplasty  H/O mitral valve repair      ICU Vital Signs Last 24 Hrs  T(C): 36.4, Max: 36.9 (06-22 @ 20:00)  T(F): 97.5, Max: 98.4 (06-22 @ 20:00)  HR: 76 (70 - 84)  BP: 103/46 (80/40 - 159/71)  BP(mean): 67 (57 - 102)  ABP: --  ABP(mean): --  RR: 20 (17 - 42)  SpO2: 93% (81% - 100%)          I&O's Detail  I & Os for 24h ending 23 Jun 2017 07:00  =============================================  IN:    Oral Fluid: 240 ml    Total IN: 240 ml  ---------------------------------------------  OUT:    Other: 1700 ml    Voided: 100 ml    Total OUT: 1800 ml  ---------------------------------------------  Total NET: -1560 ml    I & Os for current day (as of 23 Jun 2017 15:25)  =============================================  IN:    Oral Fluid: 720 ml    Solution: 50 ml    Total IN: 770 ml  ---------------------------------------------  OUT:    Voided: 100 ml    Total OUT: 100 ml  ---------------------------------------------  Total NET: 670 ml            LABS:  CBC Full  -  ( 22 Jun 2017 06:01 )  WBC Count : 8.1 K/uL  Hemoglobin : 7.6 g/dL  Hematocrit : 24.0 %  Platelet Count - Automated : 165 K/uL  Mean Cell Volume : 114.8 fl  Mean Cell Hemoglobin : 36.4 pg  Mean Cell Hemoglobin Concentration : 31.7 g/dL  Auto Neutrophil # : 6.5 K/uL  Auto Lymphocyte # : 0.6 K/uL  Auto Monocyte # : 0.8 K/uL  Auto Eosinophil # : 0.2 K/uL  Auto Basophil # : x  Auto Neutrophil % : 79.0 %  Auto Lymphocyte % : 7.0 %  Auto Monocyte % : 9.0 %  Auto Eosinophil % : 2.0 %  Auto Basophil % : x    06-23    135  |  94<L>  |  38.0<H>  ----------------------------<  123<H>  4.1   |  27.0  |  3.21<H>    Ca    9.0      23 Jun 2017 05:36  Phos  4.2     06-23  Mg     2.0     06-23            Assessment and Plan:    Neuro: GCS 15. Monitor for delirium.  Continue to optimize pain control. Serial Neurologic assessments    HEENT: no issues    CV: Continue hemodynamic monitoring    Pulm: Pulmonary toilet.  Continue incentive spirometer.  Chest PT.  Encourage OOB to chair and ambulation.  Respiratory effort much more comfortable     GI/Nutrition: Bowel Regimen    /Renal: monitor UOP. Monitor BMP.  Replete Lytes as needed      HEME- DVT prophylaxis, SCDs      Dispo: Transfer to the floor

## 2017-06-23 NOTE — PROGRESS NOTE ADULT - SUBJECTIVE AND OBJECTIVE BOX
[facility]                                   [address] [place] [address]                                                      PHONE: [phone]                                                         FAX: [phone]  -------------------------------------------------------------------------------------------------------------------------------    Pt seen and examined. FU for AVR    Overnight events/patient complaints: Pt without complains.    Vital Signs Last 24 Hrs  T(C): 36.4, Max: 36.9 (06-22 @ 20:00)  T(F): 97.5, Max: 98.4 (06-22 @ 20:00)  HR: 77 (70 - 84)  BP: 91/46 (80/40 - 122/51)  BP(mean): 66 (57 - 76)  RR: 26 (17 - 42)  SpO2: 93% (81% - 100%)  I&O's Summary  I & Os for 24h ending 23 Jun 2017 07:00  =============================================  IN: 240 ml / OUT: 1800 ml / NET: -1560 ml    I & Os for current day (as of 23 Jun 2017 10:21)  =============================================  IN: 360 ml / OUT: 0 ml / NET: 360 ml      PHYSICAL EXAM:  Constitutional: Oriented to time, place and person. Appears well developed, well nourished; alert and co-operative  HEENT:     Conjunctiva normal, Normal oral mucosa, No JVD	  Cardiovascular: S1, S2 irregular  Respiratory: Lungs clear to auscultation; no crepitations, no wheeze  Gastrointestinal:  Soft, Non-tender, + BS	  Extremities: No cyanosis, clubbing or edema  Skin: + bruising  Neurologic: Alert oriented to time place and person  Psychiatric: affect was normal        LABS:                        7.6    8.1   )-----------( 165      ( 22 Jun 2017 06:01 )             24.0     06-23    135  |  94<L>  |  38.0<H>  ----------------------------<  123<H>  4.1   |  27.0  |  3.21<H>    Ca    9.0      23 Jun 2017 05:36  Phos  4.2     06-23  Mg     2.0     06-23              RADIOLOGY & ADDITIONAL STUDIES:    CARDIOLOGY TESTING:     ECHO: Summary:   1. Left ventricular ejection fraction, by visual estimation, is 60 to   65%.   2. Spectral Doppler shows restrictive pattern of left ventricular   myocardial filling (Grade III diastolic dysfunction).   3. RV failure. Severely dilated right ventricle with severely reduced   systolic function.   4. Right ventricular volume and pressure overload.   5. Status post mitral valve repair with residual mild to moderate mitral   regurgitation. Elevated transmitral gradients (mean 6.7 mmHg at a HR of   81 bpm).   6. S/P tricuspid valve repair with moderate to severe tricuspid   regurgitation.   7. Normally functioning aortic prosthesis.   8. Moderate pulmonic valve regurgitation.   9. Estimated pulmonary artery systolic pressure is 88.4 mmHg assuming a   right atrial pressure of 15 mmHg, which is consistent with severe   pulmonary hypertension.  10. Trace pericardial effusion.  11. Right sided pleural effusion.  12. ** Compared to TTE 4/11/2017, pulmonary pressures are higher.     Kristin Yoder DO Electronically signed on 6/20/2017 at 11:44:12 AM      MEDICATIONS:  MEDICATIONS  (STANDING):  lidocaine   Patch 2Patch Transdermal daily  ALBUTerol/ipratropium for Nebulization 3milliLiter(s) Nebulizer every 6 hours  folic acid 1milliGRAM(s) Oral daily  atorvastatin 40milliGRAM(s) Oral at bedtime  midodrine 5milliGRAM(s) Oral two times a day  epoetin jaswinder Injectable [id number](s) IV Push <User Schedule>  docusate sodium 100milliGRAM(s) Oral three times a day  senna 2Tablet(s) Oral at bedtime  aspirin  chewable 81milliGRAM(s) Oral daily  heparin  Injectable 5000Unit(s) SubCutaneous every 8 hours  gabapentin 300milliGRAM(s) Oral three times a day  cefepime  IVPB  IV Intermittent   cefepime  IVPB 500milliGRAM(s) IV Intermittent every 24 hours  ALBUTerol    90 MICROgram(s) HFA Inhaler 1Puff(s) Inhalation every 4 hours  melatonin 3milliGRAM(s) Oral at bedtime  sildenafil (REVATIO) 20milliGRAM(s) Oral three times a day  cyanocobalamin 1000MICROGram(s) Oral daily    MEDICATIONS  (PRN):  sodium chloride 0.65% Nasal 1Spray(s) Both Nostrils every 6 hours PRN Nasal Congestion  acetaminophen   Tablet. 650milliGRAM(s) Oral every 6 hours PRN Mild Pain (1 - 3)  ALBUTerol    0.083% 2.5milliGRAM(s) Nebulizer every 6 hours PRN Shortness of Breath and/or Wheezing      ASSESSMENT AND PLAN:    76y Female with prior history of  HTN ESRD on HD anemia, hx Bio AVR with MV/TV annuloplasty,  PHTN; not an AC candidate due to bleeding and fall risk maintained on ASA and Plavix who presents after a mechanical fall. CT with  left posterior parietal left temporal occipital lobe subarachnoid  hemorrhage.    -  ASA restarted  -  Fluid status managed with HD  -  On antibiotics for PNA

## 2017-06-23 NOTE — PROGRESS NOTE ADULT - ASSESSMENT
ESRD on HD TTS schedule next HD estephania  Has been tolerating ok  SAH no heparin w HD  Anemia 2/2 CKD cont EPO w\TS 18 % cont iV iron  h/o bio AVR and MV/ TR repair

## 2017-06-23 NOTE — PROGRESS NOTE ADULT - SUBJECTIVE AND OBJECTIVE BOX
NEPHROLOGY INTERVAL HPI/OVERNIGHT EVENTS:    Examined earlier  looks comfortable  next HD estephania  cards f/u noted    MEDICATIONS  (STANDING):  lidocaine   Patch 2Patch Transdermal daily  ALBUTerol/ipratropium for Nebulization 3milliLiter(s) Nebulizer every 6 hours  folic acid 1milliGRAM(s) Oral daily  atorvastatin 40milliGRAM(s) Oral at bedtime  midodrine 5milliGRAM(s) Oral two times a day  epoetin jaswinder Injectable [id number](s) IV Push <User Schedule>  docusate sodium 100milliGRAM(s) Oral three times a day  senna 2Tablet(s) Oral at bedtime  aspirin  chewable 81milliGRAM(s) Oral daily  heparin  Injectable 5000Unit(s) SubCutaneous every 8 hours  gabapentin 300milliGRAM(s) Oral three times a day  cefepime  IVPB  IV Intermittent   cefepime  IVPB 500milliGRAM(s) IV Intermittent every 24 hours  ALBUTerol    90 MICROgram(s) HFA Inhaler 1Puff(s) Inhalation every 4 hours  melatonin 3milliGRAM(s) Oral at bedtime  sildenafil (REVATIO) 20milliGRAM(s) Oral three times a day  cyanocobalamin 1000MICROGram(s) Oral daily    MEDICATIONS  (PRN):  sodium chloride 0.65% Nasal 1Spray(s) Both Nostrils every 6 hours PRN Nasal Congestion  acetaminophen   Tablet. 650milliGRAM(s) Oral every 6 hours PRN Mild Pain (1 - 3)  ALBUTerol    0.083% 2.5milliGRAM(s) Nebulizer every 6 hours PRN Shortness of Breath and/or Wheezing      Allergies    allopurinol (Rash)  codeine (Nausea; Vomiting)  penicillin (Rash)  spironolactone (Unknown)    Intolerances        Vital Signs Last 24 Hrs  T(C): 36.4, Max: 36.9 (- @ 20:00)  T(F): 97.5, Max: 98.4 (- @ 20:00)  HR: 77 (70 - 84)  BP: 91/46 (80/40 - 122/51)  BP(mean): 66 (57 - 76)  RR: 26 (17 - 42)  SpO2: 93% (81% - 100%)  Daily     Daily Weight in k.9 (2017 12:20)    PHYSICAL EXAM:  GENERAL: NAD  HEENT: ecchymosis forhead  NECK: Supple.   LUNGS: Good air entry CTA B/L  HEART: Regular rate and rhythm + murmur.  ABDOMEN: Soft, nontender, and nondistended  EXTREMITIES: no LE edema         LABS:                        7.6    8.1   )-----------( 165      ( 2017 06:01 )             24.0     -23    135  |  94<L>  |  38.0<H>  ----------------------------<  123<H>  4.1   |  27.0  |  3.21<H>    Ca    9.0      2017 05:36  Phos  4.2       Mg     2.0               Magnesium, Serum: 2.0 mg/dL ( @ 05:36)  Phosphorus Level, Serum: 4.2 mg/dL ( @ 05:36)          RADIOLOGY & ADDITIONAL TESTS:

## 2017-06-23 NOTE — PROGRESS NOTE ADULT - SUBJECTIVE AND OBJECTIVE BOX
INTERVAL HPI/OVERNIGHT EVENTS/SUBJECTIVE: 75 yo F Hx of chronic ESRD on HD TTS.   Admitted for fall with small SDH that was stable on repeat CT.  Very recently transferred out of SICU and I am seeing this pt now since the [address] [place] [address] nurse was concerned with O2 sat of 88% upon arrival to [address] [place] [address] while on 3L NC.  Pt since has been placed on venti of 35%.   Pt currently states she has mild R lower rib pain and mild difficulty coughing up phlem but doesn;t know why.  She also states she feels the same she has felt all day and "Maybe a little better than yesterday" when asked. Her  at bedside states she appears "Much better today than yesterday and the other days".  Denies change in chest pain, fever, chills, abd pain, or any current SOB.  Also has HX of dependence on O2 at home.   Due for Dialysis tomorrow.  I placed pt on 2 L NC while examining her and O2 sat stayed 89-90% for ~ 20 min    ICU Vital Signs Last 24 Hrs  T(C): 36.6, Max: 36.9 (06-22 @ 20:00)  T(F): 97.8, Max: 98.4 (06-22 @ 20:00)  HR: 86 (70 - 86)  BP: 104/56 (80/40 - 159/71)  BP(mean): 76 (57 - 102)  ABP: --  ABP(mean): --  RR: 26 (17 - 42)  SpO2: 92% (81% - 99%)      I&O's Detail  I & Os for 24h ending 23 Jun 2017 07:00  =============================================  IN:    Oral Fluid: 240 ml    Total IN: 240 ml  ---------------------------------------------  OUT:    Other: 1700 ml    Voided: 100 ml    Total OUT: 1800 ml  ---------------------------------------------  Total NET: -1560 ml    I & Os for current day (as of 23 Jun 2017 19:37)  =============================================  IN:    Oral Fluid: 960 ml    Solution: 50 ml    Total IN: 1010 ml  ---------------------------------------------  OUT:    Voided: 100 ml    Total OUT: 100 ml  ---------------------------------------------  Total NET: 910 ml            MEDICATIONS  (STANDING):  lidocaine   Patch 2Patch Transdermal daily  ALBUTerol/ipratropium for Nebulization 3milliLiter(s) Nebulizer every 6 hours  folic acid 1milliGRAM(s) Oral daily  atorvastatin 40milliGRAM(s) Oral at bedtime  midodrine 5milliGRAM(s) Oral two times a day  epoetin jaswinder Injectable [id number](s) IV Push <User Schedule>  docusate sodium 100milliGRAM(s) Oral three times a day  senna 2Tablet(s) Oral at bedtime  aspirin  chewable 81milliGRAM(s) Oral daily  heparin  Injectable 5000Unit(s) SubCutaneous every 8 hours  gabapentin 300milliGRAM(s) Oral three times a day  cefepime  IVPB  IV Intermittent   cefepime  IVPB 500milliGRAM(s) IV Intermittent every 24 hours  ALBUTerol    90 MICROgram(s) HFA Inhaler 1Puff(s) Inhalation every 4 hours  melatonin 3milliGRAM(s) Oral at bedtime  sildenafil (REVATIO) 20milliGRAM(s) Oral three times a day  cyanocobalamin 1000MICROGram(s) Oral daily  ALBUTerol   0.042% 1.25milliGRAM(s) Nebulizer every 4 hours    MEDICATIONS  (PRN):  sodium chloride 0.65% Nasal 1Spray(s) Both Nostrils every 6 hours PRN Nasal Congestion  acetaminophen   Tablet. 650milliGRAM(s) Oral every 6 hours PRN Mild Pain (1 - 3)      PHYSICAL EXAM:     Gen: NAD, Well appearing, No cyanosis, Pallor. Varied stages of ecchymosis to face.  35% Venti mask on face.    Eyes: PERRL ~ 3mm, EOMI,     Neurological: A&Ox3, GCS 15, No focal deficit.     ENMT:  clear throat.      Neck: Supple. NT AT, FROM no pain.  No JVD. No meningeal signs    Pulmonary: NAD.  Course L Bases with some scattered rhonchi to L Lung space.  R Base significantly diminished.      Cardiovascular: RRR, S1, S2, No Murmurs, rubs or gallops noted.    Gastrointestinal :ND, Soft, NT.    Extremities: NT, AT, no edema, erythema or palpable cord noted.  FROM, = 2+ pulses throughout.    LABS:  CBC Full  -  ( 22 Jun 2017 06:01 )  WBC Count : 8.1 K/uL  Hemoglobin : 7.6 g/dL  Hematocrit : 24.0 %  Platelet Count - Automated : 165 K/uL  Mean Cell Volume : 114.8 fl  Mean Cell Hemoglobin : 36.4 pg  Mean Cell Hemoglobin Concentration : 31.7 g/dL  Auto Neutrophil # : 6.5 K/uL  Auto Lymphocyte # : 0.6 K/uL  Auto Monocyte # : 0.8 K/uL  Auto Eosinophil # : 0.2 K/uL  Auto Basophil # : x  Auto Neutrophil % : 79.0 %  Auto Lymphocyte % : 7.0 %  Auto Monocyte % : 9.0 %  Auto Eosinophil % : 2.0 %  Auto Basophil % : x    06-23    135  |  94<L>  |  38.0<H>  ----------------------------<  123<H>  4.1   |  27.0  |  3.21<H>    Ca    9.0      23 Jun 2017 05:36  Phos  4.2     06-23  Mg     2.0     06-23          RECENT CULTURES:  06-19 .Blood Blood XXXX XXXX   No growth at 48 hours    06-19 .Blood Blood XXXX XXXX   No growth at 48 hours              CAPILLARY BLOOD GLUCOSE      RADIOLOGY & ADDITIONAL STUDIES: Similar effusion / infiltrate in R LL.    ASSESSMENT/PLAN:  76yFemale presenting with: ICH, Rib fractures, Pulmonary effusion, PNA, hypoxia.  Recently transferred out of SCIU.  Had mild desat on floors.    Appears fairly stable from SICU stay.      P:    1. Will increase frequency of Albuterol  2. Order Vest percusion and Chest PT  3. Avoid heavy narcotics  4. Will get ABG and BNP to see if worsened trend.  5. Will consider possible tap of effusion  6. Dialysis tomorrow as scheduled.       Dispo: If pt remains stable as well as ABG / BNP relatively stable she will not need to return to SICU at this time.  Case discussed with Dr Richardson and plan passed on to Floor ACS team caring for Pt            CRITICAL CARE TIME SPENT:

## 2017-06-23 NOTE — PROGRESS NOTE ADULT - ASSESSMENT
ASSESSMENT/PLAN:  76yFemale presenting with: ICH, Rib fractures, Pulmonary effusion, PNA, hypoxia.  Recently transferred out of SCIU.  Had mild desat on floors.    Appears fairly stable from SICU stay.      P:    1. Will increase frequency of Albuterol  2. Order Vest percusion and Chest PT  3. Avoid heavy narcotics  4. Will get ABG and BNP to see if worsened trend.  5. Will consider possible tap of effusion  6. Dialysis tomorrow as scheduled.       Dispo: If pt remains stable as well as ABG / BNP relatively stable she will not need to return to SICU at this time.  Case discussed with Dr Richardson and plan passed on to Floor ACS team caring for Pt

## 2017-06-24 LAB
ALLERGY+IMMUNOLOGY DIAG STUDY NOTE: SIGNIFICANT CHANGE UP
ANION GAP SERPL CALC-SCNC: 20 MMOL/L — HIGH (ref 5–17)
ANTIBODY INTERPRETATION 2: SIGNIFICANT CHANGE UP
BLD GP AB SCN SERPL QL: ABNORMAL
BUN SERPL-MCNC: 51 MG/DL — HIGH (ref 8–20)
CALCIUM SERPL-MCNC: 9.3 MG/DL — SIGNIFICANT CHANGE UP (ref 8.6–10.2)
CHLORIDE SERPL-SCNC: 93 MMOL/L — LOW (ref 98–107)
CO2 SERPL-SCNC: 24 MMOL/L — SIGNIFICANT CHANGE UP (ref 22–29)
CREAT SERPL-MCNC: 4.07 MG/DL — HIGH (ref 0.5–1.3)
CULTURE RESULTS: SIGNIFICANT CHANGE UP
CULTURE RESULTS: SIGNIFICANT CHANGE UP
DIR ANTIGLOB POLYSPECIFIC INTERPRETATION: SIGNIFICANT CHANGE UP
GLUCOSE SERPL-MCNC: 109 MG/DL — SIGNIFICANT CHANGE UP (ref 70–115)
MAGNESIUM SERPL-MCNC: 2 MG/DL — SIGNIFICANT CHANGE UP (ref 1.8–2.6)
PHOSPHATE SERPL-MCNC: 4.4 MG/DL — SIGNIFICANT CHANGE UP (ref 2.4–4.7)
POTASSIUM SERPL-MCNC: 4.4 MMOL/L — SIGNIFICANT CHANGE UP (ref 3.5–5.3)
POTASSIUM SERPL-SCNC: 4.4 MMOL/L — SIGNIFICANT CHANGE UP (ref 3.5–5.3)
SODIUM SERPL-SCNC: 137 MMOL/L — SIGNIFICANT CHANGE UP (ref 135–145)
SPECIMEN SOURCE: SIGNIFICANT CHANGE UP
SPECIMEN SOURCE: SIGNIFICANT CHANGE UP
VANCOMYCIN TROUGH SERPL-MCNC: 26.7 UG/ML — CRITICAL HIGH (ref 10–20)

## 2017-06-24 PROCEDURE — 86077 PHYS BLOOD BANK SERV XMATCH: CPT

## 2017-06-24 RX ADMIN — LIDOCAINE 2 PATCH: 4 CREAM TOPICAL at 14:59

## 2017-06-24 RX ADMIN — SENNA PLUS 2 TABLET(S): 8.6 TABLET ORAL at 22:34

## 2017-06-24 RX ADMIN — Medication 3 MILLILITER(S): at 15:34

## 2017-06-24 RX ADMIN — GABAPENTIN 300 MILLIGRAM(S): 400 CAPSULE ORAL at 22:34

## 2017-06-24 RX ADMIN — Medication 81 MILLIGRAM(S): at 13:45

## 2017-06-24 RX ADMIN — CEFEPIME 100 MILLIGRAM(S): 1 INJECTION, POWDER, FOR SOLUTION INTRAMUSCULAR; INTRAVENOUS at 10:24

## 2017-06-24 RX ADMIN — Medication 3 MILLIGRAM(S): at 22:34

## 2017-06-24 RX ADMIN — Medication 650 MILLIGRAM(S): at 05:39

## 2017-06-24 RX ADMIN — Medication 20 MILLIGRAM(S): at 17:05

## 2017-06-24 RX ADMIN — ATORVASTATIN CALCIUM 40 MILLIGRAM(S): 80 TABLET, FILM COATED ORAL at 22:33

## 2017-06-24 RX ADMIN — Medication 1 SPRAY(S): at 04:24

## 2017-06-24 RX ADMIN — Medication 100 MILLIGRAM(S): at 22:33

## 2017-06-24 RX ADMIN — HEPARIN SODIUM 5000 UNIT(S): 5000 INJECTION INTRAVENOUS; SUBCUTANEOUS at 17:05

## 2017-06-24 RX ADMIN — Medication 650 MILLIGRAM(S): at 13:45

## 2017-06-24 RX ADMIN — GABAPENTIN 300 MILLIGRAM(S): 400 CAPSULE ORAL at 05:38

## 2017-06-24 RX ADMIN — MIDODRINE HYDROCHLORIDE 5 MILLIGRAM(S): 2.5 TABLET ORAL at 05:38

## 2017-06-24 RX ADMIN — Medication 650 MILLIGRAM(S): at 14:59

## 2017-06-24 RX ADMIN — ERYTHROPOIETIN 10000 UNIT(S): 10000 INJECTION, SOLUTION INTRAVENOUS; SUBCUTANEOUS at 14:24

## 2017-06-24 RX ADMIN — LIDOCAINE 2 PATCH: 4 CREAM TOPICAL at 01:40

## 2017-06-24 RX ADMIN — Medication 20 MILLIGRAM(S): at 22:33

## 2017-06-24 RX ADMIN — Medication 1 MILLIGRAM(S): at 12:42

## 2017-06-24 RX ADMIN — Medication 650 MILLIGRAM(S): at 06:09

## 2017-06-24 RX ADMIN — HEPARIN SODIUM 5000 UNIT(S): 5000 INJECTION INTRAVENOUS; SUBCUTANEOUS at 22:33

## 2017-06-24 RX ADMIN — PREGABALIN 1000 MICROGRAM(S): 225 CAPSULE ORAL at 12:42

## 2017-06-24 RX ADMIN — Medication 3 MILLILITER(S): at 21:19

## 2017-06-24 RX ADMIN — Medication 3 MILLILITER(S): at 03:53

## 2017-06-24 RX ADMIN — Medication 100 MILLIGRAM(S): at 12:42

## 2017-06-24 RX ADMIN — Medication 100 MILLIGRAM(S): at 05:38

## 2017-06-24 RX ADMIN — Medication 20 MILLIGRAM(S): at 05:38

## 2017-06-24 RX ADMIN — GABAPENTIN 300 MILLIGRAM(S): 400 CAPSULE ORAL at 13:45

## 2017-06-24 RX ADMIN — MIDODRINE HYDROCHLORIDE 5 MILLIGRAM(S): 2.5 TABLET ORAL at 17:05

## 2017-06-24 RX ADMIN — Medication 3 MILLILITER(S): at 08:22

## 2017-06-24 NOTE — PROGRESS NOTE ADULT - SUBJECTIVE AND OBJECTIVE BOX
NEPHROLOGY INTERVAL HPI/OVERNIGHT EVENTS:    Feels betetr   Events of last PM noted   Still with persisitent R effusion on CXR   CT head noted , No progression    MEDICATIONS  (STANDING):  lidocaine   Patch 2Patch Transdermal daily  ALBUTerol/ipratropium for Nebulization 3milliLiter(s) Nebulizer every 6 hours  folic acid 1milliGRAM(s) Oral daily  atorvastatin 40milliGRAM(s) Oral at bedtime  midodrine 5milliGRAM(s) Oral two times a day  epoetin jaswinder Injectable 63349Bttc(s) IV Push <User Schedule>  docusate sodium 100milliGRAM(s) Oral three times a day  senna 2Tablet(s) Oral at bedtime  aspirin  chewable 81milliGRAM(s) Oral daily  heparin  Injectable 5000Unit(s) SubCutaneous every 8 hours  gabapentin 300milliGRAM(s) Oral three times a day  cefepime  IVPB  IV Intermittent   cefepime  IVPB 500milliGRAM(s) IV Intermittent every 24 hours  ALBUTerol    90 MICROgram(s) HFA Inhaler 1Puff(s) Inhalation every 4 hours  melatonin 3milliGRAM(s) Oral at bedtime  sildenafil (REVATIO) 20milliGRAM(s) Oral three times a day  cyanocobalamin 1000MICROGram(s) Oral daily    MEDICATIONS  (PRN):  sodium chloride 0.65% Nasal 1Spray(s) Both Nostrils every 6 hours PRN Nasal Congestion  acetaminophen   Tablet. 650milliGRAM(s) Oral every 6 hours PRN Mild Pain (1 - 3)      Allergies    allopurinol (Rash)  codeine (Nausea; Vomiting)  penicillin (Rash)  spironolactone (Unknown)    Intolerances          Vital Signs Last 24 Hrs  T(C): 36.6, Max: 36.9 (06-23 @ 23:44)  T(F): 97.8, Max: 98.5 (06-23 @ 23:44)  HR: 82 (72 - 90)  BP: 107/57 (87/48 - 159/71)  BP(mean): 76 (65 - 102)  RR: 18 (18 - 30)  SpO2: 99% (88% - 100%)  Daily     Daily   I&O's Detail    I & Os for current day (as of 24 Jun 2017 09:31)  =============================================  IN:    Oral Fluid: 960 ml    Solution: 50 ml    Total IN: 1010 ml  ---------------------------------------------  OUT:    Voided: 250 ml    Total OUT: 250 ml  ---------------------------------------------  Total NET: 760 ml    I&O's Summary    I & Os for current day (as of 24 Jun 2017 09:31)  =============================================  IN: 1010 ml / OUT: 250 ml / NET: 760 ml      PHYSICAL EXAM:    GENERAL: NAD,   HEAD:  No edema   NECK: Supple, No JVD,   CHEST/LUNG: EAE . Decreased BS at right base   HEART: Regular rate and rhythm; No rub   ABDOMEN: Soft, Nontender, Nondistended;  EXTREMITIES:  dec edema     Labs   06-23    135  |  94<L>  |  38.0<H>  ----------------------------<  123<H>  4.1   |  27.0  |  3.21<H>    Ca    9.0      23 Jun 2017 05:36  Phos  4.2     06-23  Mg     2.0     06-23                  RADIOLOGY & ADDITIONAL TESTS:     EXAM:  CHEST SINGLE VIEW FRONTAL                          PROCEDURE DATE:  06/20/2017        INTERPRETATION:  TECHNIQUE: Single portable view of the chest.    COMPARISON: 6/20/2017    CLINICAL HISTORY: Shortness of breath.     FINDINGS:    Single frontal view of the chest demonstrates right lower lobe   infiltrate/atelectasis with adjacent effusion, unchanged. Right-sided   dialysis catheter mediastinal sternotomy wires are redemonstrated. The   cardiomediastinal silhouette is enlarged. No acute osseous abnormalities.   Overlying EKG leads and wires are noted.    IMPRESSION: No interval change.

## 2017-06-24 NOTE — PROGRESS NOTE ADULT - ASSESSMENT
ESRD - Heparin free HD today     SOB - Multifactorial - H/O VHD , Prior operative intervention , sig residual Pulm HTN ( PASP 88 )  Pt on Revatio   S/P Mechanical fall with several R rib fractures  baseline sig COPD   Anemia   Chronic R effusion - the same on imaging ( doesn't appear too sig on prior Chest CT ), therefore , probably doesn't need drainage   Will Transfuse with HD today     S/P traumatic SAH - No expansion on follow up CT    Anemia - Continue epogen TIW  Iron stores intact 6-23  + folate   Transfuse 2 units with HD today   CBC in am     RO - Phos OK w/o binders     ? PNA - Blood Cx 6-19 negative , Vanco level 26 yesterday  Need for continuation of Cefepime and Vanco ??  Check Vanco level today

## 2017-06-24 NOTE — PROGRESS NOTE ADULT - SUBJECTIVE AND OBJECTIVE BOX
INTERVAL HPI/OVERNIGHT EVENTS:  Transferred from ICU overnight.  No acute events since transfer.  Tolerating diet.  [place] independently.  Ambulating with max assist.  Reports feeling fine and that her breathing is at baseline.  Satting 92% on 5L NC.    MEDICATIONS  (STANDING):  lidocaine   Patch 2Patch Transdermal daily  ALBUTerol/ipratropium for Nebulization 3milliLiter(s) Nebulizer every 6 hours  folic acid 1milliGRAM(s) Oral daily  atorvastatin 40milliGRAM(s) Oral at bedtime  midodrine 5milliGRAM(s) Oral two times a day  epoetin jaswinder Injectable [id number](s) IV Push <User Schedule>  docusate sodium 100milliGRAM(s) Oral three times a day  senna 2Tablet(s) Oral at bedtime  aspirin  chewable 81milliGRAM(s) Oral daily  heparin  Injectable 5000Unit(s) SubCutaneous every 8 hours  gabapentin 300milliGRAM(s) Oral three times a day  cefepime  IVPB  IV Intermittent   cefepime  IVPB 500milliGRAM(s) IV Intermittent every 24 hours  ALBUTerol    90 MICROgram(s) HFA Inhaler 1Puff(s) Inhalation every 4 hours  melatonin 3milliGRAM(s) Oral at bedtime  sildenafil (REVATIO) 20milliGRAM(s) Oral three times a day  cyanocobalamin 1000MICROGram(s) Oral daily    MEDICATIONS  (PRN):  sodium chloride 0.65% Nasal 1Spray(s) Both Nostrils every 6 hours PRN Nasal Congestion  acetaminophen   Tablet. 650milliGRAM(s) Oral every 6 hours PRN Mild Pain (1 - 3)      Vital Signs Last 24 Hrs  T(C): 36.6, Max: 36.9 (06-23 @ 23:44)  T(F): 97.8, Max: 98.5 (06-23 @ 23:44)  HR: 82 (77 - 90)  BP: 107/57 (93/53 - 124/58)  BP(mean): 76 (76 - 83)  RR: 18 (18 - 30)  SpO2: 99% (88% - 100%)    Constitutional: NAD, well-groomed, well-developed  HEENT: PERRLA, EOMI.  Ecchymosis of right face with mild interval improvement  Respiratory: Breathing comfortably on 5L NC.  Stable crackles on exam.  Cardiovascular: Irregularly irregular rhythm, no murmurs  Gastrointestinal: Soft, non-tender, normal bowel sounds  Extremities: No peripheral edema, No cyanosis, clubbing   Vascular: Equal and normal pulses: 2+ peripheral pulses throughout  Neurological: GCS: 15  A&O x 3; no sensory, motor or coordination deficits, normal reflexes  Psychiatric: Normal mood, normal affect  Musculoskeletal: No joint pain, swelling or deformity; no limitation of movement  Skin: No rashes      I&O's Detail    I & Os for current day (as of 24 Jun 2017 15:35)  =============================================  IN:    Oral Fluid: 960 ml    Solution: 50 ml    Total IN: 1010 ml  ---------------------------------------------  OUT:    Voided: 250 ml    Total OUT: 250 ml  ---------------------------------------------  Total NET: 760 ml      LABS:    06-24    137  |  93<L>  |  51.0<H>  ----------------------------<  109  4.4   |  24.0  |  4.07<H>    Ca    9.3      24 Jun 2017 08:56  Phos  4.4     06-24  Mg     2.0     06-24

## 2017-06-24 NOTE — PROGRESS NOTE ADULT - ASSESSMENT
88yo woman with complex medical hx s/p mechanical fall with multiple traumatic injuries notable for stable SAH and right 4/5/6 rib fx.  Clincially stable and doing well  Continue stringent pulmonary toilet and schedule nebulizer therapy.  Will reassess regularly  Contineu DASH diet, home medications.  Continue PT  Possible dc to acute rehab when medically stable    Discussed and seen with attending physician Dr. Busch whom agrees.

## 2017-06-25 LAB
ANION GAP SERPL CALC-SCNC: 17 MMOL/L — SIGNIFICANT CHANGE UP (ref 5–17)
BUN SERPL-MCNC: 31 MG/DL — HIGH (ref 8–20)
CALCIUM SERPL-MCNC: 9.2 MG/DL — SIGNIFICANT CHANGE UP (ref 8.6–10.2)
CHLORIDE SERPL-SCNC: 94 MMOL/L — LOW (ref 98–107)
CO2 SERPL-SCNC: 25 MMOL/L — SIGNIFICANT CHANGE UP (ref 22–29)
CREAT SERPL-MCNC: 2.98 MG/DL — HIGH (ref 0.5–1.3)
GLUCOSE SERPL-MCNC: 99 MG/DL — SIGNIFICANT CHANGE UP (ref 70–115)
HCT VFR BLD CALC: 30.2 % — LOW (ref 37–47)
HGB BLD-MCNC: 9.5 G/DL — LOW (ref 12–16)
MAGNESIUM SERPL-MCNC: 2.2 MG/DL — SIGNIFICANT CHANGE UP (ref 1.6–2.6)
MCHC RBC-ENTMCNC: 31.5 G/DL — LOW (ref 32–36)
MCHC RBC-ENTMCNC: 34.5 PG — HIGH (ref 27–31)
MCV RBC AUTO: 109.8 FL — HIGH (ref 81–99)
PHOSPHATE SERPL-MCNC: 4.1 MG/DL — SIGNIFICANT CHANGE UP (ref 2.4–4.7)
PLATELET # BLD AUTO: 143 K/UL — LOW (ref 150–400)
POTASSIUM SERPL-MCNC: 4.6 MMOL/L — SIGNIFICANT CHANGE UP (ref 3.5–5.3)
POTASSIUM SERPL-SCNC: 4.6 MMOL/L — SIGNIFICANT CHANGE UP (ref 3.5–5.3)
RBC # BLD: 2.75 M/UL — LOW (ref 4.4–5.2)
RBC # FLD: 24.2 % — HIGH (ref 11–15.6)
SODIUM SERPL-SCNC: 136 MMOL/L — SIGNIFICANT CHANGE UP (ref 135–145)
VANCOMYCIN FLD-MCNC: 23.8 UG/ML — SIGNIFICANT CHANGE UP
WBC # BLD: 8.8 K/UL — SIGNIFICANT CHANGE UP (ref 4.8–10.8)
WBC # FLD AUTO: 8.8 K/UL — SIGNIFICANT CHANGE UP (ref 4.8–10.8)

## 2017-06-25 RX ORDER — ACETYLCYSTEINE 200 MG/ML
2 VIAL (ML) MISCELLANEOUS EVERY 6 HOURS
Qty: 0 | Refills: 0 | Status: DISCONTINUED | OUTPATIENT
Start: 2017-06-25 | End: 2017-06-25

## 2017-06-25 RX ORDER — ACETYLCYSTEINE 200 MG/ML
4 VIAL (ML) MISCELLANEOUS EVERY 6 HOURS
Qty: 0 | Refills: 0 | Status: DISCONTINUED | OUTPATIENT
Start: 2017-06-25 | End: 2017-06-27

## 2017-06-25 RX ORDER — ALBUTEROL 90 UG/1
2.5 AEROSOL, METERED ORAL EVERY 6 HOURS
Qty: 0 | Refills: 0 | Status: DISCONTINUED | OUTPATIENT
Start: 2017-06-25 | End: 2017-06-27

## 2017-06-25 RX ORDER — ACETYLCYSTEINE 200 MG/ML
4 VIAL (ML) MISCELLANEOUS EVERY 6 HOURS
Qty: 0 | Refills: 0 | Status: DISCONTINUED | OUTPATIENT
Start: 2017-06-25 | End: 2017-06-25

## 2017-06-25 RX ADMIN — GABAPENTIN 300 MILLIGRAM(S): 400 CAPSULE ORAL at 05:59

## 2017-06-25 RX ADMIN — Medication 81 MILLIGRAM(S): at 13:38

## 2017-06-25 RX ADMIN — Medication 1 MILLIGRAM(S): at 13:37

## 2017-06-25 RX ADMIN — ALBUTEROL 2.5 MILLIGRAM(S): 90 AEROSOL, METERED ORAL at 21:00

## 2017-06-25 RX ADMIN — LIDOCAINE 2 PATCH: 4 CREAM TOPICAL at 02:20

## 2017-06-25 RX ADMIN — Medication 100 MILLIGRAM(S): at 22:21

## 2017-06-25 RX ADMIN — Medication 100 MILLIGRAM(S): at 05:59

## 2017-06-25 RX ADMIN — GABAPENTIN 300 MILLIGRAM(S): 400 CAPSULE ORAL at 13:37

## 2017-06-25 RX ADMIN — Medication 3 MILLILITER(S): at 08:29

## 2017-06-25 RX ADMIN — HEPARIN SODIUM 5000 UNIT(S): 5000 INJECTION INTRAVENOUS; SUBCUTANEOUS at 05:59

## 2017-06-25 RX ADMIN — SENNA PLUS 2 TABLET(S): 8.6 TABLET ORAL at 22:21

## 2017-06-25 RX ADMIN — HEPARIN SODIUM 5000 UNIT(S): 5000 INJECTION INTRAVENOUS; SUBCUTANEOUS at 22:21

## 2017-06-25 RX ADMIN — Medication 4 MILLILITER(S): at 21:00

## 2017-06-25 RX ADMIN — GABAPENTIN 300 MILLIGRAM(S): 400 CAPSULE ORAL at 22:21

## 2017-06-25 RX ADMIN — PREGABALIN 1000 MICROGRAM(S): 225 CAPSULE ORAL at 13:38

## 2017-06-25 RX ADMIN — Medication 20 MILLIGRAM(S): at 05:59

## 2017-06-25 RX ADMIN — LIDOCAINE 2 PATCH: 4 CREAM TOPICAL at 17:40

## 2017-06-25 RX ADMIN — Medication 100 MILLIGRAM(S): at 13:37

## 2017-06-25 RX ADMIN — Medication 3 MILLIGRAM(S): at 22:21

## 2017-06-25 RX ADMIN — Medication 2 MILLILITER(S): at 16:00

## 2017-06-25 RX ADMIN — MIDODRINE HYDROCHLORIDE 5 MILLIGRAM(S): 2.5 TABLET ORAL at 17:40

## 2017-06-25 RX ADMIN — CEFEPIME 100 MILLIGRAM(S): 1 INJECTION, POWDER, FOR SOLUTION INTRAMUSCULAR; INTRAVENOUS at 08:53

## 2017-06-25 RX ADMIN — ATORVASTATIN CALCIUM 40 MILLIGRAM(S): 80 TABLET, FILM COATED ORAL at 22:21

## 2017-06-25 RX ADMIN — Medication 20 MILLIGRAM(S): at 13:42

## 2017-06-25 RX ADMIN — MIDODRINE HYDROCHLORIDE 5 MILLIGRAM(S): 2.5 TABLET ORAL at 05:59

## 2017-06-25 RX ADMIN — HEPARIN SODIUM 5000 UNIT(S): 5000 INJECTION INTRAVENOUS; SUBCUTANEOUS at 13:42

## 2017-06-25 RX ADMIN — Medication 20 MILLIGRAM(S): at 22:21

## 2017-06-25 RX ADMIN — Medication 3 MILLILITER(S): at 02:43

## 2017-06-25 NOTE — PROGRESS NOTE ADULT - SUBJECTIVE AND OBJECTIVE BOX
NEPHROLOGY INTERVAL HPI/OVERNIGHT EVENTS:    Feels better   Transfused with HD yesterday   (-) 1.5 Kg   SOB better , No CP     MEDICATIONS  (STANDING):  lidocaine   Patch 2Patch Transdermal daily  ALBUTerol/ipratropium for Nebulization 3milliLiter(s) Nebulizer every 6 hours  folic acid 1milliGRAM(s) Oral daily  atorvastatin 40milliGRAM(s) Oral at bedtime  midodrine 5milliGRAM(s) Oral two times a day  epoetin jaswinder Injectable 76851Uuin(s) IV Push <User Schedule>  docusate sodium 100milliGRAM(s) Oral three times a day  senna 2Tablet(s) Oral at bedtime  aspirin  chewable 81milliGRAM(s) Oral daily  heparin  Injectable 5000Unit(s) SubCutaneous every 8 hours  gabapentin 300milliGRAM(s) Oral three times a day  cefepime  IVPB  IV Intermittent   cefepime  IVPB 500milliGRAM(s) IV Intermittent every 24 hours  ALBUTerol    90 MICROgram(s) HFA Inhaler 1Puff(s) Inhalation every 4 hours  melatonin 3milliGRAM(s) Oral at bedtime  sildenafil (REVATIO) 20milliGRAM(s) Oral three times a day  cyanocobalamin 1000MICROGram(s) Oral daily    MEDICATIONS  (PRN):  sodium chloride 0.65% Nasal 1Spray(s) Both Nostrils every 6 hours PRN Nasal Congestion  acetaminophen   Tablet. 650milliGRAM(s) Oral every 6 hours PRN Mild Pain (1 - 3)      Allergies    allopurinol (Rash)  codeine (Nausea; Vomiting)  penicillin (Rash)  spironolactone (Unknown)    Intolerances          Vital Signs Last 24 Hrs  T(C): 36.7, Max: 36.8 (-24 @ 22:28)  T(F): 98, Max: 98.2 (-24 @ 22:28)  HR: 81 (73 - 88)  BP: 104/63 (98/38 - 104/63)  BP(mean): --  RR: 18 (18 - 20)  SpO2: 92% (92% - 97%)  Daily     Daily Weight in k.5 (2017 16:17)  I&O's Detail    I & Os for current day (as of 2017 09:27)  =============================================  IN:    Packed Red Blood Cells: 600 ml    Total IN: 600 ml  ---------------------------------------------  OUT:    Other: 1500 ml    Voided: 50 ml    Total OUT: 1550 ml  ---------------------------------------------  Total NET: -950 ml    I&O's Summary    I & Os for current day (as of 2017 09:27)  =============================================  IN: 600 ml / OUT: 1550 ml / NET: -950 ml      PHYSICAL EXAM:  GENERAL: NAD,   HEAD:  No edema   NECK: Supple, No JVD,   CHEST/LUNG: EAE . Decreased BS at right base   HEART: Regular rate and rhythm; No rub   ABDOMEN: Soft, Nontender, Nondistended;  EXTREMITIES:  dec edema     LABS:        137  |  93<L>  |  51.0<H>  ----------------------------<  109  4.4   |  24.0  |  4.07<H>    Ca    9.3      2017 08:56  Phos  4.4       Mg     2.0                       RADIOLOGY & ADDITIONAL TESTS:

## 2017-06-25 NOTE — PROGRESS NOTE ADULT - ASSESSMENT
88yo woman with complex medical hx s/p mechanical fall with multiple traumatic injuries notable for stable SAH and right 4/5/6 rib fx.  Clincially stable and doing well  Continue stringent pulmonary toilet and schedule nebulizer therapy.  Will reassess regularly  Contineu DASH diet, home medications.  Continue PT  Possible dc to acute rehab when medically stable

## 2017-06-25 NOTE — PROGRESS NOTE ADULT - ASSESSMENT
ESRD - Heparin free HD Tuesday    SOB - Multifactorial - H/O VHD , Prior operative intervention , sig residual Pulm HTN ( PASP 88 )  Pt on Revatio   S/P Mechanical fall with several R rib fractures  baseline sig COPD   Anemia   Chronic R effusion - the same on imaging ( doesn't appear too sig on prior Chest CT ), therefore , probably doesn't need drainage       S/P traumatic SAH - No expansion on follow up CT. Surgery follow up noted     Anemia - Continue epogen TIW  Iron stores intact 6-23  + folate   Transfuse 2 units with HD yesterday   CBC in am     RO - Phos OK w/o binders     ? PNA - Blood Cx 6-19 negative , Vanco level 26 yesterday  Need for continuation of Cefepime and Vanco ??   Vanco level yesterday was 23 , Check in am

## 2017-06-25 NOTE — PROGRESS NOTE ADULT - SUBJECTIVE AND OBJECTIVE BOX
INTERVAL HPI/ OVERNIGHT EVENTS: Patient seen and evaluated at bedside. Pain well controlled. This morning has no complaints. Denies N/V/F/C. Pulling 500 ml on incentive spirometer.       MEDICATIONS  (STANDING):  lidocaine   Patch 2Patch Transdermal daily  ALBUTerol/ipratropium for Nebulization 3milliLiter(s) Nebulizer every 6 hours  folic acid 1milliGRAM(s) Oral daily  atorvastatin 40milliGRAM(s) Oral at bedtime  midodrine 5milliGRAM(s) Oral two times a day  epoetin jaswinder Injectable [id number](s) IV Push <User Schedule>  docusate sodium 100milliGRAM(s) Oral three times a day  senna 2Tablet(s) Oral at bedtime  aspirin  chewable 81milliGRAM(s) Oral daily  heparin  Injectable 5000Unit(s) SubCutaneous every 8 hours  gabapentin 300milliGRAM(s) Oral three times a day  cefepime  IVPB  IV Intermittent   cefepime  IVPB 500milliGRAM(s) IV Intermittent every 24 hours  ALBUTerol    90 MICROgram(s) HFA Inhaler 1Puff(s) Inhalation every 4 hours  melatonin 3milliGRAM(s) Oral at bedtime  sildenafil (REVATIO) 20milliGRAM(s) Oral three times a day  cyanocobalamin 1000MICROGram(s) Oral daily    MEDICATIONS  (PRN):  sodium chloride 0.65% Nasal 1Spray(s) Both Nostrils every 6 hours PRN Nasal Congestion  acetaminophen   Tablet. 650milliGRAM(s) Oral every 6 hours PRN Mild Pain (1 - 3)      Vital Signs Last 24 Hrs  T(C): 36.7, Max: 36.8 (06-24 @ 22:28)  T(F): 98, Max: 98.2 (06-24 @ 22:28)  HR: 81 (73 - 88)  BP: 104/63 (98/38 - 104/63)  BP(mean): --  RR: 18 (18 - 20)  SpO2: 92% (92% - 97%)    Physical Exam:    Constitutional: NAD, well-groomed, well-developed  HEENT: PERRLA, EOMI.  Ecchymosis of right face with mild interval improvement  Respiratory: Breathing comfortably on 5L NC.  Stable crackles on exam.  Cardiovascular: Irregularly irregular rhythm, no murmurs  Gastrointestinal: Soft, non-tender, normal bowel sounds  Extremities: No peripheral edema, No cyanosis, clubbing   Vascular: Equal and normal pulses: 2+ peripheral pulses throughout  Neurological: GCS: 15  A&O x 3; no sensory, motor or coordination deficits, normal reflexes  Psychiatric: Normal mood, normal affect  Musculoskeletal: No joint pain, swelling or deformity; no limitation of movement  Skin: No rashes        I&O's Detail    I & Os for current day (as of 25 Jun 2017 09:45)  =============================================  IN:    Packed Red Blood Cells: 600 ml    Total IN: 600 ml  ---------------------------------------------  OUT:    Other: 1500 ml    Voided: 50 ml    Total OUT: 1550 ml  ---------------------------------------------  Total NET: -950 ml      LABS:                        9.5    8.8   )-----------( 143      ( 25 Jun 2017 09:12 )             30.2     06-24    137  |  93<L>  |  51.0<H>  ----------------------------<  109  4.4   |  24.0  |  4.07<H>    Ca    9.3      24 Jun 2017 08:56  Phos  4.4     06-24  Mg     2.0     06-24            RADIOLOGY & ADDITIONAL STUDIES:

## 2017-06-26 LAB
ANION GAP SERPL CALC-SCNC: 15 MMOL/L — SIGNIFICANT CHANGE UP (ref 5–17)
BUN SERPL-MCNC: 48 MG/DL — HIGH (ref 8–20)
CALCIUM SERPL-MCNC: 9.4 MG/DL — SIGNIFICANT CHANGE UP (ref 8.6–10.2)
CHLORIDE SERPL-SCNC: 94 MMOL/L — LOW (ref 98–107)
CO2 SERPL-SCNC: 26 MMOL/L — SIGNIFICANT CHANGE UP (ref 22–29)
CREAT SERPL-MCNC: 4.36 MG/DL — HIGH (ref 0.5–1.3)
GLUCOSE SERPL-MCNC: 127 MG/DL — HIGH (ref 70–115)
HCT VFR BLD CALC: 30.3 % — LOW (ref 37–47)
HGB BLD-MCNC: 9.3 G/DL — LOW (ref 12–16)
MCHC RBC-ENTMCNC: 30.7 G/DL — LOW (ref 32–36)
MCHC RBC-ENTMCNC: 34.2 PG — HIGH (ref 27–31)
MCV RBC AUTO: 111.4 FL — HIGH (ref 81–99)
PHOSPHATE SERPL-MCNC: 5.1 MG/DL — HIGH (ref 2.4–4.7)
PLATELET # BLD AUTO: 150 K/UL — SIGNIFICANT CHANGE UP (ref 150–400)
POTASSIUM SERPL-MCNC: 4.8 MMOL/L — SIGNIFICANT CHANGE UP (ref 3.5–5.3)
POTASSIUM SERPL-SCNC: 4.8 MMOL/L — SIGNIFICANT CHANGE UP (ref 3.5–5.3)
RBC # BLD: 2.72 M/UL — LOW (ref 4.4–5.2)
RBC # FLD: 22.8 % — HIGH (ref 11–15.6)
SODIUM SERPL-SCNC: 135 MMOL/L — SIGNIFICANT CHANGE UP (ref 135–145)
WBC # BLD: 8.5 K/UL — SIGNIFICANT CHANGE UP (ref 4.8–10.8)
WBC # FLD AUTO: 8.5 K/UL — SIGNIFICANT CHANGE UP (ref 4.8–10.8)

## 2017-06-26 PROCEDURE — 99233 SBSQ HOSP IP/OBS HIGH 50: CPT

## 2017-06-26 RX ORDER — TROPICAMIDE 1 %
3 DROPS OPHTHALMIC (EYE) ONCE
Qty: 0 | Refills: 0 | Status: COMPLETED | OUTPATIENT
Start: 2017-06-26 | End: 2017-06-26

## 2017-06-26 RX ORDER — DOCUSATE SODIUM 100 MG
1 CAPSULE ORAL
Qty: 0 | Refills: 0 | COMMUNITY
Start: 2017-06-26

## 2017-06-26 RX ORDER — CEFEPIME 1 G/1
0 INJECTION, POWDER, FOR SOLUTION INTRAMUSCULAR; INTRAVENOUS
Qty: 0 | Refills: 0 | COMMUNITY
Start: 2017-06-26

## 2017-06-26 RX ORDER — ACETYLCYSTEINE 200 MG/ML
4 VIAL (ML) MISCELLANEOUS
Qty: 0 | Refills: 0 | COMMUNITY
Start: 2017-06-26

## 2017-06-26 RX ORDER — GABAPENTIN 400 MG/1
1 CAPSULE ORAL
Qty: 0 | Refills: 0 | COMMUNITY
Start: 2017-06-26

## 2017-06-26 RX ORDER — SENNA PLUS 8.6 MG/1
2 TABLET ORAL
Qty: 0 | Refills: 0 | COMMUNITY
Start: 2017-06-26

## 2017-06-26 RX ORDER — PREGABALIN 225 MG/1
1 CAPSULE ORAL
Qty: 0 | Refills: 0 | COMMUNITY
Start: 2017-06-26

## 2017-06-26 RX ORDER — ERYTHROPOIETIN 10000 [IU]/ML
0 INJECTION, SOLUTION INTRAVENOUS; SUBCUTANEOUS
Qty: 0 | Refills: 0 | COMMUNITY
Start: 2017-06-26

## 2017-06-26 RX ORDER — LANOLIN ALCOHOL/MO/W.PET/CERES
1 CREAM (GRAM) TOPICAL
Qty: 0 | Refills: 0 | COMMUNITY
Start: 2017-06-26

## 2017-06-26 RX ORDER — PHENYLEPHRINE HCL 2.5 %
3 DROPS OPHTHALMIC (EYE) ONCE
Qty: 0 | Refills: 0 | Status: COMPLETED | OUTPATIENT
Start: 2017-06-26 | End: 2017-06-26

## 2017-06-26 RX ORDER — LIDOCAINE 4 G/100G
0 CREAM TOPICAL
Qty: 0 | Refills: 0 | COMMUNITY
Start: 2017-06-26

## 2017-06-26 RX ORDER — HEPARIN SODIUM 5000 [USP'U]/ML
0 INJECTION INTRAVENOUS; SUBCUTANEOUS
Qty: 0 | Refills: 0 | COMMUNITY
Start: 2017-06-26

## 2017-06-26 RX ORDER — ACETAMINOPHEN 500 MG
2 TABLET ORAL
Qty: 0 | Refills: 0 | COMMUNITY
Start: 2017-06-26

## 2017-06-26 RX ADMIN — ATORVASTATIN CALCIUM 40 MILLIGRAM(S): 80 TABLET, FILM COATED ORAL at 22:37

## 2017-06-26 RX ADMIN — MIDODRINE HYDROCHLORIDE 5 MILLIGRAM(S): 2.5 TABLET ORAL at 17:36

## 2017-06-26 RX ADMIN — ALBUTEROL 2.5 MILLIGRAM(S): 90 AEROSOL, METERED ORAL at 15:25

## 2017-06-26 RX ADMIN — Medication 100 MILLIGRAM(S): at 22:37

## 2017-06-26 RX ADMIN — Medication 650 MILLIGRAM(S): at 13:00

## 2017-06-26 RX ADMIN — Medication 4 MILLILITER(S): at 09:23

## 2017-06-26 RX ADMIN — SENNA PLUS 2 TABLET(S): 8.6 TABLET ORAL at 22:37

## 2017-06-26 RX ADMIN — Medication 1 MILLIGRAM(S): at 12:04

## 2017-06-26 RX ADMIN — Medication 100 MILLIGRAM(S): at 06:52

## 2017-06-26 RX ADMIN — Medication 3 DROP(S): at 12:25

## 2017-06-26 RX ADMIN — Medication 20 MILLIGRAM(S): at 22:37

## 2017-06-26 RX ADMIN — Medication 4 MILLILITER(S): at 15:33

## 2017-06-26 RX ADMIN — Medication 4 MILLILITER(S): at 20:44

## 2017-06-26 RX ADMIN — GABAPENTIN 300 MILLIGRAM(S): 400 CAPSULE ORAL at 22:37

## 2017-06-26 RX ADMIN — Medication 20 MILLIGRAM(S): at 06:52

## 2017-06-26 RX ADMIN — Medication 20 MILLIGRAM(S): at 14:56

## 2017-06-26 RX ADMIN — HEPARIN SODIUM 5000 UNIT(S): 5000 INJECTION INTRAVENOUS; SUBCUTANEOUS at 06:52

## 2017-06-26 RX ADMIN — GABAPENTIN 300 MILLIGRAM(S): 400 CAPSULE ORAL at 06:52

## 2017-06-26 RX ADMIN — Medication 650 MILLIGRAM(S): at 12:04

## 2017-06-26 RX ADMIN — LIDOCAINE 2 PATCH: 4 CREAM TOPICAL at 06:52

## 2017-06-26 RX ADMIN — Medication 3 MILLIGRAM(S): at 22:37

## 2017-06-26 RX ADMIN — PREGABALIN 1000 MICROGRAM(S): 225 CAPSULE ORAL at 12:04

## 2017-06-26 RX ADMIN — GABAPENTIN 300 MILLIGRAM(S): 400 CAPSULE ORAL at 14:56

## 2017-06-26 RX ADMIN — CEFEPIME 100 MILLIGRAM(S): 1 INJECTION, POWDER, FOR SOLUTION INTRAMUSCULAR; INTRAVENOUS at 15:02

## 2017-06-26 RX ADMIN — ALBUTEROL 2.5 MILLIGRAM(S): 90 AEROSOL, METERED ORAL at 20:43

## 2017-06-26 RX ADMIN — ALBUTEROL 2.5 MILLIGRAM(S): 90 AEROSOL, METERED ORAL at 09:17

## 2017-06-26 RX ADMIN — Medication 81 MILLIGRAM(S): at 12:04

## 2017-06-26 RX ADMIN — Medication 100 MILLIGRAM(S): at 14:56

## 2017-06-26 RX ADMIN — LIDOCAINE 2 PATCH: 4 CREAM TOPICAL at 23:46

## 2017-06-26 RX ADMIN — HEPARIN SODIUM 5000 UNIT(S): 5000 INJECTION INTRAVENOUS; SUBCUTANEOUS at 22:37

## 2017-06-26 RX ADMIN — Medication 4 MILLILITER(S): at 03:36

## 2017-06-26 RX ADMIN — LIDOCAINE 2 PATCH: 4 CREAM TOPICAL at 12:05

## 2017-06-26 RX ADMIN — HEPARIN SODIUM 5000 UNIT(S): 5000 INJECTION INTRAVENOUS; SUBCUTANEOUS at 14:56

## 2017-06-26 RX ADMIN — ALBUTEROL 2.5 MILLIGRAM(S): 90 AEROSOL, METERED ORAL at 03:36

## 2017-06-26 RX ADMIN — MIDODRINE HYDROCHLORIDE 5 MILLIGRAM(S): 2.5 TABLET ORAL at 06:52

## 2017-06-26 NOTE — PROGRESS NOTE ADULT - SUBJECTIVE AND OBJECTIVE BOX
Patient out of the ICU. Continues to have ongoing cough, which she cannot expectorate.    at bedside, spoke to him and daughter on phone about current status and rehab plans, answered all questions.     Patient is noticeably a but more confused, having more difficulty to answer questions. Slower in processing. At times, nonsensical response. Notable visual disturbance, liken to a field cut.     FUNCTIONAL PROGRESS  6/25 - PT  Bed Mobility  Bed Mobility Training Rehab Potential : good, to achieve stated therapy goals  Bed Mobility  Bed Mobility Training Symptoms Noted During/After Treatment : shortness of breath  Bed Mobility  Bed Mobility Training Charges :    Bed Mobility  Bed Mobility Training Sit-to-Supine : moderate assist (50% patient effort);  1 person assist;  bed rails  Bed Mobility  Bed Mobility Training Supine-to-Sit : minimum assist (75% patient effort);  1 person assist;  bed rails;  HOB elevated  Bed Mobility  Bed Mobility Training Limitations : decreased ability to use arms for pushing/pulling;  decreased ability to use legs for bridging/pushing;  impaired ability to control trunk for mobility;  decreased strength  Sit-Stand Transfer Training  Sit-to-Stand Transfer Training Rehab Potential : good, to achieve stated therapy goals  Sit-Stand Transfer Training  Sit-to-Stand Transfer Training Symptoms Noted During/After Treatment : shortness of breath  Sit-Stand Transfer Training  Sit-to-Stand Transfer Training Charges :    Sit-Stand Transfer Training  Transfer Training Sit-to-Stand Transfer : minimum assist (75% patient effort);  1 person assist;  full weight-bearing   rolling walker  Sit-Stand Transfer Training  Transfer Training Stand-to-Sit Transfer : minimum assist (75% patient effort);  1 person assist;  full weight-bearing   rolling walker      REVIEW OF SYSTEMS  Constitutional - No fever,  +fatigue  Neurological - No headaches, +memory loss, +loss of strength, No numbness, No tremors  Skin - No rashes, No lesions   Musculoskeletal - No joint pain, No joint swelling, No muscle pain  Psychiatric - No depression, No anxiety    VITALS  T(C): 36.6, Max: 36.7 (06-25 @ 23:37)  HR: 85 (78 - 89)  BP: 89/50 (89/50 - 94/52)  RR: 21 (20 - 21)  SpO2: 96% (85% - 97%)  Wt(kg): --    MEDICATIONS   lidocaine   Patch 2Patch daily  folic acid 1milliGRAM(s) daily  atorvastatin 40milliGRAM(s) at bedtime  midodrine 5milliGRAM(s) two times a day  epoetin jaswinder Injectable 42626Zhtr(s) <User Schedule>  sodium chloride 0.65% Nasal 1Spray(s) every 6 hours PRN  docusate sodium 100milliGRAM(s) three times a day  senna 2Tablet(s) at bedtime  acetaminophen   Tablet. 650milliGRAM(s) every 6 hours PRN  aspirin  chewable 81milliGRAM(s) daily  heparin  Injectable 5000Unit(s) every 8 hours  gabapentin 300milliGRAM(s) three times a day  cefepime  IVPB    cefepime  IVPB 500milliGRAM(s) every 24 hours  melatonin 3milliGRAM(s) at bedtime  sildenafil (REVATIO) 20milliGRAM(s) three times a day  cyanocobalamin 1000MICROGram(s) daily  ALBUTerol    0.083% 2.5milliGRAM(s) every 6 hours  acetylcysteine 20% Inhalation 4milliLiter(s) every 6 hours      RECENT LABS/IMAGING  CBC Full  -  ( 26 Jun 2017 07:21 )  WBC Count : 8.5 K/uL  Hemoglobin : 9.3 g/dL  Hematocrit : 30.3 %  Platelet Count - Automated : 150 K/uL  Mean Cell Volume : 111.4 fl  Mean Cell Hemoglobin : 34.2 pg  Mean Cell Hemoglobin Concentration : 30.7 g/dL  Auto Neutrophil # : x  Auto Lymphocyte # : x  Auto Monocyte # : x  Auto Eosinophil # : x  Auto Basophil # : x  Auto Neutrophil % : x  Auto Lymphocyte % : x  Auto Monocyte % : x  Auto Eosinophil % : x  Auto Basophil % : x    06-26    135  |  94<L>  |  48.0<H>  ----------------------------<  127<H>  4.8   |  26.0  |  4.36<H>    Ca    9.4      26 Jun 2017 07:21  Phos  5.1     06-26  Mg     2.2     06-25        ----------------------------------------------------------------------------------------    PHYSICAL EXAM  Constitutional - NAD, Uncomfortable  HEENT - Facial ecchymosis  Extremities - No C/C, Right UE edema, PVD skin changes, No calf tenderness, Multiple bruising of the BUE  Neurologic Exam - Right field cut                    Cognitive - AAOx self, month, year (with correction) and [facility]     Motor - No focal deficits     Sensory - Intact to LT     Coordination - Right inattention, Poor fine motor   Psychiatric - Mood depressed    ----------------------------------------------------------------------------------------  ASSESSMENT/PLAN  76F sustaining a TBI, rib fractures after a fall now with pulmonary complications now with functional deficits  CAD - ASA  Sleep wake cycle - Melatonin as per ACS  Pain - Tylenol, Neurontin, Lidoderm  DVT PPX - Heparin  Rehab Dispo - Recommend ACUTE inpatient rehabilitation for the functional deficits consisting of 3 hours of therapy/day & 24 hour RN/daily PMR physician for comorbid medical management. Will continue to follow for ongoing rehab needs and recommendations.

## 2017-06-26 NOTE — PROGRESS NOTE ADULT - ATTENDING COMMENTS
HD tomorrow, Epogen
NSGY Attg:    see above  exam nonfocal  repeat CT stable    A/P:  neuro stable  cont sz ppx  hold heparin with dialysis x 2 weeks  need to evaluate risk vs benefit of holding antiplatelet agents  from a neurosurgical perspective, would favor holding ASA x 72 hours and Plavix x 2 weeks  if cardiology feels strongly about restarting agents sooner, will discuss and assess risk versus benefit
Patient says she feels better and breathing is better.  C/o difficulty w/ peripheral vision.  Still unable to do more than 500ml on IS.  Has some coughing but not productive.  Afebrile.  HD normal.  Supplemental O2 as needed.  Lungs w/ good breath sounds but does have some crackles.  On basic eye exam, patient having decreased peripheral vision.  Will contact optho for evalaution.  Continue IS and chest PT.  PMR to evaluate for acute rehab.
Patient seen and examined on ACS rounds.  pain under better control, able to speak in full sentences.  HD today.  PT/OT anticipate D/C on the next 48 hrs.

## 2017-06-26 NOTE — PROGRESS NOTE ADULT - SUBJECTIVE AND OBJECTIVE BOX
NEPHROLOGY INTERVAL HPI/OVERNIGHT EVENTS:    interim noted   optho to see   No SOB or CP       MEDICATIONS  (STANDING):  lidocaine   Patch 2Patch Transdermal daily  folic acid 1milliGRAM(s) Oral daily  atorvastatin 40milliGRAM(s) Oral at bedtime  midodrine 5milliGRAM(s) Oral two times a day  epoetin jaswinder Injectable 45175Nlel(s) IV Push <User Schedule>  docusate sodium 100milliGRAM(s) Oral three times a day  senna 2Tablet(s) Oral at bedtime  aspirin  chewable 81milliGRAM(s) Oral daily  heparin  Injectable 5000Unit(s) SubCutaneous every 8 hours  gabapentin 300milliGRAM(s) Oral three times a day  cefepime  IVPB  IV Intermittent   cefepime  IVPB 500milliGRAM(s) IV Intermittent every 24 hours  melatonin 3milliGRAM(s) Oral at bedtime  sildenafil (REVATIO) 20milliGRAM(s) Oral three times a day  cyanocobalamin 1000MICROGram(s) Oral daily  ALBUTerol    0.083% 2.5milliGRAM(s) Nebulizer every 6 hours  acetylcysteine 20% Inhalation 4milliLiter(s) Inhalation every 6 hours    MEDICATIONS  (PRN):  sodium chloride 0.65% Nasal 1Spray(s) Both Nostrils every 6 hours PRN Nasal Congestion  acetaminophen   Tablet. 650milliGRAM(s) Oral every 6 hours PRN Mild Pain (1 - 3)      Allergies    allopurinol (Rash)  codeine (Nausea; Vomiting)  penicillin (Rash)  spironolactone (Unknown)    Intolerances          Vital Signs Last 24 Hrs  T(C): 36.6, Max: 36.7 (06-25 @ 23:37)  T(F): 97.9, Max: 98 (06-25 @ 23:37)  HR: 85 (78 - 89)  BP: 89/50 (89/50 - 94/52)  BP(mean): --  RR: 21 (20 - 21)  SpO2: 96% (85% - 97%)  Daily     Daily   I&O's Detail    I&O's Summary      PHYSICAL EXAM:  GENERAL: NAD,   HEAD:  No edema   NECK: Supple, No JVD,   CHEST/LUNG: EAE . Decreased BS at right base   HEART: Regular rate and rhythm; No rub   ABDOMEN: Soft, Nontender, Nondistended;  EXTREMITIES:  dec edema     LABS:                        9.3    8.5   )-----------( 150      ( 26 Jun 2017 07:21 )             30.3     06-26    135  |  94<L>  |  48.0<H>  ----------------------------<  127<H>  4.8   |  26.0  |  4.36<H>    Ca    9.4      26 Jun 2017 07:21  Phos  5.1     06-26  Mg     2.2     06-25          Phosphorus Level, Serum: 5.1 mg/dL (06-26 @ 07:21)          RADIOLOGY & ADDITIONAL TESTS:

## 2017-06-26 NOTE — PROGRESS NOTE ADULT - ASSESSMENT
88yo woman with complex medical hx s/p mechanical fall with multiple traumatic injuries notable for stable SAH and right 4/5/6 rib fx.  Clincially stable and doing well  Continue stringent pulmonary toilet and schedule nebulizer therapy.  Will reassess regularly  Contineu DASH diet, home medications.  Continue PT  Aggressive pulm toilet, IS, cough exercises, cont antibiotics  Possible dc to acute rehab when medically stable    Discussed and seen with attending physician Dr. Busch whom agrees.

## 2017-06-26 NOTE — PROGRESS NOTE ADULT - SUBJECTIVE AND OBJECTIVE BOX
INTERVAL HPI/OVERNIGHT EVENTS:  Patient seen and evaluated at bedside. Pain well controlled. This morning has no complaints. Denies N/V/F/C. Pulling 500 ml on incentive spirometer.   Crackles on ascultation remains on IV antibiotics  Afebrile, HD well    MEDICATIONS  (STANDING):  lidocaine   Patch 2Patch Transdermal daily  folic acid 1milliGRAM(s) Oral daily  atorvastatin 40milliGRAM(s) Oral at bedtime  midodrine 5milliGRAM(s) Oral two times a day  epoetin jaswinder Injectable 94297Npwp(s) IV Push <User Schedule>  docusate sodium 100milliGRAM(s) Oral three times a day  senna 2Tablet(s) Oral at bedtime  aspirin  chewable 81milliGRAM(s) Oral daily  heparin  Injectable 5000Unit(s) SubCutaneous every 8 hours  gabapentin 300milliGRAM(s) Oral three times a day  cefepime  IVPB  IV Intermittent   cefepime  IVPB 500milliGRAM(s) IV Intermittent every 24 hours  melatonin 3milliGRAM(s) Oral at bedtime  sildenafil (REVATIO) 20milliGRAM(s) Oral three times a day  cyanocobalamin 1000MICROGram(s) Oral daily  ALBUTerol    0.083% 2.5milliGRAM(s) Nebulizer every 6 hours  acetylcysteine 20% Inhalation 4milliLiter(s) Inhalation every 6 hours    MEDICATIONS  (PRN):  sodium chloride 0.65% Nasal 1Spray(s) Both Nostrils every 6 hours PRN Nasal Congestion  acetaminophen   Tablet. 650milliGRAM(s) Oral every 6 hours PRN Mild Pain (1 - 3)      Vital Signs Last 24 Hrs  T(C): 36.6, Max: 36.7 (06-25 @ 23:37)  T(F): 97.9, Max: 98 (06-25 @ 23:37)  HR: 85 (78 - 89)  BP: 89/50 (89/50 - 94/52)  BP(mean): --  RR: 21 (20 - 21)  SpO2: 96% (85% - 97%)    Physical Exam:    onstitutional: NAD, well-groomed, well-developed  HEENT: PERRLA, EOMI.  Ecchymosis of right face with mild interval improvement  Respiratory: Breathing comfortably on 5L NC.  Stable crackles on exam. Speaking full sentences with no issues.  Cardiovascular: Irregularly irregular rhythm, no murmurs  Gastrointestinal: Soft, non-tender, normal bowel sounds  Extremities: No peripheral edema, No cyanosis, clubbing   Vascular: Equal and normal pulses: 2+ peripheral pulses throughout  Neurological: GCS: 15  A&O x 3; no sensory, motor or coordination deficits, normal reflexes  Psychiatric: Normal mood, normal affect  Musculoskeletal: No joint pain, swelling or deformity; no limitation of movement  Skin: No rashes      I&O's Detail      LABS:                        9.3    8.5   )-----------( 150      ( 26 Jun 2017 07:21 )             30.3     06-26    135  |  94<L>  |  48.0<H>  ----------------------------<  127<H>  4.8   |  26.0  |  4.36<H>    Ca    9.4      26 Jun 2017 07:21  Phos  5.1     06-26  Mg     2.2     06-25            RADIOLOGY & ADDITIONAL STUDIES:

## 2017-06-26 NOTE — PROGRESS NOTE ADULT - SUBJECTIVE AND OBJECTIVE BOX
Trauma surgery:    Dr. Desai from opthalmology was contacted for consult as patient c/o blurry vision and difficulty viewing on her peripheral side bilaterally.     PE:  Eyes: EOMI, eye PERRLA, pt presents with difficulty viewing fingers from the left and right peripheral view and has trouble identifying digits on near sight and far sight which was no present when pt was in ICU or on admission    Recommendation:  As per Dr. Desai, Tropicamide x 3 drops both eyes and phenylephrine x 3 drops on both eyes to be administered at 12pm.  -Will stop by to evaluate at bedside eye exam    Case discussed w/ Dr. Desai and Dr. Michelle

## 2017-06-26 NOTE — PROGRESS NOTE ADULT - ASSESSMENT
ESRD - Heparin free HD Tuesday    SOB - Multifactorial - H/O VHD , Prior operative intervention , sig residual Pulm HTN ( PASP 88 )  Pt on Revatio   S/P Mechanical fall with several R rib fractures  baseline sig COPD   Anemia   Chronic R effusion - the same on imaging ( doesn't appear too sig on prior Chest CT ), therefore , probably doesn't need drainage       S/P traumatic SAH - No expansion on follow up CT. Surgery follow up noted     Anemia - Continue epogen TIW  Iron stores intact 6-23  + folate   Transfused 2 units with last HD   CBC in am     RO - Phos OK w/o binders     ? PNA - Blood Cx 6-19 negative , Vanco level 26 yesterday  Need for continuation of Cefepime and Vanco ??  Vanco level in am

## 2017-06-27 ENCOUNTER — INPATIENT (INPATIENT)
Facility: HOSPITAL | Age: 77
LOS: 28 days | Discharge: NURSING, REHAB OR LONG-TERM CARE | DRG: 949 | End: 2017-07-26
Attending: PHYSICAL MEDICINE & REHABILITATION | Admitting: PHYSICAL MEDICINE & REHABILITATION
Payer: MEDICARE

## 2017-06-27 VITALS
TEMPERATURE: 101 F | HEART RATE: 87 BPM | SYSTOLIC BLOOD PRESSURE: 92 MMHG | RESPIRATION RATE: 20 BRPM | DIASTOLIC BLOOD PRESSURE: 59 MMHG | OXYGEN SATURATION: 90 %

## 2017-06-27 VITALS — RESPIRATION RATE: 19 BRPM | OXYGEN SATURATION: 94 %

## 2017-06-27 DIAGNOSIS — Z90.89 ACQUIRED ABSENCE OF OTHER ORGANS: Chronic | ICD-10-CM

## 2017-06-27 DIAGNOSIS — Z90.49 ACQUIRED ABSENCE OF OTHER SPECIFIED PARTS OF DIGESTIVE TRACT: Chronic | ICD-10-CM

## 2017-06-27 DIAGNOSIS — Z95.2 PRESENCE OF PROSTHETIC HEART VALVE: Chronic | ICD-10-CM

## 2017-06-27 DIAGNOSIS — Z51.89 ENCOUNTER FOR OTHER SPECIFIED AFTERCARE: ICD-10-CM

## 2017-06-27 LAB
ANION GAP SERPL CALC-SCNC: 23 MMOL/L — HIGH (ref 5–17)
ANISOCYTOSIS BLD QL: SLIGHT — SIGNIFICANT CHANGE UP
BUN SERPL-MCNC: 66 MG/DL — HIGH (ref 8–20)
CALCIUM SERPL-MCNC: 8.5 MG/DL — LOW (ref 8.6–10.2)
CHLORIDE SERPL-SCNC: 93 MMOL/L — LOW (ref 98–107)
CO2 SERPL-SCNC: 25 MMOL/L — SIGNIFICANT CHANGE UP (ref 22–29)
CREAT SERPL-MCNC: 4.93 MG/DL — HIGH (ref 0.5–1.3)
EOSINOPHIL NFR BLD AUTO: 3 % — SIGNIFICANT CHANGE UP (ref 0–5)
GLUCOSE SERPL-MCNC: 124 MG/DL — HIGH (ref 70–115)
HCT VFR BLD CALC: 28.7 % — LOW (ref 37–47)
HGB BLD-MCNC: 9 G/DL — LOW (ref 12–16)
HYPOCHROMIA BLD QL: SLIGHT — SIGNIFICANT CHANGE UP
LYMPHOCYTES # BLD AUTO: 7 % — LOW (ref 20–55)
MACROCYTES BLD QL: SLIGHT — SIGNIFICANT CHANGE UP
MAGNESIUM SERPL-MCNC: 2.2 MG/DL — SIGNIFICANT CHANGE UP (ref 1.8–2.6)
MCHC RBC-ENTMCNC: 31.4 G/DL — LOW (ref 32–36)
MCHC RBC-ENTMCNC: 35.2 PG — HIGH (ref 27–31)
MCV RBC AUTO: 112.1 FL — HIGH (ref 81–99)
MONOCYTES NFR BLD AUTO: 13 % — HIGH (ref 3–10)
NEUTROPHILS NFR BLD AUTO: 77 % — HIGH (ref 37–73)
OVALOCYTES BLD QL SMEAR: SLIGHT — SIGNIFICANT CHANGE UP
PHOSPHATE SERPL-MCNC: 5.9 MG/DL — HIGH (ref 2.4–4.7)
PLAT MORPH BLD: NORMAL — SIGNIFICANT CHANGE UP
PLATELET # BLD AUTO: 163 K/UL — SIGNIFICANT CHANGE UP (ref 150–400)
POIKILOCYTOSIS BLD QL AUTO: SLIGHT — SIGNIFICANT CHANGE UP
POTASSIUM SERPL-MCNC: 5.2 MMOL/L — SIGNIFICANT CHANGE UP (ref 3.5–5.3)
POTASSIUM SERPL-SCNC: 5.2 MMOL/L — SIGNIFICANT CHANGE UP (ref 3.5–5.3)
RBC # BLD: 2.56 M/UL — LOW (ref 4.4–5.2)
RBC # FLD: 21.5 % — HIGH (ref 11–15.6)
RBC BLD AUTO: ABNORMAL
SODIUM SERPL-SCNC: 141 MMOL/L — SIGNIFICANT CHANGE UP (ref 135–145)
VANCOMYCIN TROUGH SERPL-MCNC: 16.7 UG/ML — SIGNIFICANT CHANGE UP (ref 10–20)
WBC # BLD: 8.1 K/UL — SIGNIFICANT CHANGE UP (ref 4.8–10.8)
WBC # FLD AUTO: 8.1 K/UL — SIGNIFICANT CHANGE UP (ref 4.8–10.8)

## 2017-06-27 PROCEDURE — 99232 SBSQ HOSP IP/OBS MODERATE 35: CPT

## 2017-06-27 PROCEDURE — 71010: CPT | Mod: 26

## 2017-06-27 PROCEDURE — 73130 X-RAY EXAM OF HAND: CPT | Mod: 26,RT

## 2017-06-27 RX ORDER — PREGABALIN 225 MG/1
1000 CAPSULE ORAL DAILY
Qty: 0 | Refills: 0 | Status: DISCONTINUED | OUTPATIENT
Start: 2017-06-27 | End: 2017-07-26

## 2017-06-27 RX ORDER — FOLIC ACID 0.8 MG
1 TABLET ORAL DAILY
Qty: 0 | Refills: 0 | Status: DISCONTINUED | OUTPATIENT
Start: 2017-06-27 | End: 2017-07-26

## 2017-06-27 RX ORDER — GABAPENTIN 400 MG/1
300 CAPSULE ORAL EVERY 8 HOURS
Qty: 0 | Refills: 0 | Status: DISCONTINUED | OUTPATIENT
Start: 2017-06-27 | End: 2017-07-26

## 2017-06-27 RX ORDER — LIDOCAINE 4 G/100G
2 CREAM TOPICAL DAILY
Qty: 0 | Refills: 0 | Status: DISCONTINUED | OUTPATIENT
Start: 2017-06-27 | End: 2017-07-10

## 2017-06-27 RX ORDER — MIDODRINE HYDROCHLORIDE 2.5 MG/1
5 TABLET ORAL
Qty: 0 | Refills: 0 | Status: DISCONTINUED | OUTPATIENT
Start: 2017-06-27 | End: 2017-06-30

## 2017-06-27 RX ORDER — CALCIUM ACETATE 667 MG
667 TABLET ORAL
Qty: 0 | Refills: 0 | COMMUNITY
Start: 2017-06-27

## 2017-06-27 RX ORDER — ACETAMINOPHEN 500 MG
650 TABLET ORAL EVERY 6 HOURS
Qty: 0 | Refills: 0 | Status: DISCONTINUED | OUTPATIENT
Start: 2017-06-27 | End: 2017-07-26

## 2017-06-27 RX ORDER — ACETYLCYSTEINE 200 MG/ML
4 VIAL (ML) MISCELLANEOUS EVERY 6 HOURS
Qty: 0 | Refills: 0 | Status: DISCONTINUED | OUTPATIENT
Start: 2017-06-27 | End: 2017-07-03

## 2017-06-27 RX ORDER — ACETAMINOPHEN 500 MG
650 TABLET ORAL ONCE
Qty: 0 | Refills: 0 | Status: COMPLETED | OUTPATIENT
Start: 2017-06-27 | End: 2017-06-27

## 2017-06-27 RX ORDER — ALBUTEROL 90 UG/1
2.5 AEROSOL, METERED ORAL EVERY 6 HOURS
Qty: 0 | Refills: 0 | Status: DISCONTINUED | OUTPATIENT
Start: 2017-06-27 | End: 2017-06-29

## 2017-06-27 RX ORDER — CALCIUM ACETATE 667 MG
667 TABLET ORAL
Qty: 0 | Refills: 0 | Status: DISCONTINUED | OUTPATIENT
Start: 2017-06-27 | End: 2017-06-27

## 2017-06-27 RX ORDER — LANOLIN ALCOHOL/MO/W.PET/CERES
3 CREAM (GRAM) TOPICAL AT BEDTIME
Qty: 0 | Refills: 0 | Status: DISCONTINUED | OUTPATIENT
Start: 2017-06-27 | End: 2017-06-28

## 2017-06-27 RX ORDER — CALCIUM ACETATE 667 MG
667 TABLET ORAL
Qty: 0 | Refills: 0 | Status: DISCONTINUED | OUTPATIENT
Start: 2017-06-27 | End: 2017-07-26

## 2017-06-27 RX ORDER — ERYTHROPOIETIN 10000 [IU]/ML
10000 INJECTION, SOLUTION INTRAVENOUS; SUBCUTANEOUS
Qty: 0 | Refills: 0 | Status: DISCONTINUED | OUTPATIENT
Start: 2017-06-27 | End: 2017-07-26

## 2017-06-27 RX ORDER — ASPIRIN/CALCIUM CARB/MAGNESIUM 324 MG
81 TABLET ORAL DAILY
Qty: 0 | Refills: 0 | Status: DISCONTINUED | OUTPATIENT
Start: 2017-06-27 | End: 2017-07-26

## 2017-06-27 RX ORDER — ATORVASTATIN CALCIUM 80 MG/1
40 TABLET, FILM COATED ORAL AT BEDTIME
Qty: 0 | Refills: 0 | Status: DISCONTINUED | OUTPATIENT
Start: 2017-06-27 | End: 2017-07-26

## 2017-06-27 RX ORDER — HEPARIN SODIUM 5000 [USP'U]/ML
5000 INJECTION INTRAVENOUS; SUBCUTANEOUS EVERY 8 HOURS
Qty: 0 | Refills: 0 | Status: DISCONTINUED | OUTPATIENT
Start: 2017-06-27 | End: 2017-07-18

## 2017-06-27 RX ADMIN — ATORVASTATIN CALCIUM 40 MILLIGRAM(S): 80 TABLET, FILM COATED ORAL at 22:17

## 2017-06-27 RX ADMIN — Medication 3 MILLIGRAM(S): at 22:17

## 2017-06-27 RX ADMIN — ERYTHROPOIETIN 10000 UNIT(S): 10000 INJECTION, SOLUTION INTRAVENOUS; SUBCUTANEOUS at 10:41

## 2017-06-27 RX ADMIN — ALBUTEROL 2.5 MILLIGRAM(S): 90 AEROSOL, METERED ORAL at 03:01

## 2017-06-27 RX ADMIN — Medication 650 MILLIGRAM(S): at 22:17

## 2017-06-27 RX ADMIN — Medication 20 MILLIGRAM(S): at 06:45

## 2017-06-27 RX ADMIN — Medication 81 MILLIGRAM(S): at 13:38

## 2017-06-27 RX ADMIN — ALBUTEROL 2.5 MILLIGRAM(S): 90 AEROSOL, METERED ORAL at 15:01

## 2017-06-27 RX ADMIN — Medication 667 MILLIGRAM(S): at 13:38

## 2017-06-27 RX ADMIN — HEPARIN SODIUM 5000 UNIT(S): 5000 INJECTION INTRAVENOUS; SUBCUTANEOUS at 22:17

## 2017-06-27 RX ADMIN — HEPARIN SODIUM 5000 UNIT(S): 5000 INJECTION INTRAVENOUS; SUBCUTANEOUS at 06:45

## 2017-06-27 RX ADMIN — GABAPENTIN 300 MILLIGRAM(S): 400 CAPSULE ORAL at 06:45

## 2017-06-27 RX ADMIN — Medication 100 MILLIGRAM(S): at 06:45

## 2017-06-27 RX ADMIN — Medication 20 MILLIGRAM(S): at 16:45

## 2017-06-27 RX ADMIN — Medication 20 MILLIGRAM(S): at 22:17

## 2017-06-27 RX ADMIN — MIDODRINE HYDROCHLORIDE 5 MILLIGRAM(S): 2.5 TABLET ORAL at 16:46

## 2017-06-27 RX ADMIN — Medication 1 MILLIGRAM(S): at 13:37

## 2017-06-27 RX ADMIN — LIDOCAINE 2 PATCH: 4 CREAM TOPICAL at 13:35

## 2017-06-27 RX ADMIN — Medication 4 MILLILITER(S): at 20:33

## 2017-06-27 RX ADMIN — Medication 667 MILLIGRAM(S): at 16:46

## 2017-06-27 RX ADMIN — GABAPENTIN 300 MILLIGRAM(S): 400 CAPSULE ORAL at 22:17

## 2017-06-27 RX ADMIN — Medication 4 MILLILITER(S): at 03:00

## 2017-06-27 RX ADMIN — GABAPENTIN 300 MILLIGRAM(S): 400 CAPSULE ORAL at 13:38

## 2017-06-27 RX ADMIN — MIDODRINE HYDROCHLORIDE 5 MILLIGRAM(S): 2.5 TABLET ORAL at 06:45

## 2017-06-27 RX ADMIN — ALBUTEROL 2.5 MILLIGRAM(S): 90 AEROSOL, METERED ORAL at 20:33

## 2017-06-27 RX ADMIN — ALBUTEROL 2.5 MILLIGRAM(S): 90 AEROSOL, METERED ORAL at 12:36

## 2017-06-27 RX ADMIN — HEPARIN SODIUM 5000 UNIT(S): 5000 INJECTION INTRAVENOUS; SUBCUTANEOUS at 13:38

## 2017-06-27 RX ADMIN — Medication 100 MILLIGRAM(S): at 13:39

## 2017-06-27 RX ADMIN — PREGABALIN 1000 MICROGRAM(S): 225 CAPSULE ORAL at 13:35

## 2017-06-27 RX ADMIN — Medication 4 MILLILITER(S): at 15:00

## 2017-06-27 NOTE — OCCUPATIONAL THERAPY INITIAL EVALUATION ADULT - LEVEL OF INDEPENDENCE, OT EVAL
secondary to endurance, lines/maximum assist (25% patients effort)
secondary to decreased cognition/maximum assist (25% patients effort)

## 2017-06-27 NOTE — PROGRESS NOTE ADULT - ASSESSMENT
ESRD - Heparin free HD Today    SOB - Multifactorial - H/O VHD , Prior operative intervention , sig residual Pulm HTN ( PASP 88 )  Pt on Revatio   S/P Mechanical fall with several R rib fractures  baseline sig COPD   Anemia   Chronic R effusion - the same on imaging ( doesn't appear too sig on prior Chest CT ), therefore , probably doesn't need drainage       S/P traumatic SAH - No expansion on follow up CT. Surgery follow up noted     Anemia - Continue epogen TIW  Iron stores intact 6-23  + folate   Transfused 2 units with last HD   Hgb stable     RO - Phos OK w/o binders     ? PNA - Blood Cx 6-19 negative , Vanco level 26 yesterday  Need for continuation of Cefepime and Vanco ??  Vanco level in am     RO - Not on binders   Add Phoslo with meals

## 2017-06-27 NOTE — OCCUPATIONAL THERAPY INITIAL EVALUATION ADULT - ADDITIONAL COMMENTS
Pt is a poor historian, has difficulty communicating needs
Pt reports living in private home with 2  steps to enter, +HR.  Pt uses cane occasionally outside the home.  She doesn't wear socks and wears slip on shoes for ease and independence with dressing.  Pt does not drive -  drives.

## 2017-06-27 NOTE — OCCUPATIONAL THERAPY INITIAL EVALUATION ADULT - NS ASR FOLLOW COMMAND OT EVAL
decreased processing speed/able to follow single-step instructions/50% of the time
unable to follow multi-step instructions/75% of the time/fatigue may be impacting ability to follow - continue to assess/able to follow single-step instructions

## 2017-06-27 NOTE — OCCUPATIONAL THERAPY INITIAL EVALUATION ADULT - PLANNED THERAPY INTERVENTIONS, OT EVAL
strengthening/transfer training/bed mobility training/balance training/ADL retraining
transfer training/motor coordination training/neuromuscular re-education/ROM/ADL retraining/bed mobility training/balance training/cognitive, visual perceptual/strengthening/IADL retraining

## 2017-06-27 NOTE — OCCUPATIONAL THERAPY INITIAL EVALUATION ADULT - LEVEL OF INDEPENDENCE: DRESS UPPER BODY, OT EVAL
moderate assist (50% patients effort)/secondary to multiple lines
moderate assist (50% patients effort)/to don gown secondary to decreased cognition

## 2017-06-27 NOTE — OCCUPATIONAL THERAPY INITIAL EVALUATION ADULT - NS ASR OT EQUIP NEEDS DISCH
bathing/dressing/rolling walker (5 inch wheels)/toileting
bathing/toileting/rolling walker (5 inch wheels)

## 2017-06-27 NOTE — PROGRESS NOTE ADULT - SUBJECTIVE AND OBJECTIVE BOX
NEPHROLOGY INTERVAL HPI/OVERNIGHT EVENTS:    Seen at HD   No new events   Vanco level today is 16       MEDICATIONS  (STANDING):  lidocaine   Patch 2 Patch Transdermal daily  folic acid 1 milliGRAM(s) Oral daily  atorvastatin 40 milliGRAM(s) Oral at bedtime  midodrine 5 milliGRAM(s) Oral two times a day  epoetin jaswinder Injectable 16180 Unit(s) IV Push <User Schedule>  docusate sodium 100 milliGRAM(s) Oral three times a day  senna 2 Tablet(s) Oral at bedtime  aspirin  chewable 81 milliGRAM(s) Oral daily  heparin  Injectable 5000 Unit(s) SubCutaneous every 8 hours  gabapentin 300 milliGRAM(s) Oral three times a day  cefepime  IVPB 500 milliGRAM(s) IV Intermittent every 24 hours  cefepime  IVPB   IV Intermittent   melatonin 3 milliGRAM(s) Oral at bedtime  sildenafil (REVATIO) 20 milliGRAM(s) Oral three times a day  cyanocobalamin 1000 MICROGram(s) Oral daily  ALBUTerol    0.083% 2.5 milliGRAM(s) Nebulizer every 6 hours  acetylcysteine 20% Inhalation 4 milliLiter(s) Inhalation every 6 hours    MEDICATIONS  (PRN):  sodium chloride 0.65% Nasal 1 Spray(s) Both Nostrils every 6 hours PRN Nasal Congestion  acetaminophen   Tablet. 650 milliGRAM(s) Oral every 6 hours PRN Mild Pain (1 - 3)      Allergies    allopurinol (Rash)  codeine (Nausea; Vomiting)  penicillin (Rash)  spironolactone (Unknown)    Intolerances            Vital Signs Last 24 Hrs  T(C): 37.2 (2017 08:05), Max: 37.2 (2017 08:05)  T(F): 99 (2017 08:05), Max: 99 (2017 08:05)  HR: 86 (2017 08:05) (85 - 88)  BP: 93/58 (2017 08:05) (93/58 - 96/60)  BP(mean): --  RR: 18 (2017 08:05) (18 - 19)  SpO2: 95% (2017 08:05) (93% - 99%)  Daily     Daily Weight in k.3 (2017 08:05)  I&O's Detail    I&O's Summary        PHYSICAL EXAM:  GENERAL: NAD,   HEAD:  No edema   NECK: Supple, No JVD,   CHEST/LUNG: EAE . Decreased BS at right base   HEART: Regular rate and rhythm; No rub   ABDOMEN: Soft, Nontender, Nondistended;  EXTREMITIES:  dec edema       LABS:                        9.0    8.1   )-----------( 163      ( 2017 08:34 )             28.7     -    141  |  93<L>  |  66.0<H>  ----------------------------<  124<H>  5.2   |  25.0  |  4.93<H>    Ca    8.5<L>      2017 08:34  Phos  5.9       Mg     2.2               Magnesium, Serum: 2.2 mg/dL ( @ 08:34)  Phosphorus Level, Serum: 5.9 mg/dL ( @ 08:34)          RADIOLOGY & ADDITIONAL TESTS:

## 2017-06-27 NOTE — PROGRESS NOTE ADULT - SUBJECTIVE AND OBJECTIVE BOX
Patient at dialysis. Continues to have a cough, which she cannot expectorate. Dialysis nurse will page respiratory to see if they can provide a treatment if due. Patient denies pain. States she slept well. However, overnight there were issues with her skin care as per family requests. Plan is for acute rehab.     FUNCTIONAL PROGRESS  Min-Mod A    REVIEW OF SYSTEMS  Constitutional - No fever,  +fatigue  HEENT - No vertigo, No neck pain  Neurological - No headaches, +memory loss, +loss of strength, No numbness, No tremors  Skin - No rashes, +lesions   Musculoskeletal - No joint pain, No joint swelling, No muscle pain  Psychiatric - No depression, No anxiety    VITALS  T(C): 37.2 (06-27-17 @ 08:05), Max: 37.2 (06-27-17 @ 08:05)  HR: 86 (06-27-17 @ 08:05) (85 - 88)  BP: 93/58 (06-27-17 @ 08:05) (93/58 - 96/60)  RR: 18 (06-27-17 @ 08:05) (18 - 19)  SpO2: 95% (06-27-17 @ 08:05) (93% - 99%)  Wt(kg): --    MEDICATIONS   lidocaine   Patch 2 Patch daily  folic acid 1 milliGRAM(s) daily  atorvastatin 40 milliGRAM(s) at bedtime  midodrine 5 milliGRAM(s) two times a day  epoetin jaswinder Injectable 32429 Unit(s) <User Schedule>  sodium chloride 0.65% Nasal 1 Spray(s) every 6 hours PRN  docusate sodium 100 milliGRAM(s) three times a day  senna 2 Tablet(s) at bedtime  acetaminophen   Tablet. 650 milliGRAM(s) every 6 hours PRN  aspirin  chewable 81 milliGRAM(s) daily  heparin  Injectable 5000 Unit(s) every 8 hours  gabapentin 300 milliGRAM(s) three times a day  cefepime  IVPB 500 milliGRAM(s) every 24 hours  cefepime  IVPB     melatonin 3 milliGRAM(s) at bedtime  sildenafil (REVATIO) 20 milliGRAM(s) three times a day  cyanocobalamin 1000 MICROGram(s) daily  ALBUTerol    0.083% 2.5 milliGRAM(s) every 6 hours  acetylcysteine 20% Inhalation 4 milliLiter(s) every 6 hours  calcium acetate 667 milliGRAM(s) three times a day with meals      RECENT LABS/IMAGING  CBC Full  -  ( 27 Jun 2017 08:34 )  WBC Count : 8.1 K/uL  Hemoglobin : 9.0 g/dL  Hematocrit : 28.7 %  Platelet Count - Automated : 163 K/uL  Mean Cell Volume : 112.1 fl  Mean Cell Hemoglobin : 35.2 pg  Mean Cell Hemoglobin Concentration : 31.4 g/dL  Auto Neutrophil # : x  Auto Lymphocyte # : x  Auto Monocyte # : x  Auto Eosinophil # : x  Auto Basophil # : x  Auto Neutrophil % : 77.0 %  Auto Lymphocyte % : 7.0 %  Auto Monocyte % : 13.0 %  Auto Eosinophil % : 3.0 %  Auto Basophil % : x    06-27    141  |  93<L>  |  66.0<H>  ----------------------------<  124<H>  5.2   |  25.0  |  4.93<H>    Ca    8.5<L>      27 Jun 2017 08:34  Phos  5.9     06-27  Mg     2.2     06-27        ----------------------------------------------------------------------------------------    PHYSICAL EXAM  Constitutional - NAD, Comfortable  HEENT - Facial ecchymosis  Extremities - No C/C, Right UE edema, PVD skin changes, No calf tenderness, Multiple bruising of the BUE  Neurologic Exam - Right field cut                    Cognitive - AAOx self, month, year (with correction) and [facility]     Motor - No focal deficits     Sensory - Intact to LT     Coordination - Right inattention, Poor fine motor   Psychiatric - Mood depressed    ---------------------------------------------------------------------------------------  ASSESSMENT/PLAN  76F sustaining a TBI, rib fractures after a fall now with pulmonary complications now with functional deficits  CAD - ASA  Sleep wake cycle - Melatonin as per ACS  Pain - Tylenol, Neurontin, Lidoderm  DVT PPX - Heparin  Rehab Dispo - Recommend ACUTE inpatient rehabilitation for the functional deficits consisting of 3 hours of therapy/day & 24 hour RN/daily PMR physician for comorbid medical management. Will continue to follow for ongoing rehab needs and recommendations.

## 2017-06-27 NOTE — OCCUPATIONAL THERAPY INITIAL EVALUATION ADULT - LEVEL OF INDEPENDENCE:TOILET, OT EVAL
+grullon/dependent (less than 25% patients effort)
moderate assist (50% patients effort)/for susie hygiene

## 2017-06-28 DIAGNOSIS — I25.10 ATHEROSCLEROTIC HEART DISEASE OF NATIVE CORONARY ARTERY WITHOUT ANGINA PECTORIS: ICD-10-CM

## 2017-06-28 DIAGNOSIS — J44.1 CHRONIC OBSTRUCTIVE PULMONARY DISEASE WITH (ACUTE) EXACERBATION: ICD-10-CM

## 2017-06-28 DIAGNOSIS — J18.9 PNEUMONIA, UNSPECIFIED ORGANISM: ICD-10-CM

## 2017-06-28 DIAGNOSIS — I95.9 HYPOTENSION, UNSPECIFIED: ICD-10-CM

## 2017-06-28 DIAGNOSIS — I27.2 OTHER SECONDARY PULMONARY HYPERTENSION: ICD-10-CM

## 2017-06-28 DIAGNOSIS — N18.6 END STAGE RENAL DISEASE: ICD-10-CM

## 2017-06-28 DIAGNOSIS — I48.91 UNSPECIFIED ATRIAL FIBRILLATION: ICD-10-CM

## 2017-06-28 LAB
ALBUMIN SERPL ELPH-MCNC: 3.6 G/DL — SIGNIFICANT CHANGE UP (ref 3.3–5.2)
ALP SERPL-CCNC: 523 U/L — HIGH (ref 40–120)
ALT FLD-CCNC: 29 U/L — SIGNIFICANT CHANGE UP
ANION GAP SERPL CALC-SCNC: 17 MMOL/L — SIGNIFICANT CHANGE UP (ref 5–17)
AST SERPL-CCNC: 27 U/L — SIGNIFICANT CHANGE UP
BILIRUB SERPL-MCNC: 1.4 MG/DL — SIGNIFICANT CHANGE UP (ref 0.4–2)
BUN SERPL-MCNC: 40 MG/DL — HIGH (ref 8–20)
CALCIUM SERPL-MCNC: 9.1 MG/DL — SIGNIFICANT CHANGE UP (ref 8.6–10.2)
CHLORIDE SERPL-SCNC: 94 MMOL/L — LOW (ref 98–107)
CO2 SERPL-SCNC: 25 MMOL/L — SIGNIFICANT CHANGE UP (ref 22–29)
CREAT SERPL-MCNC: 3.61 MG/DL — HIGH (ref 0.5–1.3)
EOSINOPHIL NFR BLD AUTO: 2 % — SIGNIFICANT CHANGE UP (ref 0–5)
GLUCOSE SERPL-MCNC: 78 MG/DL — SIGNIFICANT CHANGE UP (ref 70–115)
HCT VFR BLD CALC: 30.3 % — LOW (ref 37–47)
HGB BLD-MCNC: 9.3 G/DL — LOW (ref 12–16)
LYMPHOCYTES # BLD AUTO: 3 % — LOW (ref 20–55)
MCHC RBC-ENTMCNC: 30.7 G/DL — LOW (ref 32–36)
MCHC RBC-ENTMCNC: 34.8 PG — HIGH (ref 27–31)
MCV RBC AUTO: 113.5 FL — HIGH (ref 81–99)
MONOCYTES NFR BLD AUTO: 12 % — HIGH (ref 3–10)
NEUTROPHILS NFR BLD AUTO: 83 % — HIGH (ref 37–73)
PLATELET # BLD AUTO: 162 K/UL — SIGNIFICANT CHANGE UP (ref 150–400)
POTASSIUM SERPL-MCNC: 5.2 MMOL/L — SIGNIFICANT CHANGE UP (ref 3.5–5.3)
POTASSIUM SERPL-SCNC: 5.2 MMOL/L — SIGNIFICANT CHANGE UP (ref 3.5–5.3)
PREALB SERPL-MCNC: 16 MG/DL — LOW (ref 18–38)
PROT SERPL-MCNC: 6.7 G/DL — SIGNIFICANT CHANGE UP (ref 6.6–8.7)
RBC # BLD: 2.67 M/UL — LOW (ref 4.4–5.2)
RBC # FLD: 21.6 % — HIGH (ref 11–15.6)
SODIUM SERPL-SCNC: 136 MMOL/L — SIGNIFICANT CHANGE UP (ref 135–145)
WBC # BLD: 6.9 K/UL — SIGNIFICANT CHANGE UP (ref 4.8–10.8)
WBC # FLD AUTO: 6.9 K/UL — SIGNIFICANT CHANGE UP (ref 4.8–10.8)

## 2017-06-28 PROCEDURE — 99223 1ST HOSP IP/OBS HIGH 75: CPT | Mod: AI,GC

## 2017-06-28 PROCEDURE — 99223 1ST HOSP IP/OBS HIGH 75: CPT

## 2017-06-28 RX ORDER — FUROSEMIDE 40 MG
20 TABLET ORAL DAILY
Qty: 0 | Refills: 0 | Status: DISCONTINUED | OUTPATIENT
Start: 2017-06-28 | End: 2017-06-29

## 2017-06-28 RX ORDER — ERYTHROPOIETIN 10000 [IU]/ML
10000 INJECTION, SOLUTION INTRAVENOUS; SUBCUTANEOUS
Qty: 0 | Refills: 0 | Status: DISCONTINUED | OUTPATIENT
Start: 2017-06-28 | End: 2017-06-28

## 2017-06-28 RX ORDER — AZITHROMYCIN 500 MG/1
500 TABLET, FILM COATED ORAL EVERY 24 HOURS
Qty: 0 | Refills: 0 | Status: DISCONTINUED | OUTPATIENT
Start: 2017-06-28 | End: 2017-07-05

## 2017-06-28 RX ORDER — DOCUSATE SODIUM 100 MG
100 CAPSULE ORAL THREE TIMES A DAY
Qty: 0 | Refills: 0 | Status: DISCONTINUED | OUTPATIENT
Start: 2017-06-28 | End: 2017-06-29

## 2017-06-28 RX ORDER — SENNA PLUS 8.6 MG/1
2 TABLET ORAL AT BEDTIME
Qty: 0 | Refills: 0 | Status: DISCONTINUED | OUTPATIENT
Start: 2017-06-28 | End: 2017-06-29

## 2017-06-28 RX ORDER — CEFEPIME 1 G/1
500 INJECTION, POWDER, FOR SOLUTION INTRAMUSCULAR; INTRAVENOUS EVERY 24 HOURS
Qty: 0 | Refills: 0 | Status: COMPLETED | OUTPATIENT
Start: 2017-06-28 | End: 2017-07-05

## 2017-06-28 RX ADMIN — ALBUTEROL 2.5 MILLIGRAM(S): 90 AEROSOL, METERED ORAL at 02:55

## 2017-06-28 RX ADMIN — CEFEPIME 100 MILLIGRAM(S): 1 INJECTION, POWDER, FOR SOLUTION INTRAMUSCULAR; INTRAVENOUS at 07:50

## 2017-06-28 RX ADMIN — HEPARIN SODIUM 5000 UNIT(S): 5000 INJECTION INTRAVENOUS; SUBCUTANEOUS at 13:09

## 2017-06-28 RX ADMIN — Medication 81 MILLIGRAM(S): at 12:10

## 2017-06-28 RX ADMIN — Medication 4 MILLILITER(S): at 21:28

## 2017-06-28 RX ADMIN — HEPARIN SODIUM 5000 UNIT(S): 5000 INJECTION INTRAVENOUS; SUBCUTANEOUS at 05:22

## 2017-06-28 RX ADMIN — GABAPENTIN 300 MILLIGRAM(S): 400 CAPSULE ORAL at 22:02

## 2017-06-28 RX ADMIN — ATORVASTATIN CALCIUM 40 MILLIGRAM(S): 80 TABLET, FILM COATED ORAL at 22:02

## 2017-06-28 RX ADMIN — Medication 1 MILLIGRAM(S): at 12:10

## 2017-06-28 RX ADMIN — Medication 667 MILLIGRAM(S): at 17:21

## 2017-06-28 RX ADMIN — LIDOCAINE 2 PATCH: 4 CREAM TOPICAL at 23:56

## 2017-06-28 RX ADMIN — Medication 20 MILLIGRAM(S): at 14:18

## 2017-06-28 RX ADMIN — Medication 667 MILLIGRAM(S): at 07:50

## 2017-06-28 RX ADMIN — Medication 667 MILLIGRAM(S): at 12:10

## 2017-06-28 RX ADMIN — MIDODRINE HYDROCHLORIDE 5 MILLIGRAM(S): 2.5 TABLET ORAL at 17:21

## 2017-06-28 RX ADMIN — GABAPENTIN 300 MILLIGRAM(S): 400 CAPSULE ORAL at 13:09

## 2017-06-28 RX ADMIN — MIDODRINE HYDROCHLORIDE 5 MILLIGRAM(S): 2.5 TABLET ORAL at 05:22

## 2017-06-28 RX ADMIN — GABAPENTIN 300 MILLIGRAM(S): 400 CAPSULE ORAL at 05:22

## 2017-06-28 RX ADMIN — Medication 40 MILLIGRAM(S): at 17:21

## 2017-06-28 RX ADMIN — PREGABALIN 1000 MICROGRAM(S): 225 CAPSULE ORAL at 12:10

## 2017-06-28 RX ADMIN — Medication 4 MILLILITER(S): at 16:19

## 2017-06-28 RX ADMIN — ALBUTEROL 2.5 MILLIGRAM(S): 90 AEROSOL, METERED ORAL at 16:20

## 2017-06-28 RX ADMIN — Medication 40 MILLIGRAM(S): at 23:57

## 2017-06-28 RX ADMIN — Medication 40 MILLIGRAM(S): at 12:43

## 2017-06-28 RX ADMIN — AZITHROMYCIN 255 MILLIGRAM(S): 500 TABLET, FILM COATED ORAL at 12:43

## 2017-06-28 RX ADMIN — Medication 20 MILLIGRAM(S): at 05:22

## 2017-06-28 RX ADMIN — Medication 20 MILLIGRAM(S): at 22:02

## 2017-06-28 RX ADMIN — ALBUTEROL 2.5 MILLIGRAM(S): 90 AEROSOL, METERED ORAL at 21:28

## 2017-06-28 RX ADMIN — LIDOCAINE 2 PATCH: 4 CREAM TOPICAL at 12:11

## 2017-06-28 RX ADMIN — HEPARIN SODIUM 5000 UNIT(S): 5000 INJECTION INTRAVENOUS; SUBCUTANEOUS at 22:02

## 2017-06-28 NOTE — PROGRESS NOTE ADULT - SUBJECTIVE AND OBJECTIVE BOX
NEPHROLOGY INTERVAL HPI/OVERNIGHT EVENTS:  pt comfortable when seen earlier  no acute distress  still using supplemental O2  no cp, sob, n/v/d    MEDICATIONS  (STANDING):  heparin  Injectable 5000 Unit(s) SubCutaneous every 8 hours  aspirin enteric coated 81 milliGRAM(s) Oral daily  calcium acetate 667 milliGRAM(s) Oral three times a day with meals  acetylcysteine 20% Inhalation 4 milliLiter(s) Inhalation every 6 hours  ALBUTerol    0.083% 2.5 milliGRAM(s) Nebulizer every 6 hours  cyanocobalamin 1000 MICROGram(s) Oral daily  sildenafil (REVATIO) 20 milliGRAM(s) Oral three times a day  melatonin. 3 milliGRAM(s) Oral at bedtime  gabapentin 300 milliGRAM(s) Oral every 8 hours  epoetin jaswinder Injectable 25562 Unit(s) IV Push <User Schedule>  atorvastatin 40 milliGRAM(s) Oral at bedtime  folic acid 1 milliGRAM(s) Oral daily  lidocaine   Patch 2 Patch Transdermal daily  midodrine 5 milliGRAM(s) Oral two times a day  cefepime  IVPB 500 milliGRAM(s) IV Intermittent every 24 hours  furosemide    Tablet 20 milliGRAM(s) Oral daily    MEDICATIONS  (PRN):  acetaminophen   Tablet. 650 milliGRAM(s) Oral every 6 hours PRN Mild Pain (1 - 3)      Allergies    allopurinol (Rash)  codeine (Nausea; Vomiting)  penicillin (Rash)  spironolactone (Unknown)    Intolerances      Vital Signs Last 24 Hrs  T(C): 36.9 (28 Jun 2017 08:38), Max: 38.2 (27 Jun 2017 20:21)  T(F): 98.5 (28 Jun 2017 08:38), Max: 100.7 (27 Jun 2017 20:21)  HR: 79 (28 Jun 2017 08:38) (76 - 89)  BP: 89/52 (28 Jun 2017 08:38) (85/43 - 95/65)  BP(mean): --  RR: 20 (28 Jun 2017 08:38) (18 - 22)  SpO2: 90% (28 Jun 2017 08:38) (90% - 98%)    PHYSICAL EXAM:  GENERAL: NAD, weakness  HEAD:  No edema   NECK: Supple, No JVD  CHEST/LUNG: Diminished BS bilaterally  HEART: Regular rate and rhythm; No rub   ABDOMEN: Soft, Nontender, Nondistended; +BS  EXTREMITIES:  improved edema     LABS:                        9.3    6.9   )-----------( 162      ( 28 Jun 2017 09:11 )             30.3     06-28    136  |  94<L>  |  40.0<H>  ----------------------------<  78  5.2   |  25.0  |  3.61<H>    Ca    9.1      28 Jun 2017 09:11  Phos  5.9     06-27  Mg     2.2     06-27    TPro  6.7  /  Alb  3.6  /  TBili  1.4  /  DBili  x   /  AST  27  /  ALT  29  /  AlkPhos  523<H>  06-28            RADIOLOGY & ADDITIONAL TESTS:

## 2017-06-28 NOTE — PROGRESS NOTE ADULT - SUBJECTIVE AND OBJECTIVE BOX
HISTORY OF PRESENT ILLNESS  Ms. Crespo is a 76 year old female on ASA/Plavix at home, brought in by ambulance to [facility] on 6/14 with head wound and bruising after a mechanical fall from standing position. She reports tripping on the sidewalk while walking. Denies LOC. GCS = 15. CT head showed mild-moderate traumatic left posterior parietal and tempero-occipatal subarachnoid hemorrhage. Repeat CTH stable.  No neuro-surgical intervention.  C-spine CT showed no acute fracture, however notable for mild-moderate mulitlevel spondylosis. CT C/A/P showed nondisplaced right  4-6 ribs anterolateral rib fractures, with emphysematous changes and right pleural effusion and atelectasis of RML and RLL. Cardiology evalauted patient and plans to keep off Plavix but restart ASA when cleared by neurosurgery, restarted on 6/21. Hospital course notable for a rapid response on 6/19 for increasing shortness of breathe with hypoxia --> initiated on BiPAP and traansferred back to ICU for closer monitoring. Started on Cefepime for pneumonia and able to be downgraded to nasal cannula. Completed course of antibiotic therapy. Medically optimized and discharged to acute rehab on 6/27.  PMHx - PMHx: HTN, ESRD on HD, Atrial fibrillation (no full AC due to fall and bleeding risk), CAD, COPD (on home O2 overnight), Choleliathisis, Gout, Hyperlipidemia, Spinal stenosis    PSHx: Aortic valve replacement, Mitral valve repair, Tricupsid valve annuloplasty, Cataracts repair, H/O spinal fusion, Appendectomy, Tonsillectomy,       TODAY'S SUBJECTIVE & REVIEW OF SYMPTOMS  [X] Constitutional WNL     [X] Cardio WNL            [X] Resp WNL           [X] GI WNL                      [X]  WNL                 [X] Heme WNL              [X] Endo WNL                  [X] Skin WNL               [X] MSK WNL            [X] Neuro WNL                  [X] Cognitive WNL        [X] Psych WNL      VITALS  Vital Signs Last 24 Hrs  T(C): 36.9 (28 Jun 2017 08:38), Max: 38.2 (27 Jun 2017 20:21)  T(F): 98.5 (28 Jun 2017 08:38), Max: 100.7 (27 Jun 2017 20:21)  HR: 79 (28 Jun 2017 08:38) (76 - 89)  BP: 89/52 (28 Jun 2017 08:38) (85/43 - 95/65)  BP(mean): --  RR: 20 (28 Jun 2017 08:38) (18 - 22)  SpO2: 96% (28 Jun 2017 10:04) (90% - 98%)      PHYSICAL EXAM  Constitutional - NAD, Comfortable  HEENT - NCAT, EOMI  Neck - Supple, No limited ROM  Chest - CTA bilaterally, No wheeze, No rhonchi, No crackles  Cardiovascular - RRR, S1S2, No murmurs  Abdomen - BS+, Soft, NTND  Extremities - No C/C/E, No calf tenderness   Neurologic Exam -                    Cognitive - Awake, Alert, AAO to self, place, date, year, situation     Communication - Fluent, No dysarthria     Cranial Nerves - CN 2-12 intact     Motor - No focal deficits                    LEFT    UE - ShAB 5/5, EF 5/5, EE 5/5, WE 5/5,  5/5                    RIGHT UE - ShAB 5/5, EF 5/5, EE 5/5, WE 5/5,  5/5                    LEFT    LE - HF 5/5, KE 5/5, DF 5/5, PF 5/5                    RIGHT LE - HF 5/5, KE 5/5, DF 5/5, PF 5/5        Sensory - Intact to LT     Reflexes - DTR Intact, No primitive reflexive     Coordination - FTN intact     OculoVestibular - No saccades, No nystagmus, VOR         Balance - WNL Static  Psychiatric - Mood stable, Affect WNL  Skin -   Wounds -    FUNCTIONAL PROGRESS  Gait - EVAL  ADLs - EVAL   Transfers - EVAL  Functional transfer - EVAL    RECENT LABS                        9.3    6.9   )-----------( 162      ( 28 Jun 2017 09:11 )             30.3     06-28    136  |  94<L>  |  40.0<H>  ----------------------------<  78  5.2   |  25.0  |  3.61<H>    Ca    9.1      28 Jun 2017 09:11  Phos  5.9     06-27  Mg     2.2     06-27    TPro  6.7  /  Alb  3.6  /  TBili  1.4  /  DBili  x   /  AST  27  /  ALT  29  /  AlkPhos  523<H>  06-28            RADIOLOGY/OTHER RESULTS      CURRENT MEDICATIONS      ASSESSMENT & PLAN          GI/Bowel Management - Dulcolax PRN, Fleet PRN   Management - Toilet Q2  Skin - Turn Q2  Pain - Tylenol PRN  DVT PPX - TEDs, SCDs  Diet -     Continue comprehensive acute rehab program consisting of 3hrs/day of OT/PT and SLP. HISTORY OF PRESENT ILLNESS  76F was admitted on 6/14/17 after a mechanical fall while walking outside. She had head trauma but no LOC. In ER, GCS=15. A head CT showed left parietal and temporo-occipital SAH. A repeat head CT was stable. Additional workup showed nondisplaced fractures of the right 4-6 ribs with emphysematous changes, pleural effusion and atelectasis. Hospital course was complicated by shortness of breath/hypoxia and needed BiPAP/high-flow NC. She was started on Cefepime for PNA. Admitted to acute rehab on 6/27.    PMHx - HTN, ESRD on HD, AFIB (no full AC due to fall and bleeding risk), CAD, COPD (on home O2 overnight), Cholelithiasis, Gout, Hyperlipidemia, Spinal stenosis, s/p AVR/MVR/Tricupsid valve annuloplasty, Cataracts repair, H/O spinal fusion, Appendectomy, Tonsillectomy, C-Spine DJD      TODAY'S SUBJECTIVE & REVIEW OF SYMPTOMS  [X] Constitutional WNL     [X] Cardio WNL            [X] Resp WNL           [X] GI WNL                      [X]  WNL                 [X] Heme WNL              [X] Endo WNL                  [X] Skin WNL               [X] MSK WNL            [X] Neuro WNL                  [X] Cognitive WNL        [X] Psych WNL      VITALS  Vital Signs Last 24 Hrs  T(C): 36.9 (28 Jun 2017 08:38), Max: 38.2 (27 Jun 2017 20:21)  T(F): 98.5 (28 Jun 2017 08:38), Max: 100.7 (27 Jun 2017 20:21)  HR: 79 (28 Jun 2017 08:38) (76 - 89)  BP: 89/52 (28 Jun 2017 08:38) (85/43 - 95/65)  BP(mean): --  RR: 20 (28 Jun 2017 08:38) (18 - 22)  SpO2: 96% (28 Jun 2017 10:04) (90% - 98%)      PHYSICAL EXAM  Constitutional - NAD, Comfortable  HEENT - NCAT, EOMI  Neck - Supple, No limited ROM  Chest - CTA bilaterally, No wheeze, No rhonchi, No crackles  Cardiovascular - RRR, S1S2, No murmurs  Abdomen - BS+, Soft, NTND  Extremities - No C/C/E, No calf tenderness   Neurologic Exam -                    Cognitive - Awake, Alert, AAO to self, place, date, year, situation     Communication - Fluent, No dysarthria     Cranial Nerves - CN 2-12 intact     Motor - No focal deficits                    LEFT    UE - ShAB 5/5, EF 5/5, EE 5/5, WE 5/5,  5/5                    RIGHT UE - ShAB 5/5, EF 5/5, EE 5/5, WE 5/5,  5/5                    LEFT    LE - HF 5/5, KE 5/5, DF 5/5, PF 5/5                    RIGHT LE - HF 5/5, KE 5/5, DF 5/5, PF 5/5        Sensory - Intact to LT     Reflexes - DTR Intact, No primitive reflexive     Coordination - FTN intact     OculoVestibular - No saccades, No nystagmus, VOR         Balance - WNL Static  Psychiatric - Mood stable, Affect WNL  Skin -   Wounds -    FUNCTIONAL PROGRESS  Gait - EVAL  ADLs - EVAL   Transfers - EVAL  Functional transfer - EVAL    RECENT LABS                        9.3    6.9   )-----------( 162      ( 28 Jun 2017 09:11 )             30.3     06-28    136  |  94<L>  |  40.0<H>  ----------------------------<  78  5.2   |  25.0  |  3.61<H>    Ca    9.1      28 Jun 2017 09:11  Phos  5.9     06-27  Mg     2.2     06-27    TPro  6.7  /  Alb  3.6  /  TBili  1.4  /  DBili  x   /  AST  27  /  ALT  29  /  AlkPhos  523<H>  06-28            RADIOLOGY/OTHER RESULTS      CURRENT MEDICATIONS      ASSESSMENT & PLAN          GI/Bowel Management - Dulcolax PRN, Fleet PRN   Management - Toilet Q2  Skin - Turn Q2  Pain - Tylenol PRN  DVT PPX - TEDs, SCDs  Diet -     Continue comprehensive acute rehab program consisting of 3hrs/day of OT/PT and SLP. HISTORY OF PRESENT ILLNESS  76F was admitted on 6/14/17 after a mechanical fall while walking outside. She had head trauma but no LOC. In ER, GCS=15. A head CT showed left parietal and temporo-occipital SAH. A repeat head CT was stable. Additional workup showed nondisplaced fractures of the right 4-6 ribs with emphysematous changes, pleural effusion and atelectasis. Hospital course was complicated by shortness of breath/hypoxia and needed BiPAP/high-flow NC. She was started on Cefepime for PNA. Admitted to acute rehab on 6/27.    PMHx - HTN, ESRD on HD, AFIB (no full AC due to fall and bleeding risk), CAD, COPD (on home O2 overnight), Cholelithiasis, Gout, Hyperlipidemia, Spinal stenosis, s/p AVR/MVR/Tricupsid valve annuloplasty, Cataracts repair, H/O spinal fusion, Appendectomy, Tonsillectomy, C-Spine DJD    TODAY'S SUBJECTIVE & REVIEW OF SYMPTOMS  [ ] Constitutional WNL      [X] Cardio WNL            [ ] Resp WNL           [X] GI WNL                      [X]  WNL                [ ] Heme WNL              [X] Endo WNL                  [ ] Skin WNL               [ ] MSK WNL            [ ] Neuro WNL                  [ ] Cognitive WNL        [ ] Psych WNL    Patient had a temp overnight.   Requested IM consultation for her comorbidities.   Continues to have ongoing cough and lung exam is still significantly coarse.  Has increased swelling of the right hand with possibly gouty flare.   may need IV steroids.     VITALS  T(C): 36.9 (28 Jun 2017 08:38), Max: 38.2 (27 Jun 2017 20:21)  T(F): 98.5 (28 Jun 2017 08:38), Max: 100.7 (27 Jun 2017 20:21)  HR: 79 (28 Jun 2017 08:38) (76 - 89)  BP: 89/52 (28 Jun 2017 08:38) (85/43 - 95/65)  BP(mean): --  RR: 20 (28 Jun 2017 08:38) (18 - 22)  SpO2: 96% (28 Jun 2017 10:04) (90% - 98%)    PHYSICAL EXAM  Constitutional - NAD, Comfortable  HEENT - Facial ecchymosis, right supraorbital laceration  Neck - Supple, No limited ROM  Chest - +rhonchi, +crackles  Cardiovascular - S1S2  Abdomen - BS+, Soft, NTND  Extremities - No C/C, No calf tenderness, Right 2nd MCP swelling and pain to palpation  Neurologic Exam - Right field cut                    Cognitive - AAO to self, place, month/year, some of situation     Communication - Expressive deficits     Motor - No focal deficits     Sensory - Intact to LT     Coordination - FTN impaired  Psychiatric - Mood stable, Affect Flat  Skin - Multiple ecchymosis throughout the limbs     FUNCTIONAL PROGRESS  Gait - EVAL  ADLs - EVAL   Transfers - EVAL  Functional transfer - EVAL    RECENT LABS            9.3    6.9   )-----------( 162      ( 28 Jun 2017 09:11 )             30.3     06-28    136  |  94<L>  |  40.0<H>  ----------------------------<  78  5.2   |  25.0  |  3.61<H>    Ca    9.1      28 Jun 2017 09:11  Phos  5.9     06-27  Mg     2.2     06-27    TPro  6.7  /  Alb  3.6  /  TBili  1.4  /  DBili  x   /  AST  27  /  ALT  29  /  AlkPhos  523<H>  06-28    UA - Ordered    RADIOLOGY/OTHER RESULTS  ---    CURRENT MEDICATIONS  MEDICATIONS  (STANDING):  heparin  Injectable 5000 Unit(s) SubCutaneous every 8 hours  aspirin enteric coated 81 milliGRAM(s) Oral daily  calcium acetate 667 milliGRAM(s) Oral three times a day with meals  acetylcysteine 20% Inhalation 4 milliLiter(s) Inhalation every 6 hours  ALBUTerol    0.083% 2.5 milliGRAM(s) Nebulizer every 6 hours  cyanocobalamin 1000 MICROGram(s) Oral daily  sildenafil (REVATIO) 20 milliGRAM(s) Oral three times a day  melatonin. 3 milliGRAM(s) Oral at bedtime  gabapentin 300 milliGRAM(s) Oral every 8 hours  epoetin jaswinder Injectable 94358 Unit(s) IV Push <User Schedule>  atorvastatin 40 milliGRAM(s) Oral at bedtime  folic acid 1 milliGRAM(s) Oral daily  lidocaine   Patch 2 Patch Transdermal daily  midodrine 5 milliGRAM(s) Oral two times a day  cefepime  IVPB 500 milliGRAM(s) IV Intermittent every 24 hours  furosemide    Tablet 20 milliGRAM(s) Oral daily  methylPREDNISolone sodium succinate Injectable 40 milliGRAM(s) IV Push every 6 hours    MEDICATIONS  (PRN):  acetaminophen   Tablet. 650 milliGRAM(s) Oral every 6 hours PRN Mild Pain (1 - 3)    ASSESSMENT & PLAN  76F sustaining a TBI after a fall now with functional deficits  Cardiac/Pulm HTN - Lasix, Midodrine  PNA - Cefepime  Pulm - Mucomyst Q6, Albuterol Q6, O2  CAD - Lipitor, ASA  RF - Phoslo, Epogen, HD T/Th/Sat  Anemia - Vit B, Folate  Sleep - Melatonin 3mg  GI/Bowel Management - Toilet PRN   Management - Toilet Q2  Skin - Turn Q2  Pain - Neurontin, Lididerms to right ribs, Tylenol PRN  DVT PPX - TEDs, SCDs  Diet - Renal & Cardiac/Thins    Continue comprehensive acute rehab program consisting of 3hrs/day of OT/PT and SLP. HISTORY OF PRESENT ILLNESS  76F was admitted on 6/14/17 after a mechanical fall while walking outside. She had head trauma but no LOC. In ER, GCS=15. A head CT showed left parietal and temporo-occipital SAH. A repeat head CT was stable. Additional workup showed nondisplaced fractures of the right 4-6 ribs with emphysematous changes, pleural effusion and atelectasis. Hospital course was complicated by shortness of breath/hypoxia and needed BiPAP/high-flow NC. She was started on Cefepime for PNA. Admitted to acute rehab on 6/27.    PMHx - HTN, ESRD on HD, AFIB (no full AC due to fall and bleeding risk), CAD, COPD (on home O2 overnight), Cholelithiasis, Gout, Hyperlipidemia, Spinal stenosis, s/p AVR/MVR/Tricupsid valve annuloplasty, Cataracts repair, H/O spinal fusion, Appendectomy, Tonsillectomy, C-Spine DJD    TODAY'S SUBJECTIVE & REVIEW OF SYMPTOMS  [ ] Constitutional WNL      [X] Cardio WNL            [ ] Resp WNL           [X] GI WNL                      [X]  WNL                [ ] Heme WNL              [X] Endo WNL                  [ ] Skin WNL               [ ] MSK WNL            [ ] Neuro WNL                  [ ] Cognitive WNL        [ ] Psych WNL    Patient had a temp overnight.   Requested IM consultation for her comorbidities.   Continues to have ongoing cough and lung exam is still significantly coarse.  Has increased swelling of the right hand with possibly gouty flare.   May need IV steroids as gout medications may be precluded in setting of current medical state.     VITALS  T(C): 36.9 (28 Jun 2017 08:38), Max: 38.2 (27 Jun 2017 20:21)  T(F): 98.5 (28 Jun 2017 08:38), Max: 100.7 (27 Jun 2017 20:21)  HR: 79 (28 Jun 2017 08:38) (76 - 89)  BP: 89/52 (28 Jun 2017 08:38) (85/43 - 95/65)  BP(mean): --  RR: 20 (28 Jun 2017 08:38) (18 - 22)  SpO2: 96% (28 Jun 2017 10:04) (90% - 98%)    PHYSICAL EXAM  Constitutional - NAD, Comfortable  HEENT - Facial ecchymosis, right supraorbital laceration  Neck - Supple, No limited ROM  Chest - +rhonchi, +crackles  Cardiovascular - S1S2  Abdomen - BS+, Soft, NTND  Extremities - No C/C, No calf tenderness, Right 2nd MCP swelling and pain to palpation  Neurologic Exam - Right field cut                    Cognitive - AAO to self, place, month/year, some of situation     Communication - Expressive deficits     Motor - No focal deficits     Sensory - Intact to LT     Coordination - FTN impaired  Psychiatric - Mood stable, Affect Flat  Skin - Multiple ecchymosis throughout the limbs     FUNCTIONAL PROGRESS  Gait - EVAL  ADLs - EVAL   Transfers - EVAL  Functional transfer - EVAL    RECENT LABS            9.3    6.9   )-----------( 162      ( 28 Jun 2017 09:11 )             30.3     06-28    136  |  94<L>  |  40.0<H>  ----------------------------<  78  5.2   |  25.0  |  3.61<H>    Ca    9.1      28 Jun 2017 09:11  Phos  5.9     06-27  Mg     2.2     06-27    TPro  6.7  /  Alb  3.6  /  TBili  1.4  /  DBili  x   /  AST  27  /  ALT  29  /  AlkPhos  523<H>  06-28    UA - Ordered    RADIOLOGY/OTHER RESULTS  CXR 6/27 -  Unchanged moderate right pleural effusion with associated atelectasis or consolidation. Bilateral perihilar opacities, probably mild pulmonary edema.    Right hand XR 6/27 - No acute fracture or dislocation of the right hand.    CURRENT MEDICATIONS  MEDICATIONS  (STANDING):  heparin  Injectable 5000 Unit(s) SubCutaneous every 8 hours  aspirin enteric coated 81 milliGRAM(s) Oral daily  calcium acetate 667 milliGRAM(s) Oral three times a day with meals  acetylcysteine 20% Inhalation 4 milliLiter(s) Inhalation every 6 hours  ALBUTerol    0.083% 2.5 milliGRAM(s) Nebulizer every 6 hours  cyanocobalamin 1000 MICROGram(s) Oral daily  sildenafil (REVATIO) 20 milliGRAM(s) Oral three times a day  melatonin. 3 milliGRAM(s) Oral at bedtime  gabapentin 300 milliGRAM(s) Oral every 8 hours  epoetin jaswinder Injectable 26735 Unit(s) IV Push <User Schedule>  atorvastatin 40 milliGRAM(s) Oral at bedtime  folic acid 1 milliGRAM(s) Oral daily  lidocaine   Patch 2 Patch Transdermal daily  midodrine 5 milliGRAM(s) Oral two times a day  cefepime  IVPB 500 milliGRAM(s) IV Intermittent every 24 hours  furosemide    Tablet 20 milliGRAM(s) Oral daily  methylPREDNISolone sodium succinate Injectable 40 milliGRAM(s) IV Push every 6 hours    MEDICATIONS  (PRN):  acetaminophen   Tablet. 650 milliGRAM(s) Oral every 6 hours PRN Mild Pain (1 - 3)    ASSESSMENT & PLAN  76F sustaining a TBI after a fall now with functional deficits  Cardiac/Pulm HTN - Lasix, Midodrine  PNA - Cefepime  Pulm - Mucomyst Q6, Albuterol Q6, O2  CAD - Lipitor, ASA  RF - Phoslo, Epogen, HD T/Th/Sat  Anemia - Vit B, Folate  Sleep - Melatonin 3mg  GI/Bowel Management - Toilet PRN   Management - Toilet Q2  Skin - Turn Q2  Pain - Neurontin, Lididerms to right ribs, Tylenol PRN  DVT PPX - TEDs, SCDs  Diet - Renal & Cardiac/Thins    Continue comprehensive acute rehab program consisting of 3hrs/day of OT/PT and SLP. HISTORY OF PRESENT ILLNESS  76F was admitted on 6/14/17 after a mechanical fall while walking outside. She had head trauma but no LOC. In ER, GCS=15. A head CT showed left parietal and temporo-occipital SAH. A repeat head CT was stable. Additional workup showed nondisplaced fractures of the right 4-6 ribs with emphysematous changes, pleural effusion and atelectasis. Hospital course was complicated by shortness of breath/hypoxia and needed BiPAP/high-flow NC. She was started on Cefepime for PNA. Admitted to acute rehab on 6/27.    PMHx - HTN, ESRD on HD, AFIB (no full AC due to fall and bleeding risk), CAD, COPD (on home O2 overnight), Cholelithiasis, Gout, Hyperlipidemia, Spinal stenosis, s/p AVR/MVR/Tricupsid valve annuloplasty, Cataracts repair, H/O spinal fusion, Appendectomy, Tonsillectomy, C-Spine DJD    TODAY'S SUBJECTIVE & REVIEW OF SYMPTOMS  [ ] Constitutional WNL      [X] Cardio WNL            [ ] Resp WNL           [X] GI WNL                      [X]  WNL                [ ] Heme WNL              [X] Endo WNL                  [ ] Skin WNL               [ ] MSK WNL            [ ] Neuro WNL                  [ ] Cognitive WNL        [ ] Psych WNL    Patient had a temp overnight.   Requested IM consultation for her comorbidities.   Continues to have ongoing cough and lung exam is still significantly coarse.  Has increased swelling of the right hand with possibly gouty flare.   May need IV steroids as gout medications may be precluded in setting of current medical state.     VITALS  T(C): 36.9 (28 Jun 2017 08:38), Max: 38.2 (27 Jun 2017 20:21)  T(F): 98.5 (28 Jun 2017 08:38), Max: 100.7 (27 Jun 2017 20:21)  HR: 79 (28 Jun 2017 08:38) (76 - 89)  BP: 89/52 (28 Jun 2017 08:38) (85/43 - 95/65)  BP(mean): --  RR: 20 (28 Jun 2017 08:38) (18 - 22)  SpO2: 96% (28 Jun 2017 10:04) (90% - 98%)    PHYSICAL EXAM  Constitutional - NAD, Comfortable  HEENT - Facial ecchymosis, right supraorbital laceration  Neck - Supple, No limited ROM  Chest - +rhonchi, +crackles  Cardiovascular - S1S2  Abdomen - BS+, Soft, NTND  Extremities - No C/C, No calf tenderness, Right 2nd MCP swelling and pain to palpation  Neurologic Exam - Right field cut                    Cognitive - AAO to self, place, month/year, some of situation     Communication - Expressive deficits     Motor - No focal deficits     Sensory - Intact to LT     Coordination - FTN impaired  Psychiatric - Mood stable, Affect Flat  Skin - Multiple ecchymosis throughout the limbs     FUNCTIONAL PROGRESS  Gait - EVAL  ADLs - EVAL   Transfers - EVAL  Functional transfer - EVAL    RECENT LABS            9.3    6.9   )-----------( 162      ( 28 Jun 2017 09:11 )             30.3     06-28    136  |  94<L>  |  40.0<H>  ----------------------------<  78  5.2   |  25.0  |  3.61<H>    Ca    9.1      28 Jun 2017 09:11  Phos  5.9     06-27  Mg     2.2     06-27    TPro  6.7  /  Alb  3.6  /  TBili  1.4  /  DBili  x   /  AST  27  /  ALT  29  /  AlkPhos  523<H>  06-28    UA - Ordered    RADIOLOGY/OTHER RESULTS  CXR 6/27 -  Unchanged moderate right pleural effusion with associated atelectasis or consolidation. Bilateral perihilar opacities, probably mild pulmonary edema.    Right hand XR 6/27 - No acute fracture or dislocation of the right hand.    CURRENT MEDICATIONS  MEDICATIONS  (STANDING):  heparin  Injectable 5000 Unit(s) SubCutaneous every 8 hours  aspirin enteric coated 81 milliGRAM(s) Oral daily  calcium acetate 667 milliGRAM(s) Oral three times a day with meals  acetylcysteine 20% Inhalation 4 milliLiter(s) Inhalation every 6 hours  ALBUTerol    0.083% 2.5 milliGRAM(s) Nebulizer every 6 hours  cyanocobalamin 1000 MICROGram(s) Oral daily  sildenafil (REVATIO) 20 milliGRAM(s) Oral three times a day  melatonin. 3 milliGRAM(s) Oral at bedtime  gabapentin 300 milliGRAM(s) Oral every 8 hours  epoetin jaswinder Injectable 13282 Unit(s) IV Push <User Schedule>  atorvastatin 40 milliGRAM(s) Oral at bedtime  folic acid 1 milliGRAM(s) Oral daily  lidocaine   Patch 2 Patch Transdermal daily  midodrine 5 milliGRAM(s) Oral two times a day  cefepime  IVPB 500 milliGRAM(s) IV Intermittent every 24 hours  furosemide    Tablet 20 milliGRAM(s) Oral daily  methylPREDNISolone sodium succinate Injectable 40 milliGRAM(s) IV Push every 6 hours    MEDICATIONS  (PRN):  acetaminophen   Tablet. 650 milliGRAM(s) Oral every 6 hours PRN Mild Pain (1 - 3)    ASSESSMENT & PLAN  76F sustaining a TBI after a fall now with functional deficits  Cardiac/Pulm HTN - Lasix, Midodrine  PNA - Cefepime  Pulm - Mucomyst Q6, Albuterol Q6, O2, Solumedrol x3 doses (6/28)  CAD - Lipitor, ASA  RF - Phoslo, Epogen, HD T/Th/Sat  Anemia - Vit B, Folate  Sleep - Melatonin 3mg  GI/Bowel Management - Toilet PRN   Management - Toilet Q2  Skin - Turn Q2  Pain - Neurontin, Lidoderm to right ribs, Tylenol PRN  DVT PPX - TEDs, SCDs  Diet - Renal & Cardiac/Thins    Continue comprehensive acute rehab program consisting of 3hrs/day of OT/PT and SLP. HISTORY OF PRESENT ILLNESS  76F was admitted on 6/14/17 after a mechanical fall while walking outside. She had head trauma but no LOC. In ER, GCS=15. A head CT showed left parietal and temporo-occipital SAH. A repeat head CT was stable. Additional workup showed nondisplaced fractures of the right 4-6 ribs with emphysematous changes, pleural effusion and atelectasis. Hospital course was complicated by shortness of breath/hypoxia and needed BiPAP/high-flow NC. She was started on Cefepime for PNA. Admitted to acute rehab on 6/27.    PMHx - HTN, ESRD on HD, AFIB (no full AC due to fall and bleeding risk), CAD, COPD (on home O2 overnight), Cholelithiasis, Gout, Hyperlipidemia, Spinal stenosis, s/p AVR/MVR/Tricupsid valve annuloplasty, Cataracts repair, H/O spinal fusion, Appendectomy, Tonsillectomy, C-Spine DJD    TODAY'S SUBJECTIVE & REVIEW OF SYMPTOMS  [ ] Constitutional WNL      [X] Cardio WNL            [ ] Resp WNL           [X] GI WNL                      [X]  WNL                [ ] Heme WNL              [X] Endo WNL                  [ ] Skin WNL               [ ] MSK WNL            [ ] Neuro WNL                  [ ] Cognitive WNL        [ ] Psych WNL    Patient had a temp overnight.   Requested IM consultation for her comorbidities.   Continues to have ongoing cough and lung exam is still significantly coarse.  Has increased swelling of the right hand with possibly gouty flare.   May need IV steroids as gout medications may be precluded in setting of current medical state.     VITALS  T(C): 36.9 (28 Jun 2017 08:38), Max: 38.2 (27 Jun 2017 20:21)  T(F): 98.5 (28 Jun 2017 08:38), Max: 100.7 (27 Jun 2017 20:21)  HR: 79 (28 Jun 2017 08:38) (76 - 89)  BP: 89/52 (28 Jun 2017 08:38) (85/43 - 95/65)  BP(mean): --  RR: 20 (28 Jun 2017 08:38) (18 - 22)  SpO2: 96% (28 Jun 2017 10:04) (90% - 98%)    PHYSICAL EXAM  Constitutional - NAD, Comfortable  HEENT - Facial ecchymosis, right supraorbital laceration  Neck - Supple, No limited ROM  Chest - +rhonchi, +crackles  Cardiovascular - S1S2  Abdomen - BS+, Soft, NTND  Extremities - No C/C, No calf tenderness, Right 2nd MCP swelling and pain to palpation  Neurologic Exam - Right field cut                    Cognitive - AAO to self, place, month/year, some of situation     Communication - Expressive deficits     Motor - No focal deficits     Sensory - Intact to LT     Coordination - FTN impaired  Psychiatric - Mood stable, Affect Flat  Skin - Multiple ecchymosis throughout the limbs     FUNCTIONAL PROGRESS  Gait - EVAL  ADLs - EVAL   Transfers - EVAL  Functional transfer - EVAL    RECENT LABS            9.3    6.9   )-----------( 162      ( 28 Jun 2017 09:11 )             30.3     06-28    136  |  94<L>  |  40.0<H>  ----------------------------<  78  5.2   |  25.0  |  3.61<H>    Ca    9.1      28 Jun 2017 09:11  Phos  5.9     06-27  Mg     2.2     06-27    TPro  6.7  /  Alb  3.6  /  TBili  1.4  /  DBili  x   /  AST  27  /  ALT  29  /  AlkPhos  523<H>  06-28    UA - Ordered    RADIOLOGY/OTHER RESULTS  CXR 6/27 -  Unchanged moderate right pleural effusion with associated atelectasis or consolidation. Bilateral perihilar opacities, probably mild pulmonary edema.    Right hand XR 6/27 - No acute fracture or dislocation of the right hand.    CURRENT MEDICATIONS  MEDICATIONS  (STANDING):  heparin  Injectable 5000 Unit(s) SubCutaneous every 8 hours  aspirin enteric coated 81 milliGRAM(s) Oral daily  calcium acetate 667 milliGRAM(s) Oral three times a day with meals  acetylcysteine 20% Inhalation 4 milliLiter(s) Inhalation every 6 hours  ALBUTerol    0.083% 2.5 milliGRAM(s) Nebulizer every 6 hours  cyanocobalamin 1000 MICROGram(s) Oral daily  sildenafil (REVATIO) 20 milliGRAM(s) Oral three times a day  melatonin. 3 milliGRAM(s) Oral at bedtime  gabapentin 300 milliGRAM(s) Oral every 8 hours  epoetin jaswinder Injectable 36889 Unit(s) IV Push <User Schedule>  atorvastatin 40 milliGRAM(s) Oral at bedtime  folic acid 1 milliGRAM(s) Oral daily  lidocaine   Patch 2 Patch Transdermal daily  midodrine 5 milliGRAM(s) Oral two times a day  cefepime  IVPB 500 milliGRAM(s) IV Intermittent every 24 hours  furosemide    Tablet 20 milliGRAM(s) Oral daily  methylPREDNISolone sodium succinate Injectable 40 milliGRAM(s) IV Push every 6 hours    MEDICATIONS  (PRN):  acetaminophen   Tablet. 650 milliGRAM(s) Oral every 6 hours PRN Mild Pain (1 - 3)    ASSESSMENT & PLAN  76F sustaining a TBI after a fall now with functional deficits  Cardiac/Pulm HTN - Lasix, Midodrine  PNA - Cefepime, Zithromax (6/28-TBD)  Pulm - Mucomyst Q6, Albuterol Q6, O2, Solumedrol x3 doses (6/28)  CAD - Lipitor, ASA  RF - Phoslo, Epogen, HD T/Th/Sat  Anemia - Vit B, Folate  Sleep - Melatonin 3mg  GI/Bowel Management - Toilet PRN   Management - Toilet Q2  Skin - Turn Q2  Pain - Neurontin, Lidoderm to right ribs, Tylenol PRN  DVT PPX - TEDs, SCDs  Diet - Renal & Cardiac/Thins    Continue comprehensive acute rehab program consisting of 3hrs/day of OT/PT and SLP.

## 2017-06-28 NOTE — DIETITIAN INITIAL EVALUATION ADULT. - DIET TYPE
DASH/TLC (sodium and cholesterol restricted diet)/renal replacement pts:no protein restr,no conc K & phos, low sodium

## 2017-06-28 NOTE — PROGRESS NOTE ADULT - ASSESSMENT
ESRD - HD tomorrow  - cont Midodrine    s/p traumatic SAH - No expansion on follow up CT  - no heparin at HD    Anemia - Continue epogen TIW  Iron stores intact 6-23  + folate   s/p 2 units PRBCs with last HD   - monitor H/H    RO - monitor off binders for now

## 2017-06-28 NOTE — DIETITIAN INITIAL EVALUATION ADULT. - OTHER INFO
Pt s/p fall, SAH + fractures. ESRD on HD. Pt on renal, dash diet, intake poor thus far. Pt needs full feeding assistance. Amenable to trying Janak RUIZ notified.

## 2017-06-28 NOTE — CONSULT NOTE ADULT - PROBLEM SELECTOR RECOMMENDATION 9
continue iv antibiotics, CXR reviewed   continue mucomyst and nebulizer for congestion   chest PT and incentive spirometry  check procalcitonin , blood cx if she spikes, sputum cx gram  stain

## 2017-06-28 NOTE — DIETITIAN INITIAL EVALUATION ADULT. - ORAL INTAKE PTA
Strictly follows renal diet at home, very supportive family. Pt takes protein bars and boost at home for many meals/fair

## 2017-06-28 NOTE — CHART NOTE - NSCHARTNOTEFT_GEN_A_CORE
Upon Nutritional Assessment by the Registered Dietitian your patient was determined to meet criteria / has evidence of the following diagnosis/diagnoses:          [ ]  Mild Protein Calorie Malnutrition        [ ]  Moderate Protein Calorie Malnutrition        [ X ] Severe Protein Calorie Malnutrition        [ ] Unspecified Protein Calorie Malnutrition        [ ] Underweight / BMI <19        [ ] Morbid Obesity / BMI > 40      Findings as based on:  •  Comprehensive nutrition assessment and consultation  •  Calorie counts (nutrient intake analysis)  •  Food acceptance and intake status from observations by staff  •  Follow up  •  Patient education  •  Intervention secondary to interdisciplinary rounds  •   concerns      Treatment:    The following diet has been recommended:  Continue renal, dash diet  Rx: NEPRO TID + NEPHRO-DYLLAN/daily      PROVIDER Section:     By signing this assessment you are acknowledging and agree with the diagnosis/diagnoses assigned by the Registered Dietitian    Comments:

## 2017-06-29 DIAGNOSIS — D64.9 ANEMIA, UNSPECIFIED: ICD-10-CM

## 2017-06-29 LAB
ANION GAP SERPL CALC-SCNC: 18 MMOL/L — HIGH (ref 5–17)
BUN SERPL-MCNC: 62 MG/DL — HIGH (ref 8–20)
CALCIUM SERPL-MCNC: 9.2 MG/DL — SIGNIFICANT CHANGE UP (ref 8.6–10.2)
CHLORIDE SERPL-SCNC: 93 MMOL/L — LOW (ref 98–107)
CO2 SERPL-SCNC: 24 MMOL/L — SIGNIFICANT CHANGE UP (ref 22–29)
CREAT SERPL-MCNC: 4.85 MG/DL — HIGH (ref 0.5–1.3)
GLUCOSE SERPL-MCNC: 188 MG/DL — HIGH (ref 70–115)
NT-PROBNP SERPL-SCNC: HIGH PG/ML (ref 0–300)
POTASSIUM SERPL-MCNC: 5.8 MMOL/L — HIGH (ref 3.5–5.3)
POTASSIUM SERPL-SCNC: 5.8 MMOL/L — HIGH (ref 3.5–5.3)
SODIUM SERPL-SCNC: 135 MMOL/L — SIGNIFICANT CHANGE UP (ref 135–145)

## 2017-06-29 PROCEDURE — 99233 SBSQ HOSP IP/OBS HIGH 50: CPT

## 2017-06-29 PROCEDURE — 99232 SBSQ HOSP IP/OBS MODERATE 35: CPT | Mod: GC

## 2017-06-29 RX ORDER — SOD,AMMONIUM,POTASSIUM LACTATE
1 CREAM (GRAM) TOPICAL DAILY
Qty: 0 | Refills: 0 | Status: DISCONTINUED | OUTPATIENT
Start: 2017-06-29 | End: 2017-07-26

## 2017-06-29 RX ORDER — IPRATROPIUM/ALBUTEROL SULFATE 18-103MCG
3 AEROSOL WITH ADAPTER (GRAM) INHALATION EVERY 6 HOURS
Qty: 0 | Refills: 0 | Status: DISCONTINUED | OUTPATIENT
Start: 2017-06-29 | End: 2017-07-26

## 2017-06-29 RX ADMIN — Medication 40 MILLIGRAM(S): at 13:48

## 2017-06-29 RX ADMIN — Medication 1 TABLET(S): at 12:09

## 2017-06-29 RX ADMIN — LIDOCAINE 2 PATCH: 4 CREAM TOPICAL at 12:09

## 2017-06-29 RX ADMIN — GABAPENTIN 300 MILLIGRAM(S): 400 CAPSULE ORAL at 06:08

## 2017-06-29 RX ADMIN — ERYTHROPOIETIN 10000 UNIT(S): 10000 INJECTION, SOLUTION INTRAVENOUS; SUBCUTANEOUS at 16:40

## 2017-06-29 RX ADMIN — Medication 20 MILLIGRAM(S): at 21:49

## 2017-06-29 RX ADMIN — Medication 1 MILLIGRAM(S): at 12:09

## 2017-06-29 RX ADMIN — Medication 4 MILLILITER(S): at 03:27

## 2017-06-29 RX ADMIN — Medication 667 MILLIGRAM(S): at 12:09

## 2017-06-29 RX ADMIN — MIDODRINE HYDROCHLORIDE 5 MILLIGRAM(S): 2.5 TABLET ORAL at 06:08

## 2017-06-29 RX ADMIN — Medication 4 MILLILITER(S): at 20:56

## 2017-06-29 RX ADMIN — GABAPENTIN 300 MILLIGRAM(S): 400 CAPSULE ORAL at 21:49

## 2017-06-29 RX ADMIN — ALBUTEROL 2.5 MILLIGRAM(S): 90 AEROSOL, METERED ORAL at 03:27

## 2017-06-29 RX ADMIN — MIDODRINE HYDROCHLORIDE 5 MILLIGRAM(S): 2.5 TABLET ORAL at 16:40

## 2017-06-29 RX ADMIN — Medication 4 MILLILITER(S): at 15:30

## 2017-06-29 RX ADMIN — GABAPENTIN 300 MILLIGRAM(S): 400 CAPSULE ORAL at 14:20

## 2017-06-29 RX ADMIN — PREGABALIN 1000 MICROGRAM(S): 225 CAPSULE ORAL at 12:09

## 2017-06-29 RX ADMIN — Medication 1 APPLICATION(S): at 13:45

## 2017-06-29 RX ADMIN — Medication 3 MILLILITER(S): at 15:30

## 2017-06-29 RX ADMIN — ALBUTEROL 2.5 MILLIGRAM(S): 90 AEROSOL, METERED ORAL at 09:21

## 2017-06-29 RX ADMIN — Medication 4 MILLILITER(S): at 09:21

## 2017-06-29 RX ADMIN — Medication 81 MILLIGRAM(S): at 12:09

## 2017-06-29 RX ADMIN — ATORVASTATIN CALCIUM 40 MILLIGRAM(S): 80 TABLET, FILM COATED ORAL at 21:49

## 2017-06-29 RX ADMIN — CEFEPIME 100 MILLIGRAM(S): 1 INJECTION, POWDER, FOR SOLUTION INTRAMUSCULAR; INTRAVENOUS at 06:30

## 2017-06-29 RX ADMIN — Medication 667 MILLIGRAM(S): at 08:27

## 2017-06-29 RX ADMIN — HEPARIN SODIUM 5000 UNIT(S): 5000 INJECTION INTRAVENOUS; SUBCUTANEOUS at 06:08

## 2017-06-29 RX ADMIN — Medication 20 MILLIGRAM(S): at 06:08

## 2017-06-29 RX ADMIN — Medication 20 MILLIGRAM(S): at 14:20

## 2017-06-29 RX ADMIN — Medication 3 MILLILITER(S): at 20:57

## 2017-06-29 RX ADMIN — AZITHROMYCIN 255 MILLIGRAM(S): 500 TABLET, FILM COATED ORAL at 13:46

## 2017-06-29 RX ADMIN — HEPARIN SODIUM 5000 UNIT(S): 5000 INJECTION INTRAVENOUS; SUBCUTANEOUS at 14:20

## 2017-06-29 RX ADMIN — HEPARIN SODIUM 5000 UNIT(S): 5000 INJECTION INTRAVENOUS; SUBCUTANEOUS at 21:48

## 2017-06-29 NOTE — PROGRESS NOTE ADULT - SUBJECTIVE AND OBJECTIVE BOX
HISTORY OF PRESENT ILLNESS  76F was admitted on 6/14/17 after a mechanical fall while walking outside. She had head trauma but no LOC. In ER, GCS=15. A head CT showed left parietal and temporo-occipital SAH. A repeat head CT was stable. Additional workup showed nondisplaced fractures of the right 4-6 ribs with emphysematous changes, pleural effusion and atelectasis. Hospital course was complicated by shortness of breath/hypoxia and needed BiPAP/high-flow NC. She was started on Cefepime for PNA. Admitted to acute rehab on 6/27.    PMHx - HTN, ESRD on HD, AFIB (no full AC due to fall and bleeding risk), CAD, COPD (on home O2 overnight), Cholelithiasis, Gout, Hyperlipidemia, Spinal stenosis, s/p AVR/MVR/Tricupsid valve annuloplasty, Cataracts repair, H/O spinal fusion, Appendectomy, Tonsillectomy, C-Spine DJD    TODAY'S SUBJECTIVE & REVIEW OF SYMPTOMS  [ ] Constitutional WNL      [X] Cardio WNL            [ ] Resp WNL           [X] GI WNL                      [X]  WNL                [ ] Heme WNL              [X] Endo WNL                  [ ] Skin WNL               [ ] MSK WNL            [ ] Neuro WNL                  [ ] Cognitive WNL        [ ] Psych WNL    Patient seen and examined.   Continues to have wet cough with inability to cough out sputum.  Pulse ox. remains stable in the low 90's with the use of ventimask.  Will ask patient's pulmonologist to evaluate for further recommendations.  Per medicine, continue another day of IV steroids.  Still on IV antibiotics.   DC Lasix as not meeting BP parameters to give.  Right MCP swelling improved but with trace erythema.  For HD today per nephrology recommendations  Will add Lac-hydrin as well as z-floats to B/L LE.    VITALS  T(C): 36.2 (06-29-17 @ 05:34)  T(F): 97.1 (06-29-17 @ 05:34), Max: 98.7 (06-28-17 @ 20:04)  HR: 70 (06-29-17 @ 05:34) (70 - 83)  BP: 89/46 (06-29-17 @ 05:34) (88/49 - 94/53)  RR:  (18 - 20)  SpO2:  (91% - 99%)  Wt(kg): --    PHYSICAL EXAM  Constitutional - NAD, Comfortable  HEENT - Facial ecchymosis, right supraorbital laceration  Neck - Supple, No limited ROM  Chest - +Diffuse rhonchi, +Diffuse crackles  Cardiovascular - S1S2  Abdomen - BS+, Soft, NTND  Extremities - No C/C, No calf tenderness, Right 2nd MCP swelling and pain to palpation - Improving  Neurologic Exam - Right field cut                    Cognitive - AAO to self, place, month/year, some of situation     Communication - Expressive deficits     Motor - No focal deficits     Sensory - Intact to LT     Coordination - FTN impaired  Psychiatric - Mood stable, Affect Flat  Skin - Multiple ecchymosis throughout the limbs     FUNCTIONAL PROGRESS  Gait - Unsafe at this time  ADLs - total A  Transfers - sit-to-stand mod A x 2  Functional transfer - Bed transfer mod A x 2    RECENT LABS            9.3    6.9   )-----------( 162      ( 28 Jun 2017 09:11 )             30.3     135  |  93     |  62.0  ----------------------------<  188     ( 29 June 2017 )  5.8   |  24.0  |  4.85    Ca    9.2      29 Jun 2017 08:00    Prealb 6/28 - 16    Pro-BNP 6/29 - >05367    UA - Ordered    RADIOLOGY/OTHER RESULTS  CXR 6/27 -  Unchanged moderate right pleural effusion with associated atelectasis or consolidation. Bilateral perihilar opacities, probably mild pulmonary edema.    Right hand XR 6/27 - No acute fracture or dislocation of the right hand.    CURRENT MEDICATIONS  MEDICATIONS  (STANDING):  heparin  Injectable 5000 Unit(s) SubCutaneous every 8 hours  aspirin enteric coated 81 milliGRAM(s) Oral daily  calcium acetate 667 milliGRAM(s) Oral three times a day with meals  acetylcysteine 20% Inhalation 4 milliLiter(s) Inhalation every 6 hours  cyanocobalamin 1000 MICROGram(s) Oral daily  sildenafil (REVATIO) 20 milliGRAM(s) Oral three times a day  gabapentin 300 milliGRAM(s) Oral every 8 hours  epoetin jaswinder Injectable 13404 Unit(s) IV Push <User Schedule>  atorvastatin 40 milliGRAM(s) Oral at bedtime  folic acid 1 milliGRAM(s) Oral daily  lidocaine   Patch 2 Patch Transdermal daily  midodrine 5 milliGRAM(s) Oral two times a day  cefepime  IVPB 500 milliGRAM(s) IV Intermittent every 24 hours  azithromycin  IVPB 500 milliGRAM(s) IV Intermittent every 24 hours  Nephro-toi 1 Tablet(s) Oral daily  ammonium lactate 12% Lotion 1 Application(s) Topical daily  ALBUTerol/ipratropium for Nebulization 3 milliLiter(s) Nebulizer every 6 hours  methylPREDNISolone sodium succinate Injectable 40 milliGRAM(s) IV Push once    MEDICATIONS  (PRN):  acetaminophen   Tablet. 650 milliGRAM(s) Oral every 6 hours PRN Mild Pain (1 - 3)    ASSESSMENT & PLAN  76F sustaining a TBI after a fall now with functional deficits  Cardiac/Pulm HTN - Sildenafil, Midodrine, DC Lasix (6/29)  PNA - Cefepime, Zithromax (6/28-TBD)  Pulm - Mucomyst Q6, Change Albuterol to Duoneb (6/29), Supplemental O2, Solumedrol x4 doses (6/28-6/29)  CAD - Lipitor, ASA  ESRD on HD - Phoslo, HD T/Th/Sat  Anemia - Epogen, Vit B, Folate  Sleep - Melatonin 3mg  GI/Bowel Management - Toilet PRN   Management - Toilet Q2  Skin - Turn Q2  Pain - Neurontin, Lidoderm to right ribs, Tylenol PRN  DVT PPX - Heparin, TEDs, SCDs  Diet - Renal & Cardiac/Thins    Continue comprehensive acute rehab program consisting of 3hrs/day of OT/PT and SLP. HISTORY OF PRESENT ILLNESS  76F was admitted on 6/14/17 after a mechanical fall while walking outside. She had head trauma but no LOC. In ER, GCS=15. A head CT showed left parietal and temporo-occipital SAH. A repeat head CT was stable. Additional workup showed nondisplaced fractures of the right 4-6 ribs with emphysematous changes, pleural effusion and atelectasis. Hospital course was complicated by shortness of breath/hypoxia and needed BiPAP/high-flow NC. She was started on Cefepime for PNA. Admitted to acute rehab on 6/27.    PMHx - HTN, ESRD on HD, AFIB (no full AC due to fall and bleeding risk), CAD, COPD (on home O2 overnight), Cholelithiasis, Gout, Hyperlipidemia, Spinal stenosis, s/p AVR/MVR/Tricupsid valve annuloplasty, Cataracts repair, H/O spinal fusion, Appendectomy, Tonsillectomy, C-Spine DJD    TODAY'S SUBJECTIVE & REVIEW OF SYMPTOMS  [ ] Constitutional WNL      [X] Cardio WNL            [ ] Resp WNL           [X] GI WNL                      [X]  WNL                [ ] Heme WNL              [X] Endo WNL                  [ ] Skin WNL               [ ] MSK WNL            [ ] Neuro WNL                  [ ] Cognitive WNL        [ ] Psych WNL    Patient seen and examined.   Continues to have wet cough with inability to cough out sputum.  Pulse ox. remains stable in the low 90's with the use of ventimask.  Will ask patient's pulmonologist to evaluate for further recommendations.  Per medicine, continue another day of IV steroids.  Still on IV antibiotics.   DC Lasix as not meeting BP parameters to give.  Right MCP swelling improved but with trace erythema.  For HD today per nephrology recommendations  Will add Lac-hydrin as well as z-floats to B/L LE.    VITALS  T(C): 36.2 (06-29-17 @ 05:34)  T(F): 97.1 (06-29-17 @ 05:34), Max: 98.7 (06-28-17 @ 20:04)  HR: 70 (06-29-17 @ 05:34) (70 - 83)  BP: 89/46 (06-29-17 @ 05:34) (88/49 - 94/53)  RR:  (18 - 20)  SpO2:  (91% - 99%)  Wt(kg): --    PHYSICAL EXAM  Constitutional - NAD, Comfortable  HEENT - Facial ecchymosis, right supraorbital laceration  Neck - Supple, No limited ROM  Chest - +Diffuse rhonchi, +Diffuse crackles  Cardiovascular - S1S2  Abdomen - BS+, Soft, NTND  Extremities - No C/C, No calf tenderness, Right 2nd MCP swelling and pain to palpation - Improving  Neurologic Exam - Right field cut                    Cognitive - AAO to self, place, month/year, some of situation     Communication - Expressive deficits     Motor - No focal deficits     Sensory - Intact to LT     Coordination - FTN impaired  Psychiatric - Mood stable, Affect Flat  Skin - Multiple ecchymosis throughout the limbs     FUNCTIONAL PROGRESS  Gait - Unsafe at this time  ADLs - total A  Transfers - sit-to-stand mod A x 2  Functional transfer - Bed transfer mod A x 2    RECENT LABS            9.3    6.9   )-----------( 162      ( 28 Jun 2017 09:11 )             30.3     135  |  93     |  62.0  ----------------------------<  188     ( 29 June 2017 )  5.8   |  24.0  |  4.85    Ca    9.2      29 Jun 2017 08:00    Prealb 6/28 - 16    Pro-BNP 6/29 - >87678    UA - Ordered    RADIOLOGY/OTHER RESULTS  CXR 6/27 -  Unchanged moderate right pleural effusion with associated atelectasis or consolidation. Bilateral perihilar opacities, probably mild pulmonary edema.    Right hand XR 6/27 - No acute fracture or dislocation of the right hand.    CURRENT MEDICATIONS  MEDICATIONS  (STANDING):  heparin  Injectable 5000 Unit(s) SubCutaneous every 8 hours  aspirin enteric coated 81 milliGRAM(s) Oral daily  calcium acetate 667 milliGRAM(s) Oral three times a day with meals  acetylcysteine 20% Inhalation 4 milliLiter(s) Inhalation every 6 hours  cyanocobalamin 1000 MICROGram(s) Oral daily  sildenafil (REVATIO) 20 milliGRAM(s) Oral three times a day  gabapentin 300 milliGRAM(s) Oral every 8 hours  epoetin jaswinder Injectable 06965 Unit(s) IV Push <User Schedule>  atorvastatin 40 milliGRAM(s) Oral at bedtime  folic acid 1 milliGRAM(s) Oral daily  lidocaine   Patch 2 Patch Transdermal daily  midodrine 5 milliGRAM(s) Oral two times a day  cefepime  IVPB 500 milliGRAM(s) IV Intermittent every 24 hours  azithromycin  IVPB 500 milliGRAM(s) IV Intermittent every 24 hours  Nephro-toi 1 Tablet(s) Oral daily  ammonium lactate 12% Lotion 1 Application(s) Topical daily  ALBUTerol/ipratropium for Nebulization 3 milliLiter(s) Nebulizer every 6 hours  methylPREDNISolone sodium succinate Injectable 40 milliGRAM(s) IV Push once    MEDICATIONS  (PRN):  acetaminophen   Tablet. 650 milliGRAM(s) Oral every 6 hours PRN Mild Pain (1 - 3)    ASSESSMENT & PLAN  76F sustaining a TBI after a fall now with functional deficits  Cardiac/Pulm HTN - Sildenafil, Midodrine, DC Lasix (6/29)  PNA - Cefepime (6/19-TBD), Zithromax (6/28-TBD)  Pulm - Mucomyst Q6, Change Albuterol to Duoneb (6/29), Supplemental O2, Solumedrol x4 doses (6/28-6/29)  CAD - Lipitor, ASA  ESRD on HD - Phoslo, HD T/Th/Sat  Anemia - Epogen, Vit B, Folate  Sleep - Melatonin 3mg  GI/Bowel Management - Toilet PRN   Management - Toilet Q2  Skin - Turn Q2, Lac-hydrin BLE  Pain - Neurontin, Lidoderm to right ribs, Tylenol PRN  DVT PPX - Heparin, TEDs, SCDs  Diet - Renal & Cardiac/Thins, Nephro-toi, Nepro TID    Continue comprehensive acute rehab program consisting of 3hrs/day of OT/PT and SLP. HISTORY OF PRESENT ILLNESS  76F was admitted on 6/14/17 after a mechanical fall while walking outside. She had head trauma but no LOC. In ER, GCS=15. A head CT showed left parietal and temporo-occipital SAH. A repeat head CT was stable. Additional workup showed nondisplaced fractures of the right 4-6 ribs with emphysematous changes, pleural effusion and atelectasis. Hospital course was complicated by shortness of breath/hypoxia and needed BiPAP/high-flow NC. She was started on Cefepime for PNA. Admitted to acute rehab on 6/27.    PMHx - HTN, ESRD on HD, AFIB (no full AC due to fall and bleeding risk), CAD, COPD (on home O2 overnight), Cholelithiasis, Gout, Hyperlipidemia, Spinal stenosis, s/p AVR/MVR/Tricupsid valve annuloplasty, Cataracts repair, H/O spinal fusion, Appendectomy, Tonsillectomy, C-Spine DJD    TODAY'S SUBJECTIVE & REVIEW OF SYMPTOMS  [ ] Constitutional WNL      [X] Cardio WNL            [ ] Resp WNL           [X] GI WNL                      [X]  WNL                [ ] Heme WNL              [X] Endo WNL                  [ ] Skin WNL               [ ] MSK WNL            [ ] Neuro WNL                  [ ] Cognitive WNL        [ ] Psych WNL    Patient seen and examined.   Continues to have wet cough with inability to cough out sputum.  Pulse ox. remains stable in the low 90's with the use of ventimask.  Will ask patient's pulmonologist to evaluate for further recommendations.  Per medicine, continue another day of IV steroids.  Still on IV antibiotics. Add Mucinex BID.  DC Lasix as not meeting BP parameters to give.  Right MCP swelling improved but with trace erythema.  For HD today per nephrology recommendations  Will add Lac-hydrin as well as z-floats to B/L LE.    VITALS  T(C): 36.2 (06-29-17 @ 05:34)  T(F): 97.1 (06-29-17 @ 05:34), Max: 98.7 (06-28-17 @ 20:04)  HR: 70 (06-29-17 @ 05:34) (70 - 83)  BP: 89/46 (06-29-17 @ 05:34) (88/49 - 94/53)  RR:  (18 - 20)  SpO2:  (91% - 99%)  Wt(kg): --    PHYSICAL EXAM  Constitutional - NAD, Comfortable  HEENT - Facial ecchymosis, right supraorbital laceration  Neck - Supple, No limited ROM  Chest - +Diffuse rhonchi, +Diffuse crackles  Cardiovascular - S1S2  Abdomen - BS+, Soft, NTND  Extremities - No C/C, No calf tenderness, Right 2nd MCP swelling and pain to palpation - Improving  Neurologic Exam - Right field cut                    Cognitive - AAO to self, place, month/year, some of situation     Communication - Expressive deficits     Motor - No focal deficits     Sensory - Intact to LT     Coordination - FTN impaired  Psychiatric - Mood stable, Affect Flat  Skin - Multiple ecchymosis throughout the limbs     FUNCTIONAL PROGRESS  Gait - Unsafe at this time  ADLs - total A  Transfers - sit-to-stand mod A x 2  Functional transfer - Bed transfer mod A x 2    RECENT LABS            9.3    6.9   )-----------( 162      ( 28 Jun 2017 09:11 )             30.3     135  |  93     |  62.0  ----------------------------<  188     ( 29 June 2017 )  5.8   |  24.0  |  4.85    Ca    9.2      29 Jun 2017 08:00    Prealb 6/28 - 16    Pro-BNP 6/29 - >39633    UA - Ordered    RADIOLOGY/OTHER RESULTS  CXR 6/27 -  Unchanged moderate right pleural effusion with associated atelectasis or consolidation. Bilateral perihilar opacities, probably mild pulmonary edema.    Right hand XR 6/27 - No acute fracture or dislocation of the right hand.    CURRENT MEDICATIONS  MEDICATIONS  (STANDING):  heparin  Injectable 5000 Unit(s) SubCutaneous every 8 hours  aspirin enteric coated 81 milliGRAM(s) Oral daily  calcium acetate 667 milliGRAM(s) Oral three times a day with meals  acetylcysteine 20% Inhalation 4 milliLiter(s) Inhalation every 6 hours  cyanocobalamin 1000 MICROGram(s) Oral daily  sildenafil (REVATIO) 20 milliGRAM(s) Oral three times a day  gabapentin 300 milliGRAM(s) Oral every 8 hours  epoetin jaswinder Injectable 10998 Unit(s) IV Push <User Schedule>  atorvastatin 40 milliGRAM(s) Oral at bedtime  folic acid 1 milliGRAM(s) Oral daily  lidocaine   Patch 2 Patch Transdermal daily  midodrine 5 milliGRAM(s) Oral two times a day  cefepime  IVPB 500 milliGRAM(s) IV Intermittent every 24 hours  azithromycin  IVPB 500 milliGRAM(s) IV Intermittent every 24 hours  Nephro-toi 1 Tablet(s) Oral daily  ammonium lactate 12% Lotion 1 Application(s) Topical daily  ALBUTerol/ipratropium for Nebulization 3 milliLiter(s) Nebulizer every 6 hours  guaiFENesin  milliGRAM(s) Oral every 12 hours  methylPREDNISolone sodium succinate Injectable 40 milliGRAM(s) IV Push once    MEDICATIONS  (PRN):  acetaminophen   Tablet. 650 milliGRAM(s) Oral every 6 hours PRN Mild Pain (1 - 3)    ASSESSMENT & PLAN  76F sustaining a TBI after a fall now with functional deficits  Cardiac/Pulm HTN - Sildenafil, Midodrine, DC Lasix (6/29)  PNA - Cefepime (6/19-TBD), Zithromax (6/28-TBD)  Pulm - Mucomyst Q6, Change Albuterol to Duoneb (6/29), Supplemental O2, Solumedrol x4 doses (6/28-6/29), Guaifenesin BID  CAD - Lipitor, ASA  ESRD on HD - Phoslo, HD T/Th/Sat  Anemia - Epogen, Vit B, Folate  Sleep - Melatonin 3mg  GI/Bowel Management - Toilet PRN   Management - Toilet Q2  Skin - Turn Q2, Lac-hydrin BLE  Pain - Neurontin, Lidoderm to right ribs, Tylenol PRN  DVT PPX - Heparin, TEDs, SCDs  Diet - Renal & Cardiac/Thins, Nephro-toi, Nepro TID    Continue comprehensive acute rehab program consisting of 3hrs/day of OT/PT and SLP.

## 2017-06-29 NOTE — PROGRESS NOTE ADULT - ASSESSMENT
77 yo female with h/o Atrial fibrillation off anticoagulation due to fall risk and bleeding risk , ESRD on HD , admitted s/p fall at home with left SAH , brain injury and developed respiratory distress being treated for pneumonia  , now in rehab . Continues to have chest congestion, weak cough, BP remains low

## 2017-06-29 NOTE — PROGRESS NOTE ADULT - SUBJECTIVE AND OBJECTIVE BOX
NEPHROLOGY INTERVAL HPI/OVERNIGHT EVENTS:    seen earlier   Feels better   SOB slowly better       MEDICATIONS  (STANDING):  heparin  Injectable 5000 Unit(s) SubCutaneous every 8 hours  aspirin enteric coated 81 milliGRAM(s) Oral daily  calcium acetate 667 milliGRAM(s) Oral three times a day with meals  acetylcysteine 20% Inhalation 4 milliLiter(s) Inhalation every 6 hours  ALBUTerol    0.083% 2.5 milliGRAM(s) Nebulizer every 6 hours  cyanocobalamin 1000 MICROGram(s) Oral daily  sildenafil (REVATIO) 20 milliGRAM(s) Oral three times a day  gabapentin 300 milliGRAM(s) Oral every 8 hours  epoetin jaswinder Injectable 66715 Unit(s) IV Push <User Schedule>  atorvastatin 40 milliGRAM(s) Oral at bedtime  folic acid 1 milliGRAM(s) Oral daily  lidocaine   Patch 2 Patch Transdermal daily  midodrine 5 milliGRAM(s) Oral two times a day  cefepime  IVPB 500 milliGRAM(s) IV Intermittent every 24 hours  furosemide    Tablet 20 milliGRAM(s) Oral daily  azithromycin  IVPB 500 milliGRAM(s) IV Intermittent every 24 hours  Nephro-toi 1 Tablet(s) Oral daily    MEDICATIONS  (PRN):  acetaminophen   Tablet. 650 milliGRAM(s) Oral every 6 hours PRN Mild Pain (1 - 3)  senna 2 Tablet(s) Oral at bedtime PRN Constipation  docusate sodium 100 milliGRAM(s) Oral three times a day PRN Constipation      Allergies    allopurinol (Rash)  codeine (Nausea; Vomiting)  penicillin (Rash)  spironolactone (Unknown)    Intolerances          Vital Signs Last 24 Hrs  T(C): 36.2 (2017 05:34), Max: 37.1 (2017 20:04)  T(F): 97.1 (2017 05:34), Max: 98.7 (2017 20:04)  HR: 70 (2017 05:34) (70 - 83)  BP: 89/46 (2017 05:34) (88/49 - 94/53)  BP(mean): --  RR: 20 (2017 05:34) (18 - 20)  SpO2: 91% (2017 09:39) (91% - 99%)  Daily     Daily Weight in k.9 (2017 15:34)  I&O's Detail    2017 07:  -  2017 11:08  --------------------------------------------------------  IN:  Total IN: 0 mL    OUT:    Voided: 1 mL  Total OUT: 1 mL    Total NET: -1 mL        I&O's Summary    2017 07:  -  2017 11:08  --------------------------------------------------------  IN: 0 mL / OUT: 1 mL / NET: -1 mL        PHYSICAL EXAM:  GENERAL: NAD,   HEAD:  No edema   NECK: Supple, No JVD,   CHEST/LUNG: EAE . Decreased BS at right base   HEART: Regular rate and rhythm; No rub   ABDOMEN: Soft, Nontender, Nondistended;  EXTREMITIES:  dec edema     LABS:                        9.3    6.9   )-----------( 162      ( 2017 09:11 )             30.3     06-    135  |  93<L>  |  62.0<H>  ----------------------------<  188<H>  5.8<H>   |  24.0  |  4.85<H>    Ca    9.2      2017 08:00    TPro  6.7  /  Alb  3.6  /  TBili  1.4  /  DBili  x   /  AST  27  /  ALT  29  /  AlkPhos  523<H>                  RADIOLOGY & ADDITIONAL TESTS:

## 2017-06-29 NOTE — PROGRESS NOTE ADULT - ASSESSMENT
ESRD - Heparin free HD Today    SOB - Multifactorial - H/O VHD , Prior operative intervention , sig residual Pulm HTN ( PASP 88 )  Pt on Revatio   S/P Mechanical fall with several R rib fractures  baseline sig COPD   Anemia   Chronic R effusion - the same on imaging ( doesn't appear too sig on prior Chest CT ), therefore , probably doesn't need drainage       S/P traumatic SAH - No expansion on follow up CT. Surgery follow up noted     Anemia - Continue epogen TIW  Iron stores intact 6-23  + folate   Transfused 2 units with prior t HD   Hgb stable     RO - Phos OK w/o binders    PNA - Blood Cx 6-19 negative , Antibiotics noted     RO - Added Phoslo with meals

## 2017-06-29 NOTE — PROGRESS NOTE ADULT - PROBLEM SELECTOR PLAN 1
IV ABX  No florastor due to Permacath  continue mucomyst and nebulizer for congestion   suggest steroid if no improvement  suggest Pulmonary consult if worsens, patient is followed by Dr. Balaji Fernandes  chest PT and incentive spirometry  check procalcitonin , blood cx if she spikes, sputum cx gram  stain. IV ABX  No florastor due to Permacath  continue mucomyst and nebulizer for congestion , suction , ches PT   consider swallow evaluation ? aspiration   suggest steroid if no improvement  suggest Pulmonary consult if worsens, patient is followed by Dr. Balaji Fernandes  chest PT and incentive spirometry  check procalcitonin , blood cx if she spikes, sputum cx gram  stain.

## 2017-06-29 NOTE — PROGRESS NOTE ADULT - SUBJECTIVE AND OBJECTIVE BOX
CC : cough , congestion     HPI :  76F was admitted on 6/14/17 after a mechanical fall while walking outside. She had head trauma but no LOC. In ER, GCS=15. A head CT showed left parietal and temporo-occipital SAH. A repeat head CT was stable. Additional workup showed nondisplaced fractures of the right 4-6 ribs with emphysematous changes, pleural effusion and atelectasis. Hospital course was complicated by shortness of breath/hypoxia and needed BiPAP/high-flow NC. She was started on Cefepime for PNA. Admitted to acute rehab on 6/27.She has been with worsening congestion , and cough ,also she is hypotensive called to evaluate     INTERVAL HPI/OVERNIGHT EVENTS: Weak cough, congested. No fever overnight    Vital Signs Last 24 Hrs  T(C): 36.2 (29 Jun 2017 05:34), Max: 37.1 (28 Jun 2017 20:04)  T(F): 97.1 (29 Jun 2017 05:34), Max: 98.7 (28 Jun 2017 20:04)  HR: 70 (29 Jun 2017 05:34) (70 - 83)  BP: 89/46 (29 Jun 2017 05:34) (88/49 - 94/53)  BP(mean): --  RR: 20 (29 Jun 2017 05:34) (18 - 20)  SpO2: 95% (29 Jun 2017 05:34) (92% - 99%)  I&O's Detail                                9.3    6.9   )-----------( 162      ( 28 Jun 2017 09:11 )             30.3     29 Jun 2017 08:00    135    |  93     |  62.0   ----------------------------<  188    5.8     |  24.0   |  4.85     Ca    9.2        29 Jun 2017 08:00    TPro  6.7    /  Alb  3.6    /  TBili  1.4    /  DBili  x      /  AST  27     /  ALT  29     /  AlkPhos  523    28 Jun 2017 09:11      CAPILLARY BLOOD GLUCOSE        LIVER FUNCTIONS - ( 28 Jun 2017 09:11 )  Alb: 3.6 g/dL / Pro: 6.7 g/dL / ALK PHOS: 523 U/L / ALT: 29 U/L / AST: 27 U/L / GGT: x               MEDICATIONS  (STANDING):  heparin  Injectable 5000 Unit(s) SubCutaneous every 8 hours  aspirin enteric coated 81 milliGRAM(s) Oral daily  calcium acetate 667 milliGRAM(s) Oral three times a day with meals  acetylcysteine 20% Inhalation 4 milliLiter(s) Inhalation every 6 hours  ALBUTerol    0.083% 2.5 milliGRAM(s) Nebulizer every 6 hours  cyanocobalamin 1000 MICROGram(s) Oral daily  sildenafil (REVATIO) 20 milliGRAM(s) Oral three times a day  gabapentin 300 milliGRAM(s) Oral every 8 hours  epoetin jaswinder Injectable 06271 Unit(s) IV Push <User Schedule>  atorvastatin 40 milliGRAM(s) Oral at bedtime  folic acid 1 milliGRAM(s) Oral daily  lidocaine   Patch 2 Patch Transdermal daily  midodrine 5 milliGRAM(s) Oral two times a day  cefepime  IVPB 500 milliGRAM(s) IV Intermittent every 24 hours  furosemide    Tablet 20 milliGRAM(s) Oral daily  azithromycin  IVPB 500 milliGRAM(s) IV Intermittent every 24 hours  Nephro-toi 1 Tablet(s) Oral daily    MEDICATIONS  (PRN):  acetaminophen   Tablet. 650 milliGRAM(s) Oral every 6 hours PRN Mild Pain (1 - 3)  senna 2 Tablet(s) Oral at bedtime PRN Constipation  docusate sodium 100 milliGRAM(s) Oral three times a day PRN Constipation      RADIOLOGY & ADDITIONAL TESTS:    ROS: +cough and congestion, +easy bruising, ecchymosis, +weakness/fatigue  Denies chest pain, dizziness, lightheadedness, nausea, vomiting, fever, chills    PHYSICAL EXAM:    GENERAL: NAD, frail  HEAD:  Atraumatic, Normocephalic  EYES: EOMI, PERRLA, conjunctiva   NECK: Supple, No JVD, Normal thyroid  NERVOUS SYSTEM:  Alert & Oriented X3, Generalized weakness   CHEST/LUNG: B/L diffuse rhonchi  HEART: Regular rate and rhythm; No murmurs, rubs, or gallops  ABDOMEN: Soft, Nontender, Nondistended; Bowel sounds present  EXTREMITIES:  2+ Peripheral Pulses, No clubbing, cyanosis, or edema ,   LYMPH: No lymphadenopathy noted  SKIN: Multiple ecchymotic areas

## 2017-06-30 DIAGNOSIS — G47.33 OBSTRUCTIVE SLEEP APNEA (ADULT) (PEDIATRIC): ICD-10-CM

## 2017-06-30 DIAGNOSIS — I60.9 NONTRAUMATIC SUBARACHNOID HEMORRHAGE, UNSPECIFIED: ICD-10-CM

## 2017-06-30 DIAGNOSIS — R09.02 HYPOXEMIA: ICD-10-CM

## 2017-06-30 DIAGNOSIS — J90 PLEURAL EFFUSION, NOT ELSEWHERE CLASSIFIED: ICD-10-CM

## 2017-06-30 DIAGNOSIS — J44.9 CHRONIC OBSTRUCTIVE PULMONARY DISEASE, UNSPECIFIED: ICD-10-CM

## 2017-06-30 PROCEDURE — 99233 SBSQ HOSP IP/OBS HIGH 50: CPT

## 2017-06-30 PROCEDURE — 99232 SBSQ HOSP IP/OBS MODERATE 35: CPT | Mod: GC

## 2017-06-30 RX ORDER — MIDODRINE HYDROCHLORIDE 2.5 MG/1
10 TABLET ORAL
Qty: 0 | Refills: 0 | Status: DISCONTINUED | OUTPATIENT
Start: 2017-06-30 | End: 2017-07-26

## 2017-06-30 RX ADMIN — Medication 4 MILLILITER(S): at 03:50

## 2017-06-30 RX ADMIN — Medication 4 MILLILITER(S): at 20:45

## 2017-06-30 RX ADMIN — ATORVASTATIN CALCIUM 40 MILLIGRAM(S): 80 TABLET, FILM COATED ORAL at 21:16

## 2017-06-30 RX ADMIN — Medication 1 TABLET(S): at 11:32

## 2017-06-30 RX ADMIN — Medication 600 MILLIGRAM(S): at 17:02

## 2017-06-30 RX ADMIN — Medication 667 MILLIGRAM(S): at 07:25

## 2017-06-30 RX ADMIN — Medication 600 MILLIGRAM(S): at 06:17

## 2017-06-30 RX ADMIN — CEFEPIME 100 MILLIGRAM(S): 1 INJECTION, POWDER, FOR SOLUTION INTRAMUSCULAR; INTRAVENOUS at 06:18

## 2017-06-30 RX ADMIN — HEPARIN SODIUM 5000 UNIT(S): 5000 INJECTION INTRAVENOUS; SUBCUTANEOUS at 06:18

## 2017-06-30 RX ADMIN — Medication 667 MILLIGRAM(S): at 17:03

## 2017-06-30 RX ADMIN — LIDOCAINE 2 PATCH: 4 CREAM TOPICAL at 00:00

## 2017-06-30 RX ADMIN — Medication 4 MILLILITER(S): at 10:10

## 2017-06-30 RX ADMIN — Medication 3 MILLILITER(S): at 03:51

## 2017-06-30 RX ADMIN — Medication 10 MILLIGRAM(S): at 13:18

## 2017-06-30 RX ADMIN — MIDODRINE HYDROCHLORIDE 10 MILLIGRAM(S): 2.5 TABLET ORAL at 17:03

## 2017-06-30 RX ADMIN — Medication 20 MILLIGRAM(S): at 06:18

## 2017-06-30 RX ADMIN — AZITHROMYCIN 255 MILLIGRAM(S): 500 TABLET, FILM COATED ORAL at 11:33

## 2017-06-30 RX ADMIN — Medication 3 MILLILITER(S): at 10:10

## 2017-06-30 RX ADMIN — MIDODRINE HYDROCHLORIDE 5 MILLIGRAM(S): 2.5 TABLET ORAL at 06:18

## 2017-06-30 RX ADMIN — LIDOCAINE 2 PATCH: 4 CREAM TOPICAL at 23:30

## 2017-06-30 RX ADMIN — Medication 3 MILLILITER(S): at 20:45

## 2017-06-30 RX ADMIN — Medication 667 MILLIGRAM(S): at 11:32

## 2017-06-30 RX ADMIN — Medication 1 APPLICATION(S): at 11:32

## 2017-06-30 RX ADMIN — Medication 81 MILLIGRAM(S): at 11:32

## 2017-06-30 RX ADMIN — GABAPENTIN 300 MILLIGRAM(S): 400 CAPSULE ORAL at 06:18

## 2017-06-30 RX ADMIN — LIDOCAINE 2 PATCH: 4 CREAM TOPICAL at 11:34

## 2017-06-30 RX ADMIN — HEPARIN SODIUM 5000 UNIT(S): 5000 INJECTION INTRAVENOUS; SUBCUTANEOUS at 13:18

## 2017-06-30 RX ADMIN — Medication 1 MILLIGRAM(S): at 11:32

## 2017-06-30 RX ADMIN — PREGABALIN 1000 MICROGRAM(S): 225 CAPSULE ORAL at 11:32

## 2017-06-30 RX ADMIN — GABAPENTIN 300 MILLIGRAM(S): 400 CAPSULE ORAL at 21:16

## 2017-06-30 RX ADMIN — GABAPENTIN 300 MILLIGRAM(S): 400 CAPSULE ORAL at 13:18

## 2017-06-30 RX ADMIN — HEPARIN SODIUM 5000 UNIT(S): 5000 INJECTION INTRAVENOUS; SUBCUTANEOUS at 21:16

## 2017-06-30 NOTE — PROGRESS NOTE ADULT - ASSESSMENT
77 yo female with h/o Atrial fibrillation off anticoagulation due to fall risk and bleeding risk , ESRD on HD , admitted s/p fall at home with left SAH , brain injury and developed respiratory distress being treated for pneumonia  , now in rehab . Chest congestion, cough now improved, BP remains low

## 2017-06-30 NOTE — PROGRESS NOTE ADULT - SUBJECTIVE AND OBJECTIVE BOX
Patient without any significant change  no specific c/o  no pains; facial swelling better    Vital Signs Last 24 Hrs  T(C): 36.6 (06-30-17 @ 11:25), Max: 36.6 (06-30-17 @ 11:25)  T(F): 97.8 (06-30-17 @ 11:25), Max: 97.8 (06-30-17 @ 11:25)  HR: 71 (06-30-17 @ 11:25) (61 - 88)  BP: 80/48 (06-30-17 @ 11:25) (80/48 - 99/42)  BP(mean): --  RR: 18 (06-30-17 @ 11:25) (17 - 20)  SpO2: 97% (06-30-17 @ 11:25) (93% - 97%)  Face- ecchymosis fading, NT  Chest   clear  CV   no murmur  Abd   soft, NT BS+  Extr   tr edema  Neuro  grossly iintact motor    Patient overall stable  Labs and Meds reviewed    Continue same treatment  HD am, watch K

## 2017-06-30 NOTE — PROGRESS NOTE ADULT - SUBJECTIVE AND OBJECTIVE BOX
CC : cough , congestion     HPI :  76F was admitted on 6/14/17 after a mechanical fall while walking outside. She had head trauma but no LOC. In ER, GCS=15. A head CT showed left parietal and temporo-occipital SAH. A repeat head CT was stable. Additional workup showed nondisplaced fractures of the right 4-6 ribs with emphysematous changes, pleural effusion and atelectasis. Hospital course was complicated by shortness of breath/hypoxia and needed BiPAP/high-flow NC. She was started on Cefepime for PNA. Admitted to acute rehab on 6/27.She has been with worsening congestion , and cough ,also she is hypotensive called to evaluate     INTERVAL HPI/OVERNIGHT EVENTS: Sitting in chair, breathing improved, placed on nasal canula, tolerating well    Vital Signs Last 24 Hrs  T(C): 36.4 (30 Jun 2017 05:28), Max: 36.5 (29 Jun 2017 20:46)  T(F): 97.5 (30 Jun 2017 05:28), Max: 97.7 (29 Jun 2017 20:46)  HR: 69 (30 Jun 2017 05:28) (61 - 88)  BP: 81/52 (30 Jun 2017 07:27) (81/52 - 100/60)  BP(mean): --  RR: 20 (30 Jun 2017 05:28) (17 - 20)  SpO2: 93% (30 Jun 2017 05:28) (93% - 100%)  I&O's Detail    29 Jun 2017 07:01  -  30 Jun 2017 07:00  --------------------------------------------------------  IN:    IV PiggyBack: 250 mL  Total IN: 250 mL    OUT:    Other: 2500 mL    Voided: 1 mL  Total OUT: 2501 mL    Total NET: -2251 mL                29 Jun 2017 08:00    135    |  93     |  62.0   ----------------------------<  188    5.8     |  24.0   |  4.85     Ca    9.2        29 Jun 2017 08:00        CAPILLARY BLOOD GLUCOSE              MEDICATIONS  (STANDING):  heparin  Injectable 5000 Unit(s) SubCutaneous every 8 hours  aspirin enteric coated 81 milliGRAM(s) Oral daily  calcium acetate 667 milliGRAM(s) Oral three times a day with meals  acetylcysteine 20% Inhalation 4 milliLiter(s) Inhalation every 6 hours  cyanocobalamin 1000 MICROGram(s) Oral daily  sildenafil (REVATIO) 20 milliGRAM(s) Oral three times a day  gabapentin 300 milliGRAM(s) Oral every 8 hours  epoetin jaswinder Injectable 26889 Unit(s) IV Push <User Schedule>  atorvastatin 40 milliGRAM(s) Oral at bedtime  folic acid 1 milliGRAM(s) Oral daily  lidocaine   Patch 2 Patch Transdermal daily  midodrine 5 milliGRAM(s) Oral two times a day  cefepime  IVPB 500 milliGRAM(s) IV Intermittent every 24 hours  azithromycin  IVPB 500 milliGRAM(s) IV Intermittent every 24 hours  Nephro-toi 1 Tablet(s) Oral daily  ammonium lactate 12% Lotion 1 Application(s) Topical daily  ALBUTerol/ipratropium for Nebulization 3 milliLiter(s) Nebulizer every 6 hours  guaiFENesin  milliGRAM(s) Oral every 12 hours    MEDICATIONS  (PRN):  acetaminophen   Tablet. 650 milliGRAM(s) Oral every 6 hours PRN Mild Pain (1 - 3)      RADIOLOGY & ADDITIONAL TESTS:    ROS: +cough and congestion now improving, +easy bruising, ecchymosis, +weakness/fatigue  Denies chest pain, dizziness, lightheadedness, nausea, vomiting, fever, chills    PHYSICAL EXAM:    GENERAL: NAD, frail  HEAD:  Atraumatic, Normocephalic  EYES: EOMI, PERRLA, conjunctiva   NECK: Supple, No JVD, Normal thyroid  NERVOUS SYSTEM:  Alert & Oriented X3, Generalized weakness   CHEST/LUNG: B/L diffuse rhonchi  HEART: irregular rate and rhythm; + murmur  ABDOMEN: Soft, Nontender, Nondistended; Bowel sounds present  EXTREMITIES:  2+ Peripheral Pulses, No clubbing, cyanosis, or edema ,   LYMPH: No lymphadenopathy noted  SKIN: Multiple ecchymotic areas CC : cough , congestion     HPI :  76F was admitted on 6/14/17 after a mechanical fall while walking outside. She had head trauma but no LOC. In ER, GCS=15. A head CT showed left parietal and temporo-occipital SAH. A repeat head CT was stable. Additional workup showed nondisplaced fractures of the right 4-6 ribs with emphysematous changes, pleural effusion and atelectasis. Hospital course was complicated by shortness of breath/hypoxia and needed BiPAP/high-flow NC. She was started on Cefepime for PNA. Admitted to acute rehab on 6/27.She has been with worsening congestion , and cough ,also she is hypotensive called to evaluate     INTERVAL HPI/OVERNIGHT EVENTS: Sitting in chair, breathing improved, placed on nasal canula, tolerating well, c/o constipation no BM for 2-3 days , no nausea     Vital Signs Last 24 Hrs  T(C): 36.4 (30 Jun 2017 05:28), Max: 36.5 (29 Jun 2017 20:46)  T(F): 97.5 (30 Jun 2017 05:28), Max: 97.7 (29 Jun 2017 20:46)  HR: 69 (30 Jun 2017 05:28) (61 - 88)  BP: 81/52 (30 Jun 2017 07:27) (81/52 - 100/60)  BP(mean): --  RR: 20 (30 Jun 2017 05:28) (17 - 20)  SpO2: 93% (30 Jun 2017 05:28) (93% - 100%)  I&O's Detail    29 Jun 2017 07:01  -  30 Jun 2017 07:00  --------------------------------------------------------  IN:    IV PiggyBack: 250 mL  Total IN: 250 mL    OUT:    Other: 2500 mL    Voided: 1 mL  Total OUT: 2501 mL    Total NET: -2251 mL                29 Jun 2017 08:00    135    |  93     |  62.0   ----------------------------<  188    5.8     |  24.0   |  4.85     Ca    9.2        29 Jun 2017 08:00        CAPILLARY BLOOD GLUCOSE              MEDICATIONS  (STANDING):  heparin  Injectable 5000 Unit(s) SubCutaneous every 8 hours  aspirin enteric coated 81 milliGRAM(s) Oral daily  calcium acetate 667 milliGRAM(s) Oral three times a day with meals  acetylcysteine 20% Inhalation 4 milliLiter(s) Inhalation every 6 hours  cyanocobalamin 1000 MICROGram(s) Oral daily  sildenafil (REVATIO) 20 milliGRAM(s) Oral three times a day  gabapentin 300 milliGRAM(s) Oral every 8 hours  epoetin jaswinder Injectable 32156 Unit(s) IV Push <User Schedule>  atorvastatin 40 milliGRAM(s) Oral at bedtime  folic acid 1 milliGRAM(s) Oral daily  lidocaine   Patch 2 Patch Transdermal daily  midodrine 5 milliGRAM(s) Oral two times a day  cefepime  IVPB 500 milliGRAM(s) IV Intermittent every 24 hours  azithromycin  IVPB 500 milliGRAM(s) IV Intermittent every 24 hours  Nephro-toi 1 Tablet(s) Oral daily  ammonium lactate 12% Lotion 1 Application(s) Topical daily  ALBUTerol/ipratropium for Nebulization 3 milliLiter(s) Nebulizer every 6 hours  guaiFENesin  milliGRAM(s) Oral every 12 hours    MEDICATIONS  (PRN):  acetaminophen   Tablet. 650 milliGRAM(s) Oral every 6 hours PRN Mild Pain (1 - 3)      RADIOLOGY & ADDITIONAL TESTS:    ROS: +cough and congestion now improving, +easy bruising, ecchymosis, +weakness/fatigue  Denies chest pain, dizziness, lightheadedness, nausea, vomiting, fever, chills    PHYSICAL EXAM:    GENERAL: NAD, frail  HEAD:  Atraumatic, Normocephalic  EYES: EOMI, PERRLA, conjunctiva   NECK: Supple, No JVD, Normal thyroid  NERVOUS SYSTEM:  Alert & Oriented X3, Generalized weakness   CHEST/LUNG: B/L diffuse rhonchi  HEART: irregular rate and rhythm; + murmur  ABDOMEN: Soft, Nontender, Nondistended; Bowel sounds present  EXTREMITIES:  2+ Peripheral Pulses, No clubbing, cyanosis, or edema ,   LYMPH: No lymphadenopathy noted  SKIN: Multiple ecchymotic areas

## 2017-06-30 NOTE — PROGRESS NOTE ADULT - PROBLEM SELECTOR PLAN 3
discussed with Dr. Babcock, recommends conservative management with HD discussed with Dr. Babcock, recommends conservative management with HD fluid removal

## 2017-06-30 NOTE — CHART NOTE - NSCHARTNOTEFT_GEN_A_CORE
Outpatient consultation with Dr. Rafa Van for LAURA occlusion via Watchman device scheduled for 7/13/17 at 10:30am.   Location:   [facility]   [address] [place] [address]  Phone: [phone]  Fax: [phone]    Please call with questions or to reschedule.

## 2017-06-30 NOTE — PROGRESS NOTE ADULT - SUBJECTIVE AND OBJECTIVE BOX
PULMONARY PROGRESS NOTE      DUSTIN HERNANDEZKADE-[id number]    Patient is a 76y old  Female who presents with a chief complaint of     INTERVAL HPI/OVERNIGHT EVENTS:  denies chest pain or sob  sitting up in chair on nasal 02  MEDICATIONS  (STANDING):  heparin  Injectable 5000 Unit(s) SubCutaneous every 8 hours  aspirin enteric coated 81 milliGRAM(s) Oral daily  calcium acetate 667 milliGRAM(s) Oral three times a day with meals  acetylcysteine 20% Inhalation 4 milliLiter(s) Inhalation every 6 hours  cyanocobalamin 1000 MICROGram(s) Oral daily  sildenafil (REVATIO) 20 milliGRAM(s) Oral three times a day  gabapentin 300 milliGRAM(s) Oral every 8 hours  epoetin jaswinder Injectable 09502 Unit(s) IV Push <User Schedule>  atorvastatin 40 milliGRAM(s) Oral at bedtime  folic acid 1 milliGRAM(s) Oral daily  lidocaine   Patch 2 Patch Transdermal daily  midodrine 5 milliGRAM(s) Oral two times a day  cefepime  IVPB 500 milliGRAM(s) IV Intermittent every 24 hours  azithromycin  IVPB 500 milliGRAM(s) IV Intermittent every 24 hours  Nephro-toi 1 Tablet(s) Oral daily  ammonium lactate 12% Lotion 1 Application(s) Topical daily  ALBUTerol/ipratropium for Nebulization 3 milliLiter(s) Nebulizer every 6 hours  guaiFENesin  milliGRAM(s) Oral every 12 hours      MEDICATIONS  (PRN):  acetaminophen   Tablet. 650 milliGRAM(s) Oral every 6 hours PRN Mild Pain (1 - 3)      Allergies    allopurinol (Rash)  codeine (Nausea; Vomiting)  penicillin (Rash)  spironolactone (Unknown)    Intolerances        PAST MEDICAL & SURGICAL HISTORY:  ESRD (end stage renal disease) on dialysis: MWF via R SC Permacath  planned AVF creation  Sinusitis, unspecified chronicity, unspecified location  Gout  Gall stones  Pneumonia  Anemia  Pulmonary hypertension  COPD (chronic obstructive pulmonary disease)  CAD (coronary artery disease)  Atrial fibrillation  Hyperlipidemia  Hypertension  Spinal fusion failure, initial encounter  Tubal ligation status  Sinusitis  Spinal stenosis  S/P tonsillectomy  S/P appendectomy  H/O aortic valve replacement  H/O spinal fusion  H/O cataract  Cystocele  H/O tricuspid valve annuloplasty  H/O mitral valve repair      SOCIAL HISTORY  Smoking History:       REVIEW OF SYSTEMS:    CONSTITUTIONAL:  No distress    HEENT:  Eyes:  No diplopia or blurred vision. ENT:  No earache, sore throat or runny nose.    CARDIOVASCULAR:  No pressure, squeezing, tightness, heaviness or aching about the chest; no palpitations.    RESPIRATORY:  see hpi    GASTROINTESTINAL:  No nausea, vomiting or diarrhea.    GENITOURINARY:  No dysuria, frequency or urgency.    NEUROLOGIC:  No paresthesias, fasciculations, seizures or weakness.    PSYCHIATRIC:  No disorder of thought or mood.    Vital Signs Last 24 Hrs  T(C): 36.4 (30 Jun 2017 05:28), Max: 36.5 (29 Jun 2017 20:46)  T(F): 97.5 (30 Jun 2017 05:28), Max: 97.7 (29 Jun 2017 20:46)  HR: 69 (30 Jun 2017 05:28) (61 - 88)  BP: 81/52 (30 Jun 2017 07:27) (81/52 - 100/60)  BP(mean): --  RR: 20 (30 Jun 2017 05:28) (17 - 20)  SpO2: 93% (30 Jun 2017 05:28) (93% - 100%)    PHYSICAL EXAMINATION:    GENERAL: The patient is awake and alert in no apparent distress.     HEENT: Head is normocephalic and atraumatic. Extraocular muscles are intact. Mucous membranes are moist.    NECK: Supple.    LUNGS: moderate air entry bilat, mildy decr r base; respirations unlabored    HEART: Regular rate and rhythm without murmur.    ABDOMEN: Soft, nontender, and nondistended.      EXTREMITIES: Without any cyanosis, clubbing, rash, lesions or edema.    NEUROLOGIC: Grossly intact.    LABS:    06-29    135  |  93<L>  |  62.0<H>  ----------------------------<  188<H>  5.8<H>   |  24.0  |  4.85<H>    Ca    9.2      29 Jun 2017 08:00                  Serum Pro-Brain Natriuretic Peptide: >[id number] pg/mL (06-29-17 @ 08:00)          MICROBIOLOGY:    RADIOLOGY & ADDITIONAL STUDIES:  cxr/Ct scan and echo reviewed

## 2017-06-30 NOTE — PROGRESS NOTE ADULT - SUBJECTIVE AND OBJECTIVE BOX
HISTORY OF PRESENT ILLNESS  76F was admitted on 6/14/17 after a mechanical fall while walking outside. She had head trauma but no LOC. In ER, GCS=15. A head CT showed left parietal and temporo-occipital SAH. A repeat head CT was stable. Additional workup showed nondisplaced fractures of the right 4-6 ribs with emphysematous changes, pleural effusion and atelectasis. Hospital course was complicated by shortness of breath/hypoxia and needed BiPAP/high-flow NC. She was started on Cefepime for PNA. Admitted to acute rehab on 6/27.    PMHx - HTN, ESRD on HD, AFIB (no full AC due to fall and bleeding risk), CAD, COPD (on home O2 overnight), Cholelithiasis, Gout, Hyperlipidemia, Spinal stenosis, s/p AVR/MVR/Tricupsid valve annuloplasty, Cataracts repair, H/O spinal fusion, Appendectomy, Tonsillectomy, C-Spine DJD    TODAY'S SUBJECTIVE & REVIEW OF SYMPTOMS  [ ] Constitutional WNL      [X] Cardio WNL            [ ] Resp WNL           [X] GI WNL                      [X]  WNL                [ ] Heme WNL              [X] Endo WNL                  [ ] Skin WNL               [ ] MSK WNL            [ ] Neuro WNL                  [ ] Cognitive WNL        [ ] Psych WNL    Patient seen and examined sitting in chair.  Much more comfortable this AM, eating well.  Breathing seems to be much improved after HD yesterday.  Transition to nasal cannula today.  Per pulmonology, recommend decreasing Sildenafil and increasing Midodrine.  Recommend checking procalcitonin to evaluate need for antibiotics.      VITALS  T(C): 36.2 (06-29-17 @ 05:34)  T(F): 97.1 (06-29-17 @ 05:34), Max: 98.7 (06-28-17 @ 20:04)  HR: 70 (06-29-17 @ 05:34) (70 - 83)  BP: 89/46 (06-29-17 @ 05:34) (88/49 - 94/53)  RR:  (18 - 20)  SpO2:  (91% - 99%)  Wt(kg): --    PHYSICAL EXAM  Constitutional - NAD, Comfortable  HEENT - Facial ecchymosis, right supraorbital laceration  Neck - Supple, No limited ROM  Chest - +Diffuse rhonchi, +Diffuse crackles  Cardiovascular - S1S2  Abdomen - BS+, Soft, NTND  Extremities - No C/C, No calf tenderness, Right 2nd MCP swelling and pain to palpation - Improving  Neurologic Exam - Right field cut                    Cognitive - AAO to self, place, month/year, some of situation     Communication - Expressive deficits     Motor - No focal deficits     Sensory - Intact to LT     Coordination - FTN impaired  Psychiatric - Mood stable, Affect Flat  Skin - Multiple ecchymosis throughout the limbs     FUNCTIONAL PROGRESS  Gait - Unsafe at this time  ADLs - total A  Transfers - sit-to-stand mod A x 2  Functional transfer - Bed transfer mod A x 2    RECENT LABS            9.3    6.9   )-----------( 162      ( 28 Jun 2017 09:11 )             30.3     135  |  93     |  62.0  ----------------------------<  188     ( 29 June 2017 )  5.8   |  24.0  |  4.85    Ca    9.2      29 Jun 2017 08:00    Prealb 6/28 - 16    Pro-BNP 6/29 - >57321    UA - Ordered    RADIOLOGY/OTHER RESULTS  CXR 6/27 -  Unchanged moderate right pleural effusion with associated atelectasis or consolidation. Bilateral perihilar opacities, probably mild pulmonary edema.    Right hand XR 6/27 - No acute fracture or dislocation of the right hand.    CURRENT MEDICATIONS  MEDICATIONS  (STANDING):  heparin  Injectable 5000 Unit(s) SubCutaneous every 8 hours  aspirin enteric coated 81 milliGRAM(s) Oral daily  calcium acetate 667 milliGRAM(s) Oral three times a day with meals  acetylcysteine 20% Inhalation 4 milliLiter(s) Inhalation every 6 hours  cyanocobalamin 1000 MICROGram(s) Oral daily  sildenafil (REVATIO) 20 milliGRAM(s) Oral three times a day  gabapentin 300 milliGRAM(s) Oral every 8 hours  epoetin jaswinder Injectable 10031 Unit(s) IV Push <User Schedule>  atorvastatin 40 milliGRAM(s) Oral at bedtime  folic acid 1 milliGRAM(s) Oral daily  lidocaine   Patch 2 Patch Transdermal daily  midodrine 5 milliGRAM(s) Oral two times a day  cefepime  IVPB 500 milliGRAM(s) IV Intermittent every 24 hours  azithromycin  IVPB 500 milliGRAM(s) IV Intermittent every 24 hours  Nephro-toi 1 Tablet(s) Oral daily  ammonium lactate 12% Lotion 1 Application(s) Topical daily  ALBUTerol/ipratropium for Nebulization 3 milliLiter(s) Nebulizer every 6 hours  guaiFENesin  milliGRAM(s) Oral every 12 hours  methylPREDNISolone sodium succinate Injectable 40 milliGRAM(s) IV Push once    MEDICATIONS  (PRN):  acetaminophen   Tablet. 650 milliGRAM(s) Oral every 6 hours PRN Mild Pain (1 - 3)    ASSESSMENT & PLAN  76F sustaining a TBI after a fall now with functional deficits  Cardiac/Pulm HTN - Sildenafil, Midodrine, DC Lasix (6/29)  PNA - Cefepime (6/19-TBD), Zithromax (6/28-TBD)  Pulm - Mucomyst Q6, Change Albuterol to Duoneb (6/29), Supplemental O2, Solumedrol x4 doses (6/28-6/29), Guaifenesin BID  CAD - Lipitor, ASA  ESRD on HD - Phoslo, HD T/Th/Sat  Anemia - Epogen, Vit B, Folate  Sleep - Melatonin 3mg  GI/Bowel Management - Toilet PRN   Management - Toilet Q2  Skin - Turn Q2, Lac-hydrin BLE  Pain - Neurontin, Lidoderm to right ribs, Tylenol PRN  DVT PPX - Heparin, TEDs, SCDs  Diet - Renal & Cardiac/Thins, Nephro-toi, Nepro TID    Continue comprehensive acute rehab program consisting of 3hrs/day of OT/PT and SLP. HISTORY OF PRESENT ILLNESS  76F was admitted on 6/14/17 after a mechanical fall while walking outside. She had head trauma but no LOC. In ER, GCS=15. A head CT showed left parietal and temporo-occipital SAH. A repeat head CT was stable. Additional workup showed nondisplaced fractures of the right 4-6 ribs with emphysematous changes, pleural effusion and atelectasis. Hospital course was complicated by shortness of breath/hypoxia and needed BiPAP/high-flow NC. She was started on Cefepime for PNA. Admitted to acute rehab on 6/27.    PMHx - HTN, ESRD on HD, AFIB (no full AC due to fall and bleeding risk), CAD, COPD (on home O2 overnight), Cholelithiasis, Gout, Hyperlipidemia, Spinal stenosis, s/p AVR/MVR/Tricupsid valve annuloplasty, Cataracts repair, H/O spinal fusion, Appendectomy, Tonsillectomy, C-Spine DJD    TODAY'S SUBJECTIVE & REVIEW OF SYMPTOMS  [ ] Constitutional WNL      [X] Cardio WNL            [ ] Resp WNL           [X] GI WNL                      [X]  WNL                [ ] Heme WNL              [X] Endo WNL                  [ ] Skin WNL               [ ] MSK WNL            [ ] Neuro WNL                  [ ] Cognitive WNL        [ ] Psych WNL    Patient seen and examined sitting in chair.  Much more comfortable this AM, eating well.  Breathing seems to be much improved after HD yesterday.  Transition to nasal cannula today.  Per pulmonology, recommend decreasing Sildenafil and increasing Midodrine.  Recommend checking procalcitonin to evaluate need for antibiotics. Will draw with HD tomorrow.  Will ask palliative care to follow up on patient as well in terms of resuscitation status.    VITALS  T(C): 36.4 (06-30-17 @ 05:28)  T(F): 97.5 (06-30-17 @ 05:28), Max: 97.7 (06-29-17 @ 20:46)  HR: 69 (06-30-17 @ 05:28) (61 - 88)  BP: 81/52 (06-30-17 @ 07:27) (81/52 - 100/60)  RR:  (17 - 20)  SpO2:  (93% - 100%)  Wt(kg): --    PHYSICAL EXAM  Constitutional - NAD, Comfortable  HEENT - Facial ecchymosis, right supraorbital laceration  Neck - Supple, No limited ROM  Chest - +Diffuse rhonchi - Slightly improved, +Diffuse crackles - Slightly improved  Cardiovascular - S1S2  Abdomen - BS+, Soft, NTND  Extremities - No C/C, No calf tenderness, Right 2nd MCP swelling and pain to palpation - Improving  Neurologic Exam - Right field cut                    Cognitive - AAO to self, place, month/year, some of situation     Communication - Expressive deficits     Motor - No focal deficits     Sensory - Intact to LT     Coordination - FTN impaired  Psychiatric - Mood stable, Affect Flat  Skin - Multiple ecchymosis throughout the limbs     FUNCTIONAL PROGRESS  Gait - Unsafe at this time  ADLs - total A  Transfers - sit-to-stand mod A x 2  Functional transfer - Bed transfer mod A x 2    RECENT LABS            9.3    6.9   )-----------( 162      ( 28 Jun 2017 09:11 )             30.3     135  |  93     |  62.0  ----------------------------<  188     ( 29 June 2017 )  5.8   |  24.0  |  4.85    Ca    9.2      29 Jun 2017 08:00    Prealb 6/28 - 16    Pro-BNP 6/29 - >19393    RADIOLOGY/OTHER RESULTS  CXR 6/27 -  Unchanged moderate right pleural effusion with associated atelectasis or consolidation. Bilateral perihilar opacities, probably mild pulmonary edema.  Right hand XR 6/27 - No acute fracture or dislocation of the right hand.    CURRENT MEDICATIONS  MEDICATIONS  (STANDING):  heparin  Injectable 5000 Unit(s) SubCutaneous every 8 hours  aspirin enteric coated 81 milliGRAM(s) Oral daily  calcium acetate 667 milliGRAM(s) Oral three times a day with meals  acetylcysteine 20% Inhalation 4 milliLiter(s) Inhalation every 6 hours  cyanocobalamin 1000 MICROGram(s) Oral daily  gabapentin 300 milliGRAM(s) Oral every 8 hours  epoetin jaswinder Injectable 77311 Unit(s) IV Push <User Schedule>  atorvastatin 40 milliGRAM(s) Oral at bedtime  folic acid 1 milliGRAM(s) Oral daily  lidocaine   Patch 2 Patch Transdermal daily  cefepime  IVPB 500 milliGRAM(s) IV Intermittent every 24 hours  azithromycin  IVPB 500 milliGRAM(s) IV Intermittent every 24 hours  Nephro-toi 1 Tablet(s) Oral daily  ammonium lactate 12% Lotion 1 Application(s) Topical daily  ALBUTerol/ipratropium for Nebulization 3 milliLiter(s) Nebulizer every 6 hours  guaiFENesin  milliGRAM(s) Oral every 12 hours  midodrine 10 milliGRAM(s) Oral two times a day  sildenafil (REVATIO) 10 milliGRAM(s) Oral every 8 hours    MEDICATIONS  (PRN):  acetaminophen   Tablet. 650 milliGRAM(s) Oral every 6 hours PRN Mild Pain (1 - 3)    ASSESSMENT & PLAN  76F sustaining a TBI after a fall now with functional deficits  Cardiac/Pulm HTN - Sildenafil 10mg Q8 (decreased from 20mg Q8 6/30), Midodrine 10mg BID (increased from 5mg BID 6/30), DC Lasix (6/29)  PNA - Cefepime (6/19-TBD), Zithromax (6/28-TBD)  Pulm - Mucomyst Q6, Change Albuterol to Duoneb (6/29), Transition to NC (6/30), Solumedrol x4 doses (6/28-6/29), Guaifenesin BID  CAD - Lipitor, ASA  ESRD on HD - Phoslo, HD T/Th/Sat  Anemia - Epogen, Vit B, Folate  Sleep - Melatonin 3mg  GI/Bowel Management - Toilet PRN   Management - Toilet Q2  Skin - Turn Q2, Lac-hydrin BLE  Pain - Neurontin, Lidoderm to right ribs, Tylenol PRN  DVT PPX - Heparin, TEDs, SCDs  Diet - Renal & Cardiac/Thins, Nephro-toi, Nepro TID    Continue comprehensive acute rehab program consisting of 3hrs/day of OT/PT and SLP.

## 2017-06-30 NOTE — PROGRESS NOTE ADULT - ASSESSMENT
COPD/mild RIAZ - refuses cpap  s/p fall, a fib, sever pulmonary HTn/RV failure- diastolic dysfunction/valvular heart disease/remodelled  R pleural effusion COPD/mild RIAZ - refuses cpap  s/p fall, a fib, sever pulmonary HTn/RV failure- diastolic dysfunction/valvular heart disease/remodelled  R pleural effusion, worsened - serous on initial CT scan, suspect component of limited dialysis secondary to hypotension  on sildenafil - limited data to support in this setting, would decrease dose to avoid hypotension and allow more complete dialysis  T surg eval if no improvement in pleural fluid or change in status  clinically A-a gradient much improved, tolerating nasal cannula  Watchman being considered pending status  continue nebs/02/refuses cpap  needs palliative eval  discussed with rehab team COPD/mild RIAZ - refuses cpap  s/p fall, a fib, sever pulmonary HTn/RV failure- diastolic dysfunction/valvular heart disease/remodelled  R pleural effusion, worsened - serous on initial CT scan, suspect component of limited dialysis secondary to hypotension  on sildenafil - limited data to support in this setting, would decrease dose to avoid hypotension and allow more complete dialysis  T surg eval if no improvement in pleural fluid or change in status  clinically A-a gradient much improved, tolerating nasal cannula  Watchman being considered pending status  continue nebs/02/refuses cpap  needs palliative eval  discussed with rehab team  ? need for continued abx, check procalcitonin COPD/mild RIAZ - refuses cpap  s/p fall, a fib, sever pulmonary HTn/RV failure- diastolic dysfunction/valvular heart disease/remodelled  R pleural effusion, worsened - serous on initial CT scan, suspect component of limited dialysis secondary to hypotension  on sildenafil - limited data to support in this setting, would decrease dose to avoid hypotension and allow more complete dialysis, discussed with cardiology  T surg eval if no improvement in pleural fluid or change in status  clinically A-a gradient much improved, tolerating nasal cannula  Watchman being considered pending status  continue nebs/02/refuses cpap  needs palliative eval  discussed with rehab team  ? need for continued abx, check procalcitonin  continue pulmonary toilet

## 2017-06-30 NOTE — PROGRESS NOTE ADULT - PROBLEM SELECTOR PLAN 2
Continue IV antibiotics  steroids/nebs  supplemental oxygen  patient refusing nocturnal bipap  pulmonary input appreciated Continue IV antibiotics, pulmonary follow up discussed with Dr Babcock   steroids/nebs  supplemental oxygen  patient refusing nocturnal bipap  pulmonary input appreciated

## 2017-06-30 NOTE — PROGRESS NOTE ADULT - PROBLEM SELECTOR PLAN 4
as per Pulmonary, recommends to decrease dose due to hypotension, will place parameters as per Pulmonary, recommends to decrease dose of sildenafil due to hypotension, will place parameters

## 2017-06-30 NOTE — PROGRESS NOTE ADULT - SUBJECTIVE AND OBJECTIVE BOX
[facility]                                   [address] [place] [address]                                                      PHONE: [phone]                                                         FAX: [phone]  -------------------------------------------------------------------------------------------------------------------------------    Pt seen and examined. FU for AVR    Overnight events/patient complaints: Pt without complains. Low BP noted. No obvious symptoms.     Vital Signs Last 24 Hrs  T(C): 36.4 (30 Jun 2017 05:28), Max: 36.5 (29 Jun 2017 20:46)  T(F): 97.5 (30 Jun 2017 05:28), Max: 97.7 (29 Jun 2017 20:46)  HR: 69 (30 Jun 2017 05:28) (61 - 88)  BP: 81/52 (30 Jun 2017 07:27) (81/52 - 100/60)  BP(mean): --  RR: 20 (30 Jun 2017 05:28) (17 - 20)  SpO2: 93% (30 Jun 2017 05:28) (93% - 100%)  I&O's Summary    29 Jun 2017 07:01  -  30 Jun 2017 07:00  --------------------------------------------------------  IN: 250 mL / OUT: 2501 mL / NET: -2251 mL        PHYSICAL EXAM:  Constitutional: Oriented to time, place and person. Appears well developed, well nourished; alert and co-operative  HEENT:     Conjunctiva normal, Normal oral mucosa, No JVD	  Cardiovascular: S1, S2 irregular  Respiratory: Lungs clear to auscultation; no crepitations, no wheeze  Gastrointestinal:  Soft, Non-tender, + BS	  Extremities: No cyanosis, clubbing or edema  Skin: + bruising  Neurologic: Alert oriented to time place and person  Psychiatric: affect was normal        LABS:    06-29    135  |  93<L>  |  62.0<H>  ----------------------------<  188<H>  5.8<H>   |  24.0  |  4.85<H>    Ca    9.2      29 Jun 2017 08:00              RADIOLOGY & ADDITIONAL STUDIES:    CARDIOLOGY TESTING:     ECHO: Summary:   1. Left ventricular ejection fraction, by visual estimation, is 60 to   65%.   2. Spectral Doppler shows restrictive pattern of left ventricular   myocardial filling (Grade III diastolic dysfunction).   3. RV failure. Severely dilated right ventricle with severely reduced   systolic function.   4. Right ventricular volume and pressure overload.   5. Status post mitral valve repair with residual mild to moderate mitral   regurgitation. Elevated transmitral gradients (mean 6.7 mmHg at a HR of   81 bpm).   6. S/P tricuspid valve repair with moderate to severe tricuspid   regurgitation.   7. Normally functioning aortic prosthesis.   8. Moderate pulmonic valve regurgitation.   9. Estimated pulmonary artery systolic pressure is 88.4 mmHg assuming a   right atrial pressure of 15 mmHg, which is consistent with severe   pulmonary hypertension.  10. Trace pericardial effusion.  11. Right sided pleural effusion.  12. ** Compared to TTE 4/11/2017, pulmonary pressures are higher.     Kristin Yoder DO Electronically signed on 6/20/2017 at 11:44:12 AM      MEDICATIONS:  MEDICATIONS  (STANDING):  heparin  Injectable 5000 Unit(s) SubCutaneous every 8 hours  aspirin enteric coated 81 milliGRAM(s) Oral daily  calcium acetate 667 milliGRAM(s) Oral three times a day with meals  acetylcysteine 20% Inhalation 4 milliLiter(s) Inhalation every 6 hours  cyanocobalamin 1000 MICROGram(s) Oral daily  gabapentin 300 milliGRAM(s) Oral every 8 hours  epoetin jaswinder Injectable 40764 Unit(s) IV Push <User Schedule>  atorvastatin 40 milliGRAM(s) Oral at bedtime  folic acid 1 milliGRAM(s) Oral daily  lidocaine   Patch 2 Patch Transdermal daily  cefepime  IVPB 500 milliGRAM(s) IV Intermittent every 24 hours  azithromycin  IVPB 500 milliGRAM(s) IV Intermittent every 24 hours  Nephro-toi 1 Tablet(s) Oral daily  ammonium lactate 12% Lotion 1 Application(s) Topical daily  ALBUTerol/ipratropium for Nebulization 3 milliLiter(s) Nebulizer every 6 hours  guaiFENesin  milliGRAM(s) Oral every 12 hours  midodrine 10 milliGRAM(s) Oral two times a day  sildenafil (REVATIO) 10 milliGRAM(s) Oral every 8 hours    MEDICATIONS  (PRN):  acetaminophen   Tablet. 650 milliGRAM(s) Oral every 6 hours PRN Mild Pain (1 - 3)      ASSESSMENT AND PLAN:    76y Female with prior history of  HTN ESRD on HD anemia, hx Bio AVR with MV/TV annuloplasty,  PHTN; not an AC candidate due to bleeding and fall risk maintained on ASA and Plavix who presents after a mechanical fall. CT with  left posterior parietal left temporal occipital lobe subarachnoid  hemorrhage.    -  On ASA  -  Fluid status managed with HD  -  Agree with decreasing sildenafil. If BP remains low, can consider d/c as pHTN likely secondary to left heart disease and ESRD

## 2017-07-01 LAB
ANION GAP SERPL CALC-SCNC: 18 MMOL/L — HIGH (ref 5–17)
BUN SERPL-MCNC: 71 MG/DL — HIGH (ref 8–20)
CALCIUM SERPL-MCNC: 9.2 MG/DL — SIGNIFICANT CHANGE UP (ref 8.6–10.2)
CHLORIDE SERPL-SCNC: 92 MMOL/L — LOW (ref 98–107)
CO2 SERPL-SCNC: 26 MMOL/L — SIGNIFICANT CHANGE UP (ref 22–29)
CREAT SERPL-MCNC: 4.32 MG/DL — HIGH (ref 0.5–1.3)
GLUCOSE SERPL-MCNC: 128 MG/DL — HIGH (ref 70–115)
HCT VFR BLD CALC: 31.3 % — LOW (ref 37–47)
HGB BLD-MCNC: 9.7 G/DL — LOW (ref 12–16)
LIDOCAIN IGE QN: 28 U/L — SIGNIFICANT CHANGE UP (ref 22–51)
MCHC RBC-ENTMCNC: 31 G/DL — LOW (ref 32–36)
MCHC RBC-ENTMCNC: 35.1 PG — HIGH (ref 27–31)
MCV RBC AUTO: 113.4 FL — HIGH (ref 81–99)
PLATELET # BLD AUTO: 194 K/UL — SIGNIFICANT CHANGE UP (ref 150–400)
POTASSIUM SERPL-MCNC: 4.3 MMOL/L — SIGNIFICANT CHANGE UP (ref 3.5–5.3)
POTASSIUM SERPL-SCNC: 4.3 MMOL/L — SIGNIFICANT CHANGE UP (ref 3.5–5.3)
PROCALCITONIN SERPL-MCNC: 2.42 NG/ML — HIGH (ref 0–0.04)
RBC # BLD: 2.76 M/UL — LOW (ref 4.4–5.2)
RBC # FLD: 20.4 % — HIGH (ref 11–15.6)
SODIUM SERPL-SCNC: 136 MMOL/L — SIGNIFICANT CHANGE UP (ref 135–145)
WBC # BLD: 7.4 K/UL — SIGNIFICANT CHANGE UP (ref 4.8–10.8)
WBC # FLD AUTO: 7.4 K/UL — SIGNIFICANT CHANGE UP (ref 4.8–10.8)

## 2017-07-01 PROCEDURE — 99233 SBSQ HOSP IP/OBS HIGH 50: CPT

## 2017-07-01 PROCEDURE — 99232 SBSQ HOSP IP/OBS MODERATE 35: CPT | Mod: GC

## 2017-07-01 RX ADMIN — Medication 1 MILLIGRAM(S): at 13:06

## 2017-07-01 RX ADMIN — LIDOCAINE 2 PATCH: 4 CREAM TOPICAL at 01:00

## 2017-07-01 RX ADMIN — GABAPENTIN 300 MILLIGRAM(S): 400 CAPSULE ORAL at 21:27

## 2017-07-01 RX ADMIN — Medication 3 MILLILITER(S): at 03:52

## 2017-07-01 RX ADMIN — ATORVASTATIN CALCIUM 40 MILLIGRAM(S): 80 TABLET, FILM COATED ORAL at 21:27

## 2017-07-01 RX ADMIN — HEPARIN SODIUM 5000 UNIT(S): 5000 INJECTION INTRAVENOUS; SUBCUTANEOUS at 14:23

## 2017-07-01 RX ADMIN — LIDOCAINE 2 PATCH: 4 CREAM TOPICAL at 13:06

## 2017-07-01 RX ADMIN — ERYTHROPOIETIN 10000 UNIT(S): 10000 INJECTION, SOLUTION INTRAVENOUS; SUBCUTANEOUS at 16:08

## 2017-07-01 RX ADMIN — Medication 3 MILLILITER(S): at 09:03

## 2017-07-01 RX ADMIN — CEFEPIME 100 MILLIGRAM(S): 1 INJECTION, POWDER, FOR SOLUTION INTRAMUSCULAR; INTRAVENOUS at 06:00

## 2017-07-01 RX ADMIN — GABAPENTIN 300 MILLIGRAM(S): 400 CAPSULE ORAL at 13:06

## 2017-07-01 RX ADMIN — Medication 1 APPLICATION(S): at 14:23

## 2017-07-01 RX ADMIN — Medication 667 MILLIGRAM(S): at 13:06

## 2017-07-01 RX ADMIN — Medication 600 MILLIGRAM(S): at 05:24

## 2017-07-01 RX ADMIN — MIDODRINE HYDROCHLORIDE 10 MILLIGRAM(S): 2.5 TABLET ORAL at 18:48

## 2017-07-01 RX ADMIN — Medication 4 MILLILITER(S): at 20:02

## 2017-07-01 RX ADMIN — Medication 667 MILLIGRAM(S): at 18:48

## 2017-07-01 RX ADMIN — AZITHROMYCIN 255 MILLIGRAM(S): 500 TABLET, FILM COATED ORAL at 14:23

## 2017-07-01 RX ADMIN — Medication 81 MILLIGRAM(S): at 13:06

## 2017-07-01 RX ADMIN — PREGABALIN 1000 MICROGRAM(S): 225 CAPSULE ORAL at 13:06

## 2017-07-01 RX ADMIN — MIDODRINE HYDROCHLORIDE 10 MILLIGRAM(S): 2.5 TABLET ORAL at 05:24

## 2017-07-01 RX ADMIN — GABAPENTIN 300 MILLIGRAM(S): 400 CAPSULE ORAL at 05:24

## 2017-07-01 RX ADMIN — Medication 1 TABLET(S): at 13:06

## 2017-07-01 RX ADMIN — HEPARIN SODIUM 5000 UNIT(S): 5000 INJECTION INTRAVENOUS; SUBCUTANEOUS at 05:23

## 2017-07-01 RX ADMIN — HEPARIN SODIUM 5000 UNIT(S): 5000 INJECTION INTRAVENOUS; SUBCUTANEOUS at 21:26

## 2017-07-01 RX ADMIN — Medication 3 MILLILITER(S): at 20:02

## 2017-07-01 RX ADMIN — Medication 667 MILLIGRAM(S): at 09:01

## 2017-07-01 RX ADMIN — Medication 4 MILLILITER(S): at 03:50

## 2017-07-01 RX ADMIN — Medication 4 MILLILITER(S): at 09:02

## 2017-07-01 NOTE — PROGRESS NOTE ADULT - SUBJECTIVE AND OBJECTIVE BOX
Patient was seen and evaluated on dialysis.   No c/o CP SOB NV  no F/C  no swelling    T(C): 36.5 (07-01-17 @ 14:35), Max: 36.8 (06-30-17 @ 20:19)  HR: 74 (07-01-17 @ 14:35) (66 - 84)  BP: 95/56 (07-01-17 @ 14:35) (82/53 - 95/56)  Wt(kg): --  PE ;  NAD; ecchymosis better  lungs - CTA  CV gr 1 murmer,  No gallop or rub  Abd : soft, NT BS +, No masses  Ext- tr edema  Neuro : Grossly intact, moving extremities                             9.7    7.4   )-----------( 194      ( 01 Jul 2017 14:09 )             31.3        07-01    136  |  92<L>  |  71.0<H>  ----------------------------<  128<H>  4.3   |  26.0  |  4.32<H>    Ca    9.2      01 Jul 2017 14:09        MEDICATIONS  (STANDING):  heparin  Injectable  acetaminophen   Tablet. PRN  aspirin enteric coated  calcium acetate  acetylcysteine 20% Inhalation  cyanocobalamin  gabapentin  epoetin jaswinder Injectable  atorvastatin  folic acid  lidocaine   Patch  cefepime  IVPB  azithromycin  IVPB  Nephro-toi  ammonium lactate 12% Lotion  ALBUTerol/ipratropium for Nebulization  midodrine  guaiFENesin    Syrup PRN  aluminum hydroxide/magnesium hydroxide/simethicone Suspension PRN      Patient stable  Aida HD easily  slowly improving on Zithromax  Continue

## 2017-07-01 NOTE — PROGRESS NOTE ADULT - PROBLEM SELECTOR PLAN 4
as per Pulmonary, recommends to decrease dose of sildenafil due to hypotension, it was decereased, patient still have Low BP, in 80s systolic, will d/c Sildenafil as per cardiology we can d/c it, will continue to monitor BP.

## 2017-07-01 NOTE — PROGRESS NOTE ADULT - ASSESSMENT
75 yo female with h/o Atrial fibrillation off anticoagulation due to fall risk and bleeding risk , ESRD on HD , admitted s/p fall at home with left SAH , brain injury and developed respiratory distress being treated for pneumonia  , now in rehab . Chest congestion, cough now improved, BP remains low

## 2017-07-01 NOTE — PROGRESS NOTE ADULT - PROBLEM SELECTOR PLAN 3
discussed with Dr. Babcock, recommends conservative management with HD fluid removal, neprology is on board, patient is getting HD with extra fluid extraction.

## 2017-07-01 NOTE — PROGRESS NOTE ADULT - SUBJECTIVE AND OBJECTIVE BOX
HISTORY OF PRESENT ILLNESS  76F was admitted on 6/14/17 after a mechanical fall while walking outside. She had head trauma but no LOC. In ER, GCS=15. A head CT showed left parietal and temporo-occipital SAH. A repeat head CT was stable. Additional workup showed nondisplaced fractures of the right 4-6 ribs with emphysematous changes, pleural effusion and atelectasis. Hospital course was complicated by shortness of breath/hypoxia and needed BiPAP/high-flow NC. She was started on Cefepime for PNA. Admitted to acute rehab on 6/27.    PMHx - HTN, ESRD on HD, AFIB (no full AC due to fall and bleeding risk), CAD, COPD (on home O2 overnight), Cholelithiasis, Gout, Hyperlipidemia, Spinal stenosis, s/p AVR/MVR/Tricupsid valve annuloplasty, Cataracts repair, H/O spinal fusion, Appendectomy, Tonsillectomy, C-Spine DJD    TODAY'S SUBJECTIVE & REVIEW OF SYMPTOMS  [ ] Constitutional WNL      [X] Cardio WNL            [ ] Resp WNL           [X] GI WNL                      [X]  WNL                [ ] Heme WNL              [X] Endo WNL                  [ ] Skin WNL               [ ] MSK WNL            [ ] Neuro WNL                  [ ] Cognitive WNL        [ ] Psych WNL    Patient seen and examined, no acute events overnight.  Reports that she slept well.  To have hemodialysis today.   Breathing stable, tolerating nasal cannula.    VITALS  T(C): 36.8 (07-01-17 @ 05:33)  T(F): 98.2 (07-01-17 @ 05:33), Max: 98.3 (06-30-17 @ 20:19)  HR: 84 (07-01-17 @ 05:33) (61 - 84)  BP: 83/50 (07-01-17 @ 05:33) (80/48 - 89/48)  RR:  (17 - 18)  SpO2:  (91% - 97%)  Wt(kg): --    PHYSICAL EXAM  Constitutional - NAD, Comfortable  HEENT - Facial ecchymosis, right supraorbital laceration  Neck - Supple, No limited ROM  Chest - +Diffuse rhonchi - Slightly improved, +Diffuse crackles - Slightly improved  Cardiovascular - S1S2  Abdomen - BS+, Soft, NTND  Extremities - No C/C, No calf tenderness, Right 2nd MCP swelling and pain to palpation - Improving  Neurologic Exam - Right field cut                    Cognitive - AAO to self, place, month/year, some of situation     Communication - Expressive deficits     Motor - No focal deficits     Sensory - Intact to LT     Coordination - FTN impaired  Psychiatric - Mood stable, Affect Flat  Skin - Multiple ecchymosis throughout the limbs     FUNCTIONAL PROGRESS  Gait - Unsafe at this time  ADLs - total A  Transfers - sit-to-stand mod A x 2  Functional transfer - Bed transfer mod A x 2    RECENT LABS            9.3    6.9   )-----------( 162      ( 28 Jun 2017 09:11 )             30.3     135  |  93     |  62.0  ----------------------------<  188     ( 29 June 2017 )  5.8   |  24.0  |  4.85    Ca    9.2      29 Jun 2017 08:00    Prealb 6/28 - 16    Pro-BNP 6/29 - >81420    RADIOLOGY/OTHER RESULTS  CXR 6/27 -  Unchanged moderate right pleural effusion with associated atelectasis or consolidation. Bilateral perihilar opacities, probably mild pulmonary edema.  Right hand XR 6/27 - No acute fracture or dislocation of the right hand.    CURRENT MEDICATIONS  MEDICATIONS  (STANDING):  heparin  Injectable 5000 Unit(s) SubCutaneous every 8 hours  aspirin enteric coated 81 milliGRAM(s) Oral daily  calcium acetate 667 milliGRAM(s) Oral three times a day with meals  acetylcysteine 20% Inhalation 4 milliLiter(s) Inhalation every 6 hours  cyanocobalamin 1000 MICROGram(s) Oral daily  gabapentin 300 milliGRAM(s) Oral every 8 hours  epoetin jaswinder Injectable 72902 Unit(s) IV Push <User Schedule>  atorvastatin 40 milliGRAM(s) Oral at bedtime  folic acid 1 milliGRAM(s) Oral daily  lidocaine   Patch 2 Patch Transdermal daily  cefepime  IVPB 500 milliGRAM(s) IV Intermittent every 24 hours  azithromycin  IVPB 500 milliGRAM(s) IV Intermittent every 24 hours  Nephro-toi 1 Tablet(s) Oral daily  ammonium lactate 12% Lotion 1 Application(s) Topical daily  ALBUTerol/ipratropium for Nebulization 3 milliLiter(s) Nebulizer every 6 hours  guaiFENesin  milliGRAM(s) Oral every 12 hours  midodrine 10 milliGRAM(s) Oral two times a day  sildenafil (REVATIO) 10 milliGRAM(s) Oral every 8 hours    MEDICATIONS  (PRN):  acetaminophen   Tablet. 650 milliGRAM(s) Oral every 6 hours PRN Mild Pain (1 - 3)    ASSESSMENT & PLAN  76F sustaining a TBI after a fall now with functional deficits  Cardiac/Pulm HTN - Sildenafil 10mg Q8 (decreased from 20mg Q8 6/30), Midodrine 10mg BID (increased from 5mg BID 6/30), DC Lasix (6/29)  PNA - Cefepime (6/19-TBD), Zithromax (6/28-TBD)  Pulm - Mucomyst Q6, Change Albuterol to Duoneb (6/29), Transition to NC (6/30), Solumedrol x4 doses (6/28-6/29), Guaifenesin BID  CAD - Lipitor, ASA  ESRD on HD - Phoslo, HD T/Th/Sat  Anemia - Epogen, Vit B, Folate  Sleep - Melatonin 3mg  GI/Bowel Management - Toilet PRN   Management - Toilet Q2  Skin - Turn Q2, Lac-hydrin BLE  Pain - Neurontin, Lidoderm to right ribs, Tylenol PRN  DVT PPX - Heparin, TEDs, SCDs  Diet - Renal & Cardiac/Thins, Nephro-toi, Nepro TID    Continue comprehensive acute rehab program consisting of 3hrs/day of OT/PT and SLP.

## 2017-07-01 NOTE — PROGRESS NOTE ADULT - PROBLEM SELECTOR PLAN 2
Continue IV antibiotics azithromycin and cefepime, pulmonary follow appreciated, will continue with nebs, supplemental oxygen, patient refusing nocturnal bipap, will provide chest PT and incentive spirometery.

## 2017-07-02 PROCEDURE — 99233 SBSQ HOSP IP/OBS HIGH 50: CPT

## 2017-07-02 PROCEDURE — 99232 SBSQ HOSP IP/OBS MODERATE 35: CPT

## 2017-07-02 RX ADMIN — AZITHROMYCIN 255 MILLIGRAM(S): 500 TABLET, FILM COATED ORAL at 11:35

## 2017-07-02 RX ADMIN — Medication 650 MILLIGRAM(S): at 20:18

## 2017-07-02 RX ADMIN — HEPARIN SODIUM 5000 UNIT(S): 5000 INJECTION INTRAVENOUS; SUBCUTANEOUS at 12:37

## 2017-07-02 RX ADMIN — GABAPENTIN 300 MILLIGRAM(S): 400 CAPSULE ORAL at 22:03

## 2017-07-02 RX ADMIN — LIDOCAINE 2 PATCH: 4 CREAM TOPICAL at 12:37

## 2017-07-02 RX ADMIN — ATORVASTATIN CALCIUM 40 MILLIGRAM(S): 80 TABLET, FILM COATED ORAL at 22:03

## 2017-07-02 RX ADMIN — Medication 4 MILLILITER(S): at 20:32

## 2017-07-02 RX ADMIN — GABAPENTIN 300 MILLIGRAM(S): 400 CAPSULE ORAL at 06:11

## 2017-07-02 RX ADMIN — Medication 4 MILLILITER(S): at 03:20

## 2017-07-02 RX ADMIN — Medication 667 MILLIGRAM(S): at 16:46

## 2017-07-02 RX ADMIN — Medication 3 MILLILITER(S): at 15:18

## 2017-07-02 RX ADMIN — Medication 1 TABLET(S): at 12:37

## 2017-07-02 RX ADMIN — HEPARIN SODIUM 5000 UNIT(S): 5000 INJECTION INTRAVENOUS; SUBCUTANEOUS at 22:03

## 2017-07-02 RX ADMIN — Medication 1 MILLIGRAM(S): at 12:37

## 2017-07-02 RX ADMIN — Medication 650 MILLIGRAM(S): at 22:03

## 2017-07-02 RX ADMIN — GABAPENTIN 300 MILLIGRAM(S): 400 CAPSULE ORAL at 12:37

## 2017-07-02 RX ADMIN — Medication 4 MILLILITER(S): at 15:17

## 2017-07-02 RX ADMIN — HEPARIN SODIUM 5000 UNIT(S): 5000 INJECTION INTRAVENOUS; SUBCUTANEOUS at 06:11

## 2017-07-02 RX ADMIN — Medication 3 MILLILITER(S): at 08:25

## 2017-07-02 RX ADMIN — Medication 1 APPLICATION(S): at 12:37

## 2017-07-02 RX ADMIN — Medication 3 MILLILITER(S): at 03:20

## 2017-07-02 RX ADMIN — Medication 667 MILLIGRAM(S): at 12:37

## 2017-07-02 RX ADMIN — PREGABALIN 1000 MICROGRAM(S): 225 CAPSULE ORAL at 12:37

## 2017-07-02 RX ADMIN — CEFEPIME 100 MILLIGRAM(S): 1 INJECTION, POWDER, FOR SOLUTION INTRAMUSCULAR; INTRAVENOUS at 06:11

## 2017-07-02 RX ADMIN — MIDODRINE HYDROCHLORIDE 10 MILLIGRAM(S): 2.5 TABLET ORAL at 06:11

## 2017-07-02 RX ADMIN — Medication 3 MILLILITER(S): at 20:33

## 2017-07-02 RX ADMIN — Medication 4 MILLILITER(S): at 08:25

## 2017-07-02 RX ADMIN — Medication 667 MILLIGRAM(S): at 09:21

## 2017-07-02 RX ADMIN — Medication 81 MILLIGRAM(S): at 12:37

## 2017-07-02 RX ADMIN — MIDODRINE HYDROCHLORIDE 10 MILLIGRAM(S): 2.5 TABLET ORAL at 16:46

## 2017-07-02 NOTE — PROGRESS NOTE ADULT - PROBLEM SELECTOR PLAN 2
Continue IV antibiotics azithromycin and cefepime, pulmonary following, will continue with nebs, supplemental oxygen, patient refusing nocturnal bipap, will provide agreesive chest PT and incentive spirometer, will get speech therapy.

## 2017-07-02 NOTE — PROGRESS NOTE ADULT - SUBJECTIVE AND OBJECTIVE BOX
DUSTIN HERNANDEZ    [id number]    76y      Female      Patient is a 76y old  Female who presents with a chief complaint of     CC : cough , congestion     HPI :  76F was admitted on 6/14/17 after a mechanical fall while walking outside. She had head trauma but no LOC. In ER, GCS=15. A head CT showed left parietal and temporo-occipital SAH. A repeat head CT was stable. Additional workup showed nondisplaced fractures of the right 4-6 ribs with emphysematous changes, pleural effusion and atelectasis. Hospital course was complicated by shortness of breath/hypoxia and needed BiPAP/high-flow NC. She was started on Cefepime for PNA. Admitted to acute rehab on 6/27.She has been with worsening congestion , and cough ,also she is hypotensive called to evaluate     INTERVAL HPI/OVERNIGHT EVENTS: Patient has some intermittent cough, breathing is improving slowly, on supplemental oxygen with  nasal canula, has constipation no BM for 2-3 days , no nausea, she  is also complaining of central abdominal pain, has no nausea, vomiting, fever, chills, chest pain.       REVIEW OF SYSTEMS:    CONSTITUTIONAL: No fever, some fatigue  RESPIRATORY: has cough and some shortness of breath  CARDIOVASCULAR: No chest pain, palpitations  GASTROINTESTINAL: has abdominal pain, No nausea, vomiting  NEUROLOGICAL: No headaches,  loss of strength.  MISCELLANEOUS: No joint swelling or pain       Vital Signs Last 24 Hrs  T(C): 36 (02 Jul 2017 05:43), Max: 36.7 (01 Jul 2017 18:48)  T(F): 96.8 (02 Jul 2017 05:43), Max: 98.1 (01 Jul 2017 18:48)  HR: 73 (02 Jul 2017 08:26) (67 - 76)  BP: 94/60 (02 Jul 2017 05:43) (94/60 - 101/41)  BP(mean): --  RR: 17 (02 Jul 2017 05:43) (17 - 20)  SpO2: 99% (02 Jul 2017 08:26) (92% - 99%)    PHYSICAL EXAM:    GENERAL: Elderly female looking in mild to moderate respiratory distress.   HEENT: PERRL, +EOMI  NECK: soft, Supple, No JVD,   CHEST/LUNG: Rhonchi bilaterally, decrease air entry B/L, No wheezing  HEART: S1S2+, Regular rate and rhythm; No murmurs.   ABDOMEN: Soft, Nontender, Nondistended; mild tenderness in the middle of abdomen, Bowel sounds present  EXTREMITIES:  1+ Peripheral Pulses, No clubbing, cyanosis, or edema  SKIN: Chronic changes.   NEURO: AAOX3, no focal deficits, no motor r sensory loss      LABS:                        9.7    7.4   )-----------( 194      ( 01 Jul 2017 14:09 )             31.3     07-01    136  |  92<L>  |  71.0<H>  ----------------------------<  128<H>  4.3   |  26.0  |  4.32<H>    Ca    9.2      01 Jul 2017 14:09              I&O's Summary    01 Jul 2017 07:01  -  02 Jul 2017 07:00  --------------------------------------------------------  IN: 800 mL / OUT: 2200 mL / NET: -1400 mL        MEDICATIONS  (STANDING):  heparin  Injectable 5000 Unit(s) SubCutaneous every 8 hours  aspirin enteric coated 81 milliGRAM(s) Oral daily  calcium acetate 667 milliGRAM(s) Oral three times a day with meals  acetylcysteine 20% Inhalation 4 milliLiter(s) Inhalation every 6 hours  cyanocobalamin 1000 MICROGram(s) Oral daily  gabapentin 300 milliGRAM(s) Oral every 8 hours  epoetin jaswinder Injectable 50537 Unit(s) IV Push <User Schedule>  atorvastatin 40 milliGRAM(s) Oral at bedtime  folic acid 1 milliGRAM(s) Oral daily  lidocaine   Patch 2 Patch Transdermal daily  cefepime  IVPB 500 milliGRAM(s) IV Intermittent every 24 hours  azithromycin  IVPB 500 milliGRAM(s) IV Intermittent every 24 hours  Nephro-toi 1 Tablet(s) Oral daily  ammonium lactate 12% Lotion 1 Application(s) Topical daily  ALBUTerol/ipratropium for Nebulization 3 milliLiter(s) Nebulizer every 6 hours  midodrine 10 milliGRAM(s) Oral two times a day    MEDICATIONS  (PRN):  acetaminophen   Tablet. 650 milliGRAM(s) Oral every 6 hours PRN Mild Pain (1 - 3)  guaiFENesin    Syrup 200 milliGRAM(s) Oral every 6 hours PRN Cough  aluminum hydroxide/magnesium hydroxide/simethicone Suspension 30 milliLiter(s) Oral every 4 hours PRN Dyspepsia

## 2017-07-02 NOTE — PROGRESS NOTE ADULT - PROBLEM SELECTOR PLAN 4
as per Pulmonary, recommends to decrease dose of sildenafil due to hypotension, it was decereased, patient still have Low BP, in 80s systolic, d/chandrika Sildenafil as per cardiology we can d/c it, BP is little better today, will continue to monitor BP and also worsening of breathing.

## 2017-07-02 NOTE — PROGRESS NOTE ADULT - SUBJECTIVE AND OBJECTIVE BOX
HISTORY OF PRESENT ILLNESS  76F was admitted on 6/14/17 after a mechanical fall while walking outside. She had head trauma but no LOC. In ER, GCS=15. A head CT showed left parietal and temporo-occipital SAH. A repeat head CT was stable. Additional workup showed nondisplaced fractures of the right 4-6 ribs with emphysematous changes, pleural effusion and atelectasis. Hospital course was complicated by shortness of breath/hypoxia and needed BiPAP/high-flow NC. She was started on Cefepime for PNA. Admitted to acute rehab on 6/27.    PMHx - HTN, ESRD on HD, AFIB (no full AC due to fall and bleeding risk), CAD, COPD (on home O2 overnight), Cholelithiasis, Gout, Hyperlipidemia, Spinal stenosis, s/p AVR/MVR/Tricupsid valve annuloplasty, Cataracts repair, H/O spinal fusion, Appendectomy, Tonsillectomy, C-Spine DJD    TODAY'S SUBJECTIVE & REVIEW OF SYMPTOMS  [ ] Constitutional WNL      [X] Cardio WNL            [ ] Resp WNL           [X] GI WNL                      [X]  WNL                [ ] Heme WNL              [X] Endo WNL                  [ ] Skin WNL               [ ] MSK WNL            [ ] Neuro WNL                  [ ] Cognitive WNL        [ ] Psych WNL    No acute events overnight. Patient tolerated HD yesterday. She feels "alright" and denies any pain. Therapy is good.      Vital Signs Last 24 Hrs  T(C): 36 (02 Jul 2017 05:43), Max: 36.7 (01 Jul 2017 18:48)  T(F): 96.8 (02 Jul 2017 05:43), Max: 98.1 (01 Jul 2017 18:48)  HR: 73 (02 Jul 2017 08:26) (67 - 76)  BP: 94/60 (02 Jul 2017 05:43) (94/60 - 101/41)  BP(mean): --  RR: 17 (02 Jul 2017 05:43) (17 - 20)  SpO2: 99% (02 Jul 2017 08:26) (92% - 99%)    PHYSICAL EXAM  Constitutional - NAD, Comfortable  HEENT - Facial ecchymosis, right supraorbital laceration  Neck - Supple, No limited ROM  Chest - +Diffuse rhonchi, +Diffuse crackles  Cardiovascular - S1S2  Abdomen - BS+, Soft, NTND  Extremities - No C/C, No calf tenderness, Right 2nd MCP swelling and pain to palpation - Improving  Neurologic Exam - Right field cut                    Cognitive - AAO to self, place, month/year, some of situation     Communication - Expressive deficits     Motor - No focal deficits     Sensory - Intact to LT     Coordination - FTN impaired  Psychiatric - Mood stable, Affect Flat  Skin - Multiple ecchymosis throughout the limbs     FUNCTIONAL PROGRESS (7/1/17)  Gait - Unsafe at this time  ADLs - total A  Transfers - sit-to-stand mod A x 2  Functional transfer - Bed transfer mod A x 2    RECENT LABS                  9.7    7.4   )-----------( 194      ( 01 Jul 2017 14:09 )             31.3     07-01    136  |  92<L>  |  71.0<H>  ----------------------------<  128<H>  4.3   |  26.0  |  4.32<H>    Ca    9.2      01 Jul 2017 14:09      RADIOLOGY/OTHER RESULTS  CXR 6/27 -  Unchanged moderate right pleural effusion with associated atelectasis or consolidation. Bilateral perihilar opacities, probably mild pulmonary edema.  Right hand XR 6/27 - No acute fracture or dislocation of the right hand.    MEDICATIONS  (STANDING):  heparin  Injectable 5000 Unit(s) SubCutaneous every 8 hours  aspirin enteric coated 81 milliGRAM(s) Oral daily  calcium acetate 667 milliGRAM(s) Oral three times a day with meals  acetylcysteine 20% Inhalation 4 milliLiter(s) Inhalation every 6 hours  cyanocobalamin 1000 MICROGram(s) Oral daily  gabapentin 300 milliGRAM(s) Oral every 8 hours  epoetin jaswinder Injectable 47573 Unit(s) IV Push <User Schedule>  atorvastatin 40 milliGRAM(s) Oral at bedtime  folic acid 1 milliGRAM(s) Oral daily  lidocaine   Patch 2 Patch Transdermal daily  cefepime  IVPB 500 milliGRAM(s) IV Intermittent every 24 hours  azithromycin  IVPB 500 milliGRAM(s) IV Intermittent every 24 hours  Nephro-toi 1 Tablet(s) Oral daily  ammonium lactate 12% Lotion 1 Application(s) Topical daily  ALBUTerol/ipratropium for Nebulization 3 milliLiter(s) Nebulizer every 6 hours  midodrine 10 milliGRAM(s) Oral two times a day    MEDICATIONS  (PRN):  acetaminophen   Tablet. 650 milliGRAM(s) Oral every 6 hours PRN Mild Pain (1 - 3)  guaiFENesin    Syrup 200 milliGRAM(s) Oral every 6 hours PRN Cough  aluminum hydroxide/magnesium hydroxide/simethicone Suspension 30 milliLiter(s) Oral every 4 hours PRN Dyspepsia      ASSESSMENT & PLAN  76F sustaining a TBI after a fall now with functional deficits    Cardiac/Pulm HTN - Midodrine 10mg BID (increased from 5mg BID 6/30), DC Lasix (6/29). Sildenafil DC'ed by hospitalist 7/1/17  PNA - Cefepime (6/19-TBD), Zithromax (6/28-TBD)  Pulm - Mucomyst Q6, Change Albuterol to Duoneb (6/29), Transition to NC (6/30), Solumedrol x4 doses (6/28-6/29), Guaifenesin BID  CAD - Lipitor, ASA  ESRD on HD - Phoslo, HD T/Th/Sat  Anemia - Epogen, Vit B, Folate  Sleep - Melatonin 3mg  GI/Bowel Management - Toilet PRN   Management - Toilet Q2  Skin - Turn Q2, Lac-hydrin BLE  Pain - Neurontin, Lidoderm to right ribs, Tylenol PRN  DVT PPX - Heparin, TEDs, SCDs  Diet - Renal & Cardiac/Thins, Nephro-toi, Nepro TID    Continue comprehensive acute rehab program consisting of 3hrs/day of OT/PT and SLP.

## 2017-07-03 PROCEDURE — 99221 1ST HOSP IP/OBS SF/LOW 40: CPT

## 2017-07-03 PROCEDURE — 99233 SBSQ HOSP IP/OBS HIGH 50: CPT

## 2017-07-03 RX ORDER — LACTULOSE 10 G/15ML
10 SOLUTION ORAL DAILY
Qty: 0 | Refills: 0 | Status: DISCONTINUED | OUTPATIENT
Start: 2017-07-03 | End: 2017-07-26

## 2017-07-03 RX ADMIN — GABAPENTIN 300 MILLIGRAM(S): 400 CAPSULE ORAL at 06:20

## 2017-07-03 RX ADMIN — Medication 667 MILLIGRAM(S): at 17:31

## 2017-07-03 RX ADMIN — Medication 81 MILLIGRAM(S): at 11:50

## 2017-07-03 RX ADMIN — AZITHROMYCIN 255 MILLIGRAM(S): 500 TABLET, FILM COATED ORAL at 12:04

## 2017-07-03 RX ADMIN — MIDODRINE HYDROCHLORIDE 10 MILLIGRAM(S): 2.5 TABLET ORAL at 17:29

## 2017-07-03 RX ADMIN — Medication 650 MILLIGRAM(S): at 18:50

## 2017-07-03 RX ADMIN — HEPARIN SODIUM 5000 UNIT(S): 5000 INJECTION INTRAVENOUS; SUBCUTANEOUS at 14:17

## 2017-07-03 RX ADMIN — Medication 3 MILLILITER(S): at 21:16

## 2017-07-03 RX ADMIN — Medication 1 MILLIGRAM(S): at 11:51

## 2017-07-03 RX ADMIN — Medication 4 MILLILITER(S): at 08:36

## 2017-07-03 RX ADMIN — HEPARIN SODIUM 5000 UNIT(S): 5000 INJECTION INTRAVENOUS; SUBCUTANEOUS at 21:33

## 2017-07-03 RX ADMIN — Medication 3 MILLILITER(S): at 15:27

## 2017-07-03 RX ADMIN — Medication 200 MILLIGRAM(S): at 14:18

## 2017-07-03 RX ADMIN — Medication 650 MILLIGRAM(S): at 08:24

## 2017-07-03 RX ADMIN — MIDODRINE HYDROCHLORIDE 10 MILLIGRAM(S): 2.5 TABLET ORAL at 06:19

## 2017-07-03 RX ADMIN — Medication 667 MILLIGRAM(S): at 11:52

## 2017-07-03 RX ADMIN — LIDOCAINE 2 PATCH: 4 CREAM TOPICAL at 01:00

## 2017-07-03 RX ADMIN — PREGABALIN 1000 MICROGRAM(S): 225 CAPSULE ORAL at 11:51

## 2017-07-03 RX ADMIN — GABAPENTIN 300 MILLIGRAM(S): 400 CAPSULE ORAL at 14:17

## 2017-07-03 RX ADMIN — CEFEPIME 100 MILLIGRAM(S): 1 INJECTION, POWDER, FOR SOLUTION INTRAMUSCULAR; INTRAVENOUS at 06:19

## 2017-07-03 RX ADMIN — GABAPENTIN 300 MILLIGRAM(S): 400 CAPSULE ORAL at 21:33

## 2017-07-03 RX ADMIN — Medication 650 MILLIGRAM(S): at 09:15

## 2017-07-03 RX ADMIN — LIDOCAINE 2 PATCH: 4 CREAM TOPICAL at 11:51

## 2017-07-03 RX ADMIN — ATORVASTATIN CALCIUM 40 MILLIGRAM(S): 80 TABLET, FILM COATED ORAL at 21:33

## 2017-07-03 RX ADMIN — Medication 650 MILLIGRAM(S): at 17:29

## 2017-07-03 RX ADMIN — Medication 1 APPLICATION(S): at 11:50

## 2017-07-03 RX ADMIN — Medication 3 MILLILITER(S): at 08:36

## 2017-07-03 RX ADMIN — Medication 667 MILLIGRAM(S): at 08:23

## 2017-07-03 RX ADMIN — Medication 1 TABLET(S): at 11:52

## 2017-07-03 RX ADMIN — HEPARIN SODIUM 5000 UNIT(S): 5000 INJECTION INTRAVENOUS; SUBCUTANEOUS at 06:19

## 2017-07-03 NOTE — CONSULT NOTE ADULT - ASSESSMENT
Assess  Bronchospasm with wheeze  Valvular HD with CHF  ESRD  Pulmonary HTN principally 2ndary to HTN and valvular HD  Moderate RIAZ - Doesn't want CPAP  Right hemothorax post fall with rib fracture  Post SDH  Chronic afib  Rec  HD per Renal  DuoNeb  Cardio optimization by [facility]/ [facility]  Consult for eventual Lariat vs Watchman procedure - will notify Dr Van, et.al.  Consider CTS evaluation for possible future VATS Pleurectomy on Right  When on nasal 02 DC would be possible
75 yo female with h/o Atrial fibrillation off anticoagulation due to fall risk and bleeding risk , ESRD on HD , admitted s/p fall at home with left SAH , brain injury and developed respiratory distress being treated for pneumonia  , now in rehab . she is hypotensive
75yo F S/P fall fractured R4-6 Ribs  O2 dependent at home-Pulmonary complications during hospitalization  PNA Pleural effusions Duoneb Q6  SOB Hypoxia Cough unproductive--Mucinex ordered  ESRD on HD  Anxiety depression recommend Cymbalta 20mg HS for anxiety, depression and pain  Constipation senna

## 2017-07-03 NOTE — PROGRESS NOTE ADULT - PROBLEM SELECTOR PLAN 2
Continue IV antibiotics azithromycin and cefepime complete course    pulmonary follow up will continue with nebs, supplemental oxygen, patient refusing nocturnal bipap, will provide agressive chest PT , chest vest and incentive spirometer,

## 2017-07-03 NOTE — PROGRESS NOTE ADULT - ASSESSMENT
75 yo female with h/o Atrial fibrillation off anticoagulation due to fall risk and bleeding risk , ESRD on HD ,diastolic CHF , pulmonary hypertension , valvular haeart disease  admitted s/p fall at home with left SAH , brain injury and developed respiratory distress being treated for pneumonia  , now in rehab . Chest congestion, cough now improving  BP remains low, medications  doses of  sildenafil adjusted by pulmonologist

## 2017-07-03 NOTE — PROGRESS NOTE ADULT - PROBLEM SELECTOR PLAN 3
discussed with Dr Babcock last week   , recommends conservative management with HD fluid removal, nephrology is on board, patient is getting HD with extra fluid removal

## 2017-07-03 NOTE — PROGRESS NOTE ADULT - SUBJECTIVE AND OBJECTIVE BOX
Chart reviewed   CC : cough , congestion tired today     INTERVAL HPI/OVERNIGHT EVENTS: Patient has some intermittent cough, breathing is improving slowly, on supplemental oxygen with  nasal canula, feels tired today       REVIEW OF SYSTEMS:    CONSTITUTIONAL: No fever, + fatigue  RESPIRATORY: has cough and some shortness of breath  CARDIOVASCULAR: No chest pain, palpitations  GASTROINTESTINAL: has abdominal pain, No nausea, vomiting  NEUROLOGICAL: No headaches,  loss of strength.  MISCELLANEOUS: No joint swelling or pain     Vital Signs Last 24 Hrs  T(C): 36.2 (03 Jul 2017 06:03), Max: 36.7 (02 Jul 2017 20:45)  T(F): 97.1 (03 Jul 2017 06:03), Max: 98.1 (02 Jul 2017 20:45)  HR: 84 (03 Jul 2017 06:03) (72 - 88)  BP: 100/52 (03 Jul 2017 06:03) (90/62 - 100/52)  BP(mean): --  RR: 17 (03 Jul 2017 06:03) (17 - 18)  SpO2: 98% (03 Jul 2017 06:03) (94% - 98%)PHYSICAL EXAM:    GENERAL: Elderly female looking in mild to moderate respiratory distress.   HEENT: PERRL, +EOMI  NECK: soft, Supple, No JVD,   CHEST/LUNG: Rhonchi bilaterally, decrease air entry B/L, No wheezing  HEART: S1S2+, Regular rate and rhythm; No murmurs.   ABDOMEN: Soft, Nontender, Nondistended; mild tenderness in the middle of abdomen, Bowel sounds present  EXTREMITIES:  1+ Peripheral Pulses, No clubbing, cyanosis, or edema  SKIN: Chronic changes.   NEURO: AAOX3, no focal deficits, no motor r sensory loss                            9.7    7.4   )-----------( 194      ( 01 Jul 2017 14:09 )             31.3   07-01    136  |  92<L>  |  71.0<H>  ----------------------------<  128<H>  4.3   |  26.0  |  4.32<H>    Ca    9.2      01 Jul 2017 14:09

## 2017-07-03 NOTE — CONSULT NOTE ADULT - CONSULT REASON
cough, congestion , ESRD , s/p brain injury secondary to fall  records reviewed , discussed with Dr Yoon
Fell>Fx ribs>PNA on HD
Hypoxia

## 2017-07-03 NOTE — PROGRESS NOTE ADULT - SUBJECTIVE AND OBJECTIVE BOX
HISTORY OF PRESENT ILLNESS  76F was admitted on 6/14/17 after a mechanical fall while walking outside. She had head trauma but no LOC. In ER, GCS=15. A head CT showed left parietal and temporo-occipital SAH. A repeat head CT was stable. Additional workup showed nondisplaced fractures of the right 4-6 ribs with emphysematous changes, pleural effusion and atelectasis. Hospital course was complicated by shortness of breath/hypoxia and needed BiPAP/high-flow NC. She was started on Cefepime for PNA. Admitted to acute rehab on 6/27.    PMHx - HTN, ESRD on HD, AFIB (no full AC due to fall and bleeding risk), CAD, COPD (on home O2 overnight), Cholelithiasis, Gout, Hyperlipidemia, Spinal stenosis, s/p AVR/MVR/Tricupsid valve annuloplasty, Cataracts repair, H/O spinal fusion, Appendectomy, Tonsillectomy, C-Spine DJD    TODAY'S SUBJECTIVE & REVIEW OF SYMPTOMS  [ ] Constitutional WNL      [X] Cardio WNL            [ ] Resp WNL           [X] GI WNL                      [X]  WNL                [ ] Heme WNL              [X] Endo WNL                  [ ] Skin WNL               [ ] MSK WNL            [ ] Neuro WNL                  [ ] Cognitive WNL        [ ] Psych WNL    No overnight events. Doing well with NC O2. Will suspend continuous monitoring during the day. Belly is a little distended, No pain. Continues to have a cough which is nonproductive, but lung exam is improved. Having some generalized soarness from the weekend therapy. Sleeping well at night.     VITALS  T(C): 36.2 (07-03-17 @ 06:03)  T(F): 97.1 (07-03-17 @ 06:03), Max: 98.1 (07-02-17 @ 20:45)  HR: 84 (07-03-17 @ 06:03) (72 - 88)  BP: 100/52 (07-03-17 @ 06:03) (90/62 - 100/52)  RR:  (17 - 18)  SpO2:  (94% - 98%)  Wt(kg): --    PHYSICAL EXAM  Constitutional - NAD, Comfortable  HEENT - Facial ecchymosis, Right supraorbital laceration  Neck - Supple, No limited ROM  Chest - +Rhonchi - Improved   Cardiovascular - S1S2  Abdomen - BS+, Soft, NT, Mildly Distended  Extremities - No C/C, No calf tenderness, BLE PVD skin changes  Neurologic Exam - Right field cut                    Cognitive - AAO to self, place, month/year, some of situation     Communication - Expressive deficits     Motor - No focal deficits     Sensory - Intact to LT     Coordination - FTN impaired  Psychiatric - Mood stable, Affect Flat  Skin - BUE - Multiple ecchymosis    FUNCTIONAL PROGRESS  Gait - Total in WC  ADLs - total A  Transfers - sit-to-stand mod A x 2  Functional transfer - Bed transfer mod A x 2    RECENT LABS            9.7    7.4   )-----------( 194      ( 01 Jul 2017 14:09 )             31.3     07-01    136  |  92<L>  |  71.0<H>  ----------------------------<  128<H>  4.3   |  26.0  |  4.32<H>    Ca    9.2      01 Jul 2017 14:09    Prealb 6/28 - 16    Procalcitonin 7/1 - 2.42    RADIOLOGY/OTHER RESULTS  CXR 6/27 -  Unchanged moderate right pleural effusion with associated atelectasis or consolidation. Bilateral perihilar opacities, probably mild pulmonary edema.  Right hand XR 6/27 - No acute fracture or dislocation of the right hand.    CURRENT MEDICATIONS  MEDICATIONS  (STANDING):  heparin  Injectable 5000 Unit(s) SubCutaneous every 8 hours  aspirin enteric coated 81 milliGRAM(s) Oral daily  calcium acetate 667 milliGRAM(s) Oral three times a day with meals  acetylcysteine 20% Inhalation 4 milliLiter(s) Inhalation every 6 hours  cyanocobalamin 1000 MICROGram(s) Oral daily  gabapentin 300 milliGRAM(s) Oral every 8 hours  epoetin jaswinder Injectable 07097 Unit(s) IV Push <User Schedule>  atorvastatin 40 milliGRAM(s) Oral at bedtime  folic acid 1 milliGRAM(s) Oral daily  lidocaine   Patch 2 Patch Transdermal daily  cefepime  IVPB 500 milliGRAM(s) IV Intermittent every 24 hours  azithromycin  IVPB 500 milliGRAM(s) IV Intermittent every 24 hours  Nephro-toi 1 Tablet(s) Oral daily  ammonium lactate 12% Lotion 1 Application(s) Topical daily  ALBUTerol/ipratropium for Nebulization 3 milliLiter(s) Nebulizer every 6 hours  guaiFENesin  milliGRAM(s) Oral every 12 hours  midodrine 10 milliGRAM(s) Oral two times a day  sildenafil (REVATIO) 10 milliGRAM(s) Oral every 8 hours    MEDICATIONS  (PRN):  acetaminophen   Tablet. 650 milliGRAM(s) Oral every 6 hours PRN Mild Pain (1 - 3)    ASSESSMENT & PLAN  76F sustaining a TBI after a fall now with functional deficits  Cardiac/Pulm HTN - Midodrine, DC Sildenafil (7/1)  PNA - Cefepime (6/19-TBD), Zithromax (6/28-TBD)  Pulm - DC Mucomyst (7/3), Duoneb (6/29), O2 via NC, Guaifenesin PRN  CAD - Lipitor, ASA  ESRD on HD - Phoslo, HD T/Th/Sat  Anemia - Improved, Epogen, Vit B, Folate  Sleep - DC Melatonin (6/28)  GI/Bowel Management - Toilet PRN, Lactulose PRN   Management - Toilet Q2  Skin - Turn Q2, Lachydrin BLE, Zfloats BLE  Pain - Neurontin, Lidoderm to right ribs, Tylenol PRN  DVT PPX - Heparin, TEDs, SCDs  Diet - Renal & Cardiac/Thins, Nephro-toi, Nepro TID    Continue comprehensive acute rehab program consisting of 3hrs/day of OT/PT and SLP HISTORY OF PRESENT ILLNESS  76F was admitted on 6/14/17 after a mechanical fall while walking outside. She had head trauma but no LOC. In ER, GCS=15. A head CT showed left parietal and temporo-occipital SAH. A repeat head CT was stable. Additional workup showed nondisplaced fractures of the right 4-6 ribs with emphysematous changes, pleural effusion and atelectasis. Hospital course was complicated by shortness of breath/hypoxia and needed BiPAP/high-flow NC. She was started on Cefepime for PNA. Admitted to acute rehab on 6/27.    PMHx - HTN, ESRD on HD, AFIB (no full AC due to fall and bleeding risk), CAD, COPD (on home O2 overnight), Cholelithiasis, Gout, Hyperlipidemia, Spinal stenosis, s/p AVR/MVR/Tricupsid valve annuloplasty, Cataracts repair, H/O spinal fusion, Appendectomy, Tonsillectomy, C-Spine DJD    TODAY'S SUBJECTIVE & REVIEW OF SYMPTOMS  [ ] Constitutional WNL      [X] Cardio WNL            [ ] Resp WNL           [X] GI WNL                      [X]  WNL                [ ] Heme WNL              [X] Endo WNL                  [ ] Skin WNL               [ ] MSK WNL            [ ] Neuro WNL                  [ ] Cognitive WNL        [ ] Psych WNL    No overnight events. Doing well with NC O2. Will suspend continuous monitoring during the day. Belly is a little distended, No pain. Continues to have a cough which is nonproductive, but lung exam is improved. Having some generalized soarness from the weekend therapy. Sleeping well at night.     VITALS  T(C): 36.2 (07-03-17 @ 06:03)  T(F): 97.1 (07-03-17 @ 06:03), Max: 98.1 (07-02-17 @ 20:45)  HR: 84 (07-03-17 @ 06:03) (72 - 88)  BP: 100/52 (07-03-17 @ 06:03) (90/62 - 100/52)  RR:  (17 - 18)  SpO2:  (94% - 98%)  Wt(kg): --    PHYSICAL EXAM  Constitutional - NAD, Comfortable  HEENT - Facial ecchymosis, Right supraorbital laceration  Neck - Supple, No limited ROM  Chest - +Rhonchi - Improved   Cardiovascular - S1S2  Abdomen - BS+, Soft, NT, Mildly Distended  Extremities - No C/C, No calf tenderness, BLE PVD skin changes  Neurologic Exam - Right field cut                    Cognitive - AAO to self, place, month/year, some of situation     Communication - Expressive deficits     Motor - No focal deficits     Sensory - Intact to LT     Coordination - FTN impaired  Psychiatric - Mood stable, Affect Flat  Skin - BUE - Multiple ecchymosis    FUNCTIONAL PROGRESS  Gait - Total in WC  ADLs - total A  Transfers - sit-to-stand mod A x 2  Functional transfer - Bed transfer mod A x 2    RECENT LABS            9.7    7.4   )-----------( 194      ( 01 Jul 2017 14:09 )             31.3     07-01    136  |  92<L>  |  71.0<H>  ----------------------------<  128<H>  4.3   |  26.0  |  4.32<H>    Ca    9.2      01 Jul 2017 14:09    Prealb 6/28 - 16    Procalcitonin 7/1 - 2.42    RADIOLOGY/OTHER RESULTS  CXR 6/27 -  Unchanged moderate right pleural effusion with associated atelectasis or consolidation. Bilateral perihilar opacities, probably mild pulmonary edema.  Right hand XR 6/27 - No acute fracture or dislocation of the right hand.    CURRENT MEDICATIONS  MEDICATIONS  (STANDING):  heparin  Injectable 5000 Unit(s) SubCutaneous every 8 hours  aspirin enteric coated 81 milliGRAM(s) Oral daily  calcium acetate 667 milliGRAM(s) Oral three times a day with meals  acetylcysteine 20% Inhalation 4 milliLiter(s) Inhalation every 6 hours  cyanocobalamin 1000 MICROGram(s) Oral daily  gabapentin 300 milliGRAM(s) Oral every 8 hours  epoetin jaswinder Injectable 65332 Unit(s) IV Push <User Schedule>  atorvastatin 40 milliGRAM(s) Oral at bedtime  folic acid 1 milliGRAM(s) Oral daily  lidocaine   Patch 2 Patch Transdermal daily  cefepime  IVPB 500 milliGRAM(s) IV Intermittent every 24 hours  azithromycin  IVPB 500 milliGRAM(s) IV Intermittent every 24 hours  Nephro-toi 1 Tablet(s) Oral daily  ammonium lactate 12% Lotion 1 Application(s) Topical daily  ALBUTerol/ipratropium for Nebulization 3 milliLiter(s) Nebulizer every 6 hours  guaiFENesin  milliGRAM(s) Oral every 12 hours  midodrine 10 milliGRAM(s) Oral two times a day  sildenafil (REVATIO) 10 milliGRAM(s) Oral every 8 hours    MEDICATIONS  (PRN):  acetaminophen   Tablet. 650 milliGRAM(s) Oral every 6 hours PRN Mild Pain (1 - 3)    ASSESSMENT & PLAN  76F sustaining a TBI after a fall now with functional deficits  Cardiac/Pulm HTN - Midodrine, DC Sildenafil (7/1)  PNA - Cefepime (6/19-TBD), Zithromax (6/28-TBD)  Pulm - DC Mucomyst (7/3), Duoneb (6/29), O2 via NC, Guaifenesin PRN  CAD - Lipitor, ASA  ESRD on HD - Phoslo, HD T/Th/Sat  Anemia - Improved, Epogen, Vit B, Folate  Sleep - DC Melatonin (6/28)  GI/Bowel Management - Toilet PRN, Lactulose PRN   Management - Toilet Q2  Skin - Turn Q2, Lachydrin BLE, Zfloats BLE  Pain - Neurontin, Lidoderm to right ribs, Tylenol PRN   DVT PPX - Heparin, TEDs, SCDs  Diet - Renal & Cardiac/Thins, Nephro-toi, Nepro TID    Continue comprehensive acute rehab program consisting of 3hrs/day of OT/PT and SLP HISTORY OF PRESENT ILLNESS  76F was admitted on 6/14/17 after a mechanical fall while walking outside. She had head trauma but no LOC. In ER, GCS=15. A head CT showed left parietal and temporo-occipital SAH. A repeat head CT was stable. Additional workup showed nondisplaced fractures of the right 4-6 ribs with emphysematous changes, pleural effusion and atelectasis. Hospital course was complicated by shortness of breath/hypoxia and needed BiPAP/high-flow NC. She was started on Cefepime for PNA. Admitted to acute rehab on 6/27.    PMHx - HTN, ESRD on HD, AFIB (no full AC due to fall and bleeding risk), CAD, COPD (on home O2 overnight), Cholelithiasis, Gout, Hyperlipidemia, Spinal stenosis, s/p AVR/MVR/Tricupsid valve annuloplasty, Cataracts repair, H/O spinal fusion, Appendectomy, Tonsillectomy, C-Spine DJD    TODAY'S SUBJECTIVE & REVIEW OF SYMPTOMS  [ ] Constitutional WNL      [X] Cardio WNL            [ ] Resp WNL           [X] GI WNL                      [X]  WNL                [ ] Heme WNL              [X] Endo WNL                  [ ] Skin WNL               [ ] MSK WNL            [ ] Neuro WNL                  [ ] Cognitive WNL        [ ] Psych WNL    No overnight events. Doing well with NC O2. Will suspend continuous monitoring during the day. Belly is a little distended, No pain. Continues to have a cough which is nonproductive, but lung exam is improved. Having some generalized soarness from the weekend therapy. Sleeping well at night.     VITALS  T(C): 36.2 (07-03-17 @ 06:03)  T(F): 97.1 (07-03-17 @ 06:03), Max: 98.1 (07-02-17 @ 20:45)  HR: 84 (07-03-17 @ 06:03) (72 - 88)  BP: 100/52 (07-03-17 @ 06:03) (90/62 - 100/52)  RR:  (17 - 18)  SpO2:  (94% - 98%)  Wt(kg): --    PHYSICAL EXAM  Constitutional - NAD, Comfortable  HEENT - Facial ecchymosis, Right supraorbital laceration  Neck - Supple, No limited ROM  Chest - +Rhonchi - Improved   Cardiovascular - S1S2  Abdomen - BS+, Soft, NT, Mildly Distended  Extremities - No C/C, No calf tenderness, BLE PVD skin changes  Neurologic Exam - Right field cut                    Cognitive - AAO to self, place, month/year, some of situation     Communication - Expressive deficits     Motor - No focal deficits     Sensory - Intact to LT     Coordination - FTN impaired  Psychiatric - Mood stable, Affect Flat  Skin - BUE - Multiple ecchymosis    FUNCTIONAL PROGRESS  Gait - Total in WC  ADLs - total A  Transfers - sit-to-stand mod A x 2  Functional transfer - Bed transfer mod A x 2    RECENT LABS            9.7    7.4   )-----------( 194      ( 01 Jul 2017 14:09 )             31.3     07-01    136  |  92<L>  |  71.0<H>  ----------------------------<  128<H>  4.3   |  26.0  |  4.32<H>    Ca    9.2      01 Jul 2017 14:09    Prealb 6/28 - 16    Procalcitonin 7/1 - 2.42    RADIOLOGY/OTHER RESULTS  CXR 6/27 -  Unchanged moderate right pleural effusion with associated atelectasis or consolidation. Bilateral perihilar opacities, probably mild pulmonary edema.  Right hand XR 6/27 - No acute fracture or dislocation of the right hand.    CURRENT MEDICATIONS  MEDICATIONS  (STANDING):  heparin  Injectable 5000 Unit(s) SubCutaneous every 8 hours  aspirin enteric coated 81 milliGRAM(s) Oral daily  calcium acetate 667 milliGRAM(s) Oral three times a day with meals  acetylcysteine 20% Inhalation 4 milliLiter(s) Inhalation every 6 hours  cyanocobalamin 1000 MICROGram(s) Oral daily  gabapentin 300 milliGRAM(s) Oral every 8 hours  epoetin jaswinder Injectable 68806 Unit(s) IV Push <User Schedule>  atorvastatin 40 milliGRAM(s) Oral at bedtime  folic acid 1 milliGRAM(s) Oral daily  lidocaine   Patch 2 Patch Transdermal daily  cefepime  IVPB 500 milliGRAM(s) IV Intermittent every 24 hours  azithromycin  IVPB 500 milliGRAM(s) IV Intermittent every 24 hours  Nephro-toi 1 Tablet(s) Oral daily  ammonium lactate 12% Lotion 1 Application(s) Topical daily  ALBUTerol/ipratropium for Nebulization 3 milliLiter(s) Nebulizer every 6 hours  guaiFENesin  milliGRAM(s) Oral every 12 hours  midodrine 10 milliGRAM(s) Oral two times a day  sildenafil (REVATIO) 10 milliGRAM(s) Oral every 8 hours    MEDICATIONS  (PRN):  acetaminophen   Tablet. 650 milliGRAM(s) Oral every 6 hours PRN Mild Pain (1 - 3)    ASSESSMENT & PLAN  76F sustaining a TBI after a fall now with functional deficits  Cardiac/Pulm HTN - Midodrine, DC Sildenafil (7/1)  PNA - Cefepime (6/19-TBD), Zithromax (6/28-TBD)  Pulm - DC Mucomyst (7/3), Duoneb (6/29), O2 via NC, Guaifenesin PRN  CAD - Lipitor, ASA  ESRD on HD - Phoslo, HD T/Th/Sat  Anemia - Improved, Epogen, Vit B, Folate  Sleep - DC Melatonin (6/28)  GI/Bowel Management - Toilet PRN, Lactulose PRN   Management - Toilet Q2  Skin - Turn Q2, Lachydrin BLE, Zfloats BLE  Pain - Neurontin, Lidoderm to right ribs, Tylenol PRN, Ice to left hand PRN  DVT PPX - Heparin, TEDs, SCDs  Diet - Renal & Cardiac/Thins, Nephro-toi, Nepro TID    Continue comprehensive acute rehab program consisting of 3hrs/day of OT/PT and SLP

## 2017-07-03 NOTE — PROGRESS NOTE ADULT - SUBJECTIVE AND OBJECTIVE BOX
Patient seen and examined    Feels tired  no c/o CP  NV has cough and some wheezing  No swelling feet      Vital Signs Last 24 Hrs  T(C): 36.2 (07-03-17 @ 06:03), Max: 36.7 (07-02-17 @ 20:45)  T(F): 97.1 (07-03-17 @ 06:03), Max: 98.1 (07-02-17 @ 20:45)  HR: 84 (07-03-17 @ 06:03) (84 - 88)  BP: 100/52 (07-03-17 @ 06:03) (90/62 - 100/52)  BP(mean): --  RR: 17 (07-03-17 @ 06:03) (17 - 18)  SpO2: 98% (07-03-17 @ 06:03) (94% - 98%)    Awake/alert; NAD  chest Clear ant; scattered rhonchi post  No JVD  No murmer  abd soft, NT BS +  Tr edema      Impression  patient stable ex needs drug nebs  slowly getting stronger but didn't do much PT today  HD am    Continue same treatment

## 2017-07-03 NOTE — CONSULT NOTE ADULT - SUBJECTIVE AND OBJECTIVE BOX
HPI:This is a frail 76yoF who just returned from PT session, cut short because daughter agitated patient, "she pushes me too hard.  Admitted after falling caused Rib fractures and pulmonary complications. She has CRF and is maintained on HD  SOB on Nasal O2. Coughing loose but unproductive frequently. CC:Pain in right hand throbbing worse today.  Not sleeping well. Appetite fair bowels slow, Denies N/V/D CP Palpitations dizziness  Is anxious and depressed although she denies she is having trouble with her mood  or coping."      PERTINENT PMH REVIEWED: Yes     PAST MEDICAL & SURGICAL HISTORY:  ESRD (end stage renal disease) on dialysis: MWF via [facility] Permacath  planned AVF creation  Sinusitis, unspecified chronicity, unspecified location  Gout  Gall stones  Pneumonia  Anemia  Pulmonary hypertension  COPD (chronic obstructive pulmonary disease)  CAD (coronary artery disease)  Atrial fibrillation  Hyperlipidemia  Hypertension  Spinal fusion failure, initial encounter  Tubal ligation status  Sinusitis  Spinal stenosis  S/P tonsillectomy  S/P appendectomy  H/O aortic valve replacement  H/O spinal fusion  H/O cataract  Cystocele  H/O tricuspid valve annuloplasty  H/O mitral valve repair      SOCIAL HISTORY:  EtOH   No                                    Drugs  Yes  No                                    smoker  nonsmoker                                    Admitted from: home  HCP Daughter Estephanie Crespo [phone]:    FAMILY HISTORY:  CAD (coronary artery disease)      Allergies    allopurinol (Rash)  codeine (Nausea; Vomiting)  penicillin (Rash)  spironolactone (Unknown)    Intolerances    Baseline ADLs (prior to admission):  Independent    Present Symptoms:     Dyspnea:  2 -3   Nausea/Vomiting:  No  Anxiety:  Yes   Depression: Yes   Fatigue: Yes   Loss of appetite:  No    Pain: In left hand dominant            Character-            Duration-            Effect-            Factors-            Frequency-            Location-            Severity-    Review of Systems: Reviewed                    All others negative    MEDICATIONS  (STANDING):  heparin  Injectable 5000 Unit(s) SubCutaneous every 8 hours  aspirin enteric coated 81 milliGRAM(s) Oral daily  calcium acetate 667 milliGRAM(s) Oral three times a day with meals  cyanocobalamin 1000 MICROGram(s) Oral daily  gabapentin 300 milliGRAM(s) Oral every 8 hours  epoetin jaswinder Injectable 16214 Unit(s) IV Push <User Schedule>  atorvastatin 40 milliGRAM(s) Oral at bedtime  folic acid 1 milliGRAM(s) Oral daily  lidocaine   Patch 2 Patch Transdermal daily  cefepime  IVPB 500 milliGRAM(s) IV Intermittent every 24 hours  azithromycin  IVPB 500 milliGRAM(s) IV Intermittent every 24 hours  Nephro-toi 1 Tablet(s) Oral daily  ammonium lactate 12% Lotion 1 Application(s) Topical daily  ALBUTerol/ipratropium for Nebulization 3 milliLiter(s) Nebulizer every 6 hours  midodrine 10 milliGRAM(s) Oral two times a day    MEDICATIONS  (PRN):  acetaminophen   Tablet. 650 milliGRAM(s) Oral every 6 hours PRN Mild Pain (1 - 3)  guaiFENesin    Syrup 200 milliGRAM(s) Oral every 6 hours PRN Cough  lactulose Syrup 10 Gram(s) Oral daily PRN no bm > 2 days      PHYSICAL EXAM:    Vital Signs Last 24 Hrs  T(C): 36.2 (03 Jul 2017 06:03), Max: 36.7 (02 Jul 2017 20:45)  T(F): 97.1 (03 Jul 2017 06:03), Max: 98.1 (02 Jul 2017 20:45)  HR: 84 (03 Jul 2017 06:03) (84 - 88)  BP: 100/52 (03 Jul 2017 06:03) (90/62 - 100/52)  BP(mean): --  RR: 17 (03 Jul 2017 06:03) (17 - 18)  SpO2: 98% (03 Jul 2017 06:03) (94% - 98%)    General: alert  oriented x _3___ anxious upset with her daughter-                    HEENT: normal  dry mouth     Lungs:  tachypnea/labored breathing      CV: normal      GI: normal  distended                 constipation  last BM:     : normal  incontinent     MSK:   weakness  edema           bedbound/wheelchair bound    Skin: normal  not assessed- in wheelchair in hallway    LABS:          I&O's Summary    RADIOLOGY & ADDITIONAL STUDIES:    ADVANCE DIRECTIVES:   DNR YES NO  Completed on:                     MOLST  YES NO   Completed on:  Living Will  YES NO   Completed on:
PMd : Dr cisneros  Cardio : [facility]     CC : cough , congestion   HPI :  76F was admitted on 6/14/17 after a mechanical fall while walking outside. She had head trauma but no LOC. In ER, GCS=15. A head CT showed left parietal and temporo-occipital SAH. A repeat head CT was stable. Additional workup showed nondisplaced fractures of the right 4-6 ribs with emphysematous changes, pleural effusion and atelectasis. Hospital course was complicated by shortness of breath/hypoxia and needed BiPAP/high-flow NC. She was started on Cefepime for PNA. Admitted to acute rehab on 6/27.She has been with worsening congestion , and cough ,also she is hypotensive called to evaluate     MEDICATIONS  (STANDING):  heparin  Injectable 5000 Unit(s) SubCutaneous every 8 hours  aspirin enteric coated 81 milliGRAM(s) Oral daily  calcium acetate 667 milliGRAM(s) Oral three times a day with meals  acetylcysteine 20% Inhalation 4 milliLiter(s) Inhalation every 6 hours  ALBUTerol    0.083% 2.5 milliGRAM(s) Nebulizer every 6 hours  cyanocobalamin 1000 MICROGram(s) Oral daily  sildenafil (REVATIO) 20 milliGRAM(s) Oral three times a day  melatonin. 3 milliGRAM(s) Oral at bedtime  gabapentin 300 milliGRAM(s) Oral every 8 hours  epoetin jaswinder Injectable 78001 Unit(s) IV Push <User Schedule>  atorvastatin 40 milliGRAM(s) Oral at bedtime  folic acid 1 milliGRAM(s) Oral daily  lidocaine   Patch 2 Patch Transdermal daily  midodrine 5 milliGRAM(s) Oral two times a day  cefepime  IVPB 500 milliGRAM(s) IV Intermittent every 24 hours  furosemide    Tablet 20 milliGRAM(s) Oral daily  methylPREDNISolone sodium succinate Injectable 40 milliGRAM(s) IV Push every 6 hours    MEDICATIONS  (PRN):  acetaminophen   Tablet. 650 milliGRAM(s) Oral every 6 hours PRN Mild Pain (1 - 3)  She is sitting in the chair comfortable awake alert ,  at the bedside , no distress .She denies chest pain , dizziness           PAST MEDICAL & SURGICAL HISTORY:  ESRD (end stage renal disease) on dialysis: MWF via [facility] Permacath  planned AVF creation  Sinusitis, unspecified chronicity, unspecified location  Gout  Gall stones  Pneumonia  Anemia  Pulmonary hypertension  COPD (chronic obstructive pulmonary disease)  CAD (coronary artery disease)  Atrial fibrillation  Hyperlipidemia  Hypertension  Spinal fusion failure, initial encounter  Tubal ligation status  Sinusitis  Spinal stenosis  S/P tonsillectomy  S/P appendectomy  H/O aortic valve replacement  H/O spinal fusion  H/O cataract  Cystocele  H/O tricuspid valve annuloplasty  H/O mitral valve repair      Social History:  Tabacco - ex smoker in the past   ETOH - no alcohol use   Illicit drug abuse - denies    FAMILY HISTORY:  CAD (coronary artery disease)      Allergies    allopurinol (Rash)  codeine (Nausea; Vomiting)  penicillin (Rash)  spironolactone (Unknown)    Intolerances            REVIEW OF SYSTEMS:    CONSTITUTIONAL: No fever, weight loss, or fatigue  EYES: No eye pain, + visual disturbances, no discharge  NECK: No pain or stiffness  RESPIRATORY: + cough,no  wheezing, chills or hemoptysis; No shortness of breath  CARDIOVASCULAR: No chest pain, palpitations, dizziness, or leg swelling  GASTROINTESTINAL: No abdominal or epigastric pain. No nausea, vomiting, or hematemesis; No diarrhea or constipation. No melena or hematochezia.  GENITOURINARY: No dysuria, frequency, hematuria, or incontinence  NEUROLOGICAL: No headaches,+ memory loss, loss of strength, numbness, or tremors  SKIN: No itching, burning, rashes, or lesions   LYMPH NODES: No enlarged glands  ENDOCRINE: No heat or cold intolerance; No hair loss  MUSCULOSKELETAL: no joint pain   PSYCHIATRIC: No depression, anxiety, mood swings, or difficulty sleeping  HEME/LYMPH: No easy bruising, or bleeding gums  ALLERGY AND IMMUNOLOGIC: No hives or eczema    MEDICATIONS  (STANDING):  heparin  Injectable 5000 Unit(s) SubCutaneous every 8 hours  aspirin enteric coated 81 milliGRAM(s) Oral daily  calcium acetate 667 milliGRAM(s) Oral three times a day with meals  acetylcysteine 20% Inhalation 4 milliLiter(s) Inhalation every 6 hours  ALBUTerol    0.083% 2.5 milliGRAM(s) Nebulizer every 6 hours  cyanocobalamin 1000 MICROGram(s) Oral daily  sildenafil (REVATIO) 20 milliGRAM(s) Oral three times a day  melatonin. 3 milliGRAM(s) Oral at bedtime  gabapentin 300 milliGRAM(s) Oral every 8 hours  epoetin jaswinder Injectable 51058 Unit(s) IV Push <User Schedule>  atorvastatin 40 milliGRAM(s) Oral at bedtime  folic acid 1 milliGRAM(s) Oral daily  lidocaine   Patch 2 Patch Transdermal daily  midodrine 5 milliGRAM(s) Oral two times a day  cefepime  IVPB 500 milliGRAM(s) IV Intermittent every 24 hours  furosemide    Tablet 20 milliGRAM(s) Oral daily  methylPREDNISolone sodium succinate Injectable 40 milliGRAM(s) IV Push every 6 hours    MEDICATIONS  (PRN):  acetaminophen   Tablet. 650 milliGRAM(s) Oral every 6 hours PRN Mild Pain (1 - 3)      Vital Signs Last 24 Hrs  T(C): 36.9 (28 Jun 2017 08:38), Max: 38.2 (27 Jun 2017 20:21)  T(F): 98.5 (28 Jun 2017 08:38), Max: 100.7 (27 Jun 2017 20:21)  HR: 79 (28 Jun 2017 08:38) (76 - 89)  BP: 89/52 (28 Jun 2017 08:38) (85/43 - 95/65)  BP(mean): --  RR: 20 (28 Jun 2017 08:38) (18 - 22)  SpO2: 96% (28 Jun 2017 10:04) (90% - 98%)    PHYSICAL EXAM:    GENERAL: NAD, well-groomed, well-developed  HEAD:  Atraumatic, Normocephalic  EYES: EOMI, PERRLA, conjunctiva   NECK: Supple, No JVD, Normal thyroid  NERVOUS SYSTEM:  Alert & Oriented X3, Generalized weakness   CHEST/LUNG: CTA  b/l,  no rales, rhonchi, wheezing, or rubs  HEART: Regular rate and rhythm; No murmurs, rubs, or gallops  ABDOMEN: Soft, Nontender, Nondistended; Bowel sounds present  EXTREMITIES:  2+ Peripheral Pulses, No clubbing, cyanosis, or edema ,   LYMPH: No lymphadenopathy noted  SKIN: No rashes or lesions    LABS:                        9.3    6.9   )-----------( 162      ( 28 Jun 2017 09:11 )             30.3     06-28    136  |  94<L>  |  40.0<H>  ----------------------------<  78  5.2   |  25.0  |  3.61<H>    Ca    9.1      28 Jun 2017 09:11  Phos  5.9     06-27  Mg     2.2     06-27    TPro  6.7  /  Alb  3.6  /  TBili  1.4  /  DBili  x   /  AST  27  /  ALT  29  /  AlkPhos  523<H>  06-28            RADIOLOGY & ADDITIONAL STUDIES:
PULMONARY CONSULT NOTE      DUSTIN HERNANDEZKADE-[id number]    76F was admitted on 6/14/17 after a mechanical fall while walking outside. She had head trauma but no LOC. In ER, GCS=15. A head CT showed left parietal and temporo-occipital SAH. A repeat head CT was stable. Additional workup showed nondisplaced fractures of the right 4-6 ribs with emphysematous changes, pleural effusion and atelectasis. Hospital course was complicated by shortness of breath/hypoxia and needed BiPAP/high-flow NC. She was started on Cefepime for PNA. Admitted to acute rehab on 6/27.    PMHx - HTN, ESRD on HD, AFIB (no full AC due to fall and bleeding risk), CAD, COPD (on home O2 overnight), Cholelithiasis, Gout, Hyperlipidemia, Spinal stenosis, s/p AVR/MVR/Tricupsid valve annuloplasty, Cataracts repair, H/O spinal fusion, Appendectomy, Tonsillectomy, C-Spine DJD    No COPD.  Mild Restriction.  Mild to moderate RIAZ (AHI=16)  Won't use cpap.  Sleeps in chair - noct 02 had been entertained  Followed by me for tiny nodule    Valvular HD (AVR, MVR, TVRepair) and HTN induced pulmonary HTN    Dr Jeanine Ashton ([facility] cardio) was entertaining a WATCHMAN procedure by Dr Van in June      HISTORY OF PRESENT ILLNESS:    MEDICATIONS  (STANDING):  heparin  Injectable 5000 Unit(s) SubCutaneous every 8 hours  aspirin enteric coated 81 milliGRAM(s) Oral daily  calcium acetate 667 milliGRAM(s) Oral three times a day with meals  acetylcysteine 20% Inhalation 4 milliLiter(s) Inhalation every 6 hours  cyanocobalamin 1000 MICROGram(s) Oral daily  sildenafil (REVATIO) 20 milliGRAM(s) Oral three times a day  gabapentin 300 milliGRAM(s) Oral every 8 hours  epoetin jaswinder Injectable 76318 Unit(s) IV Push <User Schedule>  atorvastatin 40 milliGRAM(s) Oral at bedtime  folic acid 1 milliGRAM(s) Oral daily  lidocaine   Patch 2 Patch Transdermal daily  midodrine 5 milliGRAM(s) Oral two times a day  cefepime  IVPB 500 milliGRAM(s) IV Intermittent every 24 hours  azithromycin  IVPB 500 milliGRAM(s) IV Intermittent every 24 hours  Nephro-toi 1 Tablet(s) Oral daily  ammonium lactate 12% Lotion 1 Application(s) Topical daily  ALBUTerol/ipratropium for Nebulization 3 milliLiter(s) Nebulizer every 6 hours  guaiFENesin  milliGRAM(s) Oral every 12 hours      MEDICATIONS  (PRN):  acetaminophen   Tablet. 650 milliGRAM(s) Oral every 6 hours PRN Mild Pain (1 - 3)      Allergies    allopurinol (Rash)  codeine (Nausea; Vomiting)  penicillin (Rash)  spironolactone (Unknown)    Intolerances        PAST MEDICAL & SURGICAL HISTORY:  ESRD (end stage renal disease) on dialysis: MWF via R [facility] Permacath  planned AVF creation  Sinusitis, unspecified chronicity, unspecified location  Gout  Gall stones  Pneumonia  Anemia  Pulmonary hypertension  COPD (chronic obstructive pulmonary disease)  CAD (coronary artery disease)  Atrial fibrillation  Hyperlipidemia  Hypertension  Spinal fusion failure, initial encounter  Tubal ligation status  Sinusitis  Spinal stenosis  S/P tonsillectomy  S/P appendectomy  H/O aortic valve replacement  H/O spinal fusion  H/O cataract  Cystocele  H/O tricuspid valve annuloplasty  H/O mitral valve repair      FAMILY HISTORY:  CAD (coronary artery disease)      SOCIAL HISTORY  Smoking History: Trivial distant smoker    REVIEW OF SYSTEMS:    CONSTITUTIONAL:  No fevers, chills, sweats    HEENT:  Eyes:  No diplopia or blurred vision. ENT:  No earache, sore throat or runny nose.    CARDIOVASCULAR:  No chest pain; no palpitations.    RESPIRATORY:  No  PND or orthopnea. Mild SOBOE    GASTROINTESTINAL:  No abdominal pain, nausea, vomiting or diarrhea.    GENITOURINARY:  No dysuria, frequency or urgency.    NEUROLOGIC:  No paresthesias, fasciculations, seizures or weakness.    PSYCHIATRIC:  No disorder of thought or mood.    Vital Signs Last 24 Hrs  T(C): 36.3 (29 Jun 2017 12:10), Max: 37.1 (28 Jun 2017 20:04)  T(F): 97.4 (29 Jun 2017 12:10), Max: 98.7 (28 Jun 2017 20:04)  HR: 71 (29 Jun 2017 12:10) (70 - 80)  BP: 100/60 (29 Jun 2017 12:10) (89/46 - 100/60)  BP(mean): --  RR: 20 (29 Jun 2017 05:34) (20 - 20)  SpO2: 100% (29 Jun 2017 12:10) (91% - 100%)    PHYSICAL EXAMINATION:    GENERAL: The patient is a well-developed, well-nourished _____in no apparent distress.     HEENT: Head is normocephalic and atraumatic. Extraocular muscles are intact. Mucous membranes are moist.     NECK: Supple.     LUNGS: Exp wheeze to auscultation without wheezing, rales, or rhonchi. Respirations unlabored    HEART: Regular rate and rhythm without murmur.    ABDOMEN: Soft, nontender, and nondistended.  No hepatosplenomegaly is noted.    EXTREMITIES: Without any cyanosis, clubbing, rash, lesions or edema.    NEUROLOGIC: Grossly intact.      LABS:                        9.3    6.9   )-----------( 162      ( 28 Jun 2017 09:11 )             30.3     06-29    135  |  93<L>  |  62.0<H>  ----------------------------<  188<H>  5.8<H>   |  24.0  |  4.85<H>    Ca    9.2      29 Jun 2017 08:00    TPro  6.7  /  Alb  3.6  /  TBili  1.4  /  DBili  x   /  AST  27  /  ALT  29  /  AlkPhos  523<H>  06-28        Serum Pro-Brain Natriuretic Peptide: >58074 pg/mL (06-29-17 @ 08:00)        RADIOLOGY & ADDITIONAL STUDIES:  CXR on 6/23/17  IMPRESSION:     1.  Since June 23, 2017, unchanged moderate right pleural effusion with   associated atelectasis or consolidation.   2.  Bilateral perihilar opacities, probably mild pulmonary edema.    VENKATESH BRAUN M.D., ATTENDING RADIOLOGIST  This document has been electronically signed. Jun 28 2017 11:58AM    CT Chest on 6/19/17  IMPRESSION:     Right pleural effusion, larger since 6/14/2017. The density is consistent   with serous fluid. No evidence of hemothorax.    Increasing atelectasis of the right lower lobe and right middle lobe.   Consolidation in the right middle lobe and lingula, new findings since   the prior study. Left basilar atelectasis, a new finding.    On the current study acute fractures of the right fourth through eighth   ribs are noted. No displacement of fracture fragments..     FADIA VALDIVIA M.D., ATTENDING RADIOLOGIST  This document has been electronically signed. Jun 19 2017  1:16PM      Echo on 6/20/17     1. Left ventricular ejection fraction, by visual estimation, is 60 to   65%.   2. Spectral Doppler shows restrictive pattern of left ventricular   myocardial filling (Grade III diastolic dysfunction).   3. RV failure. Severely dilated right ventricle with severely reduced   systolic function.   4. Right ventricular volume and pressure overload.   5. Status post mitral valve repair with residual mild to moderate mitral   regurgitation. Elevated transmitral gradients (mean 6.7 mmHg at a HR of   81 bpm).   6. S/P tricuspid valve repair with moderate to severe tricuspid   regurgitation.   7. Normally functioning aortic prosthesis.   8. Moderate pulmonic valve regurgitation.   9. Estimated pulmonary artery systolic pressure is 88.4 mmHg assuming a   right atrial pressure of 15 mmHg, which is consistent with severe   pulmonary hypertension.  10. Trace pericardial effusion.  11. Right sided pleural effusion.  12. ** Compared to TTE 4/11/2017, pulmonary pressures are higher.     Kristin Yoder DO Electronically signed on 6/20/2017 at 11:44:12 AM

## 2017-07-03 NOTE — PROGRESS NOTE ADULT - PROBLEM SELECTOR PLAN 4
as per Pulmonary, recommends to decrease dose of sildenafil due to hypotension, it was decreased  patient still have Low BP, in 80s systolic, d/chandrika Sildenafil as per cardiology we can d/c it, BP is little better today, will continue to monitor BP and also worsening of breathing.

## 2017-07-04 LAB
ANION GAP SERPL CALC-SCNC: 18 MMOL/L — HIGH (ref 5–17)
BUN SERPL-MCNC: 84 MG/DL — HIGH (ref 8–20)
CALCIUM SERPL-MCNC: 8.8 MG/DL — SIGNIFICANT CHANGE UP (ref 8.6–10.2)
CHLORIDE SERPL-SCNC: 91 MMOL/L — LOW (ref 98–107)
CO2 SERPL-SCNC: 25 MMOL/L — SIGNIFICANT CHANGE UP (ref 22–29)
CREAT SERPL-MCNC: 5.32 MG/DL — HIGH (ref 0.5–1.3)
GLUCOSE SERPL-MCNC: 148 MG/DL — HIGH (ref 70–115)
HCT VFR BLD CALC: 28.3 % — LOW (ref 37–47)
HGB BLD-MCNC: 8.9 G/DL — LOW (ref 12–16)
MCHC RBC-ENTMCNC: 31.4 G/DL — LOW (ref 32–36)
MCHC RBC-ENTMCNC: 34.8 PG — HIGH (ref 27–31)
MCV RBC AUTO: 110.5 FL — HIGH (ref 81–99)
PHOSPHATE SERPL-MCNC: 4.2 MG/DL — SIGNIFICANT CHANGE UP (ref 2.4–4.7)
PLATELET # BLD AUTO: 179 K/UL — SIGNIFICANT CHANGE UP (ref 150–400)
POTASSIUM SERPL-MCNC: 4.8 MMOL/L — SIGNIFICANT CHANGE UP (ref 3.5–5.3)
POTASSIUM SERPL-SCNC: 4.8 MMOL/L — SIGNIFICANT CHANGE UP (ref 3.5–5.3)
RBC # BLD: 2.56 M/UL — LOW (ref 4.4–5.2)
RBC # FLD: 19.5 % — HIGH (ref 11–15.6)
SODIUM SERPL-SCNC: 134 MMOL/L — LOW (ref 135–145)
WBC # BLD: 8.6 K/UL — SIGNIFICANT CHANGE UP (ref 4.8–10.8)
WBC # FLD AUTO: 8.6 K/UL — SIGNIFICANT CHANGE UP (ref 4.8–10.8)

## 2017-07-04 PROCEDURE — 99232 SBSQ HOSP IP/OBS MODERATE 35: CPT | Mod: GC

## 2017-07-04 PROCEDURE — 99233 SBSQ HOSP IP/OBS HIGH 50: CPT

## 2017-07-04 RX ADMIN — Medication 667 MILLIGRAM(S): at 07:45

## 2017-07-04 RX ADMIN — Medication 3 MILLILITER(S): at 15:27

## 2017-07-04 RX ADMIN — LIDOCAINE 2 PATCH: 4 CREAM TOPICAL at 00:55

## 2017-07-04 RX ADMIN — HEPARIN SODIUM 5000 UNIT(S): 5000 INJECTION INTRAVENOUS; SUBCUTANEOUS at 22:07

## 2017-07-04 RX ADMIN — GABAPENTIN 300 MILLIGRAM(S): 400 CAPSULE ORAL at 13:25

## 2017-07-04 RX ADMIN — Medication 1 TABLET(S): at 11:50

## 2017-07-04 RX ADMIN — AZITHROMYCIN 255 MILLIGRAM(S): 500 TABLET, FILM COATED ORAL at 11:50

## 2017-07-04 RX ADMIN — MIDODRINE HYDROCHLORIDE 10 MILLIGRAM(S): 2.5 TABLET ORAL at 06:33

## 2017-07-04 RX ADMIN — HEPARIN SODIUM 5000 UNIT(S): 5000 INJECTION INTRAVENOUS; SUBCUTANEOUS at 13:25

## 2017-07-04 RX ADMIN — Medication 650 MILLIGRAM(S): at 07:45

## 2017-07-04 RX ADMIN — MIDODRINE HYDROCHLORIDE 10 MILLIGRAM(S): 2.5 TABLET ORAL at 18:31

## 2017-07-04 RX ADMIN — HEPARIN SODIUM 5000 UNIT(S): 5000 INJECTION INTRAVENOUS; SUBCUTANEOUS at 06:28

## 2017-07-04 RX ADMIN — ERYTHROPOIETIN 10000 UNIT(S): 10000 INJECTION, SOLUTION INTRAVENOUS; SUBCUTANEOUS at 15:12

## 2017-07-04 RX ADMIN — PREGABALIN 1000 MICROGRAM(S): 225 CAPSULE ORAL at 11:50

## 2017-07-04 RX ADMIN — Medication 650 MILLIGRAM(S): at 20:19

## 2017-07-04 RX ADMIN — ATORVASTATIN CALCIUM 40 MILLIGRAM(S): 80 TABLET, FILM COATED ORAL at 22:07

## 2017-07-04 RX ADMIN — GABAPENTIN 300 MILLIGRAM(S): 400 CAPSULE ORAL at 22:07

## 2017-07-04 RX ADMIN — GABAPENTIN 300 MILLIGRAM(S): 400 CAPSULE ORAL at 06:33

## 2017-07-04 RX ADMIN — LIDOCAINE 2 PATCH: 4 CREAM TOPICAL at 11:50

## 2017-07-04 RX ADMIN — Medication 3 MILLILITER(S): at 03:43

## 2017-07-04 RX ADMIN — Medication 650 MILLIGRAM(S): at 21:09

## 2017-07-04 RX ADMIN — Medication 81 MILLIGRAM(S): at 11:50

## 2017-07-04 RX ADMIN — CEFEPIME 100 MILLIGRAM(S): 1 INJECTION, POWDER, FOR SOLUTION INTRAMUSCULAR; INTRAVENOUS at 06:28

## 2017-07-04 RX ADMIN — Medication 1 MILLIGRAM(S): at 11:50

## 2017-07-04 RX ADMIN — Medication 1 APPLICATION(S): at 11:50

## 2017-07-04 RX ADMIN — Medication 3 MILLILITER(S): at 21:23

## 2017-07-04 RX ADMIN — Medication 667 MILLIGRAM(S): at 18:31

## 2017-07-04 RX ADMIN — Medication 650 MILLIGRAM(S): at 08:30

## 2017-07-04 RX ADMIN — Medication 3 MILLILITER(S): at 07:52

## 2017-07-04 RX ADMIN — Medication 667 MILLIGRAM(S): at 11:50

## 2017-07-04 NOTE — PROGRESS NOTE ADULT - PROBLEM SELECTOR PLAN 2
Continue IV antibiotics azithromycin and cefepime complete course stop in am    pulmonary follow up will continue with nebs, supplemental oxygen, patient refusing nocturnal bipap, will provide agressive chest PT , chest vest and incentive spirometer,

## 2017-07-04 NOTE — PROGRESS NOTE ADULT - PROBLEM SELECTOR PLAN 4
as per Pulmonary, dose of sildenafil decreased still hypotensive stopped by rehab   due to hypotension as recommended by cardiology

## 2017-07-04 NOTE — PROGRESS NOTE ADULT - SUBJECTIVE AND OBJECTIVE BOX
HISTORY OF PRESENT ILLNESS  76F was admitted on 6/14/17 after a mechanical fall while walking outside. She had head trauma but no LOC. In ER, GCS=15. A head CT showed left parietal and temporo-occipital SAH. A repeat head CT was stable. Additional workup showed nondisplaced fractures of the right 4-6 ribs with emphysematous changes, pleural effusion and atelectasis. Hospital course was complicated by shortness of breath/hypoxia and needed BiPAP/high-flow NC. She was started on Cefepime for PNA. Admitted to acute rehab on 6/27.    PMHx - HTN, ESRD on HD, AFIB (no full AC due to fall and bleeding risk), CAD, COPD (on home O2 overnight), Cholelithiasis, Gout, Hyperlipidemia, Spinal stenosis, s/p AVR/MVR/Tricupsid valve annuloplasty, Cataracts repair, H/O spinal fusion, Appendectomy, Tonsillectomy, C-Spine DJD    TODAY'S SUBJECTIVE & REVIEW OF SYMPTOMS  [ ] Constitutional WNL      [X] Cardio WNL            [ ] Resp WNL           [X] GI WNL                      [X]  WNL                [ ] Heme WNL              [X] Endo WNL                  [ ] Skin WNL               [ ] MSK WNL            [ ] Neuro WNL                  [ ] Cognitive WNL        [ ] Psych WNL    This am, pt continues to have non-productive coughing; continues on NC O2. Lung exam is stable.  Pt reported pain and swelling in the left paretic hand; wearing edema glove, and able to better move fingers since admission.  Pt is scheduled for HD today.     VITALS  Vital Signs Last 24 Hrs  T(C): 36.4 (04 Jul 2017 06:50), Max: 36.4 (04 Jul 2017 06:50)  T(F): 97.6 (04 Jul 2017 06:50), Max: 97.6 (04 Jul 2017 06:50)  HR: 88 (04 Jul 2017 07:53) (80 - 88)  BP: 98/56 (04 Jul 2017 06:50) (90/52 - 103/69)  BP(mean): --  RR: 17 (04 Jul 2017 06:50) (17 - 18)  SpO2: 91% (04 Jul 2017 07:53) (91% - 94%) on 4L NC      PHYSICAL EXAM  Constitutional - NAD, Comfortable  HEENT - Facial ecchymosis, Right supraorbital laceration  Neck - Supple, No limited ROM  Chest - +Rhonchi B/L  Cardiovascular - S1S2  Abdomen - BS+, Soft, NT, Mildly Distended  Extremities - No C/C, No calf tenderness, BLE PVD skin changes  Neurologic Exam - Right field cut                    Cognitive - AAO to self, place, month/year, some of situation     Communication - Expressive deficits     Motor - No focal deficits     Sensory - Intact to LT     Coordination - FTN impaired  Psychiatric - Mood stable, Affect Flat  Skin - BUE - Multiple ecchymosis    FUNCTIONAL PROGRESS  Gait - Total in WC  ADLs - total A  Transfers - sit-to-stand mod A x 2  Functional transfer - Bed transfer mod A x 2    RECENT LABS              9.7    7.4   )-----------( 194      ( 01 Jul 2017 14:09 )             31.3     07-01    136  |  92<L>  |  71.0<H>  ----------------------------<  128<H>  4.3   |  26.0  |  4.32<H>    Ca    9.2      01 Jul 2017 14:09    Prealb 6/28 - 16    Procalcitonin 7/1 - 2.42    RADIOLOGY/OTHER RESULTS  CXR 6/27 -  Unchanged moderate right pleural effusion with associated atelectasis or consolidation. Bilateral perihilar opacities, probably mild pulmonary edema.  Right hand XR 6/27 - No acute fracture or dislocation of the right hand.    CURRENT MEDICATIONS  MEDICATIONS  (STANDING):  heparin  Injectable 5000 Unit(s) SubCutaneous every 8 hours  aspirin enteric coated 81 milliGRAM(s) Oral daily  calcium acetate 667 milliGRAM(s) Oral three times a day with meals  cyanocobalamin 1000 MICROGram(s) Oral daily  gabapentin 300 milliGRAM(s) Oral every 8 hours  epoetin jaswinder Injectable 38404 Unit(s) IV Push <User Schedule>  atorvastatin 40 milliGRAM(s) Oral at bedtime  folic acid 1 milliGRAM(s) Oral daily  lidocaine   Patch 2 Patch Transdermal daily  cefepime  IVPB 500 milliGRAM(s) IV Intermittent every 24 hours  azithromycin  IVPB 500 milliGRAM(s) IV Intermittent every 24 hours  Nephro-toi 1 Tablet(s) Oral daily  ammonium lactate 12% Lotion 1 Application(s) Topical daily  ALBUTerol/ipratropium for Nebulization 3 milliLiter(s) Nebulizer every 6 hours  midodrine 10 milliGRAM(s) Oral two times a day    MEDICATIONS  (PRN):  acetaminophen   Tablet. 650 milliGRAM(s) Oral every 6 hours PRN Mild Pain (1 - 3)  guaiFENesin    Syrup 200 milliGRAM(s) Oral every 6 hours PRN Cough  lactulose Syrup 10 Gram(s) Oral daily PRN no bm > 2 days      ASSESSMENT & PLAN  76F sustaining a TBI after a fall now with functional deficits  Cardiac/Pulm HTN - Midodrine, DC Sildenafil (7/1)  PNA - Cefepime (6/19-TBD), Zithromax (6/28-TBD)  Pulm - DC Mucomyst (7/3), Duoneb (6/29), O2 via NC, Guaifenesin PRN  CAD - Lipitor, ASA  ESRD on HD - Phoslo, HD T/Th/Sat  Anemia - Improved, Epogen, Vit B, Folate  Sleep - DC Melatonin (6/28)  GI/Bowel Management - Toilet PRN, Lactulose PRN   Management - Toilet Q2  Skin - Turn Q2, Lachydrin BLE, Zfloats BLE  Pain - Neurontin, Lidoderm to right ribs, Tylenol PRN, Ice to left hand PRN  DVT PPX - Heparin, TEDs, SCDs  Diet - Renal & Cardiac/Thins, Nephro-toi, Nepro TID    Continue comprehensive acute rehab program consisting of 3hrs/day of OT/PT and SLP HISTORY OF PRESENT ILLNESS  76F was admitted on 6/14/17 after a mechanical fall while walking outside. She had head trauma but no LOC. In ER, GCS=15. A head CT showed left parietal and temporo-occipital SAH. A repeat head CT was stable. Additional workup showed nondisplaced fractures of the right 4-6 ribs with emphysematous changes, pleural effusion and atelectasis. Hospital course was complicated by shortness of breath/hypoxia and needed BiPAP/high-flow NC. She was started on Cefepime for PNA. Admitted to acute rehab on 6/27.    PMHx - HTN, ESRD on HD, AFIB (no full AC due to fall and bleeding risk), CAD, COPD (on home O2 overnight), Cholelithiasis, Gout, Hyperlipidemia, Spinal stenosis, s/p AVR/MVR/Tricupsid valve annuloplasty, Cataracts repair, H/O spinal fusion, Appendectomy, Tonsillectomy, C-Spine DJD    TODAY'S SUBJECTIVE & REVIEW OF SYMPTOMS  [ ] Constitutional WNL      [X] Cardio WNL            [ ] Resp WNL           [X] GI WNL                      [X]  WNL                [ ] Heme WNL              [X] Endo WNL                  [ ] Skin WNL               [ ] MSK WNL            [ ] Neuro WNL                  [ ] Cognitive WNL        [ ] Psych WNL    This am, pt continues to have non-productive coughing; continues on NC O2. Lung exam is stable.  Medicine and Nephrology following pt. Pt is scheduled for HD today; will have additional fluid removal to help w/ pleural effusion.    Pt reported pain and swelling in the left paretic hand; wearing edema glove, and able to better move fingers since admission.  Continue Tylenol and Neurontin for pain management.     VITALS  Vital Signs Last 24 Hrs  T(C): 36.4 (04 Jul 2017 06:50), Max: 36.4 (04 Jul 2017 06:50)  T(F): 97.6 (04 Jul 2017 06:50), Max: 97.6 (04 Jul 2017 06:50)  HR: 88 (04 Jul 2017 07:53) (80 - 88)  BP: 98/56 (04 Jul 2017 06:50) (90/52 - 103/69)  BP(mean): --  RR: 17 (04 Jul 2017 06:50) (17 - 18)  SpO2: 91% (04 Jul 2017 07:53) (91% - 94%) on 4L NC      PHYSICAL EXAM  Constitutional - NAD, Comfortable  HEENT - Facial ecchymosis, Right supraorbital laceration  Neck - Supple, No limited ROM  Chest - +Rhonchi B/L  Cardiovascular - S1S2  Abdomen - BS+, Soft, NT, Mildly Distended  Extremities - No C/C, No calf tenderness, BLE PVD skin changes  Neurologic Exam - Right field cut                    Cognitive - AAO to self, place, month/year, some of situation     Communication - Expressive deficits     Motor - No focal deficits     Sensory - Intact to LT     Coordination - FTN impaired  Psychiatric - Mood stable, Affect Flat  Skin - BUE - Multiple ecchymosis    FUNCTIONAL PROGRESS  Gait - Total in WC  ADLs - total A  Transfers - sit-to-stand mod A x 2  Functional transfer - Bed transfer mod A x 2    RECENT LABS              9.7    7.4   )-----------( 194      ( 01 Jul 2017 14:09 )             31.3     07-01    136  |  92<L>  |  71.0<H>  ----------------------------<  128<H>  4.3   |  26.0  |  4.32<H>    Ca    9.2      01 Jul 2017 14:09    Prealb 6/28 - 16    Procalcitonin 7/1 - 2.42    RADIOLOGY/OTHER RESULTS  CXR 6/27 -  Unchanged moderate right pleural effusion with associated atelectasis or consolidation. Bilateral perihilar opacities, probably mild pulmonary edema.  Right hand XR 6/27 - No acute fracture or dislocation of the right hand.    CURRENT MEDICATIONS  MEDICATIONS  (STANDING):  heparin  Injectable 5000 Unit(s) SubCutaneous every 8 hours  aspirin enteric coated 81 milliGRAM(s) Oral daily  calcium acetate 667 milliGRAM(s) Oral three times a day with meals  cyanocobalamin 1000 MICROGram(s) Oral daily  gabapentin 300 milliGRAM(s) Oral every 8 hours  epoetin jaswinder Injectable 39202 Unit(s) IV Push <User Schedule>  atorvastatin 40 milliGRAM(s) Oral at bedtime  folic acid 1 milliGRAM(s) Oral daily  lidocaine   Patch 2 Patch Transdermal daily  cefepime  IVPB 500 milliGRAM(s) IV Intermittent every 24 hours  azithromycin  IVPB 500 milliGRAM(s) IV Intermittent every 24 hours  Nephro-toi 1 Tablet(s) Oral daily  ammonium lactate 12% Lotion 1 Application(s) Topical daily  ALBUTerol/ipratropium for Nebulization 3 milliLiter(s) Nebulizer every 6 hours  midodrine 10 milliGRAM(s) Oral two times a day    MEDICATIONS  (PRN):  acetaminophen   Tablet. 650 milliGRAM(s) Oral every 6 hours PRN Mild Pain (1 - 3)  guaiFENesin    Syrup 200 milliGRAM(s) Oral every 6 hours PRN Cough  lactulose Syrup 10 Gram(s) Oral daily PRN no bm > 2 days      ASSESSMENT & PLAN  76F sustaining a TBI after a fall now with functional deficits  Cardiac/Pulm HTN - Midodrine, DC Sildenafil (7/1)  PNA - Cefepime (6/19-7/5), Zithromax (6/28-7/5)  Pulm - DC Mucomyst (7/3), Duoneb (6/29), O2 via NC, Guaifenesin PRN, chest PT  CAD - Lipitor, ASA  ESRD on HD - Phoslo, HD T/Th/Sat  Anemia - Improved, Epogen, Vit B, Folate  Sleep - DC Melatonin (6/28)  GI/Bowel Management - Toilet PRN, Lactulose PRN   Management - Toilet Q2  Skin - Turn Q2, Lachydrin BLE, Zfloats BLE  Pain - Neurontin, Lidoderm to right ribs, Tylenol PRN, Ice to left hand PRN  DVT PPX - Heparin, TEDs, SCDs  Diet - Renal & Cardiac/Thins, Nephro-toi, Nepro TID    Continue comprehensive acute rehab program consisting of 3hrs/day of OT/PT and SLP HISTORY OF PRESENT ILLNESS  76F was admitted on 6/14/17 after a mechanical fall while walking outside. She had head trauma but no LOC. In ER, GCS=15. A head CT showed left parietal and temporo-occipital SAH. A repeat head CT was stable. Additional workup showed nondisplaced fractures of the right 4-6 ribs with emphysematous changes, pleural effusion and atelectasis. Hospital course was complicated by shortness of breath/hypoxia and needed BiPAP/high-flow NC. She was started on Cefepime for PNA. Admitted to acute rehab on 6/27.    PMHx - HTN, ESRD on HD, AFIB (no full AC due to fall and bleeding risk), CAD, COPD (on home O2 overnight), Cholelithiasis, Gout, Hyperlipidemia, Spinal stenosis, s/p AVR/MVR/Tricupsid valve annuloplasty, Cataracts repair, H/O spinal fusion, Appendectomy, Tonsillectomy, C-Spine DJD    TODAY'S SUBJECTIVE & REVIEW OF SYMPTOMS  [ ] Constitutional WNL      [X] Cardio WNL            [ ] Resp WNL           [X] GI WNL                      [X]  WNL                [ ] Heme WNL              [X] Endo WNL                  [ ] Skin WNL               [ ] MSK WNL            [ ] Neuro WNL                  [ ] Cognitive WNL        [ ] Psych WNL    This am, pt continues to have non-productive coughing; continues on NC O2. Medicine and Nephrology following pt. Pt is scheduled for HD today; will have additional fluid removal to help w/ pleural effusion.    Pt reported pain and swelling in the left paretic hand; wearing edema glove, and able to better move fingers since admission.  Continue Tylenol and Neurontin for pain management.     VITALS  Vital Signs Last 24 Hrs  T(C): 36.4 (04 Jul 2017 06:50), Max: 36.4 (04 Jul 2017 06:50)  T(F): 97.6 (04 Jul 2017 06:50), Max: 97.6 (04 Jul 2017 06:50)  HR: 88 (04 Jul 2017 07:53) (80 - 88)  BP: 98/56 (04 Jul 2017 06:50) (90/52 - 103/69)  BP(mean): --  RR: 17 (04 Jul 2017 06:50) (17 - 18)  SpO2: 91% (04 Jul 2017 07:53) (91% - 94%) on 4L NC      PHYSICAL EXAM  Constitutional - NAD, Comfortable  HEENT - Facial ecchymosis, Right supraorbital laceration  Neck - Supple, No limited ROM  Chest - +Rhonchi B/L  Cardiovascular - S1S2  Abdomen - BS+, Soft, NT, Mildly Distended  Extremities - No C/C, No calf tenderness, BLE PVD skin changes  Neurologic Exam - Right field cut                    Cognitive - AAO to self, place, month/year, some of situation     Communication - Expressive deficits     Motor - No focal deficits     Sensory - Intact to LT     Coordination - FTN impaired  Psychiatric - Mood stable, Affect Flat  Skin - BUE - Multiple ecchymosis    FUNCTIONAL PROGRESS  Gait - Total in WC  ADLs - total A  Transfers - sit-to-stand mod A x 2  Functional transfer - Bed transfer mod A x 2    RECENT LABS              9.7    7.4   )-----------( 194      ( 01 Jul 2017 14:09 )             31.3     07-01    136  |  92<L>  |  71.0<H>  ----------------------------<  128<H>  4.3   |  26.0  |  4.32<H>    Ca    9.2      01 Jul 2017 14:09    Prealb 6/28 - 16    Procalcitonin 7/1 - 2.42    RADIOLOGY/OTHER RESULTS  CXR 6/27 -  Unchanged moderate right pleural effusion with associated atelectasis or consolidation. Bilateral perihilar opacities, probably mild pulmonary edema.  Right hand XR 6/27 - No acute fracture or dislocation of the right hand.    CURRENT MEDICATIONS  MEDICATIONS  (STANDING):  heparin  Injectable 5000 Unit(s) SubCutaneous every 8 hours  aspirin enteric coated 81 milliGRAM(s) Oral daily  calcium acetate 667 milliGRAM(s) Oral three times a day with meals  cyanocobalamin 1000 MICROGram(s) Oral daily  gabapentin 300 milliGRAM(s) Oral every 8 hours  epoetin jaswinder Injectable 20946 Unit(s) IV Push <User Schedule>  atorvastatin 40 milliGRAM(s) Oral at bedtime  folic acid 1 milliGRAM(s) Oral daily  lidocaine   Patch 2 Patch Transdermal daily  cefepime  IVPB 500 milliGRAM(s) IV Intermittent every 24 hours  azithromycin  IVPB 500 milliGRAM(s) IV Intermittent every 24 hours  Nephro-toi 1 Tablet(s) Oral daily  ammonium lactate 12% Lotion 1 Application(s) Topical daily  ALBUTerol/ipratropium for Nebulization 3 milliLiter(s) Nebulizer every 6 hours  midodrine 10 milliGRAM(s) Oral two times a day    MEDICATIONS  (PRN):  acetaminophen   Tablet. 650 milliGRAM(s) Oral every 6 hours PRN Mild Pain (1 - 3)  guaiFENesin    Syrup 200 milliGRAM(s) Oral every 6 hours PRN Cough  lactulose Syrup 10 Gram(s) Oral daily PRN no bm > 2 days      ASSESSMENT & PLAN  76F sustaining a TBI after a fall now with functional deficits  Cardiac/Pulm HTN - Midodrine, DC Sildenafil (7/1)  PNA - Cefepime (6/19-7/5), Zithromax (6/28-7/5)  Pulm - DC Mucomyst (7/3), Duoneb (6/29), O2 via NC, Guaifenesin PRN, chest PT  CAD - Lipitor, ASA  ESRD on HD - Phoslo, HD T/Th/Sat  Anemia - Improved, Epogen, Vit B, Folate  Sleep - DC Melatonin (6/28)  GI/Bowel Management - Toilet PRN, Lactulose PRN   Management - Toilet Q2  Skin - Turn Q2, Lachydrin BLE, Zfloats BLE  Pain - Neurontin, Lidoderm to right ribs, Tylenol PRN, Ice to left hand PRN  DVT PPX - Heparin, TEDs, SCDs  Diet - Renal & Cardiac/Thins, Nephro-toi, Nepro TID    Continue comprehensive acute rehab program consisting of 3hrs/day of OT/PT and SLP

## 2017-07-04 NOTE — PROGRESS NOTE ADULT - ASSESSMENT
77 yo female with h/o Atrial fibrillation off anticoagulation due to fall risk and bleeding risk , ESRD on HD ,diastolic CHF , pulmonary hypertension , valvular haeart disease  admitted s/p fall at home with left SAH , brain injury and developed respiratory distress being treated for pneumonia  , now in rehab . Chest congestion, cough now improving  BP remains low, medications  doses of  sildenafil adjusted by pulmonologist

## 2017-07-04 NOTE — PROGRESS NOTE ADULT - SUBJECTIVE AND OBJECTIVE BOX
Chart reviewed   CC : cough , congestion no change   breathing is improving slowly, on supplemental oxygen with  nasal canula, feels tired ,      REVIEW OF SYSTEMS:    CONSTITUTIONAL: No fever, + fatigue  RESPIRATORY: has cough and some shortness of breath  CARDIOVASCULAR: No chest pain, palpitations  GASTROINTESTINAL: has abdominal pain, No nausea, vomiting  NEUROLOGICAL: No headaches,  loss of strength.  MISCELLANEOUS: No joint swelling or pain       Vital Signs Last 24 Hrs  T(C): 36.4 (04 Jul 2017 06:50), Max: 36.4 (04 Jul 2017 06:50)  T(F): 97.6 (04 Jul 2017 06:50), Max: 97.6 (04 Jul 2017 06:50)  HR: 88 (04 Jul 2017 07:53) (80 - 88)  BP: 98/56 (04 Jul 2017 06:50) (90/52 - 103/69)  BP(mean): --  RR: 17 (04 Jul 2017 06:50) (17 - 18)  SpO2: 91% (04 Jul 2017 07:53) (91% - 94%)  PHYSICAL EXAM:    GENERAL: Elderly female looking in mild to moderate respiratory distress.   HEENT: PERRL, +EOMI  NECK: soft, Supple, No JVD,   CHEST/LUNG: Rhonchi bilaterally, decrease air entry B/L, No wheezing  HEART: S1S2+, Regular rate and rhythm; No murmurs.   ABDOMEN: Soft, Nontender, Nondistended; mild tenderness in the middle of abdomen, Bowel sounds present  EXTREMITIES:  1+ Peripheral Pulses, No clubbing, cyanosis, or edema  SKIN: Chronic venous statis   NEURO: AAOX3, no focal deficits, no motor r sensory loss, decreased strength

## 2017-07-04 NOTE — PROGRESS NOTE ADULT - PROBLEM SELECTOR PLAN 3
recommends conservative management with HD fluid removal, nephrology is on board, patient is getting HD with extra fluid removal

## 2017-07-04 NOTE — PROGRESS NOTE ADULT - SUBJECTIVE AND OBJECTIVE BOX
Patient was seen and evaluated on dialysis.   No c/o CP SOB NV  no F/C  no swelling    T(C): 36.3 (07-04-17 @ 20:30), Max: 37 (07-04-17 @ 14:30)  HR: 78 (07-04-17 @ 20:30) (68 - 89)  BP: 98/60 (07-04-17 @ 20:30) (92/55 - 98/60)  Wt(kg): --  PE ;  NAD  lungs - CTA  CV gr 1 murmer,  No gallop or rub  Abd : soft, NT BS +, No masses  Ext- No edema  Neuro : Grossly intact, moving extremities                             8.9    8.6   )-----------( 179      ( 04 Jul 2017 14:43 )             28.3        07-04    134<L>  |  91<L>  |  84.0<H>  ----------------------------<  148<H>  4.8   |  25.0  |  5.32<H>    Ca    8.8      04 Jul 2017 14:43  Phos  4.2     07-04        MEDICATIONS  (STANDING):  heparin  Injectable  acetaminophen   Tablet. PRN  aspirin enteric coated  calcium acetate  cyanocobalamin  gabapentin  epoetin jaswinder Injectable  atorvastatin  folic acid  lidocaine   Patch  cefepime  IVPB  azithromycin  IVPB  Nephro-toi  ammonium lactate 12% Lotion  ALBUTerol/ipratropium for Nebulization  midodrine  guaiFENesin    Syrup PRN  lactulose Syrup PRN      Patient stable  Aida HD easily  Continue

## 2017-07-05 DIAGNOSIS — F41.9 ANXIETY DISORDER, UNSPECIFIED: ICD-10-CM

## 2017-07-05 DIAGNOSIS — J18.9 PNEUMONIA, UNSPECIFIED ORGANISM: ICD-10-CM

## 2017-07-05 PROCEDURE — 99233 SBSQ HOSP IP/OBS HIGH 50: CPT

## 2017-07-05 PROCEDURE — 71010: CPT | Mod: 26

## 2017-07-05 PROCEDURE — 99232 SBSQ HOSP IP/OBS MODERATE 35: CPT

## 2017-07-05 RX ORDER — SODIUM CHLORIDE 0.65 %
1 AEROSOL, SPRAY (ML) NASAL THREE TIMES A DAY
Qty: 0 | Refills: 0 | Status: DISCONTINUED | OUTPATIENT
Start: 2017-07-05 | End: 2017-07-26

## 2017-07-05 RX ADMIN — Medication 40 MILLIGRAM(S): at 13:27

## 2017-07-05 RX ADMIN — Medication 1 MILLIGRAM(S): at 12:01

## 2017-07-05 RX ADMIN — ATORVASTATIN CALCIUM 40 MILLIGRAM(S): 80 TABLET, FILM COATED ORAL at 21:50

## 2017-07-05 RX ADMIN — MIDODRINE HYDROCHLORIDE 10 MILLIGRAM(S): 2.5 TABLET ORAL at 06:16

## 2017-07-05 RX ADMIN — GABAPENTIN 300 MILLIGRAM(S): 400 CAPSULE ORAL at 13:27

## 2017-07-05 RX ADMIN — Medication 667 MILLIGRAM(S): at 07:59

## 2017-07-05 RX ADMIN — HEPARIN SODIUM 5000 UNIT(S): 5000 INJECTION INTRAVENOUS; SUBCUTANEOUS at 13:27

## 2017-07-05 RX ADMIN — LIDOCAINE 2 PATCH: 4 CREAM TOPICAL at 12:01

## 2017-07-05 RX ADMIN — PREGABALIN 1000 MICROGRAM(S): 225 CAPSULE ORAL at 12:01

## 2017-07-05 RX ADMIN — Medication 3 MILLILITER(S): at 03:52

## 2017-07-05 RX ADMIN — Medication 667 MILLIGRAM(S): at 17:06

## 2017-07-05 RX ADMIN — Medication 667 MILLIGRAM(S): at 12:01

## 2017-07-05 RX ADMIN — Medication 1 APPLICATION(S): at 12:09

## 2017-07-05 RX ADMIN — GABAPENTIN 300 MILLIGRAM(S): 400 CAPSULE ORAL at 21:50

## 2017-07-05 RX ADMIN — LIDOCAINE 2 PATCH: 4 CREAM TOPICAL at 00:40

## 2017-07-05 RX ADMIN — Medication 3 MILLILITER(S): at 20:45

## 2017-07-05 RX ADMIN — CEFEPIME 100 MILLIGRAM(S): 1 INJECTION, POWDER, FOR SOLUTION INTRAMUSCULAR; INTRAVENOUS at 06:16

## 2017-07-05 RX ADMIN — Medication 3 MILLILITER(S): at 14:52

## 2017-07-05 RX ADMIN — Medication 1 TABLET(S): at 13:27

## 2017-07-05 RX ADMIN — MIDODRINE HYDROCHLORIDE 10 MILLIGRAM(S): 2.5 TABLET ORAL at 17:06

## 2017-07-05 RX ADMIN — HEPARIN SODIUM 5000 UNIT(S): 5000 INJECTION INTRAVENOUS; SUBCUTANEOUS at 06:16

## 2017-07-05 RX ADMIN — GABAPENTIN 300 MILLIGRAM(S): 400 CAPSULE ORAL at 06:16

## 2017-07-05 RX ADMIN — Medication 40 MILLIGRAM(S): at 21:51

## 2017-07-05 RX ADMIN — HEPARIN SODIUM 5000 UNIT(S): 5000 INJECTION INTRAVENOUS; SUBCUTANEOUS at 21:50

## 2017-07-05 RX ADMIN — Medication 81 MILLIGRAM(S): at 12:01

## 2017-07-05 RX ADMIN — Medication 3 MILLILITER(S): at 08:31

## 2017-07-05 NOTE — PROGRESS NOTE ADULT - SUBJECTIVE AND OBJECTIVE BOX
NEPHROLOGY INTERVAL HPI/OVERNIGHT EVENTS: No new events.    MEDICATIONS  (STANDING):  heparin  Injectable 5000 Unit(s) SubCutaneous every 8 hours  aspirin enteric coated 81 milliGRAM(s) Oral daily  calcium acetate 667 milliGRAM(s) Oral three times a day with meals  cyanocobalamin 1000 MICROGram(s) Oral daily  gabapentin 300 milliGRAM(s) Oral every 8 hours  epoetin jaswinder Injectable 43009 Unit(s) IV Push <User Schedule>  atorvastatin 40 milliGRAM(s) Oral at bedtime  folic acid 1 milliGRAM(s) Oral daily  lidocaine   Patch 2 Patch Transdermal daily  cefepime  IVPB 500 milliGRAM(s) IV Intermittent every 24 hours  azithromycin  IVPB 500 milliGRAM(s) IV Intermittent every 24 hours  Nephro-toi 1 Tablet(s) Oral daily  ammonium lactate 12% Lotion 1 Application(s) Topical daily  ALBUTerol/ipratropium for Nebulization 3 milliLiter(s) Nebulizer every 6 hours  midodrine 10 milliGRAM(s) Oral two times a day    MEDICATIONS  (PRN):  acetaminophen   Tablet. 650 milliGRAM(s) Oral every 6 hours PRN Mild Pain (1 - 3)  guaiFENesin    Syrup 200 milliGRAM(s) Oral every 6 hours PRN Cough  lactulose Syrup 10 Gram(s) Oral daily PRN no bm > 2 days      Allergies    allopurinol (Rash)  codeine (Nausea; Vomiting)  penicillin (Rash)  spironolactone (Unknown)    Intolerances        Vital Signs Last 24 Hrs  T(C): 36.4 (2017 05:00), Max: 37 (2017 14:30)  T(F): 97.6 (2017 05:00), Max: 98.6 (2017 14:30)  HR: 72 (2017 05:00) (68 - 89)  BP: 96/58 (2017 05:00) (92/55 - 98/60)  BP(mean): --  RR: 16 (2017 05:00) (16 - 18)  SpO2: 88% (2017 05:00) (88% - 99%)  Daily     Daily Weight in k.2 (2017 14:30)    PHYSICAL EXAM:    GENERAL: appears acutely and chronically ill.  HEAD:  ecchymosis same  EYES:  same  ENMT:   NECK: right permacath site clean  NERVOUS SYSTEM:  awake, alert , verbal  CHEST/LUNG: much clearer with rare crackles left base, 02 by mask  HEART: no rub noted  ABDOMEN: soft not tender  EXTREMITIES:  trace leg edema, muscle wasting noted  LYMPH:   SKIN:  ecchymosis diffuse    LABS:                        8.9    8.6   )-----------( 179      ( 2017 14:43 )             28.3     07-    134<L>  |  91<L>  |  84.0<H>  ----------------------------<  148<H>  4.8   |  25.0  |  5.32<H>    Ca    8.8      2017 14:43  Phos  4.2     -          Phosphorus Level, Serum: 4.2 mg/dL ( @ 14:43)          RADIOLOGY & ADDITIONAL TESTS:

## 2017-07-05 NOTE — PROGRESS NOTE ADULT - PROBLEM SELECTOR PLAN 2
Completed course of abx today     continue with nebs, supplemental oxygen, patient refusing nocturnal bipap, will provide agressive chest PT , chest vest and incentive spirometer,

## 2017-07-05 NOTE — PROGRESS NOTE ADULT - SUBJECTIVE AND OBJECTIVE BOX
PULMONARY PROGRESS NOTE      DUSTIN HERNANDEZKADE-[id number]    Patient is a 76y old  Female who presents with a chief complaint of     INTERVAL HPI/OVERNIGHT EVENTS:  feels better, no fever, chill, chest pain  MEDICATIONS  (STANDING):  heparin  Injectable 5000 Unit(s) SubCutaneous every 8 hours  aspirin enteric coated 81 milliGRAM(s) Oral daily  calcium acetate 667 milliGRAM(s) Oral three times a day with meals  cyanocobalamin 1000 MICROGram(s) Oral daily  gabapentin 300 milliGRAM(s) Oral every 8 hours  epoetin jaswinder Injectable 60369 Unit(s) IV Push <User Schedule>  atorvastatin 40 milliGRAM(s) Oral at bedtime  folic acid 1 milliGRAM(s) Oral daily  lidocaine   Patch 2 Patch Transdermal daily  azithromycin  IVPB 500 milliGRAM(s) IV Intermittent every 24 hours  Nephro-toi 1 Tablet(s) Oral daily  ammonium lactate 12% Lotion 1 Application(s) Topical daily  ALBUTerol/ipratropium for Nebulization 3 milliLiter(s) Nebulizer every 6 hours  midodrine 10 milliGRAM(s) Oral two times a day  methylPREDNISolone sodium succinate Injectable 40 milliGRAM(s) IV Push every 8 hours      MEDICATIONS  (PRN):  acetaminophen   Tablet. 650 milliGRAM(s) Oral every 6 hours PRN Mild Pain (1 - 3)  guaiFENesin    Syrup 200 milliGRAM(s) Oral every 6 hours PRN Cough  lactulose Syrup 10 Gram(s) Oral daily PRN no bm > 2 days      Allergies    allopurinol (Rash)  codeine (Nausea; Vomiting)  penicillin (Rash)  spironolactone (Unknown)    Intolerances        PAST MEDICAL & SURGICAL HISTORY:  ESRD (end stage renal disease) on dialysis: MWF via [facility] Permacath  planned AVF creation  Sinusitis, unspecified chronicity, unspecified location  Gout  Gall stones  Pneumonia  Anemia  Pulmonary hypertension  COPD (chronic obstructive pulmonary disease)  CAD (coronary artery disease)  Atrial fibrillation  Hyperlipidemia  Hypertension  Spinal fusion failure, initial encounter  Tubal ligation status  Sinusitis  Spinal stenosis  S/P tonsillectomy  S/P appendectomy  H/O aortic valve replacement  H/O spinal fusion  H/O cataract  Cystocele  H/O tricuspid valve annuloplasty  H/O mitral valve repair      SOCIAL HISTORY  Smoking History:       REVIEW OF SYSTEMS:    CONSTITUTIONAL:  No distress    HEENT:  Eyes:  No diplopia or blurred vision. ENT:  No earache, sore throat or runny nose.    CARDIOVASCULAR:  No pressure, squeezing, tightness, heaviness or aching about the chest; no palpitations.    RESPIRATORY:  see hpi    GASTROINTESTINAL:  No nausea, vomiting or diarrhea.    GENITOURINARY:  No dysuria, frequency or urgency.    NEUROLOGIC:  No paresthesias, fasciculations, seizures or weakness.    PSYCHIATRIC:  No disorder of thought or mood.    Vital Signs Last 24 Hrs  T(C): 36.4 (05 Jul 2017 05:00), Max: 37 (04 Jul 2017 14:30)  T(F): 97.6 (05 Jul 2017 05:00), Max: 98.6 (04 Jul 2017 14:30)  HR: 72 (05 Jul 2017 05:00) (68 - 89)  BP: 96/58 (05 Jul 2017 05:00) (92/55 - 98/60)  BP(mean): --  RR: 16 (05 Jul 2017 05:00) (16 - 18)  SpO2: 88% (05 Jul 2017 05:00) (88% - 99%)    PHYSICAL EXAMINATION:    GENERAL: The patient is awake and alert in no apparent distress.     HEENT: Head is normocephalic and atraumatic. Extraocular muscles are intact. Mucous membranes are moist.    NECK: Supple.    LUNGS: moderate air entry decreased BS R base; respirations unlabored    HEART: Regular rate and rhythm without murmur.    ABDOMEN: Soft, nontender, and nondistended.      EXTREMITIES: Without any cyanosis, clubbing, rash, lesions or edema.    NEUROLOGIC: Grossly intact.    LABS:                        8.9    8.6   )-----------( 179      ( 04 Jul 2017 14:43 )             28.3     07-04    134<L>  |  91<L>  |  84.0<H>  ----------------------------<  148<H>  4.8   |  25.0  |  5.32<H>    Ca    8.8      04 Jul 2017 14:43  Phos  4.2     07-04                          MICROBIOLOGY:    RADIOLOGY & ADDITIONAL STUDIES:  CXR reviewed and compared

## 2017-07-05 NOTE — PROGRESS NOTE ADULT - ASSESSMENT
Pneumonia and Right pleural effusion  Acute hypoxia this morning- on three dose steroids today.  Dyspnea on exertion Coughing non-productive Refusing nocturnalm BIPAP  Pain Left hand and swelling (chronic)  Depression and Anxiety  Poor appetite

## 2017-07-05 NOTE — PROGRESS NOTE ADULT - ASSESSMENT
77 yo female with h/o Atrial fibrillation off anticoagulation due to fall risk and bleeding risk , ESRD on HD ,diastolic CHF , pulmonary hypertension , valvular haeart disease  admitted s/p fall at home with left SAH , brain injury and developed respiratory distress being treated for pneumonia  , now in rehab . Chest congestion, cough now improving  BP remains low, medications  doses of  sildenafil adjusted by pulmonologist, hypoxic , remains hypotensive stable slightly better

## 2017-07-05 NOTE — PROGRESS NOTE ADULT - SUBJECTIVE AND OBJECTIVE BOX
HISTORY OF PRESENT ILLNESS  76F was admitted on 6/14/17 after a mechanical fall while walking outside. She had head trauma but no LOC. In ER, GCS=15. A head CT showed left parietal and temporo-occipital SAH. A repeat head CT was stable. Additional workup showed nondisplaced fractures of the right 4-6 ribs with emphysematous changes, pleural effusion and atelectasis. Hospital course was complicated by shortness of breath/hypoxia and needed BiPAP/high-flow NC. She was started on Cefepime for PNA. Admitted to acute rehab on 6/27.    PMHx - HTN, ESRD on HD, AFIB (no full AC due to fall and bleeding risk), CAD, COPD (on home O2 overnight), Cholelithiasis, Gout, Hyperlipidemia, Spinal stenosis, s/p AVR/MVR/Tricupsid valve annuloplasty, Cataracts repair, H/O spinal fusion, Appendectomy, Tonsillectomy, C-Spine DJD    TODAY'S SUBJECTIVE & REVIEW OF SYMPTOMS  [X] Constitutional WNL      [X] Cardio WNL            [ ] Resp WNL           [X] GI WNL                      [X]  WNL                [ ] Heme WNL              [X] Endo WNL                  [ ] Skin WNL               [ ] MSK WNL            [ ] Neuro WNL                  [ ] Cognitive WNL        [X] Psych WNL    Patient requiring venti mask again and is unable to hold her O2 sats above 90% with NC.   Patient feels unchanged. Discussed with medicine. Will possibly need pleural effusion to be tapped. Ordering a CXR. Added solumedrol to assist with pulm, but also the left hand swelling. Patient denies pain unless palpated.     VITALS  T(C): 36.4 (07-05-17 @ 05:00)  T(F): 97.6 (07-05-17 @ 05:00), Max: 98.6 (07-04-17 @ 14:30)  HR: 72 (07-05-17 @ 05:00) (68 - 89)  BP: 96/58 (07-05-17 @ 05:00) (92/55 - 98/60)  RR:  (16 - 18)  SpO2:  (88% - 99%)  Wt(kg): --    PHYSICAL EXAM  Constitutional - NAD, Comfortable  HEENT - Facial ecchymosis, Right supraorbital laceration  Neck - Supple, No limited ROM  Chest - +Rhonchi   Cardiovascular - S1S2  Abdomen - BS+, Soft, NTND  Extremities - No C/C, No calf tenderness, BLE PVD skin changes  Neurologic Exam - Right field cut                    Cognitive - AAO to self, place, month/year, some of situation     Communication - Expressive deficits     Motor - No focal deficits     Sensory - Intact to LT     Coordination - FTN impaired  Psychiatric - Mood stable, Affect Flat  Skin - BUE - Multiple ecchymosis, left hand edema/pain to palpation    FUNCTIONAL PROGRESS  Gait - Total in WC  ADLs - Total A  Transfers - sit-to-stand mod A x 2  Functional transfer - Bed transfer mod A x 2    RECENT LABS                         8.9    8.6   )-----------( 179      ( 04 Jul 2017 14:43 )             28.3     07-04    134<L>  |  91<L>  |  84.0<H>  ----------------------------<  148<H>  4.8   |  25.0  |  5.32<H>    Ca    8.8      04 Jul 2017 14:43  Phos  4.2     07-04    Prealb 6/28 - 16    Procalcitonin 7/1 - 2.42    RADIOLOGY/OTHER RESULTS  CXR 7/5 - Ordered  CXR 6/27 -  Unchanged moderate right pleural effusion with associated atelectasis or consolidation. Bilateral perihilar opacities, probably mild pulmonary edema.  Right hand XR 6/27 - No acute fracture or dislocation of the right hand.    CURRENT MEDICATIONS  MEDICATIONS  (STANDING):  heparin  Injectable 5000 Unit(s) SubCutaneous every 8 hours  aspirin enteric coated 81 milliGRAM(s) Oral daily  calcium acetate 667 milliGRAM(s) Oral three times a day with meals  cyanocobalamin 1000 MICROGram(s) Oral daily  gabapentin 300 milliGRAM(s) Oral every 8 hours  epoetin jaswinder Injectable 91401 Unit(s) IV Push <User Schedule>  atorvastatin 40 milliGRAM(s) Oral at bedtime  folic acid 1 milliGRAM(s) Oral daily  lidocaine   Patch 2 Patch Transdermal daily  azithromycin  IVPB 500 milliGRAM(s) IV Intermittent every 24 hours  Nephro-toi 1 Tablet(s) Oral daily  ammonium lactate 12% Lotion 1 Application(s) Topical daily  ALBUTerol/ipratropium for Nebulization 3 milliLiter(s) Nebulizer every 6 hours  midodrine 10 milliGRAM(s) Oral two times a day  methylPREDNISolone sodium succinate Injectable 40 milliGRAM(s) IV Push every 8 hours    MEDICATIONS  (PRN):  acetaminophen   Tablet. 650 milliGRAM(s) Oral every 6 hours PRN Mild Pain (1 - 3)  guaiFENesin    Syrup 200 milliGRAM(s) Oral every 6 hours PRN Cough  lactulose Syrup 10 Gram(s) Oral daily PRN no bm > 2 days    ASSESSMENT & PLAN  76F sustaining a TBI after a fall now with functional deficits  Cardiac/Pulm HTN - Midodrine, DC Sildenafil (7/1)  PNA - Cefepime (6/19-7/5), Zithromax (6/28-7/5  Pulm - Duoneb (6/29), O2 via NC, Guaifenesin PRN, Solumedrol IV x3 doses (7/5)  CAD - Lipitor, ASA  ESRD on HD - Phoslo, HD T/Th/Sat  Anemia - Improved, Epogen, Vit B, Folate  Sleep - DC Melatonin (6/28)  GI/Bowel Management - Toilet PRN, Lactulose PRN   Management - Toilet Q2  Skin - Turn Q2, Lachydrin BLE, Zfloats BLE  Pain - Neurontin, Lidoderm to right ribs, Tylenol PRN, Ice to left hand PRN  DVT PPX - Heparin, TEDs, SCDs  Diet - Renal & Cardiac/Thins, Nephro-toi, Nepro TID    Continue comprehensive acute rehab program consisting of 3hrs/day of OT/PT and SLP HISTORY OF PRESENT ILLNESS  76F was admitted on 6/14/17 after a mechanical fall while walking outside. She had head trauma but no LOC. In ER, GCS=15. A head CT showed left parietal and temporo-occipital SAH. A repeat head CT was stable. Additional workup showed nondisplaced fractures of the right 4-6 ribs with emphysematous changes, pleural effusion and atelectasis. Hospital course was complicated by shortness of breath/hypoxia and needed BiPAP/high-flow NC. She was started on Cefepime for PNA. Admitted to acute rehab on 6/27.    PMHx - HTN, ESRD on HD, AFIB (no full AC due to fall and bleeding risk), CAD, COPD (on home O2 overnight), Cholelithiasis, Gout, Hyperlipidemia, Spinal stenosis, s/p AVR/MVR/Tricupsid valve annuloplasty, Cataracts repair, H/O spinal fusion, Appendectomy, Tonsillectomy, C-Spine DJD    TODAY'S SUBJECTIVE & REVIEW OF SYMPTOMS  [X] Constitutional WNL      [X] Cardio WNL            [ ] Resp WNL           [X] GI WNL                      [X]  WNL                [ ] Heme WNL              [X] Endo WNL                  [ ] Skin WNL               [ ] MSK WNL            [ ] Neuro WNL                  [ ] Cognitive WNL        [X] Psych WNL    Patient requiring venti mask again and is unable to hold her O2 sats above 90% with NC.   Patient feels unchanged. Discussed with medicine. Will possibly need pleural effusion to be tapped. Ordering a CXR. Added solumedrol to assist with pulm, but also the left hand swelling. Patient denies pain unless palpated.   Plan for pigtail drainage of the effusion.     VITALS  T(C): 36.4 (07-05-17 @ 05:00)  T(F): 97.6 (07-05-17 @ 05:00), Max: 98.6 (07-04-17 @ 14:30)  HR: 72 (07-05-17 @ 05:00) (68 - 89)  BP: 96/58 (07-05-17 @ 05:00) (92/55 - 98/60)  RR:  (16 - 18)  SpO2:  (88% - 99%)  Wt(kg): --    PHYSICAL EXAM  Constitutional - NAD, Comfortable  HEENT - Facial ecchymosis, Right supraorbital laceration  Neck - Supple, No limited ROM  Chest - +Rhonchi   Cardiovascular - S1S2  Abdomen - BS+, Soft, NTND  Extremities - No C/C, No calf tenderness, BLE PVD skin changes  Neurologic Exam - Right field cut                    Cognitive - AAO to self, place, month/year, some of situation     Communication - Expressive deficits     Motor - No focal deficits     Sensory - Intact to LT     Coordination - FTN impaired  Psychiatric - Mood stable, Affect Flat  Skin - BUE - Multiple ecchymosis, left hand edema/pain to palpation    FUNCTIONAL PROGRESS  Gait - Total in WC  ADLs - Total A  Transfers - sit-to-stand mod A x 2  Functional transfer - Bed transfer mod A x 2    RECENT LABS                         8.9    8.6   )-----------( 179      ( 04 Jul 2017 14:43 )             28.3     07-04    134<L>  |  91<L>  |  84.0<H>  ----------------------------<  148<H>  4.8   |  25.0  |  5.32<H>    Ca    8.8      04 Jul 2017 14:43  Phos  4.2     07-04    Prealb 6/28 - 16    Procalcitonin 7/1 - 2.42    RADIOLOGY/OTHER RESULTS  CXR 7/5 -  No interval change  CXR 6/27 -  Unchanged moderate right pleural effusion with associated atelectasis or consolidation. Bilateral perihilar opacities, probably mild pulmonary edema.  Right hand XR 6/27 - No acute fracture or dislocation of the right hand.    CURRENT MEDICATIONS  MEDICATIONS  (STANDING):  heparin  Injectable 5000 Unit(s) SubCutaneous every 8 hours  aspirin enteric coated 81 milliGRAM(s) Oral daily  calcium acetate 667 milliGRAM(s) Oral three times a day with meals  cyanocobalamin 1000 MICROGram(s) Oral daily  gabapentin 300 milliGRAM(s) Oral every 8 hours  epoetin jaswinder Injectable 18260 Unit(s) IV Push <User Schedule>  atorvastatin 40 milliGRAM(s) Oral at bedtime  folic acid 1 milliGRAM(s) Oral daily  lidocaine   Patch 2 Patch Transdermal daily  azithromycin  IVPB 500 milliGRAM(s) IV Intermittent every 24 hours  Nephro-toi 1 Tablet(s) Oral daily  ammonium lactate 12% Lotion 1 Application(s) Topical daily  ALBUTerol/ipratropium for Nebulization 3 milliLiter(s) Nebulizer every 6 hours  midodrine 10 milliGRAM(s) Oral two times a day  methylPREDNISolone sodium succinate Injectable 40 milliGRAM(s) IV Push every 8 hours    MEDICATIONS  (PRN):  acetaminophen   Tablet. 650 milliGRAM(s) Oral every 6 hours PRN Mild Pain (1 - 3)  guaiFENesin    Syrup 200 milliGRAM(s) Oral every 6 hours PRN Cough  lactulose Syrup 10 Gram(s) Oral daily PRN no bm > 2 days    ASSESSMENT & PLAN  76F sustaining a TBI after a fall now with functional deficits  Cardiac/Pulm HTN - Midodrine, DC Sildenafil (7/1)  PNA - Cefepime (6/19-7/5), Zithromax (6/28-7/5  Pulm - Duoneb (6/29), O2 via NC, Guaifenesin PRN, Solumedrol IV x3 doses (7/5)  CAD - Lipitor, ASA  ESRD on HD - Phoslo, HD T/Th/Sat  Anemia - Improved, Epogen, Vit B, Folate  Sleep - DC Melatonin (6/28)  GI/Bowel Management - Toilet PRN, Lactulose PRN   Management - Toilet Q2  Skin - Turn Q2, Lachydrin BLE, Zfloats BLE  Pain - Neurontin, Lidoderm to right ribs, Tylenol PRN, Ice to left hand PRN  DVT PPX - Heparin, TEDs, SCDs  Diet - Renal & Cardiac/Thins, Nephro-toi, Nepro TID    Continue comprehensive acute rehab program consisting of 3hrs/day of OT/PT and SLP

## 2017-07-05 NOTE — PROGRESS NOTE ADULT - SUBJECTIVE AND OBJECTIVE BOX
Pt images reviewed. Full consult to follow  Rt effusion, persistent.  Rec IR for drainage at this point.

## 2017-07-05 NOTE — PROGRESS NOTE ADULT - SUBJECTIVE AND OBJECTIVE BOX
Chart reviewed , hypoxic this am on ventimask now, she is lethargic , denies any dyspnea, looks comfortable  breathing is improving slowly, on supplemental oxygen with  nasal canula, feels tired ,      REVIEW OF SYSTEMS:    CONSTITUTIONAL: No fever, + fatigue  RESPIRATORY: has cough and some shortness of breath  CARDIOVASCULAR: No chest pain, palpitations  GASTROINTESTINAL: has abdominal pain, No nausea, vomiting  NEUROLOGICAL: No headaches,  loss of strength.  MISCELLANEOUS: No joint swelling or pain       Vital Signs Last 24 Hrs  T(C): 36.4 (05 Jul 2017 05:00), Max: 37 (04 Jul 2017 14:30)  T(F): 97.6 (05 Jul 2017 05:00), Max: 98.6 (04 Jul 2017 14:30)  HR: 72 (05 Jul 2017 05:00) (68 - 89)  BP: 96/58 (05 Jul 2017 05:00) (92/55 - 98/60)  BP(mean): --  RR: 16 (05 Jul 2017 05:00) (16 - 18)  SpO2: 88% (05 Jul 2017 05:00) (88% - 99%)PHYSICAL EXAM:    GENERAL: Elderly female looking in mild to moderate respiratory distress.   HEENT: PERRL, +EOMI  NECK: soft, Supple, No JVD,   CHEST/LUNG: Rhonchi bilaterally, decrease air entry B/L, No wheezing  HEART: S1S2+, Regular rate and rhythm; No murmurs.   ABDOMEN: Soft, Nontender, Nondistended; mild tenderness in the middle of abdomen, Bowel sounds present  EXTREMITIES:  1+ Peripheral Pulses, No clubbing, cyanosis, or edema  SKIN: Chronic venous statis   NEURO: AAOX3, no focal deficits, no motor r sensory loss, decreased strength                                   8.9    8.6   )-----------( 179      ( 04 Jul 2017 14:43 )             28.3   07-04    134<L>  |  91<L>  |  84.0<H>  ----------------------------<  148<H>  4.8   |  25.0  |  5.32<H>    Ca    8.8      04 Jul 2017 14:43  Phos  4.2     07-04

## 2017-07-05 NOTE — PROGRESS NOTE ADULT - PROBLEM SELECTOR PLAN 3
recommends conservative management with HD fluid removal, nephrology is on board, patient is getting HD with extra fluid removal  repeat CXR if worsens will consider effusion drainage

## 2017-07-05 NOTE — PROGRESS NOTE ADULT - PROBLEM SELECTOR PLAN 4
as per Pulmonary, dose of sildenafil decreased still hypotensive stopped by cardiologist  hypoxic on ventimask , CXR ordered, restart  solumedrol  short course

## 2017-07-05 NOTE — PROGRESS NOTE ADULT - ASSESSMENT
COPD/mild RIAZ - refuses cpap  s/p fall, a fib, sever pulmonary HTn/RV failure- diastolic dysfunction/valvular heart disease/remodelled  R pleural effusion no improvement despite dialysis, BP better off sildenafil  Contacted T surg, Dr Bustos will see, IR called for pleural drainage   tolerating nasal cannula currently no acute distress  prognosis guarded COPD/mild RIAZ - refuses cpap  s/p fall, a fib, sever pulmonary HTn/RV failure- diastolic dysfunction/valvular heart disease/remodelled  R pleural effusion no improvement despite dialysis, BP better off sildenafil  Contacted T surg, Dr Bustos will see, IR called for pleural drainage   tolerating nasal cannula currently no acute distress  procalcitonin noted, would repeat, further eval pending fluid, limited benefit of azithromycin alone  ID eval if repeat elevated  prognosis guarded

## 2017-07-05 NOTE — PROGRESS NOTE ADULT - SUBJECTIVE AND OBJECTIVE BOX
INTERVAL HPI/OVERNIGHT EVENTS: 75yo Female in bed height at 90m degrees coughing, unproductively. Lethargic and  Hypoxic earlier.  SOB, worse with any exertion  She is now fully awake and alert. Continues to c/o Left hand pain. Frustrated and depressed about prolonged hospitalization, lack of progress.  Appetite poor.Denies N/V/D/constipation CP, Palpitations or dizziness      Present Symptoms:     Dyspnea:2- 3   Nausea/Vomiting: No  Anxiety:  Yes  Depression: Yes   Fatigue: Yes   Loss of appetite: Yes    Pain: L Hand throbbing            Character-            Duration-            Effect-            Factors-            Frequency-            Location-            Severity-    Review of Systems: Reviewed                      All others negative    MEDICATIONS  (STANDING):  heparin  Injectable 5000 Unit(s) SubCutaneous every 8 hours  aspirin enteric coated 81 milliGRAM(s) Oral daily  calcium acetate 667 milliGRAM(s) Oral three times a day with meals  cyanocobalamin 1000 MICROGram(s) Oral daily  gabapentin 300 milliGRAM(s) Oral every 8 hours  epoetin jaswinder Injectable 44280 Unit(s) IV Push <User Schedule>  atorvastatin 40 milliGRAM(s) Oral at bedtime  folic acid 1 milliGRAM(s) Oral daily  lidocaine   Patch 2 Patch Transdermal daily  Nephro-toi 1 Tablet(s) Oral daily  ammonium lactate 12% Lotion 1 Application(s) Topical daily  ALBUTerol/ipratropium for Nebulization 3 milliLiter(s) Nebulizer every 6 hours  midodrine 10 milliGRAM(s) Oral two times a day  methylPREDNISolone sodium succinate Injectable 40 milliGRAM(s) IV Push every 8 hours    MEDICATIONS  (PRN):  acetaminophen   Tablet. 650 milliGRAM(s) Oral every 6 hours PRN Mild Pain (1 - 3)  guaiFENesin    Syrup 200 milliGRAM(s) Oral every 6 hours PRN Cough  lactulose Syrup 10 Gram(s) Oral daily PRN no bm > 2 days      PHYSICAL EXAM:    Vital Signs Last 24 Hrs  T(C): 36.4 (05 Jul 2017 05:00), Max: 37 (04 Jul 2017 14:30)  T(F): 97.6 (05 Jul 2017 05:00), Max: 98.6 (04 Jul 2017 14:30)  HR: 77 (05 Jul 2017 08:37) (68 - 89)  BP: 96/58 (05 Jul 2017 05:00) (95/51 - 98/60)  BP(mean): --  RR: 16 (05 Jul 2017 05:00) (16 - 18)  SpO2: 95% (05 Jul 2017 08:37) (88% - 99%)    General: alert  oriented x _3___                    HEENT: dry mouth      Lungs:  tachypnea/labored breathing  on exertion-persistent coughing, unproductive    CV: normal      GI: normal               constipation  last BM:     : voiding     MSK:   weakness             bedbound/wheelchair bound    Skin: normal fragile    LABS:                        8.9    8.6   )-----------( 179      ( 04 Jul 2017 14:43 )             28.3     07-04    134<L>  |  91<L>  |  84.0<H>  ----------------------------<  148<H>  4.8   |  25.0  |  5.32<H>    Ca    8.8      04 Jul 2017 14:43  Phos  4.2     07-04          I&O's Summary    04 Jul 2017 07:01  -  05 Jul 2017 07:00  --------------------------------------------------------  IN: 0 mL / OUT: 1300 mL / NET: -1300 mL        RADIOLOGY & ADDITIONAL STUDIES:    ADVANCE DIRECTIVES:   DNR  YES   Completed on:                     SY  YES    Completed on:  Living Will NO   Completed on:

## 2017-07-06 DIAGNOSIS — J96.91 RESPIRATORY FAILURE, UNSPECIFIED WITH HYPOXIA: ICD-10-CM

## 2017-07-06 DIAGNOSIS — I38 ENDOCARDITIS, VALVE UNSPECIFIED: ICD-10-CM

## 2017-07-06 LAB
ANION GAP SERPL CALC-SCNC: 16 MMOL/L — SIGNIFICANT CHANGE UP (ref 5–17)
APTT BLD: 33.2 SEC — SIGNIFICANT CHANGE UP (ref 27.5–37.4)
APTT BLD: 33.2 SEC — SIGNIFICANT CHANGE UP (ref 27.5–37.4)
B PERT IGG+IGM PNL SER: CLEAR — SIGNIFICANT CHANGE UP
BUN SERPL-MCNC: 57 MG/DL — HIGH (ref 8–20)
CALCIUM SERPL-MCNC: 9 MG/DL — SIGNIFICANT CHANGE UP (ref 8.6–10.2)
CHLORIDE SERPL-SCNC: 94 MMOL/L — LOW (ref 98–107)
CO2 SERPL-SCNC: 27 MMOL/L — SIGNIFICANT CHANGE UP (ref 22–29)
COLOR FLD: YELLOW
CREAT SERPL-MCNC: 3.03 MG/DL — HIGH (ref 0.5–1.3)
FLUID INTAKE SUBSTANCE CLASS: SIGNIFICANT CHANGE UP
FLUID SEGMENTED GRANULOCYTES: 50 % — SIGNIFICANT CHANGE UP
GLUCOSE FLD-MCNC: 213 MG/DL — SIGNIFICANT CHANGE UP
GLUCOSE SERPL-MCNC: 125 MG/DL — HIGH (ref 70–115)
GRAM STN FLD: SIGNIFICANT CHANGE UP
HCT VFR BLD CALC: 31.9 % — LOW (ref 37–47)
HGB BLD-MCNC: 10.1 G/DL — LOW (ref 12–16)
INR BLD: 1 RATIO — SIGNIFICANT CHANGE UP (ref 0.88–1.16)
INR BLD: 1 RATIO — SIGNIFICANT CHANGE UP (ref 0.88–1.16)
LYMPHOCYTES # FLD: 29 % — SIGNIFICANT CHANGE UP
MCHC RBC-ENTMCNC: 31.7 G/DL — LOW (ref 32–36)
MCHC RBC-ENTMCNC: 34.6 PG — HIGH (ref 27–31)
MCV RBC AUTO: 109.2 FL — HIGH (ref 81–99)
MESOTHL CELL # FLD: 1 % — SIGNIFICANT CHANGE UP
MONOS+MACROS # FLD: 20 % — SIGNIFICANT CHANGE UP
PHOSPHATE SERPL-MCNC: 3 MG/DL — SIGNIFICANT CHANGE UP (ref 2.4–4.7)
PLATELET # BLD AUTO: 193 K/UL — SIGNIFICANT CHANGE UP (ref 150–400)
POTASSIUM SERPL-MCNC: 3.8 MMOL/L — SIGNIFICANT CHANGE UP (ref 3.5–5.3)
POTASSIUM SERPL-SCNC: 3.8 MMOL/L — SIGNIFICANT CHANGE UP (ref 3.5–5.3)
PROCALCITONIN SERPL-MCNC: 1.18 NG/ML — HIGH (ref 0–0.04)
PROT FLD-MCNC: 2.7 G/DL — SIGNIFICANT CHANGE UP
PROTHROM AB SERPL-ACNC: 11 SEC — SIGNIFICANT CHANGE UP (ref 9.8–12.7)
PROTHROM AB SERPL-ACNC: 11 SEC — SIGNIFICANT CHANGE UP (ref 9.8–12.7)
RBC # BLD: 2.92 M/UL — LOW (ref 4.4–5.2)
RBC # FLD: 18.4 % — HIGH (ref 11–15.6)
RCV VOL RI: 758 /UL — HIGH (ref 0–5)
SODIUM SERPL-SCNC: 137 MMOL/L — SIGNIFICANT CHANGE UP (ref 135–145)
SPECIMEN SOURCE FLD: SIGNIFICANT CHANGE UP
SPECIMEN SOURCE: SIGNIFICANT CHANGE UP
TOTAL NUCLEATED CELL COUNT, BODY FLUID: 160 /UL — HIGH (ref 0–5)
TUBE TYPE: SIGNIFICANT CHANGE UP
WBC # BLD: 6.2 K/UL — SIGNIFICANT CHANGE UP (ref 4.8–10.8)
WBC # FLD AUTO: 6.2 K/UL — SIGNIFICANT CHANGE UP (ref 4.8–10.8)

## 2017-07-06 PROCEDURE — 99233 SBSQ HOSP IP/OBS HIGH 50: CPT

## 2017-07-06 PROCEDURE — 88112 CYTOPATH CELL ENHANCE TECH: CPT | Mod: 26

## 2017-07-06 PROCEDURE — 88305 TISSUE EXAM BY PATHOLOGIST: CPT | Mod: 26

## 2017-07-06 PROCEDURE — 99232 SBSQ HOSP IP/OBS MODERATE 35: CPT

## 2017-07-06 PROCEDURE — 32555 ASPIRATE PLEURA W/ IMAGING: CPT | Mod: RT

## 2017-07-06 PROCEDURE — 71010: CPT | Mod: 26

## 2017-07-06 RX ADMIN — Medication 3 MILLILITER(S): at 20:59

## 2017-07-06 RX ADMIN — ATORVASTATIN CALCIUM 40 MILLIGRAM(S): 80 TABLET, FILM COATED ORAL at 21:31

## 2017-07-06 RX ADMIN — ERYTHROPOIETIN 10000 UNIT(S): 10000 INJECTION, SOLUTION INTRAVENOUS; SUBCUTANEOUS at 16:43

## 2017-07-06 RX ADMIN — GABAPENTIN 300 MILLIGRAM(S): 400 CAPSULE ORAL at 21:31

## 2017-07-06 RX ADMIN — LIDOCAINE 2 PATCH: 4 CREAM TOPICAL at 00:01

## 2017-07-06 RX ADMIN — Medication 3 MILLILITER(S): at 03:15

## 2017-07-06 RX ADMIN — GABAPENTIN 300 MILLIGRAM(S): 400 CAPSULE ORAL at 06:02

## 2017-07-06 RX ADMIN — Medication 40 MILLIGRAM(S): at 06:02

## 2017-07-06 RX ADMIN — HEPARIN SODIUM 5000 UNIT(S): 5000 INJECTION INTRAVENOUS; SUBCUTANEOUS at 21:31

## 2017-07-06 RX ADMIN — MIDODRINE HYDROCHLORIDE 10 MILLIGRAM(S): 2.5 TABLET ORAL at 17:59

## 2017-07-06 RX ADMIN — MIDODRINE HYDROCHLORIDE 10 MILLIGRAM(S): 2.5 TABLET ORAL at 06:03

## 2017-07-06 RX ADMIN — HEPARIN SODIUM 5000 UNIT(S): 5000 INJECTION INTRAVENOUS; SUBCUTANEOUS at 06:03

## 2017-07-06 NOTE — PROGRESS NOTE ADULT - SUBJECTIVE AND OBJECTIVE BOX
PULMONARY PROGRESS NOTE      DUSTIN HERNANDEZKADE-[id number]    Patient is a 76y old  Female who presents with a chief complaint of     INTERVAL HPI/OVERNIGHT EVENTS:  pt seen earlier today'  sitting up in chair  no fever, chill, chest pain  incr 02 requirement  now on 40%  MEDICATIONS  (STANDING):  heparin  Injectable 5000 Unit(s) SubCutaneous every 8 hours  aspirin enteric coated 81 milliGRAM(s) Oral daily  calcium acetate 667 milliGRAM(s) Oral three times a day with meals  cyanocobalamin 1000 MICROGram(s) Oral daily  gabapentin 300 milliGRAM(s) Oral every 8 hours  epoetin jaswinder Injectable 25404 Unit(s) IV Push <User Schedule>  atorvastatin 40 milliGRAM(s) Oral at bedtime  folic acid 1 milliGRAM(s) Oral daily  lidocaine   Patch 2 Patch Transdermal daily  Nephro-toi 1 Tablet(s) Oral daily  ammonium lactate 12% Lotion 1 Application(s) Topical daily  ALBUTerol/ipratropium for Nebulization 3 milliLiter(s) Nebulizer every 6 hours  midodrine 10 milliGRAM(s) Oral two times a day      MEDICATIONS  (PRN):  acetaminophen   Tablet. 650 milliGRAM(s) Oral every 6 hours PRN Mild Pain (1 - 3)  guaiFENesin    Syrup 200 milliGRAM(s) Oral every 6 hours PRN Cough  lactulose Syrup 10 Gram(s) Oral daily PRN no bm > 2 days  sodium chloride 0.65% Nasal 1 Spray(s) Both Nostrils three times a day PRN nasal congestion/discomfort from O2      Allergies    allopurinol (Rash)  codeine (Nausea; Vomiting)  penicillin (Rash)  spironolactone (Unknown)    Intolerances        PAST MEDICAL & SURGICAL HISTORY:  ESRD (end stage renal disease) on dialysis: MWF via [facility] Permacath  planned AVF creation  Sinusitis, unspecified chronicity, unspecified location  Gout  Gall stones  Pneumonia  Anemia  Pulmonary hypertension  COPD (chronic obstructive pulmonary disease)  CAD (coronary artery disease)  Atrial fibrillation  Hyperlipidemia  Hypertension  Spinal fusion failure, initial encounter  Tubal ligation status  Sinusitis  Spinal stenosis  S/P tonsillectomy  S/P appendectomy  H/O aortic valve replacement  H/O spinal fusion  H/O cataract  Cystocele  H/O tricuspid valve annuloplasty  H/O mitral valve repair      SOCIAL HISTORY  Smoking History:       REVIEW OF SYSTEMS:    CONSTITUTIONAL:  No distress    HEENT:  Eyes:  No diplopia or blurred vision. ENT:  No earache, sore throat or runny nose.    CARDIOVASCULAR:  No pressure, squeezing, tightness, heaviness or aching about the chest; no palpitations.    RESPIRATORY: see hpi    GASTROINTESTINAL:  No nausea, vomiting or diarrhea.    GENITOURINARY:  No dysuria, frequency or urgency.    NEUROLOGIC:  No paresthesias, fasciculations, seizures or weakness.    PSYCHIATRIC:  No disorder of thought or mood.    Vital Signs Last 24 Hrs  T(C): 36.7 (06 Jul 2017 04:53), Max: 36.7 (06 Jul 2017 04:53)  T(F): 98.1 (06 Jul 2017 04:53), Max: 98.1 (06 Jul 2017 04:53)  HR: 73 (06 Jul 2017 04:53) (72 - 81)  BP: 96/58 (05 Jul 2017 20:46) (96/58 - 104/66)  BP(mean): --  RR: 16 (05 Jul 2017 20:46) (16 - 16)  SpO2: 93% (06 Jul 2017 04:53) (92% - 93%)    PHYSICAL EXAMINATION:    GENERAL: The patient is awake and alert in no apparent distress.     HEENT: Head is normocephalic and atraumatic. Extraocular muscles are intact. Mucous membranes are moist.    NECK: Supple.    LUNGS: moderate air entry bilat, improved R base but decreased, respirations unlabored    HEART: Regular rate and rhythm without murmur.    ABDOMEN: Soft, nontender, and nondistended.      EXTREMITIES: Without any cyanosis, clubbing, rash, lesions or edema.    NEUROLOGIC: Grossly intact.    LABS:                        8.9    8.6   )-----------( 179      ( 04 Jul 2017 14:43 )             28.3     07-04    134<L>  |  91<L>  |  84.0<H>  ----------------------------<  148<H>  4.8   |  25.0  |  5.32<H>    Ca    8.8      04 Jul 2017 14:43  Phos  4.2     07-04                          MICROBIOLOGY:    RADIOLOGY & ADDITIONAL STUDIES:  cxr reviewed

## 2017-07-06 NOTE — PROGRESS NOTE ADULT - SUBJECTIVE AND OBJECTIVE BOX
Chart reviewed , hypoxic this am on ventimask now, she is sitting in the chair comfortable , hand finger swollen         REVIEW OF SYSTEMS:    CONSTITUTIONAL: No fever, + fatigue  RESPIRATORY: has cough and some shortness of breath  CARDIOVASCULAR: No chest pain, palpitations  GASTROINTESTINAL: has abdominal pain, No nausea, vomiting  NEUROLOGICAL: No headaches,  loss of strength.  MISCELLANEOUS: No joint swelling or pain   swelling of hands and fingers +      Vital Signs Last 24 Hrs  T(C): 36.7 (06 Jul 2017 04:53), Max: 36.8 (05 Jul 2017 13:05)  T(F): 98.1 (06 Jul 2017 04:53), Max: 98.3 (05 Jul 2017 13:05)  HR: 73 (06 Jul 2017 04:53) (71 - 81)  BP: 96/58 (05 Jul 2017 20:46) (93/58 - 104/66)  BP(mean): --  RR: 16 (05 Jul 2017 20:46) (16 - 17)  SpO2: 93% (06 Jul 2017 04:53) (92% - 93%)  GENERAL: Elderly female looking in mild to moderate respiratory distress.   HEENT: PERRL, +EOMI  NECK: soft, Supple, No JVD,   CHEST/LUNG: Rhonchi bilaterally, decrease air entry B/L, No wheezing  HEART: S1S2+, Regular rate and rhythm; No murmurs.   ABDOMEN: Soft, Nontender, Nondistended; mild tenderness in the middle of abdomen, Bowel sounds present  EXTREMITIES:  1+ Peripheral Pulses, No clubbing, cyanosis, + edema fingers and left hand   SKIN: Chronic venous statis changes                             8.9    8.6   )-----------( 179      ( 04 Jul 2017 14:43 )             28.3   07-04    134<L>  |  91<L>  |  84.0<H>  ----------------------------<  148<H>  4.8   |  25.0  |  5.32<H>    Ca    8.8      04 Jul 2017 14:43  Phos  4.2     07-04                                  8.9    8.6   )-----------( 179      ( 04 Jul 2017 14:43 )             28.3   07-04    134<L>  |  91<L>  |  84.0<H>  ----------------------------<  148<H>  4.8   |  25.0  |  5.32<H>    Ca    8.8      04 Jul 2017 14:43  Phos  4.2     07-04

## 2017-07-06 NOTE — PROGRESS NOTE ADULT - ASSESSMENT
COPD/mild RIAZ - refuses cpap  s/p fall, a fib, sever pulmonary HTn/RV failure- diastolic dysfunction/valvular heart disease/remodelled  R pleural effusion no improvement despite dialysis, BP better off sildenafil  Contacted T surg,  IR contacted for pleural drainage  procalcitonin noted, would repeat, further eval pending fluid, limited benefit of azithromycin alone  ID eval if repeat elevated  await pleural fluid analysis  discussed with rehab team  prognosis guarded

## 2017-07-06 NOTE — PROGRESS NOTE ADULT - ASSESSMENT
75 yo female with h/o Atrial fibrillation off anticoagulation due to fall risk and bleeding risk , ESRD on HD ,diastolic CHF , pulmonary hypertension , valvular haeart disease  admitted s/p fall at home with left SAH , brain injury and developed respiratory distress being treated for pneumonia  , now in rehab . Chest congestion, cough now improving  BP remains low, medications  doses of  sildenafil adjusted by pulmonologist, hypoxic , remains hypotensive stable slightly better

## 2017-07-06 NOTE — PROGRESS NOTE ADULT - PROBLEM SELECTOR PLAN 1
multifactorial, cont ox supplement   discussed with Dr Babcock, fluid removal by IR today   if does not improve may need to transfer to medicine service

## 2017-07-06 NOTE — PROGRESS NOTE ADULT - SUBJECTIVE AND OBJECTIVE BOX
HISTORY OF PRESENT ILLNESS  76F was admitted on 6/14/17 after a mechanical fall while walking outside. She had head trauma but no LOC. In ER, GCS=15. A head CT showed left parietal and temporo-occipital SAH. A repeat head CT was stable. Additional workup showed nondisplaced fractures of the right 4-6 ribs with emphysematous changes, pleural effusion and atelectasis. Hospital course was complicated by shortness of breath/hypoxia and needed BiPAP/high-flow NC. She was started on Cefepime for PNA. Admitted to acute rehab on 6/27.    PMHx - HTN, ESRD on HD, AFIB (no full AC due to fall and bleeding risk), CAD, COPD (on home O2 overnight), Cholelithiasis, Gout, Hyperlipidemia, Spinal stenosis, s/p AVR/MVR/Tricupsid valve annuloplasty, Cataracts repair, H/O spinal fusion, Appendectomy, Tonsillectomy, C-Spine DJD    TODAY'S SUBJECTIVE & REVIEW OF SYMPTOMS  [X] Constitutional WNL      [X] Cardio WNL            [ ] Resp WNL           [X] GI WNL                      [X]  WNL                [ ] Heme WNL              [X] Endo WNL                  [ ] Skin WNL               [ ] MSK WNL            [ ] Neuro WNL                  [ ] Cognitive WNL        [X] Psych WNL    No overnight events.  Awaiting IR to drain pleural effusion today.  Left hand appears more swollen. Pain is about the same.   RN aware and may need to cut ring on 4th digit.   Applied ice and removed the IV proximal to the swelling. Will continue monitor.     VITALS  T(C): 36.7 (07-06-17 @ 04:53)  T(F): 98.1 (07-06-17 @ 04:53), Max: 98.3 (07-05-17 @ 13:05)  HR: 73 (07-06-17 @ 04:53) (71 - 81)  BP: 96/58 (07-05-17 @ 20:46) (93/58 - 104/66)  RR:  (16 - 17)  SpO2:  (92% - 93%)  Wt(kg): --    PHYSICAL EXAM  Constitutional - NAD, Comfortable  HEENT - Facial ecchymosis, Right supraorbital laceration  Neck - Supple, No limited ROM  Chest - +Rhonchi   Cardiovascular - S1S2  Abdomen - BS+, Soft, NTND  Extremities - No C/C, No calf tenderness, BLE PVD skin changes  Neurologic Exam - Right field cut                    Cognitive - AAO to self, place, month/year, some of situation     Communication - Expressive deficits     Motor - No focal deficits     Sensory - Intact to LT     Coordination - FTN impaired  Psychiatric - Mood stable, Affect Flat  Skin - BUE - Multiple ecchymosis, left hand edema/pain to palpation    FUNCTIONAL PROGRESS  Gait - Total in WC  ADLs - Total A  Transfers - sit-to-stand mod A x 2  Functional transfer - Bed transfer mod A x 2    RECENT LABS                         8.9    8.6   )-----------( 179      ( 04 Jul 2017 14:43 )             28.3     07-04    134<L>  |  91<L>  |  84.0<H>  ----------------------------<  148<H>  4.8   |  25.0  |  5.32<H>    Ca    8.8      04 Jul 2017 14:43  Phos  4.2     07-04    Prealb 6/28 - 16    Procalcitonin 7/1 - 2.42    RADIOLOGY/OTHER RESULTS  CXR 7/5 -  No interval change  CXR 6/27 -  Unchanged moderate right pleural effusion with associated atelectasis or consolidation. Bilateral perihilar opacities, probably mild pulmonary edema.  Right hand XR 6/27 - No acute fracture or dislocation of the right hand.    CURRENT MEDICATIONS  MEDICATIONS  (STANDING):  heparin  Injectable 5000 Unit(s) SubCutaneous every 8 hours  aspirin enteric coated 81 milliGRAM(s) Oral daily  calcium acetate 667 milliGRAM(s) Oral three times a day with meals  cyanocobalamin 1000 MICROGram(s) Oral daily  gabapentin 300 milliGRAM(s) Oral every 8 hours  epoetin jaswinder Injectable 28555 Unit(s) IV Push <User Schedule>  atorvastatin 40 milliGRAM(s) Oral at bedtime  folic acid 1 milliGRAM(s) Oral daily  lidocaine   Patch 2 Patch Transdermal daily  Nephro-toi 1 Tablet(s) Oral daily  ammonium lactate 12% Lotion 1 Application(s) Topical daily  ALBUTerol/ipratropium for Nebulization 3 milliLiter(s) Nebulizer every 6 hours  midodrine 10 milliGRAM(s) Oral two times a day    MEDICATIONS  (PRN):  acetaminophen   Tablet. 650 milliGRAM(s) Oral every 6 hours PRN Mild Pain (1 - 3)  guaiFENesin    Syrup 200 milliGRAM(s) Oral every 6 hours PRN Cough  lactulose Syrup 10 Gram(s) Oral daily PRN no bm > 2 days  sodium chloride 0.65% Nasal 1 Spray(s) Both Nostrils three times a day PRN nasal congestion/discomfort from O2    ASSESSMENT & PLAN  76F sustaining a TBI after a fall now with functional deficits  Cardiac/Pulm HTN - Midodrine, DC Sildenafil (7/1)  PNA - Cefepime (6/19-7/5), Zithromax (6/28-7/5  Pulm - Duoneb (6/29), O2 via NC, Guaifenesin PRN, Solumedrol IV x3 doses (7/5), Ocean PRN  CAD - Lipitor, ASA  ESRD on HD - Phoslo, HD T/Th/Sat  Anemia - Improved, Epogen, Vit B, Folate  Sleep - DC Melatonin (6/28)  GI/Bowel Management - Toilet PRN, Lactulose PRN   Management - Toilet Q2  Skin - Turn Q2, Lachydrin BLE, Zfloats BLE  Pain - Neurontin, Lidoderm to right ribs, Tylenol PRN, Ice to left hand PRN  DVT PPX - Heparin, TEDs, SCDs  Diet - Renal & Cardiac/Thins, Nephro-toi, Nepro TID    Continue comprehensive acute rehab program consisting of 3hrs/day of OT/PT and SLP

## 2017-07-06 NOTE — CHART NOTE - NSCHARTNOTEFT_GEN_A_CORE
PA called to remove ring on left 4th finger    Patient seen and examined at the bedside. She appears in no acute distress. Left 4th finger with swelling distal to her ring. As per RN is has improved greatly since this am after using Cold compresses. Pulse, motor, sensory intact. Capillary refill less than 2 sec, FROM of left 4th digit, hand and wrist. Appropriate sensation over left 4th finger. I attempted to remove the ring without cutting it however, the swelling over her proximal phalax joint prevented me from doing so. Surgilube was used but to no avail. There was no emergent reason to remove the ring, however the rehabilitation resident wanted it removed as earlier PA called to remove ring on left 4th finger    Patient seen and examined at the bedside. She appears in no acute distress. Left 4th finger with swelling distal to her ring. As per RN is has improved greatly since this am after using Cold compresses. Pulse, motor, sensory intact. Capillary refill less than 2 sec, FROM of left 4th digit, hand and wrist. Appropriate sensation over left 4th finger. I attempted to remove the ring without cutting it however, the swelling over her proximal phalanx joint prevented me from doing so. Surgilube was used but to no avail. There was no emergent reason to remove the ring, however the rehabilitation resident wanted it removed as earlier the finger was much swollen and she did not want to risk that happening again. Consent was obtained for the ring removal procedure, Risks, benefits, and alternatives were explained. The patient agreed to the procedure. A ring cutter was used to remove the ring. The patient tolerated the procedure well. The ring was placed in a bag with her identification on it and placed into her paper chart. The patient was made aware of this.  After the procedure some erythema was noted where the ring was. RN and patient instructed to place a cold compress on the hand and finger for 15min on 15min off for at least 4 doses.

## 2017-07-07 DIAGNOSIS — S06.6X0S TRAUMATIC SUBARACHNOID HEMORRHAGE WITHOUT LOSS OF CONSCIOUSNESS, SEQUELA: ICD-10-CM

## 2017-07-07 DIAGNOSIS — I50.30 UNSPECIFIED DIASTOLIC (CONGESTIVE) HEART FAILURE: ICD-10-CM

## 2017-07-07 DIAGNOSIS — Z51.5 ENCOUNTER FOR PALLIATIVE CARE: ICD-10-CM

## 2017-07-07 LAB
AMYLASE FLD-CCNC: 29 U/L — SIGNIFICANT CHANGE UP
LDH SERPL L TO P-CCNC: 121 U/L — SIGNIFICANT CHANGE UP
LDH SERPL L TO P-CCNC: 261 U/L — HIGH (ref 98–192)
NIGHT BLUE STAIN TISS: SIGNIFICANT CHANGE UP
SPECIMEN SOURCE FLD: SIGNIFICANT CHANGE UP
SPECIMEN SOURCE FLD: SIGNIFICANT CHANGE UP
SPECIMEN SOURCE: SIGNIFICANT CHANGE UP

## 2017-07-07 PROCEDURE — 99232 SBSQ HOSP IP/OBS MODERATE 35: CPT

## 2017-07-07 PROCEDURE — 99497 ADVNCD CARE PLAN 30 MIN: CPT | Mod: 25

## 2017-07-07 PROCEDURE — 99233 SBSQ HOSP IP/OBS HIGH 50: CPT

## 2017-07-07 RX ADMIN — PREGABALIN 1000 MICROGRAM(S): 225 CAPSULE ORAL at 07:54

## 2017-07-07 RX ADMIN — GABAPENTIN 300 MILLIGRAM(S): 400 CAPSULE ORAL at 05:40

## 2017-07-07 RX ADMIN — MIDODRINE HYDROCHLORIDE 10 MILLIGRAM(S): 2.5 TABLET ORAL at 05:40

## 2017-07-07 RX ADMIN — Medication 667 MILLIGRAM(S): at 11:53

## 2017-07-07 RX ADMIN — Medication 81 MILLIGRAM(S): at 07:53

## 2017-07-07 RX ADMIN — LIDOCAINE 2 PATCH: 4 CREAM TOPICAL at 12:05

## 2017-07-07 RX ADMIN — HEPARIN SODIUM 5000 UNIT(S): 5000 INJECTION INTRAVENOUS; SUBCUTANEOUS at 13:13

## 2017-07-07 RX ADMIN — ATORVASTATIN CALCIUM 40 MILLIGRAM(S): 80 TABLET, FILM COATED ORAL at 21:39

## 2017-07-07 RX ADMIN — MIDODRINE HYDROCHLORIDE 10 MILLIGRAM(S): 2.5 TABLET ORAL at 17:43

## 2017-07-07 RX ADMIN — Medication 1 APPLICATION(S): at 07:53

## 2017-07-07 RX ADMIN — HEPARIN SODIUM 5000 UNIT(S): 5000 INJECTION INTRAVENOUS; SUBCUTANEOUS at 21:39

## 2017-07-07 RX ADMIN — Medication 3 MILLILITER(S): at 08:20

## 2017-07-07 RX ADMIN — GABAPENTIN 300 MILLIGRAM(S): 400 CAPSULE ORAL at 13:13

## 2017-07-07 RX ADMIN — Medication 3 MILLILITER(S): at 21:43

## 2017-07-07 RX ADMIN — Medication 667 MILLIGRAM(S): at 07:53

## 2017-07-07 RX ADMIN — Medication 1 MILLIGRAM(S): at 11:53

## 2017-07-07 RX ADMIN — LIDOCAINE 2 PATCH: 4 CREAM TOPICAL at 23:30

## 2017-07-07 RX ADMIN — Medication 667 MILLIGRAM(S): at 08:31

## 2017-07-07 RX ADMIN — Medication 1 APPLICATION(S): at 11:52

## 2017-07-07 RX ADMIN — Medication 1 TABLET(S): at 07:55

## 2017-07-07 RX ADMIN — Medication 81 MILLIGRAM(S): at 11:53

## 2017-07-07 RX ADMIN — Medication 667 MILLIGRAM(S): at 07:54

## 2017-07-07 RX ADMIN — Medication 1 MILLIGRAM(S): at 07:54

## 2017-07-07 RX ADMIN — GABAPENTIN 300 MILLIGRAM(S): 400 CAPSULE ORAL at 21:39

## 2017-07-07 RX ADMIN — Medication 3 MILLILITER(S): at 15:06

## 2017-07-07 RX ADMIN — HEPARIN SODIUM 5000 UNIT(S): 5000 INJECTION INTRAVENOUS; SUBCUTANEOUS at 05:40

## 2017-07-07 RX ADMIN — Medication 667 MILLIGRAM(S): at 06:57

## 2017-07-07 RX ADMIN — Medication 667 MILLIGRAM(S): at 17:43

## 2017-07-07 RX ADMIN — Medication 1 TABLET(S): at 11:54

## 2017-07-07 RX ADMIN — PREGABALIN 1000 MICROGRAM(S): 225 CAPSULE ORAL at 11:53

## 2017-07-07 NOTE — PROGRESS NOTE ADULT - ASSESSMENT
ESRD on HD TTS schedule next HD este[facility]ia  will use 3 K bath  Anemia 2/2 CKD cont EPO w HD  BP on low side will be cautious w UF removal

## 2017-07-07 NOTE — PROGRESS NOTE ADULT - SUBJECTIVE AND OBJECTIVE BOX
HISTORY OF PRESENT ILLNESS  76F was admitted on 6/14/17 after a mechanical fall while walking outside. She had head trauma but no LOC. In ER, GCS=15. A head CT showed left parietal and temporo-occipital SAH. A repeat head CT was stable. Additional workup showed nondisplaced fractures of the right 4-6 ribs with emphysematous changes, pleural effusion and atelectasis. Hospital course was complicated by shortness of breath/hypoxia and needed BiPAP/high-flow NC. She was started on Cefepime for PNA. Admitted to acute rehab on 6/27.    PMHx - HTN, ESRD on HD, AFIB (no full AC due to fall and bleeding risk), CAD, COPD (on home O2 overnight), Cholelithiasis, Gout, Hyperlipidemia, Spinal stenosis, s/p AVR/MVR/Tricupsid valve annuloplasty, Cataracts repair, H/O spinal fusion, Appendectomy, Tonsillectomy, C-Spine DJD    TODAY'S SUBJECTIVE & REVIEW OF SYMPTOMS  [X] Constitutional WNL      [X] Cardio WNL            [ ] Resp WNL           [X] GI WNL                      [X]  WNL                [ ] Heme WNL              [X] Endo WNL                  [ ] Skin WNL               [ ] MSK WNL            [ ] Neuro WNL                  [ ] Cognitive WNL        [X] Psych WNL    No overnight events. Patient had successful drainage of effusion.   Patient feels that this helped her significantly.     VITALS  T(C): 36.7 (07-07-17 @ 05:00)  T(F): 98 (07-07-17 @ 05:00), Max: 98 (07-06-17 @ 20:32)  HR: 78 (07-07-17 @ 05:00) (73 - 78)  BP: 95/62 (07-07-17 @ 05:00) (92/52 - 96/52)  RR:  (16 - 18)  SpO2:  (94% - 97%)  Wt(kg): --    PHYSICAL EXAM  Constitutional - NAD, Comfortable  HEENT - Facial ecchymosis, Right supraorbital laceration  Neck - Supple, No limited ROM  Chest - +Rhonchi   Cardiovascular - S1S2  Abdomen - BS+, Soft, NTND  Extremities - No C/C, No calf tenderness, BLE PVD skin changes  Neurologic Exam - Right field cut                    Cognitive - AAO to self, place, month/year, some of situation     Communication - Expressive deficits     Motor - No focal deficits     Sensory - Intact to LT     Coordination - FTN impaired  Psychiatric - Mood stable, Affect Flat  Skin - BUE - Multiple ecchymosis, left hand edema/pain to palpation    FUNCTIONAL PROGRESS  Gait - Total in WC  ADLs - Total A  Transfers - sit-to-stand mod A x 2  Functional transfer - Bed transfer mod A x 2    RECENT LABS                         8.9    8.6   )-----------( 179      ( 04 Jul 2017 14:43 )             28.3     07-04    134<L>  |  91<L>  |  84.0<H>  ----------------------------<  148<H>  4.8   |  25.0  |  5.32<H>    Ca    8.8      04 Jul 2017 14:43  Phos  4.2     07-04    Prealb 6/28 - 16    Procalcitonin 7/1 - 2.42, 7/6 - 1.18    RADIOLOGY/OTHER RESULTS  CXR 7/5 -  No interval change  CXR 6/27 -  Unchanged moderate right pleural effusion with associated atelectasis or consolidation. Bilateral perihilar opacities, probably mild pulmonary edema.  Right hand XR 6/27 - No acute fracture or dislocation of the right hand.    CURRENT MEDICATIONS  MEDICATIONS  (STANDING):  heparin  Injectable 5000 Unit(s) SubCutaneous every 8 hours  aspirin enteric coated 81 milliGRAM(s) Oral daily  calcium acetate 667 milliGRAM(s) Oral three times a day with meals  cyanocobalamin 1000 MICROGram(s) Oral daily  gabapentin 300 milliGRAM(s) Oral every 8 hours  epoetin jaswinder Injectable 73327 Unit(s) IV Push <User Schedule>  atorvastatin 40 milliGRAM(s) Oral at bedtime  folic acid 1 milliGRAM(s) Oral daily  lidocaine   Patch 2 Patch Transdermal daily  Nephro-toi 1 Tablet(s) Oral daily  ammonium lactate 12% Lotion 1 Application(s) Topical daily  ALBUTerol/ipratropium for Nebulization 3 milliLiter(s) Nebulizer every 6 hours  midodrine 10 milliGRAM(s) Oral two times a day    MEDICATIONS  (PRN):  acetaminophen   Tablet. 650 milliGRAM(s) Oral every 6 hours PRN Mild Pain (1 - 3)  guaiFENesin    Syrup 200 milliGRAM(s) Oral every 6 hours PRN Cough  lactulose Syrup 10 Gram(s) Oral daily PRN no bm > 2 days  sodium chloride 0.65% Nasal 1 Spray(s) Both Nostrils three times a day PRN nasal congestion/discomfort from O2    ASSESSMENT & PLAN  76F sustaining a TBI after a fall now with functional deficits  Cardiac/Pulm HTN - Midodrine, DC Sildenafil (7/1)  PNA - Cefepime (6/19-7/5), Zithromax (6/28-7/5  Pulm - Duoneb (6/29), O2 via NC, Guaifenesin PRN, Solumedrol IV x3 doses (7/5), Ocean PRN  CAD - Lipitor, ASA  ESRD on HD - Phoslo, HD T/Th/Sat  Anemia - Improved, Epogen, Vit B, Folate  Sleep - DC Melatonin (6/28)  GI/Bowel Management - Toilet PRN, Lactulose PRN   Management - Toilet Q2  Skin - Turn Q2, Lachydrin BLE, Zfloats BLE  Pain - Neurontin, Lidoderm to right ribs, Tylenol PRN, Ice to left hand PRN  DVT PPX - Heparin, TEDs, SCDs  Diet - Renal & Cardiac/Thins, Nephro-toi, Nepro TID    Continue comprehensive acute rehab program consisting of 3hrs/day of OT/PT and SLP HISTORY OF PRESENT ILLNESS  76F was admitted on 6/14/17 after a mechanical fall while walking outside. She had head trauma but no LOC. In ER, GCS=15. A head CT showed left parietal and temporo-occipital SAH. A repeat head CT was stable. Additional workup showed nondisplaced fractures of the right 4-6 ribs with emphysematous changes, pleural effusion and atelectasis. Hospital course was complicated by shortness of breath/hypoxia and needed BiPAP/high-flow NC. She was started on Cefepime for PNA. Admitted to acute rehab on 6/27.    PMHx - HTN, ESRD on HD, AFIB (no full AC due to fall and bleeding risk), CAD, COPD (on home O2 overnight), Cholelithiasis, Gout, Hyperlipidemia, Spinal stenosis, s/p AVR/MVR/Tricupsid valve annuloplasty, Cataracts repair, H/O spinal fusion, Appendectomy, Tonsillectomy, C-Spine DJD    TODAY'S SUBJECTIVE & REVIEW OF SYMPTOMS  [X] Constitutional WNL      [X] Cardio WNL            [ ] Resp WNL           [X] GI WNL                      [X]  WNL                [ ] Heme WNL              [X] Endo WNL                  [ ] Skin WNL               [ ] MSK WNL            [ ] Neuro WNL                  [ ] Cognitive WNL        [X] Psych WNL    No overnight events. Patient had successful drainage of effusion.   Approximately 1760 mL of clear straw-colored fluid was aspirated.   Patient feels that this helped her significantly.   Denies pain.   Slept very well.     VITALS  T(C): 36.7 (07-07-17 @ 05:00)  T(F): 98 (07-07-17 @ 05:00), Max: 98 (07-06-17 @ 20:32)  HR: 78 (07-07-17 @ 05:00) (73 - 78)  BP: 95/62 (07-07-17 @ 05:00) (92/52 - 96/52)  RR:  (16 - 18)  SpO2:  (94% - 97%)  Wt(kg): --    PHYSICAL EXAM  Constitutional - NAD, Comfortable  HEENT - Facial ecchymosis, Right supraorbital laceration  Neck - Supple, No limited ROM  Chest - +Rhonchi   Cardiovascular - S1S2  Abdomen - BS+, Soft, NTND  Extremities - No C/C, No calf tenderness, BLE PVD skin changes  Neurologic Exam - Right field cut                    Cognitive - AAO to self, place, month/year, some of situation     Communication - Expressive deficits     Motor - No focal deficits     Sensory - Intact to LT     Coordination - FTN impaired  Psychiatric - Mood stable, Affect Flat  Skin - BUE - Multiple ecchymosis, left hand edema/pain to palpation    FUNCTIONAL PROGRESS  Gait - Total in WC  ADLs - Total A  Transfers - sit-to-stand mod A x 2  Functional transfer - Bed transfer mod A x 2    RECENT LABS                         8.9    8.6   )-----------( 179      ( 04 Jul 2017 14:43 )             28.3     07-04    134<L>  |  91<L>  |  84.0<H>  ----------------------------<  148<H>  4.8   |  25.0  |  5.32<H>    Ca    8.8      04 Jul 2017 14:43  Phos  4.2     07-04    Prealb 6/28 - 16    Procalcitonin 7/1 - 2.42, 7/6 - 1.18    RADIOLOGY/OTHER RESULTS  CXR 7/5 -  No interval change  CXR 6/27 -  Unchanged moderate right pleural effusion with associated atelectasis or consolidation. Bilateral perihilar opacities, probably mild pulmonary edema.  Right hand XR 6/27 - No acute fracture or dislocation of the right hand.    CURRENT MEDICATIONS  MEDICATIONS  (STANDING):  heparin  Injectable 5000 Unit(s) SubCutaneous every 8 hours  aspirin enteric coated 81 milliGRAM(s) Oral daily  calcium acetate 667 milliGRAM(s) Oral three times a day with meals  cyanocobalamin 1000 MICROGram(s) Oral daily  gabapentin 300 milliGRAM(s) Oral every 8 hours  epoetin jaswinder Injectable 83055 Unit(s) IV Push <User Schedule>  atorvastatin 40 milliGRAM(s) Oral at bedtime  folic acid 1 milliGRAM(s) Oral daily  lidocaine   Patch 2 Patch Transdermal daily  Nephro-toi 1 Tablet(s) Oral daily  ammonium lactate 12% Lotion 1 Application(s) Topical daily  ALBUTerol/ipratropium for Nebulization 3 milliLiter(s) Nebulizer every 6 hours  midodrine 10 milliGRAM(s) Oral two times a day    MEDICATIONS  (PRN):  acetaminophen   Tablet. 650 milliGRAM(s) Oral every 6 hours PRN Mild Pain (1 - 3)  guaiFENesin    Syrup 200 milliGRAM(s) Oral every 6 hours PRN Cough  lactulose Syrup 10 Gram(s) Oral daily PRN no bm > 2 days  sodium chloride 0.65% Nasal 1 Spray(s) Both Nostrils three times a day PRN nasal congestion/discomfort from O2    ASSESSMENT & PLAN  76F sustaining a TBI after a fall now with functional deficits  Cardiac/Pulm HTN - Midodrine, DC Sildenafil (7/1)  PNA - Cefepime (6/19-7/5), Zithromax (6/28-7/5  Pulm - Duoneb (6/29), O2 via NC, Guaifenesin PRN, Solumedrol IV x3 doses (7/5), Ocean PRN  CAD - Lipitor, ASA  ESRD on HD - Phoslo, HD T/Th/Sat  Anemia - Improved, Epogen, Vit B, Folate  Sleep - DC Melatonin (6/28)  GI/Bowel Management - Toilet PRN, Lactulose PRN   Management - Toilet Q2  Skin - Turn Q2, Lachydrin BLE, Zfloats BLE  Pain - Neurontin, Lidoderm to right ribs, Tylenol PRN, Ice to left hand PRN  DVT PPX - Heparin, TEDs, SCDs  Diet - Renal & Cardiac/Thins, Nephro-toi, Nepro TID    Continue comprehensive acute rehab program consisting of 3hrs/day of OT/PT and SLP

## 2017-07-07 NOTE — PROGRESS NOTE ADULT - ASSESSMENT
75yo Frail debilitated Female in Acute respiratory Failure-Hypoxia  Pleural EffusionsFluid overload  Valvular HD with Diastolic Heart Failure,  Severe Dyspnea/ not tolerating increasing activity-feels better today  ESRD on HD  AFIB no anticoagulants safety risk

## 2017-07-07 NOTE — PROGRESS NOTE ADULT - SUBJECTIVE AND OBJECTIVE BOX
INTERVAL HPI/OVERNIGHT EVENTS: This 75yo F is feeling better today;She is alert and oriented x 3  relieved that she got through two procedures yesterday and feeling better for it.   Thoracentesis and HD drained combined total>2000ml. Using nasal O2; continuous Pulse Oximetry alarming at times, when her breathing is shallow otherwise O2 saturation 95%. Denies SOB at rest.  Appetite improved, able to eat better. Denies N/V/D ?constipation no BM today. Weak and was not tolerating  PT before fluid overload addressed.      76y old  Female who presents with a chief complaint of     Present Symptoms:     Dyspnea:  1 -2   Nausea/Vomiting No  Anxiety:  No  Depression: Yes   Fatigue: Yes   Loss of appetite: Yes  Improved today    Pain: Denies            Character-            Duration-            Effect-            Factors-            Frequency-            Location-            Severity-    Review of Systems: Reviewed               :All others negative    MEDICATIONS  (STANDING):  heparin  Injectable 5000 Unit(s) SubCutaneous every 8 hours  aspirin enteric coated 81 milliGRAM(s) Oral daily  calcium acetate 667 milliGRAM(s) Oral three times a day with meals  cyanocobalamin 1000 MICROGram(s) Oral daily  gabapentin 300 milliGRAM(s) Oral every 8 hours  epoetin jaswinder Injectable 55872 Unit(s) IV Push <User Schedule>  atorvastatin 40 milliGRAM(s) Oral at bedtime  folic acid 1 milliGRAM(s) Oral daily  lidocaine   Patch 2 Patch Transdermal daily  Nephro-toi 1 Tablet(s) Oral daily  ammonium lactate 12% Lotion 1 Application(s) Topical daily  ALBUTerol/ipratropium for Nebulization 3 milliLiter(s) Nebulizer every 6 hours  midodrine 10 milliGRAM(s) Oral two times a day    MEDICATIONS  (PRN):  acetaminophen   Tablet. 650 milliGRAM(s) Oral every 6 hours PRN Mild Pain (1 - 3)  guaiFENesin    Syrup 200 milliGRAM(s) Oral every 6 hours PRN Cough  lactulose Syrup 10 Gram(s) Oral daily PRN no bm > 2 days  sodium chloride 0.65% Nasal 1 Spray(s) Both Nostrils three times a day PRN nasal congestion/discomfort from O2      PHYSICAL EXAM:    Vital Signs Last 24 Hrs  T(C): 36.7 (07 Jul 2017 05:00), Max: 36.7 (06 Jul 2017 20:32)  T(F): 98 (07 Jul 2017 05:00), Max: 98 (06 Jul 2017 20:32)  HR: 70 (07 Jul 2017 08:20) (70 - 78)  BP: 95/62 (07 Jul 2017 05:00) (92/52 - 96/52)  BP(mean): --  RR: 16 (07 Jul 2017 05:00) (16 - 18)  SpO2: 96% (07 Jul 2017 08:20) (94% - 97%)    General: alert  oriented x __3__                     HEENT:  dry mouth    Lungs:  tachypnea/labored breathing on exertion    CV: normal      GI:   distended                   constipation  last BM: ?    :   oliguria/anuria   on HD    MSK:  weakness  edema              bedbound/wheelchair bound    Skin: normal  pressure ulcers-fragile skin upper extremities purpura and ecchymosis                      Skin intact no rash  LABS:                        10.1   6.2   )-----------( 193      ( 06 Jul 2017 16:22 )             31.9     07-06    137  |  94<L>  |  57.0<H>  ----------------------------<  125<H>  3.8   |  27.0  |  3.03<H>    Ca    9.0      06 Jul 2017 16:22  Phos  3.0     07-06      PT/INR - ( 06 Jul 2017 21:05 )   PT: 11.0 sec;   INR: 1.00 ratio         PTT - ( 06 Jul 2017 21:05 )  PTT:33.2 sec    I&O's Summary    06 Jul 2017 07:01  -  07 Jul 2017 07:00  --------------------------------------------------------  IN: 0 mL / OUT: 1500 mL / NET: -1500 mL        RADIOLOGY & ADDITIONAL STUDIES:    ADVANCE DIRECTIVES:   DNR NO  Completed on:            HCP at home- asked to bring it in.         SY  NO   Completed on:  Living Will  YES NO   Completed on:

## 2017-07-07 NOTE — GOALS OF CARE CONVERSATION - PERSONAL ADVANCE DIRECTIVE - TREATMENT GUIDELINE COMMENT
Full Resusitation Efforts. Aggressive medical care  Daughter will make decisions if unable to wean off ventilator       Time Spent 30 minutes

## 2017-07-07 NOTE — PROGRESS NOTE ADULT - SUBJECTIVE AND OBJECTIVE BOX
NEPHROLOGY INTERVAL HPI/OVERNIGHT EVENTS:    Examined earlier  Looks comfortable    MEDICATIONS  (STANDING):  heparin  Injectable 5000 Unit(s) SubCutaneous every 8 hours  aspirin enteric coated 81 milliGRAM(s) Oral daily  calcium acetate 667 milliGRAM(s) Oral three times a day with meals  cyanocobalamin 1000 MICROGram(s) Oral daily  gabapentin 300 milliGRAM(s) Oral every 8 hours  epoetin jaswinder Injectable 34324 Unit(s) IV Push <User Schedule>  atorvastatin 40 milliGRAM(s) Oral at bedtime  folic acid 1 milliGRAM(s) Oral daily  lidocaine   Patch 2 Patch Transdermal daily  Nephro-toi 1 Tablet(s) Oral daily  ammonium lactate 12% Lotion 1 Application(s) Topical daily  ALBUTerol/ipratropium for Nebulization 3 milliLiter(s) Nebulizer every 6 hours  midodrine 10 milliGRAM(s) Oral two times a day    MEDICATIONS  (PRN):  acetaminophen   Tablet. 650 milliGRAM(s) Oral every 6 hours PRN Mild Pain (1 - 3)  guaiFENesin    Syrup 200 milliGRAM(s) Oral every 6 hours PRN Cough  lactulose Syrup 10 Gram(s) Oral daily PRN no bm > 2 days  sodium chloride 0.65% Nasal 1 Spray(s) Both Nostrils three times a day PRN nasal congestion/discomfort from O2      Allergies    allopurinol (Rash)  codeine (Nausea; Vomiting)  penicillin (Rash)  spironolactone (Unknown)    Intolerances        Vital Signs Last 24 Hrs  T(C): 36.9 (07 Jul 2017 12:00), Max: 36.9 (07 Jul 2017 12:00)  T(F): 98.4 (07 Jul 2017 12:00), Max: 98.4 (07 Jul 2017 12:00)  HR: 70 (07 Jul 2017 12:00) (70 - 78)  BP: 100/60 (07 Jul 2017 12:00) (92/52 - 100/60)  BP(mean): --  RR: 16 (07 Jul 2017 12:00) (16 - 18)  SpO2: 95% (07 Jul 2017 15:06) (95% - 97%)  Daily     Daily     PHYSICAL EXAM:  NAD  lungs - CTA  CV gr 1 murmer,  No gallop or rub  Abd : soft, NT BS +, No masses  Ext- No edema  Neuro : Grossly intact, moving extremities       LABS:                        10.1   6.2   )-----------( 193      ( 06 Jul 2017 16:22 )             31.9     07-06    137  |  94<L>  |  57.0<H>  ----------------------------<  125<H>  3.8   |  27.0  |  3.03<H>    Ca    9.0      06 Jul 2017 16:22  Phos  3.0     07-06      PT/INR - ( 06 Jul 2017 21:05 )   PT: 11.0 sec;   INR: 1.00 ratio         PTT - ( 06 Jul 2017 21:05 )  PTT:33.2 sec            RADIOLOGY & ADDITIONAL TESTS:

## 2017-07-07 NOTE — PROGRESS NOTE ADULT - SUBJECTIVE AND OBJECTIVE BOX
PULMONARY PROGRESS NOTE      DUSTIN HERNANDEZ  [id number]    Patient is a 76y old  Female who presents with a chief complaint of SOB    INTERVAL HPI/OVERNIGHT EVENTS:    Patient is s/p thoracentesis and HD  Feels better  Decreased SOB    MEDICATIONS  (STANDING):  heparin  Injectable 5000 Unit(s) SubCutaneous every 8 hours  aspirin enteric coated 81 milliGRAM(s) Oral daily  calcium acetate 667 milliGRAM(s) Oral three times a day with meals  cyanocobalamin 1000 MICROGram(s) Oral daily  gabapentin 300 milliGRAM(s) Oral every 8 hours  epoetin jaswinder Injectable 73988 Unit(s) IV Push <User Schedule>  atorvastatin 40 milliGRAM(s) Oral at bedtime  folic acid 1 milliGRAM(s) Oral daily  lidocaine   Patch 2 Patch Transdermal daily  Nephro-toi 1 Tablet(s) Oral daily  ammonium lactate 12% Lotion 1 Application(s) Topical daily  ALBUTerol/ipratropium for Nebulization 3 milliLiter(s) Nebulizer every 6 hours  midodrine 10 milliGRAM(s) Oral two times a day      MEDICATIONS  (PRN):  acetaminophen   Tablet. 650 milliGRAM(s) Oral every 6 hours PRN Mild Pain (1 - 3)  guaiFENesin    Syrup 200 milliGRAM(s) Oral every 6 hours PRN Cough  lactulose Syrup 10 Gram(s) Oral daily PRN no bm > 2 days  sodium chloride 0.65% Nasal 1 Spray(s) Both Nostrils three times a day PRN nasal congestion/discomfort from O2      Allergies    allopurinol (Rash)  codeine (Nausea; Vomiting)  penicillin (Rash)  spironolactone (Unknown)    Intolerances        PAST MEDICAL & SURGICAL HISTORY:  ESRD (end stage renal disease) on dialysis: MWF via R [facility] Permacath  planned AVF creation  Sinusitis, unspecified chronicity, unspecified location  Gout  Gall stones  Pneumonia  Anemia  Pulmonary hypertension  COPD (chronic obstructive pulmonary disease)  CAD (coronary artery disease)  Atrial fibrillation  Hyperlipidemia  Hypertension  Spinal fusion failure, initial encounter  Tubal ligation status  Sinusitis  Spinal stenosis  S/P tonsillectomy  S/P appendectomy  H/O aortic valve replacement  H/O spinal fusion  H/O cataract  Cystocele  H/O tricuspid valve annuloplasty  H/O mitral valve repair        REVIEW OF SYSTEMS:    CONSTITUTIONAL:  No distress    HEENT:  Eyes:  No diplopia or blurred vision. ENT:  No earache, sore throat or runny nose.    CARDIOVASCULAR:  No pressure, squeezing, tightness, heaviness or aching about the chest; no palpitations.    RESPIRATORY:  Improved cough, shortness of breath, no PND or orthopnea. Mild SOBOE    GASTROINTESTINAL:  No nausea, vomiting or diarrhea.    GENITOURINARY:  No dysuria, frequency or urgency.    NEUROLOGIC:  No paresthesias, fasciculations, seizures or weakness.    PSYCHIATRIC:  No disorder of thought or mood.    Vital Signs Last 24 Hrs  T(C): 36.7 (2017 05:00), Max: 36.7 (2017 20:32)  T(F): 98 (2017 05:00), Max: 98 (2017 20:32)  HR: 70 (2017 08:20) (70 - 78)  BP: 95/62 (2017 05:00) (92/52 - 96/52)  BP(mean): --  RR: 16 (2017 05:00) (16 - 18)  SpO2: 96% (2017 08:20) (94% - 97%)    PHYSICAL EXAMINATION:    GENERAL: The patient is awake and alert in no apparent distress.     HEENT: Head is normocephalic and atraumatic. Extraocular muscles are intact. Mucous membranes are moist.    NECK: Supple.    LUNGS: Clear to auscultation without wheezing, rales or rhonchi; respirations unlabored    HEART: Irregular rhythm with mild murmur.    ABDOMEN: Soft, nontender, and nondistended.      EXTREMITIES: Without any cyanosis, clubbing, rash, lesions or edema.    NEUROLOGIC: Grossly intact.    LABS:                        10.1   6.2   )-----------( 193      ( 2017 16:22 )             31.9     07-06    137  |  94<L>  |  57.0<H>  ----------------------------<  125<H>  3.8   |  27.0  |  3.03<H>    Ca    9.0      2017 16:22  Phos  3.0     07-06      PT/INR - ( 2017 21:05 )   PT: 11.0 sec;   INR: 1.00 ratio         PTT - ( 2017 21:05 )  PTT:33.2 sec      Procalcitonin, Serum: 1.18 ng/mL (17 @ 21:06)    Lactate Dehydrogenase, Fluid (17 @ 23:59)    Fluid Source: Pleural    Lactate Dehydrogenase, Fluid: 121: Reference Ranges have NOT been established for analytes in body fluids  because of variability in body fluid composition.  The  has not determined the efficacy of this test when  performed on fluid specimens. The performance [id number]: s of this  test were determined by [facility]. U/L    Protein Total, Fluid (17 @ 15:48)    Fluid Source: Pleural    Protein Total, Fluid: 2.7: Reference Ranges have NOT been established for analytes in body fluids  because of variability in body fluid composition.  The  has not determined the efficacy of this test when  performed on fluid specimens. The performance characteristic2.7: s of this  test were determined by [facility]. g/dL      MICROBIOLOGY:    Culture - Body Fluid with Gram Stain (17 @ 15:39)    Gram Stain:   No White blood cells  No organisms seen    Specimen Source: .Body Fluid R Pleural Fluid    Culture Results:   No growth at 1 day.  Culture in progress        RADIOLOGY & ADDITIONAL STUDIES:    EXAM:  CHEST SINGLE VIEW FRONTAL                          PROCEDURE DATE:  2017          INTERPRETATION:  EXAMINATION DATE: 2017 at 1520 hours.  CLINICAL INFORMATION: Post thoracentesis.    TECHNIQUE: Frontal view of the chest   COMPARISON: 2017.    FINDINGS:    LINES/TUBES: Unchanged right-sided central venous catheter with tip   overlying the superior vena cava.  LUNGS/PLEURA: Decreased size of right pleural effusion, now small. Right   basilar atelectasis or consolidation. No pneumothorax.   MEDIASTINUM: Cardiomegaly. Aortic valve replacement.  OTHER: Sternotomy.    IMPRESSION:     1.  Since 2017, decreased size of right pleural effusion, now   small.  2.  Right basilar atelectasis or consolidation.       VENKATESH BRAUN M.D., ATTENDING RADIOLOGIST  This document has been electronically signed. 2017  3:48PM      ECHO:    EXAM:  ECHO TRANSTHORACIC COMP W DOPP      PROCEDURE DATE:  2017   .      INTERPRETATION:  REPORT:    TRANSTHORACIC ECHOCARDIOGRAM REPORT           Patient Name:   DUSTIN HERNANDEZ Patient Location: Inpatient  Medical Rec #:  [id number]   Accession #:      [id number]  Account #:                        Height:           65.7 in 167.0 cm  YOB: 1940         Weight:           167.5 lb 76.00 kg  Patient Age:    76 years          BSA:              1.85 m²  Patient Gender: F                BP:               161/58 mmHg        Date of Exam: 2017 9:29:21 AM  Sonographer:  Kimberley Franco     Procedure:     2D Echo/Doppler/Color Doppler Complete.  Indications:   Shortness of breath - R06.02  Diagnosis:     Shortness of breath - R06.02  Study Details: Technically suboptimal study. Study quality was adversely                 affected due to study done bedside in SICU.           2D AND M-MODE MEASUREMENTS (normal ranges within parentheses):  Left Ventricle:    Normal    Aorta/Left Atrium:              Normal  IVSd (2D):              0.94 cm (0.7-1.1) LA Volume Index    53.0 ml/m²  LVPWd (2D):             1.29 cm (0.7-1.1)  LVIDd (2D):             3.94 cm (3.4-5.7)  LVIDs (2D):             2.63 cm  LV FS (2D):             33.2 %  (>25%)  LV EF (2D):             62 %    (>55%)  Relative Wall Thickness 0.65    (<0.42)     LV DIASTOLIC FUNCTION:  MV Peak E: 2.09 m/s e', MV Shabana: 0.03 m/s                      E/e' Ratio: 67.53                      Decel Time: 418 msec     SPECTRAL DOPPLER ANALYSIS (where applicable):  Mitral Valve:  MV Mean Grad: 6.7 mmHg MV P1/2 Time: 121.17 msec                         MV Area, PHT: 1.82 cm²     Aortic Valve: AoV Max Trung: 3.27 m/s AoV Peak P.7 mmHg AoV Mean P.9 mmHg     LVOT Vmax: 1.12 m/s LVOT VTI: 0.224 m LVOT Diameter:     AoV Area, Vmax:  AoV Area, VTI:  AoV Area, Vmn:  Ao VTI: 0.716  Tricuspid Valve and PA/RV Systolic Pressure: TR Max Velocity: 4.28 m/s RA   Pressure: 15 mmHg RVSP/PASP: 88.4 mmHg        PHYSICIAN INTERPRETATION:  Left Ventricle: The left ventricular internal cavity size is normal.   Abnormal septal motion is seen consistent with an intraventricular   conduction delay. Left ventricular ejection fraction, by visual   estimation, is 60 to 65%. The interventricular septum is flattened in   systole and diastole, consistent with right ventricular pressure and   volume overload. Spectral Doppler shows restrictive pattern of left   ventricular myocardial filling (Grade III diastolic dysfunction).  Right Ventricle: The right ventricular size is severely enlarged. RV   systolic function is severely reduced.  Left Atrium: Severely enlarged left atrium.  Right Atrium: The right atrium is severely dilated.  Pericardium: Trivial pericardial effusion is present. The pericardial   effusion is localized near the right atrium. There is a moderate pleural   effusion in the right lateral region.  Mitral Valve: Status-post mitral annular ring insertion. Peak transmitral   mean gradient equals 6.7 mmHg, calculated mitral valve area by pressure   half time equals 1.82 cm² consistent with mild mitral stenosis. Mild to   moderate mitral valve regurgitation is seen. Mitral valve mean gradient   is 6.7 mmHg (HR 81 bpm) suggestive of moderate mitral stenosis.  Tricuspid Valve: S/P tricuspid valve repair. Moderate-severe tricuspid   regurgitation is visualized. Estimated pulmonary artery systolic pressure   is 88.4 mmHg assuming a right atrial pressure of 15 mmHg, which is   consistent with severe pulmonary hypertension.  Aortic Valve: A bioprosthetic AoV is visualized. The prosthesis appears   stable without any evidence for rocking motion. Peak velocity = 3.3 m/s.   Peak/mean transvalvular gradients = 43/22 mmHg. Trivial aortic valve   regurgitation is seen.  Pulmonic Valve: The pulmonic valve was not well visualized. Moderate   pulmonic valve regurgitation.  Aorta: The ascending aorta is mildly dilated measuring 3.8 cm.     Venous: The inferior vena cava was dilated, withrespiratory size   variation less than 50%, suggestive of elevated central venous pressure.   Estimated RA pressure = 15 mmHg.        Summary:   1. Left ventricular ejection fraction, by visual estimation, is 60 to   65%.   2. Spectral Doppler shows restrictive pattern of left ventricular   myocardial filling (Grade III diastolic dysfunction).   3. RV failure. Severely dilated right ventricle with severely reduced   systolic function.   4. Right ventricular volume and pressure overload.   5. Status post mitral valve repair with residual mild to moderate mitral   regurgitation. Elevated transmitral gradients (mean 6.7 mmHg at a HR of   81 bpm).   6. S/P tricuspid valve repair with moderate to severe tricuspid   regurgitation.   7. Normally functioning aortic prosthesis.   8. Moderate pulmonic valve regurgitation.   9. Estimated pulmonary artery systolic pressure is 88.4 mmHg assuming a   right atrial pressure of 15 mmHg, which is consistent with severe   pulmonary hypertension.  10. Trace pericardial effusion.  11. Right sided pleural effusion.  12. ** Compared to TTE 2017, pulmonary pressures are higher.     Kristin Yoder DO Electronically signed on 2017 at 11:44:12 AM        *** Final ***    KRISTIN MEZA   This document has been electronically signed. 2017  9:29AM

## 2017-07-08 LAB
ANION GAP SERPL CALC-SCNC: 19 MMOL/L — HIGH (ref 5–17)
BUN SERPL-MCNC: 77 MG/DL — HIGH (ref 8–20)
CALCIUM SERPL-MCNC: 9 MG/DL — SIGNIFICANT CHANGE UP (ref 8.6–10.2)
CHLORIDE SERPL-SCNC: 93 MMOL/L — LOW (ref 98–107)
CO2 SERPL-SCNC: 27 MMOL/L — SIGNIFICANT CHANGE UP (ref 22–29)
CREAT SERPL-MCNC: 4.2 MG/DL — HIGH (ref 0.5–1.3)
GLUCOSE SERPL-MCNC: 91 MG/DL — SIGNIFICANT CHANGE UP (ref 70–115)
HCT VFR BLD CALC: 31.1 % — LOW (ref 37–47)
HGB BLD-MCNC: 9.7 G/DL — LOW (ref 12–16)
MCHC RBC-ENTMCNC: 31.2 G/DL — LOW (ref 32–36)
MCHC RBC-ENTMCNC: 34.3 PG — HIGH (ref 27–31)
MCV RBC AUTO: 109.9 FL — HIGH (ref 81–99)
PLATELET # BLD AUTO: 192 K/UL — SIGNIFICANT CHANGE UP (ref 150–400)
POTASSIUM SERPL-MCNC: 3.8 MMOL/L — SIGNIFICANT CHANGE UP (ref 3.5–5.3)
POTASSIUM SERPL-SCNC: 3.8 MMOL/L — SIGNIFICANT CHANGE UP (ref 3.5–5.3)
RBC # BLD: 2.83 M/UL — LOW (ref 4.4–5.2)
RBC # FLD: 18.8 % — HIGH (ref 11–15.6)
SODIUM SERPL-SCNC: 139 MMOL/L — SIGNIFICANT CHANGE UP (ref 135–145)
WBC # BLD: 6.3 K/UL — SIGNIFICANT CHANGE UP (ref 4.8–10.8)
WBC # FLD AUTO: 6.3 K/UL — SIGNIFICANT CHANGE UP (ref 4.8–10.8)

## 2017-07-08 PROCEDURE — 99232 SBSQ HOSP IP/OBS MODERATE 35: CPT | Mod: GC

## 2017-07-08 PROCEDURE — 99232 SBSQ HOSP IP/OBS MODERATE 35: CPT

## 2017-07-08 PROCEDURE — 99233 SBSQ HOSP IP/OBS HIGH 50: CPT

## 2017-07-08 RX ADMIN — Medication 650 MILLIGRAM(S): at 22:10

## 2017-07-08 RX ADMIN — GABAPENTIN 300 MILLIGRAM(S): 400 CAPSULE ORAL at 06:06

## 2017-07-08 RX ADMIN — Medication 1 TABLET(S): at 18:06

## 2017-07-08 RX ADMIN — PREGABALIN 1000 MICROGRAM(S): 225 CAPSULE ORAL at 18:05

## 2017-07-08 RX ADMIN — ERYTHROPOIETIN 10000 UNIT(S): 10000 INJECTION, SOLUTION INTRAVENOUS; SUBCUTANEOUS at 13:11

## 2017-07-08 RX ADMIN — ATORVASTATIN CALCIUM 40 MILLIGRAM(S): 80 TABLET, FILM COATED ORAL at 21:18

## 2017-07-08 RX ADMIN — Medication 3 MILLILITER(S): at 15:05

## 2017-07-08 RX ADMIN — Medication 1 MILLIGRAM(S): at 18:04

## 2017-07-08 RX ADMIN — MIDODRINE HYDROCHLORIDE 10 MILLIGRAM(S): 2.5 TABLET ORAL at 18:05

## 2017-07-08 RX ADMIN — Medication 3 MILLILITER(S): at 04:13

## 2017-07-08 RX ADMIN — Medication 3 MILLILITER(S): at 10:17

## 2017-07-08 RX ADMIN — HEPARIN SODIUM 5000 UNIT(S): 5000 INJECTION INTRAVENOUS; SUBCUTANEOUS at 21:17

## 2017-07-08 RX ADMIN — Medication 650 MILLIGRAM(S): at 21:18

## 2017-07-08 RX ADMIN — Medication 667 MILLIGRAM(S): at 08:04

## 2017-07-08 RX ADMIN — Medication 667 MILLIGRAM(S): at 18:04

## 2017-07-08 RX ADMIN — MIDODRINE HYDROCHLORIDE 10 MILLIGRAM(S): 2.5 TABLET ORAL at 06:06

## 2017-07-08 RX ADMIN — Medication 3 MILLILITER(S): at 21:43

## 2017-07-08 RX ADMIN — HEPARIN SODIUM 5000 UNIT(S): 5000 INJECTION INTRAVENOUS; SUBCUTANEOUS at 06:06

## 2017-07-08 RX ADMIN — Medication 81 MILLIGRAM(S): at 18:09

## 2017-07-08 RX ADMIN — GABAPENTIN 300 MILLIGRAM(S): 400 CAPSULE ORAL at 21:18

## 2017-07-08 NOTE — PROGRESS NOTE ADULT - PROBLEM SELECTOR PLAN 3
Improve with drainage  Appears to be c/w transudate by TP and LDH criteria  Follow CXR for recurrence; may improve with HD

## 2017-07-08 NOTE — PROGRESS NOTE ADULT - ASSESSMENT
ESRD on HD TTS schedule next HD today  will use 3 K bath  Anemia 2/2 CKD cont EPO w HD  BP on low side will be cautious w UF removal

## 2017-07-08 NOTE — PROGRESS NOTE ADULT - SUBJECTIVE AND OBJECTIVE BOX
PULMONARY PROGRESS NOTE      DUSTIN HERNANDEZ  [id number]    Patient is a 76y old  Female who presents with a chief complaint of SOB    INTERVAL HPI/OVERNIGHT EVENTS:    Patient is awake and alert  Feels slightly better after thoracentesis and HD  For HD today    MEDICATIONS  (STANDING):  heparin  Injectable 5000 Unit(s) SubCutaneous every 8 hours  aspirin enteric coated 81 milliGRAM(s) Oral daily  calcium acetate 667 milliGRAM(s) Oral three times a day with meals  cyanocobalamin 1000 MICROGram(s) Oral daily  gabapentin 300 milliGRAM(s) Oral every 8 hours  epoetin jaswinder Injectable 07247 Unit(s) IV Push <User Schedule>  atorvastatin 40 milliGRAM(s) Oral at bedtime  folic acid 1 milliGRAM(s) Oral daily  lidocaine   Patch 2 Patch Transdermal daily  Nephro-toi 1 Tablet(s) Oral daily  ammonium lactate 12% Lotion 1 Application(s) Topical daily  ALBUTerol/ipratropium for Nebulization 3 milliLiter(s) Nebulizer every 6 hours  midodrine 10 milliGRAM(s) Oral two times a day      MEDICATIONS  (PRN):  acetaminophen   Tablet. 650 milliGRAM(s) Oral every 6 hours PRN Mild Pain (1 - 3)  guaiFENesin    Syrup 200 milliGRAM(s) Oral every 6 hours PRN Cough  lactulose Syrup 10 Gram(s) Oral daily PRN no bm > 2 days  sodium chloride 0.65% Nasal 1 Spray(s) Both Nostrils three times a day PRN nasal congestion/discomfort from O2      Allergies    allopurinol (Rash)  codeine (Nausea; Vomiting)  penicillin (Rash)  spironolactone (Unknown)    Intolerances        PAST MEDICAL & SURGICAL HISTORY:  ESRD (end stage renal disease) on dialysis: MWF via R [facility] Permacath  planned AVF creation  Sinusitis, unspecified chronicity, unspecified location  Gout  Gall stones  Pneumonia  Anemia  Pulmonary hypertension  COPD (chronic obstructive pulmonary disease)  CAD (coronary artery disease)  Atrial fibrillation  Hyperlipidemia  Hypertension  Spinal fusion failure, initial encounter  Tubal ligation status  Sinusitis  Spinal stenosis  S/P tonsillectomy  S/P appendectomy  H/O aortic valve replacement  H/O spinal fusion  H/O cataract  Cystocele  H/O tricuspid valve annuloplasty  H/O mitral valve repair        REVIEW OF SYSTEMS:    CONSTITUTIONAL:  No distress    HEENT:  Eyes:  No diplopia or blurred vision. ENT:  No earache, sore throat or runny nose.    CARDIOVASCULAR:  No pressure, squeezing, tightness, heaviness or aching about the chest; no palpitations.    RESPIRATORY:  Improved cough, shortness of breath, no PND or orthopnea. Mild SOBOE    GASTROINTESTINAL:  No nausea, vomiting or diarrhea.    GENITOURINARY:  No dysuria, frequency or urgency.    NEUROLOGIC:  No paresthesias, fasciculations, seizures or weakness.    PSYCHIATRIC:  No disorder of thought or mood.    Vital Signs Last 24 Hrs  T(C): 36.4 (2017 20:26), Max: 36.9 (2017 12:00)  T(F): 97.6 (2017 20:26), Max: 98.4 (2017 12:00)  HR: 86 (2017 06:02) (70 - 90)  BP: 98/58 (2017 06:02) (98/58 - 106/57)  BP(mean): --  RR: 17 (2017 06:) (16 - 17)  SpO2: 97% (2017 06:02) (94% - 97%)    PHYSICAL EXAMINATION:    GENERAL: The patient is awake and alert in no apparent distress.     HEENT: Head is normocephalic and atraumatic. Extraocular muscles are intact. Mucous membranes are moist.    NECK: Supple.    LUNGS: Mild rhonchi but no wheezing or rales; respirations unlabored    HEART: Normal S1 S2 without murmur.    ABDOMEN: Soft, nontender, and nondistended.      EXTREMITIES: Without any cyanosis, clubbing, rash, lesions or edema.    NEUROLOGIC: Grossly intact.    LABS:                        10.1   6.2   )-----------( 193      ( 2017 16:22 )             31.9     07-    137  |  94<L>  |  57.0<H>  ----------------------------<  125<H>  3.8   |  27.0  |  3.03<H>    Ca    9.0      2017 16:22  Phos  3.0     07-06      PT/INR - ( 2017 21:05 )   PT: 11.0 sec;   INR: 1.00 ratio         PTT - ( 2017 21:05 )  PTT:33.2 sec                Procalcitonin, Serum: 1.18 ng/mL (17 @ 21:06)      MICROBIOLOGY:    Culture - Body Fluid with Gram Stain (17 @ 15:39)    Gram Stain:   No White blood cells  No organisms seen    Specimen Source: .Body Fluid R Pleural Fluid    Culture Results:   No growth at 1 day.  Culture in progress        RADIOLOGY & ADDITIONAL STUDIES:     EXAM:  CHEST SINGLE VIEW FRONTAL                          PROCEDURE DATE:  2017          INTERPRETATION:  EXAMINATION DATE: 2017 at 1520 hours.  CLINICAL INFORMATION: Post thoracentesis.    TECHNIQUE: Frontal view of the chest   COMPARISON: 2017.    FINDINGS:    LINES/TUBES: Unchanged right-sided central venous catheter with tip   overlying the superior vena cava.  LUNGS/PLEURA: Decreased size of right pleural effusion, now small. Right   basilar atelectasis or consolidation. No pneumothorax.   MEDIASTINUM: Cardiomegaly. Aortic valve replacement.  OTHER: Sternotomy.    IMPRESSION:     1.  Since 2017, decreased size of right pleural effusion, now   small.  2.  Right basilar atelectasis or consolidation.       VENKATESH BRAUN M.D., ATTENDING RADIOLOGIST  This document has been electronically signed. 2017  3:48PM      ECHO:    EXAM:  ECHO TRANSTHORACIC COMP W DOPP      PROCEDURE DATE:  2017   .      INTERPRETATION:  REPORT:    TRANSTHORACIC ECHOCARDIOGRAM REPORT           Patient Name:   DUSTIN HERNANDEZ Patient Location: Inpatient  Medical Rec #:  [id number]   Accession #:      [id number]  Account #:                        Height:           65.7 in 167.0 cm  YOB: 1940         Weight:           167.5 lb 76.00 kg  Patient Age:    76 years          BSA:              1.85 m²  Patient Gender: F                BP:               161/58 mmHg        Date of Exam: 2017 9:29:21 AM  Sonographer:  Kimberley Franco     Procedure:     2D Echo/Doppler/Color Doppler Complete.  Indications:   Shortness of breath - R06.02  Diagnosis:     Shortness of breath - R06.02  Study Details: Technically suboptimal study. Study quality was adversely                 affected due to study done bedside in SICU.           2D AND M-MODE MEASUREMENTS (normal ranges within parentheses):  Left Ventricle:    Normal    Aorta/Left Atrium:              Normal  IVSd (2D):              0.94 cm (0.7-1.1) LA Volume Index    53.0 ml/m²  LVPWd (2D):             1.29 cm (0.7-1.1)  LVIDd (2D):             3.94 cm (3.4-5.7)  LVIDs (2D):             2.63 cm  LV FS (2D):             33.2 %  (>25%)  LV EF (2D):             62 %    (>55%)  Relative Wall Thickness 0.65    (<0.42)     LV DIASTOLIC FUNCTION:  MV Peak E: 2.09 m/s e', MV Shabana: 0.03 m/s                      E/e' Ratio: 67.53                      Decel Time: 418 msec     SPECTRAL DOPPLER ANALYSIS (where applicable):  Mitral Valve:  MV Mean Grad: 6.7 mmHg MV P1/2 Time: 121.17 msec                         MV Area, PHT: 1.82 cm²     Aortic Valve: AoV Max Trung: 3.27 m/s AoV Peak P.7 mmHg AoV Mean P.9 mmHg     LVOT Vmax: 1.12 m/s LVOT VTI: 0.224 m LVOT Diameter:     AoV Area, Vmax:  AoV Area, VTI:  AoV Area, Vmn:  Ao VTI: 0.716  Tricuspid Valve and PA/RV Systolic Pressure: TR Max Velocity: 4.28 m/s RA   Pressure: 15 mmHg RVSP/PASP: 88.4 mmHg        PHYSICIAN INTERPRETATION:  Left Ventricle: The left ventricular internal cavity size is normal.   Abnormal septal motion is seen consistent with an intraventricular   conduction delay. Left ventricular ejection fraction, by visual   estimation, is 60 to 65%. The interventricular septum is flattened in   systole and diastole, consistent with right ventricular pressure and   volume overload. Spectral Doppler shows restrictive pattern of left   ventricular myocardial filling (Grade III diastolic dysfunction).  Right Ventricle: The right ventricular size is severely enlarged. RV   systolic function is severely reduced.  Left Atrium: Severely enlarged left atrium.  Right Atrium: The right atrium is severely dilated.  Pericardium: Trivial pericardial effusion is present. The pericardial   effusion is localized near the right atrium. There is a moderate pleural   effusion in the right lateral region.  Mitral Valve: Status-post mitral annular ring insertion. Peak transmitral   mean gradient equals 6.7 mmHg, calculated mitral valve area by pressure   half time equals 1.82 cm² consistent with mild mitral stenosis. Mild to   moderate mitral valve regurgitation is seen. Mitral valve mean gradient   is 6.7 mmHg (HR 81 bpm) suggestive of moderate mitral stenosis.  Tricuspid Valve: S/P tricuspid valve repair. Moderate-severe tricuspid   regurgitation is visualized. Estimated pulmonary artery systolic pressure   is 88.4 mmHg assuming a right atrial pressure of 15 mmHg, which is   consistent with severe pulmonary hypertension.  Aortic Valve: A bioprosthetic AoV is visualized. The prosthesis appears   stable without any evidence for rocking motion. Peak velocity = 3.3 m/s.   Peak/mean transvalvular gradients = 43/22 mmHg. Trivial aortic valve   regurgitation is seen.  Pulmonic Valve: The pulmonic valve was not well visualized. Moderate   pulmonic valve regurgitation.  Aorta: The ascending aorta is mildly dilated measuring 3.8 cm.     Venous: The inferior vena cava was dilated, withrespiratory size   variation less than 50%, suggestive of elevated central venous pressure.   Estimated RA pressure = 15 mmHg.        Summary:   1. Left ventricular ejection fraction, by visual estimation, is 60 to   65%.   2. Spectral Doppler shows restrictive pattern of left ventricular   myocardial filling (Grade III diastolic dysfunction).   3. RV failure. Severely dilated right ventricle with severely reduced   systolic function.   4. Right ventricular volume and pressure overload.   5. Status post mitral valve repair with residual mild to moderate mitral   regurgitation. Elevated transmitral gradients (mean 6.7 mmHg at a HR of   81 bpm).   6. S/P tricuspid valve repair with moderate to severe tricuspid   regurgitation.   7. Normally functioning aortic prosthesis.   8. Moderate pulmonic valve regurgitation.   9. Estimated pulmonary artery systolic pressure is 88.4 mmHg assuming a   right atrial pressure of 15 mmHg, which is consistent with severe   pulmonary hypertension.  10. Trace pericardial effusion.  11. Right sided pleural effusion.  12. ** Compared to TTE 2017, pulmonary pressures are higher.     Kristin Yoder DO Electronically signed on 2017 at 11:44:12 AM

## 2017-07-08 NOTE — PROGRESS NOTE ADULT - SUBJECTIVE AND OBJECTIVE BOX
HISTORY OF PRESENT ILLNESS  76F was admitted on 6/14/17 after a mechanical fall while walking outside. She had head trauma but no LOC. In ER, GCS=15. A head CT showed left parietal and temporo-occipital SAH. A repeat head CT was stable. Additional workup showed nondisplaced fractures of the right 4-6 ribs with emphysematous changes, pleural effusion and atelectasis. Hospital course was complicated by shortness of breath/hypoxia and needed BiPAP/high-flow NC. She was started on Cefepime for PNA. Admitted to acute rehab on 6/27.    PMHx - HTN, ESRD on HD, AFIB (no full AC due to fall and bleeding risk), CAD, COPD (on home O2 overnight), Cholelithiasis, Gout, Hyperlipidemia, Spinal stenosis, s/p AVR/MVR/Tricupsid valve annuloplasty, Cataracts repair, H/O spinal fusion, Appendectomy, Tonsillectomy, C-Spine DJD    TODAY'S SUBJECTIVE & REVIEW OF SYMPTOMS  [X] Constitutional WNL      [X] Cardio WNL            [ ] Resp WNL           [X] GI WNL                      [X]  WNL                [ ] Heme WNL              [X] Endo WNL                  [ ] Skin WNL               [ ] MSK WNL            [ ] Neuro WNL                  [ ] Cognitive WNL        [X] Psych WNL    No overnight events.  Slept well.   Breathing is much improved after drainage.   Continues to have cough which is non-productive.  Planned for HD today.     VITALS  Vital Signs Last 24 Hrs  T(C): 36.4 (07 Jul 2017 20:26), Max: 36.9 (07 Jul 2017 12:00)  T(F): 97.6 (07 Jul 2017 20:26), Max: 98.4 (07 Jul 2017 12:00)  HR: 86 (08 Jul 2017 06:02) (70 - 90)  BP: 98/58 (08 Jul 2017 06:02) (98/58 - 106/57)  BP(mean): --  RR: 17 (08 Jul 2017 06:02) (16 - 17)  SpO2: 97% (08 Jul 2017 06:02) (94% - 97%)    PHYSICAL EXAM  Constitutional - NAD, Comfortable  HEENT - Facial ecchymosis, Right supraorbital laceration  Neck - Supple, No limited ROM  Chest - +Rhonchi   Cardiovascular - S1S2  Abdomen - BS+, Soft, NTND  Extremities - No C/C, No calf tenderness, BLE PVD skin changes  Neurologic Exam - Right field cut                    Cognitive - AAO to self, place, month/year, some of situation     Communication - Expressive deficits     Motor - No focal deficits     Sensory - Intact to LT     Coordination - FTN impaired  Psychiatric - Mood stable, Affect Flat  Skin - BUE - Multiple ecchymosis, left hand edema/pain to palpation    FUNCTIONAL PROGRESS  Gait - Min Assist RW 25'  ADLs - Min A UE dressing  Transfers - sit-to-stand min assist  Functional transfer - Bed transfer min assist    RECENT LABS                         8.9    8.6   )-----------( 179      ( 04 Jul 2017 14:43 )             28.3     07-04    134<L>  |  91<L>  |  84.0<H>  ----------------------------<  148<H>  4.8   |  25.0  |  5.32<H>    Ca    8.8      04 Jul 2017 14:43  Phos  4.2     07-04    Prealb 6/28 - 16    Procalcitonin 7/1 - 2.42, 7/6 - 1.18    Lactate Dehydrogenase, Serum: 261 U/L  Body Fluid R Pleural Fluid    No acid fast bacilli seen by fluorochrome stain    RADIOLOGY/OTHER RESULTS  CXR 7/5 -  No interval change  CXR 6/27 -  Unchanged moderate right pleural effusion with associated atelectasis or consolidation. Bilateral perihilar opacities, probably mild pulmonary edema.  Right hand XR 6/27 - No acute fracture or dislocation of the right hand.    CURRENT MEDICATIONS  MEDICATIONS  (STANDING):  heparin  Injectable 5000 Unit(s) SubCutaneous every 8 hours  aspirin enteric coated 81 milliGRAM(s) Oral daily  calcium acetate 667 milliGRAM(s) Oral three times a day with meals  cyanocobalamin 1000 MICROGram(s) Oral daily  gabapentin 300 milliGRAM(s) Oral every 8 hours  epoetin jaswinder Injectable 36456 Unit(s) IV Push <User Schedule>  atorvastatin 40 milliGRAM(s) Oral at bedtime  folic acid 1 milliGRAM(s) Oral daily  lidocaine   Patch 2 Patch Transdermal daily  Nephro-toi 1 Tablet(s) Oral daily  ammonium lactate 12% Lotion 1 Application(s) Topical daily  ALBUTerol/ipratropium for Nebulization 3 milliLiter(s) Nebulizer every 6 hours  midodrine 10 milliGRAM(s) Oral two times a day    MEDICATIONS  (PRN):  acetaminophen   Tablet. 650 milliGRAM(s) Oral every 6 hours PRN Mild Pain (1 - 3)  guaiFENesin    Syrup 200 milliGRAM(s) Oral every 6 hours PRN Cough  lactulose Syrup 10 Gram(s) Oral daily PRN no bm > 2 days  sodium chloride 0.65% Nasal 1 Spray(s) Both Nostrils three times a day PRN nasal congestion/discomfort from O2    ASSESSMENT & PLAN  76F sustaining a TBI after a fall now with functional deficits  Cardiac/Pulm HTN - Midodrine, DC Sildenafil (7/1)  PNA - Cefepime (6/19-7/5), Zithromax (6/28-7/5)  Pulm - Duoneb (6/29), O2 via NC, Guaifenesin PRN, Solumedrol IV x3 doses (7/5), [place] PRN, Right thoracentesis (7/6)  CAD - Lipitor, ASA  ESRD on HD - Phoslo, HD T/Th/Sat  Anemia - Improved, Epogen, Vit B, Folate  Sleep - DC Melatonin (6/28)  GI/Bowel Management - Toilet PRN, Lactulose PRN   Management - Toilet Q2  Skin - Turn Q2, Lachydrin BLE, Zfloats BLE  Pain - Neurontin, Lidoderm to right ribs, Tylenol PRN, Ice to left hand PRN  DVT PPX - Heparin, TEDs, SCDs  Diet - Renal & Cardiac/Thins, Nephro-toi, Nepro TID    Continue comprehensive acute rehab program consisting of 3hrs/day of OT/PT and SLP

## 2017-07-08 NOTE — PROGRESS NOTE ADULT - PROBLEM SELECTOR PLAN 9
stable   Lasix and sildenafil stopped BP is still low, monitor closely on midodrine
Lasix and sildenafil stopped BP is still low, monitor closely on midodrine

## 2017-07-08 NOTE — PROGRESS NOTE ADULT - SUBJECTIVE AND OBJECTIVE BOX
NEPHROLOGY INTERVAL HPI/OVERNIGHT EVENTS:    Examined earlier  Looks comfortable    MEDICATIONS  (STANDING):  heparin  Injectable 5000 Unit(s) SubCutaneous every 8 hours  aspirin enteric coated 81 milliGRAM(s) Oral daily  calcium acetate 667 milliGRAM(s) Oral three times a day with meals  cyanocobalamin 1000 MICROGram(s) Oral daily  gabapentin 300 milliGRAM(s) Oral every 8 hours  epoetin jaswinder Injectable 62620 Unit(s) IV Push <User Schedule>  atorvastatin 40 milliGRAM(s) Oral at bedtime  folic acid 1 milliGRAM(s) Oral daily  lidocaine   Patch 2 Patch Transdermal daily  Nephro-toi 1 Tablet(s) Oral daily  ammonium lactate 12% Lotion 1 Application(s) Topical daily  ALBUTerol/ipratropium for Nebulization 3 milliLiter(s) Nebulizer every 6 hours  midodrine 10 milliGRAM(s) Oral two times a day    MEDICATIONS  (PRN):  acetaminophen   Tablet. 650 milliGRAM(s) Oral every 6 hours PRN Mild Pain (1 - 3)  guaiFENesin    Syrup 200 milliGRAM(s) Oral every 6 hours PRN Cough  lactulose Syrup 10 Gram(s) Oral daily PRN no bm > 2 days  sodium chloride 0.65% Nasal 1 Spray(s) Both Nostrils three times a day PRN nasal congestion/discomfort from O2      Allergies    allopurinol (Rash)  codeine (Nausea; Vomiting)  penicillin (Rash)  spironolactone (Unknown)    Intolerances        Vital Signs Last 24 Hrs  T(C): 36.4 (07 Jul 2017 20:26), Max: 36.9 (07 Jul 2017 12:00)  T(F): 97.6 (07 Jul 2017 20:26), Max: 98.4 (07 Jul 2017 12:00)  HR: 86 (08 Jul 2017 06:02) (70 - 90)  BP: 98/58 (08 Jul 2017 06:02) (98/58 - 106/57)  BP(mean): --  RR: 17 (08 Jul 2017 06:02) (16 - 17)  SpO2: 97% (08 Jul 2017 06:02) (94% - 97%)  Daily     Daily     PHYSICAL EXAM:  NAD  lungs - CTA  CV gr 1 murmer,  No gallop or rub  Abd : soft, NT BS +, No masses  Ext- No edema  Neuro : Grossly intact, moving extremities       LABS:                        10.1   6.2   )-----------( 193      ( 06 Jul 2017 16:22 )             31.9     07-06    137  |  94<L>  |  57.0<H>  ----------------------------<  125<H>  3.8   |  27.0  |  3.03<H>    Ca    9.0      06 Jul 2017 16:22  Phos  3.0     07-06      PT/INR - ( 06 Jul 2017 21:05 )   PT: 11.0 sec;   INR: 1.00 ratio         PTT - ( 06 Jul 2017 21:05 )  PTT:33.2 sec            RADIOLOGY & ADDITIONAL TESTS:

## 2017-07-08 NOTE — PROGRESS NOTE ADULT - SUBJECTIVE AND OBJECTIVE BOX
Chart reviewed , pt is s/p pleural effusion drainage , feels better , less cough and congestion   sitting in the chair no distress         REVIEW OF SYSTEMS:    CONSTITUTIONAL: No fever, + fatigue  RESPIRATORY: has cough and some shortness of breath  CARDIOVASCULAR: No chest pain, palpitations  GASTROINTESTINAL: has abdominal pain, No nausea, vomiting  NEUROLOGICAL: No headaches,  loss of strength.  MISCELLANEOUS: No joint swelling or pain   swelling of hands and fingers +  Vital Signs Last 24 Hrs  T(C): 36.4 (07 Jul 2017 20:26), Max: 36.9 (07 Jul 2017 12:00)  T(F): 97.6 (07 Jul 2017 20:26), Max: 98.4 (07 Jul 2017 12:00)  HR: 86 (08 Jul 2017 06:02) (70 - 90)  BP: 98/58 (08 Jul 2017 06:02) (98/58 - 106/57)  BP(mean): --  RR: 17 (08 Jul 2017 06:02) (16 - 17)  SpO2: 97% (08 Jul 2017 06:02) (94% - 97%)    GENERAL: Elderly female looking in mild to moderate respiratory distress.   HEENT: PERRL, +EOMI  NECK: soft, Supple, No JVD,   CHEST/LUNG: Rhonchi bilaterally, decrease air entry B/L, No wheezing  HEART: S1S2+, Regular rate and rhythm; No murmurs.   ABDOMEN: Soft, Nontender, Nondistended; mild tenderness in the middle of abdomen, Bowel sounds present  EXTREMITIES:  1+ Peripheral Pulses, No clubbing, cyanosis, + edema fingers and left hand   SKIN: Chronic venous statis changes                                  10.1   6.2   )-----------( 193      ( 06 Jul 2017 16:22 )             31.9   07-06    137  |  94<L>  |  57.0<H>  ----------------------------<  125<H>  3.8   |  27.0  |  3.03<H>    Ca    9.0      06 Jul 2017 16:22  Phos  3.0     07-06

## 2017-07-09 DIAGNOSIS — M25.442 EFFUSION, LEFT HAND: ICD-10-CM

## 2017-07-09 PROCEDURE — 99232 SBSQ HOSP IP/OBS MODERATE 35: CPT | Mod: GC

## 2017-07-09 PROCEDURE — 71010: CPT | Mod: 26

## 2017-07-09 PROCEDURE — 99232 SBSQ HOSP IP/OBS MODERATE 35: CPT

## 2017-07-09 PROCEDURE — 73130 X-RAY EXAM OF HAND: CPT | Mod: 26,LT

## 2017-07-09 PROCEDURE — 99233 SBSQ HOSP IP/OBS HIGH 50: CPT

## 2017-07-09 RX ORDER — KETOROLAC TROMETHAMINE 30 MG/ML
15 SYRINGE (ML) INJECTION EVERY 8 HOURS
Qty: 0 | Refills: 0 | Status: DISCONTINUED | OUTPATIENT
Start: 2017-07-09 | End: 2017-07-09

## 2017-07-09 RX ADMIN — Medication 50 MILLIGRAM(S): at 13:22

## 2017-07-09 RX ADMIN — Medication 1 TABLET(S): at 13:23

## 2017-07-09 RX ADMIN — GABAPENTIN 300 MILLIGRAM(S): 400 CAPSULE ORAL at 13:22

## 2017-07-09 RX ADMIN — Medication 3 MILLILITER(S): at 03:26

## 2017-07-09 RX ADMIN — Medication 3 MILLILITER(S): at 15:51

## 2017-07-09 RX ADMIN — HEPARIN SODIUM 5000 UNIT(S): 5000 INJECTION INTRAVENOUS; SUBCUTANEOUS at 21:11

## 2017-07-09 RX ADMIN — Medication 667 MILLIGRAM(S): at 07:48

## 2017-07-09 RX ADMIN — PREGABALIN 1000 MICROGRAM(S): 225 CAPSULE ORAL at 13:22

## 2017-07-09 RX ADMIN — HEPARIN SODIUM 5000 UNIT(S): 5000 INJECTION INTRAVENOUS; SUBCUTANEOUS at 05:28

## 2017-07-09 RX ADMIN — ATORVASTATIN CALCIUM 40 MILLIGRAM(S): 80 TABLET, FILM COATED ORAL at 21:11

## 2017-07-09 RX ADMIN — Medication 1 MILLIGRAM(S): at 12:15

## 2017-07-09 RX ADMIN — Medication 667 MILLIGRAM(S): at 18:50

## 2017-07-09 RX ADMIN — MIDODRINE HYDROCHLORIDE 10 MILLIGRAM(S): 2.5 TABLET ORAL at 18:50

## 2017-07-09 RX ADMIN — Medication 81 MILLIGRAM(S): at 12:17

## 2017-07-09 RX ADMIN — MIDODRINE HYDROCHLORIDE 10 MILLIGRAM(S): 2.5 TABLET ORAL at 05:28

## 2017-07-09 RX ADMIN — Medication 667 MILLIGRAM(S): at 12:15

## 2017-07-09 RX ADMIN — GABAPENTIN 300 MILLIGRAM(S): 400 CAPSULE ORAL at 05:28

## 2017-07-09 RX ADMIN — Medication 3 MILLILITER(S): at 22:01

## 2017-07-09 RX ADMIN — GABAPENTIN 300 MILLIGRAM(S): 400 CAPSULE ORAL at 21:11

## 2017-07-09 RX ADMIN — HEPARIN SODIUM 5000 UNIT(S): 5000 INJECTION INTRAVENOUS; SUBCUTANEOUS at 13:22

## 2017-07-09 NOTE — PROGRESS NOTE ADULT - SUBJECTIVE AND OBJECTIVE BOX
HISTORY OF PRESENT ILLNESS  76F was admitted on 6/14/17 after a mechanical fall while walking outside. She had head trauma but no LOC. In ER, GCS=15. A head CT showed left parietal and temporo-occipital SAH. A repeat head CT was stable. Additional workup showed nondisplaced fractures of the right 4-6 ribs with emphysematous changes, pleural effusion and atelectasis. Hospital course was complicated by shortness of breath/hypoxia and needed BiPAP/high-flow NC. She was started on Cefepime for PNA. Admitted to acute rehab on 6/27.    PMHx - HTN, ESRD on HD, AFIB (no full AC due to fall and bleeding risk), CAD, COPD (on home O2 overnight), Cholelithiasis, Gout, Hyperlipidemia, Spinal stenosis, s/p AVR/MVR/Tricupsid valve annuloplasty, Cataracts repair, H/O spinal fusion, Appendectomy, Tonsillectomy, C-Spine DJD    TODAY'S SUBJECTIVE & REVIEW OF SYMPTOMS  [X] Constitutional WNL      [X] Cardio WNL            [ ] Resp WNL           [X] GI WNL                      [X]  WNL                [ ] Heme WNL              [X] Endo WNL                  [ ] Skin WNL               [ ] MSK WNL            [ ] Neuro WNL                  [ ] Cognitive WNL        [X] Psych WNL    No overnight events.    This am, pt reports breathing better but continues to have coughing at times; good oxygen saturation w/ O2 NC.  Reports pain at left hand/fingers with improved swelling.  Endorses she slept well.  No bowel or bladder complaints.     VITALS  Vital Signs Last 24 Hrs  T(C): 36.6 (09 Jul 2017 05:30), Max: 36.6 (09 Jul 2017 05:30)  T(F): 97.8 (09 Jul 2017 05:30), Max: 97.8 (09 Jul 2017 05:30)  HR: 70 (09 Jul 2017 05:30) (70 - 80)  BP: 91/60 (09 Jul 2017 05:30) (91/60 - 114/54)  BP(mean): --  RR: 18 (09 Jul 2017 05:30) (16 - 18)  SpO2: 100% (09 Jul 2017 05:30) (91% - 100%)      PHYSICAL EXAM  Constitutional - NAD, Comfortable  HEENT - Facial ecchymosis, Right supraorbital laceration  Neck - Supple, No limited ROM  Chest - +Rhonchi, mostly in right lung base; decent air movement B/L  Cardiovascular - S1S2  Abdomen - BS+, Soft, NTND  Extremities - No C/C, No calf tenderness, BLE PVD skin changes.  Left hand--improved swelling, erythema in 2nd/3rd MCPs, mildly tender  Neurologic Exam - Right field cut                    Cognitive - AAO to self, place, month/year, some of situation     Communication - Expressive deficits     Motor - No focal deficits     Sensory - Intact to LT     Coordination - FTN impaired  Psychiatric - Mood stable, Affect Flat  Skin - BUE - Multiple ecchymosis, left hand edema/pain to palpation    FUNCTIONAL PROGRESS  Gait - Min Assist RW 25'  ADLs - Min A UE dressing  Transfers - sit-to-stand min assist  Functional transfer - Bed transfer min assist    RECENT LABS                         8.9    8.6   )-----------( 179      ( 04 Jul 2017 14:43 )             28.3     07-04    134<L>  |  91<L>  |  84.0<H>  ----------------------------<  148<H>  4.8   |  25.0  |  5.32<H>    Ca    8.8      04 Jul 2017 14:43  Phos  4.2     07-04    Prealb 6/28 - 16    Procalcitonin 7/1 - 2.42, 7/6 - 1.18    Lactate Dehydrogenase, Serum: 261 U/L  Body Fluid R Pleural Fluid    No acid fast bacilli seen by fluorochrome stain    RADIOLOGY/OTHER RESULTS  CXR 7/5 -  No interval change  CXR 6/27 -  Unchanged moderate right pleural effusion with associated atelectasis or consolidation. Bilateral perihilar opacities, probably mild pulmonary edema.  Right hand XR 6/27 - No acute fracture or dislocation of the right hand.    CURRENT MEDICATIONS  MEDICATIONS  (STANDING):  heparin  Injectable 5000 Unit(s) SubCutaneous every 8 hours  aspirin enteric coated 81 milliGRAM(s) Oral daily  calcium acetate 667 milliGRAM(s) Oral three times a day with meals  cyanocobalamin 1000 MICROGram(s) Oral daily  gabapentin 300 milliGRAM(s) Oral every 8 hours  epoetin jaswinder Injectable 54749 Unit(s) IV Push <User Schedule>  atorvastatin 40 milliGRAM(s) Oral at bedtime  folic acid 1 milliGRAM(s) Oral daily  lidocaine   Patch 2 Patch Transdermal daily  Nephro-toi 1 Tablet(s) Oral daily  ammonium lactate 12% Lotion 1 Application(s) Topical daily  ALBUTerol/ipratropium for Nebulization 3 milliLiter(s) Nebulizer every 6 hours  midodrine 10 milliGRAM(s) Oral two times a day    MEDICATIONS  (PRN):  acetaminophen   Tablet. 650 milliGRAM(s) Oral every 6 hours PRN Mild Pain (1 - 3)  guaiFENesin    Syrup 200 milliGRAM(s) Oral every 6 hours PRN Cough  lactulose Syrup 10 Gram(s) Oral daily PRN no bm > 2 days  sodium chloride 0.65% Nasal 1 Spray(s) Both Nostrils three times a day PRN nasal congestion/discomfort from O2      ASSESSMENT & PLAN  76F sustaining a TBI after a fall now with functional deficits  Cardiac/Pulm HTN - Midodrine, DC Sildenafil (7/1)  PNA - Cefepime (6/19-7/5), Zithromax (6/28-7/5)  Pulm - Duoneb (6/29), O2 via NC, Guaifenesin PRN, Solumedrol IV x3 doses (7/5), [place] PRN, Right thoracentesis (7/6)  Left hand pain - likely gout; s/p solumedrol (7/5).  Continue Tylenol and Ice PRN.  Avoid nephrotoxic medications.   CAD - Lipitor, ASA  ESRD on HD - Phoslo, HD T/Th/Sat  Anemia - Improved, Epogen, Vit B, Folate  Sleep - DC Melatonin (6/28)  GI/Bowel Management - Toilet PRN, Lactulose PRN   Management - Toilet Q2  Skin - Turn Q2, Lachydrin BLE, Zfloats BLE  Pain - Neurontin, Lidoderm to right ribs, Tylenol PRN, Ice to left hand PRN  DVT PPX - Heparin, TEDs, SCDs  Diet - Renal & Cardiac/Thins, Nephro-toi, Nepro TID    Continue comprehensive acute rehab program consisting of 3hrs/day of OT/PT and SLP

## 2017-07-09 NOTE — PROGRESS NOTE ADULT - SUBJECTIVE AND OBJECTIVE BOX
NEPHROLOGY INTERVAL HPI/OVERNIGHT EVENTS:    Examined earlier  Looks comfortable  HD yest tolerated ok    MEDICATIONS  (STANDING):  heparin  Injectable 5000 Unit(s) SubCutaneous every 8 hours  aspirin enteric coated 81 milliGRAM(s) Oral daily  calcium acetate 667 milliGRAM(s) Oral three times a day with meals  cyanocobalamin 1000 MICROGram(s) Oral daily  gabapentin 300 milliGRAM(s) Oral every 8 hours  epoetin jaswinder Injectable 60996 Unit(s) IV Push <User Schedule>  atorvastatin 40 milliGRAM(s) Oral at bedtime  folic acid 1 milliGRAM(s) Oral daily  lidocaine   Patch 2 Patch Transdermal daily  Nephro-toi 1 Tablet(s) Oral daily  ammonium lactate 12% Lotion 1 Application(s) Topical daily  ALBUTerol/ipratropium for Nebulization 3 milliLiter(s) Nebulizer every 6 hours  midodrine 10 milliGRAM(s) Oral two times a day    MEDICATIONS  (PRN):  acetaminophen   Tablet. 650 milliGRAM(s) Oral every 6 hours PRN Mild Pain (1 - 3)  guaiFENesin    Syrup 200 milliGRAM(s) Oral every 6 hours PRN Cough  lactulose Syrup 10 Gram(s) Oral daily PRN no bm > 2 days  sodium chloride 0.65% Nasal 1 Spray(s) Both Nostrils three times a day PRN nasal congestion/discomfort from O2      Allergies    allopurinol (Rash)  codeine (Nausea; Vomiting)  penicillin (Rash)  spironolactone (Unknown)    Intolerances        Vital Signs Last 24 Hrs  T(C): 36.6 (2017 05:30), Max: 36.6 (2017 05:30)  T(F): 97.8 (2017 05:30), Max: 97.8 (2017 05:30)  HR: 70 (2017 05:30) (70 - 80)  BP: 91/60 (2017 05:30) (91/60 - 114/54)  BP(mean): --  RR: 18 (2017 05:30) (16 - 18)  SpO2: 100% (2017 05:30) (91% - 100%)  Daily     Daily Weight in k (2017 16:10)    PHYSICAL EXAM:  NAD  lungs - CTA  CV gr 1 murmer,  No gallop or rub  Abd : soft, NT BS +, No masses  Ext- No edema  Neuro : Grossly intact, moving extremities       LABS:                        9.7    6.3   )-----------( 192      ( 2017 13:07 )             31.1     -    139  |  93<L>  |  77.0<H>  ----------------------------<  91  3.8   |  27.0  |  4.20<H>    Ca    9.0      2017 13:07                  RADIOLOGY & ADDITIONAL TESTS:

## 2017-07-09 NOTE — PROGRESS NOTE ADULT - PROBLEM SELECTOR PLAN 1
xary of hand , short course of steoid and toradol ( discussed with nephrology ok to use nsaids with renal failure short course )  will check uric acid level  warm compress

## 2017-07-09 NOTE — PROGRESS NOTE ADULT - ASSESSMENT
77 yo female with h/o Atrial fibrillation off anticoagulation due to fall risk and bleeding risk , ESRD on HD ,diastolic CHF , pulmonary hypertension , valvular haeart disease  admitted s/p fall at home with left SAH , brain injury and developed respiratory distress being treated for pneumonia  , now in rehab . Chest congestion, cough now improving  BP remains low, medications  doses of  sildenafil adjusted by pulmonologist, hypoxic , remains hypotensive stable now, s/p right pleural  effussion  removal by IR 7/7 now with worsening left hand joint swelling and pain

## 2017-07-09 NOTE — PROGRESS NOTE ADULT - SUBJECTIVE AND OBJECTIVE BOX
sitting in the chair no distress , seen earlier today , c/o pain in the left hand and increase swelling   she says has h/o gout   dyspnea better , less cough and congestion         REVIEW OF SYSTEMS: as above     Vital Signs Last 24 Hrs  T(C): 36.6 (09 Jul 2017 05:30), Max: 36.6 (09 Jul 2017 05:30)  T(F): 97.8 (09 Jul 2017 05:30), Max: 97.8 (09 Jul 2017 05:30)  HR: 70 (09 Jul 2017 05:30) (70 - 80)  BP: 91/60 (09 Jul 2017 05:30) (91/60 - 114/54)  BP(mean): --  RR: 18 (09 Jul 2017 05:30) (16 - 18)  SpO2: 100% (09 Jul 2017 05:30) (91% - 100%)  GENERAL: Elderly female looking in mild to moderate respiratory distress.   HEENT: PERRL, +EOMI  NECK: soft, Supple, No JVD,   CHEST/LUNG: Rhonchi bilaterally, decrease air entry B/L, No wheezing  HEART: S1S2+, Regular rate and rhythm; No murmurs.   ABDOMEN: Soft, Nontender, Nondistended; mild tenderness in the middle of abdomen, Bowel sounds present  EXTREMITIES:  1+ Peripheral Pulses, No clubbing, cyanosis, + edema fingers and left hand   SKIN: Chronic venous statis changes   Musculoskletal : left hand dorsum swollen , 2nd finger proximal phalengeal joint swelling tender to touch and limited ROM                                                 9.7    6.3   )-----------( 192      ( 08 Jul 2017 13:07 )             31.1   07-08    139  |  93<L>  |  77.0<H>  ----------------------------<  91  3.8   |  27.0  |  4.20<H>    Ca    9.0      08 Jul 2017 13:07

## 2017-07-09 NOTE — PROGRESS NOTE ADULT - PROBLEM SELECTOR PLAN 4
fluid removal with HD , can not use lasix due to hypotension   overall prognosis poor , palliative care team on board

## 2017-07-09 NOTE — PROGRESS NOTE ADULT - SUBJECTIVE AND OBJECTIVE BOX
PULMONARY PROGRESS NOTE      DUSTIN HERNANDEZ  MRN-[id number]    Patient is a 76y old  Female     INTERVAL HPI/OVERNIGHT EVENTS:    Patient with slightly worsening congested cough  No change in SOB  Overall better after thoracentesis and HD    MEDICATIONS  (STANDING):  heparin  Injectable 5000 Unit(s) SubCutaneous every 8 hours  aspirin enteric coated 81 milliGRAM(s) Oral daily  calcium acetate 667 milliGRAM(s) Oral three times a day with meals  cyanocobalamin 1000 MICROGram(s) Oral daily  gabapentin 300 milliGRAM(s) Oral every 8 hours  epoetin jaswinder Injectable 75375 Unit(s) IV Push <User Schedule>  atorvastatin 40 milliGRAM(s) Oral at bedtime  folic acid 1 milliGRAM(s) Oral daily  lidocaine   Patch 2 Patch Transdermal daily  Nephro-toi 1 Tablet(s) Oral daily  ammonium lactate 12% Lotion 1 Application(s) Topical daily  ALBUTerol/ipratropium for Nebulization 3 milliLiter(s) Nebulizer every 6 hours  midodrine 10 milliGRAM(s) Oral two times a day  predniSONE   Tablet 50 milliGRAM(s) Oral daily  ketorolac   Injectable 15 milliGRAM(s) IV Push every 8 hours      MEDICATIONS  (PRN):  acetaminophen   Tablet. 650 milliGRAM(s) Oral every 6 hours PRN Mild Pain (1 - 3)  guaiFENesin    Syrup 200 milliGRAM(s) Oral every 6 hours PRN Cough  lactulose Syrup 10 Gram(s) Oral daily PRN no bm > 2 days  sodium chloride 0.65% Nasal 1 Spray(s) Both Nostrils three times a day PRN nasal congestion/discomfort from O2      Allergies    allopurinol (Rash)  codeine (Nausea; Vomiting)  penicillin (Rash)  spironolactone (Unknown)    Intolerances        PAST MEDICAL & SURGICAL HISTORY:  ESRD (end stage renal disease) on dialysis: MWF via R [facility] Permacath  planned AVF creation  Sinusitis, unspecified chronicity, unspecified location  Gout  Gall stones  Pneumonia  Anemia  Pulmonary hypertension  COPD (chronic obstructive pulmonary disease)  CAD (coronary artery disease)  Atrial fibrillation  Hyperlipidemia  Hypertension  Spinal fusion failure, initial encounter  Tubal ligation status  Sinusitis  Spinal stenosis  S/P tonsillectomy  S/P appendectomy  H/O aortic valve replacement  H/O spinal fusion  H/O cataract  Cystocele  H/O tricuspid valve annuloplasty  H/O mitral valve repair        REVIEW OF SYSTEMS:    CONSTITUTIONAL:  No distress    HEENT:  Eyes:  No diplopia or blurred vision. ENT:  No earache, sore throat or runny nose.    CARDIOVASCULAR:  No pressure, squeezing, tightness, heaviness or aching about the chest; no palpitations.    RESPIRATORY:  + cough, mild shortness of breath, no PND or orthopnea. Mild SOBOE    GASTROINTESTINAL:  No nausea, vomiting or diarrhea.    GENITOURINARY:  No dysuria, frequency or urgency.    NEUROLOGIC:  No paresthesias, fasciculations, seizures or weakness.    PSYCHIATRIC:  No disorder of thought or mood.    Vital Signs Last 24 Hrs  T(C): 36.6 (09 Jul 2017 12:12), Max: 36.6 (09 Jul 2017 05:30)  T(F): 97.8 (09 Jul 2017 12:12), Max: 97.8 (09 Jul 2017 05:30)  HR: 81 (09 Jul 2017 12:12) (70 - 81)  BP: 97/57 (09 Jul 2017 12:12) (91/60 - 114/54)  BP(mean): --  RR: 20 (09 Jul 2017 12:12) (16 - 20)  SpO2: 98% (09 Jul 2017 12:12) (95% - 100%)    PHYSICAL EXAMINATION:    GENERAL: The patient is awake and alert in no apparent distress.     HEENT: Head is normocephalic and atraumatic. Extraocular muscles are intact. Mucous membranes are moist.    NECK: Supple.    LUNGS: Mild rhonchi b/l without wheeze; respirations unlabored    HEART: Irregular rhythm without murmur.    ABDOMEN: Soft, nontender, and nondistended.      EXTREMITIES: Without any cyanosis, clubbing, rash, lesions or edema.    NEUROLOGIC: Grossly intact.    LABS:                        9.7    6.3   )-----------( 192      ( 08 Jul 2017 13:07 )             31.1     07-08    139  |  93<L>  |  77.0<H>  ----------------------------<  91  3.8   |  27.0  |  4.20<H>    Ca    9.0      08 Jul 2017 13:07    Procalcitonin, Serum: 1.18 ng/mL (07-06-17 @ 21:06)      RADIOLOGY & ADDITIONAL STUDIES:     EXAM:  CHEST SINGLE VIEW FRONTAL                          PROCEDURE DATE:  07/06/2017          INTERPRETATION:  EXAMINATION DATE: July 6, 2017 at 1520 hours.  CLINICAL INFORMATION: Post thoracentesis.    TECHNIQUE: Frontal view of the chest   COMPARISON: July 5, 2017.    FINDINGS:    LINES/TUBES: Unchanged right-sided central venous catheter with tip   overlying the superior vena cava.  LUNGS/PLEURA: Decreased size of right pleural effusion, now small. Right   basilar atelectasis or consolidation. No pneumothorax.   MEDIASTINUM: Cardiomegaly. Aortic valve replacement.  OTHER: Sternotomy.    IMPRESSION:     1.  Since July 5, 2017, decreased size of right pleural effusion, now   small.  2.  Right basilar atelectasis or consolidation.         VENKATESH BRAUN M.D., ATTENDING RADIOLOGIST  This document has been electronically signed. Jul 6 2017  3:48PM

## 2017-07-09 NOTE — PROGRESS NOTE ADULT - ASSESSMENT
ESRD on HD TTS schedule next HD tues  will use 3 K bath  Anemia 2/2 CKD cont EPO w HD  BP on low side will be cautious w UF removal  Pulm f/u noted recent thoracentesis transudative O2 sat ok  Am labs

## 2017-07-09 NOTE — PROGRESS NOTE ADULT - PROBLEM SELECTOR PLAN 3
Improved with drainage  Appears to be c/w transudate by TP and LDH criteria  Follow CXR for recurrence given persistent cough

## 2017-07-10 LAB
CULTURE RESULTS: SIGNIFICANT CHANGE UP
SPECIMEN SOURCE: SIGNIFICANT CHANGE UP

## 2017-07-10 PROCEDURE — 99233 SBSQ HOSP IP/OBS HIGH 50: CPT

## 2017-07-10 RX ADMIN — Medication 3 MILLILITER(S): at 08:10

## 2017-07-10 RX ADMIN — Medication 667 MILLIGRAM(S): at 08:21

## 2017-07-10 RX ADMIN — Medication 1 TABLET(S): at 15:04

## 2017-07-10 RX ADMIN — HEPARIN SODIUM 5000 UNIT(S): 5000 INJECTION INTRAVENOUS; SUBCUTANEOUS at 21:19

## 2017-07-10 RX ADMIN — GABAPENTIN 300 MILLIGRAM(S): 400 CAPSULE ORAL at 15:04

## 2017-07-10 RX ADMIN — Medication 1 MILLIGRAM(S): at 15:04

## 2017-07-10 RX ADMIN — Medication 667 MILLIGRAM(S): at 15:04

## 2017-07-10 RX ADMIN — Medication 3 MILLILITER(S): at 15:17

## 2017-07-10 RX ADMIN — Medication 81 MILLIGRAM(S): at 15:04

## 2017-07-10 RX ADMIN — MIDODRINE HYDROCHLORIDE 10 MILLIGRAM(S): 2.5 TABLET ORAL at 05:49

## 2017-07-10 RX ADMIN — Medication 667 MILLIGRAM(S): at 18:29

## 2017-07-10 RX ADMIN — Medication 3 MILLILITER(S): at 20:47

## 2017-07-10 RX ADMIN — MIDODRINE HYDROCHLORIDE 10 MILLIGRAM(S): 2.5 TABLET ORAL at 18:29

## 2017-07-10 RX ADMIN — PREGABALIN 1000 MICROGRAM(S): 225 CAPSULE ORAL at 15:04

## 2017-07-10 RX ADMIN — GABAPENTIN 300 MILLIGRAM(S): 400 CAPSULE ORAL at 05:49

## 2017-07-10 RX ADMIN — Medication 50 MILLIGRAM(S): at 05:49

## 2017-07-10 RX ADMIN — Medication 1 APPLICATION(S): at 15:03

## 2017-07-10 RX ADMIN — HEPARIN SODIUM 5000 UNIT(S): 5000 INJECTION INTRAVENOUS; SUBCUTANEOUS at 15:05

## 2017-07-10 RX ADMIN — HEPARIN SODIUM 5000 UNIT(S): 5000 INJECTION INTRAVENOUS; SUBCUTANEOUS at 05:49

## 2017-07-10 RX ADMIN — ATORVASTATIN CALCIUM 40 MILLIGRAM(S): 80 TABLET, FILM COATED ORAL at 21:19

## 2017-07-10 RX ADMIN — GABAPENTIN 300 MILLIGRAM(S): 400 CAPSULE ORAL at 21:19

## 2017-07-10 NOTE — GOALS OF CARE CONVERSATION - PERSONAL ADVANCE DIRECTIVE - CONVERSATION DETAILS
Immediate family not @ bedside.  Pt is nauseated and vomiting small amts of clear fluid.  Medication adm for nausea by RN.  Family is bringing in HCP.  Plan to followup tomorrow with daughters Jeanine and Venessa.
Pt sitting in wheelchair @ bedside with daughter in attendance. Pt is alert and oriented and weepy @ times.  Pt verbalizes that she is seeing progress with her vision and memory.  No complaints of pain or discomfort.
I had a short meeting with patient and her daughter, Estephanie, regarding establishing Advance Directives.  HCP already completed, they will bringing in original to be copied.  MOLST Form discussed at length, every question explained to their satisfaction. Patient wants to have CPR/and Trial intubation if necessary. She will remain a full code-until MOLST re-evaluated if medical condition changes.  PLEASE RETURN TO PATIENT-TO BE KEPT AT HOME AND FOR TRAVELING IN [place]

## 2017-07-10 NOTE — PROGRESS NOTE ADULT - SUBJECTIVE AND OBJECTIVE BOX
NEPHROLOGY INTERVAL HPI/OVERNIGHT EVENTS:    Examined earlier  Looks comfortable    MEDICATIONS  (STANDING):  heparin  Injectable 5000 Unit(s) SubCutaneous every 8 hours  aspirin enteric coated 81 milliGRAM(s) Oral daily  calcium acetate 667 milliGRAM(s) Oral three times a day with meals  cyanocobalamin 1000 MICROGram(s) Oral daily  gabapentin 300 milliGRAM(s) Oral every 8 hours  epoetin jaswinder Injectable 14538 Unit(s) IV Push <User Schedule>  atorvastatin 40 milliGRAM(s) Oral at bedtime  folic acid 1 milliGRAM(s) Oral daily  Nephro-toi 1 Tablet(s) Oral daily  ammonium lactate 12% Lotion 1 Application(s) Topical daily  ALBUTerol/ipratropium for Nebulization 3 milliLiter(s) Nebulizer every 6 hours  midodrine 10 milliGRAM(s) Oral two times a day  predniSONE   Tablet 50 milliGRAM(s) Oral daily    MEDICATIONS  (PRN):  acetaminophen   Tablet. 650 milliGRAM(s) Oral every 6 hours PRN Mild Pain (1 - 3)  guaiFENesin    Syrup 200 milliGRAM(s) Oral every 6 hours PRN Cough  lactulose Syrup 10 Gram(s) Oral daily PRN no bm > 2 days  sodium chloride 0.65% Nasal 1 Spray(s) Both Nostrils three times a day PRN nasal congestion/discomfort from O2      Allergies    allopurinol (Rash)  codeine (Nausea; Vomiting)  penicillin (Rash)  spironolactone (Unknown)    Intolerances        Vital Signs Last 24 Hrs  T(C): 36.3 (10 Jul 2017 05:38), Max: 36.3 (10 Jul 2017 05:38)  T(F): 97.4 (10 Jul 2017 05:38), Max: 97.4 (10 Jul 2017 05:38)  HR: 91 (10 Jul 2017 05:38) (85 - 91)  BP: 95/48 (10 Jul 2017 05:38) (95/48 - 102/58)  BP(mean): --  RR: 18 (10 Jul 2017 05:38) (16 - 18)  SpO2: 95% (10 Jul 2017 05:38) (95% - 96%)  Daily     Daily     PHYSICAL EXAM:  NAD  lungs - CTA  CV gr 1 murmer,  No gallop or rub  Abd : soft, NT BS +, No masses  Ext- No edema  Neuro : Grossly intact, moving extremities     LABS:                      RADIOLOGY & ADDITIONAL TESTS:

## 2017-07-10 NOTE — GOALS OF CARE CONVERSATION - PERSONAL ADVANCE DIRECTIVE - WHAT MATTERS MOST
Comfort and being able to again be independent.  Pt is looking forward to a trip to [place] even though she is on Dialysis.  Pt is upset about leaving the hospital.
That she be given every chance to survive with aggressive treatment, unless she becomes  Ventilator Dependent.

## 2017-07-10 NOTE — PROGRESS NOTE ADULT - PROBLEM SELECTOR PLAN 2
s/p drain by IR , improved resp symptoms and hypoxia , multifactorial reasons for effusion  monitor for reaccumulation

## 2017-07-10 NOTE — PROGRESS NOTE ADULT - SUBJECTIVE AND OBJECTIVE BOX
Pt session in progress , left hand pain swelling better  dyspnea better , less cough and congestion         REVIEW OF SYSTEMS: as above     Vital Signs Last 24 Hrs  T(C): 36.6 (09 Jul 2017 05:30), Max: 36.6 (09 Jul 2017 05:30)  T(F): 97.8 (09 Jul 2017 05:30), Max: 97.8 (09 Jul 2017 05:30)  HR: 70 (09 Jul 2017 05:30) (70 - 80)  BP: 91/60 (09 Jul 2017 05:30) (91/60 - 114/54)  BP(mean): --  RR: 18 (09 Jul 2017 05:30) (16 - 18)  SpO2: 100% (09 Jul 2017 05:30) (91% - 100%)  GENERAL: Elderly female looking in mild to moderate respiratory distress.   HEENT: PERRL, +EOMI  NECK: soft, Supple, No JVD,   CHEST/LUNG: Rhonchi bilaterally, decrease air entry B/L, No wheezing  HEART: S1S2+, Regular rate and rhythm; No murmurs.   ABDOMEN: Soft, Nontender, Nondistended; mild tenderness in the middle of abdomen, Bowel sounds present  EXTREMITIES:  1+ Peripheral Pulses, No clubbing, cyanosis, + edema fingers and left hand   SKIN: Chronic venous statis changes   Musculoskletal : left hand dorsum swelling mild  2nd finger proximal joint swelling improved , ROM normal little painful                                                              9.7    6.3   )-----------( 192      ( 08 Jul 2017 13:07 )             31.1   07-08    139  |  93<L>  |  77.0<H>  ----------------------------<  91  3.8   |  27.0  |  4.20<H>    Ca    9.0      08 Jul 2017 13:07

## 2017-07-10 NOTE — PROGRESS NOTE ADULT - PROBLEM SELECTOR PLAN 1
xary of hand pending   improving with short course of steroid and toradol   will check uric acid level  warm compress

## 2017-07-10 NOTE — PROGRESS NOTE ADULT - ASSESSMENT
77 yo female with h/o Atrial fibrillation off anticoagulation due to fall risk and bleeding risk , ESRD on HD ,diastolic CHF , pulmonary hypertension , valvular haeart disease  admitted s/p fall at home with left SAH , brain injury and developed respiratory distress being treated for pneumonia  , now in rehab . Chest congestion, cough now improving  BP remains low, medications  doses of  sildenafil adjusted by pulmonologist, hypoxic , remains hypotensive stable now, s/p right pleural  effussion  removal by IR 7/7 , left hand finger arthralgia improving, still coughing

## 2017-07-10 NOTE — PROGRESS NOTE ADULT - SUBJECTIVE AND OBJECTIVE BOX
HISTORY OF PRESENT ILLNESS  76F was admitted on 6/14/17 after a mechanical fall while walking outside. She had head trauma but no LOC. In ER, GCS=15. A head CT showed left parietal and temporo-occipital SAH. A repeat head CT was stable. Additional workup showed nondisplaced fractures of the right 4-6 ribs with emphysematous changes, pleural effusion and atelectasis. Hospital course was complicated by shortness of breath/hypoxia and needed BiPAP/high-flow NC. She was started on Cefepime for PNA until 7/5. Admitted to acute rehab on 6/27.    PMHx - HTN, ESRD on HD, AFIB (no full AC due to fall and bleeding risk), CAD, COPD (on home O2 overnight), Cholelithiasis, Gout, Hyperlipidemia, Spinal stenosis, s/p AVR/MVR/Tricupsid valve annuloplasty, Cataracts repair, H/O spinal fusion, Appendectomy, Tonsillectomy, C-Spine DJD    TODAY'S SUBJECTIVE & REVIEW OF SYMPTOMS  [X] Constitutional WNL      [X] Cardio WNL            [ ] Resp WNL           [X] GI WNL                      [X]  WNL                 [ ] Heme WNL              [X] Endo WNL                  [ ] Skin WNL                [ ] MSK WNL            [ ] Neuro WNL                  [ ] Cognitive WNL         [X] Psych WNL    Had a very good weekend and is getting out of here to go to [place] for her family reunion on 7/20. Denies pain. Continues to have  cough, which she cannot remove. Significantly improved pulmonary exam, also more awake.     VITALS  T(C): 36.3 (07-10-17 @ 05:38)  T(F): 97.4 (07-10-17 @ 05:38), Max: 97.8 (07-09-17 @ 12:12)  HR: 91 (07-10-17 @ 05:38) (81 - 91)  BP: 95/48 (07-10-17 @ 05:38) (95/48 - 102/58)  RR:  (16 - 20)  SpO2:  (95% - 98%)  Wt(kg): --    PHYSICAL EXAM  Constitutional - NAD, Comfortable  HEENT - Facial ecchymosis - Improved, Right supraorbital laceration  Neck - Supple, No limited ROM  Chest - Improved Rhonchi   Cardiovascular - S1S2  Abdomen - BS+, Soft, NTND  Extremities - No C/C, No calf tenderness, BLE PVD skin changes  Neurologic Exam - Right field cut                    Cognitive - AAO to self, place, month/year, some of situation     Communication - Expressive deficits     Motor - No focal deficits     Sensory - Intact to LT     Coordination - FTN impaired  Psychiatric - Mood stable, Affect Flat  Skin - BUE - Multiple ecchymosis, left hand edema/pain to palpation    FUNCTIONAL PROGRESS  Gait - Total in WC  ADLs - Total A  Transfers - sit-to-stand mod A x 2  Functional transfer - Bed transfer mod A x 2    RECENT LABS                        9.7    6.3   )-----------( 192      ( 08 Jul 2017 13:07 )             31.1     07-08    139  |  93<L>  |  77.0<H>  ----------------------------<  91  3.8   |  27.0  |  4.20<H>    Ca    9.0      08 Jul 2017 13:07    Prealb 6/28 - 16    Procalcitonin 7/1 - 2.42, 7/6 - 1.18    RADIOLOGY/OTHER RESULTS  CXR 7/5 -  No interval change  CXR 6/27 -  Unchanged moderate right pleural effusion with associated atelectasis or consolidation. Bilateral perihilar opacities, probably mild pulmonary edema.  Right hand XR 6/27 - No acute fracture or dislocation of the right hand.    CURRENT MEDICATIONS  MEDICATIONS  (STANDING):  heparin  Injectable 5000 Unit(s) SubCutaneous every 8 hours  aspirin enteric coated 81 milliGRAM(s) Oral daily  calcium acetate 667 milliGRAM(s) Oral three times a day with meals  cyanocobalamin 1000 MICROGram(s) Oral daily  gabapentin 300 milliGRAM(s) Oral every 8 hours  epoetin jaswinder Injectable 36245 Unit(s) IV Push <User Schedule>  atorvastatin 40 milliGRAM(s) Oral at bedtime  folic acid 1 milliGRAM(s) Oral daily  Nephro-toi 1 Tablet(s) Oral daily  ammonium lactate 12% Lotion 1 Application(s) Topical daily  ALBUTerol/ipratropium for Nebulization 3 milliLiter(s) Nebulizer every 6 hours  midodrine 10 milliGRAM(s) Oral two times a day  predniSONE   Tablet 50 milliGRAM(s) Oral daily    MEDICATIONS  (PRN):  acetaminophen   Tablet. 650 milliGRAM(s) Oral every 6 hours PRN Mild Pain (1 - 3)  guaiFENesin    Syrup 200 milliGRAM(s) Oral every 6 hours PRN Cough  lactulose Syrup 10 Gram(s) Oral daily PRN no bm > 2 days  sodium chloride 0.65% Nasal 1 Spray(s) Both Nostrils three times a day PRN nasal congestion/discomfort from O2    ASSESSMENT & PLAN  76F sustaining a TBI after a fall now with functional deficits  Cardiac/Pulm HTN - Midodrine, DC Sildenafil (7/1)  Pulm - Duonebs Q6, O2 via NC, Guaifenesin PRN, Ocean PRN  CAD - Lipitor, ASA  ESRD on HD - Phoslo, HD T/Th/Sat  Anemia - Epogen, Vit B, Folate  GI/Bowel Management - Toilet PRN, Lactulose PRN   Management - Toilet Q2  Skin - Turn Q2, Lachydrin BLE, Zfloats BLE  Gout - Prednisone 50mg x2 days (7/9-7/11)  Pain - Neurontin,Tylenol PRN, Ice to left hand PRN  DVT PPX - Heparin, TEDs, SCDs  Diet - Renal & Cardiac/Thins, Nephro-toi, Nepro TID    Continue comprehensive acute rehab program consisting of 3hrs/day of OT/PT and SLP

## 2017-07-11 LAB
ANION GAP SERPL CALC-SCNC: 20 MMOL/L — HIGH (ref 5–17)
BUN SERPL-MCNC: 109 MG/DL — HIGH (ref 8–20)
CALCIUM SERPL-MCNC: 9.2 MG/DL — SIGNIFICANT CHANGE UP (ref 8.6–10.2)
CHLORIDE SERPL-SCNC: 86 MMOL/L — LOW (ref 98–107)
CO2 SERPL-SCNC: 26 MMOL/L — SIGNIFICANT CHANGE UP (ref 22–29)
CREAT SERPL-MCNC: 5.33 MG/DL — HIGH (ref 0.5–1.3)
GLUCOSE SERPL-MCNC: 251 MG/DL — HIGH (ref 70–115)
HCT VFR BLD CALC: 31.6 % — LOW (ref 37–47)
HGB BLD-MCNC: 9.9 G/DL — LOW (ref 12–16)
MCHC RBC-ENTMCNC: 31.3 G/DL — LOW (ref 32–36)
MCHC RBC-ENTMCNC: 34.6 PG — HIGH (ref 27–31)
MCV RBC AUTO: 110.5 FL — HIGH (ref 81–99)
PLATELET # BLD AUTO: 221 K/UL — SIGNIFICANT CHANGE UP (ref 150–400)
POTASSIUM SERPL-MCNC: 5 MMOL/L — SIGNIFICANT CHANGE UP (ref 3.5–5.3)
POTASSIUM SERPL-SCNC: 5 MMOL/L — SIGNIFICANT CHANGE UP (ref 3.5–5.3)
RBC # BLD: 2.86 M/UL — LOW (ref 4.4–5.2)
RBC # FLD: 18.5 % — HIGH (ref 11–15.6)
SODIUM SERPL-SCNC: 132 MMOL/L — LOW (ref 135–145)
WBC # BLD: 8.7 K/UL — SIGNIFICANT CHANGE UP (ref 4.8–10.8)
WBC # FLD AUTO: 8.7 K/UL — SIGNIFICANT CHANGE UP (ref 4.8–10.8)

## 2017-07-11 PROCEDURE — 99232 SBSQ HOSP IP/OBS MODERATE 35: CPT

## 2017-07-11 PROCEDURE — 99233 SBSQ HOSP IP/OBS HIGH 50: CPT

## 2017-07-11 RX ADMIN — Medication 1 MILLIGRAM(S): at 11:51

## 2017-07-11 RX ADMIN — Medication 1 TABLET(S): at 11:56

## 2017-07-11 RX ADMIN — Medication 667 MILLIGRAM(S): at 07:33

## 2017-07-11 RX ADMIN — HEPARIN SODIUM 5000 UNIT(S): 5000 INJECTION INTRAVENOUS; SUBCUTANEOUS at 21:56

## 2017-07-11 RX ADMIN — GABAPENTIN 300 MILLIGRAM(S): 400 CAPSULE ORAL at 11:51

## 2017-07-11 RX ADMIN — Medication 3 MILLILITER(S): at 03:52

## 2017-07-11 RX ADMIN — ATORVASTATIN CALCIUM 40 MILLIGRAM(S): 80 TABLET, FILM COATED ORAL at 21:56

## 2017-07-11 RX ADMIN — HEPARIN SODIUM 5000 UNIT(S): 5000 INJECTION INTRAVENOUS; SUBCUTANEOUS at 06:00

## 2017-07-11 RX ADMIN — Medication 667 MILLIGRAM(S): at 17:43

## 2017-07-11 RX ADMIN — MIDODRINE HYDROCHLORIDE 10 MILLIGRAM(S): 2.5 TABLET ORAL at 06:01

## 2017-07-11 RX ADMIN — Medication 3 MILLILITER(S): at 21:30

## 2017-07-11 RX ADMIN — Medication 667 MILLIGRAM(S): at 11:51

## 2017-07-11 RX ADMIN — GABAPENTIN 300 MILLIGRAM(S): 400 CAPSULE ORAL at 06:01

## 2017-07-11 RX ADMIN — ERYTHROPOIETIN 10000 UNIT(S): 10000 INJECTION, SOLUTION INTRAVENOUS; SUBCUTANEOUS at 14:44

## 2017-07-11 RX ADMIN — HEPARIN SODIUM 5000 UNIT(S): 5000 INJECTION INTRAVENOUS; SUBCUTANEOUS at 11:51

## 2017-07-11 RX ADMIN — PREGABALIN 1000 MICROGRAM(S): 225 CAPSULE ORAL at 11:51

## 2017-07-11 RX ADMIN — Medication 81 MILLIGRAM(S): at 11:51

## 2017-07-11 RX ADMIN — GABAPENTIN 300 MILLIGRAM(S): 400 CAPSULE ORAL at 21:56

## 2017-07-11 RX ADMIN — Medication 1 APPLICATION(S): at 11:51

## 2017-07-11 RX ADMIN — Medication 50 MILLIGRAM(S): at 06:01

## 2017-07-11 RX ADMIN — MIDODRINE HYDROCHLORIDE 10 MILLIGRAM(S): 2.5 TABLET ORAL at 17:43

## 2017-07-11 NOTE — PROGRESS NOTE ADULT - SUBJECTIVE AND OBJECTIVE BOX
HISTORY OF PRESENT ILLNESS  76F was admitted on 6/14/17 after a mechanical fall while walking outside. She had head trauma but no LOC. In ER, GCS=15. A head CT showed left parietal and temporo-occipital SAH. A repeat head CT was stable. Additional workup showed nondisplaced fractures of the right 4-6 ribs with emphysematous changes, pleural effusion and atelectasis. Hospital course was complicated by shortness of breath/hypoxia and needed BiPAP/high-flow NC. She was started on Cefepime for PNA until 7/5. Admitted to acute rehab on 6/27.    PMHx - HTN, ESRD on HD, AFIB (no full AC due to fall and bleeding risk), CAD, COPD (on home O2 overnight), Cholelithiasis, Gout, Hyperlipidemia, Spinal stenosis, s/p AVR/MVR/Tricupsid valve annuloplasty, Cataracts repair, H/O spinal fusion, Appendectomy, Tonsillectomy, C-Spine DJD    TODAY'S SUBJECTIVE & REVIEW OF SYMPTOMS  [ ] Constitutional WNL      [X] Cardio WNL            [ ] Resp WNL           [X] GI WNL                      [X]  WNL                 [ ] Heme WNL              [X] Endo WNL                  [ ] Skin WNL                [ ] MSK WNL            [ ] Neuro WNL                  [ ] Cognitive WNL         [X] Psych WNL    No overnight events. Slept well last night. Feels that she is breathing better but continues to have SOB with therapy. Denies pain. Left hand pain is improved, less pain to palpation and swelling also improved.     VITALS  T(C): 36.5 (07-11-17 @ 05:36)  T(F): 97.7 (07-11-17 @ 05:36), Max: 98.4 (07-10-17 @ 17:00)  HR: 76 (07-11-17 @ 05:36) (76 - 88)  BP: 108/59 (07-11-17 @ 05:36) (104/61 - 113/65)  RR:  (18 - 18)  SpO2:  (94% - 96%)  Wt(kg): --    PHYSICAL EXAM  Constitutional - NAD, Comfortable  HEENT - Facial ecchymosis - Improved, Right supraorbital laceration  Neck - Supple, No limited ROM  Chest - Improved Rhonchi   Cardiovascular - S1S2  Abdomen - BS+, Soft, NTND  Extremities - No C/C, No calf tenderness, BLE PVD skin changes  Neurologic Exam - Right field cut                    Cognitive - AAO to self, place, month/year, some of situation     Communication - Expressive deficits     Motor - No focal deficits     Sensory - Intact to LT     Coordination - FTN impaired  Psychiatric - Mood stable, Affect Flat  Skin - BUE - Multiple ecchymosis, left hand edema/pain to palpation    FUNCTIONAL PROGRESS  Gait - Total in WC  ADLs - Total A  Transfers - sit-to-stand mod A x 2  Functional transfer - Bed transfer mod A x 2    RECENT LABS                        9.7    6.3   )-----------( 192      ( 08 Jul 2017 13:07 )             31.1     07-08    139  |  93<L>  |  77.0<H>  ----------------------------<  91  3.8   |  27.0  |  4.20<H>    Ca    9.0      08 Jul 2017 13:07    Prealb 6/28 - 16    Procalcitonin 7/1 - 2.42, 7/6 - 1.18    Pleural Fluid Cx 7/6 - No growth    RADIOLOGY/OTHER RESULTS  CXR 7/9 - Persistent right middle lobe infiltrate.  CXR 7/5 -  No interval change  CXR 6/27 -  Unchanged moderate right pleural effusion with associated atelectasis or consolidation. Bilateral perihilar opacities, probably mild pulmonary edema.    CURRENT MEDICATIONS  MEDICATIONS  (STANDING):  heparin  Injectable 5000 Unit(s) SubCutaneous every 8 hours  aspirin enteric coated 81 milliGRAM(s) Oral daily  calcium acetate 667 milliGRAM(s) Oral three times a day with meals  cyanocobalamin 1000 MICROGram(s) Oral daily  gabapentin 300 milliGRAM(s) Oral every 8 hours  epoetin jaswinder Injectable 68619 Unit(s) IV Push <User Schedule>  atorvastatin 40 milliGRAM(s) Oral at bedtime  folic acid 1 milliGRAM(s) Oral daily  Nephro-toi 1 Tablet(s) Oral daily  ammonium lactate 12% Lotion 1 Application(s) Topical daily  ALBUTerol/ipratropium for Nebulization 3 milliLiter(s) Nebulizer every 6 hours  midodrine 10 milliGRAM(s) Oral two times a day    MEDICATIONS  (PRN):  acetaminophen   Tablet. 650 milliGRAM(s) Oral every 6 hours PRN Mild Pain (1 - 3)  guaiFENesin    Syrup 200 milliGRAM(s) Oral every 6 hours PRN Cough  lactulose Syrup 10 Gram(s) Oral daily PRN no bm > 2 days  sodium chloride 0.65% Nasal 1 Spray(s) Both Nostrils three times a day PRN nasal congestion/discomfort from O2    ASSESSMENT & PLAN  76F sustaining a TBI after a fall now with functional deficits  Cardiac/Pulm HTN - Midodrine, DC Sildenafil (7/1)  Pulm - Duonebs Q6, O2 via NC, Guaifenesin PRN, Ocean PRN  CAD - Lipitor, ASA  ESRD on HD - Phoslo, HD T/Th/Sat  Anemia - Epogen, Vit B, Folate  GI/Bowel Management - Toilet PRN, Lactulose PRN   Management - Toilet Q2  Skin - Turn Q2, Lachydrin BLE, Zfloats BLE  Gout - Prednisone 50mg x2 days (7/9-7/11)  Pain - Neurontin,Tylenol PRN, Ice to left hand PRN  DVT PPX - Heparin, TEDs, SCDs  Diet - Renal & Cardiac/Thins, Nephro-toi, Nepro TID    Continue comprehensive acute rehab program consisting of 3hrs/day of OT/PT and SLP

## 2017-07-11 NOTE — PROGRESS NOTE ADULT - SUBJECTIVE AND OBJECTIVE BOX
CC: F/u fall with left SAH, recent treatment for PNA and pleural effusion s/p tap    HPI: 77 yo female with h/o Atrial fibrillation off anticoagulation due to fall risk and bleeding risk , ESRD on HD ,diastolic CHF , pulmonary hypertension , valvular heart disease  admitted s/p fall at home with left SAH , brain injury and developed respiratory distress being treated for pneumonia  , now in rehab .     INTERVAL HPI/OVERNIGHT EVENTS: Patient seen and examined sitting in wheelchair.  (+) Chronic cough.  Patient denies any headache, dizziness, SOB, CP, abdominal pain, nausea, vomiting, diarrhea.  Other ROS reviewed and are negative.      Vital Signs Last 24 Hrs  T(C): 36.5 (11 Jul 2017 05:36), Max: 36.9 (10 Jul 2017 17:00)  T(F): 97.7 (11 Jul 2017 05:36), Max: 98.4 (10 Jul 2017 17:00)  HR: 76 (11 Jul 2017 05:36) (76 - 88)  BP: 108/59 (11 Jul 2017 05:36) (104/61 - 113/65)  BP(mean): --  RR: 18 (11 Jul 2017 05:36) (18 - 18)  SpO2: 96% (11 Jul 2017 05:36) (94% - 96%)  I&O's Detail    PHYSICAL EXAM:  GENERAL: NAD, well-groomed, well-developed  HEAD:  Atraumatic, Normocephalic  EYES: EOMI, PERRLA, conjunctiva and sclera clear  NECK: Supple, No JVD, Normal thyroid  NERVOUS SYSTEM:  Alert & Oriented X3, Good concentration; Motor Strength 5/5 B/L upper and lower extremities  CHEST/LUNG: Coarse BS bilaterally; No rales, rhonchi, wheezing, or rubs  HEART: Regular rate and rhythm; No murmurs, rubs, or gallops  ABDOMEN: Soft, Nontender, Nondistended; Bowel sounds present  EXTREMITIES:  2+ Peripheral Pulses, No clubbing or cyanosis, (+)1 pedal edema  SKIN: No rashes or lesions        MEDICATIONS  (STANDING):  heparin  Injectable 5000 Unit(s) SubCutaneous every 8 hours  aspirin enteric coated 81 milliGRAM(s) Oral daily  calcium acetate 667 milliGRAM(s) Oral three times a day with meals  cyanocobalamin 1000 MICROGram(s) Oral daily  gabapentin 300 milliGRAM(s) Oral every 8 hours  epoetin jaswinder Injectable 43283 Unit(s) IV Push <User Schedule>  atorvastatin 40 milliGRAM(s) Oral at bedtime  folic acid 1 milliGRAM(s) Oral daily  Nephro-toi 1 Tablet(s) Oral daily  ammonium lactate 12% Lotion 1 Application(s) Topical daily  ALBUTerol/ipratropium for Nebulization 3 milliLiter(s) Nebulizer every 6 hours  midodrine 10 milliGRAM(s) Oral two times a day    MEDICATIONS  (PRN):  acetaminophen   Tablet. 650 milliGRAM(s) Oral every 6 hours PRN Mild Pain (1 - 3)  guaiFENesin    Syrup 200 milliGRAM(s) Oral every 6 hours PRN Cough  lactulose Syrup 10 Gram(s) Oral daily PRN no bm > 2 days  sodium chloride 0.65% Nasal 1 Spray(s) Both Nostrils three times a day PRN nasal congestion/discomfort from O2

## 2017-07-11 NOTE — PROGRESS NOTE ADULT - ASSESSMENT
75 yo female with h/o Atrial fibrillation off anticoagulation due to fall risk and bleeding risk , ESRD on HD ,diastolic CHF , pulmonary hypertension , valvular haeart disease  admitted s/p fall at home with left SAH , brain injury and developed respiratory distress being treated for pneumonia  , now in rehab . Chest congestion, cough now improving  BP remains low, medications  doses of  sildenafil adjusted by pulmonologist, hypoxic , remains hypotensive stable now, s/p right pleural  effussion  removal by IR 7/7 , left hand finger arthralgia improving, still coughing

## 2017-07-11 NOTE — PROGRESS NOTE ADULT - SUBJECTIVE AND OBJECTIVE BOX
NEPHROLOGY INTERVAL HPI/OVERNIGHT EVENTS:    seen at HD   No new events   No access issues     MEDICATIONS  (STANDING):  heparin  Injectable 5000 Unit(s) SubCutaneous every 8 hours  aspirin enteric coated 81 milliGRAM(s) Oral daily  calcium acetate 667 milliGRAM(s) Oral three times a day with meals  cyanocobalamin 1000 MICROGram(s) Oral daily  gabapentin 300 milliGRAM(s) Oral every 8 hours  epoetin jaswinder Injectable 59960 Unit(s) IV Push <User Schedule>  atorvastatin 40 milliGRAM(s) Oral at bedtime  folic acid 1 milliGRAM(s) Oral daily  Nephro-toi 1 Tablet(s) Oral daily  ammonium lactate 12% Lotion 1 Application(s) Topical daily  ALBUTerol/ipratropium for Nebulization 3 milliLiter(s) Nebulizer every 6 hours  midodrine 10 milliGRAM(s) Oral two times a day    MEDICATIONS  (PRN):  acetaminophen   Tablet. 650 milliGRAM(s) Oral every 6 hours PRN Mild Pain (1 - 3)  guaiFENesin    Syrup 200 milliGRAM(s) Oral every 6 hours PRN Cough  lactulose Syrup 10 Gram(s) Oral daily PRN no bm > 2 days  sodium chloride 0.65% Nasal 1 Spray(s) Both Nostrils three times a day PRN nasal congestion/discomfort from O2      Allergies    allopurinol (Rash)  codeine (Nausea; Vomiting)  penicillin (Rash)  spironolactone (Unknown)    Intolerances            Vital Signs Last 24 Hrs  T(C): 36.3 (2017 13:15), Max: 36.9 (10 Jul 2017 17:00)  T(F): 97.4 (2017 13:15), Max: 98.4 (10 Jul 2017 17:00)  HR: 78 (2017 13:15) (74 - 88)  BP: 113/58 (2017 13:15) (104/61 - 113/65)  BP(mean): --  RR: 18 (2017 13:15) (18 - 18)  SpO2: 98% (2017 13:15) (94% - 98%)  Daily     Daily Weight in k.6 (2017 13:15)  I&O's Detail    I&O's Summary      PHYSICAL EXAM:  NAD  lungs - CTA  CV gr 1 murmer,  No gallop or rub  Abd : soft, NT BS +, No masses  Ext- No edema  Neuro : Grossly intact, moving extremities   LABS:                        9.9    8.7   )-----------( 221      ( 2017 13:53 )             31.6                       RADIOLOGY & ADDITIONAL TESTS:

## 2017-07-12 PROCEDURE — 99232 SBSQ HOSP IP/OBS MODERATE 35: CPT

## 2017-07-12 RX ADMIN — Medication 667 MILLIGRAM(S): at 12:34

## 2017-07-12 RX ADMIN — Medication 1 TABLET(S): at 12:34

## 2017-07-12 RX ADMIN — GABAPENTIN 300 MILLIGRAM(S): 400 CAPSULE ORAL at 06:10

## 2017-07-12 RX ADMIN — Medication 667 MILLIGRAM(S): at 08:34

## 2017-07-12 RX ADMIN — HEPARIN SODIUM 5000 UNIT(S): 5000 INJECTION INTRAVENOUS; SUBCUTANEOUS at 13:19

## 2017-07-12 RX ADMIN — Medication 650 MILLIGRAM(S): at 14:00

## 2017-07-12 RX ADMIN — HEPARIN SODIUM 5000 UNIT(S): 5000 INJECTION INTRAVENOUS; SUBCUTANEOUS at 21:57

## 2017-07-12 RX ADMIN — Medication 1 MILLIGRAM(S): at 12:34

## 2017-07-12 RX ADMIN — Medication 3 MILLILITER(S): at 20:41

## 2017-07-12 RX ADMIN — Medication 650 MILLIGRAM(S): at 21:50

## 2017-07-12 RX ADMIN — PREGABALIN 1000 MICROGRAM(S): 225 CAPSULE ORAL at 12:34

## 2017-07-12 RX ADMIN — GABAPENTIN 300 MILLIGRAM(S): 400 CAPSULE ORAL at 21:57

## 2017-07-12 RX ADMIN — MIDODRINE HYDROCHLORIDE 10 MILLIGRAM(S): 2.5 TABLET ORAL at 06:10

## 2017-07-12 RX ADMIN — Medication 667 MILLIGRAM(S): at 17:40

## 2017-07-12 RX ADMIN — MIDODRINE HYDROCHLORIDE 10 MILLIGRAM(S): 2.5 TABLET ORAL at 17:40

## 2017-07-12 RX ADMIN — Medication 3 MILLILITER(S): at 04:05

## 2017-07-12 RX ADMIN — Medication 3 MILLILITER(S): at 15:47

## 2017-07-12 RX ADMIN — Medication 1 APPLICATION(S): at 17:40

## 2017-07-12 RX ADMIN — Medication 81 MILLIGRAM(S): at 12:34

## 2017-07-12 RX ADMIN — GABAPENTIN 300 MILLIGRAM(S): 400 CAPSULE ORAL at 13:18

## 2017-07-12 RX ADMIN — ATORVASTATIN CALCIUM 40 MILLIGRAM(S): 80 TABLET, FILM COATED ORAL at 21:57

## 2017-07-12 RX ADMIN — HEPARIN SODIUM 5000 UNIT(S): 5000 INJECTION INTRAVENOUS; SUBCUTANEOUS at 06:12

## 2017-07-12 RX ADMIN — Medication 650 MILLIGRAM(S): at 13:19

## 2017-07-12 RX ADMIN — Medication 650 MILLIGRAM(S): at 21:59

## 2017-07-12 NOTE — PROGRESS NOTE ADULT - SUBJECTIVE AND OBJECTIVE BOX
NEPHROLOGY INTERVAL HPI/OVERNIGHT EVENTS:  Feels well  Seen at Occupational therapy   No new complaints   MEDICATIONS  (STANDING):  heparin  Injectable 5000 Unit(s) SubCutaneous every 8 hours  aspirin enteric coated 81 milliGRAM(s) Oral daily  calcium acetate 667 milliGRAM(s) Oral three times a day with meals  cyanocobalamin 1000 MICROGram(s) Oral daily  gabapentin 300 milliGRAM(s) Oral every 8 hours  epoetin jaswinder Injectable 08259 Unit(s) IV Push <User Schedule>  atorvastatin 40 milliGRAM(s) Oral at bedtime  folic acid 1 milliGRAM(s) Oral daily  Nephro-toi 1 Tablet(s) Oral daily  ammonium lactate 12% Lotion 1 Application(s) Topical daily  ALBUTerol/ipratropium for Nebulization 3 milliLiter(s) Nebulizer every 6 hours  midodrine 10 milliGRAM(s) Oral two times a day    MEDICATIONS  (PRN):  acetaminophen   Tablet. 650 milliGRAM(s) Oral every 6 hours PRN Mild Pain (1 - 3)  guaiFENesin    Syrup 200 milliGRAM(s) Oral every 6 hours PRN Cough  lactulose Syrup 10 Gram(s) Oral daily PRN no bm > 2 days  sodium chloride 0.65% Nasal 1 Spray(s) Both Nostrils three times a day PRN nasal congestion/discomfort from O2      Allergies    allopurinol (Rash)  codeine (Nausea; Vomiting)  penicillin (Rash)  spironolactone (Unknown)    Intolerances        Vital Signs Last 24 Hrs  T(C): 36.1 (2017 05:31), Max: 36.7 (2017 20:21)  T(F): 97 (2017 05:31), Max: 98 (2017 20:21)  HR: 84 (2017 05:31) (82 - 88)  BP: 99/57 (2017 05:31) (97/51 - 114/56)  BP(mean): --  RR: 18 (2017 05:31) (18 - 19)  SpO2: 98% (2017 05:31) (87% - 98%)  Daily     Daily Weight in k.6 (2017 16:45)  I&O's Detail    2017 07:01  -  2017 07:00  --------------------------------------------------------  IN:  Total IN: 0 mL    OUT:    Other: 2000 mL  Total OUT: 2000 mL    Total NET: -2000 mL        I&O's Summary    2017 07:01  -  2017 07:00  --------------------------------------------------------  IN: 0 mL / OUT: 2000 mL / NET: -2000 mL        PHYSICAL EXAM:  NAD  lungs - CTA  CV gr 1 murmer,  No gallop or rub  Abd : soft, NT BS +, No masses  Ext- No edema  Neuro : Grossly intact, moving extremities     LABS:                        9.9    8.7   )-----------( 221      ( 2017 13:53 )             31.6     07-11    132<L>  |  86<L>  |  109.0<H>  ----------------------------<  251<H>  5.0   |  26.0  |  5.33<H>    Ca    9.2      2017 13:53                  RADIOLOGY & ADDITIONAL TESTS:

## 2017-07-12 NOTE — PROGRESS NOTE ADULT - SUBJECTIVE AND OBJECTIVE BOX
CC: F/u fall with left SAH, recent treatment for PNA and pleural effusion s/p tap    HPI: 75 yo female with h/o Atrial fibrillation off anticoagulation due to fall risk and bleeding risk , ESRD on HD ,diastolic CHF , pulmonary hypertension , valvular heart disease  admitted s/p fall at home with left SAH , brain injury and developed respiratory distress being treated for pneumonia  , now in rehab .     INTERVAL HPI/OVERNIGHT EVENTS: Patient seen and examined sitting in wheelchair.  Patient "feeling good."  (+) Chronic cough.  Patient denies any headache, dizziness, SOB, CP, abdominal pain, nausea, dysuria.  Other ROS reviewed and are negative.    Vital Signs Last 24 Hrs  T(C): 36.1 (12 Jul 2017 05:31), Max: 36.7 (11 Jul 2017 20:21)  T(F): 97 (12 Jul 2017 05:31), Max: 98 (11 Jul 2017 20:21)  HR: 84 (12 Jul 2017 05:31) (74 - 88)  BP: 99/57 (12 Jul 2017 05:31) (97/51 - 114/56)  BP(mean): --  RR: 18 (12 Jul 2017 05:31) (18 - 19)  SpO2: 98% (12 Jul 2017 05:31) (87% - 98%)  I&O's Detail    11 Jul 2017 07:01  -  12 Jul 2017 07:00  --------------------------------------------------------  IN:  Total IN: 0 mL    OUT:    Other: 2000 mL  Total OUT: 2000 mL    Total NET: -2000 mL      PHYSICAL EXAM:  GENERAL: NAD, well-groomed, well-developed  HEAD:  Atraumatic, Normocephalic  EYES: EOMI, PERRLA, conjunctiva and sclera clear  NECK: Supple, No JVD, Normal thyroid  NERVOUS SYSTEM:  Alert & Oriented X3, Good concentration; Motor Strength 5/5 B/L upper and lower extremities  CHEST/LUNG: Clear to auscultation bilaterally; No rales, rhonchi, wheezing, or rubs  HEART: Regular rate and rhythm; No murmurs, rubs, or gallops  ABDOMEN: Soft, Nontender, Nondistended; Bowel sounds present  EXTREMITIES:  2+ Peripheral Pulses, No clubbing, cyanosis, or edema  SKIN: No rashes or lesions                                9.9    8.7   )-----------( 221      ( 11 Jul 2017 13:53 )             31.6     11 Jul 2017 13:53    132    |  86     |  109.0  ----------------------------<  251    5.0     |  26.0   |  5.33     Ca    9.2        11 Jul 2017 13:53        MEDICATIONS  (STANDING):  heparin  Injectable 5000 Unit(s) SubCutaneous every 8 hours  aspirin enteric coated 81 milliGRAM(s) Oral daily  calcium acetate 667 milliGRAM(s) Oral three times a day with meals  cyanocobalamin 1000 MICROGram(s) Oral daily  gabapentin 300 milliGRAM(s) Oral every 8 hours  epoetin jaswinder Injectable 69600 Unit(s) IV Push <User Schedule>  atorvastatin 40 milliGRAM(s) Oral at bedtime  folic acid 1 milliGRAM(s) Oral daily  Nephro-toi 1 Tablet(s) Oral daily  ammonium lactate 12% Lotion 1 Application(s) Topical daily  ALBUTerol/ipratropium for Nebulization 3 milliLiter(s) Nebulizer every 6 hours  midodrine 10 milliGRAM(s) Oral two times a day    MEDICATIONS  (PRN):  acetaminophen   Tablet. 650 milliGRAM(s) Oral every 6 hours PRN Mild Pain (1 - 3)  guaiFENesin    Syrup 200 milliGRAM(s) Oral every 6 hours PRN Cough  lactulose Syrup 10 Gram(s) Oral daily PRN no bm > 2 days  sodium chloride 0.65% Nasal 1 Spray(s) Both Nostrils three times a day PRN nasal congestion/discomfort from O2

## 2017-07-12 NOTE — PROGRESS NOTE ADULT - ASSESSMENT
ESRD - HD tomorrow , No Heparin     Anemia - Epogen with HD , CBC in am     RO - Continue the current binders

## 2017-07-12 NOTE — PROGRESS NOTE ADULT - NSHPATTENDINGPLANDISCUSS_GEN_ALL_CORE
Will be meeting with daughter later this afternnoon
rehab team and IR
rehab team and T surg
Housestaff
housestaff
rehab team
with nurse practioner

## 2017-07-12 NOTE — PROGRESS NOTE ADULT - SUBJECTIVE AND OBJECTIVE BOX
HISTORY OF PRESENT ILLNESS  76F was admitted on 6/14/17 after a mechanical fall while walking outside. She had head trauma but no LOC. In ER, GCS=15. A head CT showed left parietal and temporo-occipital SAH. A repeat head CT was stable. Additional workup showed nondisplaced fractures of the right 4-6 ribs with emphysematous changes, pleural effusion and atelectasis. Hospital course was complicated by shortness of breath/hypoxia and needed BiPAP/high-flow NC. She was started on Cefepime for PNA until 7/5. Admitted to acute rehab on 6/27.    PMHx - HTN, ESRD on HD, AFIB (no full AC due to fall and bleeding risk), CAD, COPD (on home O2 overnight), Cholelithiasis, Gout, Hyperlipidemia, Spinal stenosis, s/p AVR/MVR/Tricupsid valve annuloplasty, Cataracts repair, H/O spinal fusion, Appendectomy, Tonsillectomy, C-Spine DJD    TODAY'S SUBJECTIVE & REVIEW OF SYMPTOMS  [X] Constitutional WNL      [X] Cardio WNL            [ ] Resp WNL           [X] GI WNL                      [X]  WNL                 [X] Heme WNL              [X] Endo WNL                  [ ] Skin WNL                [ ] MSK WNL            [ ] Neuro WNL                  [ ] Cognitive WNL         [X] Psych WNL    No overnight events. Denies pain.   Working with SLP and noted to be making improvements.   Slept well overnight. Cough noted to be improved, as is the pulm exam.     VITALS  T(C): 36.1 (07-12-17 @ 05:31)  T(F): 97 (07-12-17 @ 05:31), Max: 98 (07-11-17 @ 20:21)  HR: 84 (07-12-17 @ 05:31) (74 - 88)  BP: 99/57 (07-12-17 @ 05:31) (97/51 - 114/56)  RR:  (18 - 19)  SpO2:  (87% - 98%)  Wt(kg): --    PHYSICAL EXAM  Constitutional - NAD, Comfortable  HEENT - Facial ecchymosis - Improved, Right supraorbital laceration  Neck - Supple, No limited ROM  Chest - Improved Rhonchi   Cardiovascular - S1S2  Abdomen - BS+, Soft, NTND  Extremities - No C/C, Mild BLE edema, No calf tenderness, BLE PVD skin changes  Neurologic Exam - Right field cut                    Cognitive - AAO to self, place, month/year, some of situation     Communication - Expressive deficits     Motor - No focal deficits     Sensory - Intact to LT     Coordination - FTN impaired  Psychiatric - Mood stable, Affect Flat  Skin - BUE - Multiple ecchymosis, left hand edema/pain to palpation    FUNCTIONAL PROGRESS  Gait - Mod A  ADLs - Mod A  Transfers - Mod A   Functional transfers - Mod A    RECENT LABS                         9.9    8.7   )-----------( 221      ( 11 Jul 2017 13:53 )             31.6     07-11    132<L>  |  86<L>  |  109.0<H>  ----------------------------<  251<H>  5.0   |  26.0  |  5.33<H>    Ca    9.2      11 Jul 2017 13:53    Prealb 6/28 - 16    Procalcitonin 7/1 - 2.42, 7/6 - 1.18    Pleural Fluid Cx 7/6 - No growth    RADIOLOGY/OTHER RESULTS  CXR 7/9 - Persistent right middle lobe infiltrate.  CXR 7/5 -  No interval change  CXR 6/27 -  Unchanged moderate right pleural effusion with associated atelectasis or consolidation. Bilateral perihilar opacities, probably mild pulmonary edema.    CURRENT MEDICATIONS  MEDICATIONS  (STANDING):  heparin  Injectable 5000 Unit(s) SubCutaneous every 8 hours  aspirin enteric coated 81 milliGRAM(s) Oral daily  calcium acetate 667 milliGRAM(s) Oral three times a day with meals  cyanocobalamin 1000 MICROGram(s) Oral daily  gabapentin 300 milliGRAM(s) Oral every 8 hours  epoetin jaswinder Injectable 76879 Unit(s) IV Push <User Schedule>  atorvastatin 40 milliGRAM(s) Oral at bedtime  folic acid 1 milliGRAM(s) Oral daily  Nephro-toi 1 Tablet(s) Oral daily  ammonium lactate 12% Lotion 1 Application(s) Topical daily  ALBUTerol/ipratropium for Nebulization 3 milliLiter(s) Nebulizer every 6 hours  midodrine 10 milliGRAM(s) Oral two times a day    MEDICATIONS  (PRN):  acetaminophen   Tablet. 650 milliGRAM(s) Oral every 6 hours PRN Mild Pain (1 - 3)  guaiFENesin    Syrup 200 milliGRAM(s) Oral every 6 hours PRN Cough  lactulose Syrup 10 Gram(s) Oral daily PRN no bm > 2 days  sodium chloride 0.65% Nasal 1 Spray(s) Both Nostrils three times a day PRN nasal congestion/discomfort from O2    ASSESSMENT & PLAN  76F sustaining a TBI after a fall now with functional deficits  Cardiac/Pulm HTN - Midodrine, DC Sildenafil (7/1)  Pulm - Duonebs Q6, O2 via NC, Guaifenesin PRN, Ocean PRN  CAD - Lipitor, ASA  ESRD on HD - Phoslo, HD T/Th/Sat  Anemia - Epogen, Vit B, Folate  GI/Bowel Management - Toilet PRN, Lactulose PRN   Management - Toilet Q2  Skin - Turn Q2, Lachydrin BLE, Zfloats BLE  Gout - Prednisone 50mg x2 days (7/9-7/11)  Pain - Neurontin,Tylenol PRN, Ice to left hand PRN  DVT PPX - Heparin, TEDs, SCDs  Diet - Renal & Cardiac/Thins, Nephro-toi, Nepro TID    Continue comprehensive acute rehab program consisting of 3hrs/day of OT/PT and SLP

## 2017-07-12 NOTE — PROGRESS NOTE ADULT - PROBLEM SELECTOR PLAN 3
Improved with drainage  Appears to be c/w transudate by TP and LDH criteria  Follow CXR for R mid lung filed opacity/infiltrate  If opacity persists, consider chest CT

## 2017-07-13 ENCOUNTER — APPOINTMENT (OUTPATIENT)
Dept: ELECTROPHYSIOLOGY | Facility: CLINIC | Age: 77
End: 2017-07-13

## 2017-07-13 LAB
ANION GAP SERPL CALC-SCNC: 18 MMOL/L — HIGH (ref 5–17)
BUN SERPL-MCNC: 97 MG/DL — HIGH (ref 8–20)
CALCIUM SERPL-MCNC: 9.1 MG/DL — SIGNIFICANT CHANGE UP (ref 8.6–10.2)
CHLORIDE SERPL-SCNC: 92 MMOL/L — LOW (ref 98–107)
CO2 SERPL-SCNC: 24 MMOL/L — SIGNIFICANT CHANGE UP (ref 22–29)
CREAT SERPL-MCNC: 4.53 MG/DL — HIGH (ref 0.5–1.3)
GLUCOSE SERPL-MCNC: 112 MG/DL — SIGNIFICANT CHANGE UP (ref 70–115)
HCT VFR BLD CALC: 32.3 % — LOW (ref 37–47)
HGB BLD-MCNC: 9.9 G/DL — LOW (ref 12–16)
MCHC RBC-ENTMCNC: 30.7 G/DL — LOW (ref 32–36)
MCHC RBC-ENTMCNC: 34.3 PG — HIGH (ref 27–31)
MCV RBC AUTO: 111.8 FL — HIGH (ref 81–99)
NON-GYN CYTOLOGY SPEC: SIGNIFICANT CHANGE UP
PHOSPHATE SERPL-MCNC: 3.6 MG/DL — SIGNIFICANT CHANGE UP (ref 2.4–4.7)
PLATELET # BLD AUTO: 180 K/UL — SIGNIFICANT CHANGE UP (ref 150–400)
POTASSIUM SERPL-MCNC: 4.8 MMOL/L — SIGNIFICANT CHANGE UP (ref 3.5–5.3)
POTASSIUM SERPL-SCNC: 4.8 MMOL/L — SIGNIFICANT CHANGE UP (ref 3.5–5.3)
RBC # BLD: 2.89 M/UL — LOW (ref 4.4–5.2)
RBC # FLD: 18.8 % — HIGH (ref 11–15.6)
SODIUM SERPL-SCNC: 134 MMOL/L — LOW (ref 135–145)
WBC # BLD: 6.3 K/UL — SIGNIFICANT CHANGE UP (ref 4.8–10.8)
WBC # FLD AUTO: 6.3 K/UL — SIGNIFICANT CHANGE UP (ref 4.8–10.8)

## 2017-07-13 PROCEDURE — 71010: CPT | Mod: 26

## 2017-07-13 PROCEDURE — 99233 SBSQ HOSP IP/OBS HIGH 50: CPT

## 2017-07-13 PROCEDURE — 99232 SBSQ HOSP IP/OBS MODERATE 35: CPT

## 2017-07-13 RX ADMIN — Medication 1 APPLICATION(S): at 16:28

## 2017-07-13 RX ADMIN — Medication 3 MILLILITER(S): at 15:47

## 2017-07-13 RX ADMIN — Medication 1 MILLIGRAM(S): at 16:28

## 2017-07-13 RX ADMIN — Medication 3 MILLILITER(S): at 13:05

## 2017-07-13 RX ADMIN — HEPARIN SODIUM 5000 UNIT(S): 5000 INJECTION INTRAVENOUS; SUBCUTANEOUS at 16:28

## 2017-07-13 RX ADMIN — ATORVASTATIN CALCIUM 40 MILLIGRAM(S): 80 TABLET, FILM COATED ORAL at 22:12

## 2017-07-13 RX ADMIN — ERYTHROPOIETIN 10000 UNIT(S): 10000 INJECTION, SOLUTION INTRAVENOUS; SUBCUTANEOUS at 12:33

## 2017-07-13 RX ADMIN — Medication 667 MILLIGRAM(S): at 18:00

## 2017-07-13 RX ADMIN — GABAPENTIN 300 MILLIGRAM(S): 400 CAPSULE ORAL at 05:49

## 2017-07-13 RX ADMIN — Medication 81 MILLIGRAM(S): at 16:28

## 2017-07-13 RX ADMIN — GABAPENTIN 300 MILLIGRAM(S): 400 CAPSULE ORAL at 22:12

## 2017-07-13 RX ADMIN — Medication 1 TABLET(S): at 16:27

## 2017-07-13 RX ADMIN — MIDODRINE HYDROCHLORIDE 10 MILLIGRAM(S): 2.5 TABLET ORAL at 18:00

## 2017-07-13 RX ADMIN — Medication 667 MILLIGRAM(S): at 08:52

## 2017-07-13 RX ADMIN — GABAPENTIN 300 MILLIGRAM(S): 400 CAPSULE ORAL at 16:27

## 2017-07-13 RX ADMIN — MIDODRINE HYDROCHLORIDE 10 MILLIGRAM(S): 2.5 TABLET ORAL at 05:49

## 2017-07-13 RX ADMIN — PREGABALIN 1000 MICROGRAM(S): 225 CAPSULE ORAL at 16:27

## 2017-07-13 RX ADMIN — Medication 3 MILLILITER(S): at 21:07

## 2017-07-13 RX ADMIN — HEPARIN SODIUM 5000 UNIT(S): 5000 INJECTION INTRAVENOUS; SUBCUTANEOUS at 05:49

## 2017-07-13 RX ADMIN — HEPARIN SODIUM 5000 UNIT(S): 5000 INJECTION INTRAVENOUS; SUBCUTANEOUS at 22:12

## 2017-07-13 RX ADMIN — Medication 3 MILLILITER(S): at 03:28

## 2017-07-13 NOTE — PROGRESS NOTE ADULT - ASSESSMENT
ESRD - HD today , No Heparin     Anemia - Epogen with HD , CBC stable     RO - Continue the current binders

## 2017-07-13 NOTE — PROGRESS NOTE ADULT - SUBJECTIVE AND OBJECTIVE BOX
HISTORY OF PRESENT ILLNESS  76F was admitted on 6/14/17 after a mechanical fall while walking outside. She had head trauma but no LOC. In ER, GCS=15. A head CT showed left parietal and temporo-occipital SAH. A repeat head CT was stable. Additional workup showed nondisplaced fractures of the right 4-6 ribs with emphysematous changes, pleural effusion and atelectasis. Hospital course was complicated by shortness of breath/hypoxia and needed BiPAP/high-flow NC. She was started on Cefepime for PNA until 7/5. Admitted to acute rehab on 6/27.    PMHx - HTN, ESRD on HD, AFIB (no full AC due to fall and bleeding risk), CAD, COPD (on home O2 overnight), Cholelithiasis, Gout, Hyperlipidemia, Spinal stenosis, s/p AVR/MVR/Tricupsid valve annuloplasty, Cataracts repair, H/O spinal fusion, Appendectomy, Tonsillectomy, C-Spine DJD    TODAY'S SUBJECTIVE & REVIEW OF SYMPTOMS  [X] Constitutional WNL      [X] Cardio WNL            [ ] Resp WNL           [X] GI WNL                      [X]  WNL                 [X] Heme WNL              [X] Endo WNL                  [ ] Skin WNL                [ ] MSK WNL            [ ] Neuro WNL                  [ ] Cognitive WNL         [X] Psych WNL    Patient had some desaturations overnight and increased from 3L to 4L. Now back to 3L and is saturating well. Having some pain in the left hand again. Seems that she responds to the steroids, but returns soon after they are discontinued. Consider keeping on standing. Daughter spoke to patient about [place] and has come to terms with it.     VITALS  T(C): 35.9 (07-13-17 @ 05:53)  T(F): 96.7 (07-13-17 @ 05:53), Max: 98.4 (07-12-17 @ 20:56)  HR: 81 (07-13-17 @ 05:53) (81 - 85)  BP: 113/69 (07-13-17 @ 05:53) (103/58 - 113/69)  RR:  (18 - 18)  SpO2:  (92% - 95%)  Wt(kg): --    PHYSICAL EXAM  Constitutional - NAD, Comfortable  HEENT - Facial ecchymosis - Improved, Right supraorbital laceration  Neck - Supple, No limited ROM  Chest - Improved Rhonchi   Cardiovascular - S1S2  Abdomen - BS+, Soft, NTND  Extremities - No C/C, Mild BLE edema, No calf tenderness, BLE PVD skin changes  Neurologic Exam - Right field cut                    Cognitive - AAO to self, place, month/year, some of situation     Communication - Expressive deficits     Motor - No focal deficits     Sensory - Intact to LT     Coordination - FTN impaired  Psychiatric - Mood stable, Affect Flat  Skin - BUE - Multiple ecchymosis, left hand edema/pain to palpation    FUNCTIONAL PROGRESS  Gait - Mod A  ADLs - Mod A  Transfers - Mod A   Functional transfers - Mod A    RECENT LABS             9.9    6.3   )-----------( 180      ( 13 Jul 2017 06:54 )             32.3     07-13    134<L>  |  92<L>  |  97.0<H>  ----------------------------<  112  4.8   |  24.0  |  4.53<H>    Ca    9.1      13 Jul 2017 06:54  Phos  3.6     07-13    Prealb 6/28 - 16    Procalcitonin 7/1 - 2.42, 7/6 - 1.18    Pleural Fluid Cx 7/6 - No growth    RADIOLOGY/OTHER RESULTS  CXR 7/12 - Ordered  CXR 7/9 - Persistent right middle lobe infiltrate.  CXR 7/5 -  No interval change    CURRENT MEDICATIONS  MEDICATIONS  (STANDING):  heparin  Injectable 5000 Unit(s) SubCutaneous every 8 hours  aspirin enteric coated 81 milliGRAM(s) Oral daily  calcium acetate 667 milliGRAM(s) Oral three times a day with meals  cyanocobalamin 1000 MICROGram(s) Oral daily  gabapentin 300 milliGRAM(s) Oral every 8 hours  epoetin jaswinder Injectable 85023 Unit(s) IV Push <User Schedule>  atorvastatin 40 milliGRAM(s) Oral at bedtime  folic acid 1 milliGRAM(s) Oral daily  Nephro-toi 1 Tablet(s) Oral daily  ammonium lactate 12% Lotion 1 Application(s) Topical daily  ALBUTerol/ipratropium for Nebulization 3 milliLiter(s) Nebulizer every 6 hours  midodrine 10 milliGRAM(s) Oral two times a day    MEDICATIONS  (PRN):  acetaminophen   Tablet. 650 milliGRAM(s) Oral every 6 hours PRN Mild Pain (1 - 3)  guaiFENesin    Syrup 200 milliGRAM(s) Oral every 6 hours PRN Cough  lactulose Syrup 10 Gram(s) Oral daily PRN no bm > 2 days  sodium chloride 0.65% Nasal 1 Spray(s) Both Nostrils three times a day PRN nasal congestion/discomfort from O2    ASSESSMENT & PLAN  76F sustaining a TBI after a fall now with functional deficits  Cardiac/Pulm HTN - Midodrine   Pulm - Duonebs Q6, O2 via NC, Guaifenesin PRN, Ocean PRN  CAD - Lipitor, ASA  ESRD on HD - Phoslo, HD T/Th/Sat  Anemia - Epogen, Vit B, Folate  GI/Bowel Management - Toilet PRN, Lactulose PRN   Management - Toilet Q2  Skin - Turn Q2, Lachydrin BLE, Zfloats BLE  Gout - Prednisone 50mg x2 days (7/9-7/11)  Pain - Neurontin,Tylenol PRN, Ice to left hand PRN  DVT PPX - Heparin, TEDs, SCDs  Diet - Renal & Cardiac/Thins, Nephro-toi, Nepro TID    Continue comprehensive acute rehab program consisting of 3hrs/day of OT/PT and SLP HISTORY OF PRESENT ILLNESS  76F was admitted on 6/14/17 after a mechanical fall while walking outside. She had head trauma but no LOC. In ER, GCS=15. A head CT showed left parietal and temporo-occipital SAH. A repeat head CT was stable. Additional workup showed nondisplaced fractures of the right 4-6 ribs with emphysematous changes, pleural effusion and atelectasis. Hospital course was complicated by shortness of breath/hypoxia and needed BiPAP/high-flow NC. She was started on Cefepime for PNA until 7/5. Admitted to acute rehab on 6/27.    PMHx - HTN, ESRD on HD, AFIB (no full AC due to fall and bleeding risk), CAD, COPD (on home O2 overnight), Cholelithiasis, Gout, Hyperlipidemia, Spinal stenosis, s/p AVR/MVR/Tricupsid valve annuloplasty, Cataracts repair, H/O spinal fusion, Appendectomy, Tonsillectomy, C-Spine DJD    TODAY'S SUBJECTIVE & REVIEW OF SYMPTOMS  [X] Constitutional WNL      [X] Cardio WNL            [ ] Resp WNL           [X] GI WNL                      [X]  WNL                 [X] Heme WNL              [X] Endo WNL                  [ ] Skin WNL                [ ] MSK WNL            [ ] Neuro WNL                  [ ] Cognitive WNL         [X] Psych WNL    Patient had some desaturations overnight and increased from 3L to 4L. Now back to 3L and is saturating well. Having some pain in the left hand again. Seems that she responds to the steroids, but returns soon after they are discontinued. Consider keeping on standing. Daughter spoke to patient about FL and has come to terms with it.     VITALS  T(C): 35.9 (07-13-17 @ 05:53)  T(F): 96.7 (07-13-17 @ 05:53), Max: 98.4 (07-12-17 @ 20:56)  HR: 81 (07-13-17 @ 05:53) (81 - 85)  BP: 113/69 (07-13-17 @ 05:53) (103/58 - 113/69)  RR:  (18 - 18)  SpO2:  (92% - 95%)  Wt(kg): --    PHYSICAL EXAM  Constitutional - NAD, Comfortable  HEENT - Facial ecchymosis - Improved, Right supraorbital laceration  Neck - Supple, No limited ROM  Chest - Improved Rhonchi   Cardiovascular - S1S2  Abdomen - BS+, Soft, NTND  Extremities - No C/C, Mild BLE edema, No calf tenderness, BLE PVD skin changes  Neurologic Exam - Right field cut                    Cognitive - AAO to self, place, month/year, some of situation     Communication - Expressive deficits     Motor - No focal deficits     Sensory - Intact to LT     Coordination - FTN impaired  Psychiatric - Mood stable, Affect Flat  Skin - BUE - Multiple ecchymosis, left hand edema/pain to palpation    FUNCTIONAL PROGRESS  Gait - Mod A  ADLs - Mod A  Transfers - Mod A   Functional transfers - Mod A    RECENT LABS             9.9    6.3   )-----------( 180      ( 13 Jul 2017 06:54 )             32.3     07-13    134<L>  |  92<L>  |  97.0<H>  ----------------------------<  112  4.8   |  24.0  |  4.53<H>    Ca    9.1      13 Jul 2017 06:54  Phos  3.6     07-13    Prealb 6/28 - 16    Procalcitonin 7/1 - 2.42, 7/6 - 1.18    Pleural Fluid Cx 7/6 - No growth    RADIOLOGY/OTHER RESULTS  CXR 7/12 - Right middle lobe airspace opacity improved. Increased right pleural effusion.   CXR 7/9 - Persistent right middle lobe infiltrate.    CURRENT MEDICATIONS  MEDICATIONS  (STANDING):  heparin  Injectable 5000 Unit(s) SubCutaneous every 8 hours  aspirin enteric coated 81 milliGRAM(s) Oral daily  calcium acetate 667 milliGRAM(s) Oral three times a day with meals  cyanocobalamin 1000 MICROGram(s) Oral daily  gabapentin 300 milliGRAM(s) Oral every 8 hours  epoetin jaswinder Injectable 45052 Unit(s) IV Push <User Schedule>  atorvastatin 40 milliGRAM(s) Oral at bedtime  folic acid 1 milliGRAM(s) Oral daily  Nephro-toi 1 Tablet(s) Oral daily  ammonium lactate 12% Lotion 1 Application(s) Topical daily  ALBUTerol/ipratropium for Nebulization 3 milliLiter(s) Nebulizer every 6 hours  midodrine 10 milliGRAM(s) Oral two times a day    MEDICATIONS  (PRN):  acetaminophen   Tablet. 650 milliGRAM(s) Oral every 6 hours PRN Mild Pain (1 - 3)  guaiFENesin    Syrup 200 milliGRAM(s) Oral every 6 hours PRN Cough  lactulose Syrup 10 Gram(s) Oral daily PRN no bm > 2 days  sodium chloride 0.65% Nasal 1 Spray(s) Both Nostrils three times a day PRN nasal congestion/discomfort from O2    ASSESSMENT & PLAN  76F sustaining a TBI after a fall now with functional deficits  Cardiac/Pulm HTN - Midodrine   Pulm - Duonebs Q6, O2 via NC, Guaifenesin PRN, Ocean PRN  CAD - Lipitor, ASA  ESRD on HD - Phoslo, HD T/Th/Sat  Anemia - Epogen, Vit B, Folate  GI/Bowel Management - Toilet PRN, Lactulose PRN   Management - Toilet Q2  Skin - Turn Q2, Lachydrin BLE, Zfloats BLE  Gout - Prednisone 50mg x2 days (7/9-7/11)  Pain - Neurontin,Tylenol PRN, Ice to left hand PRN  DVT PPX - Heparin, TEDs, SCDs  Diet - Renal & Cardiac/Thins, Nephro-toi, Nepro TID    Continue comprehensive acute rehab program consisting of 3hrs/day of OT/PT and SLP

## 2017-07-13 NOTE — PROGRESS NOTE ADULT - SUBJECTIVE AND OBJECTIVE BOX
NEPHROLOGY INTERVAL HPI/OVERNIGHT EVENTS:    sen at HD   Comfortable   NO new complaints     MEDICATIONS  (STANDING):  heparin  Injectable 5000 Unit(s) SubCutaneous every 8 hours  aspirin enteric coated 81 milliGRAM(s) Oral daily  calcium acetate 667 milliGRAM(s) Oral three times a day with meals  cyanocobalamin 1000 MICROGram(s) Oral daily  gabapentin 300 milliGRAM(s) Oral every 8 hours  epoetin jaswinder Injectable 06663 Unit(s) IV Push <User Schedule>  atorvastatin 40 milliGRAM(s) Oral at bedtime  folic acid 1 milliGRAM(s) Oral daily  Nephro-toi 1 Tablet(s) Oral daily  ammonium lactate 12% Lotion 1 Application(s) Topical daily  ALBUTerol/ipratropium for Nebulization 3 milliLiter(s) Nebulizer every 6 hours  midodrine 10 milliGRAM(s) Oral two times a day    MEDICATIONS  (PRN):  acetaminophen   Tablet. 650 milliGRAM(s) Oral every 6 hours PRN Mild Pain (1 - 3)  guaiFENesin    Syrup 200 milliGRAM(s) Oral every 6 hours PRN Cough  lactulose Syrup 10 Gram(s) Oral daily PRN no bm > 2 days  sodium chloride 0.65% Nasal 1 Spray(s) Both Nostrils three times a day PRN nasal congestion/discomfort from O2      Allergies    allopurinol (Rash)  codeine (Nausea; Vomiting)  penicillin (Rash)  spironolactone (Unknown)    Intolerances          Vital Signs Last 24 Hrs  T(C): 35.9 (13 Jul 2017 11:03), Max: 36.9 (12 Jul 2017 20:56)  T(F): 96.7 (13 Jul 2017 11:03), Max: 98.4 (12 Jul 2017 20:56)  HR: 84 (13 Jul 2017 11:03) (81 - 85)  BP: 105/62 (13 Jul 2017 11:03) (103/58 - 113/69)  BP(mean): --  RR: 20 (13 Jul 2017 11:03) (18 - 20)  SpO2: 97% (13 Jul 2017 11:03) (92% - 97%)  Daily     Daily   I&O's Detail    I&O's Summary      PHYSICAL EXAM:  NAD  lungs - CTA  CV gr 1 murmer,  No gallop or rub  Abd : soft, NT BS +, No masses  Ext- No edema  Neuro : Grossly intact, moving extremities     LABS:                        9.9    6.3   )-----------( 180      ( 13 Jul 2017 06:54 )             32.3     07-13    134<L>  |  92<L>  |  97.0<H>  ----------------------------<  112  4.8   |  24.0  |  4.53<H>    Ca    9.1      13 Jul 2017 06:54  Phos  3.6     07-13          Phosphorus Level, Serum: 3.6 mg/dL (07-13 @ 06:54)          RADIOLOGY & ADDITIONAL TESTS:

## 2017-07-13 NOTE — PROGRESS NOTE ADULT - SUBJECTIVE AND OBJECTIVE BOX
CC: F/u fall with left SAH, recent treatment for PNA and pleural effusion s/p tap    HPI: 75 yo female with h/o Atrial fibrillation off anticoagulation due to fall risk and bleeding risk , ESRD on HD ,diastolic CHF , pulmonary hypertension , valvular heart disease  admitted s/p fall at home with left SAH , brain injury and developed respiratory distress and was treated for pneumonia  , now in rehab .     INTERVAL HPI/OVERNIGHT EVENTS: Patient seen and examined sitting in the wheelchair.  Patient states she had SOB last night when trying to fall asleep.  Dyspnea was self-limiting and patient was able to fall asleep and wake up without SOB.  Patient denies any headache, dizziness, fever, chills, CP, abdominal pain, nausea, vomiting, diarrhea.  Other ROS reviewed and are negative.    Vital Signs Last 24 Hrs  T(C): 35.9 (13 Jul 2017 05:53), Max: 36.9 (12 Jul 2017 20:56)  T(F): 96.7 (13 Jul 2017 05:53), Max: 98.4 (12 Jul 2017 20:56)  HR: 81 (13 Jul 2017 05:53) (81 - 85)  BP: 113/69 (13 Jul 2017 05:53) (103/58 - 113/69)  BP(mean): --  RR: 18 (13 Jul 2017 05:53) (18 - 18)  SpO2: 95% (13 Jul 2017 05:53) (92% - 95%)  I&O's Detail      PHYSICAL EXAM:  GENERAL: NAD, well-groomed, well-developed  HEAD:  Atraumatic, Normocephalic  EYES: EOMI, PERRLA, conjunctiva and sclera clear  NECK: Supple, No JVD, Normal thyroid  NERVOUS SYSTEM:  Alert & Oriented X3, Good concentration; Motor Strength 5/5 B/L upper and lower extremities  CHEST/LUNG: Clear to auscultation bilaterally; No rales, rhonchi, wheezing, or rubs  HEART: Regular rate and rhythm; No murmurs, rubs, or gallops  ABDOMEN: Soft, Nontender, Nondistended; Bowel sounds present  EXTREMITIES:  2+ Peripheral Pulses, No clubbing, cyanosis, or edema  SKIN: No rashes or lesions                            9.9    6.3   )-----------( 180      ( 13 Jul 2017 06:54 )             32.3     13 Jul 2017 06:54    134    |  92     |  97.0   ----------------------------<  112    4.8     |  24.0   |  4.53     Ca    9.1        13 Jul 2017 06:54  Phos  3.6       13 Jul 2017 06:54        MEDICATIONS  (STANDING):  heparin  Injectable 5000 Unit(s) SubCutaneous every 8 hours  aspirin enteric coated 81 milliGRAM(s) Oral daily  calcium acetate 667 milliGRAM(s) Oral three times a day with meals  cyanocobalamin 1000 MICROGram(s) Oral daily  gabapentin 300 milliGRAM(s) Oral every 8 hours  epoetin jaswinder Injectable 18446 Unit(s) IV Push <User Schedule>  atorvastatin 40 milliGRAM(s) Oral at bedtime  folic acid 1 milliGRAM(s) Oral daily  Nephro-toi 1 Tablet(s) Oral daily  ammonium lactate 12% Lotion 1 Application(s) Topical daily  ALBUTerol/ipratropium for Nebulization 3 milliLiter(s) Nebulizer every 6 hours  midodrine 10 milliGRAM(s) Oral two times a day    MEDICATIONS  (PRN):  acetaminophen   Tablet. 650 milliGRAM(s) Oral every 6 hours PRN Mild Pain (1 - 3)  guaiFENesin    Syrup 200 milliGRAM(s) Oral every 6 hours PRN Cough  lactulose Syrup 10 Gram(s) Oral daily PRN no bm > 2 days  sodium chloride 0.65% Nasal 1 Spray(s) Both Nostrils three times a day PRN nasal congestion/discomfort from O2

## 2017-07-14 PROCEDURE — 99233 SBSQ HOSP IP/OBS HIGH 50: CPT

## 2017-07-14 PROCEDURE — 99232 SBSQ HOSP IP/OBS MODERATE 35: CPT

## 2017-07-14 RX ADMIN — Medication 1 MILLIGRAM(S): at 13:08

## 2017-07-14 RX ADMIN — Medication 667 MILLIGRAM(S): at 18:01

## 2017-07-14 RX ADMIN — GABAPENTIN 300 MILLIGRAM(S): 400 CAPSULE ORAL at 05:34

## 2017-07-14 RX ADMIN — MIDODRINE HYDROCHLORIDE 10 MILLIGRAM(S): 2.5 TABLET ORAL at 18:00

## 2017-07-14 RX ADMIN — HEPARIN SODIUM 5000 UNIT(S): 5000 INJECTION INTRAVENOUS; SUBCUTANEOUS at 13:08

## 2017-07-14 RX ADMIN — Medication 3 MILLILITER(S): at 16:56

## 2017-07-14 RX ADMIN — Medication 667 MILLIGRAM(S): at 08:04

## 2017-07-14 RX ADMIN — HEPARIN SODIUM 5000 UNIT(S): 5000 INJECTION INTRAVENOUS; SUBCUTANEOUS at 21:56

## 2017-07-14 RX ADMIN — PREGABALIN 1000 MICROGRAM(S): 225 CAPSULE ORAL at 13:08

## 2017-07-14 RX ADMIN — ATORVASTATIN CALCIUM 40 MILLIGRAM(S): 80 TABLET, FILM COATED ORAL at 21:55

## 2017-07-14 RX ADMIN — GABAPENTIN 300 MILLIGRAM(S): 400 CAPSULE ORAL at 13:08

## 2017-07-14 RX ADMIN — Medication 1 APPLICATION(S): at 13:08

## 2017-07-14 RX ADMIN — Medication 1 TABLET(S): at 13:08

## 2017-07-14 RX ADMIN — Medication 81 MILLIGRAM(S): at 13:08

## 2017-07-14 RX ADMIN — Medication 3 MILLILITER(S): at 20:30

## 2017-07-14 RX ADMIN — GABAPENTIN 300 MILLIGRAM(S): 400 CAPSULE ORAL at 21:56

## 2017-07-14 RX ADMIN — HEPARIN SODIUM 5000 UNIT(S): 5000 INJECTION INTRAVENOUS; SUBCUTANEOUS at 05:34

## 2017-07-14 RX ADMIN — Medication 3 MILLILITER(S): at 03:37

## 2017-07-14 RX ADMIN — MIDODRINE HYDROCHLORIDE 10 MILLIGRAM(S): 2.5 TABLET ORAL at 05:34

## 2017-07-14 RX ADMIN — Medication 667 MILLIGRAM(S): at 13:08

## 2017-07-14 NOTE — PROGRESS NOTE ADULT - ASSESSMENT
75 yo female with h/o Atrial fibrillation off anticoagulation due to fall risk and bleeding risk , ESRD on HD ,diastolic CHF , pulmonary hypertension , valvular haeart disease  admitted s/p fall at home with left SAH , brain injury and developed respiratory distress being treated for pneumonia  , now in rehab . Chest congestion, cough improving,  BP remains low, medication doses of  sildenafil adjusted by pulmonologist, hypoxic , remains hypotensive, stable now, s/p right pleural effusion removal by IR 7/7 , left hand finger arthralgia improving.

## 2017-07-14 NOTE — PROGRESS NOTE ADULT - ASSESSMENT
Persistent pleural effusion despite thoracentesis on basis VHD and renal failure  pt in no distress. No evidence malignancy or infection

## 2017-07-14 NOTE — PROGRESS NOTE ADULT - PROBLEM SELECTOR PROBLEM 8
Anemia
Anemia
Hypotension

## 2017-07-14 NOTE — PROGRESS NOTE ADULT - SUBJECTIVE AND OBJECTIVE BOX
PULMONARY PROGRESS NOTE      DUSTIN HERNANDEZKADE-[id number]    Patient is a 76y old  Female who presents with a chief complaint of     INTERVAL HPI/OVERNIGHT EVENTS:  Complains of fatigue. No cough wheeze or sputum. Day 8 post thoracentesis of exudative neg fluid    MEDICATIONS  (STANDING):  heparin  Injectable 5000 Unit(s) SubCutaneous every 8 hours  aspirin enteric coated 81 milliGRAM(s) Oral daily  calcium acetate 667 milliGRAM(s) Oral three times a day with meals  cyanocobalamin 1000 MICROGram(s) Oral daily  gabapentin 300 milliGRAM(s) Oral every 8 hours  epoetin jaswinder Injectable 85237 Unit(s) IV Push <User Schedule>  atorvastatin 40 milliGRAM(s) Oral at bedtime  folic acid 1 milliGRAM(s) Oral daily  Nephro-toi 1 Tablet(s) Oral daily  ammonium lactate 12% Lotion 1 Application(s) Topical daily  ALBUTerol/ipratropium for Nebulization 3 milliLiter(s) Nebulizer every 6 hours  midodrine 10 milliGRAM(s) Oral two times a day      MEDICATIONS  (PRN):  acetaminophen   Tablet. 650 milliGRAM(s) Oral every 6 hours PRN Mild Pain (1 - 3)  guaiFENesin    Syrup 200 milliGRAM(s) Oral every 6 hours PRN Cough  lactulose Syrup 10 Gram(s) Oral daily PRN no bm > 2 days  sodium chloride 0.65% Nasal 1 Spray(s) Both Nostrils three times a day PRN nasal congestion/discomfort from O2      Allergies    allopurinol (Rash)  codeine (Nausea; Vomiting)  penicillin (Rash)  spironolactone (Unknown)    Intolerances        PAST MEDICAL & SURGICAL HISTORY:  ESRD (end stage renal disease) on dialysis: MWF via [facility] Permacath  planned AVF creation  Sinusitis, unspecified chronicity, unspecified location  Gout  Gall stones  Pneumonia  Anemia  Pulmonary hypertension  COPD (chronic obstructive pulmonary disease)  CAD (coronary artery disease)  Atrial fibrillation  Hyperlipidemia  Hypertension  Spinal fusion failure, initial encounter  Tubal ligation status  Sinusitis  Spinal stenosis  S/P tonsillectomy  S/P appendectomy  H/O aortic valve replacement  H/O spinal fusion  H/O cataract  Cystocele  H/O tricuspid valve annuloplasty  H/O mitral valve repair      SOCIAL HISTORY  Smoking History:       REVIEW OF SYSTEMS:    CONSTITUTIONAL:  No distress    HEENT:  Eyes:  No diplopia or blurred vision. ENT:  No earache, sore throat or runny nose.    CARDIOVASCULAR:  No pressure, squeezing, tightness, heaviness or aching about the chest; no palpitations.    RESPIRATORY:  No cough, shortness of breath, PND or orthopnea. Mild SOBOE    GASTROINTESTINAL:  No nausea, vomiting or diarrhea.    GENITOURINARY:  No dysuria, frequency or urgency.    NEUROLOGIC:  No paresthesias, fasciculations, seizures or weakness.    Extremities: No cyanosis, clubbing or edema    PSYCHIATRIC:  No disorder of thought or mood.    Vital Signs Last 24 Hrs  T(C): 36.6 (14 Jul 2017 11:30), Max: 36.6 (13 Jul 2017 20:40)  T(F): 97.8 (14 Jul 2017 11:30), Max: 97.9 (13 Jul 2017 20:40)  HR: 81 (14 Jul 2017 11:30) (81 - 92)  BP: 113/58 (14 Jul 2017 11:30) (95/53 - 113/58)  BP(mean): --  RR: 18 (14 Jul 2017 11:30) (18 - 24)  SpO2: 94% (14 Jul 2017 11:30) (90% - 100%)    PHYSICAL EXAMINATION:    GENERAL: The patient is awake and alert in no apparent distress.     HEENT: Head is normocephalic and atraumatic. Extraocular muscles are intact. Mucous membranes are moist.    NECK: Supple.    LUNGS: decreased bs at rt base otherwise  Clear to auscultation without wheezing, rales or rhonchi; respirations unlabored    HEART: Regular rate and rhythm without murmur.    ABDOMEN: Soft, nontender, and nondistended.      EXTREMITIES: Without any cyanosis, clubbing, rash, lesions or edema.    NEUROLOGIC: Grossly intact.    LABS:                        9.9    6.3   )-----------( 180      ( 13 Jul 2017 06:54 )             32.3     07-13    134<L>  |  92<L>  |  97.0<H>  ----------------------------<  112  4.8   |  24.0  |  4.53<H>    Ca    9.1      13 Jul 2017 06:54  Phos  3.6     07-13                          MICROBIOLOGY:    RADIOLOGY & ADDITIONAL STUDIES:

## 2017-07-14 NOTE — PROGRESS NOTE ADULT - SUBJECTIVE AND OBJECTIVE BOX
CC: F/u fall with left SAH, recent treatment for PNA and pleural effusion s/p tap    HPI: 77 yo female with h/o Atrial fibrillation off anticoagulation due to fall risk and bleeding risk , ESRD on HD ,diastolic CHF , pulmonary hypertension , valvular heart disease  admitted s/p fall at home with left SAH , brain injury and developed respiratory distress and was treated for pneumonia  , now in rehab .     INTERVAL HPI/OVERNIGHT EVENTS: Patient seen and examined sitting in wheelchair. Patient reports SOB is improved.  (+) Productive cough.  Patient denies any headache, dizziness, CP, abdominal pain, nausea, vomiting, dysuria.  Other ROS reviewed and are negative.    Vital Signs Last 24 Hrs  T(C): 36.3 (14 Jul 2017 05:26), Max: 36.6 (13 Jul 2017 20:40)  T(F): 97.4 (14 Jul 2017 05:26), Max: 97.9 (13 Jul 2017 20:40)  HR: 92 (14 Jul 2017 05:26) (76 - 92)  BP: 95/53 (14 Jul 2017 05:26) (95/53 - 117/50)  BP(mean): --  RR: 18 (14 Jul 2017 05:26) (18 - 24)  SpO2: 97% (14 Jul 2017 05:26) (90% - 100%)  I&O's Detail    13 Jul 2017 07:01  -  14 Jul 2017 07:00  --------------------------------------------------------  IN:  Total IN: 0 mL    OUT:    Other: 2000 mL  Total OUT: 2000 mL    Total NET: -2000 mL      PHYSICAL EXAM:  GENERAL: NAD, well-groomed, well-developed  HEAD:  Atraumatic, Normocephalic  EYES: EOMI, PERRLA, conjunctiva and sclera clear  NECK: Supple, No JVD, Normal thyroid  NERVOUS SYSTEM:  Alert & Oriented X3, Good concentration; Motor Strength 5/5 B/L upper and lower extremities  CHEST/LUNG: Clear to auscultation bilaterally; No rales, rhonchi, wheezing, or rubs  HEART: Regular rate and rhythm; No murmurs, rubs, or gallops  ABDOMEN: Soft, Nontender, Nondistended; Bowel sounds present  EXTREMITIES:  2+ Peripheral Pulses, No clubbing, cyanosis, or edema  SKIN: No rashes or lesions                                9.9    6.3   )-----------( 180      ( 13 Jul 2017 06:54 )             32.3     13 Jul 2017 06:54    134    |  92     |  97.0   ----------------------------<  112    4.8     |  24.0   |  4.53     Ca    9.1        13 Jul 2017 06:54  Phos  3.6       13 Jul 2017 06:54        MEDICATIONS  (STANDING):  heparin  Injectable 5000 Unit(s) SubCutaneous every 8 hours  aspirin enteric coated 81 milliGRAM(s) Oral daily  calcium acetate 667 milliGRAM(s) Oral three times a day with meals  cyanocobalamin 1000 MICROGram(s) Oral daily  gabapentin 300 milliGRAM(s) Oral every 8 hours  epoetin jaswinder Injectable 07222 Unit(s) IV Push <User Schedule>  atorvastatin 40 milliGRAM(s) Oral at bedtime  folic acid 1 milliGRAM(s) Oral daily  Nephro-toi 1 Tablet(s) Oral daily  ammonium lactate 12% Lotion 1 Application(s) Topical daily  ALBUTerol/ipratropium for Nebulization 3 milliLiter(s) Nebulizer every 6 hours  midodrine 10 milliGRAM(s) Oral two times a day    MEDICATIONS  (PRN):  acetaminophen   Tablet. 650 milliGRAM(s) Oral every 6 hours PRN Mild Pain (1 - 3)  guaiFENesin    Syrup 200 milliGRAM(s) Oral every 6 hours PRN Cough  lactulose Syrup 10 Gram(s) Oral daily PRN no bm > 2 days  sodium chloride 0.65% Nasal 1 Spray(s) Both Nostrils three times a day PRN nasal congestion/discomfort from O2      RADIOLOGY & ADDITIONAL TESTS:  < from: Xray Chest 1 View AP/PA. (07.13.17 @ 07:12) >   EXAM:  CHEST SINGLE VIEW FRONTAL                          PROCEDURE DATE:  07/13/2017          INTERPRETATION:  Chest, single portable view    HISTORY: Status post drainage pleural effusion, follow-up right middle   lobe opacity    Comparison: 7/9    IMPRESSION:    Support Devices:  Unchanged  Heart: Cardiomegaly, replaced aortic valve  Mediastinum:  Unremarkable  Lungs/Airways: Right middle lobe airspace opacity improved. Increased   right pleural effusion.  Bones/Soft tissues: Unremarkable        HUGO DEVINE M.D., ATTENDING RADIOLOGIST  This document has been electronically signed. Jul 13 2017  9:03AM    < end of copied text > CC: F/u fall with left SAH, recent treatment for PNA and pleural effusion s/p tap    INTERVAL HPI/OVERNIGHT EVENTS: Patient seen and examined sitting in wheelchair. Patient reports SOB is improved.  (+) Productive cough.  Patient denies any headache, dizziness, CP, abdominal pain, nausea, vomiting, dysuria.  Other ROS reviewed and are negative.    Vital Signs Last 24 Hrs  T(C): 36.3 (14 Jul 2017 05:26), Max: 36.6 (13 Jul 2017 20:40)  T(F): 97.4 (14 Jul 2017 05:26), Max: 97.9 (13 Jul 2017 20:40)  HR: 92 (14 Jul 2017 05:26) (76 - 92)  BP: 95/53 (14 Jul 2017 05:26) (95/53 - 117/50)  BP(mean): --  RR: 18 (14 Jul 2017 05:26) (18 - 24)  SpO2: 97% (14 Jul 2017 05:26) (90% - 100%)  I&O's Detail    13 Jul 2017 07:01  -  14 Jul 2017 07:00  --------------------------------------------------------  IN:  Total IN: 0 mL    OUT:    Other: 2000 mL  Total OUT: 2000 mL    Total NET: -2000 mL      PHYSICAL EXAM:  GENERAL: NAD, well-groomed, well-developed  HEAD:  Atraumatic, Normocephalic  EYES: EOMI, PERRLA, conjunctiva and sclera clear  NECK: Supple, No JVD, Normal thyroid  NERVOUS SYSTEM:  Alert & Oriented X3, Good concentration; Motor Strength 5/5 B/L upper and lower extremities  CHEST/LUNG: Clear to auscultation bilaterally; No rales, rhonchi, wheezing, or rubs  HEART: Regular rate and rhythm; No murmurs, rubs, or gallops  ABDOMEN: Soft, Nontender, Nondistended; Bowel sounds present  EXTREMITIES:  2+ Peripheral Pulses, No clubbing, cyanosis, or edema  SKIN: No rashes or lesions                                9.9    6.3   )-----------( 180      ( 13 Jul 2017 06:54 )             32.3     13 Jul 2017 06:54    134    |  92     |  97.0   ----------------------------<  112    4.8     |  24.0   |  4.53     Ca    9.1        13 Jul 2017 06:54  Phos  3.6       13 Jul 2017 06:54        MEDICATIONS  (STANDING):  heparin  Injectable 5000 Unit(s) SubCutaneous every 8 hours  aspirin enteric coated 81 milliGRAM(s) Oral daily  calcium acetate 667 milliGRAM(s) Oral three times a day with meals  cyanocobalamin 1000 MICROGram(s) Oral daily  gabapentin 300 milliGRAM(s) Oral every 8 hours  epoetin jaswinder Injectable 06112 Unit(s) IV Push <User Schedule>  atorvastatin 40 milliGRAM(s) Oral at bedtime  folic acid 1 milliGRAM(s) Oral daily  Nephro-toi 1 Tablet(s) Oral daily  ammonium lactate 12% Lotion 1 Application(s) Topical daily  ALBUTerol/ipratropium for Nebulization 3 milliLiter(s) Nebulizer every 6 hours  midodrine 10 milliGRAM(s) Oral two times a day    MEDICATIONS  (PRN):  acetaminophen   Tablet. 650 milliGRAM(s) Oral every 6 hours PRN Mild Pain (1 - 3)  guaiFENesin    Syrup 200 milliGRAM(s) Oral every 6 hours PRN Cough  lactulose Syrup 10 Gram(s) Oral daily PRN no bm > 2 days  sodium chloride 0.65% Nasal 1 Spray(s) Both Nostrils three times a day PRN nasal congestion/discomfort from O2      RADIOLOGY & ADDITIONAL TESTS:  < from: Xray Chest 1 View AP/PA. (07.13.17 @ 07:12) >   EXAM:  CHEST SINGLE VIEW FRONTAL                          PROCEDURE DATE:  07/13/2017          INTERPRETATION:  Chest, single portable view    HISTORY: Status post drainage pleural effusion, follow-up right middle   lobe opacity    Comparison: 7/9    IMPRESSION:    Support Devices:  Unchanged  Heart: Cardiomegaly, replaced aortic valve  Mediastinum:  Unremarkable  Lungs/Airways: Right middle lobe airspace opacity improved. Increased   right pleural effusion.  Bones/Soft tissues: Unremarkable        HUGO DEVINE M.D., ATTENDING RADIOLOGIST  This document has been electronically signed. Jul 13 2017  9:03AM    < end of copied text >

## 2017-07-14 NOTE — PROGRESS NOTE ADULT - SUBJECTIVE AND OBJECTIVE BOX
HISTORY OF PRESENT ILLNESS  76F was admitted on 6/14/17 after a mechanical fall while walking outside. She had head trauma but no LOC. In ER, GCS=15. A head CT showed left parietal and temporo-occipital SAH. A repeat head CT was stable. Additional workup showed nondisplaced fractures of the right 4-6 ribs with emphysematous changes, pleural effusion and atelectasis. Hospital course was complicated by shortness of breath/hypoxia and needed BiPAP/high-flow NC. She was started on Cefepime for PNA until 7/5. Admitted to acute rehab on 6/27.    PMHx - HTN, ESRD on HD, AFIB (no full AC due to fall and bleeding risk), CAD, COPD (on home O2 overnight), Cholelithiasis, Gout, Hyperlipidemia, Spinal stenosis, s/p AVR/MVR/Tricupsid valve annuloplasty, Cataracts repair, H/O spinal fusion, Appendectomy, Tonsillectomy, C-Spine DJD    TODAY'S SUBJECTIVE & REVIEW OF SYMPTOMS  [X] Constitutional WNL      [X] Cardio WNL            [ ] Resp WNL           [X] GI WNL                      [X]  WNL                 [X] Heme WNL              [X] Endo WNL                  [ ] Skin WNL                [ ] MSK WNL            [ ] Neuro WNL                  [ ] Cognitive WNL         [ ] Psych WNL    Had a noneventful night. Feels she is breathing better. Continues to have cough and able to more increasingly spit out. Encouraged to do so. On 3-4L of O2.   Slept well overnight. Pain overall improved.     VITALS  T(C): 36.3 (07-14-17 @ 05:26)  T(F): 97.4 (07-14-17 @ 05:26), Max: 97.9 (07-13-17 @ 20:40)  HR: 92 (07-14-17 @ 05:26) (76 - 92)  BP: 95/53 (07-14-17 @ 05:26) (95/53 - 117/50)  RR:  (18 - 24)  SpO2:  (90% - 100%)  Wt(kg): --    PHYSICAL EXAM  Constitutional - NAD, Comfortable  HEENT - Facial ecchymosis - Improved, Right supraorbital laceration  Neck - Supple, No limited ROM  Chest - Rhonchi   Cardiovascular - S1S2  Abdomen - BS+, Soft, NTND  Extremities - No C/C, Mild BLE edema, No calf tenderness, BLE PVD skin changes  Neurologic Exam - Right field cut                    Cognitive - AAO to self, place, month/year, some of situation, Mild deficits     Communication - Expressive deficits     Motor - No focal deficits     Sensory - Intact to LT     Coordination - FTN impaired  Psychiatric - Mood stable, Affect Flat  Skin - BUE - Multiple ecchymosis, left hand edema/pain to palpation    FUNCTIONAL PROGRESS  Gait - Mod A  ADLs - Mod A  Transfers - Mod A   Functional transfers - Mod A    RECENT LABS             9.9    6.3   )-----------( 180      ( 13 Jul 2017 06:54 )             32.3     07-13    134<L>  |  92<L>  |  97.0<H>  ----------------------------<  112  4.8   |  24.0  |  4.53<H>    Ca    9.1      13 Jul 2017 06:54  Phos  3.6     07-13    Prealb 6/28 - 16    Procalcitonin 7/1 - 2.42, 7/6 - 1.18    Pleural Fluid Cx 7/6 - No growth    RADIOLOGY/OTHER RESULTS  CXR 7/12 - Right middle lobe airspace opacity improved. Increased right pleural effusion.   CXR 7/9 - Persistent right middle lobe infiltrate.    CURRENT MEDICATIONS  MEDICATIONS  (STANDING):  heparin  Injectable 5000 Unit(s) SubCutaneous every 8 hours  aspirin enteric coated 81 milliGRAM(s) Oral daily  calcium acetate 667 milliGRAM(s) Oral three times a day with meals  cyanocobalamin 1000 MICROGram(s) Oral daily  gabapentin 300 milliGRAM(s) Oral every 8 hours  epoetin jaswinder Injectable 45862 Unit(s) IV Push <User Schedule>  atorvastatin 40 milliGRAM(s) Oral at bedtime  folic acid 1 milliGRAM(s) Oral daily  Nephro-toi 1 Tablet(s) Oral daily  ammonium lactate 12% Lotion 1 Application(s) Topical daily  ALBUTerol/ipratropium for Nebulization 3 milliLiter(s) Nebulizer every 6 hours  midodrine 10 milliGRAM(s) Oral two times a day    MEDICATIONS  (PRN):  acetaminophen   Tablet. 650 milliGRAM(s) Oral every 6 hours PRN Mild Pain (1 - 3)  guaiFENesin    Syrup 200 milliGRAM(s) Oral every 6 hours PRN Cough  lactulose Syrup 10 Gram(s) Oral daily PRN no bm > 2 days  sodium chloride 0.65% Nasal 1 Spray(s) Both Nostrils three times a day PRN nasal congestion/discomfort from O2    ASSESSMENT & PLAN  76F sustaining a TBI after a fall now with functional deficits  Cardiac/Pulm HTN - Midodrine   Pulm - Duonebs Q6, O2 via NC, Guaifenesin PRN, [place] PRN  CAD - Lipitor, ASA  ESRD on HD - Phoslo, HD T/Th/Sat  Anemia - Epogen, Vit B, Folate  GI/Bowel Management - Toilet PRN, Lactulose PRN   Management - Toilet Q2  Skin - Turn Q2, Lachydrin BLE, Zfloats BLE  Gout - Prednisone 50mg x2 days (7/9-7/11)  Pain - Neurontin,Tylenol PRN, Ice to left hand PRN  DVT PPX - Heparin, TEDs, SCDs  Diet - Renal & Cardiac/Thins, Nephro-toi, Nepro TID    Continue comprehensive acute rehab program consisting of 3hrs/day of OT/PT and SLP

## 2017-07-14 NOTE — PROGRESS NOTE ADULT - PROBLEM SELECTOR PLAN 2
s/p drain by IR , improving resp symptoms and hypoxia , multifactorial reasons for effusion  Repeat CXR shows worsening right pleural effusion.  Pulmonary following.  Will discuss regarding possible tap.

## 2017-07-14 NOTE — PROGRESS NOTE ADULT - SUBJECTIVE AND OBJECTIVE BOX
NEPHROLOGY INTERVAL HPI/OVERNIGHT EVENTS:    No new events.    MEDICATIONS  (STANDING):  heparin  Injectable 5000 Unit(s) SubCutaneous every 8 hours  aspirin enteric coated 81 milliGRAM(s) Oral daily  calcium acetate 667 milliGRAM(s) Oral three times a day with meals  cyanocobalamin 1000 MICROGram(s) Oral daily  gabapentin 300 milliGRAM(s) Oral every 8 hours  epoetin jaswinder Injectable 54534 Unit(s) IV Push <User Schedule>  atorvastatin 40 milliGRAM(s) Oral at bedtime  folic acid 1 milliGRAM(s) Oral daily  Nephro-toi 1 Tablet(s) Oral daily  ammonium lactate 12% Lotion 1 Application(s) Topical daily  ALBUTerol/ipratropium for Nebulization 3 milliLiter(s) Nebulizer every 6 hours  midodrine 10 milliGRAM(s) Oral two times a day    MEDICATIONS  (PRN):  acetaminophen   Tablet. 650 milliGRAM(s) Oral every 6 hours PRN Mild Pain (1 - 3)  guaiFENesin    Syrup 200 milliGRAM(s) Oral every 6 hours PRN Cough  lactulose Syrup 10 Gram(s) Oral daily PRN no bm > 2 days  sodium chloride 0.65% Nasal 1 Spray(s) Both Nostrils three times a day PRN nasal congestion/discomfort from O2      Allergies    allopurinol (Rash)  codeine (Nausea; Vomiting)  penicillin (Rash)  spironolactone (Unknown)    Intolerances          Vital Signs Last 24 Hrs  T(C): 35.9 (13 Jul 2017 11:03), Max: 36.9 (12 Jul 2017 20:56)  T(F): 96.7 (13 Jul 2017 11:03), Max: 98.4 (12 Jul 2017 20:56)  HR: 84 (13 Jul 2017 11:03) (81 - 85)  BP: 105/62 (13 Jul 2017 11:03) (103/58 - 113/69)  BP(mean): --  RR: 20 (13 Jul 2017 11:03) (18 - 20)  SpO2: 97% (13 Jul 2017 11:03) (92% - 97%)  Daily     Daily   I&O's Detail    I&O's Summary      PHYSICAL EXAM:  NAD  lungs - bilateral base rhonchi with some clearing with cough  CV gr 1 murmur,  No gallop or rub  Abd : soft, NT BS +, No masses  Ext- No edema  Neuro : More alert with good mentation     LABS:                        9.9    6.3   )-----------( 180      ( 13 Jul 2017 06:54 )             32.3     07-13    134<L>  |  92<L>  |  97.0<H>  ----------------------------<  112  4.8   |  24.0  |  4.53<H>    Ca    9.1      13 Jul 2017 06:54  Phos  3.6     07-13          Phosphorus Level, Serum: 3.6 mg/dL (07-13 @ 06:54)          RADIOLOGY & ADDITIONAL TESTS:

## 2017-07-14 NOTE — PROGRESS NOTE ADULT - PROBLEM SELECTOR PLAN 8
Epogen weekly , hb stable no indication for transfusion
Epogen weekly , hb stable no indication for transfusion
Lasix and sildenafil stopped BP is still low, monitor closely on midodrine
Lasix and sildenafil stopped BP is still low, monitor closely on midodrine
Lasix dced, sildenafil dose was decreased, BP is still low, will d/c Sildenafil, will continue to monitor BP.
stable   Lasix and sildenafil stopped BP is still low, monitor closely continue midodrine
lasix dc  sildenafil decreased and parameters added

## 2017-07-15 LAB
ALBUMIN SERPL ELPH-MCNC: 3.7 G/DL — SIGNIFICANT CHANGE UP (ref 3.3–5.2)
ALP SERPL-CCNC: 554 U/L — HIGH (ref 40–120)
ALT FLD-CCNC: 70 U/L — HIGH
ANION GAP SERPL CALC-SCNC: 15 MMOL/L — SIGNIFICANT CHANGE UP (ref 5–17)
AST SERPL-CCNC: 39 U/L — HIGH
BILIRUB SERPL-MCNC: 1 MG/DL — SIGNIFICANT CHANGE UP (ref 0.4–2)
BUN SERPL-MCNC: 71 MG/DL — HIGH (ref 8–20)
CALCIUM SERPL-MCNC: 8.5 MG/DL — LOW (ref 8.6–10.2)
CHLORIDE SERPL-SCNC: 94 MMOL/L — LOW (ref 98–107)
CO2 SERPL-SCNC: 27 MMOL/L — SIGNIFICANT CHANGE UP (ref 22–29)
CREAT SERPL-MCNC: 3.44 MG/DL — HIGH (ref 0.5–1.3)
GLUCOSE SERPL-MCNC: 117 MG/DL — HIGH (ref 70–115)
HCT VFR BLD CALC: 30.2 % — LOW (ref 37–47)
HGB BLD-MCNC: 9.4 G/DL — LOW (ref 12–16)
MCHC RBC-ENTMCNC: 31.1 G/DL — LOW (ref 32–36)
MCHC RBC-ENTMCNC: 34.8 PG — HIGH (ref 27–31)
MCV RBC AUTO: 111.9 FL — HIGH (ref 81–99)
PLATELET # BLD AUTO: 139 K/UL — LOW (ref 150–400)
POTASSIUM SERPL-MCNC: 4.7 MMOL/L — SIGNIFICANT CHANGE UP (ref 3.5–5.3)
POTASSIUM SERPL-SCNC: 4.7 MMOL/L — SIGNIFICANT CHANGE UP (ref 3.5–5.3)
PROT SERPL-MCNC: 6.4 G/DL — LOW (ref 6.6–8.7)
RBC # BLD: 2.7 M/UL — LOW (ref 4.4–5.2)
RBC # FLD: 18.5 % — HIGH (ref 11–15.6)
SODIUM SERPL-SCNC: 136 MMOL/L — SIGNIFICANT CHANGE UP (ref 135–145)
WBC # BLD: 4 K/UL — LOW (ref 4.8–10.8)
WBC # FLD AUTO: 4 K/UL — LOW (ref 4.8–10.8)

## 2017-07-15 PROCEDURE — 99233 SBSQ HOSP IP/OBS HIGH 50: CPT

## 2017-07-15 PROCEDURE — 99232 SBSQ HOSP IP/OBS MODERATE 35: CPT

## 2017-07-15 RX ADMIN — Medication 81 MILLIGRAM(S): at 13:45

## 2017-07-15 RX ADMIN — Medication 1 MILLIGRAM(S): at 13:46

## 2017-07-15 RX ADMIN — Medication 1 TABLET(S): at 13:47

## 2017-07-15 RX ADMIN — Medication 3 MILLILITER(S): at 21:18

## 2017-07-15 RX ADMIN — GABAPENTIN 300 MILLIGRAM(S): 400 CAPSULE ORAL at 05:22

## 2017-07-15 RX ADMIN — MIDODRINE HYDROCHLORIDE 10 MILLIGRAM(S): 2.5 TABLET ORAL at 17:39

## 2017-07-15 RX ADMIN — ERYTHROPOIETIN 10000 UNIT(S): 10000 INJECTION, SOLUTION INTRAVENOUS; SUBCUTANEOUS at 16:51

## 2017-07-15 RX ADMIN — Medication 667 MILLIGRAM(S): at 13:48

## 2017-07-15 RX ADMIN — GABAPENTIN 300 MILLIGRAM(S): 400 CAPSULE ORAL at 21:52

## 2017-07-15 RX ADMIN — HEPARIN SODIUM 5000 UNIT(S): 5000 INJECTION INTRAVENOUS; SUBCUTANEOUS at 13:46

## 2017-07-15 RX ADMIN — HEPARIN SODIUM 5000 UNIT(S): 5000 INJECTION INTRAVENOUS; SUBCUTANEOUS at 05:22

## 2017-07-15 RX ADMIN — ATORVASTATIN CALCIUM 40 MILLIGRAM(S): 80 TABLET, FILM COATED ORAL at 21:52

## 2017-07-15 RX ADMIN — Medication 667 MILLIGRAM(S): at 07:44

## 2017-07-15 RX ADMIN — Medication 667 MILLIGRAM(S): at 20:37

## 2017-07-15 RX ADMIN — Medication 3 MILLILITER(S): at 03:34

## 2017-07-15 RX ADMIN — PREGABALIN 1000 MICROGRAM(S): 225 CAPSULE ORAL at 13:47

## 2017-07-15 RX ADMIN — MIDODRINE HYDROCHLORIDE 10 MILLIGRAM(S): 2.5 TABLET ORAL at 05:22

## 2017-07-15 RX ADMIN — HEPARIN SODIUM 5000 UNIT(S): 5000 INJECTION INTRAVENOUS; SUBCUTANEOUS at 21:52

## 2017-07-15 RX ADMIN — Medication 200 MILLIGRAM(S): at 16:52

## 2017-07-15 RX ADMIN — GABAPENTIN 300 MILLIGRAM(S): 400 CAPSULE ORAL at 13:45

## 2017-07-15 RX ADMIN — Medication 1 APPLICATION(S): at 13:46

## 2017-07-15 RX ADMIN — Medication 200 MILLIGRAM(S): at 05:23

## 2017-07-15 NOTE — PROGRESS NOTE ADULT - ASSESSMENT
75 yo female with h/o Atrial fibrillation off anticoagulation due to fall risk and bleeding risk , ESRD on HD ,diastolic CHF , pulmonary hypertension , valvular heart disease  admitted s/p fall at home with left SAH , brain injury and developed respiratory distress being treated for pneumonia  , now in rehab . Chest congestion, cough improving,  BP remains low, medication doses of  sildenafil adjusted by pulmonologist, hypoxic , remains hypotensive, stable now, s/p right pleural effusion removal by IR 7/7 , left hand finger arthralgia improving.    Problem/Plan - 1:  ·  Problem: Finger joint swelling, left.  Plan: xray of hand reviewed.  Resolved with short course of steroid and Toradol   warm compress.     Problem/Plan - 2:  ·  Problem: Pleural effusion.  Plan: s/p drain by IR , improving resp symptoms and hypoxia , multifactorial reasons for effusion  Repeat CXR shows worsening right pleural effusion.  Pulmonary following.  Will discuss regarding possible tap.     Problem/Plan - 3:  ·  Problem: Pulmonary hypertension.  Plan: off sildenafil due to hypotension , pulmonary following.     Problem/Plan - 4:  ·  Problem: Diastolic heart failure due to valvular disease.  Plan: fluid removal with HD ,  palliative care team on board.     Problem/Plan - 5:  ·  Problem: ESRD (end stage renal disease) on dialysis.  Plan: HD as per renal T-Th-Sat.     Problem/Plan - 6:  Problem: Atrial fibrillation, unspecified type. Plan: off ac due to fall/SAH  rate controlled off AV asher blocking agents.    Problem/Plan - 7:  ·  Problem: Anemia.  Plan: Epogen weekly , hb stable no indication.     Problem/Plan - 8:  ·  Problem: Hypotension.  Plan: stable   Lasix and sildenafil stopped BP on lower side, monitor closely continue midodrine. 75 yo female with h/o Atrial fibrillation off anticoagulation due to fall risk and bleeding risk , ESRD on HD ,diastolic CHF , pulmonary hypertension , valvular heart disease  admitted s/p fall at home with left SAH , brain injury and developed respiratory distress being treated for pneumonia  , now in rehab . Chest congestion, cough improving,  BP remains low, medication doses of  sildenafil adjusted by pulmonologist, hypoxic , remains hypotensive, stable now, s/p right pleural effusion removal by IR 7/7 , left hand finger arthralgia improving.    Problem/Plan - 1:  ·  Problem: Finger joint swelling, left.  Plan: xray of hand reviewed.  Resolved with short course of steroid and Toradol   warm compress - likely gout attack .    Problem/Plan - 2:  ·  Problem: Pleural effusion.  Plan: s/p drain by IR , on O2 via NC  , multifactorial reasons for effusion  Repeat CXR shows worsening right pleural effusion.  Pulmonary following. For repeated CXR on 7/17 .    Problem/Plan - 3:  ·  Problem: Pulmonary hypertension.  Plan: off sildenafil due to hypotension , pulmonary following.     Problem/Plan - 4:  ·  Problem: Diastolic heart failure due to valvular disease.  Plan: fluid removal with HD ,  palliative care team on board.     Problem/Plan - 5:  ·  Problem: ESRD (end stage renal disease) on dialysis.  Plan: HD as per renal T-Th-Sat.     Problem/Plan - 6:  Problem: Atrial fibrillation, unspecified type. Plan: off ac due to fall/SAH  rate controlled off AV asher blocking agents.    Problem/Plan - 7:  ·  Problem: Anemia.  Plan: Epogen weekly , hb stable no indication.     Problem/Plan - 8:  ·  Problem: Hypotension.  Plan: stable   Lasix and sildenafil stopped as BP on lower side, monitor closely continue midodrine.

## 2017-07-15 NOTE — PROGRESS NOTE ADULT - SUBJECTIVE AND OBJECTIVE BOX
Patient seen and examined .     CC: F/u fall with left SAH, recent treatment for PNA and pleural effusion s/p tap    INTERVAL HPI/OVERNIGHT EVENTS: Patient seen and examined sitting in wheelchair. Patient reports SOB is improved.  (+) Productive cough.  Patient denies any headache, dizziness, CP, abdominal pain, nausea, vomiting, dysuria.  Other ROS reviewed and are negative.    PAST MEDICAL & SURGICAL HISTORY:  ESRD (end stage renal disease) on dialysis: MWF via [facility] Permacath  planned AVF creation  Sinusitis, unspecified chronicity, unspecified location  Gout  Gall stones  Pneumonia  Anemia  Pulmonary hypertension  COPD (chronic obstructive pulmonary disease)  CAD (coronary artery disease)  Atrial fibrillation  Hyperlipidemia  Hypertension  Spinal fusion failure, initial encounter  Tubal ligation status  Sinusitis  Spinal stenosis  S/P tonsillectomy  S/P appendectomy  H/O aortic valve replacement  H/O spinal fusion  H/O cataract  Cystocele  H/O tricuspid valve annuloplasty  H/O mitral valve repair      MEDICATIONS  (STANDING):  heparin  Injectable 5000 Unit(s) SubCutaneous every 8 hours  aspirin enteric coated 81 milliGRAM(s) Oral daily  calcium acetate 667 milliGRAM(s) Oral three times a day with meals  cyanocobalamin 1000 MICROGram(s) Oral daily  gabapentin 300 milliGRAM(s) Oral every 8 hours  epoetin jaswinder Injectable 04692 Unit(s) IV Push <User Schedule>  atorvastatin 40 milliGRAM(s) Oral at bedtime  folic acid 1 milliGRAM(s) Oral daily  Nephro-toi 1 Tablet(s) Oral daily  ammonium lactate 12% Lotion 1 Application(s) Topical daily  ALBUTerol/ipratropium for Nebulization 3 milliLiter(s) Nebulizer every 6 hours  midodrine 10 milliGRAM(s) Oral two times a day    MEDICATIONS  (PRN):  acetaminophen   Tablet. 650 milliGRAM(s) Oral every 6 hours PRN Mild Pain (1 - 3)  guaiFENesin    Syrup 200 milliGRAM(s) Oral every 6 hours PRN Cough  lactulose Syrup 10 Gram(s) Oral daily PRN no bm > 2 days  sodium chloride 0.65% Nasal 1 Spray(s) Both Nostrils three times a day PRN nasal congestion/discomfort from O2      LABS:                  RADIOLOGY & ADDITIONAL TESTS:      REVIEW OF SYSTEMS:    CONSTITUTIONAL: No fever, weight loss, or fatigue  EYES: No eye pain, visual disturbances, or discharge  ENMT:  No difficulty hearing, tinnitus, vertigo; No sinus or throat pain  NECK: No pain or stiffness  RESPIRATORY: No cough, wheezing, chills or hemoptysis; No shortness of breath  CARDIOVASCULAR: No chest pain, palpitations, dizziness, or leg swelling  GASTROINTESTINAL: No abdominal or epigastric pain. No nausea, vomiting, or hematemesis; No diarrhea or constipation. No melena or hematochezia.  GENITOURINARY: No dysuria, frequency, hematuria, or incontinence  NEUROLOGICAL: No headaches, memory loss, loss of strength, numbness, or tremors  SKIN: No itching, burning, rashes, or lesions   LYMPH NODES: No enlarged glands  ENDOCRINE: No heat or cold intolerance; No hair loss  MUSCULOSKELETAL:   PSYCHIATRIC: No depression, anxiety, mood swings, or difficulty sleeping  HEME/LYMPH: No easy bruising, or bleeding gums  ALLERGY AND IMMUNOLOGIC: No hives or eczema    Vital Signs Last 24 Hrs  T(C): 36.6 (15 Jul 2017 05:20), Max: 36.6 (15 Jul 2017 05:20)  T(F): 97.9 (15 Jul 2017 05:20), Max: 97.9 (15 Jul 2017 05:20)  HR: 86 (15 Jul 2017 05:20) (86 - 95)  BP: 104/60 (15 Jul 2017 05:20) (104/60 - 116/64)  BP(mean): --  RR: 18 (15 Jul 2017 05:20) (18 - 18)  SpO2: 95% (15 Jul 2017 05:20) (95% - 96%)  PHYSICAL EXAM:    GENERAL: NAD, well-groomed, well-developed  HEAD:  Atraumatic, Normocephalic  EYES: EOMI, PERRLA, conjunctiva and sclera clear  NECK: Supple, No JVD, Normal thyroid  NERVOUS SYSTEM:  Alert & Oriented X3, no focal deficit  CHEST/LUNG: CTA b/l ,  no  rales, rhonchi, wheezing, or rubs  HEART: Regular rate and rhythm; No murmurs, rubs, or gallops  ABDOMEN: Soft, Nontender, Nondistended; Bowel sounds present  EXTREMITIES:  2+ Peripheral Pulses, No clubbing, cyanosis, or edema  LYMPH: No lymphadenopathy noted  SKIN: No rashes or lesions Patient seen and examined . NAD , on O2 via NC , c/o cough with gabriel phlegm , sob + but better      CC: F/u fall with left SAH, recent treatment for PNA and pleural effusion s/p tap     Patient denies any headache, dizziness, CP, abdominal pain, nausea, vomiting, dysuria.  Had BM this am . Other ROS reviewed and are negative.    PAST MEDICAL & SURGICAL HISTORY:  ESRD (end stage renal disease) on dialysis: MWF via [facility] Permacath  planned AVF creation  Sinusitis, unspecified chronicity, unspecified location  Gout  Gall stones  Pneumonia  Anemia  Pulmonary hypertension  COPD (chronic obstructive pulmonary disease)  CAD (coronary artery disease)  Atrial fibrillation  Hyperlipidemia  Hypertension  Spinal fusion failure, initial encounter  Tubal ligation status  Sinusitis  Spinal stenosis  S/P tonsillectomy  S/P appendectomy  H/O aortic valve replacement  H/O spinal fusion  H/O cataract  Cystocele  H/O tricuspid valve annuloplasty  H/O mitral valve repair      MEDICATIONS  (STANDING):  heparin  Injectable 5000 Unit(s) SubCutaneous every 8 hours  aspirin enteric coated 81 milliGRAM(s) Oral daily  calcium acetate 667 milliGRAM(s) Oral three times a day with meals  cyanocobalamin 1000 MICROGram(s) Oral daily  gabapentin 300 milliGRAM(s) Oral every 8 hours  epoetin jaswinder Injectable 48620 Unit(s) IV Push <User Schedule>  atorvastatin 40 milliGRAM(s) Oral at bedtime  folic acid 1 milliGRAM(s) Oral daily  Nephro-toi 1 Tablet(s) Oral daily  ammonium lactate 12% Lotion 1 Application(s) Topical daily  ALBUTerol/ipratropium for Nebulization 3 milliLiter(s) Nebulizer every 6 hours  midodrine 10 milliGRAM(s) Oral two times a day    MEDICATIONS  (PRN):  acetaminophen   Tablet. 650 milliGRAM(s) Oral every 6 hours PRN Mild Pain (1 - 3)  guaiFENesin    Syrup 200 milliGRAM(s) Oral every 6 hours PRN Cough  lactulose Syrup 10 Gram(s) Oral daily PRN no bm > 2 days  sodium chloride 0.65% Nasal 1 Spray(s) Both Nostrils three times a day PRN nasal congestion/discomfort from O2        REVIEW OF SYSTEMS:    CONSTITUTIONAL: No fever, weight loss, or fatigue  EYES: No eye pain, visual disturbances, or discharge  ENMT:  No difficulty hearing, tinnitus, vertigo; No sinus or throat pain  NECK: No pain or stiffness  RESPIRATORY:  cough+ , no wheezing, chills or hemoptysis; gabriel phlegm + , sob +    CARDIOVASCULAR: No chest pain, palpitations, dizziness, or leg swelling  GASTROINTESTINAL: No abdominal or epigastric pain. No nausea, vomiting, or hematemesis; No diarrhea or constipation. No melena or hematochezia.  GENITOURINARY: No dysuria, frequency, hematuria, or incontinence  NEUROLOGICAL: No headaches, memory loss, loss of strength, numbness, or tremors  SKIN: No itching, burning, rashes, or lesions   LYMPH NODES: No enlarged glands  ENDOCRINE: No heat or cold intolerance; No hair loss  MUSCULOSKELETAL: L finger pain - resolved   PSYCHIATRIC: No depression, anxiety, mood swings, or difficulty sleeping  HEME/LYMPH: No easy bruising, or bleeding gums  ALLERGY AND IMMUNOLOGIC: No hives or eczema    Vital Signs Last 24 Hrs  T(C): 36.6 (15 Jul 2017 05:20), Max: 36.6 (15 Jul 2017 05:20)  T(F): 97.9 (15 Jul 2017 05:20), Max: 97.9 (15 Jul 2017 05:20)  HR: 86 (15 Jul 2017 05:20) (86 - 95)  BP: 104/60 (15 Jul 2017 05:20) (104/60 - 116/64)  BP(mean): --  RR: 18 (15 Jul 2017 05:20) (18 - 18)  SpO2: 95% (15 Jul 2017 05:20) (95% - 96%)  PHYSICAL EXAM:    GENERAL: NAD, well-groomed, well-developed  HEAD:  Atraumatic, Normocephalic  EYES: EOMI, PERRLA, conjunctiva and sclera clear  NECK: Supple, No JVD, Normal thyroid  NERVOUS SYSTEM:  Alert & Oriented X3, no focal deficit  CHEST/LUNG: b/l rhonchi , R base decreased bs   ,   wheezing, or rubs  HEART: irregular rate and rhythm  No murmurs, rubs, or gallops  ABDOMEN: Soft, Nontender, Nondistended; Bowel sounds present  EXTREMITIES:  2+ Peripheral Pulses, No clubbing, cyanosis, or edema  LYMPH: No lymphadenopathy noted  SKIN: b/l UE ecchymoses +     < from: Xray Chest 1 View AP/PA. (07.13.17 @ 07:12) >     EXAM:  CHEST SINGLE VIEW FRONTAL                          PROCEDURE DATE:  07/13/2017          INTERPRETATION:  Chest, single portable view    HISTORY: Status post drainage pleural effusion, follow-up right middle   lobe opacity    Comparison: 7/9    IMPRESSION:    Support Devices:  Unchanged  Heart: Cardiomegaly, replaced aortic valve  Mediastinum:  Unremarkable  Lungs/Airways: Right middle lobe airspace opacity improved. Increased   right pleural effusion.  Bones/Soft tissues: Unremarkable                                      HUGO DEVINE M.D., ATTENDING RADIOLOGIST  This document has been electronically signed. Jul 13 2017  9:03AM        < end of copied text >

## 2017-07-15 NOTE — PROGRESS NOTE ADULT - SUBJECTIVE AND OBJECTIVE BOX
HISTORY OF PRESENT ILLNESS  76F was admitted on 6/14/17 after a mechanical fall while walking outside. She had head trauma but no LOC. In ER, GCS=15. A head CT showed left parietal and temporo-occipital SAH. A repeat head CT was stable. Additional workup showed nondisplaced fractures of the right 4-6 ribs with emphysematous changes, pleural effusion and atelectasis. Hospital course was complicated by shortness of breath/hypoxia and needed BiPAP/high-flow NC. She was started on Cefepime for PNA until 7/5. Admitted to acute rehab on 6/27.    PMHx - HTN, ESRD on HD, AFIB (no full AC due to fall and bleeding risk), CAD, COPD (on home O2 overnight), Cholelithiasis, Gout, Hyperlipidemia, Spinal stenosis, s/p AVR/MVR/Tricupsid valve annuloplasty, Cataracts repair, H/O spinal fusion, Appendectomy, Tonsillectomy, C-Spine DJD    TODAY'S SUBJECTIVE & REVIEW OF SYMPTOMS  [X] Constitutional WNL      [X] Cardio WNL            [ ] Resp WNL           [X] GI WNL                      [X]  WNL                 [X] Heme WNL              [X] Endo WNL                  [ ] Skin WNL                [ ] MSK WNL            [ ] Neuro WNL                  [ ] Cognitive WNL         [ ] Psych WNL    No overnight events. Patient feels well. Breathing well. Cough is about the same.   Did have a few awakenings in the night due to room mate, but otherwise feels well and understands she needs to use IS.     VITALS  T(C): 36.6 (07-15-17 @ 05:20)  T(F): 97.9 (07-15-17 @ 05:20), Max: 97.9 (07-15-17 @ 05:20)  HR: 86 (07-15-17 @ 05:20) (81 - 95)  BP: 104/60 (07-15-17 @ 05:20) (104/60 - 116/64)  RR:  (18 - 18)  SpO2:  (94% - 96%)  Wt(kg): --    PHYSICAL EXAM  Constitutional - NAD, Comfortable  HEENT - Facial ecchymosis - Improved, Right supraorbital laceration  Neck - Supple, No limited ROM  Chest - decreased right BS, Scattered rhonchi  Cardiovascular - S1S2  Abdomen - BS+, Soft, NTND  Extremities - No C/C, Mild BLE edema, No calf tenderness, BLE PVD skin changes  Neurologic Exam - Right field cut                    Cognitive - AAO to self, place, month/year, some of situation, Mild deficits     Communication - Expressive deficits     Motor - No focal deficits     Sensory - Intact to LT     Coordination - FTN impaired  Psychiatric - Mood stable, Affect Flat  Skin - BUE - Multiple ecchymosis, left hand edema/pain to palpation    FUNCTIONAL PROGRESS  Gait - Min-Mod A  ADLs - Mod A  Transfers - Mod A   Functional transfers - Mod A    RECENT LABS             9.9    6.3   )-----------( 180      ( 13 Jul 2017 06:54 )             32.3     07-13    134<L>  |  92<L>  |  97.0<H>  ----------------------------<  112  4.8   |  24.0  |  4.53<H>    Ca    9.1      13 Jul 2017 06:54  Phos  3.6     07-13    Prealb 6/28 - 16    Procalcitonin 7/1 - 2.42, 7/6 - 1.18    Pleural Fluid Cx 7/6 - No growth    RADIOLOGY/OTHER RESULTS  CXR 7/12 - Right middle lobe airspace opacity improved. Increased right pleural effusion.   CXR 7/9 - Persistent right middle lobe infiltrate.    CURRENT MEDICATIONS  MEDICATIONS  (STANDING):  heparin  Injectable 5000 Unit(s) SubCutaneous every 8 hours  aspirin enteric coated 81 milliGRAM(s) Oral daily  calcium acetate 667 milliGRAM(s) Oral three times a day with meals  cyanocobalamin 1000 MICROGram(s) Oral daily  gabapentin 300 milliGRAM(s) Oral every 8 hours  epoetin jaswinder Injectable 74342 Unit(s) IV Push <User Schedule>  atorvastatin 40 milliGRAM(s) Oral at bedtime  folic acid 1 milliGRAM(s) Oral daily  Nephro-toi 1 Tablet(s) Oral daily  ammonium lactate 12% Lotion 1 Application(s) Topical daily  ALBUTerol/ipratropium for Nebulization 3 milliLiter(s) Nebulizer every 6 hours  midodrine 10 milliGRAM(s) Oral two times a day    MEDICATIONS  (PRN):  acetaminophen   Tablet. 650 milliGRAM(s) Oral every 6 hours PRN Mild Pain (1 - 3)  guaiFENesin    Syrup 200 milliGRAM(s) Oral every 6 hours PRN Cough  lactulose Syrup 10 Gram(s) Oral daily PRN no bm > 2 days  sodium chloride 0.65% Nasal 1 Spray(s) Both Nostrils three times a day PRN nasal congestion/discomfort from O2    ASSESSMENT & PLAN  76F sustaining a TBI after a fall now with functional deficits  Cardiac/Pulm HTN - Midodrine   Pulm - Duonebs Q6, O2 via NC, Guaifenesin PRN, Ocean PRN  CAD - Lipitor, ASA  ESRD on HD - Phoslo, HD T/Th/Sat  Anemia - Epogen, Vit B, Folate  GI/Bowel Management - Toilet PRN, Lactulose PRN   Management - Toilet Q2  Skin - Turn Q2, Lachydrin BLE, Zfloats BLE  Gout - Prednisone 50mg x2 days (7/9-7/11)  Pain - Neurontin,Tylenol PRN, Ice to left hand PRN  DVT PPX - Heparin, TEDs, SCDs  Diet - Renal & Cardiac/Thins, Nephro-toi, Nepro TID    Continue comprehensive acute rehab program consisting of 3hrs/day of OT/PT and SLP

## 2017-07-15 NOTE — PROGRESS NOTE ADULT - SUBJECTIVE AND OBJECTIVE BOX
NEPHROLOGY INTERVAL HPI/OVERNIGHT EVENTS:    No new events.    MEDICATIONS  (STANDING):  heparin  Injectable 5000 Unit(s) SubCutaneous every 8 hours  aspirin enteric coated 81 milliGRAM(s) Oral daily  calcium acetate 667 milliGRAM(s) Oral three times a day with meals  cyanocobalamin 1000 MICROGram(s) Oral daily  gabapentin 300 milliGRAM(s) Oral every 8 hours  epoetin jaswinder Injectable 44511 Unit(s) IV Push <User Schedule>  atorvastatin 40 milliGRAM(s) Oral at bedtime  folic acid 1 milliGRAM(s) Oral daily  Nephro-toi 1 Tablet(s) Oral daily  ammonium lactate 12% Lotion 1 Application(s) Topical daily  ALBUTerol/ipratropium for Nebulization 3 milliLiter(s) Nebulizer every 6 hours  midodrine 10 milliGRAM(s) Oral two times a day    MEDICATIONS  (PRN):  acetaminophen   Tablet. 650 milliGRAM(s) Oral every 6 hours PRN Mild Pain (1 - 3)  guaiFENesin    Syrup 200 milliGRAM(s) Oral every 6 hours PRN Cough  lactulose Syrup 10 Gram(s) Oral daily PRN no bm > 2 days  sodium chloride 0.65% Nasal 1 Spray(s) Both Nostrils three times a day PRN nasal congestion/discomfort from O2      Allergies    allopurinol (Rash)  codeine (Nausea; Vomiting)  penicillin (Rash)  spironolactone (Unknown)    Intolerances          Vital Signs Last 24 Hrs  T(C): 35.9 (13 Jul 2017 11:03), Max: 36.9 (12 Jul 2017 20:56)  T(F): 96.7 (13 Jul 2017 11:03), Max: 98.4 (12 Jul 2017 20:56)  HR: 84 (13 Jul 2017 11:03) (81 - 85)  BP: 105/62 (13 Jul 2017 11:03) (103/58 - 113/69)  BP(mean): --  RR: 20 (13 Jul 2017 11:03) (18 - 20)  SpO2: 97% (13 Jul 2017 11:03) (92% - 97%)  Daily     Daily   I&O's Detail    I&O's Summary      PHYSICAL EXAM:  NAD  lungs - bilateral base rhonchi with some clearing with cough  CV gr 1 murmur,  No gallop or rub  Abd : soft, NT BS +, No masses  Ext- No edema  Neuro : More alert with good mentation     LABS:                        9.9    6.3   )-----------( 180      ( 13 Jul 2017 06:54 )             32.3     07-13    134<L>  |  92<L>  |  97.0<H>  ----------------------------<  112  4.8   |  24.0  |  4.53<H>    Ca    9.1      13 Jul 2017 06:54  Phos  3.6     07-13          Phosphorus Level, Serum: 3.6 mg/dL (07-13 @ 06:54)          RADIOLOGY & ADDITIONAL TESTS:

## 2017-07-16 PROCEDURE — 99233 SBSQ HOSP IP/OBS HIGH 50: CPT

## 2017-07-16 PROCEDURE — 99232 SBSQ HOSP IP/OBS MODERATE 35: CPT

## 2017-07-16 RX ADMIN — Medication 3 MILLILITER(S): at 03:06

## 2017-07-16 RX ADMIN — Medication 200 MILLIGRAM(S): at 05:28

## 2017-07-16 RX ADMIN — Medication 200 MILLIGRAM(S): at 21:21

## 2017-07-16 RX ADMIN — MIDODRINE HYDROCHLORIDE 10 MILLIGRAM(S): 2.5 TABLET ORAL at 17:49

## 2017-07-16 RX ADMIN — GABAPENTIN 300 MILLIGRAM(S): 400 CAPSULE ORAL at 13:08

## 2017-07-16 RX ADMIN — HEPARIN SODIUM 5000 UNIT(S): 5000 INJECTION INTRAVENOUS; SUBCUTANEOUS at 05:30

## 2017-07-16 RX ADMIN — HEPARIN SODIUM 5000 UNIT(S): 5000 INJECTION INTRAVENOUS; SUBCUTANEOUS at 21:21

## 2017-07-16 RX ADMIN — HEPARIN SODIUM 5000 UNIT(S): 5000 INJECTION INTRAVENOUS; SUBCUTANEOUS at 13:09

## 2017-07-16 RX ADMIN — Medication 1 APPLICATION(S): at 13:06

## 2017-07-16 RX ADMIN — MIDODRINE HYDROCHLORIDE 10 MILLIGRAM(S): 2.5 TABLET ORAL at 05:30

## 2017-07-16 RX ADMIN — GABAPENTIN 300 MILLIGRAM(S): 400 CAPSULE ORAL at 05:30

## 2017-07-16 RX ADMIN — Medication 1 TABLET(S): at 13:08

## 2017-07-16 RX ADMIN — Medication 667 MILLIGRAM(S): at 17:48

## 2017-07-16 RX ADMIN — Medication 1 MILLIGRAM(S): at 13:08

## 2017-07-16 RX ADMIN — GABAPENTIN 300 MILLIGRAM(S): 400 CAPSULE ORAL at 21:21

## 2017-07-16 RX ADMIN — Medication 667 MILLIGRAM(S): at 07:44

## 2017-07-16 RX ADMIN — Medication 81 MILLIGRAM(S): at 13:07

## 2017-07-16 RX ADMIN — Medication 667 MILLIGRAM(S): at 13:08

## 2017-07-16 RX ADMIN — Medication 3 MILLILITER(S): at 21:36

## 2017-07-16 RX ADMIN — PREGABALIN 1000 MICROGRAM(S): 225 CAPSULE ORAL at 13:07

## 2017-07-16 RX ADMIN — ATORVASTATIN CALCIUM 40 MILLIGRAM(S): 80 TABLET, FILM COATED ORAL at 21:21

## 2017-07-16 NOTE — PROGRESS NOTE ADULT - SUBJECTIVE AND OBJECTIVE BOX
HISTORY OF PRESENT ILLNESS  76F was admitted on 6/14/17 after a mechanical fall while walking outside. She had head trauma but no LOC. In ER, GCS=15. A head CT showed left parietal and temporo-occipital SAH. A repeat head CT was stable. Additional workup showed nondisplaced fractures of the right 4-6 ribs with emphysematous changes, pleural effusion and atelectasis. Hospital course was complicated by shortness of breath/hypoxia and needed BiPAP/high-flow NC. She was started on Cefepime for PNA until 7/5. Admitted to acute rehab on 6/27.    PMHx - HTN, ESRD on HD, AFIB (no full AC due to fall and bleeding risk), CAD, COPD (on home O2 overnight), Cholelithiasis, Gout, Hyperlipidemia, Spinal stenosis, s/p AVR/MVR/Tricupsid valve annuloplasty, Cataracts repair, H/O spinal fusion, Appendectomy, Tonsillectomy, C-Spine DJD    TODAY'S SUBJECTIVE & REVIEW OF SYMPTOMS  [X] Constitutional WNL      [X] Cardio WNL             [ ] Resp WNL           [X] GI WNL                      [X]  WNL                 [X] Heme WNL              [X] Endo WNL                  [X] Skin WNL                [ ] MSK WNL            [ ] Neuro WNL                  [ ] Cognitive WNL          [ ] Psych WNL    Patient feels well. No overnight events. Denies pain.   Breathing well. Cough unchanged. Maintain at 4L NC.     VITALS  T(C): 36.7 (07-16-17 @ 05:22)  T(F): 98.1 (07-16-17 @ 05:22), Max: 98.1 (07-15-17 @ 20:43)  HR: 81 (07-16-17 @ 05:22) (77 - 82)  BP: 94/59 (07-16-17 @ 05:22) (94/59 - 118/54)  RR:  (18 - 20)  SpO2:  (92% - 99%)  Wt(kg): --    PHYSICAL EXAM  Constitutional - NAD, Comfortable  HEENT - Facial ecchymosis - Improved, Right supraorbital laceration  Neck - Supple, No limited ROM  Chest - decreased right BS, Scattered rhonchi  Cardiovascular - S1S2  Abdomen - BS+, Soft, NTND  Extremities - No C/C, Mild BLE edema, No calf tenderness, BLE PVD skin changes  Neurologic Exam - Right field cut                    Cognitive - AAO to self, place, month/year, some of situation, Mild deficits     Communication - Expressive deficits     Motor - No focal deficits     Sensory - Intact to LT     Coordination - FTN impaired  Psychiatric - Mood stable, Affect Flat  Skin - BUE - Multiple ecchymosis, left hand edema/pain to palpation    FUNCTIONAL PROGRESS  Gait - Min-Mod A  ADLs - Mod A  Transfers - Mod A   Functional transfers - Mod A    RECENT LABS                          9.4    4.0   )-----------( 139      ( 15 Jul 2017 16:15 )             30.2     07-15    136  |  94<L>  |  71.0<H>  ----------------------------<  117<H>  4.7   |  27.0  |  3.44<H>    Ca    8.5<L>      15 Jul 2017 16:15    TPro  6.4<L>  /  Alb  3.7  /  TBili  1.0  /  DBili  x   /  AST  39<H>  /  ALT  70<H>  /  AlkPhos  554<H>  07-15    Prealb 6/28 - 16    Procalcitonin 7/1 - 2.42, 7/6 - 1.18    Pleural Fluid Cx 7/6 - No growth    RADIOLOGY/OTHER RESULTS  CXR 7/12 - Right middle lobe airspace opacity improved. Increased right pleural effusion.   CXR 7/9 - Persistent right middle lobe infiltrate.    CURRENT MEDICATIONS  MEDICATIONS  (STANDING):  heparin  Injectable 5000 Unit(s) SubCutaneous every 8 hours  aspirin enteric coated 81 milliGRAM(s) Oral daily  calcium acetate 667 milliGRAM(s) Oral three times a day with meals  cyanocobalamin 1000 MICROGram(s) Oral daily  gabapentin 300 milliGRAM(s) Oral every 8 hours  epoetin jaswinder Injectable 58787 Unit(s) IV Push <User Schedule>  atorvastatin 40 milliGRAM(s) Oral at bedtime  folic acid 1 milliGRAM(s) Oral daily  Nephro-toi 1 Tablet(s) Oral daily  ammonium lactate 12% Lotion 1 Application(s) Topical daily  ALBUTerol/ipratropium for Nebulization 3 milliLiter(s) Nebulizer every 6 hours  midodrine 10 milliGRAM(s) Oral two times a day    MEDICATIONS  (PRN):  acetaminophen   Tablet. 650 milliGRAM(s) Oral every 6 hours PRN Mild Pain (1 - 3)  guaiFENesin    Syrup 200 milliGRAM(s) Oral every 6 hours PRN Cough  lactulose Syrup 10 Gram(s) Oral daily PRN no bm > 2 days  sodium chloride 0.65% Nasal 1 Spray(s) Both Nostrils three times a day PRN nasal congestion/discomfort from O2    ASSESSMENT & PLAN  76F sustaining a TBI after a fall now with functional deficits  Cardiac/Pulm HTN - Midodrine   Pulm - Duonebs Q6, O2 4L via NC, Guaifenesin PRN, Ocean PRN  CAD - Lipitor, ASA  ESRD on HD - Phoslo, HD T/Th/Sat  Anemia - Epogen, Vit B, Folate  GI/Bowel Management - Toilet PRN, Lactulose PRN   Management - Toilet Q2  Skin - Turn Q2, Lachydrin BLE, Zfloats BLE  Gout - Prednisone 50mg x2 days (7/9-7/11)  Pain - Neurontin, Tylenol PRN, Ice to left hand PRN  DVT PPX - Heparin, TEDs, SCDs  Diet - Renal & Cardiac/Thins, Nephro-toi, Nepro TID    Continue comprehensive acute rehab program consisting of 3hrs/day of OT/PT and SLP

## 2017-07-16 NOTE — PROGRESS NOTE ADULT - SUBJECTIVE AND OBJECTIVE BOX
Patient seen and examined . NAD , on O2 via NC , c/o cough with gabriel phlegm , sob + but better      CC: F/u fall with left SAH, recent treatment for PNA and pleural effusion s/p tap     Patient denies any headache, dizziness, CP, abdominal pain, nausea, vomiting, dysuria. Other ROS reviewed and are negative.    PAST MEDICAL & SURGICAL HISTORY:  ESRD (end stage renal disease) on dialysis: MWF via R SC Permacath  planned AVF creation  Sinusitis, unspecified chronicity, unspecified location  Gout  Gall stones  Pneumonia  Anemia  Pulmonary hypertension  COPD (chronic obstructive pulmonary disease)  CAD (coronary artery disease)  Atrial fibrillation  Hyperlipidemia  Hypertension  Spinal fusion failure, initial encounter  Tubal ligation status  Sinusitis  Spinal stenosis  S/P tonsillectomy  S/P appendectomy  H/O aortic valve replacement  H/O spinal fusion  H/O cataract  Cystocele  H/O tricuspid valve annuloplasty  H/O mitral valve repair      MEDICATIONS  (STANDING):  heparin  Injectable 5000 Unit(s) SubCutaneous every 8 hours  aspirin enteric coated 81 milliGRAM(s) Oral daily  calcium acetate 667 milliGRAM(s) Oral three times a day with meals  cyanocobalamin 1000 MICROGram(s) Oral daily  gabapentin 300 milliGRAM(s) Oral every 8 hours  epoetin jaswinder Injectable 29736 Unit(s) IV Push <User Schedule>  atorvastatin 40 milliGRAM(s) Oral at bedtime  folic acid 1 milliGRAM(s) Oral daily  Nephro-toi 1 Tablet(s) Oral daily  ammonium lactate 12% Lotion 1 Application(s) Topical daily  ALBUTerol/ipratropium for Nebulization 3 milliLiter(s) Nebulizer every 6 hours  midodrine 10 milliGRAM(s) Oral two times a day    MEDICATIONS  (PRN):  acetaminophen   Tablet. 650 milliGRAM(s) Oral every 6 hours PRN Mild Pain (1 - 3)  guaiFENesin    Syrup 200 milliGRAM(s) Oral every 6 hours PRN Cough  lactulose Syrup 10 Gram(s) Oral daily PRN no bm > 2 days  sodium chloride 0.65% Nasal 1 Spray(s) Both Nostrils three times a day PRN nasal congestion/discomfort from O2      LABS:                          9.4    4.0   )-----------( 139      ( 15 Jul 2017 16:15 )             30.2     07-15    136  |  94<L>  |  71.0<H>  ----------------------------<  117<H>  4.7   |  27.0  |  3.44<H>    Ca    8.5<L>      15 Jul 2017 16:15    TPro  6.4<L>  /  Alb  3.7  /  TBili  1.0  /  DBili  x   /  AST  39<H>  /  ALT  70<H>  /  AlkPhos  554<H>  07-15        REVIEW OF SYSTEMS:    Cough with gabriel phlegm , sob , all other systems are reviewed and are negative      Vital Signs Last 24 Hrs  T(C): 36.7 (16 Jul 2017 05:22), Max: 36.7 (15 Jul 2017 20:43)  T(F): 98.1 (16 Jul 2017 05:22), Max: 98.1 (15 Jul 2017 20:43)  HR: 81 (16 Jul 2017 05:22) (77 - 82)  BP: 94/59 (16 Jul 2017 05:22) (94/59 - 118/54)  BP(mean): --  RR: 19 (16 Jul 2017 05:22) (18 - 20)  SpO2: 94% (16 Jul 2017 05:22) (92% - 99%)  PHYSICAL EXAM:    GENERAL: NAD, well-groomed, well-developed  HEAD:  Atraumatic, Normocephalic  EYES: EOMI, PERRLA, conjunctiva and sclera clear  NECK: Supple, No JVD, Normal thyroid  NERVOUS SYSTEM:  Alert & Oriented X3, no focal deficit  CHEST/LUNG: CTA b/l , R base decreased bs ,  no  rales, rhonchi, wheezing, or rubs  HEART: Regular rate and rhythm; No murmurs, rubs, or gallops  ABDOMEN: Soft, Nontender, Nondistended; Bowel sounds present  EXTREMITIES:  2+ Peripheral Pulses, No clubbing, cyanosis, or edema  LYMPH: No lymphadenopathy noted  SKIN: B/L UE ecchymoses +

## 2017-07-16 NOTE — PROGRESS NOTE ADULT - ASSESSMENT
77 yo female with h/o Atrial fibrillation off anticoagulation due to fall risk and bleeding risk , ESRD on HD ,diastolic CHF , pulmonary hypertension , valvular heart disease  admitted s/p fall at home with left SAH , brain injury and developed respiratory distress being treated for pneumonia  , now in rehab . Chest congestion, cough improving,  BP remains low, medication doses of  sildenafil adjusted by pulmonologist, hypoxic , remains hypotensive, stable now, s/p right pleural effusion removal by IR 7/7 . C/O cough with gabriel phlegm , sob . ON o2 via NC .    Problem/Plan - 1:  ·  Problem: Finger joint swelling, left.  Plan: xray of hand reviewed.  Resolved with short course of steroid and Toradol   warm compress - likely gout attack .    Problem/Plan - 2:  ·  Problem: Pleural effusion.  Plan: s/p drain by IR , on O2 via NC  , multifactorial reasons for effusion  Repeat CXR shows worsening right pleural effusion.  Pulmonary following. For repeated CXR on 7/17 .    Problem/Plan - 3:  ·  Problem: Pulmonary hypertension.  Plan: off sildenafil due to hypotension , pulmonary following.     Problem/Plan - 4:  ·  Problem: Diastolic heart failure due to valvular disease.  Plan: fluid removal with HD ,  palliative care team on board.     Problem/Plan - 5:  ·  Problem: ESRD (end stage renal disease) on dialysis.  Plan: HD as per renal T-Th-Sat.     Problem/Plan - 6:  Problem: Atrial fibrillation, unspecified type. Plan: off ac due to fall/SAH  rate controlled off AV asher blocking agents.    Problem/Plan - 7:  ·  Problem: Anemia.  Plan: Epogen weekly , hb stable no indication.     Problem/Plan - 8:  ·  Problem: Hypotension.  Plan:   Lasix and sildenafil stopped as BP on lower side, monitor closely continue midodrine.

## 2017-07-17 PROCEDURE — 99232 SBSQ HOSP IP/OBS MODERATE 35: CPT

## 2017-07-17 PROCEDURE — 71020: CPT | Mod: 26

## 2017-07-17 PROCEDURE — 99233 SBSQ HOSP IP/OBS HIGH 50: CPT

## 2017-07-17 RX ADMIN — HEPARIN SODIUM 5000 UNIT(S): 5000 INJECTION INTRAVENOUS; SUBCUTANEOUS at 15:11

## 2017-07-17 RX ADMIN — PREGABALIN 1000 MICROGRAM(S): 225 CAPSULE ORAL at 12:06

## 2017-07-17 RX ADMIN — Medication 3 MILLILITER(S): at 15:16

## 2017-07-17 RX ADMIN — Medication 3 MILLILITER(S): at 12:33

## 2017-07-17 RX ADMIN — HEPARIN SODIUM 5000 UNIT(S): 5000 INJECTION INTRAVENOUS; SUBCUTANEOUS at 21:28

## 2017-07-17 RX ADMIN — Medication 667 MILLIGRAM(S): at 18:33

## 2017-07-17 RX ADMIN — Medication 1 TABLET(S): at 12:06

## 2017-07-17 RX ADMIN — ATORVASTATIN CALCIUM 40 MILLIGRAM(S): 80 TABLET, FILM COATED ORAL at 21:28

## 2017-07-17 RX ADMIN — Medication 81 MILLIGRAM(S): at 12:06

## 2017-07-17 RX ADMIN — Medication 200 MILLIGRAM(S): at 05:36

## 2017-07-17 RX ADMIN — Medication 667 MILLIGRAM(S): at 08:33

## 2017-07-17 RX ADMIN — Medication 3 MILLILITER(S): at 20:38

## 2017-07-17 RX ADMIN — GABAPENTIN 300 MILLIGRAM(S): 400 CAPSULE ORAL at 21:28

## 2017-07-17 RX ADMIN — Medication 667 MILLIGRAM(S): at 12:06

## 2017-07-17 RX ADMIN — GABAPENTIN 300 MILLIGRAM(S): 400 CAPSULE ORAL at 05:35

## 2017-07-17 RX ADMIN — Medication 1 APPLICATION(S): at 12:06

## 2017-07-17 RX ADMIN — Medication 1 MILLIGRAM(S): at 12:06

## 2017-07-17 RX ADMIN — Medication 3 MILLILITER(S): at 03:07

## 2017-07-17 RX ADMIN — LACTULOSE 10 GRAM(S): 10 SOLUTION ORAL at 05:36

## 2017-07-17 RX ADMIN — MIDODRINE HYDROCHLORIDE 10 MILLIGRAM(S): 2.5 TABLET ORAL at 05:35

## 2017-07-17 RX ADMIN — GABAPENTIN 300 MILLIGRAM(S): 400 CAPSULE ORAL at 15:11

## 2017-07-17 RX ADMIN — HEPARIN SODIUM 5000 UNIT(S): 5000 INJECTION INTRAVENOUS; SUBCUTANEOUS at 05:35

## 2017-07-17 RX ADMIN — MIDODRINE HYDROCHLORIDE 10 MILLIGRAM(S): 2.5 TABLET ORAL at 18:33

## 2017-07-17 NOTE — PROGRESS NOTE ADULT - ASSESSMENT
77 yo female with h/o Atrial fibrillation off anticoagulation due to fall risk and bleeding risk , ESRD on HD ,diastolic CHF , pulmonary hypertension , valvular heart disease  admitted s/p fall at home with left SAH , brain injury and developed respiratory distress being treated for pneumonia  , now in rehab . Chest congestion, cough improving,  BP remains low, medication doses of  sildenafil adjusted by pulmonologist, hypoxic , remains hypotensive, stable now, s/p right pleural effusion removal by IR 7/7 . C/O cough with gabriel phlegm , sob . ON o2 via NC . Repeat chest xray today, may need fluid removal, Pulmonary following.     Problem/Plan - 1:  ·  Problem: Finger joint swelling, left.  Plan: xray of hand reviewed.  Resolved with short course of steroid and Toradol   warm compress - likely gout attack .    Problem/Plan - 2:  ·  Problem: Pleural effusion.  Plan: s/p drain by IR , on O2 via NC  , multifactorial reasons for effusion  Repeat CXR shows worsening right pleural effusion. Respiratory called to titrate O2, patient sats 88-90  Pulmonary following. For repeated CXR today    Problem/Plan - 3:  ·  Problem: Pulmonary hypertension.  Plan: off sildenafil due to hypotension , pulmonary following.     Problem/Plan - 4:  ·  Problem: Diastolic heart failure due to valvular disease.  Plan: fluid removal with HD ,  palliative care team on board.     Problem/Plan - 5:  ·  Problem: ESRD (end stage renal disease) on dialysis.  Plan: HD as per renal T-Th-Sat.     Problem/Plan - 6:  Problem: Atrial fibrillation, unspecified type. Plan: off ac due to fall/SAH  rate controlled off AV asher blocking agents.    Problem/Plan - 7:  ·  Problem: Anemia.  Plan: Epogen weekly , hb stable, monitor labs    Problem/Plan - 8:  ·  Problem: Hypotension.  Plan:   Lasix and sildenafil stopped as BP on lower side, monitor closely continue midodrine. 77 yo female with h/o Atrial fibrillation off anticoagulation due to fall risk and bleeding risk , ESRD on HD ,diastolic CHF , pulmonary hypertension , valvular heart disease  admitted s/p fall at home with left SAH , brain injury and developed respiratory distress being treated for pneumonia  , now in rehab . Chest congestion, cough improving,  BP remains low, medication doses of  sildenafil adjusted by pulmonologist, hypoxic , remains hypotensive, stable now, s/p right pleural effusion removal by IR 7/7 . C/O cough with gabriel phlegm , sob . ON o2 via NC . Repeat chest xray today, may need fluid removal, Pulmonary following.     Problem/Plan - 1:  ·  Problem: Finger joint swelling, left. Plan :  Resolved - likely gout attack .    Problem/Plan - 2:  ·  Problem: Pleural effusion.  Plan: s/p drain by IR , on O2 via NC  , multifactorial reasons for effusion  Repeat CXR shows worsening right pleural effusion. Respiratory called to titrate O2, patient sats 88-90  Pulmonary following. For repeated CXR today    Problem/Plan - 3:  ·  Problem: Pulmonary hypertension.  Plan: off sildenafil due to hypotension , pulmonary following.     Problem/Plan - 4:  ·  Problem: Diastolic heart failure due to valvular disease.  Plan: fluid removal with HD ,  palliative care team on board.     Problem/Plan - 5:  ·  Problem: ESRD (end stage renal disease) on dialysis.  Plan: HD as per renal T-Th-Sat.     Problem/Plan - 6:  Problem: Atrial fibrillation, unspecified type. Plan: off ac due to fall/SAH  rate controlled off AV asher blocking agents.    Problem/Plan - 7:  ·  Problem: Anemia.  Plan: Epogen weekly , hb stable, monitor labs    Problem/Plan - 8:  ·  Problem: Hypotension.  Plan:   Lasix and sildenafil stopped as BP on lower side, monitor closely continue midodrine.

## 2017-07-17 NOTE — PROGRESS NOTE ADULT - SUBJECTIVE AND OBJECTIVE BOX
NEPHROLOGY INTERVAL HPI/OVERNIGHT EVENTS:    No new events.    MEDICATIONS  (STANDING):  heparin  Injectable 5000 Unit(s) SubCutaneous every 8 hours  aspirin enteric coated 81 milliGRAM(s) Oral daily  calcium acetate 667 milliGRAM(s) Oral three times a day with meals  cyanocobalamin 1000 MICROGram(s) Oral daily  gabapentin 300 milliGRAM(s) Oral every 8 hours  epoetin jaswinder Injectable 10047 Unit(s) IV Push <User Schedule>  atorvastatin 40 milliGRAM(s) Oral at bedtime  folic acid 1 milliGRAM(s) Oral daily  Nephro-toi 1 Tablet(s) Oral daily  ammonium lactate 12% Lotion 1 Application(s) Topical daily  ALBUTerol/ipratropium for Nebulization 3 milliLiter(s) Nebulizer every 6 hours  midodrine 10 milliGRAM(s) Oral two times a day    MEDICATIONS  (PRN):  acetaminophen   Tablet. 650 milliGRAM(s) Oral every 6 hours PRN Mild Pain (1 - 3)  guaiFENesin    Syrup 200 milliGRAM(s) Oral every 6 hours PRN Cough  lactulose Syrup 10 Gram(s) Oral daily PRN no bm > 2 days  sodium chloride 0.65% Nasal 1 Spray(s) Both Nostrils three times a day PRN nasal congestion/discomfort from O2      Allergies    allopurinol (Rash)  codeine (Nausea; Vomiting)  penicillin (Rash)  spironolactone (Unknown)    Intolerances          Vital Signs Last 24 Hrs  T(C): 35.9 (13 Jul 2017 11:03), Max: 36.9 (12 Jul 2017 20:56)  T(F): 96.7 (13 Jul 2017 11:03), Max: 98.4 (12 Jul 2017 20:56)  HR: 84 (13 Jul 2017 11:03) (81 - 85)  BP: 105/62 (13 Jul 2017 11:03) (103/58 - 113/69)  BP(mean): --  RR: 20 (13 Jul 2017 11:03) (18 - 20)  SpO2: 97% (13 Jul 2017 11:03) (92% - 97%)  Daily     Daily   I&O's Detail    I&O's Summary      PHYSICAL EXAM:  NAD  lungs - bilateral base rhonchi with some clearing with cough  CV gr 1 murmur,  No gallop or rub  Abd : soft, NT BS +, No masses  Ext- No edema  Neuro : More alert with good mentation     LABS: 07-15    136  |  94<L>  |  71.0<H>  ----------------------------<  117<H>  4.7   |  27.0  |  3.44<H>    Ca    8.5<L>      15 Jul 2017 16:15    TPro  6.4<L>  /  Alb  3.7  /  TBili  1.0  /  DBili  x   /  AST  39<H>  /  ALT  70<H>  /  AlkPhos  554<H>  07-15                          9.9    6.3   )-----------( 180      ( 13 Jul 2017 06:54 )             32.3     07-13    134<L>  |  92<L>  |  97.0<H>  ----------------------------<  112  4.8   |  24.0  |  4.53<H>    Ca    9.1      13 Jul 2017 06:54  Phos  3.6     07-13          Phosphorus Level, Serum: 3.6 mg/dL (07-13 @ 06:54)          RADIOLOGY & ADDITIONAL TESTS:

## 2017-07-17 NOTE — PROGRESS NOTE ADULT - SUBJECTIVE AND OBJECTIVE BOX
CC: F/u fall with left SAH, recent treatment for PNA and pleural effusion s/p tap    HPI:76F was admitted on 6/14/17 after a mechanical fall while walking outside. She had head trauma but no LOC. In ER, GCS=15. A head CT showed left parietal and temporo-occipital SAH. A repeat head CT was stable. Additional workup showed nondisplaced fractures of the right 4-6 ribs with emphysematous changes, pleural effusion and atelectasis. Hospital course was complicated by shortness of breath/hypoxia and needed BiPAP/high-flow NC. She was started on Cefepime for PNA. Admitted to acute rehab on 6/27.    INTERVAL HPI/OVERNIGHT EVENTS: Patient fatigued from therapy this morning, O2 sats 88-90. C/O cough/sputum    Vital Signs Last 24 Hrs  T(C): 36.3 (17 Jul 2017 11:42), Max: 37 (16 Jul 2017 13:00)  T(F): 97.4 (17 Jul 2017 11:42), Max: 98.6 (16 Jul 2017 13:00)  HR: 72 (17 Jul 2017 11:42) (72 - 95)  BP: 98/54 (17 Jul 2017 11:42) (98/54 - 108/62)  BP(mean): --  RR: 18 (17 Jul 2017 11:42) (18 - 20)  SpO2: 93% (17 Jul 2017 05:42) (93% - 95%)  I&O's Detail                                9.4    4.0   )-----------( 139      ( 15 Jul 2017 16:15 )             30.2     15 Jul 2017 16:15    136    |  94     |  71.0   ----------------------------<  117    4.7     |  27.0   |  3.44     Ca    8.5        15 Jul 2017 16:15    TPro  6.4    /  Alb  3.7    /  TBili  1.0    /  DBili  x      /  AST  39     /  ALT  70     /  AlkPhos  554    15 Jul 2017 16:15      CAPILLARY BLOOD GLUCOSE        LIVER FUNCTIONS - ( 15 Jul 2017 16:15 )  Alb: 3.7 g/dL / Pro: 6.4 g/dL / ALK PHOS: 554 U/L / ALT: 70 U/L / AST: 39 U/L / GGT: x               MEDICATIONS  (STANDING):  heparin  Injectable 5000 Unit(s) SubCutaneous every 8 hours  aspirin enteric coated 81 milliGRAM(s) Oral daily  calcium acetate 667 milliGRAM(s) Oral three times a day with meals  cyanocobalamin 1000 MICROGram(s) Oral daily  gabapentin 300 milliGRAM(s) Oral every 8 hours  epoetin jaswinder Injectable 02815 Unit(s) IV Push <User Schedule>  atorvastatin 40 milliGRAM(s) Oral at bedtime  folic acid 1 milliGRAM(s) Oral daily  Nephro-toi 1 Tablet(s) Oral daily  ammonium lactate 12% Lotion 1 Application(s) Topical daily  ALBUTerol/ipratropium for Nebulization 3 milliLiter(s) Nebulizer every 6 hours  midodrine 10 milliGRAM(s) Oral two times a day    MEDICATIONS  (PRN):  acetaminophen   Tablet. 650 milliGRAM(s) Oral every 6 hours PRN Mild Pain (1 - 3)  guaiFENesin    Syrup 200 milliGRAM(s) Oral every 6 hours PRN Cough  lactulose Syrup 10 Gram(s) Oral daily PRN no bm > 2 days  sodium chloride 0.65% Nasal 1 Spray(s) Both Nostrils three times a day PRN nasal congestion/discomfort from O2      RADIOLOGY & ADDITIONAL TESTS:    ROS: +cough, +sputum, +fatigue  Denies chest pain, dizziness, lightheadedness, fever, chills    PHYSICAL EXAM:    GENERAL: NAD, frail  HEAD:  Atraumatic, Normocephalic  EYES: EOMI, PERRLA, conjunctiva and sclera clear  NECK: Supple, No JVD, Normal thyroid  NERVOUS SYSTEM:  Alert & Oriented X3, no focal deficit  CHEST/LUNG: Course BS B/L decrease right side  HEART: irregular rate and rhythm; No murmurs, rubs, or gallops  ABDOMEN: Soft, Nontender, Nondistended; Bowel sounds present  EXTREMITIES:  2+ Peripheral Pulses, No clubbing, cyanosis, or edema, chronic venous stasis changes  LYMPH: No lymphadenopathy noted  SKIN: B/L UE ecchymoses +

## 2017-07-17 NOTE — PROGRESS NOTE ADULT - ASSESSMENT
Patient with multiple medical problems currently with cough and sputum.  Last CXR with evidence of significant R effusion>has had IR drainage.    Pulmonary hypertension secondary to valvular heart disease.     Plan:  May need repeat drainage pending CXR review  Unless patient has demonstrated significant response to nitric oxide during right heart cath, sildenafil is not indicated.    Further recommendations pending CXR

## 2017-07-17 NOTE — PROGRESS NOTE ADULT - SUBJECTIVE AND OBJECTIVE BOX
HISTORY OF PRESENT ILLNESS  76F was admitted on 6/14/17 after a mechanical fall while walking outside. She had head trauma but no LOC. In ER, GCS=15. A head CT showed left parietal and temporo-occipital SAH. A repeat head CT was stable. Additional workup showed nondisplaced fractures of the right 4-6 ribs with emphysematous changes, pleural effusion and atelectasis. Hospital course was complicated by shortness of breath/hypoxia and needed BiPAP/high-flow NC. She was started on Cefepime for PNA until 7/5. Admitted to acute rehab on 6/27.    PMHx - HTN, ESRD on HD, AFIB (no full AC due to fall and bleeding risk), CAD, COPD (on home O2 overnight), Cholelithiasis, Gout, Hyperlipidemia, Spinal stenosis, s/p AVR/MVR/Tricupsid valve annuloplasty, Cataracts repair, H/O spinal fusion, Appendectomy, Tonsillectomy, C-Spine DJD    TODAY'S SUBJECTIVE & REVIEW OF SYMPTOMS  [X] Constitutional WNL      [X] Cardio WNL             [ ] Resp WNL           [X] GI WNL                      [X]  WNL                  [X] Heme WNL              [X] Endo WNL                  [X] Skin WNL                [ ] MSK WNL            [ ] Neuro WNL                  [ ] Cognitive WNL          [ ] Psych WNL    Doing well. Feels that she is breathing better.   Denies pain, hand pain resolved.  Cough is also improved.   Looking to move on to the next rehab.     VITALS  T(C): 36.4 (07-17-17 @ 05:42)  T(F): 97.6 (07-17-17 @ 05:42), Max: 98.6 (07-16-17 @ 13:00)  HR: 84 (07-17-17 @ 05:42) (74 - 95)  BP: 108/62 (07-17-17 @ 05:42) (101/50 - 108/62)  RR:  (18 - 20)  SpO2:  (93% - 95%)  Wt(kg): --    PHYSICAL EXAM  Constitutional - NAD, Comfortable  HEENT - Facial ecchymosis - Improved, Right supraorbital laceration  Neck - Supple, No limited ROM  Chest - Occasional rhonchi in left > right  Cardiovascular - S1S2  Abdomen - BS+, Soft, NTND  Extremities - No C/C, Mild BLE edema, No calf tenderness, BLE PVD skin changes  Neurologic Exam - Right field cut                    Cognitive - AAO to self, place, month/year, some of situation, Mild deficits     Communication - Expressive deficits     Motor - No focal deficits     Sensory - Intact to LT     Coordination - FTN impaired  Psychiatric - Mood stable, Affect Flat  Skin - BUE - Multiple ecchymosis, left hand edema/pain to palpation    FUNCTIONAL PROGRESS  Gait - Min-Mod A  ADLs - Mod A  Transfers - Mod A   Functional transfers - Mod A    RECENT LABS                          9.4    4.0   )-----------( 139      ( 15 Jul 2017 16:15 )             30.2     07-15    136  |  94<L>  |  71.0<H>  ----------------------------<  117<H>  4.7   |  27.0  |  3.44<H>    Ca    8.5<L>      15 Jul 2017 16:15    TPro  6.4<L>  /  Alb  3.7  /  TBili  1.0  /  DBili  x   /  AST  39<H>  /  ALT  70<H>  /  AlkPhos  554<H>  07-15    Prealb 6/28 - 16    Procalcitonin 7/1 - 2.42, 7/6 - 1.18    Pleural Fluid Cx 7/6 - No growth    RADIOLOGY/OTHER RESULTS  CXR 7/17 - Ordered  CXR 7/12 - Right middle lobe airspace opacity improved. Increased right pleural effusion.   CXR 7/9 - Persistent right middle lobe infiltrate.    CURRENT MEDICATIONS  MEDICATIONS  (STANDING):  heparin  Injectable 5000 Unit(s) SubCutaneous every 8 hours  aspirin enteric coated 81 milliGRAM(s) Oral daily  calcium acetate 667 milliGRAM(s) Oral three times a day with meals  cyanocobalamin 1000 MICROGram(s) Oral daily  gabapentin 300 milliGRAM(s) Oral every 8 hours  epoetin jaswinder Injectable 61985 Unit(s) IV Push <User Schedule>  atorvastatin 40 milliGRAM(s) Oral at bedtime  folic acid 1 milliGRAM(s) Oral daily  Nephro-toi 1 Tablet(s) Oral daily  ammonium lactate 12% Lotion 1 Application(s) Topical daily  ALBUTerol/ipratropium for Nebulization 3 milliLiter(s) Nebulizer every 6 hours  midodrine 10 milliGRAM(s) Oral two times a day    MEDICATIONS  (PRN):  acetaminophen   Tablet. 650 milliGRAM(s) Oral every 6 hours PRN Mild Pain (1 - 3)  guaiFENesin    Syrup 200 milliGRAM(s) Oral every 6 hours PRN Cough  lactulose Syrup 10 Gram(s) Oral daily PRN no bm > 2 days  sodium chloride 0.65% Nasal 1 Spray(s) Both Nostrils three times a day PRN nasal congestion/discomfort from O2    ASSESSMENT & PLAN  76F sustaining a TBI after a fall now with functional deficits  Cardiac/Pulm HTN - Midodrine   Pulm - Duonebs Q6, O2 4L via NC, Guaifenesin PRN, Ocean PRN  CAD - Lipitor, ASA  ESRD on HD - Phoslo, HD T/Th/Sat  Anemia - Epogen, Vit B, Folate  GI/Bowel Management - Toilet PRN, Lactulose PRN   Management - Toilet Q2  Skin - Turn Q2, Lachydrin BLE, Zfloats BLE  Gout - Prednisone 50mg x2 days (7/9-7/11)  Pain - Neurontin, Tylenol PRN, Ice to left hand PRN  DVT PPX - Heparin, TEDs, SCDs  Diet - Renal & Cardiac/Thins, Nephro-toi, Nepro TID    Continue comprehensive acute rehab program consisting of 3hrs/day of OT/PT and SLP

## 2017-07-17 NOTE — PROGRESS NOTE ADULT - ASSESSMENT
1. Elderly F with severe and chronic Pulmonary hypertension most likely secondary to hx of valvular heart disease-Bio AVR/mitral and tricuspid annuloplasty with continued severe TR and some MR. Preserved LV systolic function. Diastolic dysfunction. RV dilation and hypokinesia.  2. chronic AF-not a candidate for chronic systemic AC.  3. CRF-on dilaysis.  4. recurrent right pleural effusion-consider IR repeat drainage.  5. anemia-chronic  6. hypotension-on midodrine

## 2017-07-17 NOTE — PROGRESS NOTE ADULT - SUBJECTIVE AND OBJECTIVE BOX
PULMONARY PROGRESS NOTE      DUSTIN HERNANDEZKADE-[id number]    Patient is a 76y old  Female who presents with a chief complaint of     INTERVAL HPI/OVERNIGHT EVENTS:  Patient states feels much better  Still with cough/sputum  Awaiting chest xray    MEDICATIONS  (STANDING):  heparin  Injectable 5000 Unit(s) SubCutaneous every 8 hours  aspirin enteric coated 81 milliGRAM(s) Oral daily  calcium acetate 667 milliGRAM(s) Oral three times a day with meals  cyanocobalamin 1000 MICROGram(s) Oral daily  gabapentin 300 milliGRAM(s) Oral every 8 hours  epoetin jaswinder Injectable 87386 Unit(s) IV Push <User Schedule>  atorvastatin 40 milliGRAM(s) Oral at bedtime  folic acid 1 milliGRAM(s) Oral daily  Nephro-toi 1 Tablet(s) Oral daily  ammonium lactate 12% Lotion 1 Application(s) Topical daily  ALBUTerol/ipratropium for Nebulization 3 milliLiter(s) Nebulizer every 6 hours  midodrine 10 milliGRAM(s) Oral two times a day      MEDICATIONS  (PRN):  acetaminophen   Tablet. 650 milliGRAM(s) Oral every 6 hours PRN Mild Pain (1 - 3)  guaiFENesin    Syrup 200 milliGRAM(s) Oral every 6 hours PRN Cough  lactulose Syrup 10 Gram(s) Oral daily PRN no bm > 2 days  sodium chloride 0.65% Nasal 1 Spray(s) Both Nostrils three times a day PRN nasal congestion/discomfort from O2      Allergies    allopurinol (Rash)  codeine (Nausea; Vomiting)  penicillin (Rash)  spironolactone (Unknown)    Intolerances        PAST MEDICAL & SURGICAL HISTORY:  ESRD (end stage renal disease) on dialysis: MWF via [facility] Permacath  planned AVF creation  Sinusitis, unspecified chronicity, unspecified location  Gout  Gall stones  Pneumonia  Anemia  Pulmonary hypertension  COPD (chronic obstructive pulmonary disease)  CAD (coronary artery disease)  Atrial fibrillation  Hyperlipidemia  Hypertension  Spinal fusion failure, initial encounter  Tubal ligation status  Sinusitis  Spinal stenosis  S/P tonsillectomy  S/P appendectomy  H/O aortic valve replacement  H/O spinal fusion  H/O cataract  Cystocele  H/O tricuspid valve annuloplasty  H/O mitral valve repair      SOCIAL HISTORY  Smoking History:       REVIEW OF SYSTEMS:    CONSTITUTIONAL:  No distress    HEENT:  Eyes:  No diplopia or blurred vision. ENT:  No earache, sore throat or runny nose.    CARDIOVASCULAR:  No pressure, squeezing, tightness, heaviness or aching about the chest; no palpitations.    RESPIRATORY:  Per HPI    GASTROINTESTINAL:  No nausea, vomiting or diarrhea.    GENITOURINARY:  ESRD>dialysis    MUSCULOSKELETAL:  No joint pain    SKIN:  No new lesions.    NEUROLOGIC:  No paresthesias, fasciculations, seizures or weakness.    PSYCHIATRIC:  No disorder of thought or mood.    ENDOCRINE:  No heat or cold intolerance, polyuria or polydipsia.    HEMATOLOGICAL:  No easy bruising or bleeding.     Vital Signs Last 24 Hrs  T(C): 36.4 (17 Jul 2017 05:42), Max: 37 (16 Jul 2017 13:00)  T(F): 97.6 (17 Jul 2017 05:42), Max: 98.6 (16 Jul 2017 13:00)  HR: 84 (17 Jul 2017 05:42) (74 - 95)  BP: 108/62 (17 Jul 2017 05:42) (101/50 - 108/62)  BP(mean): --  RR: 18 (17 Jul 2017 05:42) (18 - 20)  SpO2: 93% (17 Jul 2017 05:42) (93% - 95%)    PHYSICAL EXAMINATION:    GENERAL: The patient is awake and alert in no apparent distress.     HEENT: Head is normocephalic and atraumatic. Extraocular muscles are intact. Mucous membranes are moist.    NECK: Supple.    LUNGS: Decreased breath sounds RLL approximately 1/4 up    HEART: Regular rate and rhythm without murmur.    ABDOMEN: Soft, nontender, and nondistended.      EXTREMITIES: Without any cyanosis, clubbing, rash, lesions or edema.    NEUROLOGIC: Grossly intact.    SKIN: No ulceration or induration present.      LABS:                        9.4    4.0   )-----------( 139      ( 15 Jul 2017 16:15 )             30.2     07-15    136  |  94<L>  |  71.0<H>  ----------------------------<  117<H>  4.7   |  27.0  |  3.44<H>    Ca    8.5<L>      15 Jul 2017 16:15    TPro  6.4<L>  /  Alb  3.7  /  TBili  1.0  /  DBili  x   /  AST  39<H>  /  ALT  70<H>  /  AlkPhos  554<H>  07-15                        MICROBIOLOGY:    RADIOLOGY & ADDITIONAL STUDIES:

## 2017-07-17 NOTE — PROGRESS NOTE ADULT - SUBJECTIVE AND OBJECTIVE BOX
Chief Complaint: chart and lab reviewed. Pt examined.    HPI: 75 yo F with multiple chronic comorbidities including severe pulmonary hypertension (long hx of valvular heart disease) afib-chronic/aortic valve replacement/mitral and tricuspid annuloplasty/RV dysfunction/CRF on dialysis/pleural effusion/anemia. Pt with dry cough but says she feels "breathing not bad".    PAST MEDICAL & SURGICAL HISTORY:  ESRD (end stage renal disease) on dialysis: MWF via [facility] Permacath  planned AVF creation  Sinusitis, unspecified chronicity, unspecified location  Gout  Gall stones  Pneumonia  Anemia  Pulmonary hypertension  COPD (chronic obstructive pulmonary disease)  CAD (coronary artery disease)  Atrial fibrillation  Hyperlipidemia  Hypertension  Spinal fusion failure, initial encounter  Tubal ligation status  Sinusitis  Spinal stenosis  S/P tonsillectomy  S/P appendectomy  H/O aortic valve replacement  H/O spinal fusion  H/O cataract  Cystocele  H/O tricuspid valve annuloplasty  H/O mitral valve repair      PREVIOUS DIAGNOSTIC TESTING:      ECHO  FINDINGS: LVEF nl. Severe PHT 90mm hg/severe TR/mild MR/bio AVR/RV dilation and hypokinesia    STRESS  FINDINGS:    CATHETERIZATION  FINDINGS:    MEDICATIONS  (STANDING):  heparin  Injectable 5000 Unit(s) SubCutaneous every 8 hours  aspirin enteric coated 81 milliGRAM(s) Oral daily  calcium acetate 667 milliGRAM(s) Oral three times a day with meals  cyanocobalamin 1000 MICROGram(s) Oral daily  gabapentin 300 milliGRAM(s) Oral every 8 hours  epoetin jaswinder Injectable 10684 Unit(s) IV Push <User Schedule>  atorvastatin 40 milliGRAM(s) Oral at bedtime  folic acid 1 milliGRAM(s) Oral daily  Nephro-toi 1 Tablet(s) Oral daily  ammonium lactate 12% Lotion 1 Application(s) Topical daily  ALBUTerol/ipratropium for Nebulization 3 milliLiter(s) Nebulizer every 6 hours  midodrine 10 milliGRAM(s) Oral two times a day    MEDICATIONS  (PRN):  acetaminophen   Tablet. 650 milliGRAM(s) Oral every 6 hours PRN Mild Pain (1 - 3)  guaiFENesin    Syrup 200 milliGRAM(s) Oral every 6 hours PRN Cough  lactulose Syrup 10 Gram(s) Oral daily PRN no bm > 2 days  sodium chloride 0.65% Nasal 1 Spray(s) Both Nostrils three times a day PRN nasal congestion/discomfort from O2      FAMILY HISTORY:  CAD (coronary artery disease)      ROS: Negative other than as mentioned in HPI.    Vital Signs Last 24 Hrs  T(C): 36.3 (17 Jul 2017 11:42), Max: 36.9 (16 Jul 2017 20:14)  T(F): 97.4 (17 Jul 2017 11:42), Max: 98.4 (16 Jul 2017 20:14)  HR: 72 (17 Jul 2017 11:42) (72 - 95)  BP: 90 by palp RA (17 Jul 2017 11:42) (98/54 - 108/62)  BP(mean): --  RR: 18 (17 Jul 2017 11:42) (18 - 20)  SpO2: 94% (17 Jul 2017 12:33) (90% - 95%)    PHYSICAL EXAM:  General: Appears well developed, well nourished alert and cooperative. chronically ill appearing afebrile elderly F nad.  HEENT: Head; normocephalic, atraumatic.  Eyes;   Pupils reactive, cornea wnl.  Neck; Supple, no nodes adenopathy, no NVD or carotid bruit or thyromegaly.  CARDIOVASCULAR; No murmur, rub, gallop + RV lift Normal S1 and S2. irreg rate  LUNGS; Coarse BS bilat with decreased BS right lung. No wheeze or rales. Right chest has dialysis catheter in place.  ABDOMEN ; Soft, nontender without mass or organomegaly. bowel sounds normoactive.  EXTREMITIES; No clubbing, cyanosis or edema.   SKIN; warm and dry with normal turgor. ecchymotic upper extremities  NEURO; Alert/oriented x 3/normal motor exam. No pathologic reflexes.    PSYCH; normal affect.            INTERPRETATION OF TELEMETRY:    ECG: from June-afib    I&O's Detail      LABS:                        9.4    4.0   )-----------( 139      ( 15 Jul 2017 16:15 )             30.2     07-15    136  |  94<L>  |  71.0<H>  ----------------------------<  117<H>  4.7   |  27.0  |  3.44<H>    Ca    8.5<L>      15 Jul 2017 16:15    TPro  6.4<L>  /  Alb  3.7  /  TBili  1.0  /  DBili  x   /  AST  39<H>  /  ALT  70<H>  /  AlkPhos  554<H>  07-15            I&O's Summary      RADIOLOGY & ADDITIONAL STUDIES:

## 2017-07-18 LAB
ANION GAP SERPL CALC-SCNC: 19 MMOL/L — HIGH (ref 5–17)
BUN SERPL-MCNC: 99 MG/DL — HIGH (ref 8–20)
CALCIUM SERPL-MCNC: 9.1 MG/DL — SIGNIFICANT CHANGE UP (ref 8.6–10.2)
CHLORIDE SERPL-SCNC: 92 MMOL/L — LOW (ref 98–107)
CO2 SERPL-SCNC: 26 MMOL/L — SIGNIFICANT CHANGE UP (ref 22–29)
CREAT SERPL-MCNC: 4.77 MG/DL — HIGH (ref 0.5–1.3)
GLUCOSE SERPL-MCNC: 126 MG/DL — HIGH (ref 70–115)
HCT VFR BLD CALC: 29.5 % — LOW (ref 37–47)
HGB BLD-MCNC: 9.1 G/DL — LOW (ref 12–16)
MCHC RBC-ENTMCNC: 30.8 G/DL — LOW (ref 32–36)
MCHC RBC-ENTMCNC: 34.9 PG — HIGH (ref 27–31)
MCV RBC AUTO: 113 FL — HIGH (ref 81–99)
PLATELET # BLD AUTO: 140 K/UL — LOW (ref 150–400)
POTASSIUM SERPL-MCNC: 5.1 MMOL/L — SIGNIFICANT CHANGE UP (ref 3.5–5.3)
POTASSIUM SERPL-SCNC: 5.1 MMOL/L — SIGNIFICANT CHANGE UP (ref 3.5–5.3)
RBC # BLD: 2.61 M/UL — LOW (ref 4.4–5.2)
RBC # FLD: 18.8 % — HIGH (ref 11–15.6)
SODIUM SERPL-SCNC: 137 MMOL/L — SIGNIFICANT CHANGE UP (ref 135–145)
WBC # BLD: 5.8 K/UL — SIGNIFICANT CHANGE UP (ref 4.8–10.8)
WBC # FLD AUTO: 5.8 K/UL — SIGNIFICANT CHANGE UP (ref 4.8–10.8)

## 2017-07-18 PROCEDURE — 99232 SBSQ HOSP IP/OBS MODERATE 35: CPT

## 2017-07-18 PROCEDURE — 99233 SBSQ HOSP IP/OBS HIGH 50: CPT

## 2017-07-18 RX ADMIN — HEPARIN SODIUM 5000 UNIT(S): 5000 INJECTION INTRAVENOUS; SUBCUTANEOUS at 05:50

## 2017-07-18 RX ADMIN — Medication 3 MILLILITER(S): at 02:23

## 2017-07-18 RX ADMIN — Medication 3 MILLILITER(S): at 16:53

## 2017-07-18 RX ADMIN — Medication 667 MILLIGRAM(S): at 18:12

## 2017-07-18 RX ADMIN — Medication 3 MILLILITER(S): at 09:53

## 2017-07-18 RX ADMIN — GABAPENTIN 300 MILLIGRAM(S): 400 CAPSULE ORAL at 05:50

## 2017-07-18 RX ADMIN — MIDODRINE HYDROCHLORIDE 10 MILLIGRAM(S): 2.5 TABLET ORAL at 05:50

## 2017-07-18 RX ADMIN — Medication 200 MILLIGRAM(S): at 21:09

## 2017-07-18 RX ADMIN — MIDODRINE HYDROCHLORIDE 10 MILLIGRAM(S): 2.5 TABLET ORAL at 18:13

## 2017-07-18 RX ADMIN — PREGABALIN 1000 MICROGRAM(S): 225 CAPSULE ORAL at 12:03

## 2017-07-18 RX ADMIN — Medication 1 APPLICATION(S): at 12:02

## 2017-07-18 RX ADMIN — ERYTHROPOIETIN 10000 UNIT(S): 10000 INJECTION, SOLUTION INTRAVENOUS; SUBCUTANEOUS at 13:34

## 2017-07-18 RX ADMIN — Medication 1 MILLIGRAM(S): at 12:03

## 2017-07-18 RX ADMIN — Medication 81 MILLIGRAM(S): at 12:02

## 2017-07-18 RX ADMIN — Medication 1 TABLET(S): at 12:02

## 2017-07-18 RX ADMIN — Medication 667 MILLIGRAM(S): at 12:03

## 2017-07-18 RX ADMIN — Medication 667 MILLIGRAM(S): at 07:46

## 2017-07-18 RX ADMIN — ATORVASTATIN CALCIUM 40 MILLIGRAM(S): 80 TABLET, FILM COATED ORAL at 21:04

## 2017-07-18 RX ADMIN — GABAPENTIN 300 MILLIGRAM(S): 400 CAPSULE ORAL at 21:04

## 2017-07-18 RX ADMIN — Medication 3 MILLILITER(S): at 21:19

## 2017-07-18 NOTE — PROGRESS NOTE ADULT - ASSESSMENT
Recurrent right effusion  Pulmonary hypertension secondary to h/o valvular heart disease>clinically stable but would benefit from thoracentesis at this point.     Plan:  IR thoracentesis arranged for tomorrow  Hold heparin  If effusion recurs again>needs pleurex

## 2017-07-18 NOTE — PROGRESS NOTE ADULT - ASSESSMENT
77 yo female with h/o Atrial fibrillation off anticoagulation due to fall risk and bleeding risk , ESRD on HD ,diastolic CHF , pulmonary hypertension , valvular heart disease  admitted s/p fall at home with left SAH , brain injury and developed respiratory distress being treated for pneumonia  , now in rehab . Chest congestion, cough improving,  BP remains low, medication doses of  sildenafil adjusted by pulmonologist, hypoxic , remains hypotensive, stable now, s/p right pleural effusion removal by IR 7/7 . C/O cough with gabriel phlegm , sob . ON o2 via NC . Repeat chest xray yesterday, may need fluid removal, Pulmonary following.     Problem/Plan - 1:  ·  Problem: Finger joint swelling, left. Plan :  Resolved - likely gout attack .    Problem/Plan - 2:  ·  Problem: Pleural effusion.  Plan: s/p drain by IR , on O2 via NC  , multifactorial reasons for effusion  Repeat CXR shows worsening right pleural effusion. Will discuss with Pulmonary, probably will nee IR drainage    Problem/Plan - 3:  ·  Problem: Pulmonary hypertension.  Plan: off sildenafil due to hypotension , pulmonary following.     Problem/Plan - 4:  ·  Problem: Diastolic heart failure due to valvular disease.  Plan: fluid removal with HD ,  palliative care team on board.     Problem/Plan - 5:  ·  Problem: ESRD (end stage renal disease) on dialysis.  Plan: HD as per renal T-Th-Sat.     Problem/Plan - 6:  Problem: Atrial fibrillation, unspecified type. Plan: off ac due to fall/SAH  rate controlled off AV asher blocking agents.    Problem/Plan - 7:  ·  Problem: Anemia.  Plan: Epogen weekly , hb stable, monitor labs    Problem/Plan - 8:  ·  Problem: Hypotension.  Plan:   Lasix and sildenafil stopped as BP on lower side, monitor closely continue midodrine. 77 yo female with h/o Atrial fibrillation off anticoagulation due to fall risk and bleeding risk , ESRD on HD ,diastolic CHF , pulmonary hypertension , valvular heart disease  admitted s/p fall at home with left SAH , brain injury and developed respiratory distress being treated for pneumonia  , now in rehab . Chest congestion, cough improving,  BP remains low, medication doses of  sildenafil adjusted by pulmonologist, hypoxic , remains hypotensive, stable now, s/p right pleural effusion removal by IR 7/7 . C/O cough with gabriel phlegm , sob . ON o2 via NC . Repeat chest xray yesterday, may need fluid removal, Pulmonary following.     Problem/Plan - 1:  ·  Problem: Pleural effusion.  Plan: s/p drain by IR , on O2 via NC  , multifactorial reasons for effusion  Repeat CXR shows worsening right pleural effusion. Will discuss with Pulmonary, probably will nee IR drainage.     Problem/Plan - 3:  ·  Problem: Pulmonary hypertension.  Plan: off sildenafil due to hypotension , pulmonary following.     Problem/Plan - 4:  ·  Problem: Diastolic heart failure due to valvular disease.  Plan: fluid removal with HD ,  palliative care team on board.     Problem/Plan - 5:  ·  Problem: ESRD (end stage renal disease) on dialysis.  Plan: HD as per renal T-Th-Sat.     Problem/Plan - 6:  Problem: Atrial fibrillation, unspecified type. Plan: off ac due to fall/SAH  rate controlled off AV asher blocking agents.    Problem/Plan - 7:  ·  Problem: Anemia.  Plan: Epogen weekly , hb stable, monitor labs    Problem/Plan - 8:  ·  Problem: Hypotension.  Plan:   Lasix and sildenafil stopped as BP on lower side, monitor closely continue midodrine.

## 2017-07-18 NOTE — PROGRESS NOTE ADULT - SUBJECTIVE AND OBJECTIVE BOX
PULMONARY PROGRESS NOTE      DUSTIN HERNANDEZKADE-[id number]    Patient is a 76y old  Female who presents with a chief complaint of     INTERVAL HPI/OVERNIGHT EVENT  No new complaints  Currently in dialysis    MEDICATIONS  (STANDING):  heparin  Injectable 5000 Unit(s) SubCutaneous every 8 hours  aspirin enteric coated 81 milliGRAM(s) Oral daily  calcium acetate 667 milliGRAM(s) Oral three times a day with meals  cyanocobalamin 1000 MICROGram(s) Oral daily  gabapentin 300 milliGRAM(s) Oral every 8 hours  epoetin jaswinder Injectable 46003 Unit(s) IV Push <User Schedule>  atorvastatin 40 milliGRAM(s) Oral at bedtime  folic acid 1 milliGRAM(s) Oral daily  Nephro-toi 1 Tablet(s) Oral daily  ammonium lactate 12% Lotion 1 Application(s) Topical daily  ALBUTerol/ipratropium for Nebulization 3 milliLiter(s) Nebulizer every 6 hours  midodrine 10 milliGRAM(s) Oral two times a day      MEDICATIONS  (PRN):  acetaminophen   Tablet. 650 milliGRAM(s) Oral every 6 hours PRN Mild Pain (1 - 3)  guaiFENesin    Syrup 200 milliGRAM(s) Oral every 6 hours PRN Cough  lactulose Syrup 10 Gram(s) Oral daily PRN no bm > 2 days  sodium chloride 0.65% Nasal 1 Spray(s) Both Nostrils three times a day PRN nasal congestion/discomfort from O2      Allergies    allopurinol (Rash)  codeine (Nausea; Vomiting)  penicillin (Rash)  spironolactone (Unknown)    Intolerances        PAST MEDICAL & SURGICAL HISTORY:  ESRD (end stage renal disease) on dialysis: MWF via R [facility] Permacath  planned AVF creation  Sinusitis, unspecified chronicity, unspecified location  Gout  Gall stones  Pneumonia  Anemia  Pulmonary hypertension  COPD (chronic obstructive pulmonary disease)  CAD (coronary artery disease)  Atrial fibrillation  Hyperlipidemia  Hypertension  Spinal fusion failure, initial encounter  Tubal ligation status  Sinusitis  Spinal stenosis  S/P tonsillectomy  S/P appendectomy  H/O aortic valve replacement  H/O spinal fusion  H/O cataract  Cystocele  H/O tricuspid valve annuloplasty  H/O mitral valve repair      SOCIAL HISTORY  Smoking History:       REVIEW OF SYSTEMS:    CONSTITUTIONAL:  No distress    HEENT:  Eyes:  No diplopia or blurred vision. ENT:  No earache, sore throat or runny nose.    CARDIOVASCULAR:  No pressure, squeezing, tightness, heaviness or aching about the chest; no palpitations.    RESPIRATORY:  No cough, shortness of breath, PND or orthopnea. Mild SOBOE    GASTROINTESTINAL:  No nausea, vomiting or diarrhea.    GENITOURINARY:  No dysuria, frequency or urgency.    MUSCULOSKELETAL:  No joint pain    SKIN:  No new lesions.    NEUROLOGIC:  No paresthesias, fasciculations, seizures or weakness.    PSYCHIATRIC:  No disorder of thought or mood.    ENDOCRINE:  No heat or cold intolerance, polyuria or polydipsia.    HEMATOLOGICAL:  No easy bruising or bleeding.     Vital Signs Last 24 Hrs  T(C): 36.3 (18 Jul 2017 13:35), Max: 36.5 (18 Jul 2017 12:06)  T(F): 97.4 (18 Jul 2017 13:35), Max: 97.7 (18 Jul 2017 12:06)  HR: 76 (18 Jul 2017 13:35) (68 - 82)  BP: 107/54 (18 Jul 2017 13:35) (100/52 - 114/71)  BP(mean): --  RR: 18 (18 Jul 2017 13:35) (18 - 93)  SpO2: 98% (18 Jul 2017 12:06) (92% - 98%)    PHYSICAL EXAMINATION:    GENERAL: The patient is awake and alert in no apparent distress.     HEENT: Head is normocephalic and atraumatic. Extraocular muscles are intact. Mucous membranes are moist.    NECK: Supple.    LUNGS: Decreased breath sounds right hemithorax    HEART: Regular rate and rhythm without murmur.    ABDOMEN: Soft, nontender, and nondistended.      EXTREMITIES: Without any cyanosis, clubbing, rash, lesions or edema.    NEUROLOGIC: Grossly intact.    SKIN: No ulceration or induration present.      LABS:                        9.1    5.8   )-----------( 140      ( 18 Jul 2017 13:41 )             29.5     07-18    137  |  92<L>  |  99.0<H>  ----------------------------<  126<H>  5.1   |  26.0  |  4.77<H>    Ca    9.1      18 Jul 2017 13:41                          MICROBIOLOGY:    RADIOLOGY & ADDITIONAL STUDIES:< from: Xray Chest 2 Views PA/Lat (07.17.17 @ 16:57) >   EXAM:  CHEST P.A. LATERAL                          PROCEDURE DATE:  07/17/2017          INTERPRETATION:  EXAMINATION DATE: July 17, 2017 at 1650 hours.  CLINICAL INFORMATION: Effusion, shortness of breath, hypoxia.    TECHNIQUE: PA and lateral views of the chest   COMPARISON: July 13, 2017.    FINDINGS:    LINES/TUBES: Unchanged right-sided central venous catheter with tip   overlying the superior vena cava.  LUNGS/PLEURA: Increased size of right pleural effusion associated   atelectasis, now moderate. Bilateral perihilar opacities, probably   pulmonary edema. No pneumothorax.  MEDIASTINUM: Cardiomegaly. Aortic valve replacement.  OTHER: Sternotomy.    IMPRESSION:     1.  Since July 13, 2017, increased size of right pleural effusion with   associated atelectasis, now moderate.  2.  Bilateral perihilar opacities, probably pulmonary edema.    < end of copied text >

## 2017-07-18 NOTE — PROGRESS NOTE ADULT - SUBJECTIVE AND OBJECTIVE BOX
:CC: F/u fall with left SAH, recent treatment for PNA and pleural effusion s/p tap    HPI:76F was admitted on 6/14/17 after a mechanical fall while walking outside. She had head trauma but no LOC. In ER, GCS=15. A head CT showed left parietal and temporo-occipital SAH. A repeat head CT was stable. Additional workup showed nondisplaced fractures of the right 4-6 ribs with emphysematous changes, pleural effusion and atelectasis. Hospital course was complicated by shortness of breath/hypoxia and needed BiPAP/high-flow NC. She was started on Cefepime for PNA. Admitted to acute rehab on 6/27.    INTERVAL HPI/OVERNIGHT EVENTS: More alert today, still with cough and sputum. Feels better as per patient    Vital Signs Last 24 Hrs  T(C): 36.3 (18 Jul 2017 05:37), Max: 36.3 (17 Jul 2017 11:42)  T(F): 97.4 (18 Jul 2017 05:37), Max: 97.4 (17 Jul 2017 11:42)  HR: 68 (18 Jul 2017 09:55) (68 - 82)  BP: 114/71 (18 Jul 2017 05:37) (98/54 - 114/71)  BP(mean): --  RR: 20 (18 Jul 2017 07:52) (18 - 93)  SpO2: 92% (18 Jul 2017 09:55) (90% - 94%)  I&O's Detail                    CAPILLARY BLOOD GLUCOSE              MEDICATIONS  (STANDING):  heparin  Injectable 5000 Unit(s) SubCutaneous every 8 hours  aspirin enteric coated 81 milliGRAM(s) Oral daily  calcium acetate 667 milliGRAM(s) Oral three times a day with meals  cyanocobalamin 1000 MICROGram(s) Oral daily  gabapentin 300 milliGRAM(s) Oral every 8 hours  epoetin jaswinder Injectable 08402 Unit(s) IV Push <User Schedule>  atorvastatin 40 milliGRAM(s) Oral at bedtime  folic acid 1 milliGRAM(s) Oral daily  Nephro-toi 1 Tablet(s) Oral daily  ammonium lactate 12% Lotion 1 Application(s) Topical daily  ALBUTerol/ipratropium for Nebulization 3 milliLiter(s) Nebulizer every 6 hours  midodrine 10 milliGRAM(s) Oral two times a day    MEDICATIONS  (PRN):  acetaminophen   Tablet. 650 milliGRAM(s) Oral every 6 hours PRN Mild Pain (1 - 3)  guaiFENesin    Syrup 200 milliGRAM(s) Oral every 6 hours PRN Cough  lactulose Syrup 10 Gram(s) Oral daily PRN no bm > 2 days  sodium chloride 0.65% Nasal 1 Spray(s) Both Nostrils three times a day PRN nasal congestion/discomfort from O2      RADIOLOGY & ADDITIONAL TESTS:    ROS: +cough, +sputum  Denies chest pain, dizziness, lightheadedness, fever, chills    PHYSICAL EXAM:    GENERAL: NAD, frail  HEAD:  Atraumatic, Normocephalic  EYES: EOMI, PERRLA, conjunctiva and sclera clear  NECK: Supple, No JVD, Normal thyroid  NERVOUS SYSTEM:  Alert & Oriented X3, no focal deficit  CHEST/LUNG: Course BS B/L decrease right side  HEART: irregular rate and rhythm; No murmurs, rubs, or gallops  ABDOMEN: Soft, Nontender, Nondistended; Bowel sounds present  EXTREMITIES:  2+ Peripheral Pulses, No clubbing, cyanosis, or edema, chronic venous stasis changes  LYMPH: No lymphadenopathy noted  SKIN: B/L UE ecchymoses + CC: Cough with gabriel phlegm , sob    F/u fall with  left SAH, recent treatment for PNA and pleural effusion s/p tap    HPI:76F was admitted on 6/14/17 after a mechanical fall while walking outside. She had head trauma but no LOC. In ER, GCS=15. A head CT showed left parietal and temporo-occipital SAH. A repeat head CT was stable. Additional workup showed nondisplaced fractures of the right 4-6 ribs with emphysematous changes, pleural effusion and atelectasis. Hospital course was complicated by shortness of breath/hypoxia and needed BiPAP/high-flow NC. She was started on Cefepime for PNA. Admitted to acute rehab on 6/27. Feels fatigue , CXR with increasing R pleural effusion , b/l opacity - likely     INTERVAL HPI/OVERNIGHT EVENTS: More alert today, still with cough and sputum. Feels better as per patient    Vital Signs Last 24 Hrs  T(C): 36.3 (18 Jul 2017 05:37), Max: 36.3 (17 Jul 2017 11:42)  T(F): 97.4 (18 Jul 2017 05:37), Max: 97.4 (17 Jul 2017 11:42)  HR: 68 (18 Jul 2017 09:55) (68 - 82)  BP: 114/71 (18 Jul 2017 05:37) (98/54 - 114/71)  BP(mean): --  RR: 20 (18 Jul 2017 07:52) (18 - 93)  SpO2: 92% (18 Jul 2017 09:55) (90% - 94%)  I&O's Detail          MEDICATIONS  (STANDING):  heparin  Injectable 5000 Unit(s) SubCutaneous every 8 hours  aspirin enteric coated 81 milliGRAM(s) Oral daily  calcium acetate 667 milliGRAM(s) Oral three times a day with meals  cyanocobalamin 1000 MICROGram(s) Oral daily  gabapentin 300 milliGRAM(s) Oral every 8 hours  epoetin jaswinder Injectable 18431 Unit(s) IV Push <User Schedule>  atorvastatin 40 milliGRAM(s) Oral at bedtime  folic acid 1 milliGRAM(s) Oral daily  Nephro-toi 1 Tablet(s) Oral daily  ammonium lactate 12% Lotion 1 Application(s) Topical daily  ALBUTerol/ipratropium for Nebulization 3 milliLiter(s) Nebulizer every 6 hours  midodrine 10 milliGRAM(s) Oral two times a day    MEDICATIONS  (PRN):  acetaminophen   Tablet. 650 milliGRAM(s) Oral every 6 hours PRN Mild Pain (1 - 3)  guaiFENesin    Syrup 200 milliGRAM(s) Oral every 6 hours PRN Cough  lactulose Syrup 10 Gram(s) Oral daily PRN no bm > 2 days  sodium chloride 0.65% Nasal 1 Spray(s) Both Nostrils three times a day PRN nasal congestion/discomfort from O2      RADIOLOGY & ADDITIONAL TESTS:    < from: Xray Chest 2 Views PA/Lat (07.17.17 @ 16:57) >     EXAM:  CHEST P.A. LATERAL                          PROCEDURE DATE:  07/17/2017          INTERPRETATION:  EXAMINATION DATE: July 17, 2017 at 1650 hours.  CLINICAL INFORMATION: Effusion, shortness of breath, hypoxia.    TECHNIQUE: PA and lateral views of the chest   COMPARISON: July 13, 2017.    FINDINGS:    LINES/TUBES: Unchanged right-sided central venous catheter with tip   overlying the superior vena cava.  LUNGS/PLEURA: Increased size of right pleural effusion associated   atelectasis, now moderate. Bilateral perihilar opacities, probably   pulmonary edema. No pneumothorax.  MEDIASTINUM: Cardiomegaly. Aortic valve replacement.  OTHER: Sternotomy.    IMPRESSION:     1.  Since July 13, 2017, increased size of right pleural effusion with   associated atelectasis, now moderate.  2.  Bilateral perihilar opacities, probably pulmonary edema.          VENKATESH BRAUN M.D., ATTENDING RADIOLOGIST  This document has been electronically signed. Jul 17 2017  6:57PM        < end of copied text >      ROS: +cough, +sputum  Denies chest pain, dizziness, lightheadedness, fever, chills    PHYSICAL EXAM:    GENERAL: NAD, frail  HEAD:  Atraumatic, Normocephalic  EYES: EOMI, PERRLA, conjunctiva and sclera clear  NECK: Supple, No JVD, Normal thyroid  NERVOUS SYSTEM:  Alert & Oriented X3, no focal deficit  CHEST/LUNG: Course BS, B/L decrease bs R base > L  HEART: irregular rate and rhythm; No murmurs, rubs, or gallops  ABDOMEN: Soft, Nontender, Nondistended; Bowel sounds present  EXTREMITIES:  2+ Peripheral Pulses, No clubbing, cyanosis, or edema, chronic venous stasis changes  LYMPH: No lymphadenopathy noted  SKIN: B/L UE ecchymoses +

## 2017-07-18 NOTE — PROGRESS NOTE ADULT - SUBJECTIVE AND OBJECTIVE BOX
HISTORY OF PRESENT ILLNESS  76F was admitted on 6/14/17 after a mechanical fall while walking outside. She had head trauma but no LOC. In ER, GCS=15. A head CT showed left parietal and temporo-occipital SAH. A repeat head CT was stable. Additional workup showed nondisplaced fractures of the right 4-6 ribs with emphysematous changes, pleural effusion and atelectasis. Hospital course was complicated by shortness of breath/hypoxia and needed BiPAP/high-flow NC. She was started on Cefepime for PNA until 7/5. Admitted to acute rehab on 6/27.    PMHx - HTN, ESRD on HD, AFIB (no full AC due to fall and bleeding risk), CAD, COPD (on home O2 overnight), Cholelithiasis, Gout, Hyperlipidemia, Spinal stenosis, s/p AVR/MVR/Tricupsid valve annuloplasty, Cataracts repair, H/O spinal fusion, Appendectomy, Tonsillectomy, C-Spine DJD    TODAY'S SUBJECTIVE & REVIEW OF SYMPTOMS  [X] Constitutional WNL      [X] Cardio WNL             [ ] Resp WNL           [X] GI WNL                      [X]  WNL                  [X] Heme WNL              [X] Endo WNL                  [X] Skin WNL                [ ] MSK WNL            [ ] Neuro WNL                  [ ] Cognitive WNL          [ ] Psych WNL    No overnight events. Continues to have improved clinical exam, however, right pleural effusion is worsening and planning on draining it again. Extended patient another week as patient is making progress and not medically ready for discharge. Possibly able to get patient home if she continues to make progress.     VITALS  T(C): 36.4 (07-17-17 @ 05:42)  T(F): 97.6 (07-17-17 @ 05:42), Max: 98.6 (07-16-17 @ 13:00)  HR: 84 (07-17-17 @ 05:42) (74 - 95)  BP: 108/62 (07-17-17 @ 05:42) (101/50 - 108/62)  RR:  (18 - 20)  SpO2:  (93% - 95%)  Wt(kg): --    PHYSICAL EXAM  Constitutional - NAD, Comfortable  HEENT - Facial ecchymosis - Improved, Right supraorbital laceration  Neck - Supple, No limited ROM  Chest - CTAB  Cardiovascular - S1S2  Abdomen - BS+, Soft, NTND  Extremities - No C/C, Mild BLE edema, No calf tenderness, BLE PVD skin changes  Neurologic Exam - Right field cut                    Cognitive - AAO to self, place, month/year, some of situation, Mild deficits     Communication - Expressive deficits     Motor - No focal deficits     Sensory - Intact to LT     Coordination - FTN impaired  Psychiatric - Mood stable, Affect Flat  Skin - BUE - Multiple ecchymosis, left hand edema/pain to palpation    FUNCTIONAL PROGRESS  Gait - Min-Mod A  ADLs - Mod A  Transfers - Mod A   Functional transfers - Mod A    RECENT LABS                          9.4    4.0   )-----------( 139      ( 15 Jul 2017 16:15 )             30.2     07-15    136  |  94<L>  |  71.0<H>  ----------------------------<  117<H>  4.7   |  27.0  |  3.44<H>    Ca    8.5<L>      15 Jul 2017 16:15    TPro  6.4<L>  /  Alb  3.7  /  TBili  1.0  /  DBili  x   /  AST  39<H>  /  ALT  70<H>  /  AlkPhos  554<H>  07-15    Prealb 6/28 - 16    Procalcitonin 7/1 - 2.42, 7/6 - 1.18    Pleural Fluid Cx 7/6 - No growth    RADIOLOGY/OTHER RESULTS  CXR 7/17 - Increased size of right pleural effusion with associated atelectasis, now moderate. Bilateral perihilar opacities, probably pulmonary edema.  CXR 7/12 - Right middle lobe airspace opacity improved. Increased right pleural effusion.   CXR 7/9 - Persistent right middle lobe infiltrate.    CURRENT MEDICATIONS  MEDICATIONS  (STANDING):  heparin  Injectable 5000 Unit(s) SubCutaneous every 8 hours  aspirin enteric coated 81 milliGRAM(s) Oral daily  calcium acetate 667 milliGRAM(s) Oral three times a day with meals  cyanocobalamin 1000 MICROGram(s) Oral daily  gabapentin 300 milliGRAM(s) Oral every 8 hours  epoetin jaswinder Injectable 53026 Unit(s) IV Push <User Schedule>  atorvastatin 40 milliGRAM(s) Oral at bedtime  folic acid 1 milliGRAM(s) Oral daily  Nephro-toi 1 Tablet(s) Oral daily  ammonium lactate 12% Lotion 1 Application(s) Topical daily  ALBUTerol/ipratropium for Nebulization 3 milliLiter(s) Nebulizer every 6 hours  midodrine 10 milliGRAM(s) Oral two times a day    MEDICATIONS  (PRN):  acetaminophen   Tablet. 650 milliGRAM(s) Oral every 6 hours PRN Mild Pain (1 - 3)  guaiFENesin    Syrup 200 milliGRAM(s) Oral every 6 hours PRN Cough  lactulose Syrup 10 Gram(s) Oral daily PRN no bm > 2 days  sodium chloride 0.65% Nasal 1 Spray(s) Both Nostrils three times a day PRN nasal congestion/discomfort from O2  congestion/discomfort from O2    ASSESSMENT & PLAN  76F sustaining a TBI after a fall now with functional deficits  Cardiac/Pulm HTN - Midodrine   Pulm - Duonebs Q6, O2 4L via NC, Guaifenesin PRN, Ocean PRN  CAD - Lipitor, ASA  ESRD on HD - Phoslo, HD T/Th/Sat  Anemia - Epogen, Vit B, Folate  GI/Bowel Management - Toilet PRN, Lactulose PRN   Management - Toilet Q2  Skin - Turn Q2, Lachydrin BLE, Zfloats BLE  Gout - Prednisone 50mg x2 days (7/9-7/11)  Pain - Neurontin, Tylenol PRN, Ice to left hand PRN  DVT PPX - Heparin, TEDs, SCDs  Diet - Renal & Cardiac/Thins, Nephro-toi, Nepro TID    Continue comprehensive acute rehab program consisting of 3hrs/day of OT/PT and SLP

## 2017-07-19 LAB
HBV CORE AB SER-ACNC: SIGNIFICANT CHANGE UP
HBV SURFACE AB SER-ACNC: <3 MIU/ML — LOW
HBV SURFACE AG SER-ACNC: SIGNIFICANT CHANGE UP
HCV AB S/CO SERPL IA: 0.04 S/CO — SIGNIFICANT CHANGE UP
HCV AB SERPL-IMP: SIGNIFICANT CHANGE UP

## 2017-07-19 PROCEDURE — 99233 SBSQ HOSP IP/OBS HIGH 50: CPT

## 2017-07-19 PROCEDURE — 71010: CPT | Mod: 26

## 2017-07-19 RX ORDER — HEPARIN SODIUM 5000 [USP'U]/ML
5000 INJECTION INTRAVENOUS; SUBCUTANEOUS EVERY 8 HOURS
Qty: 0 | Refills: 0 | Status: DISCONTINUED | OUTPATIENT
Start: 2017-07-20 | End: 2017-07-26

## 2017-07-19 RX ADMIN — Medication 1 TABLET(S): at 11:55

## 2017-07-19 RX ADMIN — GABAPENTIN 300 MILLIGRAM(S): 400 CAPSULE ORAL at 14:49

## 2017-07-19 RX ADMIN — PREGABALIN 1000 MICROGRAM(S): 225 CAPSULE ORAL at 11:55

## 2017-07-19 RX ADMIN — Medication 3 MILLILITER(S): at 03:36

## 2017-07-19 RX ADMIN — Medication 667 MILLIGRAM(S): at 11:56

## 2017-07-19 RX ADMIN — Medication 81 MILLIGRAM(S): at 11:55

## 2017-07-19 RX ADMIN — ATORVASTATIN CALCIUM 40 MILLIGRAM(S): 80 TABLET, FILM COATED ORAL at 21:05

## 2017-07-19 RX ADMIN — Medication 667 MILLIGRAM(S): at 17:33

## 2017-07-19 RX ADMIN — Medication 200 MILLIGRAM(S): at 21:05

## 2017-07-19 RX ADMIN — MIDODRINE HYDROCHLORIDE 10 MILLIGRAM(S): 2.5 TABLET ORAL at 17:33

## 2017-07-19 RX ADMIN — Medication 3 MILLILITER(S): at 14:35

## 2017-07-19 RX ADMIN — Medication 3 MILLILITER(S): at 08:36

## 2017-07-19 RX ADMIN — Medication 3 MILLILITER(S): at 20:42

## 2017-07-19 RX ADMIN — GABAPENTIN 300 MILLIGRAM(S): 400 CAPSULE ORAL at 05:19

## 2017-07-19 RX ADMIN — Medication 667 MILLIGRAM(S): at 08:27

## 2017-07-19 RX ADMIN — GABAPENTIN 300 MILLIGRAM(S): 400 CAPSULE ORAL at 21:05

## 2017-07-19 RX ADMIN — Medication 650 MILLIGRAM(S): at 03:10

## 2017-07-19 RX ADMIN — Medication 1 APPLICATION(S): at 11:57

## 2017-07-19 RX ADMIN — Medication 1 MILLIGRAM(S): at 11:55

## 2017-07-19 RX ADMIN — MIDODRINE HYDROCHLORIDE 10 MILLIGRAM(S): 2.5 TABLET ORAL at 05:20

## 2017-07-19 RX ADMIN — Medication 650 MILLIGRAM(S): at 02:10

## 2017-07-19 NOTE — PROGRESS NOTE ADULT - ASSESSMENT
Assessment  Chronic diastolic HF  s/p TAVR with normal Ef and mild to mod MR  ESRD on HD  AF not Ac candidiate      Rec  Cont current meds, will FU in office  please recall as needed

## 2017-07-19 NOTE — PROGRESS NOTE ADULT - SUBJECTIVE AND OBJECTIVE BOX
VIR Post Procedure Note    Dx: TBI after fall. Right pleural effusion  Procedure: Bedside right thoracentesis. 1L clear fluid aspirated similar to prior thoracentesis. No complications. No sedation. No blood loss.    Len Church MD  VIR

## 2017-07-19 NOTE — PROGRESS NOTE ADULT - SUBJECTIVE AND OBJECTIVE BOX
HISTORY OF PRESENT ILLNESS  76F was admitted on 6/14/17 after a mechanical fall while walking outside. She had head trauma but no LOC. In ER, GCS=15. A head CT showed left parietal and temporo-occipital SAH. A repeat head CT was stable. Additional workup showed nondisplaced fractures of the right 4-6 ribs with emphysematous changes, pleural effusion and atelectasis. Hospital course was complicated by shortness of breath/hypoxia and needed BiPAP/high-flow NC. She was started on Cefepime for PNA until 7/5. Admitted to acute rehab on 6/27.    PMHx - HTN, ESRD on HD, AFIB (no full AC due to fall and bleeding risk), CAD, COPD (on home O2 overnight), Cholelithiasis, Gout, Hyperlipidemia, Spinal stenosis, s/p AVR/MVR/Tricupsid valve annuloplasty, Cataracts repair, H/O spinal fusion, Appendectomy, Tonsillectomy, C-Spine DJD    TODAY'S SUBJECTIVE & REVIEW OF SYMPTOMS  [X] Constitutional WNL      [X] Cardio WNL             [ ] Resp WNL           [X] GI WNL                      [X]  WNL                  [X] Heme WNL              [X] Endo WNL                  [X] Skin WNL                [ ] MSK WNL            [ ] Neuro WNL                  [ ] Cognitive WNL          [ ] Psych WNL    Patient having a pleural effusion drained. Removed over a liter.   Patient prior was doing well. Denies pain.   Slept poorly due to being anxious about procedure.     VITALS  T(C): 36.5 (07-19-17 @ 05:16)  T(F): 97.7 (07-19-17 @ 05:16), Max: 97.7 (07-18-17 @ 12:06)  HR: 83 (07-19-17 @ 05:16) (68 - 84)  BP: 97/59 (07-19-17 @ 05:16) (91/46 - 117/57)  RR:  (18 - 18)  SpO2:  (92% - 98%)  Wt(kg): --    PHYSICAL EXAM  Constitutional - NAD, Comfortable  HEENT - Facial ecchymosis - Improved, Right supraorbital laceration  Neck - Supple, No limited ROM  Chest - CTAB  Cardiovascular - S1S2  Abdomen - BS+, Soft, NTND  Extremities - No C/C, Mild BLE edema, No calf tenderness, BLE PVD skin changes  Neurologic Exam - Right field cut                    Cognitive - AAO to self, place, month/year, some of situation, Mild deficits     Communication - Expressive deficits     Motor - No focal deficits     Sensory - Intact to LT     Coordination - FTN impaired  Psychiatric - Mood stable, Affect Flat  Skin - BUE - Multiple ecchymosis, left hand edema/pain to palpation    FUNCTIONAL PROGRESS  Gait - Min-Mod A  ADLs - Mod A  Transfers - Mod A   Functional transfers - Mod A    RECENT LABS                           9.1    5.8   )-----------( 140      ( 18 Jul 2017 13:41 )             29.5     07-18    137  |  92<L>  |  99.0<H>  ----------------------------<  126<H>  5.1   |  26.0  |  4.77<H>    Ca    9.1      18 Jul 2017 13:41    TPro  6.4<L>  /  Alb  3.7  /  TBili  1.0  /  DBili  x   /  AST  39<H>  /  ALT  70<H>  /  AlkPhos  554<H>  07-15    Prealb 6/28 - 16    Procalcitonin 7/1 - 2.42, 7/6 - 1.18    Pleural Fluid Cx 7/6 - No growth    RADIOLOGY/OTHER RESULTS  CXR 7/17 - Increased size of right pleural effusion with associated atelectasis, now moderate. Bilateral perihilar opacities, probably pulmonary edema.  CXR 7/12 - Right middle lobe airspace opacity improved. Increased right pleural effusion.   CXR 7/9 - Persistent right middle lobe infiltrate.    CURRENT MEDICATIONS  MEDICATIONS  (STANDING):  aspirin enteric coated 81 milliGRAM(s) Oral daily  calcium acetate 667 milliGRAM(s) Oral three times a day with meals  cyanocobalamin 1000 MICROGram(s) Oral daily  gabapentin 300 milliGRAM(s) Oral every 8 hours  epoetin jaswinder Injectable 19025 Unit(s) IV Push <User Schedule>  atorvastatin 40 milliGRAM(s) Oral at bedtime  folic acid 1 milliGRAM(s) Oral daily  Nephro-toi 1 Tablet(s) Oral daily  ammonium lactate 12% Lotion 1 Application(s) Topical daily  ALBUTerol/ipratropium for Nebulization 3 milliLiter(s) Nebulizer every 6 hours  midodrine 10 milliGRAM(s) Oral two times a day    MEDICATIONS  (PRN):  acetaminophen   Tablet. 650 milliGRAM(s) Oral every 6 hours PRN Mild Pain (1 - 3)  guaiFENesin    Syrup 200 milliGRAM(s) Oral every 6 hours PRN Cough  lactulose Syrup 10 Gram(s) Oral daily PRN no bm > 2 days  sodium chloride 0.65% Nasal 1 Spray(s) Both Nostrils three times a day PRN nasal congestion/discomfort from O2    ASSESSMENT & PLAN  76F sustaining a TBI after a fall now with functional deficits  Cardiac/Pulm HTN - Midodrine   Pulm - Duonebs Q6, O2 4L via NC, Guaifenesin PRN, Ocean PRN  CAD - Lipitor, ASA  ESRD on HD - Phoslo, HD T/Th/Sat  Anemia - Epogen, Vit B, Folate  GI/Bowel Management - Toilet PRN, Lactulose PRN   Management - Toilet Q2  Skin - Turn Q2, Lachydrin BLE, Zfloats BLE  Gout - Prednisone 50mg x2 days (7/9-7/11)  Pain - Neurontin, Tylenol PRN, Ice to left hand PRN  DVT PPX - Heparin (resume 7/20), TEDs, SCDs  Diet - Renal & Cardiac/Thins, Nephro-toi, Nepro TID    Continue comprehensive acute rehab program consisting of 3hrs/day of OT/PT and SLP

## 2017-07-19 NOTE — PROGRESS NOTE ADULT - SUBJECTIVE AND OBJECTIVE BOX
CC: Cough with gabriel phlegm , sob . Now better after thoracentesis    F/u fall with  left SAH, recent treatment for PNA and pleural effusion s/p tap    HPI:76F was admitted on 6/14/17 after a mechanical fall while walking outside. She had head trauma but no LOC. In ER, GCS=15. A head CT showed left parietal and temporo-occipital SAH. A repeat head CT was stable. Additional workup showed nondisplaced fractures of the right 4-6 ribs with emphysematous changes, pleural effusion and atelectasis. Hospital course was complicated by shortness of breath/hypoxia and needed BiPAP/high-flow NC. She was started on Cefepime for PNA. Admitted to acute rehab on 6/27. Feels fatigue , CXR with increasing R pleural effusion , b/l opacity . Today s/p thoracentesis , 1 L removed . SOB decreased , will repeat CXR in 2-3 days , Pulmonary following .      PAST MEDICAL & SURGICAL HISTORY:  ESRD (end stage renal disease) on dialysis: MWF via [facility] Permacath  planned AVF creation  Sinusitis, unspecified chronicity, unspecified location  Gout  Gall stones  Pneumonia  Anemia  Pulmonary hypertension  COPD (chronic obstructive pulmonary disease)  CAD (coronary artery disease)  Atrial fibrillation  Hyperlipidemia  Hypertension  Spinal fusion failure, initial encounter  Tubal ligation status  Sinusitis  Spinal stenosis  S/P tonsillectomy  S/P appendectomy  H/O aortic valve replacement  H/O spinal fusion  H/O cataract  Cystocele  H/O tricuspid valve annuloplasty  H/O mitral valve repair      MEDICATIONS  (STANDING):  aspirin enteric coated 81 milliGRAM(s) Oral daily  calcium acetate 667 milliGRAM(s) Oral three times a day with meals  cyanocobalamin 1000 MICROGram(s) Oral daily  gabapentin 300 milliGRAM(s) Oral every 8 hours  epoetin jaswinder Injectable 44633 Unit(s) IV Push <User Schedule>  atorvastatin 40 milliGRAM(s) Oral at bedtime  folic acid 1 milliGRAM(s) Oral daily  Nephro-toi 1 Tablet(s) Oral daily  ammonium lactate 12% Lotion 1 Application(s) Topical daily  ALBUTerol/ipratropium for Nebulization 3 milliLiter(s) Nebulizer every 6 hours  midodrine 10 milliGRAM(s) Oral two times a day    MEDICATIONS  (PRN):  acetaminophen   Tablet. 650 milliGRAM(s) Oral every 6 hours PRN Mild Pain (1 - 3)  guaiFENesin    Syrup 200 milliGRAM(s) Oral every 6 hours PRN Cough  lactulose Syrup 10 Gram(s) Oral daily PRN no bm > 2 days  sodium chloride 0.65% Nasal 1 Spray(s) Both Nostrils three times a day PRN nasal congestion/discomfort from O2      LABS:                          9.1    5.8   )-----------( 140      ( 18 Jul 2017 13:41 )             29.5     07-18    137  |  92<L>  |  99.0<H>  ----------------------------<  126<H>  5.1   |  26.0  |  4.77<H>    Ca    9.1      18 Jul 2017 13:41            RADIOLOGY & ADDITIONAL TESTS:      REVIEW OF SYSTEMS:    Cough with gabriel phlegm , sob - decreased after thoracentesis     Vital Signs Last 24 Hrs  T(C): 36.1 (19 Jul 2017 12:37), Max: 36.5 (19 Jul 2017 05:16)  T(F): 97 (19 Jul 2017 12:37), Max: 97.7 (19 Jul 2017 05:16)  HR: 79 (19 Jul 2017 12:37) (78 - 84)  BP: 90/58 (19 Jul 2017 12:37) (90/58 - 117/57)  BP(mean): --  RR: 18 (19 Jul 2017 12:37) (18 - 18)  SpO2: 100% (19 Jul 2017 12:37) (94% - 100%)  PHYSICAL EXAM:    GENERAL: NAD, well-groomed, well-developed  HEAD:  Atraumatic, Normocephalic  EYES: EOMI, PERRLA, conjunctiva and sclera clear  NECK: Supple, No JVD, Normal thyroid  NERVOUS SYSTEM:  Alert & Oriented X3, no focal deficit  CHEST/LUNG: CTA b/l ,  no  rales, rhonchi, wheezing, or rubs  HEART: irregularly irregular   ; No murmurs, rubs, or gallops  ABDOMEN: Soft, Nontender, Nondistended; Bowel sounds present  EXTREMITIES:  2+ Peripheral Pulses, No clubbing, cyanosis, or edema  LYMPH: No lymphadenopathy noted  SKIN: No rashes or lesions

## 2017-07-19 NOTE — PROGRESS NOTE ADULT - ASSESSMENT
75 yo female with h/o Atrial fibrillation off anticoagulation due to fall risk and bleeding risk , ESRD on HD ,diastolic CHF , pulmonary hypertension , valvular heart disease  admitted s/p fall at home with left SAH , brain injury and developed respiratory distress being treated for pneumonia  , now in rehab . Chest congestion, cough improving,  BP remains low, medication doses of  sildenafil adjusted by pulmonologist, hypoxic , remains hypotensive, stable now, s/p right pleural effusion removal by IR 7/7 . C/O cough with gabriel phlegm , sob . On  o2 via NC . Repeated  chest xray with increasing R pleural effusion . S/P recurrent R pleural effusion drainage . Feels better . Pulmonary following.     Problem/Plan - 1:  ·  Problem:  Recurrent Pleural effusion.  Plan: s/p drain by IR  X2 , on O2 via NC ,   If pleural effusion reoccur will consider pleurex .    Problem/Plan - 3:  ·  Problem: Pulmonary hypertension.  Plan: off sildenafil due to hypotension , pulmonary following.     Problem/Plan - 4:  ·  Problem: Diastolic heart failure due to valvular disease.  Plan: fluid removal with HD ,  palliative care team on board.     Problem/Plan - 5:  ·  Problem: ESRD (end stage renal disease) on dialysis.  Plan: HD as per renal T-Th-Sat.     Problem/Plan - 6:  Problem: Atrial fibrillation, unspecified type. Plan: off ac due to fall/SAH  rate controlled off AV asher blocking agents.    Problem/Plan - 7:  ·  Problem: Anemia.  Plan: Epogen weekly , hb stable, monitor labs    Problem/Plan - 8:  ·  Problem: Hypotension.  Plan:   Lasix and sildenafil stopped as BP on lower side, monitor closely continue midodrine

## 2017-07-19 NOTE — PROGRESS NOTE ADULT - SUBJECTIVE AND OBJECTIVE BOX
[facility] - [facility], [facility]                                   [address] [place] [address]                                                      PHONE: [phone]                                                         FAX: [phone]  [email or web address]  ---------------------------------------------------------------------------------------------------------------------------------    Overnight events/patient complaints: much improved post thoracentesis      allopurinol (Rash)  codeine (Nausea; Vomiting)  penicillin (Rash)  spironolactone (Unknown)    MEDICATIONS  (STANDING):  aspirin enteric coated 81 milliGRAM(s) Oral daily  calcium acetate 667 milliGRAM(s) Oral three times a day with meals  cyanocobalamin 1000 MICROGram(s) Oral daily  gabapentin 300 milliGRAM(s) Oral every 8 hours  epoetin jaswinder Injectable 88552 Unit(s) IV Push <User Schedule>  atorvastatin 40 milliGRAM(s) Oral at bedtime  folic acid 1 milliGRAM(s) Oral daily  Nephro-toi 1 Tablet(s) Oral daily  ammonium lactate 12% Lotion 1 Application(s) Topical daily  ALBUTerol/ipratropium for Nebulization 3 milliLiter(s) Nebulizer every 6 hours  midodrine 10 milliGRAM(s) Oral two times a day    MEDICATIONS  (PRN):  acetaminophen   Tablet. 650 milliGRAM(s) Oral every 6 hours PRN Mild Pain (1 - 3)  guaiFENesin    Syrup 200 milliGRAM(s) Oral every 6 hours PRN Cough  lactulose Syrup 10 Gram(s) Oral daily PRN no bm > 2 days  sodium chloride 0.65% Nasal 1 Spray(s) Both Nostrils three times a day PRN nasal congestion/discomfort from O2      Vital Signs Last 24 Hrs  T(C): 36.1 (19 Jul 2017 12:37), Max: 36.5 (19 Jul 2017 05:16)  T(F): 97 (19 Jul 2017 12:37), Max: 97.7 (19 Jul 2017 05:16)  HR: 79 (19 Jul 2017 12:37) (79 - 84)  BP: 90/58 (19 Jul 2017 12:37) (90/58 - 117/57)  BP(mean): --  RR: 18 (19 Jul 2017 12:37) (18 - 18)  SpO2: 100% (19 Jul 2017 12:37) (94% - 100%)  Daily     Daily   ICU Vital Signs Last 24 Hrs  DUSTIN HERNANDEZ  I&O's Detail    18 Jul 2017 07:01  -  19 Jul 2017 07:00  --------------------------------------------------------  IN:  Total IN: 0 mL    OUT:    Other: 2100 mL  Total OUT: 2100 mL    Total NET: -2100 mL        I&O's Summary    18 Jul 2017 07:01  -  19 Jul 2017 07:00  --------------------------------------------------------  IN: 0 mL / OUT: 2100 mL / NET: -2100 mL      Drug Dosing Weight  DUSTIN HERNANDEZ      PHYSICAL EXAM:  General: Appears well developed, well nourished alert and cooperative.  HEENT: Head; normocephalic, atraumatic.  Eyes: Pupils reactive, cornea wnl.  Neck: Supple, no nodes adenopathy, no NVD or carotid bruit or thyromegaly.  CARDIOVASCULAR: Normal S1 and S2, 3/6 ssytolicmurmur, rub, gallop or lift.   LUNGS: No rales, rhonchi or wheeze. Normal breath sounds bilaterally.  ABDOMEN: Soft, nontender without mass or organomegaly. bowel sounds normoactive.  EXTREMITIES: No clubbing, cyanosis or edema. Distal pulses wnl.   SKIN: warm and dry with normal turgor.  NEURO: Alert/oriented x 3/normal motor exam. No pathologic reflexes.    PSYCH: normal affect.        LABS:                        9.1    5.8   )-----------( 140      ( 18 Jul 2017 13:41 )             29.5     07-18    137  |  92<L>  |  99.0<H>  ----------------------------<  126<H>  5.1   |  26.0  |  4.77<H>    Ca    9.1      18 Jul 2017 13:41      DUSTIN HERNANDEZ            RADIOLOGY & ADDITIONAL STUDIES:    INTERPRETATION OF TELEMETRY (personally reviewed):    ECG:< from: 12 Lead ECG (06.14.17 @ 15:28) >    Diagnosis Line Undetermined rhythm  Incomplete right bundle branch block  T wave abnormality, consider anterior ischemia  Prolonged QT  Abnormal ECG    Confirmed by TELLO PRIETO ([id number]) on 6/14/2017 6:58:08 PM    < end of copied text >      ECHO:< from: TTE Echo Complete w/Doppler (06.20.17 @ 09:29) >   Summary:   1. Left ventricular ejection fraction, by visual estimation, is 60 to   65%.   2. Spectral Doppler shows restrictive pattern of left ventricular   myocardial filling (Grade III diastolic dysfunction).   3. RV failure. Severely dilated right ventricle with severely reduced   systolic function.   4. Right ventricular volume and pressure overload.   5. Status post mitral valve repair with residual mild to moderate mitral   regurgitation. Elevated transmitral gradients (mean 6.7 mmHg at a HR of   81 bpm).   6. S/P tricuspid valve repair with moderate to severe tricuspid   regurgitation.   7. Normally functioning aortic prosthesis.   8. Moderate pulmonic valve regurgitation.   9. Estimated pulmonary artery systolic pressure is 88.4 mmHg assuming a   right atrial pressure of 15 mmHg, which is consistent with severe   pulmonary hypertension.  10. Trace pericardial effusion.  11. Right sided pleural effusion.    < end of copied text >

## 2017-07-20 LAB
ANION GAP SERPL CALC-SCNC: 15 MMOL/L — SIGNIFICANT CHANGE UP (ref 5–17)
BUN SERPL-MCNC: 78 MG/DL — HIGH (ref 8–20)
CALCIUM SERPL-MCNC: 8.8 MG/DL — SIGNIFICANT CHANGE UP (ref 8.6–10.2)
CHLORIDE SERPL-SCNC: 92 MMOL/L — LOW (ref 98–107)
CO2 SERPL-SCNC: 27 MMOL/L — SIGNIFICANT CHANGE UP (ref 22–29)
CREAT SERPL-MCNC: 4.11 MG/DL — HIGH (ref 0.5–1.3)
GLUCOSE SERPL-MCNC: 94 MG/DL — SIGNIFICANT CHANGE UP (ref 70–115)
HCT VFR BLD CALC: 29.4 % — LOW (ref 37–47)
HGB BLD-MCNC: 9.2 G/DL — LOW (ref 12–16)
MCHC RBC-ENTMCNC: 31.3 G/DL — LOW (ref 32–36)
MCHC RBC-ENTMCNC: 35.4 PG — HIGH (ref 27–31)
MCV RBC AUTO: 113.1 FL — HIGH (ref 81–99)
PLATELET # BLD AUTO: 126 K/UL — LOW (ref 150–400)
POTASSIUM SERPL-MCNC: 5.4 MMOL/L — HIGH (ref 3.5–5.3)
POTASSIUM SERPL-SCNC: 5.4 MMOL/L — HIGH (ref 3.5–5.3)
RBC # BLD: 2.6 M/UL — LOW (ref 4.4–5.2)
RBC # FLD: 19.3 % — HIGH (ref 11–15.6)
SODIUM SERPL-SCNC: 134 MMOL/L — LOW (ref 135–145)
WBC # BLD: 6 K/UL — SIGNIFICANT CHANGE UP (ref 4.8–10.8)
WBC # FLD AUTO: 6 K/UL — SIGNIFICANT CHANGE UP (ref 4.8–10.8)

## 2017-07-20 PROCEDURE — 99233 SBSQ HOSP IP/OBS HIGH 50: CPT

## 2017-07-20 RX ADMIN — Medication 667 MILLIGRAM(S): at 08:15

## 2017-07-20 RX ADMIN — GABAPENTIN 300 MILLIGRAM(S): 400 CAPSULE ORAL at 21:41

## 2017-07-20 RX ADMIN — MIDODRINE HYDROCHLORIDE 10 MILLIGRAM(S): 2.5 TABLET ORAL at 17:53

## 2017-07-20 RX ADMIN — Medication 650 MILLIGRAM(S): at 20:45

## 2017-07-20 RX ADMIN — Medication 200 MILLIGRAM(S): at 05:47

## 2017-07-20 RX ADMIN — HEPARIN SODIUM 5000 UNIT(S): 5000 INJECTION INTRAVENOUS; SUBCUTANEOUS at 21:42

## 2017-07-20 RX ADMIN — Medication 3 MILLILITER(S): at 08:30

## 2017-07-20 RX ADMIN — Medication 650 MILLIGRAM(S): at 20:06

## 2017-07-20 RX ADMIN — Medication 1 MILLIGRAM(S): at 11:21

## 2017-07-20 RX ADMIN — Medication 3 MILLILITER(S): at 03:20

## 2017-07-20 RX ADMIN — Medication 3 MILLILITER(S): at 20:48

## 2017-07-20 RX ADMIN — Medication 81 MILLIGRAM(S): at 11:21

## 2017-07-20 RX ADMIN — ERYTHROPOIETIN 10000 UNIT(S): 10000 INJECTION, SOLUTION INTRAVENOUS; SUBCUTANEOUS at 12:59

## 2017-07-20 RX ADMIN — ATORVASTATIN CALCIUM 40 MILLIGRAM(S): 80 TABLET, FILM COATED ORAL at 21:41

## 2017-07-20 RX ADMIN — MIDODRINE HYDROCHLORIDE 10 MILLIGRAM(S): 2.5 TABLET ORAL at 05:47

## 2017-07-20 RX ADMIN — Medication 650 MILLIGRAM(S): at 10:50

## 2017-07-20 RX ADMIN — HEPARIN SODIUM 5000 UNIT(S): 5000 INJECTION INTRAVENOUS; SUBCUTANEOUS at 17:53

## 2017-07-20 RX ADMIN — Medication 1 APPLICATION(S): at 11:21

## 2017-07-20 RX ADMIN — Medication 200 MILLIGRAM(S): at 19:01

## 2017-07-20 RX ADMIN — Medication 1 TABLET(S): at 11:21

## 2017-07-20 RX ADMIN — GABAPENTIN 300 MILLIGRAM(S): 400 CAPSULE ORAL at 17:53

## 2017-07-20 RX ADMIN — PREGABALIN 1000 MICROGRAM(S): 225 CAPSULE ORAL at 11:21

## 2017-07-20 RX ADMIN — Medication 667 MILLIGRAM(S): at 17:53

## 2017-07-20 RX ADMIN — Medication 1 SPRAY(S): at 17:53

## 2017-07-20 RX ADMIN — HEPARIN SODIUM 5000 UNIT(S): 5000 INJECTION INTRAVENOUS; SUBCUTANEOUS at 05:47

## 2017-07-20 RX ADMIN — Medication 650 MILLIGRAM(S): at 10:05

## 2017-07-20 RX ADMIN — GABAPENTIN 300 MILLIGRAM(S): 400 CAPSULE ORAL at 05:47

## 2017-07-20 NOTE — PROGRESS NOTE ADULT - SUBJECTIVE AND OBJECTIVE BOX
CC: SOB Now better after thoracentesis    F/u fall with  left SAH, recent treatment for PNA and pleural effusion s/p tap    HPI:76F was admitted on 6/14/17 after a mechanical fall while walking outside. She had head trauma but no LOC. In ER, GCS=15. A head CT showed left parietal and temporo-occipital SAH. A repeat head CT was stable. Additional workup showed nondisplaced fractures of the right 4-6 ribs with emphysematous changes, pleural effusion and atelectasis. Hospital course was complicated by shortness of breath/hypoxia and needed BiPAP/high-flow NC. She was started on Cefepime for PNA. Admitted to acute rehab on 6/27. Feels fatigue , CXR with increasing R pleural effusion , b/l opacity . Today s/p thoracentesis , 1 L removed . SOB decreased , will repeat CXR in 2-3 days , Pulmonary following .    INTERVAL HPI/OVERNIGHT EVENTS: Fatigued after therapy today, however states breathing is improved.    Vital Signs Last 24 Hrs  T(C): 36.4 (20 Jul 2017 11:15), Max: 36.9 (19 Jul 2017 20:04)  T(F): 97.5 (20 Jul 2017 11:15), Max: 98.4 (19 Jul 2017 20:04)  HR: 80 (20 Jul 2017 11:15) (79 - 91)  BP: 96/58 (20 Jul 2017 11:15) (90/58 - 104/61)  BP(mean): --  RR: 20 (20 Jul 2017 11:15) (18 - 20)  SpO2: 95% (20 Jul 2017 11:15) (95% - 100%)  I&O's Detail                                9.1    5.8   )-----------( 140      ( 18 Jul 2017 13:41 )             29.5     18 Jul 2017 13:41    137    |  92     |  99.0   ----------------------------<  126    5.1     |  26.0   |  4.77     Ca    9.1        18 Jul 2017 13:41        CAPILLARY BLOOD GLUCOSE              MEDICATIONS  (STANDING):  aspirin enteric coated 81 milliGRAM(s) Oral daily  calcium acetate 667 milliGRAM(s) Oral three times a day with meals  cyanocobalamin 1000 MICROGram(s) Oral daily  gabapentin 300 milliGRAM(s) Oral every 8 hours  epoetin jaswinder Injectable 85198 Unit(s) IV Push <User Schedule>  atorvastatin 40 milliGRAM(s) Oral at bedtime  folic acid 1 milliGRAM(s) Oral daily  Nephro-toi 1 Tablet(s) Oral daily  ammonium lactate 12% Lotion 1 Application(s) Topical daily  ALBUTerol/ipratropium for Nebulization 3 milliLiter(s) Nebulizer every 6 hours  midodrine 10 milliGRAM(s) Oral two times a day  heparin  Injectable 5000 Unit(s) SubCutaneous every 8 hours    MEDICATIONS  (PRN):  acetaminophen   Tablet. 650 milliGRAM(s) Oral every 6 hours PRN Mild Pain (1 - 3)  guaiFENesin    Syrup 200 milliGRAM(s) Oral every 6 hours PRN Cough  lactulose Syrup 10 Gram(s) Oral daily PRN no bm > 2 days  sodium chloride 0.65% Nasal 1 Spray(s) Both Nostrils three times a day PRN nasal congestion/discomfort from O2      RADIOLOGY & ADDITIONAL TESTS:       EXAM:  CHEST SINGLE VIEW FRONTAL                          PROCEDURE DATE:  07/19/2017          INTERPRETATION:  TECHNIQUE: Single portable view of the chest.    COMPARISON: 7/17/2017    CLINICAL HISTORY: Status post thoracentesis.     FINDINGS:    Single frontal view of the chest demonstrates small to moderate   right-sided pleural effusion, not significantly changed. Right-sided   dialysis catheter. No enlarged pneumothorax. Mediastinal sternotomy   wires. The cardiomediastinal silhouette is enlarged. No acute osseous   abnormalities.     IMPRESSION: Small to moderate right-sided pleural effusion, not   significantly changed.    REVIEW OF SYSTEMS:    SOB: decreased after thoracentesis   Denies chest pain, dizziness, lightheadedness, fever, chills, nausea, vomiting    PHYSICAL EXAM:    GENERAL: NAD  HEAD:  Atraumatic, Normocephalic  EYES: EOMI, PERRLA, conjunctiva and sclera clear  NECK: Supple, No JVD, Normal thyroid  NERVOUS SYSTEM:  Alert & Oriented X3, no focal deficit  CHEST/LUNG: decrease BS right side, no wheezes  HEART: irregularly irregular   ; No murmurs, rubs, or gallops  ABDOMEN: Soft, Nontender, Nondistended; Bowel sounds present  EXTREMITIES:  2+ Peripheral Pulses, No clubbing, cyanosis, or edema  LYMPH: No lymphadenopathy noted  SKIN: Ecchymosis B/L UE

## 2017-07-20 NOTE — PROGRESS NOTE ADULT - ASSESSMENT
77 yo female with h/o Atrial fibrillation off anticoagulation due to fall risk and bleeding risk , ESRD on HD ,diastolic CHF , pulmonary hypertension , valvular heart disease  admitted s/p fall at home with left SAH , brain injury and developed respiratory distress being treated for pneumonia  , now in rehab . Chest congestion, cough improving,  BP remains low, medication doses of  sildenafil adjusted by pulmonologist, hypoxic , remains hypotensive, stable now, s/p right pleural effusion removal by IR 7/7 . C/O cough with gabriel phlegm , sob . On  o2 via NC . Repeated  chest xray with increasing R pleural effusion . S/P recurrent R pleural effusion drainage . Feels better . Pulmonary following.     Problem/Plan - 1:  ·  Problem:  Recurrent Pleural effusion.  Plan: s/p drain by IR  X2 , on O2 via NC ,   If pleural effusion reoccur will consider pleurex .    Problem/Plan - 3:  ·  Problem: Pulmonary hypertension.  Plan: off sildenafil due to hypotension , pulmonary following.     Problem/Plan - 4:  ·  Problem: Diastolic heart failure due to valvular disease.  Plan: fluid removal with HD ,  palliative care team on board.     Problem/Plan - 5:  ·  Problem: ESRD (end stage renal disease) on dialysis.  Plan: HD as per renal T-Th-Sat.     Problem/Plan - 6:  Problem: Atrial fibrillation, unspecified type. Plan: off ac due to fall/SAH  rate controlled off AV asher blocking agents.    Problem/Plan - 7:  ·  Problem: Anemia.  Plan: Epogen weekly , hb stable, monitor labs    Problem/Plan - 8:  ·  Problem: Hypotension.  Plan: Stable  Lasix and sildenafil stopped as BP on lower side, monitor closely continue midodrine

## 2017-07-20 NOTE — PROGRESS NOTE ADULT - ASSESSMENT
Pt improved s/p rt thoracentesis  Analysis of fluid pending. Original tap on rt transudate  Traumatic exudate ve transudate expected

## 2017-07-20 NOTE — PROGRESS NOTE ADULT - SUBJECTIVE AND OBJECTIVE BOX
NEPHROLOGY INTERVAL HPI/OVERNIGHT EVENTS:    Thoracentesis yesterday, breathing better today.    MEDICATIONS  (STANDING):  heparin  Injectable 5000 Unit(s) SubCutaneous every 8 hours  aspirin enteric coated 81 milliGRAM(s) Oral daily  calcium acetate 667 milliGRAM(s) Oral three times a day with meals  cyanocobalamin 1000 MICROGram(s) Oral daily  gabapentin 300 milliGRAM(s) Oral every 8 hours  epoetin jaswinder Injectable 14668 Unit(s) IV Push <User Schedule>  atorvastatin 40 milliGRAM(s) Oral at bedtime  folic acid 1 milliGRAM(s) Oral daily  Nephro-toi 1 Tablet(s) Oral daily  ammonium lactate 12% Lotion 1 Application(s) Topical daily  ALBUTerol/ipratropium for Nebulization 3 milliLiter(s) Nebulizer every 6 hours  midodrine 10 milliGRAM(s) Oral two times a day    MEDICATIONS  (PRN):  acetaminophen   Tablet. 650 milliGRAM(s) Oral every 6 hours PRN Mild Pain (1 - 3)  guaiFENesin    Syrup 200 milliGRAM(s) Oral every 6 hours PRN Cough  lactulose Syrup 10 Gram(s) Oral daily PRN no bm > 2 days  sodium chloride 0.65% Nasal 1 Spray(s) Both Nostrils three times a day PRN nasal congestion/discomfort from O2      Allergies    allopurinol (Rash)  codeine (Nausea; Vomiting)  penicillin (Rash)  spironolactone (Unknown)    Intolerances          Vital Signs Last 24 Hrs  T(C): 35.9 (13 Jul 2017 11:03), Max: 36.9 (12 Jul 2017 20:56)  T(F): 96.7 (13 Jul 2017 11:03), Max: 98.4 (12 Jul 2017 20:56)  HR: 84 (13 Jul 2017 11:03) (81 - 85)  BP: 105/62 (13 Jul 2017 11:03) (103/58 - 113/69)  BP(mean): --  RR: 20 (13 Jul 2017 11:03) (18 - 20)  SpO2: 97% (13 Jul 2017 11:03) (92% - 97%)  Daily     Daily   I&O's Detail    I&O's Summary      PHYSICAL EXAM:  NAD  lungs - bilateral base rhonchi with some clearing with cough  CV gr 1 murmur,  No gallop or rub  Abd : soft, NT BS +, No masses  Ext- No edema  Neuro : More alert with good mentation     LABS: -18    137  |  92<L>  |  99.0<H>  ----------------------------<  126<H>  5.1   |  26.0  |  4.77<H>    Ca    9.1      18 Jul 2017 13:41        136  |  94<L>  |  71.0<H>  ----------------------------<  117<H>  4.7   |  27.0  |  3.44<H>    Ca    8.5<L>      15 Jul 2017 16:15    TPro  6.4<L>  /  Alb  3.7  /  TBili  1.0  /  DBili  x   /  AST  39<H>  /  ALT  70<H>  /  AlkPhos  554<H>  07-15                          9.9    6.3   )-----------( 180      ( 13 Jul 2017 06:54 )             32.3     07-13    134<L>  |  92<L>  |  97.0<H>  ----------------------------<  112  4.8   |  24.0  |  4.53<H>    Ca    9.1      13 Jul 2017 06:54  Phos  3.6     07-13          Phosphorus Level, Serum: 3.6 mg/dL (07-13 @ 06:54)          RADIOLOGY & ADDITIONAL TESTS:

## 2017-07-20 NOTE — PROGRESS NOTE ADULT - SUBJECTIVE AND OBJECTIVE BOX
HISTORY OF PRESENT ILLNESS  76F was admitted on 6/14/17 after a mechanical fall while walking outside. She had head trauma but no LOC. In ER, GCS=15. A head CT showed left parietal and temporo-occipital SAH. A repeat head CT was stable. Additional workup showed nondisplaced fractures of the right 4-6 ribs with emphysematous changes, pleural effusion and atelectasis. Hospital course was complicated by shortness of breath/hypoxia and needed BiPAP/high-flow NC. She was started on Cefepime for PNA until 7/5. Admitted to acute rehab on 6/27.    PMHx - HTN, ESRD on HD, AFIB (no full AC due to fall and bleeding risk), CAD, COPD (on home O2 overnight), Cholelithiasis, Gout, Hyperlipidemia, Spinal stenosis, s/p AVR/MVR/Tricupsid valve annuloplasty, Cataracts repair, H/O spinal fusion, Appendectomy, Tonsillectomy, C-Spine DJD    TODAY'S SUBJECTIVE & REVIEW OF SYMPTOMS  [X] Constitutional WNL      [X] Cardio WNL             [ ] Resp WNL           [X] GI WNL                      [X]  WNL                  [X] Heme WNL              [X] Endo WNL                  [X] Skin WNL                [ ] MSK WNL            [ ] Neuro WNL                  [ ] Cognitive WNL          [ ] Psych WNL    Had drainage of fluid yesterday. She feels better.   Denies pain. Slept well overnight. Feels that she is breathing better.     VITALS  T(C): 36.6 (07-20-17 @ 05:23)  T(F): 97.9 (07-20-17 @ 05:23), Max: 98.4 (07-19-17 @ 20:04)  HR: 91 (07-20-17 @ 05:23) (79 - 91)  BP: 101/66 (07-20-17 @ 05:23) (90/58 - 104/61)  RR:  (18 - 18)  SpO2:  (95% - 100%)  Wt(kg): --    PHYSICAL EXAM  Constitutional - NAD, Comfortable  HEENT - Facial ecchymosis - Improved, Right supraorbital laceration  Neck - Supple, No limited ROM  Chest - Improved right sided BS, Occasional crackles  Cardiovascular - S1S2  Abdomen - BS+, Soft, NTND  Extremities - No C/C, Mild BLE edema, No calf tenderness, BLE PVD skin changes  Neurologic Exam - Right field cut                    Cognitive - AAO to self, place, month/year, some of situation, Mild deficits     Communication - Expressive deficits     Motor - No focal deficits     Sensory - Intact to LT     Coordination - FTN impaired  Psychiatric - Mood stable, Affect Flat  Skin - BUE - Multiple ecchymosis, left hand edema/pain to palpation    FUNCTIONAL PROGRESS  Gait - Min-Mod A  ADLs - Mod A  Transfers - Mod A   Functional transfers - Mod A    RECENT LABS                           9.1    5.8   )-----------( 140      ( 18 Jul 2017 13:41 )             29.5     07-18    137  |  92<L>  |  99.0<H>  ----------------------------<  126<H>  5.1   |  26.0  |  4.77<H>    Ca    9.1      18 Jul 2017 13:41    TPro  6.4<L>  /  Alb  3.7  /  TBili  1.0  /  DBili  x   /  AST  39<H>  /  ALT  70<H>  /  AlkPhos  554<H>  07-15    Prealb 6/28 - 16    Procalcitonin 7/1 - 2.42, 7/6 - 1.18    Pleural Fluid Cx 7/6 - No growth    RADIOLOGY/OTHER RESULTS  CXR 7/19 - Small to moderate right-sided pleural effusion, not significantly changed.  CXR 7/17 - Increased size of right pleural effusion with associated atelectasis, now moderate. Bilateral perihilar opacities, probably pulmonary edema.  CXR 7/12 - Right middle lobe airspace opacity improved. Increased right pleural effusion.   CXR 7/9 - Persistent right middle lobe infiltrate.    CURRENT MEDICATIONS  MEDICATIONS  (STANDING):  aspirin enteric coated 81 milliGRAM(s) Oral daily  calcium acetate 667 milliGRAM(s) Oral three times a day with meals  cyanocobalamin 1000 MICROGram(s) Oral daily  gabapentin 300 milliGRAM(s) Oral every 8 hours  epoetin jaswinder Injectable 31962 Unit(s) IV Push <User Schedule>  atorvastatin 40 milliGRAM(s) Oral at bedtime  folic acid 1 milliGRAM(s) Oral daily  Nephro-toi 1 Tablet(s) Oral daily  ammonium lactate 12% Lotion 1 Application(s) Topical daily  ALBUTerol/ipratropium for Nebulization 3 milliLiter(s) Nebulizer every 6 hours  midodrine 10 milliGRAM(s) Oral two times a day  heparin  Injectable 5000 Unit(s) SubCutaneous every 8 hours    MEDICATIONS  (PRN):  acetaminophen   Tablet. 650 milliGRAM(s) Oral every 6 hours PRN Mild Pain (1 - 3)  guaiFENesin    Syrup 200 milliGRAM(s) Oral every 6 hours PRN Cough  lactulose Syrup 10 Gram(s) Oral daily PRN no bm > 2 days  sodium chloride 0.65% Nasal 1 Spray(s) Both Nostrils three times a day PRN nasal congestion/discomfort from O2    ASSESSMENT & PLAN  76F sustaining a TBI after a fall now with functional deficits  Cardiac/Pulm HTN - Midodrine   Pulm - Duonebs Q6, O2 4L via NC, Guaifenesin PRN, Ocean PRN  CAD - Lipitor, ASA  ESRD on HD - Phoslo, HD T/Th/Sat  Anemia - Epogen, Vit B, Folate  GI/Bowel Management - Toilet PRN, Lactulose PRN   Management - Toilet Q2  Skin - Turn Q2, Lachydrin BLE, Zfloats BLE  Gout - Prednisone 50mg x2 days (7/9-7/11)  Pain - Neurontin, Tylenol PRN, Ice to left hand PRN  DVT PPX - Heparin (resume 7/20), TEDs, SCDs  Diet - Renal & Cardiac/Thins, Nephro-toi, Nepro TID    Continue comprehensive acute rehab program consisting of 3hrs/day of OT/PT and SLP HISTORY OF PRESENT ILLNESS  76F was admitted on 6/14/17 after a mechanical fall while walking outside. She had head trauma but no LOC. In ER, GCS=15. A head CT showed left parietal and temporo-occipital SAH. A repeat head CT was stable. Additional workup showed nondisplaced fractures of the right 4-6 ribs with emphysematous changes, pleural effusion and atelectasis. Hospital course was complicated by shortness of breath/hypoxia and needed BiPAP/high-flow NC. She was started on Cefepime for PNA until 7/5. Admitted to acute rehab on 6/27.    PMHx - HTN, ESRD on HD, AFIB (no full AC due to fall and bleeding risk), CAD, COPD (on home O2 overnight), Cholelithiasis, Gout, Hyperlipidemia, Spinal stenosis, s/p AVR/MVR/Tricupsid valve annuloplasty, Cataracts repair, H/O spinal fusion, Appendectomy, Tonsillectomy, C-Spine DJD    TODAY'S SUBJECTIVE & REVIEW OF SYMPTOMS  [X] Constitutional WNL      [X] Cardio WNL             [ ] Resp WNL           [X] GI WNL                      [X]  WNL                  [X] Heme WNL              [X] Endo WNL                  [X] Skin WNL                [ ] MSK WNL            [ ] Neuro WNL                  [ ] Cognitive WNL          [ ] Psych WNL    Had drainage of fluid yesterday. She feels better.   Denies pain. Slept well overnight. Feels that she is breathing better.     VITALS  T(C): 36.6 (07-20-17 @ 05:23)  T(F): 97.9 (07-20-17 @ 05:23), Max: 98.4 (07-19-17 @ 20:04)  HR: 91 (07-20-17 @ 05:23) (79 - 91)  BP: 101/66 (07-20-17 @ 05:23) (90/58 - 104/61)  RR:  (18 - 18)  SpO2:  (95% - 100%)  Wt(kg): --    PHYSICAL EXAM  Constitutional - NAD, Comfortable  HEENT - Facial ecchymosis - Improved, Right supraorbital laceration  Neck - Supple, No limited ROM  Chest - Improved right sided BS, Occasional crackles  Cardiovascular - S1S2  Abdomen - BS+, Soft, NTND  Extremities - No C/C, Mild BLE edema, No calf tenderness, BLE PVD skin changes  Neurologic Exam - Right field cut                    Cognitive - AAO to self, place, month/year, some of situation, Mild deficits     Communication - Expressive deficits     Motor - No focal deficits     Sensory - Intact to LT     Coordination - FTN impaired  Psychiatric - Mood stable, Affect Flat  Skin - BUE - Multiple ecchymosis, left hand edema/pain to palpation    FUNCTIONAL PROGRESS  Gait - Min-Mod A  ADLs - Mod A  Transfers - Mod A   Functional transfers - Mod A    RECENT LABS                            9.2    6.0   )-----------( 126      ( 20 Jul 2017 12:41 )             29.4     07-20    134<L>  |  92<L>  |  78.0<H>  ----------------------------<  94  5.4<H>   |  27.0  |  4.11<H>    Ca    8.8      20 Jul 2017 12:41    Prealb 6/28 - 16    Procalcitonin 7/1 - 2.42, 7/6 - 1.18    Pleural Fluid Cx 7/6 - No growth    RADIOLOGY/OTHER RESULTS  CXR 7/19 - Small to moderate right-sided pleural effusion, not significantly changed.  CXR 7/17 - Increased size of right pleural effusion with associated atelectasis, now moderate. Bilateral perihilar opacities, probably pulmonary edema.  CXR 7/12 - Right middle lobe airspace opacity improved. Increased right pleural effusion.   CXR 7/9 - Persistent right middle lobe infiltrate.    CURRENT MEDICATIONS  MEDICATIONS  (STANDING):  aspirin enteric coated 81 milliGRAM(s) Oral daily  calcium acetate 667 milliGRAM(s) Oral three times a day with meals  cyanocobalamin 1000 MICROGram(s) Oral daily  gabapentin 300 milliGRAM(s) Oral every 8 hours  epoetin jaswinder Injectable 28134 Unit(s) IV Push <User Schedule>  atorvastatin 40 milliGRAM(s) Oral at bedtime  folic acid 1 milliGRAM(s) Oral daily  Nephro-toi 1 Tablet(s) Oral daily  ammonium lactate 12% Lotion 1 Application(s) Topical daily  ALBUTerol/ipratropium for Nebulization 3 milliLiter(s) Nebulizer every 6 hours  midodrine 10 milliGRAM(s) Oral two times a day  heparin  Injectable 5000 Unit(s) SubCutaneous every 8 hours    MEDICATIONS  (PRN):  acetaminophen   Tablet. 650 milliGRAM(s) Oral every 6 hours PRN Mild Pain (1 - 3)  guaiFENesin    Syrup 200 milliGRAM(s) Oral every 6 hours PRN Cough  lactulose Syrup 10 Gram(s) Oral daily PRN no bm > 2 days  sodium chloride 0.65% Nasal 1 Spray(s) Both Nostrils three times a day PRN nasal congestion/discomfort from O2    ASSESSMENT & PLAN  76F sustaining a TBI after a fall now with functional deficits  Cardiac/Pulm HTN - Midodrine   Pulm - Duonebs Q6, O2 4L via NC, Guaifenesin PRN, Ocean PRN  CAD - Lipitor, ASA  ESRD on HD - Phoslo, HD T/Th/Sat  Anemia - Epogen, Vit B, Folate  GI/Bowel Management - Toilet PRN, Lactulose PRN   Management - Toilet Q2  Skin - Turn Q2, Lachydrin BLE, Zfloats BLE  Gout - Prednisone 50mg x2 days (7/9-7/11)  Pain - Neurontin, Tylenol PRN, Ice to left hand PRN  DVT PPX - Heparin (resume 7/20), TEDs, SCDs  Diet - Renal & Cardiac/Thins, Nephro-toi, Nepro TID    Continue comprehensive acute rehab program consisting of 3hrs/day of OT/PT and SLP

## 2017-07-20 NOTE — PROGRESS NOTE ADULT - SUBJECTIVE AND OBJECTIVE BOX
PULMONARY PROGRESS NOTE      DUSTIN HERNANDEZKADE-[id number]    Patient is a 76y old  Female who presents with a chief complaint of     INTERVAL HPI/OVERNIGHT EVENTS:    Underwent 1000 cc rt thoracentesis  Seen in HD today  No SOB    MEDICATIONS  (STANDING):  aspirin enteric coated 81 milliGRAM(s) Oral daily  calcium acetate 667 milliGRAM(s) Oral three times a day with meals  cyanocobalamin 1000 MICROGram(s) Oral daily  gabapentin 300 milliGRAM(s) Oral every 8 hours  epoetin jaswinder Injectable 41773 Unit(s) IV Push <User Schedule>  atorvastatin 40 milliGRAM(s) Oral at bedtime  folic acid 1 milliGRAM(s) Oral daily  Nephro-toi 1 Tablet(s) Oral daily  ammonium lactate 12% Lotion 1 Application(s) Topical daily  ALBUTerol/ipratropium for Nebulization 3 milliLiter(s) Nebulizer every 6 hours  midodrine 10 milliGRAM(s) Oral two times a day  heparin  Injectable 5000 Unit(s) SubCutaneous every 8 hours      MEDICATIONS  (PRN):  acetaminophen   Tablet. 650 milliGRAM(s) Oral every 6 hours PRN Mild Pain (1 - 3)  guaiFENesin    Syrup 200 milliGRAM(s) Oral every 6 hours PRN Cough  lactulose Syrup 10 Gram(s) Oral daily PRN no bm > 2 days  sodium chloride 0.65% Nasal 1 Spray(s) Both Nostrils three times a day PRN nasal congestion/discomfort from O2      Allergies    allopurinol (Rash)  codeine (Nausea; Vomiting)  penicillin (Rash)  spironolactone (Unknown)    Intolerances        PAST MEDICAL & SURGICAL HISTORY:  ESRD (end stage renal disease) on dialysis: MWF via [facility] Permacath  planned AVF creation  Sinusitis, unspecified chronicity, unspecified location  Gout  Gall stones  Pneumonia  Anemia  Pulmonary hypertension  COPD (chronic obstructive pulmonary disease)  CAD (coronary artery disease)  Atrial fibrillation  Hyperlipidemia  Hypertension  Spinal fusion failure, initial encounter  Tubal ligation status  Sinusitis  Spinal stenosis  S/P tonsillectomy  S/P appendectomy  H/O aortic valve replacement  H/O spinal fusion  H/O cataract  Cystocele  H/O tricuspid valve annuloplasty  H/O mitral valve repair      SOCIAL HISTORY  Smoking History:       REVIEW OF SYSTEMS:    CONSTITUTIONAL:  No distress    HEENT:  Eyes:  No diplopia or blurred vision. ENT:  No earache, sore throat or runny nose.    CARDIOVASCULAR:  No pressure, squeezing, tightness, heaviness or aching about the chest; no palpitations.    RESPIRATORY:  above    GASTROINTESTINAL:  No nausea, vomiting or diarrhea.    GENITOURINARY:  No dysuria, frequency or urgency.    NEUROLOGIC:  No paresthesias, fasciculations, seizures or weakness.    Extremities: No cyanosis, clubbing or edema    PSYCHIATRIC:  No disorder of thought or mood.    Vital Signs Last 24 Hrs  T(C): 36.7 (20 Jul 2017 12:27), Max: 36.9 (19 Jul 2017 20:04)  T(F): 98 (20 Jul 2017 12:27), Max: 98.4 (19 Jul 2017 20:04)  HR: 81 (20 Jul 2017 12:27) (80 - 91)  BP: 94/51 (20 Jul 2017 12:27) (94/51 - 104/61)  BP(mean): --  RR: 18 (20 Jul 2017 12:27) (18 - 20)  SpO2: 95% (20 Jul 2017 11:15) (95% - 97%)    PHYSICAL EXAMINATION:    GENERAL: The patient is awake and alert in no apparent distress.     HEENT: Head is normocephalic and atraumatic. Extraocular muscles are intact. Mucous membranes are moist.    NECK: Supple.    LUNGS: Clear to auscultation without wheezing, rales or rhonchi; respirations unlabored, rt hd cath    HEART: Regular rate and rhythm without murmur.    ABDOMEN: Soft, nontender, and nondistended.      EXTREMITIES: Without any cyanosis, clubbing, rash, lesions or edema.    NEUROLOGIC: Grossly intact.    LABS:                        9.2    6.0   )-----------( 126      ( 20 Jul 2017 12:41 )             29.4     07-20    134<L>  |  92<L>  |  78.0<H>  ----------------------------<  94  5.4<H>   |  27.0  |  4.11<H>    Ca    8.8      20 Jul 2017 12:41                          MICROBIOLOGY:    RADIOLOGY & ADDITIONAL STUDIES:  Progress Note:   · Provider Specialty	Radiology	      · Subjective and Objective: 	  VIR Post Procedure Note    Dx: TBI after fall. Right pleural effusion  Procedure: Bedside right thoracentesis. 1L clear fluid aspirated similar to prior thoracentesis. No complications. No sedation. No blood loss.    Len Church MD  VIR    Electronic Signatures:  Len Church)  (Signed 19-Jul-2017 10:22)  	Authored: Progress Note, Subjective and Objective      Last Updated: 19-Jul-2017 10:22 by Len Church)    < from: Xray Chest 1 View AP/PA. (07.19.17 @ 12:26) >   EXAM:  CHEST SINGLE VIEW FRONTAL                          PROCEDURE DATE:  07/19/2017          INTERPRETATION:  TECHNIQUE: Single portable view of the chest.    COMPARISON: 7/17/2017    CLINICAL HISTORY: Status post thoracentesis.     FINDINGS:    Single frontal view of the chest demonstrates small to moderate   right-sided pleural effusion, not significantly changed. Right-sided   dialysis catheter. No enlarged pneumothorax. Mediastinal sternotomy   wires. The cardiomediastinal silhouette is enlarged. No acute osseous   abnormalities.     IMPRESSION: Small to moderate right-sided pleural effusion, not   significantly changed.                ANTONETTE BROWN M.D., ATTENDING RADIOLOGIST  This document has been electronically signed. Jul 19 2017  3:21PM    < end of copied text >

## 2017-07-21 PROCEDURE — 99232 SBSQ HOSP IP/OBS MODERATE 35: CPT

## 2017-07-21 PROCEDURE — 99233 SBSQ HOSP IP/OBS HIGH 50: CPT

## 2017-07-21 RX ADMIN — Medication 1 TABLET(S): at 11:34

## 2017-07-21 RX ADMIN — Medication 200 MILLIGRAM(S): at 02:36

## 2017-07-21 RX ADMIN — Medication 3 MILLILITER(S): at 09:09

## 2017-07-21 RX ADMIN — MIDODRINE HYDROCHLORIDE 10 MILLIGRAM(S): 2.5 TABLET ORAL at 05:40

## 2017-07-21 RX ADMIN — PREGABALIN 1000 MICROGRAM(S): 225 CAPSULE ORAL at 11:34

## 2017-07-21 RX ADMIN — HEPARIN SODIUM 5000 UNIT(S): 5000 INJECTION INTRAVENOUS; SUBCUTANEOUS at 14:52

## 2017-07-21 RX ADMIN — Medication 200 MILLIGRAM(S): at 11:33

## 2017-07-21 RX ADMIN — ATORVASTATIN CALCIUM 40 MILLIGRAM(S): 80 TABLET, FILM COATED ORAL at 22:06

## 2017-07-21 RX ADMIN — GABAPENTIN 300 MILLIGRAM(S): 400 CAPSULE ORAL at 14:52

## 2017-07-21 RX ADMIN — Medication 667 MILLIGRAM(S): at 11:34

## 2017-07-21 RX ADMIN — Medication 81 MILLIGRAM(S): at 11:34

## 2017-07-21 RX ADMIN — GABAPENTIN 300 MILLIGRAM(S): 400 CAPSULE ORAL at 22:06

## 2017-07-21 RX ADMIN — HEPARIN SODIUM 5000 UNIT(S): 5000 INJECTION INTRAVENOUS; SUBCUTANEOUS at 05:40

## 2017-07-21 RX ADMIN — Medication 667 MILLIGRAM(S): at 08:06

## 2017-07-21 RX ADMIN — Medication 1 MILLIGRAM(S): at 11:34

## 2017-07-21 RX ADMIN — Medication 3 MILLILITER(S): at 15:12

## 2017-07-21 RX ADMIN — HEPARIN SODIUM 5000 UNIT(S): 5000 INJECTION INTRAVENOUS; SUBCUTANEOUS at 22:06

## 2017-07-21 RX ADMIN — Medication 200 MILLIGRAM(S): at 18:38

## 2017-07-21 RX ADMIN — Medication 3 MILLILITER(S): at 03:08

## 2017-07-21 RX ADMIN — Medication 667 MILLIGRAM(S): at 18:35

## 2017-07-21 RX ADMIN — Medication 1 APPLICATION(S): at 11:34

## 2017-07-21 RX ADMIN — GABAPENTIN 300 MILLIGRAM(S): 400 CAPSULE ORAL at 05:40

## 2017-07-21 RX ADMIN — MIDODRINE HYDROCHLORIDE 10 MILLIGRAM(S): 2.5 TABLET ORAL at 18:35

## 2017-07-21 RX ADMIN — Medication 3 MILLILITER(S): at 20:12

## 2017-07-21 NOTE — PROGRESS NOTE ADULT - ASSESSMENT
-RF, on HD  -Pl effusion. S/P another thoracentesis. Initial thoracentesis was transudative.  -SOB. Improving.   -Pna  -Hx COPD.     RECC:  HD per renal. Follow radiographically. Nebs.

## 2017-07-21 NOTE — PROGRESS NOTE ADULT - SUBJECTIVE AND OBJECTIVE BOX
HISTORY OF PRESENT ILLNESS  76F was admitted on 6/14/17 after a mechanical fall while walking outside. She had head trauma but no LOC. In ER, GCS=15. A head CT showed left parietal and temporo-occipital SAH. A repeat head CT was stable. Additional workup showed nondisplaced fractures of the right 4-6 ribs with emphysematous changes, pleural effusion and atelectasis. Hospital course was complicated by shortness of breath/hypoxia and needed BiPAP/high-flow NC. She was started on Cefepime for PNA until 7/5. Admitted to acute rehab on 6/27.    PMHx - HTN, ESRD on HD, AFIB (no full AC due to fall and bleeding risk), CAD, COPD (on home O2 overnight), Cholelithiasis, Gout, Hyperlipidemia, Spinal stenosis, s/p AVR/MVR/Tricupsid valve annuloplasty, Cataracts repair, H/O spinal fusion, Appendectomy, Tonsillectomy, C-Spine DJD    TODAY'S SUBJECTIVE & REVIEW OF SYMPTOMS  [X] Constitutional WNL      [X] Cardio WNL             [ ] Resp WNL           [X] GI WNL                      [X]  WNL                  [X] Heme WNL              [X] Endo WNL                  [X] Skin WNL                [ ] MSK WNL            [ ] Neuro WNL                  [ ] Cognitive WNL          [ ] Psych WNL    No overnight events. Slept well. Breathing well.  Looking to go to bathroom for BM. Denies pain.     VITALS  T(C): 36.1 (07-21-17 @ 05:39)  T(F): 96.9 (07-21-17 @ 05:39), Max: 98.2 (07-20-17 @ 20:36)  HR: 94 (07-21-17 @ 09:10) (75 - 94)  BP: 97/58 (07-21-17 @ 05:39) (91/55 - 114/46)  RR:  (18 - 20)  SpO2:  (92% - 97%)  Wt(kg): --    PHYSICAL EXAM  Constitutional - NAD, Comfortable  HEENT - Facial ecchymosis - Improved, Right supraorbital laceration  Neck - Supple, No limited ROM  Chest - Improved right sided BS, Occasional crackles  Cardiovascular - S1S2  Abdomen - BS+, Soft, NTND  Extremities - No C/C, Mild BLE edema, No calf tenderness, BLE PVD skin changes  Neurologic Exam - Right field cut                    Cognitive - AAO to self, place, month/year, some of situation, Mild deficits     Communication - Expressive deficits     Motor - No focal deficits     Sensory - Intact to LT     Coordination - FTN impaired  Psychiatric - Mood stable, Affect Flat  Skin - BUE - Multiple ecchymosis, left hand edema/pain to palpation    FUNCTIONAL PROGRESS  Gait - Min-Mod A  ADLs - Mod A  Transfers - Mod A   Functional transfers - Mod A    RECENT LABS                            9.2    6.0   )-----------( 126      ( 20 Jul 2017 12:41 )             29.4     07-20    134<L>  |  92<L>  |  78.0<H>  ----------------------------<  94  5.4<H>   |  27.0  |  4.11<H>    Ca    8.8      20 Jul 2017 12:41    Prealb 6/28 - 16    Procalcitonin 7/1 - 2.42, 7/6 - 1.18    Pleural Fluid Cx 7/6 - No growth    RADIOLOGY/OTHER RESULTS  CXR 7/19 - Small to moderate right-sided pleural effusion, not significantly changed.  CXR 7/17 - Increased size of right pleural effusion with associated atelectasis, now moderate. Bilateral perihilar opacities, probably pulmonary edema.  CXR 7/12 - Right middle lobe airspace opacity improved. Increased right pleural effusion.   CXR 7/9 - Persistent right middle lobe infiltrate.    CURRENT MEDICATIONS  MEDICATIONS  (STANDING):  aspirin enteric coated 81 milliGRAM(s) Oral daily  calcium acetate 667 milliGRAM(s) Oral three times a day with meals  cyanocobalamin 1000 MICROGram(s) Oral daily  gabapentin 300 milliGRAM(s) Oral every 8 hours  epoetin jaswinder Injectable 78029 Unit(s) IV Push <User Schedule>  atorvastatin 40 milliGRAM(s) Oral at bedtime  folic acid 1 milliGRAM(s) Oral daily  Nephro-toi 1 Tablet(s) Oral daily  ammonium lactate 12% Lotion 1 Application(s) Topical daily  ALBUTerol/ipratropium for Nebulization 3 milliLiter(s) Nebulizer every 6 hours  midodrine 10 milliGRAM(s) Oral two times a day  heparin  Injectable 5000 Unit(s) SubCutaneous every 8 hours    MEDICATIONS  (PRN):  acetaminophen   Tablet. 650 milliGRAM(s) Oral every 6 hours PRN Mild Pain (1 - 3)  guaiFENesin    Syrup 200 milliGRAM(s) Oral every 6 hours PRN Cough  lactulose Syrup 10 Gram(s) Oral daily PRN no bm > 2 days  sodium chloride 0.65% Nasal 1 Spray(s) Both Nostrils three times a day PRN nasal congestion/discomfort from O2    ASSESSMENT & PLAN  76F sustaining a TBI after a fall now with functional deficits  Cardiac/Pulm HTN - Midodrine   Pulm - Duonebs Q6, O2 4L via NC, Guaifenesin PRN, Ocean PRN  CAD - Lipitor, ASA  ESRD on HD - Phoslo, HD T/Th/Sat  Anemia - Epogen, Vit B, Folate  GI/Bowel Management - Toilet PRN, Lactulose PRN   Management - Toilet Q2  Skin - Turn Q2, Lachydrin BLE, Zfloats BLE  Gout - Prednisone 50mg x2 days (7/9-7/11)  Pain - Neurontin, Tylenol PRN, Ice to left hand PRN  DVT PPX - Heparin (resume 7/20), TEDs, SCDs  Diet - Renal & Cardiac/Thins, Nephro-toi, Nepro TID    Continue comprehensive acute rehab program consisting of 3hrs/day of OT/PT and SLP

## 2017-07-21 NOTE — PROGRESS NOTE ADULT - ASSESSMENT
75 yo female with h/o Atrial fibrillation off anticoagulation due to fall risk and bleeding risk , ESRD on HD ,diastolic CHF , pulmonary hypertension , valvular heart disease  admitted s/p fall at home with left SAH , brain injury and developed respiratory distress being treated for pneumonia  , now in rehab . Chest congestion, cough improving,  BP remains low, medication doses of  sildenafil adjusted by pulmonologist, hypoxic , remains hypotensive, stable now, s/p right pleural effusion removal by IR 7/7 . C/O cough with gabriel phlegm , sob . On  o2 via NC . Repeated  chest xray with increasing R pleural effusion . S/P recurrent R pleural effusion drainage . Feels better . Pulmonary following.     Problem/Plan - 1:  ·  Problem:  Recurrent Pleural effusion.  Plan: s/p drain by IR  X2 , on O2 via NC ,   If pleural effusion reoccur will consider pleurex .    Problem/Plan - 3:  ·  Problem: Pulmonary hypertension.  Plan: off sildenafil due to hypotension , pulmonary following.     Problem/Plan - 4:  ·  Problem: Diastolic heart failure due to valvular disease.  Plan: fluid removal with HD ,  palliative care team on board.     Problem/Plan - 5:  ·  Problem: ESRD (end stage renal disease) on dialysis.  Plan: HD as per renal T-Th-Sat.     Problem/Plan - 6:  Problem: Atrial fibrillation, unspecified type. Plan: off ac due to fall/SAH  rate controlled off AV asher blocking agents.    Problem/Plan - 7:  ·  Problem: Anemia.  Plan: Epogen weekly , hb stable, monitor labs    Problem/Plan - 8:  ·  Problem: Hypotension.  Plan: Stable  Lasix and sildenafil stopped as BP on lower side, monitor closely continue midodrine 75 yo female with h/o Atrial fibrillation off anticoagulation due to fall risk and bleeding risk , ESRD on HD ,diastolic CHF , pulmonary hypertension , valvular heart disease  admitted s/p fall at home with left SAH , brain injury and developed respiratory distress being treated for pneumonia  , now in rehab . Chest congestion, cough improving,  BP remains low, medication doses of  sildenafil adjusted by pulmonologist, hypoxic , remains hypotensive, stable now, s/p right pleural effusion removal by IR 7/7 . C/O cough with gabriel phlegm , sob . On  o2 via NC . Repeated  chest xray with increasing R pleural effusion . S/P recurrent R pleural effusion drainage . Feels better . Pulmonary following.     Problem/Plan - 1:  ·  Problem:  Recurrent Pleural effusion.  Plan: s/p drain by IR  X2 ,Traumatic exudate ve transudate expected ,  f/u c-xray om 7/24/16  f/u pleural fluid analysis  If pleural effusion reoccur will consider pleurex ( with Dr Bustos )     Problem/Plan - 3:  ·  Problem: Pulmonary hypertension.  Plan: off sildenafil due to hypotension , pulmonary following.     Problem/Plan - 4:  ·  Problem: Diastolic heart failure due to valvular disease.  Plan: fluid removal with HD ,  palliative care team on board.     Problem/Plan - 5:  ·  Problem: ESRD (end stage renal disease) on dialysis.  Plan: HD as per renal T-Th-Sat.     Problem/Plan - 6:  Problem: Atrial fibrillation, unspecified type. Plan: off ac due to fall/SAH  rate controlled off AV asher blocking agents.    Problem/Plan - 7:  ·  Problem: Anemia.  Plan: Epogen weekly , hb stable, monitor labs    Problem/Plan - 8:  ·  Problem: Hypotension.  Plan: Stable  Lasix and sildenafil stopped as BP on lower side, monitor closely continue midodrine 77 yo female with h/o Atrial fibrillation off anticoagulation due to fall risk and bleeding risk , ESRD on HD ,diastolic CHF , pulmonary hypertension , valvular heart disease  admitted s/p fall at home with left SAH , brain injury and developed respiratory distress being treated for pneumonia  , now in rehab . Chest congestion, cough improving,  BP remains low, medication doses of  sildenafil adjusted by pulmonologist, hypoxic , remains hypotensive, stable now, s/p right pleural effusion removal by IR 7/7 . C/O cough with gabriel phlegm , sob . On  o2 via NC . Repeated  chest xray with increasing R pleural effusion . S/P recurrent R pleural effusion drainage . Feels better . Pulmonary following. For repeated CXR on 7/22 .    Problem/Plan - 1:  ·  Problem:  Recurrent Pleural effusion.  Plan: s/p drain by IR  X2 ,Traumatic exudate ve transudate expected ,  f/u c-xray om 7/24/16  f/u pleural fluid analysis  If pleural effusion reoccur will consider pleurex ( with Dr Bustos )     Problem/Plan - 3:  ·  Problem: Pulmonary hypertension.  Plan: off sildenafil due to hypotension , pulmonary following.     Problem/Plan - 4:  ·  Problem: Diastolic heart failure due to valvular disease.  Plan: fluid removal with HD ,  palliative care team on board.     Problem/Plan - 5:  ·  Problem: ESRD (end stage renal disease) on dialysis.  Plan: HD as per renal T-Th-Sat.     Problem/Plan - 6:  Problem: Atrial fibrillation, unspecified type. Plan: off ac due to fall/SAH  rate controlled off AV asher blocking agents.    Problem/Plan - 7:  ·  Problem: Anemia.  Plan: Epogen weekly , hb stable, monitor labs    Problem/Plan - 8:  ·  Problem: Hypotension.  Plan: Stable  Lasix and sildenafil stopped as BP on lower side, monitor closely continue midodrine

## 2017-07-21 NOTE — PROGRESS NOTE ADULT - SUBJECTIVE AND OBJECTIVE BOX
PULMONARY PROGRESS NOTE      DUSTIN HERNANDEZKADE-[id number]    Patient is a 76y old  Female who presents with a chief complaint of     INTERVAL HPI/OVERNIGHT EVENTS: Feeling much better since thoracentesis. Less SOB.     MEDICATIONS  (STANDING):  aspirin enteric coated 81 milliGRAM(s) Oral daily  calcium acetate 667 milliGRAM(s) Oral three times a day with meals  cyanocobalamin 1000 MICROGram(s) Oral daily  gabapentin 300 milliGRAM(s) Oral every 8 hours  epoetin jaswinder Injectable 31101 Unit(s) IV Push <User Schedule>  atorvastatin 40 milliGRAM(s) Oral at bedtime  folic acid 1 milliGRAM(s) Oral daily  Nephro-toi 1 Tablet(s) Oral daily  ammonium lactate 12% Lotion 1 Application(s) Topical daily  ALBUTerol/ipratropium for Nebulization 3 milliLiter(s) Nebulizer every 6 hours  midodrine 10 milliGRAM(s) Oral two times a day  heparin  Injectable 5000 Unit(s) SubCutaneous every 8 hours      MEDICATIONS  (PRN):  acetaminophen   Tablet. 650 milliGRAM(s) Oral every 6 hours PRN Mild Pain (1 - 3)  guaiFENesin    Syrup 200 milliGRAM(s) Oral every 6 hours PRN Cough  lactulose Syrup 10 Gram(s) Oral daily PRN no bm > 2 days  sodium chloride 0.65% Nasal 1 Spray(s) Both Nostrils three times a day PRN nasal congestion/discomfort from O2      Allergies    allopurinol (Rash)  codeine (Nausea; Vomiting)  penicillin (Rash)  spironolactone (Unknown)    Intolerances        PAST MEDICAL & SURGICAL HISTORY:  ESRD (end stage renal disease) on dialysis: MWF via R [facility] Permacath  planned AVF creation  Sinusitis, unspecified chronicity, unspecified location  Gout  Gall stones  Pneumonia  Anemia  Pulmonary hypertension  COPD (chronic obstructive pulmonary disease)  CAD (coronary artery disease)  Atrial fibrillation  Hyperlipidemia  Hypertension  Spinal fusion failure, initial encounter  Tubal ligation status  Sinusitis  Spinal stenosis  S/P tonsillectomy  S/P appendectomy  H/O aortic valve replacement  H/O spinal fusion  H/O cataract  Cystocele  H/O tricuspid valve annuloplasty  H/O mitral valve repair             REVIEW OF SYSTEMS:    CONSTITUTIONAL:  No distress    HEENT:  Eyes:  No diplopia or blurred vision. ENT:  No earache, sore throat or runny nose.    CARDIOVASCULAR:  No pressure, squeezing, tightness, heaviness or aching about the chest; no palpitations.    RESPIRATORY:  per HPI     GASTROINTESTINAL:  No nausea, vomiting or diarrhea.    GENITOURINARY:  No dysuria, frequency or urgency.    NEUROLOGIC:  No paresthesias, fasciculations, seizures or weakness.    PSYCHIATRIC:  No disorder of thought or mood.    Vital Signs Last 24 Hrs  T(C): 36.4 (21 Jul 2017 11:32), Max: 36.8 (20 Jul 2017 20:36)  T(F): 97.6 (21 Jul 2017 11:32), Max: 98.2 (20 Jul 2017 20:36)  HR: 80 (21 Jul 2017 11:32) (80 - 94)  BP: 101/55 (21 Jul 2017 11:32) (91/55 - 101/55)  BP(mean): --  RR: 18 (21 Jul 2017 11:32) (18 - 18)  SpO2: 94% (21 Jul 2017 11:32) (92% - 97%)    PHYSICAL EXAMINATION:    GENERAL: The patient is awake and alert in no apparent distress.     HEENT: Head is normocephalic and atraumatic. Extraocular muscles are intact. Mucous membranes are moist.    NECK: Supple.    LUNGS: scattered rhonchi, decreased at bases, respirations unlabored    HEART: Regular rate and rhythm      ABDOMEN: Soft, nontender, and nondistended.      EXTREMITIES: Without any cyanosis, clubbing, rash, lesions or edema.    NEUROLOGIC: Grossly intact.    LABS:                        9.2    6.0   )-----------( 126      ( 20 Jul 2017 12:41 )             29.4     07-20    134<L>  |  92<L>  |  78.0<H>  ----------------------------<  94  5.4<H>   |  27.0  |  4.11<H>    Ca    8.8      20 Jul 2017 12:41           RADIOLOGY & ADDITIONAL STUDIES:  < from: Xray Chest 1 View AP/PA. (07.19.17 @ 12:26) >  EXAM:  CHEST SINGLE VIEW FRONTAL                          PROCEDURE DATE:  07/19/2017          INTERPRETATION:  TECHNIQUE: Single portable view of the chest.    COMPARISON: 7/17/2017    CLINICAL HISTORY: Status post thoracentesis.     FINDINGS:    Single frontal view of the chest demonstrates small to moderate   right-sided pleural effusion, not significantly changed. Right-sided   dialysis catheter. No enlarged pneumothorax. Mediastinal sternotomy   wires. The cardiomediastinal silhouette is enlarged. No acute osseous   abnormalities.     IMPRESSION: Small to moderate right-sided pleural effusion, not   significantly changed.                ANTONETTE BROWN M.D., ATTENDING RADIOLOGIST    < end of copied text >

## 2017-07-21 NOTE — PROGRESS NOTE ADULT - SUBJECTIVE AND OBJECTIVE BOX
CC: SOB Now better after thoracentesis    F/u fall with  left SAH, recent treatment for PNA and pleural effusion s/p tap    HPI:76F was admitted on 6/14/17 after a mechanical fall while walking outside. She had head trauma but no LOC. In ER, GCS=15. A head CT showed left parietal and temporo-occipital SAH. A repeat head CT was stable. Additional workup showed nondisplaced fractures of the right 4-6 ribs with emphysematous changes, pleural effusion and atelectasis. Hospital course was complicated by shortness of breath/hypoxia and needed BiPAP/high-flow NC. She was started on Cefepime for PNA. Admitted to acute rehab on 6/27. Feels fatigue , CXR with increasing R pleural effusion , b/l opacity . Today s/p thoracentesis , 1 L removed . SOB decreased , will repeat CXR in 2-3 days , Pulmonary following .    PAST MEDICAL & SURGICAL HISTORY:  ESRD (end stage renal disease) on dialysis: MWF via R SC Permacath  planned AVF creation  Sinusitis, unspecified chronicity, unspecified location  Gout  Gall stones  Pneumonia  Anemia  Pulmonary hypertension  COPD (chronic obstructive pulmonary disease)  CAD (coronary artery disease)  Atrial fibrillation  Hyperlipidemia  Hypertension  Spinal fusion failure, initial encounter  Tubal ligation status  Sinusitis  Spinal stenosis  S/P tonsillectomy  S/P appendectomy  H/O aortic valve replacement  H/O spinal fusion  H/O cataract  Cystocele  H/O tricuspid valve annuloplasty  H/O mitral valve repair      MEDICATIONS  (STANDING):  aspirin enteric coated 81 milliGRAM(s) Oral daily  calcium acetate 667 milliGRAM(s) Oral three times a day with meals  cyanocobalamin 1000 MICROGram(s) Oral daily  gabapentin 300 milliGRAM(s) Oral every 8 hours  epoetin jaswinder Injectable 41090 Unit(s) IV Push <User Schedule>  atorvastatin 40 milliGRAM(s) Oral at bedtime  folic acid 1 milliGRAM(s) Oral daily  Nephro-toi 1 Tablet(s) Oral daily  ammonium lactate 12% Lotion 1 Application(s) Topical daily  ALBUTerol/ipratropium for Nebulization 3 milliLiter(s) Nebulizer every 6 hours  midodrine 10 milliGRAM(s) Oral two times a day  heparin  Injectable 5000 Unit(s) SubCutaneous every 8 hours    MEDICATIONS  (PRN):  acetaminophen   Tablet. 650 milliGRAM(s) Oral every 6 hours PRN Mild Pain (1 - 3)  guaiFENesin    Syrup 200 milliGRAM(s) Oral every 6 hours PRN Cough  lactulose Syrup 10 Gram(s) Oral daily PRN no bm > 2 days  sodium chloride 0.65% Nasal 1 Spray(s) Both Nostrils three times a day PRN nasal congestion/discomfort from O2      LABS:                          9.2    6.0   )-----------( 126      ( 20 Jul 2017 12:41 )             29.4     07-20    134<L>  |  92<L>  |  78.0<H>  ----------------------------<  94  5.4<H>   |  27.0  |  4.11<H>    Ca    8.8      20 Jul 2017 12:41            RADIOLOGY & ADDITIONAL TESTS:      REVIEW OF SYSTEMS:    CONSTITUTIONAL: No fever, weight loss, or fatigue  EYES: No eye pain, visual disturbances, or discharge  ENMT:  No difficulty hearing, tinnitus, vertigo; No sinus or throat pain  NECK: No pain or stiffness  RESPIRATORY: No cough, wheezing, chills or hemoptysis; No shortness of breath  CARDIOVASCULAR: No chest pain, palpitations, dizziness, or leg swelling  GASTROINTESTINAL: No abdominal or epigastric pain. No nausea, vomiting, or hematemesis; No diarrhea or constipation. No melena or hematochezia.  GENITOURINARY: No dysuria, frequency, hematuria, or incontinence  NEUROLOGICAL: No headaches, memory loss, loss of strength, numbness, or tremors  SKIN: No itching, burning, rashes, or lesions   LYMPH NODES: No enlarged glands  ENDOCRINE: No heat or cold intolerance; No hair loss  MUSCULOSKELETAL:   PSYCHIATRIC: No depression, anxiety, mood swings, or difficulty sleeping  HEME/LYMPH: No easy bruising, or bleeding gums  ALLERGY AND IMMUNOLOGIC: No hives or eczema    Vital Signs Last 24 Hrs  T(C): 36.4 (21 Jul 2017 11:32), Max: 36.8 (20 Jul 2017 20:36)  T(F): 97.6 (21 Jul 2017 11:32), Max: 98.2 (20 Jul 2017 20:36)  HR: 80 (21 Jul 2017 11:32) (75 - 94)  BP: 101/55 (21 Jul 2017 11:32) (91/55 - 114/46)  BP(mean): --  RR: 18 (21 Jul 2017 11:32) (18 - 18)  SpO2: 94% (21 Jul 2017 11:32) (92% - 97%)  PHYSICAL EXAM:    GENERAL: NAD, well-groomed, well-developed  HEAD:  Atraumatic, Normocephalic  EYES: EOMI, PERRLA, conjunctiva and sclera clear  NECK: Supple, No JVD, Normal thyroid  NERVOUS SYSTEM:  Alert & Oriented X3, no focal deficit  CHEST/LUNG: CTA b/l ,  no  rales, rhonchi, wheezing, or rubs  HEART: Regular rate and rhythm; No murmurs, rubs, or gallops  ABDOMEN: Soft, Nontender, Nondistended; Bowel sounds present  EXTREMITIES:  2+ Peripheral Pulses, No clubbing, cyanosis, or edema  LYMPH: No lymphadenopathy noted  SKIN: No rashes or lesions CC: sob /- better after thoracentesis    F/u fall with  left SAH, recent treatment for PNA and pleural effusion s/p tap    HPI:76F was admitted on 6/14/17 after a mechanical fall while walking outside. She had head trauma but no LOC. In ER, GCS=15. A head CT showed left parietal and temporo-occipital SAH. A repeat head CT was stable. Additional workup showed nondisplaced fractures of the right 4-6 ribs with emphysematous changes, pleural effusion and atelectasis. Hospital course was complicated by shortness of breath/hypoxia and needed BiPAP/high-flow NC. She was started on Cefepime for PNA. Admitted to acute rehab on 6/27. Feels fatigue , CXR with increasing R pleural effusion , b/l opacity . Today s/p thoracentesis , 1 L removed . SOB decreased , will repeat CXR in 2-3 days , Pulmonary following .    PAST MEDICAL & SURGICAL HISTORY:  ESRD (end stage renal disease) on dialysis: MWF via R [facility] Permacath  planned AVF creation  Sinusitis, unspecified chronicity, unspecified location  Gout  Gall stones  Pneumonia  Anemia  Pulmonary hypertension  COPD (chronic obstructive pulmonary disease)  CAD (coronary artery disease)  Atrial fibrillation  Hyperlipidemia  Hypertension  Spinal fusion failure, initial encounter  Tubal ligation status  Sinusitis  Spinal stenosis  S/P tonsillectomy  S/P appendectomy  H/O aortic valve replacement  H/O spinal fusion  H/O cataract  Cystocele  H/O tricuspid valve annuloplasty  H/O mitral valve repair      MEDICATIONS  (STANDING):  aspirin enteric coated 81 milliGRAM(s) Oral daily  calcium acetate 667 milliGRAM(s) Oral three times a day with meals  cyanocobalamin 1000 MICROGram(s) Oral daily  gabapentin 300 milliGRAM(s) Oral every 8 hours  epoetin jaswinder Injectable 24969 Unit(s) IV Push <User Schedule>  atorvastatin 40 milliGRAM(s) Oral at bedtime  folic acid 1 milliGRAM(s) Oral daily  Nephro-toi 1 Tablet(s) Oral daily  ammonium lactate 12% Lotion 1 Application(s) Topical daily  ALBUTerol/ipratropium for Nebulization 3 milliLiter(s) Nebulizer every 6 hours  midodrine 10 milliGRAM(s) Oral two times a day  heparin  Injectable 5000 Unit(s) SubCutaneous every 8 hours    MEDICATIONS  (PRN):  acetaminophen   Tablet. 650 milliGRAM(s) Oral every 6 hours PRN Mild Pain (1 - 3)  guaiFENesin    Syrup 200 milliGRAM(s) Oral every 6 hours PRN Cough  lactulose Syrup 10 Gram(s) Oral daily PRN no bm > 2 days  sodium chloride 0.65% Nasal 1 Spray(s) Both Nostrils three times a day PRN nasal congestion/discomfort from O2      LABS:                          9.2    6.0   )-----------( 126      ( 20 Jul 2017 12:41 )             29.4     07-20    134<L>  |  92<L>  |  78.0<H>  ----------------------------<  94  5.4<H>   |  27.0  |  4.11<H>    Ca    8.8      20 Jul 2017 12:41            RADIOLOGY & ADDITIONAL TESTS:      REVIEW OF SYSTEMS:    CONSTITUTIONAL: No fever, weight loss, or fatigue  EYES: No eye pain, visual disturbances, or discharge  ENMT:  No difficulty hearing, tinnitus, vertigo; No sinus or throat pain  NECK: No pain or stiffness  RESPIRATORY: cough+,  no wheezing, chills or hemoptysis; No shortness of breath  CARDIOVASCULAR: No chest pain, palpitations, dizziness, or leg swelling  GASTROINTESTINAL: No abdominal or epigastric pain. No nausea, vomiting, or hematemesis; No diarrhea or constipation. No melena or hematochezia.  GENITOURINARY: No dysuria, frequency, hematuria, or incontinence  NEUROLOGICAL: No headaches, memory loss, loss of strength, numbness, or tremors  SKIN: No itching, burning, rashes, or lesions   LYMPH NODES: No enlarged glands  ENDOCRINE: No heat or cold intolerance; No hair loss  MUSCULOSKELETAL: no joint / muscle pain or swelling   PSYCHIATRIC: No depression, anxiety, mood swings, or difficulty sleeping  HEME/LYMPH: No easy bruising, or bleeding gums  ALLERGY AND IMMUNOLOGIC: No hives or eczema    Vital Signs Last 24 Hrs  T(C): 36.4 (21 Jul 2017 11:32), Max: 36.8 (20 Jul 2017 20:36)  T(F): 97.6 (21 Jul 2017 11:32), Max: 98.2 (20 Jul 2017 20:36)  HR: 80 (21 Jul 2017 11:32) (75 - 94)  BP: 101/55 (21 Jul 2017 11:32) (91/55 - 114/46)  BP(mean): --  RR: 18 (21 Jul 2017 11:32) (18 - 18)  SpO2: 94% (21 Jul 2017 11:32) (92% - 97%)  PHYSICAL EXAM:    GENERAL: NAD, well-groomed, well-developed  HEAD:  Atraumatic, Normocephalic  EYES: EOMI, PERRLA, conjunctiva and sclera clear  NECK: Supple, No JVD, Normal thyroid  NERVOUS SYSTEM:  Alert & Oriented X3, no focal deficit  CHEST/LUNG: CTA b/l ,  no  rales, rhonchi, wheezing, or rubs  HEART: irregularly irregular; No murmurs, rubs, or gallops  ABDOMEN: Soft, Nontender, Nondistended; Bowel sounds present  EXTREMITIES:  2+ Peripheral Pulses, No clubbing, cyanosis, or edema  LYMPH: No lymphadenopathy noted  SKIN: b/l UE multiple ecchymosis

## 2017-07-21 NOTE — PROGRESS NOTE ADULT - ASSESSMENT
ESRD - HD tomorrow  - cont Midodrine     Anemia - Epogen with HD , CBC stable     RO - Continue the current binders

## 2017-07-22 LAB
ANION GAP SERPL CALC-SCNC: 15 MMOL/L — SIGNIFICANT CHANGE UP (ref 5–17)
BUN SERPL-MCNC: 62 MG/DL — HIGH (ref 8–20)
CALCIUM SERPL-MCNC: 9.1 MG/DL — SIGNIFICANT CHANGE UP (ref 8.6–10.2)
CHLORIDE SERPL-SCNC: 94 MMOL/L — LOW (ref 98–107)
CO2 SERPL-SCNC: 29 MMOL/L — SIGNIFICANT CHANGE UP (ref 22–29)
CREAT SERPL-MCNC: 3.61 MG/DL — HIGH (ref 0.5–1.3)
GLUCOSE SERPL-MCNC: 88 MG/DL — SIGNIFICANT CHANGE UP (ref 70–115)
HCT VFR BLD CALC: 30 % — LOW (ref 37–47)
HGB BLD-MCNC: 9.3 G/DL — LOW (ref 12–16)
MCHC RBC-ENTMCNC: 31 G/DL — LOW (ref 32–36)
MCHC RBC-ENTMCNC: 35.6 PG — HIGH (ref 27–31)
MCV RBC AUTO: 114.9 FL — HIGH (ref 81–99)
PLATELET # BLD AUTO: 126 K/UL — LOW (ref 150–400)
POTASSIUM SERPL-MCNC: 4.7 MMOL/L — SIGNIFICANT CHANGE UP (ref 3.5–5.3)
POTASSIUM SERPL-SCNC: 4.7 MMOL/L — SIGNIFICANT CHANGE UP (ref 3.5–5.3)
RBC # BLD: 2.61 M/UL — LOW (ref 4.4–5.2)
RBC # FLD: 19.4 % — HIGH (ref 11–15.6)
SODIUM SERPL-SCNC: 138 MMOL/L — SIGNIFICANT CHANGE UP (ref 135–145)
WBC # BLD: 5.6 K/UL — SIGNIFICANT CHANGE UP (ref 4.8–10.8)
WBC # FLD AUTO: 5.6 K/UL — SIGNIFICANT CHANGE UP (ref 4.8–10.8)

## 2017-07-22 PROCEDURE — 99232 SBSQ HOSP IP/OBS MODERATE 35: CPT | Mod: GC

## 2017-07-22 RX ADMIN — HEPARIN SODIUM 5000 UNIT(S): 5000 INJECTION INTRAVENOUS; SUBCUTANEOUS at 21:06

## 2017-07-22 RX ADMIN — Medication 200 MILLIGRAM(S): at 21:06

## 2017-07-22 RX ADMIN — PREGABALIN 1000 MICROGRAM(S): 225 CAPSULE ORAL at 12:16

## 2017-07-22 RX ADMIN — ERYTHROPOIETIN 10000 UNIT(S): 10000 INJECTION, SOLUTION INTRAVENOUS; SUBCUTANEOUS at 13:04

## 2017-07-22 RX ADMIN — Medication 3 MILLILITER(S): at 03:17

## 2017-07-22 RX ADMIN — GABAPENTIN 300 MILLIGRAM(S): 400 CAPSULE ORAL at 05:02

## 2017-07-22 RX ADMIN — ATORVASTATIN CALCIUM 40 MILLIGRAM(S): 80 TABLET, FILM COATED ORAL at 21:06

## 2017-07-22 RX ADMIN — Medication 667 MILLIGRAM(S): at 12:16

## 2017-07-22 RX ADMIN — Medication 1 MILLIGRAM(S): at 12:16

## 2017-07-22 RX ADMIN — Medication 1 APPLICATION(S): at 12:16

## 2017-07-22 RX ADMIN — MIDODRINE HYDROCHLORIDE 10 MILLIGRAM(S): 2.5 TABLET ORAL at 17:15

## 2017-07-22 RX ADMIN — Medication 667 MILLIGRAM(S): at 08:17

## 2017-07-22 RX ADMIN — GABAPENTIN 300 MILLIGRAM(S): 400 CAPSULE ORAL at 21:06

## 2017-07-22 RX ADMIN — MIDODRINE HYDROCHLORIDE 10 MILLIGRAM(S): 2.5 TABLET ORAL at 05:02

## 2017-07-22 RX ADMIN — Medication 81 MILLIGRAM(S): at 12:17

## 2017-07-22 RX ADMIN — Medication 3 MILLILITER(S): at 21:13

## 2017-07-22 RX ADMIN — Medication 200 MILLIGRAM(S): at 05:14

## 2017-07-22 RX ADMIN — Medication 667 MILLIGRAM(S): at 17:15

## 2017-07-22 RX ADMIN — Medication 1 TABLET(S): at 12:16

## 2017-07-22 RX ADMIN — HEPARIN SODIUM 5000 UNIT(S): 5000 INJECTION INTRAVENOUS; SUBCUTANEOUS at 05:02

## 2017-07-22 RX ADMIN — GABAPENTIN 300 MILLIGRAM(S): 400 CAPSULE ORAL at 17:15

## 2017-07-22 NOTE — PROGRESS NOTE ADULT - SUBJECTIVE AND OBJECTIVE BOX
NEPHROLOGY INTERVAL HPI/OVERNIGHT EVENTS:  pt clinically stable  no acute distress    MEDICATIONS  (STANDING):  aspirin enteric coated 81 milliGRAM(s) Oral daily  calcium acetate 667 milliGRAM(s) Oral three times a day with meals  cyanocobalamin 1000 MICROGram(s) Oral daily  gabapentin 300 milliGRAM(s) Oral every 8 hours  epoetin jaswinder Injectable 14678 Unit(s) IV Push <User Schedule>  atorvastatin 40 milliGRAM(s) Oral at bedtime  folic acid 1 milliGRAM(s) Oral daily  Nephro-toi 1 Tablet(s) Oral daily  ammonium lactate 12% Lotion 1 Application(s) Topical daily  ALBUTerol/ipratropium for Nebulization 3 milliLiter(s) Nebulizer every 6 hours  midodrine 10 milliGRAM(s) Oral two times a day  heparin  Injectable 5000 Unit(s) SubCutaneous every 8 hours    MEDICATIONS  (PRN):  acetaminophen   Tablet. 650 milliGRAM(s) Oral every 6 hours PRN Mild Pain (1 - 3)  guaiFENesin    Syrup 200 milliGRAM(s) Oral every 6 hours PRN Cough  lactulose Syrup 10 Gram(s) Oral daily PRN no bm > 2 days  sodium chloride 0.65% Nasal 1 Spray(s) Both Nostrils three times a day PRN nasal congestion/discomfort from O2      Allergies    allopurinol (Rash)  codeine (Nausea; Vomiting)  penicillin (Rash)  spironolactone (Unknown)    Intolerances      Vital Signs Last 24 Hrs  T(C): 36.7 (22 Jul 2017 04:47), Max: 36.7 (21 Jul 2017 20:10)  T(F): 98 (22 Jul 2017 04:47), Max: 98.1 (21 Jul 2017 20:10)  HR: 81 (22 Jul 2017 04:47) (73 - 94)  BP: 100/52 (22 Jul 2017 04:47) (100/52 - 101/55)  BP(mean): --  RR: 16 (22 Jul 2017 04:47) (16 - 18)  SpO2: 93% (22 Jul 2017 04:47) (93% - 96%)    PHYSICAL EXAM:  NAD  lungs - CTA  CV: no rub  Abd : soft, NT BS +, No masses  Ext- No edema  Neuro : AAO x 3 and nonfocal    LABS:                        9.2    6.0   )-----------( 126      ( 20 Jul 2017 12:41 )             29.4     07-20    134<L>  |  92<L>  |  78.0<H>  ----------------------------<  94  5.4<H>   |  27.0  |  4.11<H>    Ca    8.8      20 Jul 2017 12:41              RADIOLOGY & ADDITIONAL TESTS:

## 2017-07-22 NOTE — PROGRESS NOTE ADULT - SUBJECTIVE AND OBJECTIVE BOX
HISTORY OF PRESENT ILLNESS  76F was admitted on 6/14/17 after a mechanical fall while walking outside. She had head trauma but no LOC. In ER, GCS=15. A head CT showed left parietal and temporo-occipital SAH. A repeat head CT was stable. Additional workup showed nondisplaced fractures of the right 4-6 ribs with emphysematous changes, pleural effusion and atelectasis. Hospital course was complicated by shortness of breath/hypoxia and needed BiPAP/high-flow NC. She was started on Cefepime for PNA until 7/5. Admitted to acute rehab on 6/27.    PMHx - HTN, ESRD on HD, AFIB (no full AC due to fall and bleeding risk), CAD, COPD (on home O2 overnight), Cholelithiasis, Gout, Hyperlipidemia, Spinal stenosis, s/p AVR/MVR/Tricupsid valve annuloplasty, Cataracts repair, H/O spinal fusion, Appendectomy, Tonsillectomy, C-Spine DJD    TODAY'S SUBJECTIVE & REVIEW OF SYMPTOMS  [X] Constitutional WNL      [X] Cardio WNL             [ ] Resp WNL           [X] GI WNL                      [X]  WNL                  [X] Heme WNL              [X] Endo WNL                  [X] Skin WNL                [ ] MSK WNL            [ ] Neuro WNL                  [ ] Cognitive WNL          [ ] Psych WNL    No overnight events.   This am, pt reports breathing well on oxygen.  Coughing improving with taking Robitussin regularly.  Continues to feel tired throughout the day.  Able to participate in therapy, but needs to take short breaks in between. Denies any pain.     VITALS  Vital Signs Last 24 Hrs  T(C): 36.7 (22 Jul 2017 04:47), Max: 36.7 (21 Jul 2017 20:10)  T(F): 98 (22 Jul 2017 04:47), Max: 98.1 (21 Jul 2017 20:10)  HR: 81 (22 Jul 2017 04:47) (73 - 81)  BP: 100/52 (22 Jul 2017 04:47) (100/52 - 101/55)  BP(mean): --  RR: 16 (22 Jul 2017 04:47) (16 - 18)  SpO2: 93% (22 Jul 2017 04:47) (93% - 95%)     PHYSICAL EXAM  Constitutional - NAD, Comfortable on 4L NC  HEENT - Facial ecchymosis - Improved, Right supraorbital laceration healing  Neck - Supple, No limited ROM  Chest - Improved right sided BS, Occasional crackles  Cardiovascular - S1S2  Abdomen - BS+, Soft, NTND  Extremities - No C/C, Mild BLE edema, No calf tenderness, BLE PVD skin changes  Neurologic Exam - Right field cut                    Cognitive - AAO to self, place, month/year, some of situation, Mild deficits     Communication - Expressive deficits     Motor - No focal deficits     Sensory - Intact to LT     Coordination - FTN impaired  Psychiatric - Mood stable, Affect Flat  Skin - BUE - Multiple ecchymosis, improved left hand edema    FUNCTIONAL PROGRESS  Gait - Min-Mod A  ADLs - Mod A  Transfers - Mod A   Functional transfers - Mod A    RECENT LABS                            9.2    6.0   )-----------( 126      ( 20 Jul 2017 12:41 )             29.4     07-20    134<L>  |  92<L>  |  78.0<H>  ----------------------------<  94  5.4<H>   |  27.0  |  4.11<H>    Ca    8.8      20 Jul 2017 12:41    Prealb 6/28 - 16    Procalcitonin 7/1 - 2.42, 7/6 - 1.18    Pleural Fluid Cx 7/6 - No growth    RADIOLOGY/OTHER RESULTS  CXR 7/19 - Small to moderate right-sided pleural effusion, not significantly changed.  CXR 7/17 - Increased size of right pleural effusion with associated atelectasis, now moderate. Bilateral perihilar opacities, probably pulmonary edema.  CXR 7/12 - Right middle lobe airspace opacity improved. Increased right pleural effusion.   CXR 7/9 - Persistent right middle lobe infiltrate.    CURRENT MEDICATIONS  MEDICATIONS  (STANDING):  aspirin enteric coated 81 milliGRAM(s) Oral daily  calcium acetate 667 milliGRAM(s) Oral three times a day with meals  cyanocobalamin 1000 MICROGram(s) Oral daily  gabapentin 300 milliGRAM(s) Oral every 8 hours  epoetin jaswinder Injectable 90193 Unit(s) IV Push <User Schedule>  atorvastatin 40 milliGRAM(s) Oral at bedtime  folic acid 1 milliGRAM(s) Oral daily  Nephro-toi 1 Tablet(s) Oral daily  ammonium lactate 12% Lotion 1 Application(s) Topical daily  ALBUTerol/ipratropium for Nebulization 3 milliLiter(s) Nebulizer every 6 hours  midodrine 10 milliGRAM(s) Oral two times a day  heparin  Injectable 5000 Unit(s) SubCutaneous every 8 hours    MEDICATIONS  (PRN):  acetaminophen   Tablet. 650 milliGRAM(s) Oral every 6 hours PRN Mild Pain (1 - 3)  guaiFENesin    Syrup 200 milliGRAM(s) Oral every 6 hours PRN Cough  lactulose Syrup 10 Gram(s) Oral daily PRN no bm > 2 days  sodium chloride 0.65% Nasal 1 Spray(s) Both Nostrils three times a day PRN nasal congestion/discomfort from O2      ASSESSMENT & PLAN  76F sustaining a TBI after a fall now with functional deficits  Cardiac/Pulm HTN - Midodrine for BP support  Pulm - Duonebs Q6, O2 4L via NC, Guaifenesin PRN, Ocean PRN  CAD - Lipitor, ASA  ESRD on HD - Phoslo, HD T/Th/Sat  Anemia - Epogen, Vit B, Folate  GI/Bowel Management - Toilet PRN, Lactulose PRN   Management - Toilet Q2  Skin - Turn Q2, Lachydrin BLE, Zfloats BLE  Gout - resolved, s/p Prednisone course(7/9-7/11)  Pain - Neurontin, Tylenol PRN, Ice to left hand PRN  DVT PPX - Heparin (resume 7/20), TEDs, SCDs  Diet - Renal & Cardiac/Thins, Nephro-toi, Nepro TID    Continue comprehensive acute rehab program consisting of 3hrs/day of OT/PT and SLP

## 2017-07-22 NOTE — PROGRESS NOTE ADULT - ASSESSMENT
ESRD - HD today  - cont Midodrine     Anemia - Epogen with HD; monitor CBC     RO - Continue the current binders

## 2017-07-23 DIAGNOSIS — J44.9 CHRONIC OBSTRUCTIVE PULMONARY DISEASE, UNSPECIFIED: ICD-10-CM

## 2017-07-23 PROCEDURE — 99232 SBSQ HOSP IP/OBS MODERATE 35: CPT

## 2017-07-23 PROCEDURE — 99233 SBSQ HOSP IP/OBS HIGH 50: CPT

## 2017-07-23 RX ADMIN — Medication 1 APPLICATION(S): at 12:49

## 2017-07-23 RX ADMIN — GABAPENTIN 300 MILLIGRAM(S): 400 CAPSULE ORAL at 13:15

## 2017-07-23 RX ADMIN — ATORVASTATIN CALCIUM 40 MILLIGRAM(S): 80 TABLET, FILM COATED ORAL at 21:12

## 2017-07-23 RX ADMIN — Medication 1 TABLET(S): at 12:49

## 2017-07-23 RX ADMIN — Medication 200 MILLIGRAM(S): at 13:05

## 2017-07-23 RX ADMIN — Medication 667 MILLIGRAM(S): at 18:26

## 2017-07-23 RX ADMIN — Medication 1 MILLIGRAM(S): at 12:49

## 2017-07-23 RX ADMIN — PREGABALIN 1000 MICROGRAM(S): 225 CAPSULE ORAL at 12:49

## 2017-07-23 RX ADMIN — GABAPENTIN 300 MILLIGRAM(S): 400 CAPSULE ORAL at 05:58

## 2017-07-23 RX ADMIN — MIDODRINE HYDROCHLORIDE 10 MILLIGRAM(S): 2.5 TABLET ORAL at 05:58

## 2017-07-23 RX ADMIN — Medication 3 MILLILITER(S): at 08:41

## 2017-07-23 RX ADMIN — Medication 200 MILLIGRAM(S): at 05:58

## 2017-07-23 RX ADMIN — GABAPENTIN 300 MILLIGRAM(S): 400 CAPSULE ORAL at 21:11

## 2017-07-23 RX ADMIN — Medication 3 MILLILITER(S): at 20:49

## 2017-07-23 RX ADMIN — Medication 667 MILLIGRAM(S): at 09:19

## 2017-07-23 RX ADMIN — Medication 81 MILLIGRAM(S): at 12:49

## 2017-07-23 RX ADMIN — MIDODRINE HYDROCHLORIDE 10 MILLIGRAM(S): 2.5 TABLET ORAL at 18:30

## 2017-07-23 RX ADMIN — HEPARIN SODIUM 5000 UNIT(S): 5000 INJECTION INTRAVENOUS; SUBCUTANEOUS at 21:11

## 2017-07-23 RX ADMIN — HEPARIN SODIUM 5000 UNIT(S): 5000 INJECTION INTRAVENOUS; SUBCUTANEOUS at 13:16

## 2017-07-23 RX ADMIN — HEPARIN SODIUM 5000 UNIT(S): 5000 INJECTION INTRAVENOUS; SUBCUTANEOUS at 05:58

## 2017-07-23 RX ADMIN — Medication 3 MILLILITER(S): at 14:53

## 2017-07-23 RX ADMIN — Medication 200 MILLIGRAM(S): at 21:12

## 2017-07-23 RX ADMIN — Medication 3 MILLILITER(S): at 03:37

## 2017-07-23 RX ADMIN — Medication 667 MILLIGRAM(S): at 12:49

## 2017-07-23 NOTE — PROGRESS NOTE ADULT - PROBLEM SELECTOR PLAN 7
Likely related to diastolic dysfunction/VHD with vascular remodeling
Epogen  transfuse as indicated during HD, H&H has been stable
Epogen weekly , hb stable no indication
Epogen weekly , hb stable no indication for transfusion
Epogen weekly , hb stable no indication for transfusion
Likely related to diastolic dysfunction/VHD with vascular remodeling
off ac due to fall/SAH  rate controlled off AV asher blocking agents
off ac due to fall/SAH  rate controlled off AV asher blocking agents
ASA/Statin
Epogen  transfuse as indicated during HD
on HD tiw

## 2017-07-23 NOTE — PROGRESS NOTE ADULT - ASSESSMENT
75 yo female with h/o Atrial fibrillation off anticoagulation due to fall risk and bleeding risk , ESRD on HD ,diastolic CHF , pulmonary hypertension , valvular heart disease  admitted s/p fall at home with left SAH , brain injury and developed respiratory distress being treated for pneumonia  , now in rehab . Chest congestion, cough improving,  BP remains low, medication doses of  sildenafil adjusted by pulmonologist, hypoxic , remains hypotensive, stable now, s/p right pleural effusion removal by IR 7/7 . C/O cough with gabriel phlegm , sob . On  o2 via NC . Repeated  chest xray with increasing R pleural effusion . S/P recurrent R pleural effusion drainage . Feels better . Pulmonary following. For repeated CXR on 7/22       .

## 2017-07-23 NOTE — PROGRESS NOTE ADULT - PROBLEM SELECTOR PROBLEM 4
Obstructive sleep apnea
Obstructive sleep apnea
Atrial fibrillation, unspecified type
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
Pulmonary hypertension
Valvular heart disease
Valvular heart disease
Atrial fibrillation, unspecified type
Atrial fibrillation, unspecified type
Pulmonary hypertension
Pulmonary hypertension
ESRD (end stage renal disease) on dialysis
Obstructive sleep apnea
ESRD (end stage renal disease) on dialysis

## 2017-07-23 NOTE — PROGRESS NOTE ADULT - PROBLEM SELECTOR PROBLEM 5
Hypoxemia
Hypoxemia
Chronic obstructive pulmonary disease with acute exacerbation
ESRD (end stage renal disease) on dialysis
SAH (subarachnoid hemorrhage)
SAH (subarachnoid hemorrhage)
Chronic obstructive pulmonary disease with acute exacerbation
Atrial fibrillation, unspecified type
ESRD (end stage renal disease) on dialysis
ESRD (end stage renal disease) on dialysis
Hypoxemia
Subarachnoid hemorrhage following injury, no loss of consciousness, sequela
Anxiety and depression

## 2017-07-23 NOTE — PROGRESS NOTE ADULT - PROBLEM SELECTOR PROBLEM 3
Chronic obstructive pulmonary disease, unspecified COPD type
Chronic obstructive pulmonary disease, unspecified COPD type
Pleural effusion
Pulmonary hypertension
Pleural effusion
Chronic obstructive pulmonary disease with acute exacerbation
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
Pleural effusion
Pulmonary hypertension
Chronic obstructive pulmonary disease, unspecified COPD type
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
Pulmonary hypertension

## 2017-07-23 NOTE — PROGRESS NOTE ADULT - PROBLEM SELECTOR PLAN 5
Drug nebs
HD as per renal
HD as per renal T-Th-Sat
rehab program, neuro surgery follow up as out patient.
rehab program, neuro surgery follow up as out patient.
HD as per Renal  skin is dry, would recommend Lac Hydrin to lower extremities
HD as per renal
rate alannah, off anticoagulation due to brain bleed
On Acute BIU  Managing Hypoxia from fluid overload, will increase activity as tolerated
Cymbalta started on Monday.  Need to meet with daughter and HCP later today to discuss realistic goakls and Advance Directives

## 2017-07-23 NOTE — PROGRESS NOTE ADULT - PROBLEM SELECTOR PROBLEM 7
Pulmonary hypertension
Anemia
Atrial fibrillation, unspecified type
Atrial fibrillation, unspecified type
Pulmonary hypertension
Anemia
CAD (coronary artery disease)
ESRD (end stage renal disease) on dialysis

## 2017-07-23 NOTE — PROGRESS NOTE ADULT - PROBLEM SELECTOR PROBLEM 2
ESRD (end stage renal disease) on dialysis
ESRD (end stage renal disease) on dialysis
Hypoxemia
Pleural effusion
Pneumonia
Hypoxemia
COPD (chronic obstructive pulmonary disease)
Hypotension
Pneumonia
Pneumonia due to infectious organism, unspecified laterality, unspecified part of lung
ESRD (end stage renal disease) on dialysis
Pleural effusion
Pneumonia due to infectious organism, unspecified laterality, unspecified part of lung

## 2017-07-23 NOTE — PROGRESS NOTE ADULT - PROBLEM SELECTOR PLAN 6
HD
HD as per renal
HD as per renal
off ac due to fall/SAH  rate controlled off AV asher blocking agents
Epogen as per renal  monitor labs
asymptomatic
off ac due to fall/SAH  rate controlled off AV asher blocking agents
Family Meeting with daughter and  at 2:00 today to discuss goals of care  establish Adv Directives
No anticoagulants

## 2017-07-23 NOTE — PROGRESS NOTE ADULT - SUBJECTIVE AND OBJECTIVE BOX
CC: sob , cough , denies dyspnea   no overnight events reported     HPI:76F was admitted on 6/14/17 after a mechanical fall while walking outside. She had head trauma but no LOC. In ER, GCS=15. A head CT showed left parietal and temporo-occipital SAH. A repeat head CT was stable. Additional workup showed nondisplaced fractures of the right 4-6 ribs with emphysematous changes, pleural effusion and atelectasis. Hospital course was complicated by shortness of breath/hypoxia and needed BiPAP/high-flow NC. She was started on Cefepime for PNA. Admitted to acute rehab on 6/27. Feels fatigue , CXR with increasing R pleural effusion , b/l opacity . Today s/p thoracentesis , 1 L removed . SOB decreased.           Vital Signs Last 24 Hrs  T(C): 36.3 (23 Jul 2017 05:24), Max: 36.6 (22 Jul 2017 16:10)  T(F): 97.4 (23 Jul 2017 05:24), Max: 97.8 (22 Jul 2017 16:10)  HR: 78 (23 Jul 2017 05:24) (75 - 89)  BP: 103/70 (23 Jul 2017 05:24) (86/27 - 103/70)  BP(mean): --  RR: 16 (23 Jul 2017 05:24) (16 - 18)  SpO2: 90% (23 Jul 2017 08:41) (90% - 100%)    GENERAL: NAD, well-groomed, well-developed  CHEST/LUNG: Coarse breath sounds  ,  no  rales, rhonchi, wheezing, or rubs  HEART: irregularly irregular; No murmurs, rubs, or gallops  ABDOMEN: Soft, Nontender, Nondistended; Bowel sounds present  EXTREMITIES:  2+ Peripheral Pulses, No clubbing, cyanosis, or edema                          9.3    5.6   )-----------( 126      ( 22 Jul 2017 13:04 )             30.0   07-22    138  |  94<L>  |  62.0<H>  ----------------------------<  88  4.7   |  29.0  |  3.61<H>    Ca    9.1      22 Jul 2017 13:04

## 2017-07-23 NOTE — PROGRESS NOTE ADULT - PROBLEM SELECTOR PROBLEM 6
Pleural effusion
Pleural effusion
ESRD (end stage renal disease) on dialysis
Atrial fibrillation, unspecified type
ESRD (end stage renal disease) on dialysis
Anemia
Atrial fibrillation, unspecified type
CAD (coronary artery disease)
Palliative care encounter
Pleural effusion
SAH (subarachnoid hemorrhage)

## 2017-07-23 NOTE — PROGRESS NOTE ADULT - SUBJECTIVE AND OBJECTIVE BOX
HISTORY OF PRESENT ILLNESS  76F was admitted on 6/14/17 after a mechanical fall while walking outside. She had head trauma but no LOC. In ER, GCS=15. A head CT showed left parietal and temporo-occipital SAH. A repeat head CT was stable. Additional workup showed nondisplaced fractures of the right 4-6 ribs with emphysematous changes, pleural effusion and atelectasis. Hospital course was complicated by shortness of breath/hypoxia and needed BiPAP/high-flow NC. She was started on Cefepime for PNA until 7/5. Admitted to acute rehab on 6/27.    PMHx - HTN, ESRD on HD, AFIB (no full AC due to fall and bleeding risk), CAD, COPD (on home O2 overnight), Cholelithiasis, Gout, Hyperlipidemia, Spinal stenosis, s/p AVR/MVR/Tricupsid valve annuloplasty, Cataracts repair, H/O spinal fusion, Appendectomy, Tonsillectomy, C-Spine DJD    TODAY'S SUBJECTIVE & REVIEW OF SYMPTOMS  [X] Constitutional WNL      [X] Cardio WNL             [ ] Resp WNL           [X] GI WNL                      [X]  WNL                  [X] Heme WNL              [X] Endo WNL                  [X] Skin WNL                [ ] MSK WNL            [ ] Neuro WNL                  [ ] Cognitive WNL          [ ] Psych WNL    Pt had HD yesterday. No overnight events.   Denies complaints this morning. Reports breathing is stable. Feels she is progressing with therapy and anticipates discharge this week.     VITALS  T(C): 36.3 (07-23-17 @ 05:24)  T(F): 97.4 (07-23-17 @ 05:24), Max: 97.8 (07-22-17 @ 16:10)  HR: 78 (07-23-17 @ 05:24) (75 - 89)  BP: 103/70 (07-23-17 @ 05:24) (86/27 - 103/70)  RR:  (16 - 18)  SpO2:  (90% - 100%)  Wt(kg): --    PHYSICAL EXAM  Constitutional - NAD, Comfortable on NC  HEENT - Facial ecchymosis - Improved, Right supraorbital laceration healing  Neck - Supple, No limited ROM  Chest - Coarse breath sounds  Cardiovascular - S1S2  Abdomen - BS+, Soft, NTND  Extremities - No C/C, Mild BLE edema, No calf tenderness, BLE PVD skin changes  Neurologic Exam - Right field cut                    Cognitive - AAO to self, place, month/year, some of situation, Mild deficits     Communication - Expressive deficits     Motor - No focal deficits     Sensory - Intact to LT     Coordination - FTN impaired  Psychiatric - Mood stable, Affect Flat  Skin - BUE - Multiple ecchymosis, improved left hand edema    FUNCTIONAL PROGRESS  Gait - Min-Mod A  ADLs - Mod A  Transfers - Mod A   Functional transfers - Mod A    RECENT LABS                   9.3    5.6   )-----------( 126      ( 22 Jul 2017 13:04 )             30.0     07-22    138  |  94<L>  |  62.0<H>  ----------------------------<  88  4.7   |  29.0  |  3.61<H>    Ca    9.1      22 Jul 2017 13:04    Prealb 6/28 - 16  Procalcitonin 7/1 - 2.42, 7/6 - 1.18  Pleural Fluid Cx 7/6 - No growth    RADIOLOGY/OTHER RESULTS  CXR 7/19 - Small to moderate right-sided pleural effusion, not significantly changed.  CXR 7/17 - Increased size of right pleural effusion with associated atelectasis, now moderate. Bilateral perihilar opacities, probably pulmonary edema.  CXR 7/12 - Right middle lobe airspace opacity improved. Increased right pleural effusion.   CXR 7/9 - Persistent right middle lobe infiltrate.    CURRENT MEDICATIONS  MEDICATIONS  (STANDING):  aspirin enteric coated 81 milliGRAM(s) Oral daily  calcium acetate 667 milliGRAM(s) Oral three times a day with meals  cyanocobalamin 1000 MICROGram(s) Oral daily  gabapentin 300 milliGRAM(s) Oral every 8 hours  epoetin jaswnider Injectable 39557 Unit(s) IV Push <User Schedule>  atorvastatin 40 milliGRAM(s) Oral at bedtime  folic acid 1 milliGRAM(s) Oral daily  Nephro-toi 1 Tablet(s) Oral daily  ammonium lactate 12% Lotion 1 Application(s) Topical daily  ALBUTerol/ipratropium for Nebulization 3 milliLiter(s) Nebulizer every 6 hours  midodrine 10 milliGRAM(s) Oral two times a day  heparin  Injectable 5000 Unit(s) SubCutaneous every 8 hours    MEDICATIONS  (PRN):  acetaminophen   Tablet. 650 milliGRAM(s) Oral every 6 hours PRN Mild Pain (1 - 3)  guaiFENesin    Syrup 200 milliGRAM(s) Oral every 6 hours PRN Cough  lactulose Syrup 10 Gram(s) Oral daily PRN no bm > 2 days  sodium chloride 0.65% Nasal 1 Spray(s) Both Nostrils three times a day PRN nasal congestion/discomfort from O2    ASSESSMENT & PLAN  76F sustaining a TBI after a fall now with functional deficits  Cardiac/Pulm HTN - Midodrine for BP support  Pulm - Duonebs Q6, O2 4L via NC, Guaifenesin PRN, Ocean PRN  CAD - Lipitor, ASA  ESRD on HD - Phoslo, HD T/Th/Sat  Anemia - Epogen, Vit B, Folate  GI/Bowel Management - Toilet PRN, Lactulose PRN   Management - Toilet Q2  Skin - Turn Q2, Lachydrin BLE, Zfloats BLE  Gout - resolved, s/p Prednisone course(7/9-7/11)  Pain - Neurontin, Tylenol PRN, Ice to left hand PRN  DVT PPX - Heparin (resume 7/20), TEDs, SCDs  Diet - Renal & Cardiac/Thins, Nephro-toi, Nepro TID    Continue comprehensive acute rehab program consisting of 3hrs/day of OT/PT and SLP HISTORY OF PRESENT ILLNESS  76F was admitted on 6/14/17 after a mechanical fall while walking outside. She had head trauma but no LOC. In ER, GCS=15. A head CT showed left parietal and temporo-occipital SAH. A repeat head CT was stable. Additional workup showed nondisplaced fractures of the right 4-6 ribs with emphysematous changes, pleural effusion and atelectasis. Hospital course was complicated by shortness of breath/hypoxia and needed BiPAP/high-flow NC. She was started on Cefepime for PNA until 7/5. Admitted to acute rehab on 6/27.    PMHx - HTN, ESRD on HD, AFIB (no full AC due to fall and bleeding risk), CAD, COPD (on home O2 overnight), Cholelithiasis, Gout, Hyperlipidemia, Spinal stenosis, s/p AVR/MVR/Tricupsid valve annuloplasty, Cataracts repair, H/O spinal fusion, Appendectomy, Tonsillectomy, C-Spine DJD    TODAY'S SUBJECTIVE & REVIEW OF SYMPTOMS  [X] Constitutional WNL      [X] Cardio WNL             [ ] Resp WNL           [X] GI WNL                      [X]  WNL                  [X] Heme WNL              [X] Endo WNL                  [X] Skin WNL                [ ] MSK WNL            [ ] Neuro WNL                  [ ] Cognitive WNL          [ ] Psych WNL    Pt had HD yesterday. No overnight events.   Denies complaints this morning. Reports breathing is stable. Feels she is progressing with therapy and anticipates discharge this week.     VITALS  T(C): 36.3 (07-23-17 @ 05:24)  T(F): 97.4 (07-23-17 @ 05:24), Max: 97.8 (07-22-17 @ 16:10)  HR: 78 (07-23-17 @ 05:24) (75 - 89)  BP: 103/70 (07-23-17 @ 05:24) (86/27 - 103/70)  RR:  (16 - 18)  SpO2:  (90% - 100%)  Wt(kg): --    PHYSICAL EXAM  Constitutional - NAD, Comfortable on NC  HEENT - Facial ecchymosis - Improved, Right supraorbital laceration healing  Neck - Supple, No limited ROM  Chest - Coarse breath sounds  Cardiovascular - S1S2  Abdomen - BS+, Soft, NTND  Extremities - No C/C, Mild BLE edema, No calf tenderness, BLE PVD skin changes  Neurologic Exam - Right field cut                    Cognitive - AAO to self, place, month/year, some of situation, Mild deficits     Communication - Expressive deficits     Motor - No focal deficits     Sensory - Intact to LT     Coordination - FTN impaired  Psychiatric - Mood stable, Affect Flat  Skin - BUE - Multiple ecchymosis, improved left hand edema    FUNCTIONAL PROGRESS  Gait - Pt ambulated 50' sup RW  ADLs - Sup/Min assist  Transfers - Supervision  Functional transfers - Min A    RECENT LABS                   9.3    5.6   )-----------( 126      ( 22 Jul 2017 13:04 )             30.0     07-22    138  |  94<L>  |  62.0<H>  ----------------------------<  88  4.7   |  29.0  |  3.61<H>    Ca    9.1      22 Jul 2017 13:04    Prealb 6/28 - 16  Procalcitonin 7/1 - 2.42, 7/6 - 1.18  Pleural Fluid Cx 7/6 - No growth    RADIOLOGY/OTHER RESULTS  CXR 7/19 - Small to moderate right-sided pleural effusion, not significantly changed.  CXR 7/17 - Increased size of right pleural effusion with associated atelectasis, now moderate. Bilateral perihilar opacities, probably pulmonary edema.  CXR 7/12 - Right middle lobe airspace opacity improved. Increased right pleural effusion.   CXR 7/9 - Persistent right middle lobe infiltrate.    CURRENT MEDICATIONS  MEDICATIONS  (STANDING):  aspirin enteric coated 81 milliGRAM(s) Oral daily  calcium acetate 667 milliGRAM(s) Oral three times a day with meals  cyanocobalamin 1000 MICROGram(s) Oral daily  gabapentin 300 milliGRAM(s) Oral every 8 hours  epoetin jaswinder Injectable 08073 Unit(s) IV Push <User Schedule>  atorvastatin 40 milliGRAM(s) Oral at bedtime  folic acid 1 milliGRAM(s) Oral daily  Nephro-toi 1 Tablet(s) Oral daily  ammonium lactate 12% Lotion 1 Application(s) Topical daily  ALBUTerol/ipratropium for Nebulization 3 milliLiter(s) Nebulizer every 6 hours  midodrine 10 milliGRAM(s) Oral two times a day  heparin  Injectable 5000 Unit(s) SubCutaneous every 8 hours    MEDICATIONS  (PRN):  acetaminophen   Tablet. 650 milliGRAM(s) Oral every 6 hours PRN Mild Pain (1 - 3)  guaiFENesin    Syrup 200 milliGRAM(s) Oral every 6 hours PRN Cough  lactulose Syrup 10 Gram(s) Oral daily PRN no bm > 2 days  sodium chloride 0.65% Nasal 1 Spray(s) Both Nostrils three times a day PRN nasal congestion/discomfort from O2    ASSESSMENT & PLAN  76F sustaining a TBI after a fall now with functional deficits  Cardiac/Pulm HTN - Midodrine for BP support  Pulm - Duonebs Q6, O2 4L via NC, Guaifenesin PRN, Ocean PRN  CAD - Lipitor, ASA  ESRD on HD - Phoslo, HD T/Th/Sat  Anemia - Epogen, Vit B, Folate  GI/Bowel Management - Toilet PRN, Lactulose PRN   Management - Toilet Q2  Skin - Turn Q2, Lachydrin BLE, Zfloats BLE  Gout - resolved, s/p Prednisone course(7/9-7/11)  Pain - Neurontin, Tylenol PRN, Ice to left hand PRN  DVT PPX - Heparin (resume 7/20), TEDs, SCDs  Diet - Renal & Cardiac/Thins, Nephro-toi, Nepro TID    Continue comprehensive acute rehab program consisting of 3hrs/day of OT/PT and SLP

## 2017-07-23 NOTE — PROGRESS NOTE ADULT - PROBLEM SELECTOR PROBLEM 1
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
Finger joint swelling, left
Pulmonary hypertension
Pulmonary hypertension
Respiratory failure with hypoxia, unspecified chronicity
Respiratory failure with hypoxia, unspecified chronicity
SAH (subarachnoid hemorrhage)
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
Pleural effusion
Pneumonia
SAH (subarachnoid hemorrhage)
Pulmonary hypertension
Respiratory failure with hypoxia, unspecified chronicity
Hypoxemia

## 2017-07-24 PROCEDURE — 99232 SBSQ HOSP IP/OBS MODERATE 35: CPT | Mod: GC

## 2017-07-24 PROCEDURE — 99233 SBSQ HOSP IP/OBS HIGH 50: CPT

## 2017-07-24 PROCEDURE — 71020: CPT | Mod: 26

## 2017-07-24 RX ADMIN — Medication 200 MILLIGRAM(S): at 14:26

## 2017-07-24 RX ADMIN — GABAPENTIN 300 MILLIGRAM(S): 400 CAPSULE ORAL at 05:12

## 2017-07-24 RX ADMIN — PREGABALIN 1000 MICROGRAM(S): 225 CAPSULE ORAL at 13:40

## 2017-07-24 RX ADMIN — Medication 667 MILLIGRAM(S): at 07:44

## 2017-07-24 RX ADMIN — Medication 3 MILLILITER(S): at 08:23

## 2017-07-24 RX ADMIN — Medication 1 TABLET(S): at 12:02

## 2017-07-24 RX ADMIN — HEPARIN SODIUM 5000 UNIT(S): 5000 INJECTION INTRAVENOUS; SUBCUTANEOUS at 21:18

## 2017-07-24 RX ADMIN — ATORVASTATIN CALCIUM 40 MILLIGRAM(S): 80 TABLET, FILM COATED ORAL at 21:18

## 2017-07-24 RX ADMIN — GABAPENTIN 300 MILLIGRAM(S): 400 CAPSULE ORAL at 21:18

## 2017-07-24 RX ADMIN — MIDODRINE HYDROCHLORIDE 10 MILLIGRAM(S): 2.5 TABLET ORAL at 05:12

## 2017-07-24 RX ADMIN — Medication 200 MILLIGRAM(S): at 03:14

## 2017-07-24 RX ADMIN — GABAPENTIN 300 MILLIGRAM(S): 400 CAPSULE ORAL at 13:41

## 2017-07-24 RX ADMIN — Medication 3 MILLILITER(S): at 03:56

## 2017-07-24 RX ADMIN — Medication 1 MILLIGRAM(S): at 12:02

## 2017-07-24 RX ADMIN — Medication 3 MILLILITER(S): at 20:28

## 2017-07-24 RX ADMIN — Medication 667 MILLIGRAM(S): at 17:46

## 2017-07-24 RX ADMIN — MIDODRINE HYDROCHLORIDE 10 MILLIGRAM(S): 2.5 TABLET ORAL at 17:46

## 2017-07-24 RX ADMIN — Medication 3 MILLILITER(S): at 14:47

## 2017-07-24 RX ADMIN — Medication 1 APPLICATION(S): at 12:02

## 2017-07-24 RX ADMIN — Medication 667 MILLIGRAM(S): at 12:02

## 2017-07-24 RX ADMIN — Medication 81 MILLIGRAM(S): at 12:01

## 2017-07-24 RX ADMIN — HEPARIN SODIUM 5000 UNIT(S): 5000 INJECTION INTRAVENOUS; SUBCUTANEOUS at 13:41

## 2017-07-24 RX ADMIN — HEPARIN SODIUM 5000 UNIT(S): 5000 INJECTION INTRAVENOUS; SUBCUTANEOUS at 05:12

## 2017-07-24 NOTE — PROGRESS NOTE ADULT - ASSESSMENT
ESRD - HD tomorrow   - cont Midodrine     Anemia - Epogen with HD; monitor CBC     RO - Continue the current binders

## 2017-07-24 NOTE — PROGRESS NOTE ADULT - SUBJECTIVE AND OBJECTIVE BOX
C: sob , cough     HPI:76F was admitted on 6/14/17 after a mechanical fall while walking outside. She had head trauma but no LOC. In ER, GCS=15. A head CT showed left parietal and temporo-occipital SAH. A repeat head CT was stable. Additional workup showed nondisplaced fractures of the right 4-6 ribs with emphysematous changes, pleural effusion and atelectasis. Hospital course was complicated by shortness of breath/hypoxia and needed BiPAP/high-flow NC. She was started on Cefepime for PNA. Admitted to acute rehab on 6/27. Feels fatigue , CXR with increasing R pleural effusion , b/l opacity . Today s/p thoracentesis , 1 L removed . SOB decreased.     INTERVAL HPI/OVERNIGHT EVENTS: No overnight events. Reports breathing is better but still requires supplemental oxygen.     Vital Signs Last 24 Hrs  T(C): 36.3 (24 Jul 2017 05:09), Max: 36.6 (23 Jul 2017 20:09)  T(F): 97.4 (24 Jul 2017 05:09), Max: 97.9 (23 Jul 2017 20:09)  HR: 84 (24 Jul 2017 05:09) (74 - 84)  BP: 98/67 (24 Jul 2017 05:09) (93/52 - 99/68)  BP(mean): --  RR: 18 (24 Jul 2017 05:09) (18 - 18)  SpO2: 94% (24 Jul 2017 05:09) (92% - 96%)  I&O's Detail                                9.3    5.6   )-----------( 126      ( 22 Jul 2017 13:04 )             30.0     22 Jul 2017 13:04    138    |  94     |  62.0   ----------------------------<  88     4.7     |  29.0   |  3.61     Ca    9.1        22 Jul 2017 13:04        CAPILLARY BLOOD GLUCOSE              MEDICATIONS  (STANDING):  aspirin enteric coated 81 milliGRAM(s) Oral daily  calcium acetate 667 milliGRAM(s) Oral three times a day with meals  cyanocobalamin 1000 MICROGram(s) Oral daily  gabapentin 300 milliGRAM(s) Oral every 8 hours  epoetin jaswinder Injectable 56113 Unit(s) IV Push <User Schedule>  atorvastatin 40 milliGRAM(s) Oral at bedtime  folic acid 1 milliGRAM(s) Oral daily  Nephro-toi 1 Tablet(s) Oral daily  ammonium lactate 12% Lotion 1 Application(s) Topical daily  ALBUTerol/ipratropium for Nebulization 3 milliLiter(s) Nebulizer every 6 hours  midodrine 10 milliGRAM(s) Oral two times a day  heparin  Injectable 5000 Unit(s) SubCutaneous every 8 hours    MEDICATIONS  (PRN):  acetaminophen   Tablet. 650 milliGRAM(s) Oral every 6 hours PRN Mild Pain (1 - 3)  guaiFENesin    Syrup 200 milliGRAM(s) Oral every 6 hours PRN Cough  lactulose Syrup 10 Gram(s) Oral daily PRN no bm > 2 days  sodium chloride 0.65% Nasal 1 Spray(s) Both Nostrils three times a day PRN nasal congestion/discomfort from O2      RADIOLOGY & ADDITIONAL TESTS:  ROS:  SOB: decreased   Denies chest pain, dizziness, lightheadedness, fever, chills, nausea, vomiting    PHYSICAL EXAM:    GENERAL: NAD  HEAD:  Atraumatic, Normocephalic  EYES: EOMI, PERRLA, conjunctiva and sclera clear  NECK: Supple, No JVD, Normal thyroid  NERVOUS SYSTEM:  Alert & Oriented X3, no focal deficit  CHEST/LUNG: decrease BS right side, no wheezes  HEART: irregularly irregular   ; No murmurs, rubs, or gallops  ABDOMEN: Soft, Nontender, Nondistended; Bowel sounds present  EXTREMITIES:  2+ Peripheral Pulses, No clubbing, cyanosis, or edema  LYMPH: No lymphadenopathy noted  SKIN: Ecchymosis B/L UE

## 2017-07-24 NOTE — PROGRESS NOTE ADULT - ASSESSMENT
77 yo female with h/o Atrial fibrillation off anticoagulation due to fall risk and bleeding risk , ESRD on HD ,diastolic CHF , pulmonary hypertension , valvular heart disease  admitted s/p fall at home with left SAH , brain injury and developed respiratory distress being treated for pneumonia  , now in rehab . Chest congestion, cough improving,  BP remains low, medication doses of  sildenafil adjusted by pulmonologist, hypoxic , remains hypotensive, stable now, s/p right pleural effusion removal by IR 7/7 . C/O cough with gabriel phlegm , sob . On  o2 via NC . Repeated  chest xray with increasing R pleural effusion . S/P recurrent R pleural effusion drainage . Feels better . Pulmonary following. For repeated CXR on 7/24 .    Problem/Plan - 1:  ·  Problem:  Recurrent Pleural effusion.  Plan: s/p drain by IR  X2 ,Traumatic exudate ve transudate expected ,  f/u c-xray this am  f/u pleural fluid analysis  If pleural effusion reoccur will consider pleurex ( with Dr Bustos )     Problem/Plan - 3:  ·  Problem: Pulmonary hypertension.  Plan: off sildenafil due to hypotension , pulmonary following.     Problem/Plan - 4:  ·  Problem: Diastolic heart failure due to valvular disease.  Plan: fluid removal with HD ,  palliative care team on board.     Problem/Plan - 5:  ·  Problem: ESRD (end stage renal disease) on dialysis.  Plan: HD as per renal T-Th-Sat.     Problem/Plan - 6:  Problem: Atrial fibrillation, unspecified type. Plan: off ac due to fall/SAH  rate controlled off AV asher blocking agents.    Problem/Plan - 7:  ·  Problem: Anemia.  Plan: Epogen weekly , hb stable, monitor labs    Problem/Plan - 8:  ·  Problem: Hypotension.  Plan: Stable  Lasix and sildenafil stopped as BP on lower side, monitor closely continue midodrine

## 2017-07-24 NOTE — PROGRESS NOTE ADULT - SUBJECTIVE AND OBJECTIVE BOX
NEPHROLOGY INTERVAL HPI/OVERNIGHT EVENTS:    Feels better   No new complaints     MEDICATIONS  (STANDING):  aspirin enteric coated 81 milliGRAM(s) Oral daily  calcium acetate 667 milliGRAM(s) Oral three times a day with meals  cyanocobalamin 1000 MICROGram(s) Oral daily  gabapentin 300 milliGRAM(s) Oral every 8 hours  epoetin jaswinder Injectable 28339 Unit(s) IV Push <User Schedule>  atorvastatin 40 milliGRAM(s) Oral at bedtime  folic acid 1 milliGRAM(s) Oral daily  Nephro-toi 1 Tablet(s) Oral daily  ammonium lactate 12% Lotion 1 Application(s) Topical daily  ALBUTerol/ipratropium for Nebulization 3 milliLiter(s) Nebulizer every 6 hours  midodrine 10 milliGRAM(s) Oral two times a day  heparin  Injectable 5000 Unit(s) SubCutaneous every 8 hours    MEDICATIONS  (PRN):  acetaminophen   Tablet. 650 milliGRAM(s) Oral every 6 hours PRN Mild Pain (1 - 3)  guaiFENesin    Syrup 200 milliGRAM(s) Oral every 6 hours PRN Cough  lactulose Syrup 10 Gram(s) Oral daily PRN no bm > 2 days  sodium chloride 0.65% Nasal 1 Spray(s) Both Nostrils three times a day PRN nasal congestion/discomfort from O2      Allergies    allopurinol (Rash)  codeine (Nausea; Vomiting)  penicillin (Rash)  spironolactone (Unknown)    Intolerances            Vital Signs Last 24 Hrs  T(C): 36.3 (24 Jul 2017 08:00), Max: 36.6 (23 Jul 2017 20:09)  T(F): 97.4 (24 Jul 2017 08:00), Max: 97.9 (23 Jul 2017 20:09)  HR: 85 (24 Jul 2017 08:00) (74 - 85)  BP: 98/60 (24 Jul 2017 08:00) (93/52 - 99/68)  BP(mean): --  RR: 18 (24 Jul 2017 08:00) (18 - 18)  SpO2: 93% (24 Jul 2017 08:00) (93% - 96%)  Daily     Daily   I&O's Detail    I&O's Summary      PHYSICAL EXAM:  NAD  lungs - CTA  CV: no rub  Abd : soft, NT BS +, No masses  Ext- No edema  Neuro : AAO x 3 and nonfocal    LABS:                      RADIOLOGY & ADDITIONAL TESTS:

## 2017-07-24 NOTE — PROGRESS NOTE ADULT - SUBJECTIVE AND OBJECTIVE BOX
HISTORY OF PRESENT ILLNESS  76F was admitted on 6/14/17 after a mechanical fall while walking outside. She had head trauma but no LOC. In ER, GCS=15. A head CT showed left parietal and temporo-occipital SAH. A repeat head CT was stable. Additional workup showed nondisplaced fractures of the right 4-6 ribs with emphysematous changes, pleural effusion and atelectasis. Hospital course was complicated by shortness of breath/hypoxia and needed BiPAP/high-flow NC. She was started on Cefepime for PNA until 7/5. Admitted to acute rehab on 6/27.    PMHx - HTN, ESRD on HD, AFIB (no full AC due to fall and bleeding risk), CAD, COPD (on home O2 overnight), Cholelithiasis, Gout, Hyperlipidemia, Spinal stenosis, s/p AVR/MVR/Tricupsid valve annuloplasty, Cataracts repair, H/O spinal fusion, Appendectomy, Tonsillectomy, C-Spine DJD    TODAY'S SUBJECTIVE & REVIEW OF SYMPTOMS  [X] Constitutional WNL      [X] Cardio WNL             [ ] Resp WNL           [X] GI WNL                      [X]  WNL                  [X] Heme WNL              [X] Endo WNL                  [X] Skin WNL                [ ] MSK WNL            [ ] Neuro WNL                  [ ] Cognitive WNL          [ ] Psych WNL  Patient seen at bedside. States that she is feeling well. and wants to go home soon. Denies any new dyspnea    Vital Signs Last 24 Hrs  T(C): 36.3 (24 Jul 2017 08:00), Max: 36.6 (23 Jul 2017 20:09)  T(F): 97.4 (24 Jul 2017 08:00), Max: 97.9 (23 Jul 2017 20:09)  HR: 85 (24 Jul 2017 08:00) (74 - 85)  BP: 98/60 (24 Jul 2017 08:00) (93/52 - 99/68)  BP(mean): --  RR: 18 (24 Jul 2017 08:00) (18 - 18)  SpO2: 93% (24 Jul 2017 08:00) (93% - 96%)    PHYSICAL EXAM  Constitutional - NAD, Comfortable on NC  HEENT - Facial ecchymosis - Improved, Right supraorbital laceration healing  Neck - Supple, No limited ROM  Chest - Coarse breath sounds  Cardiovascular - S1S2  Abdomen - BS+, Soft, NTND  Extremities - No C/C, Mild BLE edema, No calf tenderness, BLE PVD skin changes  Neurologic Exam - Right field cut                    Cognitive - AAO to self, place, month/year, some of situation, Mild deficits      Motor - No focal deficits     Sensory - Intact to LT     Psychiatric - Mood stable, Affect Flat  Skin - BUE - Multiple ecchymosis, improved left hand edema    FUNCTIONAL PROGRESS  Gait - Pt ambulated  sup RW  ADLs - Sup/Min assist  Transfers - Supervision  Functional transfers - Min A    RECENT LABS                   9.3    5.6   )-----------( 126      ( 22 Jul 2017 13:04 )             30.0     07-22    138  |  94<L>  |  62.0<H>  ----------------------------<  88  4.7   |  29.0  |  3.61<H>    Ca    9.1      22 Jul 2017 13:04    Prealb 6/28 - 16  Procalcitonin 7/1 - 2.42, 7/6 - 1.18  Pleural Fluid Cx 7/6 - No growth    RADIOLOGY/OTHER RESULTS  CXR 7/19 - Small to moderate right-sided pleural effusion, not significantly changed.  CXR 7/17 - Increased size of right pleural effusion with associated atelectasis, now moderate. Bilateral perihilar opacities, probably pulmonary edema.  CXR 7/12 - Right middle lobe airspace opacity improved. Increased right pleural effusion.   CXR 7/9 - Persistent right middle lobe infiltrate.    MEDICATIONS  (STANDING):  aspirin enteric coated 81 milliGRAM(s) Oral daily  calcium acetate 667 milliGRAM(s) Oral three times a day with meals  cyanocobalamin 1000 MICROGram(s) Oral daily  gabapentin 300 milliGRAM(s) Oral every 8 hours  epoetin jaswinder Injectable 38710 Unit(s) IV Push <User Schedule>  atorvastatin 40 milliGRAM(s) Oral at bedtime  folic acid 1 milliGRAM(s) Oral daily  Nephro-toi 1 Tablet(s) Oral daily  ammonium lactate 12% Lotion 1 Application(s) Topical daily  ALBUTerol/ipratropium for Nebulization 3 milliLiter(s) Nebulizer every 6 hours  midodrine 10 milliGRAM(s) Oral two times a day  heparin  Injectable 5000 Unit(s) SubCutaneous every 8 hours    MEDICATIONS  (PRN):  acetaminophen   Tablet. 650 milliGRAM(s) Oral every 6 hours PRN Mild Pain (1 - 3)  guaiFENesin    Syrup 200 milliGRAM(s) Oral every 6 hours PRN Cough  lactulose Syrup 10 Gram(s) Oral daily PRN no bm > 2 days  sodium chloride 0.65% Nasal 1 Spray(s) Both Nostrils three times a day PRN nasal congestion/discomfort from O2    ASSESSMENT & PLAN  76F sustaining a TBI after a fall now with functional deficits  Cardiac/Pulm HTN - Midodrine   Pulm - Duonebs Q6, O2 4L via NC, Guaifenesin PRN, [place] PRN  CAD - Lipitor, ASA  ESRD on HD - Phoslo, HD T/Th/Sat  Anemia - Epogen, Vit B, Folate  GI/Bowel Management - Toilet PRN, Lactulose PRN   Management - Toilet Q2  Skin - Turn Q2, Lachydrin BLE, Zfloats BLE  Gout - resolved, s/p Prednisone course(7/9-7/11)  Pain - Neurontin, Tylenol PRN, Ice to left hand PRN  DVT PPX - Heparin (resume 7/20), TEDs, SCDs  Diet - Renal & Cardiac/Thins, Nephro-toi, Nepro TID    Continue comprehensive acute rehab program consisting of 3hrs/day of OT/PT and SLP  Team conference today. Progress in therapy varied and plan for GIULIANO HISTORY OF PRESENT ILLNESS  76F was admitted on 6/14/17 after a mechanical fall while walking outside. She had head trauma but no LOC. In ER, GCS=15. A head CT showed left parietal and temporo-occipital SAH. A repeat head CT was stable. Additional workup showed nondisplaced fractures of the right 4-6 ribs with emphysematous changes, pleural effusion and atelectasis. Hospital course was complicated by shortness of breath/hypoxia and needed BiPAP/high-flow NC. She was started on Cefepime for PNA until 7/5. Admitted to acute rehab on 6/27.    PMHx - HTN, ESRD on HD, AFIB (no full AC due to fall and bleeding risk), CAD, COPD (on home O2 overnight), Cholelithiasis, Gout, Hyperlipidemia, Spinal stenosis, s/p AVR/MVR/Tricupsid valve annuloplasty, Cataracts repair, H/O spinal fusion, Appendectomy, Tonsillectomy, C-Spine DJD    TODAY'S SUBJECTIVE & REVIEW OF SYMPTOMS  [X] Constitutional WNL      [X] Cardio WNL             [ ] Resp WNL           [X] GI WNL                      [X]  WNL                  [X] Heme WNL              [X] Endo WNL                  [X] Skin WNL                [ ] MSK WNL            [ ] Neuro WNL                  [ ] Cognitive WNL          [ ] Psych WNL  Patient seen at bedside. States that she is feeling well. and wants to go home soon. Denies any new dyspnea    Vital Signs Last 24 Hrs  T(C): 36.3 (24 Jul 2017 08:00), Max: 36.6 (23 Jul 2017 20:09)  T(F): 97.4 (24 Jul 2017 08:00), Max: 97.9 (23 Jul 2017 20:09)  HR: 85 (24 Jul 2017 08:00) (74 - 85)  BP: 98/60 (24 Jul 2017 08:00) (93/52 - 99/68)  BP(mean): --  RR: 18 (24 Jul 2017 08:00) (18 - 18)  SpO2: 93% (24 Jul 2017 08:00) (93% - 96%)    PHYSICAL EXAM  Constitutional - NAD, Comfortable on NC  HEENT - Facial ecchymosis - Improved, Right supraorbital laceration healing  Neck - Supple, No limited ROM  Chest - Coarse breath sounds  Cardiovascular - S1S2  Abdomen - BS+, Soft, NTND  Extremities - No C/C, Mild BLE edema, No calf tenderness, BLE PVD skin changes  Neurologic Exam - Right field cut                    Cognitive - AAO to self, place, month/year, some of situation, Mild deficits      Motor - No focal deficits     Sensory - Intact to LT     Psychiatric - Mood stable, Affect Flat  Skin - BUE - Multiple ecchymosis, improved left hand edema    FUNCTIONAL PROGRESS  Gait - Pt ambulated  sup RW  ADLs - Sup/Min assist  Transfers - Supervision  Functional transfers - Min A    RECENT LABS                   9.3    5.6   )-----------( 126      ( 22 Jul 2017 13:04 )             30.0     07-22    138  |  94<L>  |  62.0<H>  ----------------------------<  88  4.7   |  29.0  |  3.61<H>    Ca    9.1      22 Jul 2017 13:04    Prealb 6/28 - 16  Procalcitonin 7/1 - 2.42, 7/6 - 1.18  Pleural Fluid Cx 7/6 - No growth    RADIOLOGY/OTHER RESULTS  CXR 7/19 - Small to moderate right-sided pleural effusion, not significantly changed.  CXR 7/17 - Increased size of right pleural effusion with associated atelectasis, now moderate. Bilateral perihilar opacities, probably pulmonary edema.  CXR 7/12 - Right middle lobe airspace opacity improved. Increased right pleural effusion.   CXR 7/9 - Persistent right middle lobe infiltrate.    MEDICATIONS  (STANDING):  aspirin enteric coated 81 milliGRAM(s) Oral daily  calcium acetate 667 milliGRAM(s) Oral three times a day with meals  cyanocobalamin 1000 MICROGram(s) Oral daily  gabapentin 300 milliGRAM(s) Oral every 8 hours  epoetin jaswinder Injectable 60404 Unit(s) IV Push <User Schedule>  atorvastatin 40 milliGRAM(s) Oral at bedtime  folic acid 1 milliGRAM(s) Oral daily  Nephro-toi 1 Tablet(s) Oral daily  ammonium lactate 12% Lotion 1 Application(s) Topical daily  ALBUTerol/ipratropium for Nebulization 3 milliLiter(s) Nebulizer every 6 hours  midodrine 10 milliGRAM(s) Oral two times a day  heparin  Injectable 5000 Unit(s) SubCutaneous every 8 hours    MEDICATIONS  (PRN):  acetaminophen   Tablet. 650 milliGRAM(s) Oral every 6 hours PRN Mild Pain (1 - 3)  guaiFENesin    Syrup 200 milliGRAM(s) Oral every 6 hours PRN Cough  lactulose Syrup 10 Gram(s) Oral daily PRN no bm > 2 days  sodium chloride 0.65% Nasal 1 Spray(s) Both Nostrils three times a day PRN nasal congestion/discomfort from O2    ASSESSMENT & PLAN  76F sustaining a TBI after a fall now with functional deficits  Cardiac/Pulm HTN - Midodrine   Pulm - Duonebs Q6, O2 4L via NC, Guaifenesin PRN, [place] spray prn. Patient with recurrent pleural effusion needing pig tail catheter/drainage. Repeat CXR today  CAD - Lipitor, ASA  ESRD on HD - Phoslo, HD T/Th/Sat  Anemia - Epogen, Vit B, Folate  GI/Bowel Management - Toilet PRN, Lactulose PRN   Management - Toilet Q2  Skin - Turn Q2, Lachydrin BLE, Zfloats BLE  Gout - resolved, s/p Prednisone course(7/9-7/11)  Pain - Neurontin, Tylenol PRN, Ice to left hand PRN  DVT PPX - Heparin (resume 7/20), TEDs, SCDs  Diet - Renal & Cardiac/Thins, Nephro-toi, Nepro TID    Continue comprehensive acute rehab program consisting of 3hrs/day of OT/PT and SLP  Team conference today. Progress in therapy varied and plan for GIULIANO

## 2017-07-25 LAB
ANION GAP SERPL CALC-SCNC: 13 MMOL/L — SIGNIFICANT CHANGE UP (ref 5–17)
BUN SERPL-MCNC: 77 MG/DL — HIGH (ref 8–20)
CALCIUM SERPL-MCNC: 9 MG/DL — SIGNIFICANT CHANGE UP (ref 8.6–10.2)
CHLORIDE SERPL-SCNC: 94 MMOL/L — LOW (ref 98–107)
CO2 SERPL-SCNC: 28 MMOL/L — SIGNIFICANT CHANGE UP (ref 22–29)
CREAT SERPL-MCNC: 4.56 MG/DL — HIGH (ref 0.5–1.3)
GLUCOSE SERPL-MCNC: 105 MG/DL — SIGNIFICANT CHANGE UP (ref 70–115)
HCT VFR BLD CALC: 27.7 % — LOW (ref 37–47)
HGB BLD-MCNC: 8.4 G/DL — LOW (ref 12–16)
MCHC RBC-ENTMCNC: 30.3 G/DL — LOW (ref 32–36)
MCHC RBC-ENTMCNC: 34.9 PG — HIGH (ref 27–31)
MCV RBC AUTO: 114.9 FL — HIGH (ref 81–99)
PHOSPHATE SERPL-MCNC: 4.3 MG/DL — SIGNIFICANT CHANGE UP (ref 2.4–4.7)
PLATELET # BLD AUTO: 114 K/UL — LOW (ref 150–400)
POTASSIUM SERPL-MCNC: 5.5 MMOL/L — HIGH (ref 3.5–5.3)
POTASSIUM SERPL-SCNC: 5.5 MMOL/L — HIGH (ref 3.5–5.3)
RBC # BLD: 2.41 M/UL — LOW (ref 4.4–5.2)
RBC # FLD: 19.5 % — HIGH (ref 11–15.6)
SODIUM SERPL-SCNC: 135 MMOL/L — SIGNIFICANT CHANGE UP (ref 135–145)
WBC # BLD: 5.2 K/UL — SIGNIFICANT CHANGE UP (ref 4.8–10.8)
WBC # FLD AUTO: 5.2 K/UL — SIGNIFICANT CHANGE UP (ref 4.8–10.8)

## 2017-07-25 PROCEDURE — 99232 SBSQ HOSP IP/OBS MODERATE 35: CPT

## 2017-07-25 PROCEDURE — 99232 SBSQ HOSP IP/OBS MODERATE 35: CPT | Mod: GC

## 2017-07-25 RX ORDER — ASPIRIN/CALCIUM CARB/MAGNESIUM 324 MG
1 TABLET ORAL
Qty: 0 | Refills: 0 | COMMUNITY
Start: 2017-07-25

## 2017-07-25 RX ORDER — ASCORBIC ACID 60 MG
1 TABLET,CHEWABLE ORAL
Qty: 0 | Refills: 0 | COMMUNITY

## 2017-07-25 RX ORDER — FOLIC ACID 0.8 MG
1 TABLET ORAL
Qty: 0 | Refills: 0 | COMMUNITY
Start: 2017-07-25

## 2017-07-25 RX ORDER — ATORVASTATIN CALCIUM 80 MG/1
1 TABLET, FILM COATED ORAL
Qty: 0 | Refills: 0 | COMMUNITY
Start: 2017-07-25

## 2017-07-25 RX ORDER — HEPARIN SODIUM 5000 [USP'U]/ML
5000 INJECTION INTRAVENOUS; SUBCUTANEOUS
Qty: 0 | Refills: 0 | COMMUNITY
Start: 2017-07-25

## 2017-07-25 RX ORDER — LACTULOSE 10 G/15ML
15 SOLUTION ORAL
Qty: 0 | Refills: 0 | COMMUNITY
Start: 2017-07-25

## 2017-07-25 RX ORDER — GABAPENTIN 400 MG/1
1 CAPSULE ORAL
Qty: 0 | Refills: 0 | COMMUNITY
Start: 2017-07-25

## 2017-07-25 RX ORDER — PREGABALIN 225 MG/1
1 CAPSULE ORAL
Qty: 0 | Refills: 0 | COMMUNITY
Start: 2017-07-25

## 2017-07-25 RX ORDER — MIDODRINE HYDROCHLORIDE 2.5 MG/1
1 TABLET ORAL
Qty: 0 | Refills: 0 | COMMUNITY
Start: 2017-07-25

## 2017-07-25 RX ORDER — SOD,AMMONIUM,POTASSIUM LACTATE
1 CREAM (GRAM) TOPICAL
Qty: 0 | Refills: 0 | COMMUNITY
Start: 2017-07-25

## 2017-07-25 RX ORDER — ERYTHROPOIETIN 10000 [IU]/ML
0 INJECTION, SOLUTION INTRAVENOUS; SUBCUTANEOUS
Qty: 0 | Refills: 0 | COMMUNITY
Start: 2017-07-25

## 2017-07-25 RX ORDER — CALCIUM ACETATE 667 MG
1 TABLET ORAL
Qty: 0 | Refills: 0 | COMMUNITY
Start: 2017-07-25

## 2017-07-25 RX ORDER — ACETAMINOPHEN 500 MG
2 TABLET ORAL
Qty: 0 | Refills: 0 | COMMUNITY
Start: 2017-07-25

## 2017-07-25 RX ORDER — SODIUM CHLORIDE 0.65 %
1 AEROSOL, SPRAY (ML) NASAL
Qty: 0 | Refills: 0 | COMMUNITY
Start: 2017-07-25

## 2017-07-25 RX ORDER — IPRATROPIUM/ALBUTEROL SULFATE 18-103MCG
3 AEROSOL WITH ADAPTER (GRAM) INHALATION
Qty: 0 | Refills: 0 | COMMUNITY
Start: 2017-07-25

## 2017-07-25 RX ORDER — NAPROXEN AND ESOMEPRAZOLE MAGNESIUM 375; 20 MG/1; MG/1
1 TABLET, DELAYED RELEASE ORAL
Qty: 0 | Refills: 0 | COMMUNITY

## 2017-07-25 RX ADMIN — HEPARIN SODIUM 5000 UNIT(S): 5000 INJECTION INTRAVENOUS; SUBCUTANEOUS at 06:05

## 2017-07-25 RX ADMIN — ATORVASTATIN CALCIUM 40 MILLIGRAM(S): 80 TABLET, FILM COATED ORAL at 21:06

## 2017-07-25 RX ADMIN — Medication 1 APPLICATION(S): at 12:12

## 2017-07-25 RX ADMIN — Medication 1 MILLIGRAM(S): at 12:13

## 2017-07-25 RX ADMIN — Medication 667 MILLIGRAM(S): at 12:12

## 2017-07-25 RX ADMIN — GABAPENTIN 300 MILLIGRAM(S): 400 CAPSULE ORAL at 21:06

## 2017-07-25 RX ADMIN — Medication 667 MILLIGRAM(S): at 19:39

## 2017-07-25 RX ADMIN — Medication 200 MILLIGRAM(S): at 02:57

## 2017-07-25 RX ADMIN — Medication 1 SPRAY(S): at 12:13

## 2017-07-25 RX ADMIN — Medication 650 MILLIGRAM(S): at 19:44

## 2017-07-25 RX ADMIN — Medication 81 MILLIGRAM(S): at 12:12

## 2017-07-25 RX ADMIN — GABAPENTIN 300 MILLIGRAM(S): 400 CAPSULE ORAL at 06:05

## 2017-07-25 RX ADMIN — HEPARIN SODIUM 5000 UNIT(S): 5000 INJECTION INTRAVENOUS; SUBCUTANEOUS at 21:06

## 2017-07-25 RX ADMIN — Medication 200 MILLIGRAM(S): at 12:15

## 2017-07-25 RX ADMIN — Medication 3 MILLILITER(S): at 03:05

## 2017-07-25 RX ADMIN — MIDODRINE HYDROCHLORIDE 10 MILLIGRAM(S): 2.5 TABLET ORAL at 19:40

## 2017-07-25 RX ADMIN — ERYTHROPOIETIN 10000 UNIT(S): 10000 INJECTION, SOLUTION INTRAVENOUS; SUBCUTANEOUS at 15:08

## 2017-07-25 RX ADMIN — Medication 200 MILLIGRAM(S): at 21:07

## 2017-07-25 RX ADMIN — Medication 1 TABLET(S): at 12:12

## 2017-07-25 RX ADMIN — PREGABALIN 1000 MICROGRAM(S): 225 CAPSULE ORAL at 12:12

## 2017-07-25 RX ADMIN — MIDODRINE HYDROCHLORIDE 10 MILLIGRAM(S): 2.5 TABLET ORAL at 06:05

## 2017-07-25 RX ADMIN — Medication 650 MILLIGRAM(S): at 20:44

## 2017-07-25 RX ADMIN — Medication 3 MILLILITER(S): at 20:00

## 2017-07-25 RX ADMIN — Medication 3 MILLILITER(S): at 11:06

## 2017-07-25 RX ADMIN — Medication 667 MILLIGRAM(S): at 07:35

## 2017-07-25 NOTE — PROGRESS NOTE ADULT - SUBJECTIVE AND OBJECTIVE BOX
NEPHROLOGY INTERVAL HPI/OVERNIGHT EVENTS:    pt had loose bm earlier, not sob presently.      MEDICATIONS  (STANDING):  aspirin enteric coated 81 milliGRAM(s) Oral daily  calcium acetate 667 milliGRAM(s) Oral three times a day with meals  cyanocobalamin 1000 MICROGram(s) Oral daily  gabapentin 300 milliGRAM(s) Oral every 8 hours  epoetin jaswinder Injectable 55651 Unit(s) IV Push <User Schedule>  atorvastatin 40 milliGRAM(s) Oral at bedtime  folic acid 1 milliGRAM(s) Oral daily  Nephro-toi 1 Tablet(s) Oral daily  ammonium lactate 12% Lotion 1 Application(s) Topical daily  ALBUTerol/ipratropium for Nebulization 3 milliLiter(s) Nebulizer every 6 hours  midodrine 10 milliGRAM(s) Oral two times a day  heparin  Injectable 5000 Unit(s) SubCutaneous every 8 hours    MEDICATIONS  (PRN):  acetaminophen   Tablet. 650 milliGRAM(s) Oral every 6 hours PRN Mild Pain (1 - 3)  guaiFENesin    Syrup 200 milliGRAM(s) Oral every 6 hours PRN Cough  lactulose Syrup 10 Gram(s) Oral daily PRN no bm > 2 days  sodium chloride 0.65% Nasal 1 Spray(s) Both Nostrils three times a day PRN nasal congestion/discomfort from O2      Allergies    allopurinol (Rash)  codeine (Nausea; Vomiting)  penicillin (Rash)      Vital Signs Last 24 Hrs  T(C): 36.1 (21 Jul 2017 05:39), Max: 36.8 (20 Jul 2017 20:36)  T(F): 96.9 (21 Jul 2017 05:39), Max: 98.2 (20 Jul 2017 20:36)  HR: 94 (21 Jul 2017 09:10) (75 - 94)  BP: 97/58 (21 Jul 2017 05:39) (91/55 - 114/46)  BP(mean): --  RR: 18 (21 Jul 2017 05:39) (18 - 20)  SpO2: 96% (21 Jul 2017 09:10) (92% - 97%)    PHYSICAL EXAM:  NAD  lungs - decreased bs left base  CV: no rub, murmur same  Abd : soft, NT BS +, No masses  Ext- No edema  Neuro : oriented, verbal      LABS:                         9.2    6.0   )-----------( 126      (22 jul 2017 12:41 )             29.4     07-20    134<L>  |  92<L>  |  78.0<H>  ----------------------------<  94  5.4<H>   |  27.0  |  4.11<H>    Ca    8.8      20 Jul 2017 12:41              RADIOLOGY & ADDITIONAL TESTS:

## 2017-07-25 NOTE — PROGRESS NOTE ADULT - ATTENDING COMMENTS
Agree w Dr. Cheung's note.  Stable to continue current rehabilitation program.
COUNSELING:    Face to face meeting to discuss Advanced Care Planning - Time Spent ______ Minutes.  See goals of care note.    More than 50% time spent in counseling and coordinating care. _75_____ Minutes.     Thank you for the opportunity to assist with the care of this patient.   [facility] [phone].
COUNSELING:    Face to face meeting to discuss Advanced Care Planning - Time Spent ______ Minutes.  See goals of care note.    More than 50% time spent in counseling and coordinating care. __30____ Minutes.     Thank you for the opportunity to assist with the care of this patient.   [facility] [phone].
Doing well.  Progressing well in rehab. cont current management
Stable to continue current rehabilitation program.
Agree with above.  Continue comprehensive rehab
Chart reviewed. Patient without complaints this am. Repeat chest xray shows increased right pleural effusion. discussed with thoracic surgery- will follow up as outpatient. most likely will need CT chest.  d/c planning on going for GIULIANO.
Dr Francis will take over on 7/22
Florin tomorrow.
HD today
Hd today
Hd today with labs
NAS tomorrow.
NAS tomorrow.
Patient appears more comfortable.  Patient c/o pain in left 1st and 2nd fingers- with erythema of 1st MCP - patient with h/o gout. As per d/w medicine-treatment after discussion with renal.   Two doses of oral steroid. xray. will hold off toradol as patient has relief with ice  Continue full rehab program.
Patient states that she feels much better today. Denies any dyspnea. has some left hand swelling that is improving. Had thoracocentesis on 7/6/17. Pleural fluid no growth so far. culture in progress. AFB negative  She is scheduled for HD today.  Continue full rehab program  Medical/pulmonary/nephro f/u noted.
Significantly improved medically, more awake and improved respiratory function. Will change O2 to NC.
Patient seen at bedside. States that she had some left hand pain. Otherwise feels ok, cough improved.   Scheduled for HD today.  Continue full rehab program  Medical/ nephro f/u noted  Palliative team consult noted. Cymbalta recommended. will d/w Primary team.
Spoke to daughter. Medically is more stable. Denies pain. Right hand is improved in swelling.   Breathing is about the same.   No fevers. Continue rehab program.
Abdominal pain for couple of days, has some tenderness in the center of the abdomen, as per daughter she has this pain for couple of weeks, patient has an US of abdomen 3 weeks ago, she is going to send me the report, will get lipase level , will give some Maalox, her liver enzymes are ok excpet alk phos, lipase ok as well.     Patient has lots of chronic debilitating medical issues, Like ESRD, Pul HTN, Cirrhosis, palliate care consult has been called.
Abdominal pain for couple of days, has some tenderness in the center of the abdomen, as per daughter she has this pain for couple of weeks, patient has an US of abdomen 3 weeks ago, she is going to send me the report, will get lipase level , will give some maalox .

## 2017-07-25 NOTE — PROGRESS NOTE ADULT - SUBJECTIVE AND OBJECTIVE BOX
HISTORY OF PRESENT ILLNESS  76F was admitted on 6/14/17 after a mechanical fall while walking outside. She had head trauma but no LOC. In ER, GCS=15. A head CT showed left parietal and temporo-occipital SAH. A repeat head CT was stable. Additional workup showed nondisplaced fractures of the right 4-6 ribs with emphysematous changes, pleural effusion and atelectasis. Hospital course was complicated by shortness of breath/hypoxia and needed BiPAP/high-flow NC. She was started on Cefepime for PNA until 7/5. Admitted to acute rehab on 6/27.    PMHx - HTN, ESRD on HD, AFIB (no full AC due to fall and bleeding risk), CAD, COPD (on home O2 overnight), Cholelithiasis, Gout, Hyperlipidemia, Spinal stenosis, s/p AVR/MVR/Tricupsid valve annuloplasty, Cataracts repair, H/O spinal fusion, Appendectomy, Tonsillectomy, C-Spine DJD    TODAY'S SUBJECTIVE & REVIEW OF SYMPTOMS  [X] Constitutional WNL      [X] Cardio WNL             [ ] Resp WNL           [X] GI WNL                      [X]  WNL                  [X] Heme WNL              [X] Endo WNL                  [X] Skin WNL                [ ] MSK WNL            [ ] Neuro WNL                  [ ] Cognitive WNL          [ ] Psych WNL  Yesterday CXR shows recurrence of right pleural effusion.  Oxygen saturation stable overnight.   This am, pt reports breathing well on 4L NC oxygen.  Coughing controlled on Robitussin.  Pt had small amount of nose bleed this am from dryness and use of NC; nostrils wiped with tissue and bleeding stopped.   Discussed with pt about her ongoing need for assistance with ambulation and ADLs. Pt is aware of her condition, and in agreement for subacute rehabilitation for continuing therapy and care.     VITAL  Vital Signs Last 24 Hrs  T(C): 36.8 (25 Jul 2017 12:20), Max: 36.8 (25 Jul 2017 06:03)  T(F): 98.3 (25 Jul 2017 12:20), Max: 98.3 (25 Jul 2017 12:20)  HR: 73 (25 Jul 2017 12:20) (73 - 90)  BP: 108/50 (25 Jul 2017 12:20) (101/58 - 109/72)  BP(mean): --  RR: 18 (25 Jul 2017 12:20) (18 - 18)  SpO2: 95% (25 Jul 2017 12:20) (91% - 97%)    PHYSICAL EXAM  Constitutional - NAD, Comfortable on NC  HEENT - Facial ecchymosis - Improved, healed Right supraorbital laceration  Neck - Supple, No limited ROM  Chest - Coarse breath sounds  Cardiovascular - S1S2  Abdomen - BS+, Soft, NTND  Extremities - No C/C, Trace BLE edema, BLE PVD skin changes; no UE/hand edema  Neurologic Exam - Right field cut                    Cognitive - AAO to self, place, month/year, some of situation, Mild deficits      Motor - No focal deficits     Sensory - Intact to LT     Psychiatric - Mood stable, Affect Flat  Skin - BUE - Multiple ecchymosis    FUNCTIONAL PROGRESS  Gait - Pt ambulated  sup RW  ADLs - Sup/Min assist  Transfers - Supervision  Functional transfers - Min A    RECENT LABS                   9.3    5.6   )-----------( 126      ( 22 Jul 2017 13:04 )             30.0     07-22    138  |  94<L>  |  62.0<H>  ----------------------------<  88  4.7   |  29.0  |  3.61<H>    Ca    9.1      22 Jul 2017 13:04    Prealb 6/28 - 16  Procalcitonin 7/1 - 2.42, 7/6 - 1.18  Pleural Fluid Cx 7/6 - No growth    RADIOLOGY/OTHER RESULTS  CXR 7/24 - increase right pleural effusion, right basilar atelectasis   CXR 7/19 - Small to moderate right-sided pleural effusion, not significantly changed.  CXR 7/17 - Increased size of right pleural effusion with associated atelectasis, now moderate. Bilateral perihilar opacities, probably pulmonary edema.  CXR 7/12 - Right middle lobe airspace opacity improved. Increased right pleural effusion.   CXR 7/9 - Persistent right middle lobe infiltrate.    MEDICATIONS  (STANDING):  aspirin enteric coated 81 milliGRAM(s) Oral daily  calcium acetate 667 milliGRAM(s) Oral three times a day with meals  cyanocobalamin 1000 MICROGram(s) Oral daily  gabapentin 300 milliGRAM(s) Oral every 8 hours  epoetin jaswinder Injectable 22518 Unit(s) IV Push <User Schedule>  atorvastatin 40 milliGRAM(s) Oral at bedtime  folic acid 1 milliGRAM(s) Oral daily  Nephro-toi 1 Tablet(s) Oral daily  ammonium lactate 12% Lotion 1 Application(s) Topical daily  ALBUTerol/ipratropium for Nebulization 3 milliLiter(s) Nebulizer every 6 hours  midodrine 10 milliGRAM(s) Oral two times a day  heparin  Injectable 5000 Unit(s) SubCutaneous every 8 hours    MEDICATIONS  (PRN):  acetaminophen   Tablet. 650 milliGRAM(s) Oral every 6 hours PRN Mild Pain (1 - 3)  guaiFENesin    Syrup 200 milliGRAM(s) Oral every 6 hours PRN Cough  lactulose Syrup 10 Gram(s) Oral daily PRN no bm > 2 days  sodium chloride 0.65% Nasal 1 Spray(s) Both Nostrils three times a day PRN nasal congestion/discomfort from O2    ASSESSMENT & PLAN  76F sustaining a TBI after a fall now with functional deficits  Cardiac/Pulm HTN - Midodrine   Pulm - Duonebs Q6, O2 4L via NC, Guaifenesin PRN, [place] spray prn. CXR 07/24 shows recurrence of right pleural effusion.  Thoracic surgery consulted--no intervention currently, need F/U as outpt in 1 week.   CAD - Lipitor, ASA  ESRD on HD - Phoslo, HD T/Th/Sat  Anemia - Epogen, Vit B, Folate  GI/Bowel Management - Toilet PRN, Lactulose PRN   Management - anuric; Toilet Q2  Skin - Turn Q2, Lachydrin BLE, Zfloats BLE  Gout - resolved, s/p Prednisone course(7/9-7/11)  Pain - Neurontin, Tylenol PRN  DVT PPX - Heparin (resume 7/20), TEDs, SCDs  Diet - Renal & Cardiac/Thins, Nephro-toi, Nepro TID    Pt medically stable for discharge to [facility] today.

## 2017-07-25 NOTE — PROGRESS NOTE ADULT - ASSESSMENT
77 yo female with h/o Atrial fibrillation off anticoagulation due to fall risk and bleeding risk , ESRD on HD ,diastolic CHF , pulmonary hypertension , valvular heart disease  admitted s/p fall at home with left SAH , brain injury and developed respiratory distress being treated for pneumonia  , now in rehab . Chest congestion, cough improving,  BP remains low, medication doses of  sildenafil adjusted by pulmonologist, hypoxic , remains hypotensive, stable now, s/p right pleural effusion removal by IR 7/7 . C/O cough with gabriel phlegm , sob . On  o2 via NC . Repeated  chest xray with increasing R pleural effusion . S/P recurrent R pleural effusion drainage . Feels better . Pulmonary following. For repeated CXR on 7/24 .    Problem/Plan - 1:  ·  Problem:  Recurrent Pleural effusion.  Plan: s/p drain by IR  X2 , repeat chest xray reviewed and discussed with Dr. Bustos with rehab attending. Will arrange outpatient follow up with Dr. Bustos, will be discharged home today.     Problem/Plan - 3:  ·  Problem: Pulmonary hypertension.  Plan: off sildenafil due to hypotension , pulmonary following.     Problem/Plan - 4:  ·  Problem: Diastolic heart failure due to valvular disease.  Plan: fluid removal with HD ,  palliative care team on board.     Problem/Plan - 5:  ·  Problem: ESRD (end stage renal disease) on dialysis.  Plan: HD as per renal T-Th-Sat.     Problem/Plan - 6:  Problem: Atrial fibrillation, unspecified type. Plan: off ac due to fall/SAH  rate controlled off AV asher blocking agents.    Problem/Plan - 7:  ·  Problem: Anemia.  Plan: Epogen weekly , hb stable, monitor labs with HD as outpatient    Problem/Plan - 8:  ·  Problem: Hypotension.  Plan: Stable  Lasix and sildenafil stopped     Problem/Plan-9  Problem:Nose bleed Plan: Releated to supplemental oxygen use,  Nasal saline as needed

## 2017-07-25 NOTE — PROGRESS NOTE ADULT - SUBJECTIVE AND OBJECTIVE BOX
C: sob , cough     HPI:76F was admitted on 6/14/17 after a mechanical fall while walking outside. She had head trauma but no LOC. In ER, GCS=15. A head CT showed left parietal and temporo-occipital SAH. A repeat head CT was stable. Additional workup showed nondisplaced fractures of the right 4-6 ribs with emphysematous changes, pleural effusion and atelectasis. Hospital course was complicated by shortness of breath/hypoxia and needed BiPAP/high-flow NC. She was started on Cefepime for PNA. Admitted to acute rehab on 6/27. Feels fatigue , CXR with increasing R pleural effusion , b/l opacity . Today s/p thoracentesis , 1 L removed . SOB decreased.    INTERVAL HPI/OVERNIGHT EVENTS: C/O nasal dryness, had small nose bleed today, anticipating going home today    Vital Signs Last 24 Hrs  T(C): 36.8 (25 Jul 2017 06:03), Max: 36.8 (25 Jul 2017 06:03)  T(F): 98.2 (25 Jul 2017 06:03), Max: 98.2 (25 Jul 2017 06:03)  HR: 90 (25 Jul 2017 06:03) (74 - 90)  BP: 101/58 (25 Jul 2017 06:03) (101/58 - 109/72)  BP(mean): --  RR: 18 (25 Jul 2017 06:03) (18 - 18)  SpO2: 91% (25 Jul 2017 07:50) (91% - 97%)  I&O's Detail    24 Jul 2017 07:01  -  25 Jul 2017 07:00  --------------------------------------------------------  IN:  Total IN: 0 mL    OUT:    Voided: 2 mL  Total OUT: 2 mL    Total NET: -2 mL                        CAPILLARY BLOOD GLUCOSE              MEDICATIONS  (STANDING):  aspirin enteric coated 81 milliGRAM(s) Oral daily  calcium acetate 667 milliGRAM(s) Oral three times a day with meals  cyanocobalamin 1000 MICROGram(s) Oral daily  gabapentin 300 milliGRAM(s) Oral every 8 hours  epoetin jaswinder Injectable 97466 Unit(s) IV Push <User Schedule>  atorvastatin 40 milliGRAM(s) Oral at bedtime  folic acid 1 milliGRAM(s) Oral daily  Nephro-toi 1 Tablet(s) Oral daily  ammonium lactate 12% Lotion 1 Application(s) Topical daily  ALBUTerol/ipratropium for Nebulization 3 milliLiter(s) Nebulizer every 6 hours  midodrine 10 milliGRAM(s) Oral two times a day  heparin  Injectable 5000 Unit(s) SubCutaneous every 8 hours    MEDICATIONS  (PRN):  acetaminophen   Tablet. 650 milliGRAM(s) Oral every 6 hours PRN Mild Pain (1 - 3)  guaiFENesin    Syrup 200 milliGRAM(s) Oral every 6 hours PRN Cough  lactulose Syrup 10 Gram(s) Oral daily PRN no bm > 2 days  sodium chloride 0.65% Nasal 1 Spray(s) Both Nostrils three times a day PRN nasal congestion/discomfort from O2      RADIOLOGY & ADDITIONAL TESTS:     EXAM:  CHEST P.A. LATERAL                          PROCEDURE DATE:  07/24/2017          INTERPRETATION:  History: Status post thoracocentesis. Pleural effusions   follow-up    Technique:  AP and lateral    Comparisons:  Chest x-ray dated 7/19/2017    Findings: CVP line unchanged in the superior vena cava. Increased right   pleural effusion. Subsegmental atelectasis right lower lobe. Left lung is   clear and unchanged..  . The pulmonary vasculature and aorta are normal   for age. Cardiomegaly unchanged. Status post median sternotomy and   prosthetic aortic valve replacement.    Impression: Increased right pleural effusion. Atelectasis right lung base.    No change in cardiomegaly. Status post aortic valve replacement    ROS:  SOB: decreased   Denies chest pain, dizziness, lightheadedness, fever, chills, nausea, vomiting    PHYSICAL EXAM:    GENERAL: NAD  HEAD:  Atraumatic, Normocephalic  EYES: EOMI, PERRLA, conjunctiva and sclera clear  NECK: Supple, No JVD, Normal thyroid  NERVOUS SYSTEM:  Alert & Oriented X3, no focal deficit  CHEST/LUNG: decrease BS right side, no wheezes  HEART: irregularly irregular   ; No murmurs, rubs, or gallops  ABDOMEN: Soft, Nontender, Nondistended; Bowel sounds present  EXTREMITIES:  2+ Peripheral Pulses, No clubbing, cyanosis, or edema  LYMPH: No lymphadenopathy noted  SKIN: Ecchymosis B/L UE

## 2017-07-26 VITALS
OXYGEN SATURATION: 93 % | DIASTOLIC BLOOD PRESSURE: 50 MMHG | TEMPERATURE: 97 F | SYSTOLIC BLOOD PRESSURE: 100 MMHG | RESPIRATION RATE: 18 BRPM | HEART RATE: 78 BPM

## 2017-07-26 PROCEDURE — 99238 HOSP IP/OBS DSCHRG MGMT 30/<: CPT

## 2017-07-26 RX ADMIN — HEPARIN SODIUM 5000 UNIT(S): 5000 INJECTION INTRAVENOUS; SUBCUTANEOUS at 05:22

## 2017-07-26 RX ADMIN — MIDODRINE HYDROCHLORIDE 10 MILLIGRAM(S): 2.5 TABLET ORAL at 05:22

## 2017-07-26 RX ADMIN — GABAPENTIN 300 MILLIGRAM(S): 400 CAPSULE ORAL at 05:22

## 2017-07-26 RX ADMIN — Medication 667 MILLIGRAM(S): at 08:08

## 2017-07-26 RX ADMIN — Medication 3 MILLILITER(S): at 02:38

## 2017-07-26 NOTE — PROGRESS NOTE ADULT - PROVIDER SPECIALTY LIST ADULT
Cardiology
Hospitalist
Nephrology
Palliative Care
Palliative Care
Pulmonology
Radiology
Rehab Medicine
Thoracic Surgery
Pulmonology
Rehab Medicine
Pulmonology

## 2017-07-26 NOTE — PROGRESS NOTE ADULT - SUBJECTIVE AND OBJECTIVE BOX
NEPHROLOGY INTERVAL HPI/OVERNIGHT EVENTS:  pt clinically stable  no acute distress  Tolerated HD yesterday    MEDICATIONS  (STANDING):  aspirin enteric coated 81 milliGRAM(s) Oral daily  calcium acetate 667 milliGRAM(s) Oral three times a day with meals  cyanocobalamin 1000 MICROGram(s) Oral daily  gabapentin 300 milliGRAM(s) Oral every 8 hours  epoetin jaswinder Injectable 32812 Unit(s) IV Push <User Schedule>  atorvastatin 40 milliGRAM(s) Oral at bedtime  folic acid 1 milliGRAM(s) Oral daily  Nephro-toi 1 Tablet(s) Oral daily  ammonium lactate 12% Lotion 1 Application(s) Topical daily  ALBUTerol/ipratropium for Nebulization 3 milliLiter(s) Nebulizer every 6 hours  midodrine 10 milliGRAM(s) Oral two times a day  heparin  Injectable 5000 Unit(s) SubCutaneous every 8 hours    MEDICATIONS  (PRN):  acetaminophen   Tablet. 650 milliGRAM(s) Oral every 6 hours PRN Mild Pain (1 - 3)  guaiFENesin    Syrup 200 milliGRAM(s) Oral every 6 hours PRN Cough  lactulose Syrup 10 Gram(s) Oral daily PRN no bm > 2 days  sodium chloride 0.65% Nasal 1 Spray(s) Both Nostrils three times a day PRN nasal congestion/discomfort from O2      Allergies    allopurinol (Rash)  codeine (Nausea; Vomiting)  penicillin (Rash)  spironolactone (Unknown)    Intolerances        Vital Signs Last 24 Hrs  T(C): 36.1 (26 Jul 2017 05:09), Max: 36.8 (25 Jul 2017 12:20)  T(F): 97 (26 Jul 2017 05:09), Max: 98.3 (25 Jul 2017 12:20)  HR: 86 (26 Jul 2017 05:09) (73 - 86)  BP: 102/67 (26 Jul 2017 05:09) (94/52 - 108/50)  BP(mean): --  RR: 16 (26 Jul 2017 05:09) (16 - 18)  SpO2: 94% (26 Jul 2017 08:10) (94% - 99%)    PHYSICAL EXAM:  NAD  lungs - CTA  CV: no rub  Abd : soft, NT BS +, No masses  Ext- No edema  Neuro : AAO x 3 and nonfocal    LABS:                        8.4    5.2   )-----------( 114      ( 25 Jul 2017 14:55 )             27.7     07-25    135  |  94<L>  |  77.0<H>  ----------------------------<  105  5.5<H>   |  28.0  |  4.56<H>    Ca    9.0      25 Jul 2017 14:55  Phos  4.3     07-25          Phosphorus Level, Serum: 4.3 mg/dL (07-25 @ 14:55)      RADIOLOGY & ADDITIONAL TESTS:

## 2017-07-26 NOTE — PROGRESS NOTE ADULT - ASSESSMENT
ESRD - HD tomorrow  - cont Midodrine     Anemia - Epogen with HD; monitor CBC     RO - Continue the current binders     Pt awaiting d/c today

## 2017-07-26 NOTE — PROGRESS NOTE ADULT - SUBJECTIVE AND OBJECTIVE BOX
HISTORY OF PRESENT ILLNESS  76F was admitted on 6/14/17 after a mechanical fall while walking outside. She had head trauma but no LOC. In ER, GCS=15. A head CT showed left parietal and temporo-occipital SAH. A repeat head CT was stable. Additional workup showed nondisplaced fractures of the right 4-6 ribs with emphysematous changes, pleural effusion and atelectasis. Hospital course was complicated by shortness of breath/hypoxia and needed BiPAP/high-flow NC. She was started on Cefepime for PNA until 7/5. Admitted to acute rehab on 6/27.    PMHx - HTN, ESRD on HD, AFIB (no full AC due to fall and bleeding risk), CAD, COPD (on home O2 overnight), Cholelithiasis, Gout, Hyperlipidemia, Spinal stenosis, s/p AVR/MVR/Tricupsid valve annuloplasty, Cataracts repair, H/O spinal fusion, Appendectomy, Tonsillectomy, C-Spine DJD    TODAY'S SUBJECTIVE & REVIEW OF SYMPTOMS  [X] Constitutional WNL      [X] Cardio WNL             [ ] Resp WNL           [X] GI WNL                      [X]  WNL                  [X] Heme WNL              [X] Endo WNL                  [X] Skin WNL                [ ] MSK WNL            [ ] Neuro WNL                  [ ] Cognitive WNL          [ ] Psych WNL      No new issues overnight. Did have a right shin abrasion yesterday during HD transportation yesterday. Bleeding controlled with pressure dressing    Vital Signs Last 24 Hrs  T(C): 36.1 (26 Jul 2017 05:09), Max: 36.8 (25 Jul 2017 12:20)  T(F): 97 (26 Jul 2017 05:09), Max: 98.3 (25 Jul 2017 12:20)  HR: 86 (26 Jul 2017 05:09) (73 - 86)  BP: 102/67 (26 Jul 2017 05:09) (94/52 - 108/50)  BP(mean): --  RR: 16 (26 Jul 2017 05:09) (16 - 18)  SpO2: 94% (26 Jul 2017 08:10) (94% - 99%)      PHYSICAL EXAM  Constitutional - NAD, Comfortable on NC  HEENT - Facial ecchymosis - Improved, healed Right supraorbital laceration  Neck - Supple, No limited ROM  Chest - Coarse breath sounds with reduced air entry on right side  Cardiovascular - S1S2  Abdomen - BS+, Soft, NTND  Extremities - multiple areas of ecchymosis in UE with varied discoloration. Right shin- abrasion: clean. minimal bleeding- new dressing applied  Neurologic Exam - Right field cut                    Cognitive - AAO to self, place, month/year, some of situation, Mild deficits      Motor - No focal deficits      Psychiatric - Mood stable, Affect Flat  Skin - BUE - Multiple ecchymosis    FUNCTIONAL PROGRESS  Gait - Pt ambulated  sup RW  ADLs - Sup/Min assist  Transfers - Supervision  Functional transfers - Min A    RECENT LABS                   9.3    5.6   )-----------( 126      ( 22 Jul 2017 13:04 )             30.0     07-22    138  |  94<L>  |  62.0<H>  ----------------------------<  88  4.7   |  29.0  |  3.61<H>    Ca    9.1      22 Jul 2017 13:04    Prealb 6/28 - 16  Procalcitonin 7/1 - 2.42, 7/6 - 1.18  Pleural Fluid Cx 7/6 - No growth    RADIOLOGY/OTHER RESULTS  CXR 7/24 - increase right pleural effusion, right basilar atelectasis   CXR 7/19 - Small to moderate right-sided pleural effusion, not significantly changed.  CXR 7/17 - Increased size of right pleural effusion with associated atelectasis, now moderate. Bilateral perihilar opacities, probably pulmonary edema.  CXR 7/12 - Right middle lobe airspace opacity improved. Increased right pleural effusion.   CXR 7/9 - Persistent right middle lobe infiltrate.    MEDICATIONS  (STANDING):  aspirin enteric coated 81 milliGRAM(s) Oral daily  calcium acetate 667 milliGRAM(s) Oral three times a day with meals  cyanocobalamin 1000 MICROGram(s) Oral daily  gabapentin 300 milliGRAM(s) Oral every 8 hours  epoetin jaswinder Injectable 24022 Unit(s) IV Push <User Schedule>  atorvastatin 40 milliGRAM(s) Oral at bedtime  folic acid 1 milliGRAM(s) Oral daily  Nephro-toi 1 Tablet(s) Oral daily  ammonium lactate 12% Lotion 1 Application(s) Topical daily  ALBUTerol/ipratropium for Nebulization 3 milliLiter(s) Nebulizer every 6 hours  midodrine 10 milliGRAM(s) Oral two times a day  heparin  Injectable 5000 Unit(s) SubCutaneous every 8 hours    MEDICATIONS  (PRN):  acetaminophen   Tablet. 650 milliGRAM(s) Oral every 6 hours PRN Mild Pain (1 - 3)  guaiFENesin    Syrup 200 milliGRAM(s) Oral every 6 hours PRN Cough  lactulose Syrup 10 Gram(s) Oral daily PRN no bm > 2 days  sodium chloride 0.65% Nasal 1 Spray(s) Both Nostrils three times a day PRN nasal congestion/discomfort from O2    ASSESSMENT & PLAN  76F sustaining a TBI after a fall now with functional deficits  Cardiac/Pulm HTN - Midodrine   Pulm - Duonebs Q6, O2 4L via NC, Guaifenesin PRN, Drew spray prn. CXR 07/24 shows recurrence of right pleural effusion.  Thoracic surgery consulted--no intervention currently, need F/U as outpt in 1 week.   CAD - Lipitor, ASA  ESRD on HD - Phoslo, HD T/Th/Sat  Anemia - Epogen, Vit B, Folate  GI/Bowel Management - Toilet PRN, Lactulose PRN   Management - anuric; Toilet Q2  Skin - Turn Q2, Lachydrin BLE, Zfloats BLE  Gout - resolved, s/p Prednisone course(7/9-7/11)  Pain - Neurontin, Tylenol PRN  DVT PPX - Heparin (resume 7/20), TEDs, SCDs  Diet - Renal & Cardiac/Thins, Nephro-toi, Nepro TID    [facility] discharge today. f/u with PCP, cardio, renal after d/c from [facility]. D/W patient and  to schedule f/u with Dr. Bustos in one week regarding right pleural effusion. SW to inform facility too.

## 2017-07-27 LAB
CULTURE RESULTS: SIGNIFICANT CHANGE UP
SPECIMEN SOURCE: SIGNIFICANT CHANGE UP

## 2017-07-31 PROCEDURE — 92523 SPEECH SOUND LANG COMPREHEN: CPT

## 2017-07-31 PROCEDURE — G0515: CPT

## 2017-07-31 PROCEDURE — 87070 CULTURE OTHR SPECIMN AEROBIC: CPT

## 2017-07-31 PROCEDURE — 84157 ASSAY OF PROTEIN OTHER: CPT

## 2017-07-31 PROCEDURE — 87015 SPECIMEN INFECT AGNT CONCNTJ: CPT

## 2017-07-31 PROCEDURE — 76942 ECHO GUIDE FOR BIOPSY: CPT

## 2017-07-31 PROCEDURE — 97116 GAIT TRAINING THERAPY: CPT

## 2017-07-31 PROCEDURE — 71046 X-RAY EXAM CHEST 2 VIEWS: CPT

## 2017-07-31 PROCEDURE — 85610 PROTHROMBIN TIME: CPT

## 2017-07-31 PROCEDURE — 84145 PROCALCITONIN (PCT): CPT

## 2017-07-31 PROCEDURE — 82945 GLUCOSE OTHER FLUID: CPT

## 2017-07-31 PROCEDURE — 88305 TISSUE EXAM BY PATHOLOGIST: CPT

## 2017-07-31 PROCEDURE — 94640 AIRWAY INHALATION TREATMENT: CPT

## 2017-07-31 PROCEDURE — 87116 MYCOBACTERIA CULTURE: CPT

## 2017-07-31 PROCEDURE — 97535 SELF CARE MNGMENT TRAINING: CPT

## 2017-07-31 PROCEDURE — C1887: CPT

## 2017-07-31 PROCEDURE — 85730 THROMBOPLASTIN TIME PARTIAL: CPT

## 2017-07-31 PROCEDURE — 97110 THERAPEUTIC EXERCISES: CPT

## 2017-07-31 PROCEDURE — 86803 HEPATITIS C AB TEST: CPT

## 2017-07-31 PROCEDURE — 83880 ASSAY OF NATRIURETIC PEPTIDE: CPT

## 2017-07-31 PROCEDURE — 84100 ASSAY OF PHOSPHORUS: CPT

## 2017-07-31 PROCEDURE — 97750 PHYSICAL PERFORMANCE TEST: CPT

## 2017-07-31 PROCEDURE — 94760 N-INVAS EAR/PLS OXIMETRY 1: CPT

## 2017-07-31 PROCEDURE — 83690 ASSAY OF LIPASE: CPT

## 2017-07-31 PROCEDURE — 87205 SMEAR GRAM STAIN: CPT

## 2017-07-31 PROCEDURE — 73130 X-RAY EXAM OF HAND: CPT

## 2017-07-31 PROCEDURE — 94668 MNPJ CHEST WALL SBSQ: CPT

## 2017-07-31 PROCEDURE — 36415 COLL VENOUS BLD VENIPUNCTURE: CPT

## 2017-07-31 PROCEDURE — 97167 OT EVAL HIGH COMPLEX 60 MIN: CPT

## 2017-07-31 PROCEDURE — 87340 HEPATITIS B SURFACE AG IA: CPT

## 2017-07-31 PROCEDURE — 86704 HEP B CORE ANTIBODY TOTAL: CPT

## 2017-07-31 PROCEDURE — 87075 CULTR BACTERIA EXCEPT BLOOD: CPT

## 2017-07-31 PROCEDURE — 92507 TX SP LANG VOICE COMM INDIV: CPT

## 2017-07-31 PROCEDURE — 97163 PT EVAL HIGH COMPLEX 45 MIN: CPT

## 2017-07-31 PROCEDURE — C1729: CPT

## 2017-07-31 PROCEDURE — 85027 COMPLETE CBC AUTOMATED: CPT

## 2017-07-31 PROCEDURE — 87102 FUNGUS ISOLATION CULTURE: CPT

## 2017-07-31 PROCEDURE — 71045 X-RAY EXAM CHEST 1 VIEW: CPT

## 2017-07-31 PROCEDURE — 80048 BASIC METABOLIC PNL TOTAL CA: CPT

## 2017-07-31 PROCEDURE — 99261: CPT

## 2017-07-31 PROCEDURE — 97530 THERAPEUTIC ACTIVITIES: CPT

## 2017-07-31 PROCEDURE — 87206 SMEAR FLUORESCENT/ACID STAI: CPT

## 2017-07-31 PROCEDURE — 83615 LACTATE (LD) (LDH) ENZYME: CPT

## 2017-07-31 PROCEDURE — 82150 ASSAY OF AMYLASE: CPT

## 2017-07-31 PROCEDURE — 97112 NEUROMUSCULAR REEDUCATION: CPT

## 2017-07-31 PROCEDURE — 88112 CYTOPATH CELL ENHANCE TECH: CPT

## 2017-07-31 PROCEDURE — C1769: CPT

## 2017-07-31 PROCEDURE — 84134 ASSAY OF PREALBUMIN: CPT

## 2017-07-31 PROCEDURE — 80053 COMPREHEN METABOLIC PANEL: CPT

## 2017-07-31 PROCEDURE — 89051 BODY FLUID CELL COUNT: CPT

## 2017-07-31 PROCEDURE — 86706 HEP B SURFACE ANTIBODY: CPT

## 2017-08-07 ENCOUNTER — RESULT REVIEW (OUTPATIENT)
Age: 77
End: 2017-08-07

## 2017-08-07 ENCOUNTER — INPATIENT (INPATIENT)
Facility: HOSPITAL | Age: 77
LOS: 9 days | Discharge: NURSING, REHAB OR LONG-TERM CARE | DRG: 166 | End: 2017-08-17
Attending: INTERNAL MEDICINE | Admitting: INTERNAL MEDICINE
Payer: MEDICARE

## 2017-08-07 ENCOUNTER — OUTPATIENT (OUTPATIENT)
Dept: OUTPATIENT SERVICES | Facility: HOSPITAL | Age: 77
LOS: 1 days | End: 2017-08-07
Payer: MEDICARE

## 2017-08-07 ENCOUNTER — APPOINTMENT (OUTPATIENT)
Dept: THORACIC SURGERY | Facility: CLINIC | Age: 77
End: 2017-08-07
Payer: MEDICARE

## 2017-08-07 VITALS
SYSTOLIC BLOOD PRESSURE: 93 MMHG | WEIGHT: 183 LBS | RESPIRATION RATE: 16 BRPM | OXYGEN SATURATION: 76 % | HEIGHT: 66 IN | DIASTOLIC BLOOD PRESSURE: 54 MMHG | BODY MASS INDEX: 29.41 KG/M2 | HEART RATE: 78 BPM

## 2017-08-07 VITALS
WEIGHT: 160.06 LBS | SYSTOLIC BLOOD PRESSURE: 85 MMHG | HEART RATE: 81 BPM | OXYGEN SATURATION: 82 % | DIASTOLIC BLOOD PRESSURE: 50 MMHG | TEMPERATURE: 98 F | RESPIRATION RATE: 16 BRPM | HEIGHT: 64 IN

## 2017-08-07 DIAGNOSIS — Z95.2 PRESENCE OF PROSTHETIC HEART VALVE: Chronic | ICD-10-CM

## 2017-08-07 DIAGNOSIS — E78.00 PURE HYPERCHOLESTEROLEMIA, UNSPECIFIED: ICD-10-CM

## 2017-08-07 DIAGNOSIS — J90 PLEURAL EFFUSION, NOT ELSEWHERE CLASSIFIED: ICD-10-CM

## 2017-08-07 DIAGNOSIS — Z79.899 OTHER LONG TERM (CURRENT) DRUG THERAPY: ICD-10-CM

## 2017-08-07 DIAGNOSIS — I10 ESSENTIAL (PRIMARY) HYPERTENSION: ICD-10-CM

## 2017-08-07 DIAGNOSIS — Z90.49 ACQUIRED ABSENCE OF OTHER SPECIFIED PARTS OF DIGESTIVE TRACT: Chronic | ICD-10-CM

## 2017-08-07 DIAGNOSIS — N18.6 END STAGE RENAL DISEASE: ICD-10-CM

## 2017-08-07 DIAGNOSIS — D64.9 ANEMIA, UNSPECIFIED: ICD-10-CM

## 2017-08-07 DIAGNOSIS — Z90.89 ACQUIRED ABSENCE OF OTHER ORGANS: Chronic | ICD-10-CM

## 2017-08-07 DIAGNOSIS — Z88.5 ALLERGY STATUS TO NARCOTIC AGENT: ICD-10-CM

## 2017-08-07 DIAGNOSIS — E78.5 HYPERLIPIDEMIA, UNSPECIFIED: ICD-10-CM

## 2017-08-07 DIAGNOSIS — I25.10 ATHEROSCLEROTIC HEART DISEASE OF NATIVE CORONARY ARTERY WITHOUT ANGINA PECTORIS: ICD-10-CM

## 2017-08-07 DIAGNOSIS — I27.2 OTHER SECONDARY PULMONARY HYPERTENSION: ICD-10-CM

## 2017-08-07 DIAGNOSIS — J44.9 CHRONIC OBSTRUCTIVE PULMONARY DISEASE, UNSPECIFIED: ICD-10-CM

## 2017-08-07 DIAGNOSIS — Z88.0 ALLERGY STATUS TO PENICILLIN: ICD-10-CM

## 2017-08-07 DIAGNOSIS — Z79.82 LONG TERM (CURRENT) USE OF ASPIRIN: ICD-10-CM

## 2017-08-07 LAB
ALBUMIN FLD-MCNC: 2.1 G/DL — SIGNIFICANT CHANGE UP
ALBUMIN SERPL ELPH-MCNC: 4 G/DL — SIGNIFICANT CHANGE UP (ref 3.3–5.2)
ALP SERPL-CCNC: 469 U/L — HIGH (ref 40–120)
ALT FLD-CCNC: 70 U/L — HIGH
ANION GAP SERPL CALC-SCNC: 21 MMOL/L — HIGH (ref 5–17)
ANISOCYTOSIS BLD QL: SLIGHT — SIGNIFICANT CHANGE UP
APTT BLD: 35.5 SEC — SIGNIFICANT CHANGE UP (ref 27.5–37.4)
AST SERPL-CCNC: 40 U/L — HIGH
B PERT IGG+IGM PNL SER: CLEAR — SIGNIFICANT CHANGE UP
BASOPHILS # BLD AUTO: 0 K/UL — SIGNIFICANT CHANGE UP (ref 0–0.2)
BILIRUB SERPL-MCNC: 1.1 MG/DL — SIGNIFICANT CHANGE UP (ref 0.4–2)
BUN SERPL-MCNC: 46 MG/DL — HIGH (ref 8–20)
CALCIUM SERPL-MCNC: 9.3 MG/DL — SIGNIFICANT CHANGE UP (ref 8.6–10.2)
CHLORIDE SERPL-SCNC: 87 MMOL/L — LOW (ref 98–107)
CO2 SERPL-SCNC: 24 MMOL/L — SIGNIFICANT CHANGE UP (ref 22–29)
COLOR FLD: YELLOW
CREAT SERPL-MCNC: 4.9 MG/DL — HIGH (ref 0.5–1.3)
EOSINOPHIL # BLD AUTO: 0.1 K/UL — SIGNIFICANT CHANGE UP (ref 0–0.5)
EOSINOPHIL NFR BLD AUTO: 2 % — SIGNIFICANT CHANGE UP (ref 0–5)
FLUID INTAKE SUBSTANCE CLASS: SIGNIFICANT CHANGE UP
FLUID SEGMENTED GRANULOCYTES: 3 % — SIGNIFICANT CHANGE UP
GLUCOSE FLD-MCNC: 123 MG/DL — SIGNIFICANT CHANGE UP
GLUCOSE SERPL-MCNC: 105 MG/DL — SIGNIFICANT CHANGE UP (ref 70–115)
GRAM STN FLD: SIGNIFICANT CHANGE UP
HCT VFR BLD CALC: 29.3 % — LOW (ref 37–47)
HGB BLD-MCNC: 9.4 G/DL — LOW (ref 12–16)
HYPOCHROMIA BLD QL: SLIGHT — SIGNIFICANT CHANGE UP
INR BLD: 1 RATIO — SIGNIFICANT CHANGE UP (ref 0.88–1.16)
LYMPHOCYTES # BLD AUTO: 0.6 K/UL — LOW (ref 1–4.8)
LYMPHOCYTES # BLD AUTO: 12 % — LOW (ref 20–55)
LYMPHOCYTES # FLD: 42 % — SIGNIFICANT CHANGE UP
MACROCYTES BLD QL: SIGNIFICANT CHANGE UP
MCHC RBC-ENTMCNC: 32.1 G/DL — SIGNIFICANT CHANGE UP (ref 32–36)
MCHC RBC-ENTMCNC: 35.3 PG — HIGH (ref 27–31)
MCV RBC AUTO: 110.2 FL — HIGH (ref 81–99)
MESOTHL CELL # FLD: 2 % — SIGNIFICANT CHANGE UP
MONOCYTES # BLD AUTO: 0.8 K/UL — SIGNIFICANT CHANGE UP (ref 0–0.8)
MONOCYTES NFR BLD AUTO: 16 % — HIGH (ref 3–10)
MONOS+MACROS # FLD: 53 % — SIGNIFICANT CHANGE UP
NEUTROPHILS # BLD AUTO: 2.9 K/UL — SIGNIFICANT CHANGE UP (ref 1.8–8)
NEUTROPHILS NFR BLD AUTO: 67 % — SIGNIFICANT CHANGE UP (ref 37–73)
NEUTS BAND # BLD: 3 % — SIGNIFICANT CHANGE UP (ref 0–8)
NT-PROBNP SERPL-SCNC: HIGH PG/ML (ref 0–300)
PH FLD: 8 — SIGNIFICANT CHANGE UP
PLAT MORPH BLD: NORMAL — SIGNIFICANT CHANGE UP
PLATELET # BLD AUTO: 153 K/UL — SIGNIFICANT CHANGE UP (ref 150–400)
POIKILOCYTOSIS BLD QL AUTO: SLIGHT — SIGNIFICANT CHANGE UP
POTASSIUM SERPL-MCNC: 3.6 MMOL/L — SIGNIFICANT CHANGE UP (ref 3.5–5.3)
POTASSIUM SERPL-SCNC: 3.6 MMOL/L — SIGNIFICANT CHANGE UP (ref 3.5–5.3)
PROT FLD-MCNC: 3 G/DL — SIGNIFICANT CHANGE UP
PROT SERPL-MCNC: 6.5 G/DL — LOW (ref 6.6–8.7)
PROTHROM AB SERPL-ACNC: 11 SEC — SIGNIFICANT CHANGE UP (ref 9.8–12.7)
RBC # BLD: 2.66 M/UL — LOW (ref 4.4–5.2)
RBC # FLD: 17.2 % — HIGH (ref 11–15.6)
RBC BLD AUTO: ABNORMAL
RCV VOL RI: 110 /UL — HIGH (ref 0–5)
SODIUM SERPL-SCNC: 132 MMOL/L — LOW (ref 135–145)
SPECIMEN SOURCE FLD: SIGNIFICANT CHANGE UP
SPECIMEN SOURCE: SIGNIFICANT CHANGE UP
TOTAL NUCLEATED CELL COUNT, BODY FLUID: 85 /UL — HIGH (ref 0–5)
TUBE TYPE: SIGNIFICANT CHANGE UP
WBC # BLD: 4.4 K/UL — LOW (ref 4.8–10.8)
WBC # FLD AUTO: 4.4 K/UL — LOW (ref 4.8–10.8)

## 2017-08-07 PROCEDURE — 93010 ELECTROCARDIOGRAM REPORT: CPT

## 2017-08-07 PROCEDURE — 88305 TISSUE EXAM BY PATHOLOGIST: CPT | Mod: 26

## 2017-08-07 PROCEDURE — 99205 OFFICE O/P NEW HI 60 MIN: CPT

## 2017-08-07 PROCEDURE — 99223 1ST HOSP IP/OBS HIGH 75: CPT | Mod: AI

## 2017-08-07 PROCEDURE — 71010: CPT | Mod: 26,59

## 2017-08-07 PROCEDURE — 32551 INSERTION OF CHEST TUBE: CPT

## 2017-08-07 PROCEDURE — 88112 CYTOPATH CELL ENHANCE TECH: CPT | Mod: 26

## 2017-08-07 PROCEDURE — 99285 EMERGENCY DEPT VISIT HI MDM: CPT

## 2017-08-07 PROCEDURE — 71020: CPT | Mod: 26

## 2017-08-07 RX ORDER — ONDANSETRON 8 MG/1
4 TABLET, FILM COATED ORAL EVERY 6 HOURS
Qty: 0 | Refills: 0 | Status: DISCONTINUED | OUTPATIENT
Start: 2017-08-07 | End: 2017-08-16

## 2017-08-07 RX ORDER — GABAPENTIN 300 MG/1
300 CAPSULE ORAL
Refills: 0 | Status: ACTIVE | COMMUNITY

## 2017-08-07 RX ORDER — MIDODRINE HYDROCHLORIDE 2.5 MG/1
10 TABLET ORAL
Qty: 0 | Refills: 0 | Status: DISCONTINUED | OUTPATIENT
Start: 2017-08-07 | End: 2017-08-07

## 2017-08-07 RX ORDER — CLOPIDOGREL BISULFATE 75 MG/1
75 TABLET, FILM COATED ORAL
Qty: 30 | Refills: 0 | Status: DISCONTINUED | COMMUNITY
Start: 2017-04-27 | End: 2017-08-07

## 2017-08-07 RX ORDER — ATORVASTATIN CALCIUM 80 MG/1
40 TABLET, FILM COATED ORAL AT BEDTIME
Qty: 0 | Refills: 0 | Status: DISCONTINUED | OUTPATIENT
Start: 2017-08-07 | End: 2017-08-16

## 2017-08-07 RX ORDER — SOD,AMMONIUM,POTASSIUM LACTATE
1 CREAM (GRAM) TOPICAL DAILY
Qty: 0 | Refills: 0 | Status: DISCONTINUED | OUTPATIENT
Start: 2017-08-07 | End: 2017-08-16

## 2017-08-07 RX ORDER — MIDODRINE HYDROCHLORIDE 2.5 MG/1
10 TABLET ORAL
Qty: 0 | Refills: 0 | Status: DISCONTINUED | OUTPATIENT
Start: 2017-08-07 | End: 2017-08-09

## 2017-08-07 RX ORDER — OXYCODONE AND ACETAMINOPHEN 5; 325 MG/1; MG/1
1 TABLET ORAL EVERY 4 HOURS
Qty: 0 | Refills: 0 | Status: DISCONTINUED | OUTPATIENT
Start: 2017-08-07 | End: 2017-08-13

## 2017-08-07 RX ORDER — ACETAMINOPHEN 500 MG
650 TABLET ORAL EVERY 6 HOURS
Qty: 0 | Refills: 0 | Status: DISCONTINUED | OUTPATIENT
Start: 2017-08-07 | End: 2017-08-16

## 2017-08-07 RX ORDER — CALCIUM ACETATE 667 MG
667 TABLET ORAL
Qty: 0 | Refills: 0 | Status: DISCONTINUED | OUTPATIENT
Start: 2017-08-07 | End: 2017-08-16

## 2017-08-07 RX ORDER — HEPARIN SODIUM 5000 [USP'U]/ML
5000 INJECTION INTRAVENOUS; SUBCUTANEOUS EVERY 8 HOURS
Qty: 0 | Refills: 0 | Status: DISCONTINUED | OUTPATIENT
Start: 2017-08-07 | End: 2017-08-16

## 2017-08-07 RX ORDER — OXYCODONE AND ACETAMINOPHEN 5; 325 MG/1; MG/1
2 TABLET ORAL EVERY 6 HOURS
Qty: 0 | Refills: 0 | Status: DISCONTINUED | OUTPATIENT
Start: 2017-08-07 | End: 2017-08-13

## 2017-08-07 RX ORDER — PREGABALIN 225 MG/1
1000 CAPSULE ORAL DAILY
Qty: 0 | Refills: 0 | Status: DISCONTINUED | OUTPATIENT
Start: 2017-08-07 | End: 2017-08-16

## 2017-08-07 RX ORDER — IPRATROPIUM/ALBUTEROL SULFATE 18-103MCG
3 AEROSOL WITH ADAPTER (GRAM) INHALATION EVERY 6 HOURS
Qty: 0 | Refills: 0 | Status: DISCONTINUED | OUTPATIENT
Start: 2017-08-07 | End: 2017-08-10

## 2017-08-07 RX ORDER — ASPIRIN/CALCIUM CARB/MAGNESIUM 324 MG
81 TABLET ORAL DAILY
Qty: 0 | Refills: 0 | Status: DISCONTINUED | OUTPATIENT
Start: 2017-08-07 | End: 2017-08-16

## 2017-08-07 RX ADMIN — ATORVASTATIN CALCIUM 40 MILLIGRAM(S): 80 TABLET, FILM COATED ORAL at 22:26

## 2017-08-07 RX ADMIN — MIDODRINE HYDROCHLORIDE 10 MILLIGRAM(S): 2.5 TABLET ORAL at 17:31

## 2017-08-07 RX ADMIN — OXYCODONE AND ACETAMINOPHEN 1 TABLET(S): 5; 325 TABLET ORAL at 18:02

## 2017-08-07 RX ADMIN — HEPARIN SODIUM 5000 UNIT(S): 5000 INJECTION INTRAVENOUS; SUBCUTANEOUS at 22:26

## 2017-08-07 RX ADMIN — Medication 3 MILLILITER(S): at 20:51

## 2017-08-07 RX ADMIN — Medication 667 MILLIGRAM(S): at 17:31

## 2017-08-07 NOTE — PROCEDURE NOTE - NSPROCDETAILS_GEN_ALL_CORE
dressing applied/ultrasound assessment of fluid (location)/ultrasound assessment of fluid (size)/secured in place/sterile dressing applied/Seldinger technique/percutaneous

## 2017-08-07 NOTE — CONSULT NOTE ADULT - PROBLEM SELECTOR RECOMMENDATION 9
Dialysis MD has seen patient at bedside and plan for dialysis tomorrow 8/8/17.  Continue care as per medicine.

## 2017-08-07 NOTE — H&P ADULT - NSHPPHYSICALEXAM_GEN_ALL_CORE
ICU Vital Signs Last 24 Hrs  T(C): 36.4 (07 Aug 2017 13:36), Max: 36.4 (07 Aug 2017 13:36)  T(F): 97.6 (07 Aug 2017 13:36), Max: 97.6 (07 Aug 2017 13:36)  HR: 80 (07 Aug 2017 15:06) (80 - 81)  BP: 112/70 (07 Aug 2017 15:06) (85/50 - 112/70)  BP(mean): --  ABP: --  ABP(mean): --  RR: 22 (07 Aug 2017 15:06) (16 - 22)  SpO2: 98% (07 Aug 2017 15:06) (82% - 98%)

## 2017-08-07 NOTE — H&P ADULT - ASSESSMENT
This is 76 woman with h/o recurrent R pleural effusion drainage, Pt went to see Dr. Bustos today for f/u visit, xray chest showed worsening right sided pleural, so she was sent to ED. Pt report mild SOB, on home oxygen 3L, also c/o cough, productive.     A/P    >Recurrent pleural effusuion - admit to monitor bed  CT surgery consulted, she is s/p IR guided drainage twice, will benefit from pigtail   oxygen, duoneb, IS   was treated with Abx last time   pain control     >ESRD on HD - T - T - S  renal consulted     > Pulmonary HTN -   off sildenafil due to hypotension    >Chronic diastolic chf - stable - on HD     >A fib not on coumadin due to fall risk and recent SAH    >chronic anemia due to ESRD - stable     >Hypotension - stable on Midodrine     >DVT PPX-  heparin

## 2017-08-07 NOTE — ED ADULT NURSE REASSESSMENT NOTE - NS ED NURSE REASSESS COMMENT FT1
hospitalist dr smith with pt assessing, await vascular PA, unk at this time if chest tube vs pigtail at IR suite. Dr Smith calling for dialyses orders tu/thurs/sat.

## 2017-08-07 NOTE — CONSULT NOTE ADULT - SUBJECTIVE AND OBJECTIVE BOX
HISTORY OF PRESENT ILLNESS:  76y Female with significant past notably pulmonary htn, prior open heart surgery with valve replacement and recent admission for pleural effusion with other medical history noted below presented to ED for large pleural effusion noted on CXR today while at followup with Dr. Bustos. Patient was seen in office today at with Dr. Bustos when she was noted to be saturating 77% on home O2. Patient was sent for chest xray which showed right sided pleural effusion. Patient was then sent to the ED by Dr. Bustos for possible admission into the hospital. Patient and health care proxy were seen in the ED where proxy admits to increasing O2 requirements. Patient was recently hospitalized for pleural effusion. Patient currently denies chest pain, active shortness of breath, fevers, fatigue.      PAST MEDICAL & SURGICAL HISTORY:  ESRD (end stage renal disease) on dialysis: MWF via R [facility] Permacath  planned AVF creation  Sinusitis, unspecified chronicity, unspecified location  Gout  Gall stones  Pneumonia  Anemia  Pulmonary hypertension  COPD (chronic obstructive pulmonary disease)  CAD (coronary artery disease)  Atrial fibrillation  Hyperlipidemia  Hypertension  Spinal fusion failure, initial encounter  Tubal ligation status  Sinusitis  Spinal stenosis  S/P tonsillectomy  S/P appendectomy  H/O aortic valve replacement  H/O spinal fusion  H/O cataract  Cystocele  H/O tricuspid valve annuloplasty  H/O mitral valve repair      MEDICATIONS  (STANDING):  aspirin enteric coated 81 milliGRAM(s) Oral daily  atorvastatin 40 milliGRAM(s) Oral at bedtime  ammonium lactate 12% Lotion 1 Application(s) Topical daily  calcium acetate 667 milliGRAM(s) Oral three times a day with meals  cyanocobalamin 1000 MICROGram(s) Oral daily  heparin  Injectable 5000 Unit(s) SubCutaneous every 8 hours  ALBUTerol/ipratropium for Nebulization 3 milliLiter(s) Nebulizer every 6 hours  midodrine 10 milliGRAM(s) Oral two times a day    MEDICATIONS  (PRN):  acetaminophen   Tablet 650 milliGRAM(s) Oral every 6 hours PRN Mild Pain (1 - 3)  guaiFENesin    Syrup 200 milliGRAM(s) Oral every 6 hours PRN Cough  oxyCODONE    5 mG/acetaminophen 325 mG 1 Tablet(s) Oral every 4 hours PRN Moderate Pain (4 - 6)  oxyCODONE    5 mG/acetaminophen 325 mG 2 Tablet(s) Oral every 6 hours PRN Severe Pain (7 - 10)  ondansetron Injectable 4 milliGRAM(s) IV Push every 6 hours PRN Nausea and/or Vomiting    Allergies:  allopurinol (Rash)  codeine (Nausea; Vomiting)  penicillin (Rash)  spironolactone (Unknown)    Review of Systems  CONSTITUTIONAL:  Fevers[ ] chills[ ] sweats[ ] fatigue[ ] weight loss[ ] weight gain [ ]                                     NEGATIVE [X]   NEURO:  parathesias[ ] seizures [ ]  syncope [ ]  confusion [ ]                                                                                NEGATIVE[X]   EYES: glasses[ ]  blurry vision[ ]  discharge[ ] pain[ ] glaucoma [ ]                                                                          NEGATIVE[X]   ENMT:  difficulty hearing [ ]  vertigo[ ]  dysphagia[ ] epistaxis[ ] recent dental work [ ]                                    NEGATIVE[X]   CV:  chest pain[ ] palpitations[ ] LINDSAY [ ] diaphoresis [ ]                                                                                           NEGATIVE[X]   RESPIRATORY:  wheezing[ ] SOB[X] cough [ ] sputum[ ] hemoptysis[ ]                                                                  NEGATIVE[ ]   GI:  nausea[ ]  vommiting [ ]  diarrhea[ ] constipation [ ] melena [ ]                                                                      NEGATIVE[X]   : hematuria[ ]  dysuria[ ] urgency[ ] incontinence[ ]                                                                                            NEGATIVE[X]   MUSKULOSKELETAL:  arthritis[ ]  joint swelling [ ] muscle weakness [ ]                                                                NEGATIVE[X]   SKIN/BREAST:  rash[ ] itching [ ]  hair loss[ ] masses[ ]                                                                                              NEGATIVE[X]       PHYSICAL EXAM  Vital Signs Last 24 Hrs  T(C): 36.4 (07 Aug 2017 13:36), Max: 36.4 (07 Aug 2017 13:36)  T(F): 97.6 (07 Aug 2017 13:36), Max: 97.6 (07 Aug 2017 13:36)  HR: 80 (07 Aug 2017 15:06) (80 - 81)  BP: 112/70 (07 Aug 2017 15:06) (85/50 - 112/70)  BP(mean): --  RR: 22 (07 Aug 2017 15:06) (16 - 22)  SpO2: 98% (07 Aug 2017 15:06) (82% - 98%)    CONSTITUTIONAL:                                                                          WNL[ ]   Neuro: WNL[X] Normal exam oriented to person/place & time with no focal motor or sensory  deficits. Other __________                    Chest: WNL[X] Normal lung exam with good air movement absence of wheezes, rales, or rhonchi: Other diminished breath sounds noted on right.                                                                                CV:  Auscultation: normal [X] S3[ ] S4[ ] Irregular [ ] Rub[ ] Clicks[ ]    Murmurs none:[X]systolic [ ]  diastolic [ ] holosystolic [ ]  GI: WNL[X] Normal exam of abdomen, liver & spleen with no noted masses or tenderness. Other______________________                                                                                                        Extremities: WNL[X] Normal no evidence of cyanosis or deformity Edema: none[X]trace[ ]1+[ ]2+[ ]3+[ ]4+[ ]  Lower Extremity Pulses: Right[1+] Left[1+]Varicosities[ ]  SKIN :WNL[X] Normal exam to inspection & palation. Other: lower extremity chronic venous changes noted.                                                          LABS:                        9.4    4.4   )-----------( 153      ( 07 Aug 2017 14:03 )             29.3     08-07    132<L>  |  87<L>  |  46.0<H>  ----------------------------<  105  3.6   |  24.0  |  4.90<H>    Ca    9.3      07 Aug 2017 14:03    TPro  6.5<L>  /  Alb  4.0  /  TBili  1.1  /  DBili  x   /  AST  40<H>  /  ALT  70<H>  /  AlkPhos  469<H>  08-07    PT/INR - ( 07 Aug 2017 14:03 )   PT: 11.0 sec;   INR: 1.00 ratio         PTT - ( 07 Aug 2017 14:03 )  PTT:35.5 sec          Serum Pro-Brain Natriuretic Peptide: [id number] pg/mL (08-07-17 @ 14:03)      CXR: < from: Xray Chest 2 Views PA/Lat (08.07.17 @ 12:08) >  FINDINGS:  Increased right pleural effusion noted layering to the right posterior   seventh of the sixth rib interspace. Underlying airspace consolidation   likely. The left lung clear. No shift of midline structures.  A double-lumen tunneled right IJ catheter tip in SVC.    The  heart is enlarged in transverse diameter. No hilar mass. Trachea   midline.       . Status post cardiac valve replacement. .  IMPRESSION:    Increased opacification of a few percent right lower hemithorax. Likely   combination of pleural effusion and/or a airspace consolidation.

## 2017-08-07 NOTE — ED ADULT NURSE REASSESSMENT NOTE - NS ED NURSE REASSESS COMMENT FT1
pt tolerate pigtail rt chest and oasis device. time out complete. vascular PA Carmelo Phoenix did procedure and walked down specimen.

## 2017-08-07 NOTE — ED PROVIDER NOTE - PROGRESS NOTE DETAILS
Spoke to Dr. Smith. He will admit the patient. Also spoke to the cardiothoracic PA. They will come to evaluate the patient.

## 2017-08-07 NOTE — H&P ADULT - NSHPOUTPATIENTPROVIDERS_GEN_ALL_CORE
Dr. Alexandre  CT surgery - Dr. Bustos  Cardiology - [facility]  Renal - [facility]

## 2017-08-07 NOTE — ED PROVIDER NOTE - OBJECTIVE STATEMENT
Pt. present to ED after she was found to have a large pleural effusion. Pt. went to see Dr. Sharp as a follow up today. Pt. did admit to some shortness of breath on exertion. NO chest pain now. Pt. is currently in a rehab facility after she was admitted for pleural effusion. Pt. had to have the effusion drained twice during her last hospitalization. Pt. also has hx of ESRD/HTN/A-fib/COPD. Pt. is not on coumadin, possibly due to fall risk.

## 2017-08-07 NOTE — CONSULT NOTE ADULT - SUBJECTIVE AND OBJECTIVE BOX
HPI:  This is 76 w dav ESRD on HD TTS at [facility] p/w SOB  chest xray with increasing R pleural effusion . S/P recurrent R pleural effusion drainage. Dr. Bustos today for f/u visit, xray chest showed worsening right sided pleural, so she was sent to ED Now has R Chest Tube     ROS: Pt report mild SOB, on home oxygen 3L, also c/o cough, productive. denies CP N/V/D    PAST MEDICAL & SURGICAL HISTORY:  ESRD (end stage renal disease) on dialysis: MWF via R SC Permacath  planned AVF creation  Sinusitis, unspecified chronicity, unspecified location  Gout  Gall stones  Pneumonia  Anemia  Pulmonary hypertension  COPD (chronic obstructive pulmonary disease)  CAD (coronary artery disease)  Atrial fibrillation  Hyperlipidemia  Hypertension  Spinal fusion failure, initial encounter  Tubal ligation status  Sinusitis  Spinal stenosis  S/P tonsillectomy  S/P appendectomy  H/O aortic valve replacement  H/O spinal fusion  H/O cataract  Cystocele  H/O tricuspid valve annuloplasty  H/O mitral valve repair      FAMILY HISTORY:  NC    Social History:Non smoker    MEDICATIONS  (STANDING):  aspirin enteric coated 81 milliGRAM(s) Oral daily  atorvastatin 40 milliGRAM(s) Oral at bedtime  ammonium lactate 12% Lotion 1 Application(s) Topical daily  calcium acetate 667 milliGRAM(s) Oral three times a day with meals  cyanocobalamin 1000 MICROGram(s) Oral daily  heparin  Injectable 5000 Unit(s) SubCutaneous every 8 hours  ALBUTerol/ipratropium for Nebulization 3 milliLiter(s) Nebulizer every 6 hours  midodrine 10 milliGRAM(s) Oral two times a day    MEDICATIONS  (PRN):  acetaminophen   Tablet 650 milliGRAM(s) Oral every 6 hours PRN Mild Pain (1 - 3)  guaiFENesin    Syrup 200 milliGRAM(s) Oral every 6 hours PRN Cough  oxyCODONE    5 mG/acetaminophen 325 mG 1 Tablet(s) Oral every 4 hours PRN Moderate Pain (4 - 6)  oxyCODONE    5 mG/acetaminophen 325 mG 2 Tablet(s) Oral every 6 hours PRN Severe Pain (7 - 10)  ondansetron Injectable 4 milliGRAM(s) IV Push every 6 hours PRN Nausea and/or Vomiting   Meds reviewed    Allergies    allopurinol (Rash)  codeine (Nausea; Vomiting)  penicillin (Rash)  spironolactone (Unknown)    Vital Signs Last 24 Hrs  T(C): 36.4 (07 Aug 2017 13:36), Max: 36.4 (07 Aug 2017 13:36)  T(F): 97.6 (07 Aug 2017 13:36), Max: 97.6 (07 Aug 2017 13:36)  HR: 80 (07 Aug 2017 15:06) (80 - 81)  BP: 112/70 (07 Aug 2017 15:06) (85/50 - 112/70)  BP(mean): --  RR: 22 (07 Aug 2017 15:06) (16 - 22)  SpO2: 98% (07 Aug 2017 15:06) (82% - 98%)  Daily Height in cm: 162.56 (07 Aug 2017 13:36)    Daily     PHYSICAL EXAM:    GENERAL: appears chronically ill  HEAD:  Atraumatic, Normocephalic  EYES: EOMI  NECK: Supple, neck  veins full  NERVOUS SYSTEM:  Alert & Oriented X3  CHEST/LUNG: Dec BS R> L  HEART: Regular rate and rhythm; No murmurs  ABDOMEN: Soft, Nontender, Nondistended; Bowel sounds present  EXTREMITIES:  No edema        LABS:                        9.4    4.4   )-----------( 153      ( 07 Aug 2017 14:03 )             29.3     08-07    132<L>  |  87<L>  |  46.0<H>  ----------------------------<  105  3.6   |  24.0  |  4.90<H>    Ca    9.3      07 Aug 2017 14:03    TPro  6.5<L>  /  Alb  4.0  /  TBili  1.1  /  DBili  x   /  AST  40<H>  /  ALT  70<H>  /  AlkPhos  469<H>  08-07    PT/INR - ( 07 Aug 2017 14:03 )   PT: 11.0 sec;   INR: 1.00 ratio         PTT - ( 07 Aug 2017 14:03 )  PTT:35.5 sec            RADIOLOGY & ADDITIONAL TESTS:

## 2017-08-07 NOTE — ED ADULT TRIAGE NOTE - CHIEF COMPLAINT QUOTE
pt brought from dr marie office for eval of large pleural effusion; dr proctor requesting ct/drain. patient spo2 82% on arrival on 3L nasal cannula. Patient oxygen increased. Dr. roman and rn to bedside for eval. unlabored resp at this time. thoracentesis last week

## 2017-08-07 NOTE — CONSULT NOTE ADULT - PROBLEM SELECTOR RECOMMENDATION 2
14F pigtail placed in ED. Cultures sent. CXR pending. chest tube to wall suction. CT surgery will continue to follow daily.

## 2017-08-08 DIAGNOSIS — J90 PLEURAL EFFUSION, NOT ELSEWHERE CLASSIFIED: ICD-10-CM

## 2017-08-08 LAB
ANION GAP SERPL CALC-SCNC: 18 MMOL/L — HIGH (ref 5–17)
BUN SERPL-MCNC: 57 MG/DL — HIGH (ref 8–20)
CALCIUM SERPL-MCNC: 8.6 MG/DL — SIGNIFICANT CHANGE UP (ref 8.6–10.2)
CHLORIDE SERPL-SCNC: 92 MMOL/L — LOW (ref 98–107)
CO2 SERPL-SCNC: 24 MMOL/L — SIGNIFICANT CHANGE UP (ref 22–29)
CREAT SERPL-MCNC: 5.61 MG/DL — HIGH (ref 0.5–1.3)
GLUCOSE SERPL-MCNC: 105 MG/DL — SIGNIFICANT CHANGE UP (ref 70–115)
HCT VFR BLD CALC: 27.5 % — LOW (ref 37–47)
HGB BLD-MCNC: 8.6 G/DL — LOW (ref 12–16)
IRON SATN MFR SERPL: 14 % — SIGNIFICANT CHANGE UP (ref 14–50)
IRON SATN MFR SERPL: 41 UG/DL — SIGNIFICANT CHANGE UP (ref 37–145)
LDH SERPL L TO P-CCNC: 217 U/L — HIGH (ref 98–192)
MCHC RBC-ENTMCNC: 31.3 G/DL — LOW (ref 32–36)
MCHC RBC-ENTMCNC: 35.2 PG — HIGH (ref 27–31)
MCV RBC AUTO: 112.7 FL — HIGH (ref 81–99)
NIGHT BLUE STAIN TISS: SIGNIFICANT CHANGE UP
PLATELET # BLD AUTO: 131 K/UL — LOW (ref 150–400)
POTASSIUM SERPL-MCNC: 3.7 MMOL/L — SIGNIFICANT CHANGE UP (ref 3.5–5.3)
POTASSIUM SERPL-SCNC: 3.7 MMOL/L — SIGNIFICANT CHANGE UP (ref 3.5–5.3)
RBC # BLD: 2.44 M/UL — LOW (ref 4.4–5.2)
RBC # FLD: 16.7 % — HIGH (ref 11–15.6)
SODIUM SERPL-SCNC: 134 MMOL/L — LOW (ref 135–145)
SPECIMEN SOURCE: SIGNIFICANT CHANGE UP
TIBC SERPL-MCNC: 300 UG/DL — SIGNIFICANT CHANGE UP (ref 220–430)
WBC # BLD: 4.2 K/UL — LOW (ref 4.8–10.8)
WBC # FLD AUTO: 4.2 K/UL — LOW (ref 4.8–10.8)

## 2017-08-08 PROCEDURE — 99233 SBSQ HOSP IP/OBS HIGH 50: CPT

## 2017-08-08 PROCEDURE — 71010: CPT | Mod: 26

## 2017-08-08 RX ORDER — ERYTHROPOIETIN 10000 [IU]/ML
10000 INJECTION, SOLUTION INTRAVENOUS; SUBCUTANEOUS
Qty: 0 | Refills: 0 | Status: DISCONTINUED | OUTPATIENT
Start: 2017-08-08 | End: 2017-08-16

## 2017-08-08 RX ADMIN — Medication 3 MILLILITER(S): at 20:05

## 2017-08-08 RX ADMIN — HEPARIN SODIUM 5000 UNIT(S): 5000 INJECTION INTRAVENOUS; SUBCUTANEOUS at 05:35

## 2017-08-08 RX ADMIN — OXYCODONE AND ACETAMINOPHEN 1 TABLET(S): 5; 325 TABLET ORAL at 12:44

## 2017-08-08 RX ADMIN — OXYCODONE AND ACETAMINOPHEN 1 TABLET(S): 5; 325 TABLET ORAL at 00:47

## 2017-08-08 RX ADMIN — Medication 200 MILLIGRAM(S): at 00:46

## 2017-08-08 RX ADMIN — Medication 667 MILLIGRAM(S): at 08:54

## 2017-08-08 RX ADMIN — Medication 3 MILLILITER(S): at 02:24

## 2017-08-08 RX ADMIN — OXYCODONE AND ACETAMINOPHEN 1 TABLET(S): 5; 325 TABLET ORAL at 06:30

## 2017-08-08 RX ADMIN — MIDODRINE HYDROCHLORIDE 10 MILLIGRAM(S): 2.5 TABLET ORAL at 05:35

## 2017-08-08 RX ADMIN — ATORVASTATIN CALCIUM 40 MILLIGRAM(S): 80 TABLET, FILM COATED ORAL at 22:09

## 2017-08-08 RX ADMIN — OXYCODONE AND ACETAMINOPHEN 1 TABLET(S): 5; 325 TABLET ORAL at 18:07

## 2017-08-08 RX ADMIN — Medication 667 MILLIGRAM(S): at 11:33

## 2017-08-08 RX ADMIN — OXYCODONE AND ACETAMINOPHEN 1 TABLET(S): 5; 325 TABLET ORAL at 18:03

## 2017-08-08 RX ADMIN — Medication 200 MILLIGRAM(S): at 11:33

## 2017-08-08 RX ADMIN — Medication 3 MILLILITER(S): at 15:08

## 2017-08-08 RX ADMIN — OXYCODONE AND ACETAMINOPHEN 1 TABLET(S): 5; 325 TABLET ORAL at 05:36

## 2017-08-08 RX ADMIN — HEPARIN SODIUM 5000 UNIT(S): 5000 INJECTION INTRAVENOUS; SUBCUTANEOUS at 22:09

## 2017-08-08 RX ADMIN — MIDODRINE HYDROCHLORIDE 10 MILLIGRAM(S): 2.5 TABLET ORAL at 17:47

## 2017-08-08 RX ADMIN — OXYCODONE AND ACETAMINOPHEN 1 TABLET(S): 5; 325 TABLET ORAL at 01:30

## 2017-08-08 RX ADMIN — ERYTHROPOIETIN 10000 UNIT(S): 10000 INJECTION, SOLUTION INTRAVENOUS; SUBCUTANEOUS at 16:49

## 2017-08-08 RX ADMIN — Medication 81 MILLIGRAM(S): at 11:32

## 2017-08-08 RX ADMIN — HEPARIN SODIUM 5000 UNIT(S): 5000 INJECTION INTRAVENOUS; SUBCUTANEOUS at 14:04

## 2017-08-08 RX ADMIN — PREGABALIN 1000 MICROGRAM(S): 225 CAPSULE ORAL at 11:33

## 2017-08-08 RX ADMIN — Medication 667 MILLIGRAM(S): at 22:09

## 2017-08-08 RX ADMIN — Medication 3 MILLILITER(S): at 08:43

## 2017-08-08 RX ADMIN — Medication 200 MILLIGRAM(S): at 22:19

## 2017-08-08 RX ADMIN — Medication 1 APPLICATION(S): at 11:32

## 2017-08-08 RX ADMIN — OXYCODONE AND ACETAMINOPHEN 1 TABLET(S): 5; 325 TABLET ORAL at 13:29

## 2017-08-08 NOTE — PROGRESS NOTE ADULT - SUBJECTIVE AND OBJECTIVE BOX
Internal Medicine Hospitalist - Dr. Sarah HERNANDEZ    [id number]    76y      Female    Patient is a 76y old  Female who presents with a chief complaint of Right sided pleural effusion (07 Aug 2017 15:59)      INTERVAL HPI/ OVERNIGHT EVENTS: Patient is seen and examined, pt is s/p chest tube yesterday, feeling better, still c/o cough, denied fever, chills, chest paon    REVIEW OF SYSTEMS:    Denied fever, chills, abd. pain, nausea, vomiting, chest pain, headache, dizziness    PHYSICAL EXAM:    Vital Signs Last 24 Hrs  T(C): 36.9 (08 Aug 2017 10:35), Max: 36.9 (08 Aug 2017 05:34)  T(F): 98.4 (08 Aug 2017 10:35), Max: 98.4 (08 Aug 2017 05:34)  HR: 85 (08 Aug 2017 10:35) (78 - 85)  BP: 82/53 (08 Aug 2017 10:35) (82/53 - 112/70)  BP(mean): --  RR: 17 (08 Aug 2017 10:35) (16 - 22)  SpO2: 96% (08 Aug 2017 05:34) (82% - 100%)    GENERAL: NAD  HEENT: EOMI  Neck: supple  CHEST/LUNG: air entry improved right lung base   HEART: S1S2+ audible  ABDOMEN: Soft, Nontender, Nondistended; Bowel sounds present  EXTREMITIES:  no edema  CNS: AAO X 3  Psychiatry: normal mood    LABS:                        8.6    4.2   )-----------( 131      ( 08 Aug 2017 07:22 )             27.5     08-08    134<L>  |  92<L>  |  57.0<H>  ----------------------------<  105  3.7   |  24.0  |  5.61<H>    Ca    8.6      08 Aug 2017 06:36    TPro  6.5<L>  /  Alb  4.0  /  TBili  1.1  /  DBili  x   /  AST  40<H>  /  ALT  70<H>  /  AlkPhos  469<H>  08-07    PT/INR - ( 07 Aug 2017 14:03 )   PT: 11.0 sec;   INR: 1.00 ratio         PTT - ( 07 Aug 2017 14:03 )  PTT:35.5 sec        MEDICATIONS  (STANDING):  aspirin enteric coated 81 milliGRAM(s) Oral daily  atorvastatin 40 milliGRAM(s) Oral at bedtime  ammonium lactate 12% Lotion 1 Application(s) Topical daily  calcium acetate 667 milliGRAM(s) Oral three times a day with meals  cyanocobalamin 1000 MICROGram(s) Oral daily  heparin  Injectable 5000 Unit(s) SubCutaneous every 8 hours  ALBUTerol/ipratropium for Nebulization 3 milliLiter(s) Nebulizer every 6 hours  midodrine 10 milliGRAM(s) Oral two times a day  epoetin jaswinder Injectable 36751 Unit(s) IV Push <User Schedule>    MEDICATIONS  (PRN):  acetaminophen   Tablet 650 milliGRAM(s) Oral every 6 hours PRN Mild Pain (1 - 3)  guaiFENesin    Syrup 200 milliGRAM(s) Oral every 6 hours PRN Cough  oxyCODONE    5 mG/acetaminophen 325 mG 1 Tablet(s) Oral every 4 hours PRN Moderate Pain (4 - 6)  oxyCODONE    5 mG/acetaminophen 325 mG 2 Tablet(s) Oral every 6 hours PRN Severe Pain (7 - 10)  ondansetron Injectable 4 milliGRAM(s) IV Push every 6 hours PRN Nausea and/or Vomiting      RADIOLOGY & ADDITIONAL TEST    < from: Xray Chest 1 View AP/PA. (08.08.17 @ 05:01) >  Impression:  Stable exam without significant change since the previous study..    < end of copied text >

## 2017-08-08 NOTE — PROGRESS NOTE ADULT - SUBJECTIVE AND OBJECTIVE BOX
Subjective: "I'm doing OK."  Sitting up in bed.  Denies SOB or CP.  NAD noted.      Tele:  SR.                       T(F): 98.4 (17 @ 10:35), Max: 98.4 (17 @ 05:34)  HR: 85 (17 @ 10:35) (78 - 85)  BP: 82/53 (17 @ 10:35) (82/53 - 112/70)  RR: 17 (17 @ 10:35) (16 - 22)  SpO2: 96% (17 @ 05:34) (82% - 100%) on 3 liters O2.      Weight (kg): 72.6 ( @ 13:36)    Daily Height in cm: 162.56 (07 Aug 2017 13:36)    Daily Weight in k.6 (08 Aug 2017 00:45)    LV EF: 60-65%    Allergies:  allopurinol (Rash)  codeine (Nausea; Vomiting)  penicillin (Rash)  spironolactone (Unknown)          134<L>  |  92<L>  |  57.0<H>  ----------------------------<  105  3.7   |  24.0  |  5.61<H>    Ca    8.6      08 Aug 2017 06:36    TPro  6.5<L>  /  Alb  4.0  /  TBili  1.1  /  DBili  x   /  AST  40<H>  /  ALT  70<H>  /  AlkPhos  469<H>                                 8.6    4.2   )-----------( 131      ( 08 Aug 2017 07:22 )             27.5        PT/INR - ( 07 Aug 2017 14:03 )   PT: 11.0 sec;   INR: 1.00 ratio         PTT - ( 07 Aug 2017 14:03 )  PTT:35.5 sec               CXR:   Xray Chest 1 View AP/PA. (17 @ 05:01) >  Comparison exam: 2017 5:15 PM.    Findings:  No change in lines and tubes. Persistent cardiomegaly. Right basilar   atelectasis. No evidence of pneumothorax..    Impression:  Stable exam without significant change since the previous study.      FADIA VALDIVIA M.D., ATTENDING RADIOLOGIST  This document has been electronically signed. Aug  8 2017  8:29AM        I&O's Detail    07 Aug 2017 07:01  -  08 Aug 2017 07:00  --------------------------------------------------------  IN:  Total IN: 0 mL    OUT:    Chest Tube: 125 mL  Total OUT: 125 mL    Total NET: -125 mL        CHEST TUBE:  [x ] YES [ ] NO  OUTPUT:    125ml overnight.  125ml over the last 24 hours    AIR LEAKS:  [ ] YES [x ] NO        Active Medications:  acetaminophen   Tablet 650 milliGRAM(s) Oral every 6 hours PRN  aspirin enteric coated 81 milliGRAM(s) Oral daily  atorvastatin 40 milliGRAM(s) Oral at bedtime  ammonium lactate 12% Lotion 1 Application(s) Topical daily  guaiFENesin    Syrup 200 milliGRAM(s) Oral every 6 hours PRN  calcium acetate 667 milliGRAM(s) Oral three times a day with meals  cyanocobalamin 1000 MICROGram(s) Oral daily  heparin  Injectable 5000 Unit(s) SubCutaneous every 8 hours  ALBUTerol/ipratropium for Nebulization 3 milliLiter(s) Nebulizer every 6 hours  midodrine 10 milliGRAM(s) Oral two times a day  oxyCODONE    5 mG/acetaminophen 325 mG 1 Tablet(s) Oral every 4 hours PRN  oxyCODONE    5 mG/acetaminophen 325 mG 2 Tablet(s) Oral every 6 hours PRN  ondansetron Injectable 4 milliGRAM(s) IV Push every 6 hours PRN  epoetin jaswinder Injectable 77149 Unit(s) IV Push <User Schedule>      Physical Exam:    Neuro: AAOX3.  No focal deficits.    Pulm: Rhonchi throughout.  +Right chest tube.  No crepitus or airleak noted.    CV: RRR.  +S1+S2.    Abd: Soft/NT/ND.  +BS.     Extremities: +1 edema BLE.  Chronic Skin changes to BLE/reddened.  +DSD to Right shin noted.                PAST MEDICAL & SURGICAL HISTORY:  ESRD (end stage renal disease) on dialysis: MWF via R SC Permacath  planned AVF creation  Sinusitis, unspecified chronicity, unspecified location  Gout  Gall stones  Pneumonia  Anemia  Pulmonary hypertension  COPD (chronic obstructive pulmonary disease)  CAD (coronary artery disease)  Atrial fibrillation  Hyperlipidemia  Hypertension  Spinal fusion failure, initial encounter  Tubal ligation status  Sinusitis  Spinal stenosis  S/P tonsillectomy  S/P appendectomy  H/O aortic valve replacement  H/O spinal fusion  H/O cataract  Cystocele  H/O tricuspid valve annuloplasty  H/O mitral valve repair Subjective: "I'm doing OK."  Sitting up in bed.  Denies SOB or CP.  NAD noted.      Tele:  SR.                       T(F): 98.4 (17 @ 10:35), Max: 98.4 (17 @ 05:34)  HR: 85 (17 @ 10:35) (78 - 85)  BP: 82/53 (17 @ 10:35) (82/53 - 112/70)  RR: 17 (17 @ 10:35) (16 - 22)  SpO2: 96% (17 @ 05:34) (82% - 100%) on 3 liters O2.      Weight (kg): 72.6 ( @ 13:36)    Daily Height in cm: 162.56 (07 Aug 2017 13:36)    Daily Weight in k.6 (08 Aug 2017 00:45)    LV EF: 60-65%    Allergies:  allopurinol (Rash)  codeine (Nausea; Vomiting)  penicillin (Rash)  spironolactone (Unknown)          134<L>  |  92<L>  |  57.0<H>  ----------------------------<  105  3.7   |  24.0  |  5.61<H>    Ca    8.6      08 Aug 2017 06:36    TPro  6.5<L>  /  Alb  4.0  /  TBili  1.1  /  DBili  x   /  AST  40<H>  /  ALT  70<H>  /  AlkPhos  469<H>                                 8.6    4.2   )-----------( 131      ( 08 Aug 2017 07:22 )             27.5        PT/INR - ( 07 Aug 2017 14:03 )   PT: 11.0 sec;   INR: 1.00 ratio         PTT - ( 07 Aug 2017 14:03 )  PTT:35.5 sec               CXR:   Xray Chest 1 View AP/PA. (17 @ 05:01) >  Comparison exam: 2017 5:15 PM.    Findings:  No change in lines and tubes. Persistent cardiomegaly. Right basilar   atelectasis. No evidence of pneumothorax..    Impression:  Stable exam without significant change since the previous study.      FADIA VALDIVIA M.D., ATTENDING RADIOLOGIST  This document has been electronically signed. Aug  8 2017  8:29AM        I&O's Detail    07 Aug 2017 07:01  -  08 Aug 2017 07:00  --------------------------------------------------------  IN:  Total IN: 0 mL    OUT:    Chest Tube: 125 mL  Total OUT: 125 mL    Total NET: -125 mL        CHEST TUBE:  [x ] YES [ ] NO  OUTPUT:    125ml documented overnight.  125ml over the last 24 hours  *** Not accurately documented. RN stated that the canister was "full" lastnight and changed to a new canister.    AIR LEAKS:  [ ] YES [x ] NO        Active Medications:  acetaminophen   Tablet 650 milliGRAM(s) Oral every 6 hours PRN  aspirin enteric coated 81 milliGRAM(s) Oral daily  atorvastatin 40 milliGRAM(s) Oral at bedtime  ammonium lactate 12% Lotion 1 Application(s) Topical daily  guaiFENesin    Syrup 200 milliGRAM(s) Oral every 6 hours PRN  calcium acetate 667 milliGRAM(s) Oral three times a day with meals  cyanocobalamin 1000 MICROGram(s) Oral daily  heparin  Injectable 5000 Unit(s) SubCutaneous every 8 hours  ALBUTerol/ipratropium for Nebulization 3 milliLiter(s) Nebulizer every 6 hours  midodrine 10 milliGRAM(s) Oral two times a day  oxyCODONE    5 mG/acetaminophen 325 mG 1 Tablet(s) Oral every 4 hours PRN  oxyCODONE    5 mG/acetaminophen 325 mG 2 Tablet(s) Oral every 6 hours PRN  ondansetron Injectable 4 milliGRAM(s) IV Push every 6 hours PRN  epoetin jaswinder Injectable 63406 Unit(s) IV Push <User Schedule>      Physical Exam:    Neuro: AAOX3.  No focal deficits.    Pulm: Rhonchi throughout.  +Right chest tube.  No crepitus or airleak noted.    CV: RRR.  +S1+S2.    Abd: Soft/NT/ND.  +BS.     Extremities: +1 edema BLE.  Chronic Skin changes to BLE/reddened.  +DSD to Right shin noted.                PAST MEDICAL & SURGICAL HISTORY:  ESRD (end stage renal disease) on dialysis: MWF via R SC Permacath  planned AVF creation  Sinusitis, unspecified chronicity, unspecified location  Gout  Gall stones  Pneumonia  Anemia  Pulmonary hypertension  COPD (chronic obstructive pulmonary disease)  CAD (coronary artery disease)  Atrial fibrillation  Hyperlipidemia  Hypertension  Spinal fusion failure, initial encounter  Tubal ligation status  Sinusitis  Spinal stenosis  S/P tonsillectomy  S/P appendectomy  H/O aortic valve replacement  H/O spinal fusion  H/O cataract  Cystocele  H/O tricuspid valve annuloplasty  H/O mitral valve repair

## 2017-08-08 NOTE — PROGRESS NOTE ADULT - ASSESSMENT
This is 76 woman with h/o recurrent R pleural effusion drainage, Pt went to see Dr. Bustos today for f/u visit, xray chest showed worsening right sided pleural, so she was sent to ED. Pt report mild SOB, on home oxygen 3L, also c/o cough, seen by CT surgery, s/p Chest tube on 08/07/2017    A/P    >Recurrent pleural effusion -  CT surgery consult appreciated, s/p chest tube on 08/07/2017   Pleural fluid protein 3.0, serum protein 6.5- ratio 0.46 - transudative   LDH pending   oxygen, duoneb, IS   pain control     >ESRD on HD - T - T - S  renal consulted     > Pulmonary HTN -   off sildenafil due to hypotension    >Chronic diastolic chf - stable - on HD     >A fib not on coumadin due to fall risk and recent SAH    >chronic anemia due to ESRD - stable     >Hypotension - noted to have low BP, increase midodrine 10 q8h    >DVT PPX-  heparin

## 2017-08-08 NOTE — PROGRESS NOTE ADULT - SUBJECTIVE AND OBJECTIVE BOX
NEPHROLOGY INTERVAL HPI/OVERNIGHT EVENTS:    MEDICATIONS  (STANDING):  aspirin enteric coated 81 milliGRAM(s) Oral daily  atorvastatin 40 milliGRAM(s) Oral at bedtime  ammonium lactate 12% Lotion 1 Application(s) Topical daily  calcium acetate 667 milliGRAM(s) Oral three times a day with meals  cyanocobalamin 1000 MICROGram(s) Oral daily  heparin  Injectable 5000 Unit(s) SubCutaneous every 8 hours  ALBUTerol/ipratropium for Nebulization 3 milliLiter(s) Nebulizer every 6 hours  midodrine 10 milliGRAM(s) Oral two times a day  epoetin jaswinder Injectable 58265 Unit(s) IV Push <User Schedule>    MEDICATIONS  (PRN):  acetaminophen   Tablet 650 milliGRAM(s) Oral every 6 hours PRN Mild Pain (1 - 3)  guaiFENesin    Syrup 200 milliGRAM(s) Oral every 6 hours PRN Cough  oxyCODONE    5 mG/acetaminophen 325 mG 1 Tablet(s) Oral every 4 hours PRN Moderate Pain (4 - 6)  oxyCODONE    5 mG/acetaminophen 325 mG 2 Tablet(s) Oral every 6 hours PRN Severe Pain (7 - 10)  ondansetron Injectable 4 milliGRAM(s) IV Push every 6 hours PRN Nausea and/or Vomiting      Allergies    allopurinol (Rash)  codeine (Nausea; Vomiting)  penicillin (Rash)  spironolactone (Unknown)    Intolerances        Vital Signs Last 24 Hrs  T(C): 36.9 (08 Aug 2017 05:34), Max: 36.9 (08 Aug 2017 05:34)  T(F): 98.4 (08 Aug 2017 05:34), Max: 98.4 (08 Aug 2017 05:34)  HR: 85 (08 Aug 2017 05:34) (78 - 85)  BP: 92/48 (08 Aug 2017 05:34) (85/50 - 112/70)  BP(mean): --  RR: 18 (08 Aug 2017 05:34) (16 - 22)  SpO2: 96% (08 Aug 2017 05:34) (82% - 100%)  Daily Height in cm: 162.56 (07 Aug 2017 13:36)    Daily Weight in k.6 (08 Aug 2017 00:45)    PHYSICAL EXAM:    GENERAL:  appears  chronically ill  HEAD:  ecchymosis face  EYES: wnl  ENMT:   NECK: veins wnl  NERVOUS SYSTEM: wnl   CHEST/LUNG: bilateral  rhonchi, right pigtail cath   HEART: clear tones, no rub  ABDOMEN: soft, not tender  EXTREMITIES:  leg edema +1  LYMPH:   SKIN: no rash    LABS:                        8.6    4.2   )-----------( 131      ( 08 Aug 2017 07:22 )             27.5     08    134<L>  |  92<L>  |  57.0<H>  ----------------------------<  105  3.7   |  24.0  |  5.61<H>    Ca    8.6      08 Aug 2017 06:36    TPro  6.5<L>  /  Alb  4.0  /  TBili  1.1  /  DBili  x   /  AST  40<H>  /  ALT  70<H>  /  AlkPhos  469<H>      PT/INR - ( 07 Aug 2017 14:03 )   PT: 11.0 sec;   INR: 1.00 ratio         PTT - ( 07 Aug 2017 14:03 )  PTT:35.5 sec            RADIOLOGY & ADDITIONAL TESTS:

## 2017-08-08 NOTE — PROGRESS NOTE ADULT - SUBJECTIVE AND OBJECTIVE BOX
PA note    Nurse reporting an asymptomatic low BP 74/40 HR 94 Patient A&OX3 NAD sitting up in bed denies lightheadedness, dizziness, weakness stating my blood pressure runs low. Asked for orthostatic VS 82/40  sitting 74/38. I&O s reviewed patient negative 2405 CC. Nurse instructed to encourage PO fluids tonight and continue to monitor BP. Patient remains on midodrine to aid in blood pressure

## 2017-08-08 NOTE — PROGRESS NOTE ADULT - ASSESSMENT
76 year old female with PMH ESRD on HD, Gout, Pulmonary HTN, COPD, CAD, Afib, HLD, HTN, Spinal stenosis s/p fusion, AVR, MV and TV repair.  Sent to the ER for evaluation of Right pleural effusion. Pigtail placed.

## 2017-08-09 LAB
ANION GAP SERPL CALC-SCNC: 15 MMOL/L — SIGNIFICANT CHANGE UP (ref 5–17)
BUN SERPL-MCNC: 33 MG/DL — HIGH (ref 8–20)
CALCIUM SERPL-MCNC: 8.6 MG/DL — SIGNIFICANT CHANGE UP (ref 8.6–10.2)
CHLORIDE SERPL-SCNC: 95 MMOL/L — LOW (ref 98–107)
CO2 SERPL-SCNC: 26 MMOL/L — SIGNIFICANT CHANGE UP (ref 22–29)
CREAT SERPL-MCNC: 3.87 MG/DL — HIGH (ref 0.5–1.3)
GLUCOSE SERPL-MCNC: 105 MG/DL — SIGNIFICANT CHANGE UP (ref 70–115)
HCT VFR BLD CALC: 26.9 % — LOW (ref 37–47)
HGB BLD-MCNC: 8.3 G/DL — LOW (ref 12–16)
LDH SERPL L TO P-CCNC: 117 U/L — SIGNIFICANT CHANGE UP
MCHC RBC-ENTMCNC: 30.9 G/DL — LOW (ref 32–36)
MCHC RBC-ENTMCNC: 35.3 PG — HIGH (ref 27–31)
MCV RBC AUTO: 114.5 FL — HIGH (ref 81–99)
PLATELET # BLD AUTO: 129 K/UL — LOW (ref 150–400)
POTASSIUM SERPL-MCNC: 4.2 MMOL/L — SIGNIFICANT CHANGE UP (ref 3.5–5.3)
POTASSIUM SERPL-SCNC: 4.2 MMOL/L — SIGNIFICANT CHANGE UP (ref 3.5–5.3)
RBC # BLD: 2.35 M/UL — LOW (ref 4.4–5.2)
RBC # FLD: 17.5 % — HIGH (ref 11–15.6)
SODIUM SERPL-SCNC: 136 MMOL/L — SIGNIFICANT CHANGE UP (ref 135–145)
WBC # BLD: 3.4 K/UL — LOW (ref 4.8–10.8)
WBC # FLD AUTO: 3.4 K/UL — LOW (ref 4.8–10.8)

## 2017-08-09 PROCEDURE — 99233 SBSQ HOSP IP/OBS HIGH 50: CPT

## 2017-08-09 PROCEDURE — 99223 1ST HOSP IP/OBS HIGH 75: CPT

## 2017-08-09 PROCEDURE — 71010: CPT | Mod: 26

## 2017-08-09 RX ORDER — DOCUSATE SODIUM 100 MG
100 CAPSULE ORAL
Qty: 0 | Refills: 0 | Status: DISCONTINUED | OUTPATIENT
Start: 2017-08-09 | End: 2017-08-16

## 2017-08-09 RX ORDER — MIDODRINE HYDROCHLORIDE 2.5 MG/1
10 TABLET ORAL THREE TIMES A DAY
Qty: 0 | Refills: 0 | Status: DISCONTINUED | OUTPATIENT
Start: 2017-08-09 | End: 2017-08-16

## 2017-08-09 RX ADMIN — Medication 3 MILLILITER(S): at 03:44

## 2017-08-09 RX ADMIN — MIDODRINE HYDROCHLORIDE 10 MILLIGRAM(S): 2.5 TABLET ORAL at 21:45

## 2017-08-09 RX ADMIN — Medication 3 MILLILITER(S): at 20:29

## 2017-08-09 RX ADMIN — Medication 81 MILLIGRAM(S): at 12:31

## 2017-08-09 RX ADMIN — PREGABALIN 1000 MICROGRAM(S): 225 CAPSULE ORAL at 12:31

## 2017-08-09 RX ADMIN — Medication 200 MILLIGRAM(S): at 06:47

## 2017-08-09 RX ADMIN — HEPARIN SODIUM 5000 UNIT(S): 5000 INJECTION INTRAVENOUS; SUBCUTANEOUS at 06:45

## 2017-08-09 RX ADMIN — MIDODRINE HYDROCHLORIDE 10 MILLIGRAM(S): 2.5 TABLET ORAL at 14:19

## 2017-08-09 RX ADMIN — Medication 3 MILLILITER(S): at 14:32

## 2017-08-09 RX ADMIN — Medication 650 MILLIGRAM(S): at 07:48

## 2017-08-09 RX ADMIN — ATORVASTATIN CALCIUM 40 MILLIGRAM(S): 80 TABLET, FILM COATED ORAL at 21:45

## 2017-08-09 RX ADMIN — Medication 667 MILLIGRAM(S): at 19:14

## 2017-08-09 RX ADMIN — HEPARIN SODIUM 5000 UNIT(S): 5000 INJECTION INTRAVENOUS; SUBCUTANEOUS at 21:45

## 2017-08-09 RX ADMIN — MIDODRINE HYDROCHLORIDE 10 MILLIGRAM(S): 2.5 TABLET ORAL at 06:45

## 2017-08-09 RX ADMIN — Medication 667 MILLIGRAM(S): at 09:07

## 2017-08-09 RX ADMIN — HEPARIN SODIUM 5000 UNIT(S): 5000 INJECTION INTRAVENOUS; SUBCUTANEOUS at 14:19

## 2017-08-09 RX ADMIN — Medication 1 APPLICATION(S): at 12:32

## 2017-08-09 RX ADMIN — Medication 3 MILLILITER(S): at 08:43

## 2017-08-09 RX ADMIN — Medication 667 MILLIGRAM(S): at 12:31

## 2017-08-09 NOTE — PROGRESS NOTE ADULT - ASSESSMENT
This is 76 woman with h/o recurrent R pleural effusion, s/p IR guided drainage during last admission, Pt went to see Dr. Bustos  for f/u visit, xray chest showed worsening right sided pleural, so she was sent to ED, seen by CT surgery, s/p Chest tube on 08/07/2017. Pt may need pleurex     A/P    >Recurrent pleural effusion - transudative   CT surgery consult appreciated, s/p chest tube on 08/07/2017, not draining alot, may need Pleurex    Pleural fluid protein 3.0, serum protein 6.5- ratio 0.46 - transudative   LDH fluid 117, serum 217, ratio 0.53  oxygen, duoneb, IS   pain control     >ESRD on HD - T - T - S  renal consulted , HD as per renal    >Hypotension chronic on Midodrine, noted to have lower than her baseline,  increase 10mg tid, monitor BP     > Pulmonary HTN -   off sildenafil due to hypotension    >Chronic diastolic chf - stable - on HD     >A fib not on coumadin due to fall risk and recent SAH    >chronic anemia due to ESRD - stable       >DVT PPX-  heparin

## 2017-08-09 NOTE — PROGRESS NOTE ADULT - SUBJECTIVE AND OBJECTIVE BOX
NEPHROLOGY INTERVAL HPI/OVERNIGHT EVENTS:    Examined earlier  Clinically unchanged    MEDICATIONS  (STANDING):  aspirin enteric coated 81 milliGRAM(s) Oral daily  atorvastatin 40 milliGRAM(s) Oral at bedtime  ammonium lactate 12% Lotion 1 Application(s) Topical daily  calcium acetate 667 milliGRAM(s) Oral three times a day with meals  cyanocobalamin 1000 MICROGram(s) Oral daily  heparin  Injectable 5000 Unit(s) SubCutaneous every 8 hours  ALBUTerol/ipratropium for Nebulization 3 milliLiter(s) Nebulizer every 6 hours  epoetin jaswinder Injectable 91234 Unit(s) IV Push <User Schedule>  midodrine 10 milliGRAM(s) Oral three times a day    MEDICATIONS  (PRN):  acetaminophen   Tablet 650 milliGRAM(s) Oral every 6 hours PRN Mild Pain (1 - 3)  guaiFENesin    Syrup 200 milliGRAM(s) Oral every 6 hours PRN Cough  oxyCODONE    5 mG/acetaminophen 325 mG 1 Tablet(s) Oral every 4 hours PRN Moderate Pain (4 - 6)  oxyCODONE    5 mG/acetaminophen 325 mG 2 Tablet(s) Oral every 6 hours PRN Severe Pain (7 - 10)  ondansetron Injectable 4 milliGRAM(s) IV Push every 6 hours PRN Nausea and/or Vomiting      Allergies    allopurinol (Rash)  codeine (Nausea; Vomiting)  penicillin (Rash)  spironolactone (Unknown)    Intolerances        Vital Signs Last 24 Hrs  T(C): 37.1 (09 Aug 2017 06:25), Max: 37.2 (08 Aug 2017 21:46)  T(F): 98.7 (09 Aug 2017 06:25), Max: 99 (08 Aug 2017 21:46)  HR: 80 (09 Aug 2017 06:25) (80 - 94)  BP: 84/50 (09 Aug 2017 10:12) (74/40 - 90/48)  BP(mean): --  RR: 18 (09 Aug 2017 06:25) (18 - 18)  SpO2: 98% (09 Aug 2017 06:25) (94% - 100%)  Daily     Daily Weight in k.2 (09 Aug 2017 05:00)    PHYSICAL EXAM:    GENERAL: appears chronically ill  HEAD:  Atraumatic, Normocephalic  EYES: EOMI  NECK: Supple, neck  veins full  NERVOUS SYSTEM:  Alert & Oriented X3  CHEST/LUNG: Dec BS R> L  HEART: Regular rate and rhythm; No murmurs  ABDOMEN: Soft, Nontender, Nondistended; Bowel sounds present  EXTREMITIES:  No edema      LABS:                        8.3    3.4   )-----------( 129      ( 09 Aug 2017 07:23 )             26.9     -    136  |  95<L>  |  33.0<H>  ----------------------------<  105  4.2   |  26.0  |  3.87<H>    Ca    8.6      09 Aug 2017 07:23                  RADIOLOGY & ADDITIONAL TESTS:

## 2017-08-09 NOTE — PROGRESS NOTE ADULT - SUBJECTIVE AND OBJECTIVE BOX
Patient is a 76y old  Female who presents with a chief complaint of right sided pleural effusion.  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  ESRD (end stage renal disease) on dialysis  Gout  Gall stones  Pneumonia  Anemia  Pulmonary hypertension - severe  COPD (chronic obstructive pulmonary disease)  CAD (coronary artery disease)  Atrial fibrillation  Hyperlipidemia  Hypertension  Spinal fusion failure  Sinusitis  Spinal stenosis        PAST SURGICAL HISTORY:  Tonsillectomy  Appendectomy  Aortic valve replacement - Open heart surgery  Spinal fusion,failure.to fuse the extra L6 vertebra to the sacrum @ age 31  Cataract extraction with lens implant  Cystocele  Tricuspid valve annuloplasty  Mitral valve repair  Tubal ligation     MEDICATIONS  (STANDING):  aspirin enteric coated 81 milliGRAM(s) Oral daily  atorvastatin 40 milliGRAM(s) Oral at bedtime  ammonium lactate 12% Lotion 1 Application(s) Topical daily  calcium acetate 667 milliGRAM(s) Oral three times a day with meals  cyanocobalamin 1000 MICROGram(s) Oral daily  heparin  Injectable 5000 Unit(s) SubCutaneous every 8 hours  ALBUTerol/ipratropium for Nebulization 3 milliLiter(s) Nebulizer every 6 hours  epoetin jaswinder Injectable 01076 Unit(s) IV Push <User Schedule>  midodrine 10 milliGRAM(s) Oral three times a day  docusate sodium 100 milliGRAM(s) Oral two times a day    MEDICATIONS  (PRN):  acetaminophen   Tablet 650 milliGRAM(s) Oral every 6 hours PRN Mild Pain (1 - 3)  guaiFENesin    Syrup 200 milliGRAM(s) Oral every 6 hours PRN Cough  oxyCODONE    5 mG/acetaminophen 325 mG 1 Tablet(s) Oral every 4 hours PRN Moderate Pain (4 - 6)  oxyCODONE    5 mG/acetaminophen 325 mG 2 Tablet(s) Oral every 6 hours PRN Severe Pain (7 - 10)  ondansetron Injectable 4 milliGRAM(s) IV Push every 6 hours PRN Nausea and/or Vomiting      Allergies:   Codeine (Made her nuts)                   allopurinol (Rash)                   penicillin (Rash)                   spironolactone (Unknown)    SOCIAL HISTORY:   The patient says that she stopped drinking alcohol last April.  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                       8.3    3.4   )-----------( 129      ( 09 Aug 2017 07:23 )             26.9       PT/INR - ( 07 Aug 2017 14:03 )   PT: 11.0 sec;   INR: 1.00 ratio         PTT - ( 07 Aug 2017 14:03 )  PTT:35.5 sec        136  |  95<L>  |  33.0<H>  ----------------------------<  105  4.2   |  26.0  |  3.87<H>    Ca    8.6      09 Aug 2017 07:23    [id number]  Atrial fibrillation  Right bundle branch block  Abnormal ECG      ECHO TRANSTHORACIC  - 2017   Summary:   1. Left ventricular ejection fraction, by visual estimation, is 60 to        65%   2. Spectral Doppler shows restrictive pattern of left ventricular myocardial   filling (Grade III diastolic dysfunction).     3. RV failure. Severely dilated right ventricle with severely reduced   systolic function.   4. Right ventricular volume and pressure overload.   5. Status post mitral valve repair with residual mild to moderate mitral   regurgitation. Elevated transmitral gradients (mean 6.7 mmHg at a HR of   81 bpm).   6. S/P tricuspid valve repair with moderate to severe tricuspid   regurgitation.   7. Normally functioning aortic prosthesis.   8. Moderate pulmonic valve regurgitation.   9. Estimated pulmonary artery systolic pressure is 88.4 mmHg assuming a   right atrial pressure of 15 mmHg, which is consistent with severe   pulmonary hypertension.  10. Trace pericardial effusion.  11. Right sided pleural effusion.    CHEST X-RAY  2017  Findings:  No change in lines and tubes. Persistent cardiomegaly. Right basilar   atelectasis. No evidence of pneumothorax..    ASA # = 4  Mallampati # = 2 Patient is a 76y old  Female who presents with a chief complaint of right sided pleural effusion.  She is scheduled to have a RIGHT VIDEO ASSISTED THORACIC SURGERY (VATS), [facility].  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  ESRD (end stage renal disease) on dialysis  Gout  Gall stones  Pneumonia  Anemia  Pulmonary hypertension - severe  COPD (chronic obstructive pulmonary disease)  CAD (coronary artery disease)  Atrial fibrillation  Hyperlipidemia  Hypertension  Spinal fusion failure  Sinusitis  Spinal stenosis        PAST SURGICAL HISTORY:  Tonsillectomy  Appendectomy  Aortic valve replacement - Open heart surgery  Spinal fusion,failure.to fuse the extra L6 vertebra to the sacrum @ age 31  Cataract extraction with lens implant  Cystocele  Tricuspid valve annuloplasty  Mitral valve repair  Tubal ligation     MEDICATIONS  (STANDING):  aspirin enteric coated 81 milliGRAM(s) Oral daily  atorvastatin 40 milliGRAM(s) Oral at bedtime  ammonium lactate 12% Lotion 1 Application(s) Topical daily  calcium acetate 667 milliGRAM(s) Oral three times a day with meals  cyanocobalamin 1000 MICROGram(s) Oral daily  heparin  Injectable 5000 Unit(s) SubCutaneous every 8 hours  ALBUTerol/ipratropium for Nebulization 3 milliLiter(s) Nebulizer every 6 hours  epoetin jaswinder Injectable 71453 Unit(s) IV Push <User Schedule>  midodrine 10 milliGRAM(s) Oral three times a day  docusate sodium 100 milliGRAM(s) Oral two times a day    MEDICATIONS  (PRN):  acetaminophen   Tablet 650 milliGRAM(s) Oral every 6 hours PRN Mild Pain (1 - 3)  guaiFENesin    Syrup 200 milliGRAM(s) Oral every 6 hours PRN Cough  oxyCODONE    5 mG/acetaminophen 325 mG 1 Tablet(s) Oral every 4 hours PRN Moderate Pain (4 - 6)  oxyCODONE    5 mG/acetaminophen 325 mG 2 Tablet(s) Oral every 6 hours PRN Severe Pain (7 - 10)  ondansetron Injectable 4 milliGRAM(s) IV Push every 6 hours PRN Nausea and/or Vomiting      Allergies:   Codeine (Made her nuts)                   allopurinol (Rash)                   penicillin (Rash)                   spironolactone (Unknown)    SOCIAL HISTORY:   The patient says that she stopped drinking alcohol last April.  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                       8.3    3.4   )-----------( 129      ( 09 Aug 2017 07:23 )             26.9       PT/INR - ( 07 Aug 2017 14:03 )   PT: 11.0 sec;   INR: 1.00 ratio         PTT - ( 07 Aug 2017 14:03 )  PTT:35.5 sec        136  |  95<L>  |  33.0<H>  ----------------------------<  105  4.2   |  26.0  |  3.87<H>    Ca    8.6      09 Aug 2017 07:23    [id number]  Atrial fibrillation  Right bundle branch block  Abnormal ECG      ECHO TRANSTHORACIC  - 2017   Summary:   1. Left ventricular ejection fraction, by visual estimation, is 60 to        65%   2. Spectral Doppler shows restrictive pattern of left ventricular myocardial   filling (Grade III diastolic dysfunction).     3. RV failure. Severely dilated right ventricle with severely reduced   systolic function.   4. Right ventricular volume and pressure overload.   5. Status post mitral valve repair with residual mild to moderate mitral   regurgitation. Elevated transmitral gradients (mean 6.7 mmHg at a HR of   81 bpm).   6. S/P tricuspid valve repair with moderate to severe tricuspid   regurgitation.   7. Normally functioning aortic prosthesis.   8. Moderate pulmonic valve regurgitation.   9. Estimated pulmonary artery systolic pressure is 88.4 mmHg assuming a   right atrial pressure of 15 mmHg, which is consistent with severe   pulmonary hypertension.  10. Trace pericardial effusion.  11. Right sided pleural effusion.    CHEST X-RAY  2017  Findings:  No change in lines and tubes. Persistent cardiomegaly. Right basilar   atelectasis. No evidence of pneumothorax..    ASA # = 4  Mallampati # = 2 Patient is a 76y old  Female who presents with a chief complaint of right sided pleural effusion.  She is scheduled to have a RIGHT VIDEO ASSISTED THORACIC SURGERY (VATS), [facility].  After the Right VATS the patient is scheduled to have a RIGHT UPPER EXTREMITY   ARTERIOVENOUS FISTULA  PLACED.  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  ESRD (end stage renal disease) on dialysis  Gout  Gall stones  Pneumonia  Anemia  Pulmonary hypertension - severe  COPD (chronic obstructive pulmonary disease)  CAD (coronary artery disease)  Atrial fibrillation  Hyperlipidemia  Hypertension  Spinal fusion failure  Sinusitis  Spinal stenosis        PAST SURGICAL HISTORY:  Tonsillectomy  Appendectomy  Aortic valve replacement - Open heart surgery  Spinal fusion,failure.to fuse the extra L6 vertebra to the sacrum @ age 31  Cataract extraction with lens implant  Cystocele  Tricuspid valve annuloplasty  Mitral valve repair  Tubal ligation     MEDICATIONS  (STANDING):  aspirin enteric coated 81 milliGRAM(s) Oral daily  atorvastatin 40 milliGRAM(s) Oral at bedtime  ammonium lactate 12% Lotion 1 Application(s) Topical daily  calcium acetate 667 milliGRAM(s) Oral three times a day with meals  cyanocobalamin 1000 MICROGram(s) Oral daily  heparin  Injectable 5000 Unit(s) SubCutaneous every 8 hours  ALBUTerol/ipratropium for Nebulization 3 milliLiter(s) Nebulizer every 6 hours  epoetin jaswinder Injectable 59521 Unit(s) IV Push <User Schedule>  midodrine 10 milliGRAM(s) Oral three times a day  docusate sodium 100 milliGRAM(s) Oral two times a day    MEDICATIONS  (PRN):  acetaminophen   Tablet 650 milliGRAM(s) Oral every 6 hours PRN Mild Pain (1 - 3)  guaiFENesin    Syrup 200 milliGRAM(s) Oral every 6 hours PRN Cough  oxyCODONE    5 mG/acetaminophen 325 mG 1 Tablet(s) Oral every 4 hours PRN Moderate Pain (4 - 6)  oxyCODONE    5 mG/acetaminophen 325 mG 2 Tablet(s) Oral every 6 hours PRN Severe Pain (7 - 10)  ondansetron Injectable 4 milliGRAM(s) IV Push every 6 hours PRN Nausea and/or Vomiting      Allergies:   Codeine (Made her nuts)                   allopurinol (Rash)                   penicillin (Rash)                   spironolactone (Unknown)    SOCIAL HISTORY:   The patient says that she stopped drinking alcohol last April.  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                       8.3    3.4   )-----------( 129      ( 09 Aug 2017 07:23 )             26.9       PT/INR - ( 07 Aug 2017 14:03 )   PT: 11.0 sec;   INR: 1.00 ratio         PTT - ( 07 Aug 2017 14:03 )  PTT:35.5 sec        136  |  95<L>  |  33.0<H>  ----------------------------<  105  4.2   |  26.0  |  3.87<H>    Ca    8.6      09 Aug 2017 07:23    [id number]  Atrial fibrillation  Right bundle branch block  Abnormal ECG      ECHO TRANSTHORACIC  - 2017   Summary:   1. Left ventricular ejection fraction, by visual estimation, is 60 to        65%   2. Spectral Doppler shows restrictive pattern of left ventricular myocardial   filling (Grade III diastolic dysfunction).     3. RV failure. Severely dilated right ventricle with severely reduced   systolic function.   4. Right ventricular volume and pressure overload.   5. Status post mitral valve repair with residual mild to moderate mitral   regurgitation. Elevated transmitral gradients (mean 6.7 mmHg at a HR of   81 bpm).   6. S/P tricuspid valve repair with moderate to severe tricuspid   regurgitation.   7. Normally functioning aortic prosthesis.   8. Moderate pulmonic valve regurgitation.   9. Estimated pulmonary artery systolic pressure is 88.4 mmHg assuming a   right atrial pressure of 15 mmHg, which is consistent with severe   pulmonary hypertension.  10. Trace pericardial effusion.  11. Right sided pleural effusion.    CHEST X-RAY  2017  Findings:  No change in lines and tubes. Persistent cardiomegaly. Right basilar   atelectasis. No evidence of pneumothorax..    ASA # = 4  Mallampati # = 2

## 2017-08-09 NOTE — PROGRESS NOTE ADULT - SUBJECTIVE AND OBJECTIVE BOX
Internal Medicine Hospitalist - Dr. Sarah HERNANDEZ    [id number]    76y      Female    Patient is a 76y old  Female who presents with a chief complaint of Right sided pleural effusion (07 Aug 2017 15:59)    INTERVAL HPI/ OVERNIGHT EVENTS: Patient is seen and examined, report breathing is getting better, still c/o cough, denied fever, chills, chest pain     REVIEW OF SYSTEMS:    Denied fever, chills, abd. pain, nausea, vomiting, chest pain,, headache, dizziness    PHYSICAL EXAM:    Vital Signs Last 24 Hrs  T(C): 37.1 (09 Aug 2017 06:25), Max: 37.2 (08 Aug 2017 21:46)  T(F): 98.7 (09 Aug 2017 06:25), Max: 99 (08 Aug 2017 21:46)  HR: 80 (09 Aug 2017 06:25) (70 - 94)  BP: 84/50 (09 Aug 2017 10:12) (74/40 - 95/48)  BP(mean): --  RR: 18 (09 Aug 2017 06:25) (18 - 18)  SpO2: 98% (09 Aug 2017 06:25) (94% - 100%)    GENERAL: NAD  HEENT: EOMI  Neck: supple  CHEST/LUNG: improving air entry at right lung base   HEART: S1S2+ audible  ABDOMEN: Soft, Nontender, Nondistended; Bowel sounds present  EXTREMITIES:  chronic skin changes   CNS: AAO X 3  Psychiatry: normal mood    LABS:                        8.3    3.4   )-----------( 129      ( 09 Aug 2017 07:23 )             26.9     08-09    136  |  95<L>  |  33.0<H>  ----------------------------<  105  4.2   |  26.0  |  3.87<H>    Ca    8.6      09 Aug 2017 07:23    TPro  6.5<L>  /  Alb  4.0  /  TBili  1.1  /  DBili  x   /  AST  40<H>  /  ALT  70<H>  /  AlkPhos  469<H>  08-07    PT/INR - ( 07 Aug 2017 14:03 )   PT: 11.0 sec;   INR: 1.00 ratio         PTT - ( 07 Aug 2017 14:03 )  PTT:35.5 sec        MEDICATIONS  (STANDING):  aspirin enteric coated 81 milliGRAM(s) Oral daily  atorvastatin 40 milliGRAM(s) Oral at bedtime  ammonium lactate 12% Lotion 1 Application(s) Topical daily  calcium acetate 667 milliGRAM(s) Oral three times a day with meals  cyanocobalamin 1000 MICROGram(s) Oral daily  heparin  Injectable 5000 Unit(s) SubCutaneous every 8 hours  ALBUTerol/ipratropium for Nebulization 3 milliLiter(s) Nebulizer every 6 hours  epoetin jaswinder Injectable 57685 Unit(s) IV Push <User Schedule>  midodrine 10 milliGRAM(s) Oral three times a day    MEDICATIONS  (PRN):  acetaminophen   Tablet 650 milliGRAM(s) Oral every 6 hours PRN Mild Pain (1 - 3)  guaiFENesin    Syrup 200 milliGRAM(s) Oral every 6 hours PRN Cough  oxyCODONE    5 mG/acetaminophen 325 mG 1 Tablet(s) Oral every 4 hours PRN Moderate Pain (4 - 6)  oxyCODONE    5 mG/acetaminophen 325 mG 2 Tablet(s) Oral every 6 hours PRN Severe Pain (7 - 10)  ondansetron Injectable 4 milliGRAM(s) IV Push every 6 hours PRN Nausea and/or Vomiting      RADIOLOGY & ADDITIONAL TEST

## 2017-08-09 NOTE — PROGRESS NOTE ADULT - ASSESSMENT
ESRD on HD TTS no urgent need for HD today  Will HD estephania on schedule consent obtained   Anemia 2/2 CKD ESPERANZA with HD TS 14% will give IV iron x 5 days  Electrolytes acceptable

## 2017-08-10 DIAGNOSIS — I25.10 ATHEROSCLEROTIC HEART DISEASE OF NATIVE CORONARY ARTERY WITHOUT ANGINA PECTORIS: ICD-10-CM

## 2017-08-10 DIAGNOSIS — D53.9 NUTRITIONAL ANEMIA, UNSPECIFIED: ICD-10-CM

## 2017-08-10 LAB
ALBUMIN SERPL ELPH-MCNC: 3.3 G/DL — SIGNIFICANT CHANGE UP (ref 3.3–5.2)
ALLERGY+IMMUNOLOGY DIAG STUDY NOTE: SIGNIFICANT CHANGE UP
ALP SERPL-CCNC: 381 U/L — HIGH (ref 40–120)
ALT FLD-CCNC: 41 U/L — HIGH
ANION GAP SERPL CALC-SCNC: 18 MMOL/L — HIGH (ref 5–17)
ANION GAP SERPL CALC-SCNC: 18 MMOL/L — HIGH (ref 5–17)
ANTIBODY INTERPRETATION 2: SIGNIFICANT CHANGE UP
AST SERPL-CCNC: 32 U/L — HIGH
BILIRUB SERPL-MCNC: 0.7 MG/DL — SIGNIFICANT CHANGE UP (ref 0.4–2)
BLD GP AB SCN SERPL QL: ABNORMAL
BUN SERPL-MCNC: 38 MG/DL — HIGH (ref 8–20)
BUN SERPL-MCNC: 51 MG/DL — HIGH (ref 8–20)
CALCIUM SERPL-MCNC: 8.3 MG/DL — LOW (ref 8.6–10.2)
CALCIUM SERPL-MCNC: 8.6 MG/DL — SIGNIFICANT CHANGE UP (ref 8.6–10.2)
CHLORIDE SERPL-SCNC: 92 MMOL/L — LOW (ref 98–107)
CHLORIDE SERPL-SCNC: 94 MMOL/L — LOW (ref 98–107)
CO2 SERPL-SCNC: 21 MMOL/L — LOW (ref 22–29)
CO2 SERPL-SCNC: 24 MMOL/L — SIGNIFICANT CHANGE UP (ref 22–29)
CREAT SERPL-MCNC: 3.68 MG/DL — HIGH (ref 0.5–1.3)
CREAT SERPL-MCNC: 5.17 MG/DL — HIGH (ref 0.5–1.3)
DIR ANTIGLOB POLYSPECIFIC INTERPRETATION: SIGNIFICANT CHANGE UP
GLUCOSE SERPL-MCNC: 90 MG/DL — SIGNIFICANT CHANGE UP (ref 70–115)
GLUCOSE SERPL-MCNC: 95 MG/DL — SIGNIFICANT CHANGE UP (ref 70–115)
HCT VFR BLD CALC: 26.7 % — LOW (ref 37–47)
HGB BLD-MCNC: 8.6 G/DL — LOW (ref 12–16)
MCHC RBC-ENTMCNC: 32.2 G/DL — SIGNIFICANT CHANGE UP (ref 32–36)
MCHC RBC-ENTMCNC: 35.2 PG — HIGH (ref 27–31)
MCV RBC AUTO: 109.4 FL — HIGH (ref 81–99)
PLATELET # BLD AUTO: 114 K/UL — LOW (ref 150–400)
POTASSIUM SERPL-MCNC: 3.8 MMOL/L — SIGNIFICANT CHANGE UP (ref 3.5–5.3)
POTASSIUM SERPL-MCNC: 4.8 MMOL/L — SIGNIFICANT CHANGE UP (ref 3.5–5.3)
POTASSIUM SERPL-SCNC: 3.8 MMOL/L — SIGNIFICANT CHANGE UP (ref 3.5–5.3)
POTASSIUM SERPL-SCNC: 4.8 MMOL/L — SIGNIFICANT CHANGE UP (ref 3.5–5.3)
PROT SERPL-MCNC: 5.6 G/DL — LOW (ref 6.6–8.7)
RBC # BLD: 2.44 M/UL — LOW (ref 4.4–5.2)
RBC # FLD: 17.5 % — HIGH (ref 11–15.6)
SODIUM SERPL-SCNC: 131 MMOL/L — LOW (ref 135–145)
SODIUM SERPL-SCNC: 136 MMOL/L — SIGNIFICANT CHANGE UP (ref 135–145)
TYPE + AB SCN PNL BLD: SIGNIFICANT CHANGE UP
WBC # BLD: 3.8 K/UL — LOW (ref 4.8–10.8)
WBC # FLD AUTO: 3.8 K/UL — LOW (ref 4.8–10.8)

## 2017-08-10 PROCEDURE — 71010: CPT | Mod: 26

## 2017-08-10 PROCEDURE — 70450 CT HEAD/BRAIN W/O DYE: CPT | Mod: 26

## 2017-08-10 PROCEDURE — 86077 PHYS BLOOD BANK SERV XMATCH: CPT

## 2017-08-10 PROCEDURE — 99233 SBSQ HOSP IP/OBS HIGH 50: CPT

## 2017-08-10 RX ORDER — IPRATROPIUM/ALBUTEROL SULFATE 18-103MCG
3 AEROSOL WITH ADAPTER (GRAM) INHALATION EVERY 6 HOURS
Qty: 0 | Refills: 0 | Status: DISCONTINUED | OUTPATIENT
Start: 2017-08-10 | End: 2017-08-16

## 2017-08-10 RX ORDER — IRON SUCROSE 20 MG/ML
200 INJECTION, SOLUTION INTRAVENOUS DAILY
Qty: 0 | Refills: 0 | Status: DISCONTINUED | OUTPATIENT
Start: 2017-08-10 | End: 2017-08-12

## 2017-08-10 RX ADMIN — Medication 667 MILLIGRAM(S): at 16:55

## 2017-08-10 RX ADMIN — Medication 200 MILLIGRAM(S): at 21:19

## 2017-08-10 RX ADMIN — Medication 100 MILLIGRAM(S): at 05:44

## 2017-08-10 RX ADMIN — MIDODRINE HYDROCHLORIDE 10 MILLIGRAM(S): 2.5 TABLET ORAL at 21:19

## 2017-08-10 RX ADMIN — IRON SUCROSE 110 MILLIGRAM(S): 20 INJECTION, SOLUTION INTRAVENOUS at 16:55

## 2017-08-10 RX ADMIN — ERYTHROPOIETIN 10000 UNIT(S): 10000 INJECTION, SOLUTION INTRAVENOUS; SUBCUTANEOUS at 12:21

## 2017-08-10 RX ADMIN — Medication 100 MILLIGRAM(S): at 16:56

## 2017-08-10 RX ADMIN — Medication 3 MILLILITER(S): at 21:47

## 2017-08-10 RX ADMIN — Medication 3 MILLILITER(S): at 08:48

## 2017-08-10 RX ADMIN — MIDODRINE HYDROCHLORIDE 10 MILLIGRAM(S): 2.5 TABLET ORAL at 16:55

## 2017-08-10 RX ADMIN — HEPARIN SODIUM 5000 UNIT(S): 5000 INJECTION INTRAVENOUS; SUBCUTANEOUS at 21:19

## 2017-08-10 RX ADMIN — Medication 200 MILLIGRAM(S): at 04:27

## 2017-08-10 RX ADMIN — MIDODRINE HYDROCHLORIDE 10 MILLIGRAM(S): 2.5 TABLET ORAL at 05:44

## 2017-08-10 RX ADMIN — Medication 3 MILLILITER(S): at 03:50

## 2017-08-10 RX ADMIN — ATORVASTATIN CALCIUM 40 MILLIGRAM(S): 80 TABLET, FILM COATED ORAL at 21:20

## 2017-08-10 NOTE — CONSULT NOTE ADULT - SUBJECTIVE AND OBJECTIVE BOX
[facility]                                                                     [facility]                                                                      [address]                                                                           [place], [place], [address]                                                                                 [phone]    HISTORY:    The patient is a 76y Female s/p fall with head injury, SAH 2 months ago.  Asked to clear for planned dialysis fistula  Patient denies any specific neurological complaints      PAST MEDICAL & SURGICAL HISTORY:  ESRD (end stage renal disease) on dialysis: MWF via R [facility] Permacath  planned AVF creation  Sinusitis, unspecified chronicity, unspecified location  Gout  Gall stones  Pneumonia  Anemia  Pulmonary hypertension  COPD (chronic obstructive pulmonary disease)  CAD (coronary artery disease)  Atrial fibrillation  Hyperlipidemia  Hypertension  Spinal fusion failure, initial encounter  Tubal ligation status  Sinusitis  Spinal stenosis  S/P tonsillectomy  S/P appendectomy  H/O aortic valve replacement  H/O spinal fusion  H/O cataract  Cystocele  H/O tricuspid valve annuloplasty  H/O mitral valve repair      MEDICATIONS  (STANDING):  aspirin enteric coated 81 milliGRAM(s) Oral daily  atorvastatin 40 milliGRAM(s) Oral at bedtime  ammonium lactate 12% Lotion 1 Application(s) Topical daily  calcium acetate 667 milliGRAM(s) Oral three times a day with meals  cyanocobalamin 1000 MICROGram(s) Oral daily  heparin  Injectable 5000 Unit(s) SubCutaneous every 8 hours  epoetin jaswinder Injectable 32254 Unit(s) IV Push <User Schedule>  midodrine 10 milliGRAM(s) Oral three times a day  docusate sodium 100 milliGRAM(s) Oral two times a day  iron sucrose IVPB 200 milliGRAM(s) IV Intermittent daily    MEDICATIONS  (PRN):  acetaminophen   Tablet 650 milliGRAM(s) Oral every 6 hours PRN Mild Pain (1 - 3)  guaiFENesin    Syrup 200 milliGRAM(s) Oral every 6 hours PRN Cough  oxyCODONE    5 mG/acetaminophen 325 mG 1 Tablet(s) Oral every 4 hours PRN Moderate Pain (4 - 6)  oxyCODONE    5 mG/acetaminophen 325 mG 2 Tablet(s) Oral every 6 hours PRN Severe Pain (7 - 10)  ondansetron Injectable 4 milliGRAM(s) IV Push every 6 hours PRN Nausea and/or Vomiting  ALBUTerol/ipratropium for Nebulization 3 milliLiter(s) Nebulizer every 6 hours PRN Shortness of Breath and/or Wheezing      Allergies    allopurinol (Rash)  codeine (Nausea; Vomiting)  penicillin (Rash)  spironolactone (Unknown)    Intolerances          FAMILY HISTORY:      ROS:  The patient denies fevers or weight changes.  Denies headache or dizziness.  Denies chest pain.  Denies shortness of breath.  Denies abdominal pain, nausea, or vomiting.  Denies change in urinary pattern.  Denies rash.  Denies recent mood changes.    Exam:  Vital Signs Last 24 Hrs  T(C): 37 (10 Aug 2017 11:00), Max: 37 (10 Aug 2017 11:00)  T(F): 98.6 (10 Aug 2017 11:00), Max: 98.6 (10 Aug 2017 11:00)  HR: 75 (10 Aug 2017 11:00) (75 - 83)  BP: 96/50 (10 Aug 2017 11:00) (88/46 - 96/50)  BP(mean): --  RR: 18 (10 Aug 2017 11:00) (18 - 18)  SpO2: 96% (10 Aug 2017 11:00) (96% - 100%)      Mental status: The patient is awake, alert, and fully oriented. There is no aphasia. Speech and comprehension intact. Short term memory intact    Cranial nerves:  Pupils react symmetrically to light. There is a right homonomous hemianopsia. Extraocular motion is full with no nystagmus. There is no ptosis. Facial sensation is intact. Facial musculature is symmetric. Palate elevates symmetrically. Tongue is midline.    Motor: There is normal bulk and tone.  Strength is 5/5 in the right arm and leg.   Strength is 5/5 in the left arm and leg.  There is no drift    Sensation: Intact to light touch, pin and proprioception    Reflexes: 0+ throughout and plantar responses are flexor.    Cerebellar: There is no dysmetria on finger to nose or heel to shin testing    LABS:  WBC Count 3.4    Hemoglobin 8.3    Hematocit 26.9   Platelet Count - Automated 129        08-10    131<L>  |  92<L>  |  51.0<H>  ----------------------------<  95  4.8   |  21.0<L>  |  5.17<H>    Ca    8.6      10 Aug 2017 07:26            RADIOLOGY & ADDITIONAL STUDIES:  ***

## 2017-08-10 NOTE — PROGRESS NOTE ADULT - SUBJECTIVE AND OBJECTIVE BOX
NEPHROLOGY INTERVAL HPI/OVERNIGHT EVENTS:    Examined earlier  noted VATS today  scheduled to have HD also today    MEDICATIONS  (STANDING):  aspirin enteric coated 81 milliGRAM(s) Oral daily  atorvastatin 40 milliGRAM(s) Oral at bedtime  ammonium lactate 12% Lotion 1 Application(s) Topical daily  calcium acetate 667 milliGRAM(s) Oral three times a day with meals  cyanocobalamin 1000 MICROGram(s) Oral daily  heparin  Injectable 5000 Unit(s) SubCutaneous every 8 hours  ALBUTerol/ipratropium for Nebulization 3 milliLiter(s) Nebulizer every 6 hours  epoetin jaswinder Injectable 08694 Unit(s) IV Push <User Schedule>  midodrine 10 milliGRAM(s) Oral three times a day  docusate sodium 100 milliGRAM(s) Oral two times a day  iron sucrose IVPB 200 milliGRAM(s) IV Intermittent daily    MEDICATIONS  (PRN):  acetaminophen   Tablet 650 milliGRAM(s) Oral every 6 hours PRN Mild Pain (1 - 3)  guaiFENesin    Syrup 200 milliGRAM(s) Oral every 6 hours PRN Cough  oxyCODONE    5 mG/acetaminophen 325 mG 1 Tablet(s) Oral every 4 hours PRN Moderate Pain (4 - 6)  oxyCODONE    5 mG/acetaminophen 325 mG 2 Tablet(s) Oral every 6 hours PRN Severe Pain (7 - 10)  ondansetron Injectable 4 milliGRAM(s) IV Push every 6 hours PRN Nausea and/or Vomiting      Allergies    allopurinol (Rash)  codeine (Nausea; Vomiting)  penicillin (Rash)  spironolactone (Unknown)    Intolerances        Vital Signs Last 24 Hrs  T(C): 36.6 (10 Aug 2017 05:43), Max: 36.9 (09 Aug 2017 18:27)  T(F): 97.8 (10 Aug 2017 05:43), Max: 98.5 (09 Aug 2017 18:27)  HR: 83 (10 Aug 2017 05:43) (78 - 83)  BP: 90/46 (10 Aug 2017 05:43) (84/50 - 90/46)  BP(mean): --  RR: 18 (10 Aug 2017 05:43) (18 - 18)  SpO2: 100% (10 Aug 2017 05:43) (98% - 100%)  Daily     Daily Weight in k.3 (10 Aug 2017 05:00)    PHYSICAL EXAM:  GENERAL: appears chronically ill  HEAD:  Atraumatic, Normocephalic  EYES: EOMI  NECK: Supple, neck  veins full  NERVOUS SYSTEM:  Alert & Oriented X3  CHEST/LUNG: Dec BS R> L  HEART: Regular rate and rhythm; No murmurs  ABDOMEN: Soft, Nontender, Nondistended; Bowel sounds present  EXTREMITIES:  No edema      LABS:                        8.3    3.4   )-----------( 129      ( 09 Aug 2017 07:23 )             26.9     08-10    131<L>  |  92<L>  |  51.0<H>  ----------------------------<  95  4.8   |  21.0<L>  |  5.17<H>    Ca    8.6      10 Aug 2017 07:26                  RADIOLOGY & ADDITIONAL TESTS:

## 2017-08-10 NOTE — PROGRESS NOTE ADULT - SUBJECTIVE AND OBJECTIVE BOX
Patient is a 76y old  Female who presents with a chief complaint of Right sided pleural effusion (07 Aug 2017 15:59)  Pt is well known to vascular surgery and asked by Dr Messer to follow pt for possible AVF creation  Was scheduled for same however cancelled recently sec to fall and SDH  She is now here for R pleural effusion and needs pleurx cath placed.  She reports that she is to be dc'd back to nursing facility after procedure    PAST MEDICAL & SURGICAL HISTORY:  ESRD (end stage renal disease) on dialysis: MWF via [facility] Permacath  planned AVF creation  Sinusitis, unspecified chronicity, unspecified location  Gout  Gall stones  Pneumonia  Anemia  Pulmonary hypertension  COPD (chronic obstructive pulmonary disease)  CAD (coronary artery disease)  Atrial fibrillation  Hyperlipidemia  Hypertension  Spinal fusion failure, initial encounter  Tubal ligation status  Sinusitis  Spinal stenosis  S/P tonsillectomy  S/P appendectomy  H/O aortic valve replacement  H/O spinal fusion  H/O cataract  Cystocele  H/O tricuspid valve annuloplasty  H/O mitral valve repair      Allergies:  allopurinol (Rash)  codeine (Nausea; Vomiting)  penicillin (Rash)  spironolactone (Unknown)    MEDICATIONS  (STANDING):  aspirin enteric coated 81 milliGRAM(s) Oral daily  atorvastatin 40 milliGRAM(s) Oral at bedtime  ammonium lactate 12% Lotion 1 Application(s) Topical daily  calcium acetate 667 milliGRAM(s) Oral three times a day with meals  cyanocobalamin 1000 MICROGram(s) Oral daily  heparin  Injectable 5000 Unit(s) SubCutaneous every 8 hours  epoetin jaswinder Injectable 01576 Unit(s) IV Push <User Schedule>  midodrine 10 milliGRAM(s) Oral three times a day  docusate sodium 100 milliGRAM(s) Oral two times a day  iron sucrose IVPB 200 milliGRAM(s) IV Intermittent daily    MEDICATIONS  (PRN):  acetaminophen   Tablet 650 milliGRAM(s) Oral every 6 hours PRN Mild Pain (1 - 3)  guaiFENesin    Syrup 200 milliGRAM(s) Oral every 6 hours PRN Cough  oxyCODONE    5 mG/acetaminophen 325 mG 1 Tablet(s) Oral every 4 hours PRN Moderate Pain (4 - 6)  oxyCODONE    5 mG/acetaminophen 325 mG 2 Tablet(s) Oral every 6 hours PRN Severe Pain (7 - 10)  ondansetron Injectable 4 milliGRAM(s) IV Push every 6 hours PRN Nausea and/or Vomiting  ALBUTerol/ipratropium for Nebulization 3 milliLiter(s) Nebulizer every 6 hours PRN Shortness of Breath and/or Wheezing    Vital Signs Last 24 Hrs  T(C): 37 (10 Aug 2017 11:00), Max: 37 (10 Aug 2017 11:00)  T(F): 98.6 (10 Aug 2017 11:00), Max: 98.6 (10 Aug 2017 11:00)  HR: 75 (10 Aug 2017 11:00) (75 - 83)  BP: 96/50 (10 Aug 2017 11:00) (88/46 - 96/50)  BP(mean): --  RR: 18 (10 Aug 2017 11:00) (18 - 18)  SpO2: 96% (10 Aug 2017 11:00) (96% - 100%)  I&O's Detail    09 Aug 2017 07:01  -  10 Aug 2017 07:00  --------------------------------------------------------  IN:    Oral Fluid: 480 mL  Total IN: 480 mL    OUT:    Chest Tube: 1180 mL  Total OUT: 1180 mL    Total NET: -700 mL    Physical Exam:  General: NAD, resting comfortably in bed  Extremities: RIJ permcath insitu, site CDI; RUE limb preservation in progress      LABS:                        8.6    3.8   )-----------( 114      ( 10 Aug 2017 12:44 )             26.7     08-10    136  |  94<L>  |  38.0<H>  ----------------------------<  90  3.8   |  24.0  |  3.68<H>    Ca    8.3<L>      10 Aug 2017 12:44    TPro  5.6<L>  /  Alb  3.3  /  TBili  0.7  /  DBili  x   /  AST  32<H>  /  ALT  41<H>  /  AlkPhos  381<H>  08-10      CAPILLARY BLOOD GLUCOSE        Radiology and Additional Studies:    Assessment and Plan: 76yFemalePleural effusion, and ESRD on HD  Pt will need L AVF at some point and can be done as outpt  Will needcardiac, medical and neuro clearances  Discussed with Dr Werner

## 2017-08-10 NOTE — CONSULT NOTE ADULT - SUBJECTIVE AND OBJECTIVE BOX
[facility] - [facility], THE [facility]                                   [address] [place] [address]                                                      PHONE: [phone]                                                         FAX: [phone]  [email or web address]  ---------------------------------------------------------------------------------------------------------------------------------    HPI:  DUSTIN HERNANDEZ is an 76y Female HTn, HLD CAD, s/p Bioprosthetic Aortic Valve, Mitral/Tricuspid Valve repairs, pHTN on Sildenafil, chronic  Atrial Fibrillation (on asa/plavix - not AC due to frequent falls), former smoker, COPD, ESRD on HD, She was most recently hospitalized in [facility] in 6/2017 after a mechanical fall.  She was found to have a left parietal and temporo-occipital SAH, with complicated hospitalization involving resp distress and treatment for PNA.  She now presented to [facility] from Dr Bustos's office having worsening of her right pleural effusion and sob.  She underwent a right chest tube on 8/7/17.  Pt is now being planned to undergo a AV fistula formation. She denies active chest pain, palps, syncope.       PAST MEDICAL & SURGICAL HISTORY:  ESRD (end stage renal disease) on dialysis: MWF via R SC Permacath  planned AVF creation  Sinusitis, unspecified chronicity, unspecified location  Gout  Gall stones  Pneumonia  Anemia  Pulmonary hypertension  COPD (chronic obstructive pulmonary disease)  CAD (coronary artery disease)  Atrial fibrillation  Hyperlipidemia  Hypertension  Spinal fusion failure, initial encounter  Tubal ligation status  Sinusitis  Spinal stenosis  S/P tonsillectomy  S/P appendectomy  H/O aortic valve replacement  H/O spinal fusion  H/O cataract  Cystocele  H/O tricuspid valve annuloplasty  H/O mitral valve repair      allopurinol (Rash)  codeine (Nausea; Vomiting)  penicillin (Rash)  spironolactone (Unknown)      MEDICATIONS  (STANDING):  aspirin enteric coated 81 milliGRAM(s) Oral daily  atorvastatin 40 milliGRAM(s) Oral at bedtime  ammonium lactate 12% Lotion 1 Application(s) Topical daily  calcium acetate 667 milliGRAM(s) Oral three times a day with meals  cyanocobalamin 1000 MICROGram(s) Oral daily  heparin  Injectable 5000 Unit(s) SubCutaneous every 8 hours  epoetin jaswinder Injectable 54523 Unit(s) IV Push <User Schedule>  midodrine 10 milliGRAM(s) Oral three times a day  docusate sodium 100 milliGRAM(s) Oral two times a day  iron sucrose IVPB 200 milliGRAM(s) IV Intermittent daily    MEDICATIONS  (PRN):  acetaminophen   Tablet 650 milliGRAM(s) Oral every 6 hours PRN Mild Pain (1 - 3)  guaiFENesin    Syrup 200 milliGRAM(s) Oral every 6 hours PRN Cough  oxyCODONE    5 mG/acetaminophen 325 mG 1 Tablet(s) Oral every 4 hours PRN Moderate Pain (4 - 6)  oxyCODONE    5 mG/acetaminophen 325 mG 2 Tablet(s) Oral every 6 hours PRN Severe Pain (7 - 10)  ondansetron Injectable 4 milliGRAM(s) IV Push every 6 hours PRN Nausea and/or Vomiting  ALBUTerol/ipratropium for Nebulization 3 milliLiter(s) Nebulizer every 6 hours PRN Shortness of Breath and/or Wheezing      Family History: Pt denies hx of early cad, SCD, or congenital heart disease.      Social History:  Cigarettes:                    Alchohol:                 Illicit Drug Abuse:      ROS:  Constitutional: No fever, weight loss or fatigue  Eyes: No eye pain, visual disturbances, or discharge  ENMT:  No difficulty hearing, tinnitus, vertigo; No sinus or throat pain  Neck: No pain or stiffness  Respiratory: No cough, wheezing, chills or hemoptysis  Cardiovascular: No chest pain, palpitations, shortness of breath, dizziness or leg swelling  Gastrointestinal: No abdominal or epigastric pain. No nausea, vomiting or hematemesis; No diarrhea or constipation. No melena or hematochezia.  Genitourinary: No dysuria, frequency, hematuria or incontinence  Rectal: No pain, hemorrhoids or incontinence  Neurological: No headaches, memory loss, loss of strength, numbness or tremors  Skin: No itching, burning, rashes or lesions   Lymph Nodes: No enlarged glands  Endocrine: No heat or cold intolerance; No hair loss  Musculoskeletal: No joint pain or swelling; No muscle, back or extremity pain  Psychiatric: No depression, anxiety, mood swings or difficulty sleeping  Heme/Lymph: No easy bruising or bleeding gums  Allergy and Immunologic: No hives or eczema    Vital Signs Last 24 Hrs  T(C): 37.6 (10 Aug 2017 15:00), Max: 37.6 (10 Aug 2017 15:00)  T(F): 99.6 (10 Aug 2017 15:00), Max: 99.6 (10 Aug 2017 15:00)  HR: 88 (10 Aug 2017 16:02) (75 - 100)  BP: 92/54 (10 Aug 2017 16:02) (87/60 - 98/47)  BP(mean): --  RR: 18 (10 Aug 2017 16:02) (18 - 18)  SpO2: 97% (10 Aug 2017 16:02) (96% - 100%)  ICU Vital Signs Last 24 Hrs  DUSTIN HERNANDEZ  I&O's Detail    09 Aug 2017 07:01  -  10 Aug 2017 07:00  --------------------------------------------------------  IN:    Oral Fluid: 480 mL  Total IN: 480 mL    OUT:    Chest Tube: 1180 mL  Total OUT: 1180 mL    Total NET: -700 mL      10 Aug 2017 07:01  -  10 Aug 2017 17:53  --------------------------------------------------------  IN:  Total IN: 0 mL    OUT:    Other: 2100 mL  Total OUT: 2100 mL    Total NET: -2100 mL        I&O's Summary    09 Aug 2017 07:01  -  10 Aug 2017 07:00  --------------------------------------------------------  IN: 480 mL / OUT: 1180 mL / NET: -700 mL    10 Aug 2017 07:01  -  10 Aug 2017 17:53  --------------------------------------------------------  IN: 0 mL / OUT: 2100 mL / NET: -2100 mL      Drug Dosing Weight  DUSTIN MARY      PHYSICAL EXAM:  General: Appears well developed, well nourished alert and cooperative.  HEENT: Head; normocephalic, atraumatic.  Eyes: Pupils reactive, cornea wnl.  Neck: Supple, no nodes adenopathy, no NVD or carotid bruit or thyromegaly.  CARDIOVASCULAR: Normal S1 and S2, No murmur, rub, gallop or lift.   LUNGS: No rales, rhonchi or wheeze. Normal breath sounds bilaterally.  ABDOMEN: Soft, nontender without mass or organomegaly. bowel sounds normoactive.  EXTREMITIES: No clubbing, cyanosis or edema. Distal pulses wnl.   SKIN: warm and dry with normal turgor.  NEURO: Alert/oriented x 3/normal motor exam. No pathologic reflexes.    PSYCH: normal affect.        LABS:                        8.6    3.8   )-----------( 114      ( 10 Aug 2017 12:44 )             26.7     08-10    136  |  94<L>  |  38.0<H>  ----------------------------<  90  3.8   |  24.0  |  3.68<H>    Ca    8.3<L>      10 Aug 2017 12:44    TPro  5.6<L>  /  Alb  3.3  /  TBili  0.7  /  DBili  x   /  AST  32<H>  /  ALT  41<H>  /  AlkPhos  381<H>  08-10    DUSTIN HERNANDEZ    RADIOLOGY & ADDITIONAL STUDIES:    INTERPRETATION OF TELEMETRY (personally reviewed):    ECG:afib, rbbb    ECHO:6/2017  PHYSICIAN INTERPRETATION:  Left Ventricle: The left ventricular internal cavity size is normal.   Abnormal septal motion is seen consistent with an intraventricular   conduction delay. Left ventricular ejection fraction, by visual   estimation, is 60 to 65%. The interventricular septum is flattened in   systole and diastole, consistent with right ventricular pressure and   volume overload. Spectral Doppler shows restrictive pattern of left   ventricular myocardial filling (Grade III diastolic dysfunction).  Right Ventricle: The right ventricular size is severely enlarged. RV   systolic function is severely reduced.  Left Atrium: Severely enlarged left atrium.  Right Atrium: The right atrium is severely dilated.  Pericardium: Trivial pericardial effusion is present. The pericardial   effusion is localized near the right atrium. There is a moderate pleural   effusion in the right lateral region.  Mitral Valve: Status-post mitral annular ring insertion. Peak transmitral   mean gradient equals 6.7 mmHg, calculated mitral valve area by pressure   half time equals 1.82 cm² consistent with mild mitral stenosis. Mild to   moderate mitral valve regurgitation is seen. Mitral valve mean gradient   is 6.7 mmHg (HR 81 bpm) suggestive of moderate mitral stenosis.  Tricuspid Valve: S/P tricuspid valve repair. Moderate-severe tricuspid   regurgitation is visualized. Estimated pulmonary artery systolic pressure   is 88.4 mmHg assuming a right atrial pressure of 15 mmHg, which is   consistent with severe pulmonary hypertension.  Aortic Valve: A bioprosthetic AoV is visualized. The prosthesis appears   stable without any evidence for rocking motion. Peak velocity = 3.3 m/s.   Peak/mean transvalvular gradients = 43/22 mmHg. Trivial aortic valve   regurgitation is seen.  Pulmonic Valve: The pulmonic valve was not well visualized. Moderate   pulmonic valve regurgitation.  Aorta: The ascending aorta is mildly dilated measuring 3.8 cm.     Venous: The inferior vena cava was dilated, with respiratory size   variation less than 50%, suggestive of elevated central venous pressure.   Estimated RA pressure = 15 mmHg.        Summary:   1. Left ventricular ejection fraction, by visual estimation, is 60 to   65%.   2. Spectral Doppler shows restrictive pattern of left ventricular   myocardial filling (Grade III diastolic dysfunction).   3. RV failure. Severely dilated right ventricle with severely reduced   systolic function.   4. Right ventricular volume and pressure overload.   5. Status post mitral valve repair with residual mild to moderate mitral   regurgitation. Elevated transmitral gradients (mean 6.7 mmHg at a HR of   81 bpm).   6. S/P tricuspid valve repair with moderate to severe tricuspid   regurgitation.   7. Normally functioning aortic prosthesis.   8. Moderate pulmonic valve regurgitation.   9. Estimated pulmonary artery systolic pressure is 88.4 mmHg assuming a   right atrial pressure of 15 mmHg, which is consistent with severe   pulmonary hypertension.  10. Trace pericardial effusion.  11. Right sided pleural effusion.       Assessment and Plan:  In summary, DUSTIN HERNANDEZ is an 76y Female HTn, HLD CAD, s/p Bioprosthetic Aortic Valve, Mitral/Tricuspid Valve repairs, pHTN on Sildenafil, chronic  Atrial Fibrillation (on asa/plavix - not AC due to frequent falls), former smoker, COPD, ESRD on HD, She was most recently hospitalized in [facility] in 6/2017 after a mechanical fall.  She was found to have a left parietal and temporo-occipital SAH, with complicated hospitalization involving resp distress and treatment for PNA.  She now presented to [facility] from Dr Bustos's office having worsening of her right pleural effusion and sob.  She underwent a right chest tube on 8/7/17.  Pt is now being planned to undergo a AV fistula formation. She denies active chest pain, palps, syncope.     -she is medically optimized for her vascular procedure  -she is an intermediate cardiovascular risk for an intermediate-high risk procedure  -no further cardiac interventions warranted at this time  -continue current cardiac meds/dosages    Thank you for allowing [facility] to participate in the care of this patient.  Please feel free to call with any questions or concerns. [facility] - [facility], [facility]                                   [address] [place] [address]                                                      PHONE: [phone]                                                         FAX: [phone]  [email or web address]  ---------------------------------------------------------------------------------------------------------------------------------    HPI:  DUSTIN HERNANDEZ is an 76y Female HTn, HLD CAD, s/p Bioprosthetic Aortic Valve, Mitral/Tricuspid Valve repairs, pHTN on Sildenafil, chronic  Atrial Fibrillation (on asa/plavix - not AC due to frequent falls), former smoker, COPD, ESRD on HD, She was most recently hospitalized in [facility] in 6/2017 after a mechanical fall.  She was found to have a left parietal and temporo-occipital SAH, with complicated hospitalization involving resp distress and treatment for PNA.  She now presented to [facility] from Dr Bustos's office having worsening of her right pleural effusion and sob.  She underwent a right chest tube on 8/7/17.  Pt is now being planned to undergo a AV fistula formation. She denies active chest pain, palps, syncope.       PAST MEDICAL & SURGICAL HISTORY:  ESRD (end stage renal disease) on dialysis: MWF via R SC Permacath  planned AVF creation  Sinusitis, unspecified chronicity, unspecified location  Gout  Gall stones  Pneumonia  Anemia  Pulmonary hypertension  COPD (chronic obstructive pulmonary disease)  CAD (coronary artery disease)  Atrial fibrillation  Hyperlipidemia  Hypertension  Spinal fusion failure, initial encounter  Tubal ligation status  Sinusitis  Spinal stenosis  S/P tonsillectomy  S/P appendectomy  H/O aortic valve replacement  H/O spinal fusion  H/O cataract  Cystocele  H/O tricuspid valve annuloplasty  H/O mitral valve repair      allopurinol (Rash)  codeine (Nausea; Vomiting)  penicillin (Rash)  spironolactone (Unknown)      MEDICATIONS  (STANDING):  aspirin enteric coated 81 milliGRAM(s) Oral daily  atorvastatin 40 milliGRAM(s) Oral at bedtime  ammonium lactate 12% Lotion 1 Application(s) Topical daily  calcium acetate 667 milliGRAM(s) Oral three times a day with meals  cyanocobalamin 1000 MICROGram(s) Oral daily  heparin  Injectable 5000 Unit(s) SubCutaneous every 8 hours  epoetin jaswinder Injectable 61504 Unit(s) IV Push <User Schedule>  midodrine 10 milliGRAM(s) Oral three times a day  docusate sodium 100 milliGRAM(s) Oral two times a day  iron sucrose IVPB 200 milliGRAM(s) IV Intermittent daily    MEDICATIONS  (PRN):  acetaminophen   Tablet 650 milliGRAM(s) Oral every 6 hours PRN Mild Pain (1 - 3)  guaiFENesin    Syrup 200 milliGRAM(s) Oral every 6 hours PRN Cough  oxyCODONE    5 mG/acetaminophen 325 mG 1 Tablet(s) Oral every 4 hours PRN Moderate Pain (4 - 6)  oxyCODONE    5 mG/acetaminophen 325 mG 2 Tablet(s) Oral every 6 hours PRN Severe Pain (7 - 10)  ondansetron Injectable 4 milliGRAM(s) IV Push every 6 hours PRN Nausea and/or Vomiting  ALBUTerol/ipratropium for Nebulization 3 milliLiter(s) Nebulizer every 6 hours PRN Shortness of Breath and/or Wheezing      Family History: Pt denies hx of early cad, SCD, or congenital heart disease.      Social History:  Cigarettes:                    Alchohol:                 Illicit Drug Abuse:      ROS:  Constitutional: No fever, weight loss or fatigue  Eyes: No eye pain, visual disturbances, or discharge  ENMT:  No difficulty hearing, tinnitus, vertigo; No sinus or throat pain  Neck: No pain or stiffness  Respiratory: No cough, wheezing, chills or hemoptysis  Cardiovascular: No chest pain, palpitations, shortness of breath, dizziness or leg swelling  Gastrointestinal: No abdominal or epigastric pain. No nausea, vomiting or hematemesis; No diarrhea or constipation. No melena or hematochezia.  Genitourinary: No dysuria, frequency, hematuria or incontinence  Rectal: No pain, hemorrhoids or incontinence  Neurological: No headaches, memory loss, loss of strength, numbness or tremors  Skin: No itching, burning, rashes or lesions   Lymph Nodes: No enlarged glands  Endocrine: No heat or cold intolerance; No hair loss  Musculoskeletal: No joint pain or swelling; No muscle, back or extremity pain  Psychiatric: No depression, anxiety, mood swings or difficulty sleeping  Heme/Lymph: No easy bruising or bleeding gums  Allergy and Immunologic: No hives or eczema    Vital Signs Last 24 Hrs  T(C): 37.6 (10 Aug 2017 15:00), Max: 37.6 (10 Aug 2017 15:00)  T(F): 99.6 (10 Aug 2017 15:00), Max: 99.6 (10 Aug 2017 15:00)  HR: 88 (10 Aug 2017 16:02) (75 - 100)  BP: 92/54 (10 Aug 2017 16:02) (87/60 - 98/47)  BP(mean): --  RR: 18 (10 Aug 2017 16:02) (18 - 18)  SpO2: 97% (10 Aug 2017 16:02) (96% - 100%)  ICU Vital Signs Last 24 Hrs  DUSTIN HERNANDEZ  I&O's Detail    09 Aug 2017 07:01  -  10 Aug 2017 07:00  --------------------------------------------------------  IN:    Oral Fluid: 480 mL  Total IN: 480 mL    OUT:    Chest Tube: 1180 mL  Total OUT: 1180 mL    Total NET: -700 mL      10 Aug 2017 07:01  -  10 Aug 2017 17:53  --------------------------------------------------------  IN:  Total IN: 0 mL    OUT:    Other: 2100 mL  Total OUT: 2100 mL    Total NET: -2100 mL        I&O's Summary    09 Aug 2017 07:01  -  10 Aug 2017 07:00  --------------------------------------------------------  IN: 480 mL / OUT: 1180 mL / NET: -700 mL    10 Aug 2017 07:01  -  10 Aug 2017 17:53  --------------------------------------------------------  IN: 0 mL / OUT: 2100 mL / NET: -2100 mL      Drug Dosing Weight  DUSTIN HERNANDEZ      PHYSICAL EXAM:  General: Appears well developed, well nourished alert and cooperative.  HEENT: Head; normocephalic, atraumatic.  Eyes: Pupils reactive, cornea wnl.  Neck: Supple, no nodes adenopathy, no NVD or carotid bruit or thyromegaly.  CARDIOVASCULAR: lisette RUSB, LSB LISETTE RV heave  LUNGS: No rales, rhonchi or wheeze. Normal breath sounds bilaterally.  ABDOMEN: Soft, nontender without mass or organomegaly. bowel sounds normoactive.  EXTREMITIES: No clubbing, cyanosis or edema. Distal pulses wnl.   SKIN: warm and dry with normal turgor.  NEURO: Alert/oriented x 3/normal motor exam. No pathologic reflexes.    PSYCH: normal affect.        LABS:                        8.6    3.8   )-----------( 114      ( 10 Aug 2017 12:44 )             26.7     08-10    136  |  94<L>  |  38.0<H>  ----------------------------<  90  3.8   |  24.0  |  3.68<H>    Ca    8.3<L>      10 Aug 2017 12:44    TPro  5.6<L>  /  Alb  3.3  /  TBili  0.7  /  DBili  x   /  AST  32<H>  /  ALT  41<H>  /  AlkPhos  381<H>  08-10    DUSTIN HERNANDEZ    RADIOLOGY & ADDITIONAL STUDIES:    INTERPRETATION OF TELEMETRY (personally reviewed):    ECG:afib, rbbb    ECHO:6/2017  PHYSICIAN INTERPRETATION:  Left Ventricle: The left ventricular internal cavity size is normal.   Abnormal septal motion is seen consistent with an intraventricular   conduction delay. Left ventricular ejection fraction, by visual   estimation, is 60 to 65%. The interventricular septum is flattened in   systole and diastole, consistent with right ventricular pressure and   volume overload. Spectral Doppler shows restrictive pattern of left   ventricular myocardial filling (Grade III diastolic dysfunction).  Right Ventricle: The right ventricular size is severely enlarged. RV   systolic function is severely reduced.  Left Atrium: Severely enlarged left atrium.  Right Atrium: The right atrium is severely dilated.  Pericardium: Trivial pericardial effusion is present. The pericardial   effusion is localized near the right atrium. There is a moderate pleural   effusion in the right lateral region.  Mitral Valve: Status-post mitral annular ring insertion. Peak transmitral   mean gradient equals 6.7 mmHg, calculated mitral valve area by pressure   half time equals 1.82 cm² consistent with mild mitral stenosis. Mild to   moderate mitral valve regurgitation is seen. Mitral valve mean gradient   is 6.7 mmHg (HR 81 bpm) suggestive of moderate mitral stenosis.  Tricuspid Valve: S/P tricuspid valve repair. Moderate-severe tricuspid   regurgitation is visualized. Estimated pulmonary artery systolic pressure   is 88.4 mmHg assuming a right atrial pressure of 15 mmHg, which is   consistent with severe pulmonary hypertension.  Aortic Valve: A bioprosthetic AoV is visualized. The prosthesis appears   stable without any evidence for rocking motion. Peak velocity = 3.3 m/s.   Peak/mean transvalvular gradients = 43/22 mmHg. Trivial aortic valve   regurgitation is seen.  Pulmonic Valve: The pulmonic valve was not well visualized. Moderate   pulmonic valve regurgitation.  Aorta: The ascending aorta is mildly dilated measuring 3.8 cm.     Venous: The inferior vena cava was dilated, with respiratory size   variation less than 50%, suggestive of elevated central venous pressure.   Estimated RA pressure = 15 mmHg.        Summary:   1. Left ventricular ejection fraction, by visual estimation, is 60 to   65%.   2. Spectral Doppler shows restrictive pattern of left ventricular   myocardial filling (Grade III diastolic dysfunction).   3. RV failure. Severely dilated right ventricle with severely reduced   systolic function.   4. Right ventricular volume and pressure overload.   5. Status post mitral valve repair with residual mild to moderate mitral   regurgitation. Elevated transmitral gradients (mean 6.7 mmHg at a HR of   81 bpm).   6. S/P tricuspid valve repair with moderate to severe tricuspid   regurgitation.   7. Normally functioning aortic prosthesis.   8. Moderate pulmonic valve regurgitation.   9. Estimated pulmonary artery systolic pressure is 88.4 mmHg assuming a   right atrial pressure of 15 mmHg, which is consistent with severe   pulmonary hypertension.  10. Trace pericardial effusion.  11. Right sided pleural effusion.       Assessment and Plan:  In summary, DUSTIN HERNANDEZ is an 76y Female HTn, HLD CAD, s/p Bioprosthetic Aortic Valve, Mitral/Tricuspid Valve repairs, pHTN on Sildenafil, chronic  Atrial Fibrillation (on asa/plavix - not AC due to frequent falls), former smoker, COPD, ESRD on HD, She was most recently hospitalized in [facility] in 6/2017 after a mechanical fall.  She was found to have a left parietal and temporo-occipital SAH, with complicated hospitalization involving resp distress and treatment for PNA.  She now presented to [facility] from Dr Bustos's office having worsening of her right pleural effusion and sob.  She underwent a right chest tube on 8/7/17.  Pt is now being planned to undergo a AV fistula formation. She denies active chest pain, palps, syncope.     -she is medically optimized for her permacath/ AV fistula vascular procedures  -she is an intermediate cardiovascular risk for an intermediate-high risk procedure  -no further cardiac interventions warranted at this time  -continue current cardiac meds/dosages  -will reassess plavix/sildenafil post procedures    Thank you for allowing [facility] to participate in the care of this patient.  Please feel free to call with any questions or concerns.

## 2017-08-10 NOTE — PROGRESS NOTE ADULT - ASSESSMENT
ESRD on HD TTS HD today  Pleural effusion s/p CT placement fluid transudative going for VATS today  Anemia 2/2 CKD ESPERANZA with HD TS 14% will give IV iron x 5 days  Electrolytes acceptable

## 2017-08-10 NOTE — PROGRESS NOTE ADULT - SUBJECTIVE AND OBJECTIVE BOX
seen for recurrent pleural effusion    seen in HD suite, agitated pleurex procedure may be postponed as chest tube unclamped  ROS otherwise negative     MEDICATIONS  (STANDING):  aspirin enteric coated 81 milliGRAM(s) Oral daily  atorvastatin 40 milliGRAM(s) Oral at bedtime  ammonium lactate 12% Lotion 1 Application(s) Topical daily  calcium acetate 667 milliGRAM(s) Oral three times a day with meals  cyanocobalamin 1000 MICROGram(s) Oral daily  heparin  Injectable 5000 Unit(s) SubCutaneous every 8 hours  epoetin jaswinder Injectable 63196 Unit(s) IV Push <User Schedule>  midodrine 10 milliGRAM(s) Oral three times a day  docusate sodium 100 milliGRAM(s) Oral two times a day  iron sucrose IVPB 200 milliGRAM(s) IV Intermittent daily    MEDICATIONS  (PRN):  acetaminophen   Tablet 650 milliGRAM(s) Oral every 6 hours PRN Mild Pain (1 - 3)  guaiFENesin    Syrup 200 milliGRAM(s) Oral every 6 hours PRN Cough  oxyCODONE    5 mG/acetaminophen 325 mG 1 Tablet(s) Oral every 4 hours PRN Moderate Pain (4 - 6)  oxyCODONE    5 mG/acetaminophen 325 mG 2 Tablet(s) Oral every 6 hours PRN Severe Pain (7 - 10)  ondansetron Injectable 4 milliGRAM(s) IV Push every 6 hours PRN Nausea and/or Vomiting  ALBUTerol/ipratropium for Nebulization 3 milliLiter(s) Nebulizer every 6 hours PRN Shortness of Breath and/or Wheezing      Allergies    allopurinol (Rash)  codeine (Nausea; Vomiting)  penicillin (Rash)  spironolactone (Unknown)    Intolerances      Vital Signs Last 24 Hrs  T(C): 37 (10 Aug 2017 11:00), Max: 37 (10 Aug 2017 11:00)  T(F): 98.6 (10 Aug 2017 11:00), Max: 98.6 (10 Aug 2017 11:00)  HR: 75 (10 Aug 2017 11:00) (75 - 83)  BP: 96/50 (10 Aug 2017 11:00) (88/46 - 96/50)  BP(mean): --  RR: 18 (10 Aug 2017 11:00) (18 - 18)  SpO2: 96% (10 Aug 2017 11:00) (96% - 100%)    PHYSICAL EXAM:    GENERAL: NAD  CHEST/LUNG: dec bs at right base, + right chest tube   HEART: irreg rate and rhythm; S1 S2  ABDOMEN: Soft, Nontender, Nondistended; Bowel sounds present  EXTREMITIES: no edema.   NERVOUS SYSTEM:  Alert & Oriented X3, nonfocal    LABS:                        8.3    3.4   )-----------( 129      ( 09 Aug 2017 07:23 )             26.9     08-10    131<L>  |  92<L>  |  51.0<H>  ----------------------------<  95  4.8   |  21.0<L>  |  5.17<H>    Ca    8.6      10 Aug 2017 07:26            CAPILLARY BLOOD GLUCOSE            RADIOLOGY & ADDITIONAL TESTS:

## 2017-08-10 NOTE — PROGRESS NOTE ADULT - ASSESSMENT
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  june 2017 SDH after trauma.  Presents for pleurex placement.      >ESRD on HD - T - T - S  renal consulted , HD as per renal    >Hypotension chronic on Midodrine, noted to have lower than her baseline,  increase 10mg tid, monitor BP     > Pulmonary HTN -   off sildenafil due to hypotension    >Chronic diastolic chf - stable - on HD     >A fib not on coumadin due to fall risk and recent SAH    >chronic anemia due to ESRD - stable       >DVT PPX-  heparin

## 2017-08-11 ENCOUNTER — APPOINTMENT (OUTPATIENT)
Dept: THORACIC SURGERY | Facility: HOSPITAL | Age: 77
End: 2017-08-11
Payer: MEDICARE

## 2017-08-11 LAB
ANION GAP SERPL CALC-SCNC: 16 MMOL/L — SIGNIFICANT CHANGE UP (ref 5–17)
BUN SERPL-MCNC: 29 MG/DL — HIGH (ref 8–20)
CALCIUM SERPL-MCNC: 8.5 MG/DL — LOW (ref 8.6–10.2)
CHLORIDE SERPL-SCNC: 96 MMOL/L — LOW (ref 98–107)
CO2 SERPL-SCNC: 26 MMOL/L — SIGNIFICANT CHANGE UP (ref 22–29)
CREAT SERPL-MCNC: 3.46 MG/DL — HIGH (ref 0.5–1.3)
GLUCOSE SERPL-MCNC: 87 MG/DL — SIGNIFICANT CHANGE UP (ref 70–115)
HCT VFR BLD CALC: 27.3 % — LOW (ref 37–47)
HGB BLD-MCNC: 8.6 G/DL — LOW (ref 12–16)
MCHC RBC-ENTMCNC: 31.5 G/DL — LOW (ref 32–36)
MCHC RBC-ENTMCNC: 35 PG — HIGH (ref 27–31)
MCV RBC AUTO: 111 FL — HIGH (ref 81–99)
NON-GYN CYTOLOGY SPEC: SIGNIFICANT CHANGE UP
PLATELET # BLD AUTO: 121 K/UL — LOW (ref 150–400)
POTASSIUM SERPL-MCNC: 4.1 MMOL/L — SIGNIFICANT CHANGE UP (ref 3.5–5.3)
POTASSIUM SERPL-SCNC: 4.1 MMOL/L — SIGNIFICANT CHANGE UP (ref 3.5–5.3)
RBC # BLD: 2.46 M/UL — LOW (ref 4.4–5.2)
RBC # FLD: 17.7 % — HIGH (ref 11–15.6)
SODIUM SERPL-SCNC: 138 MMOL/L — SIGNIFICANT CHANGE UP (ref 135–145)
TSH SERPL-MCNC: 2.35 UIU/ML — SIGNIFICANT CHANGE UP (ref 0.27–4.2)
VIT B12 SERPL-MCNC: >1500 PG/ML — SIGNIFICANT CHANGE UP (ref 180–914)
WBC # BLD: 3.9 K/UL — LOW (ref 4.8–10.8)
WBC # FLD AUTO: 3.9 K/UL — LOW (ref 4.8–10.8)

## 2017-08-11 PROCEDURE — 32550 INSERT PLEURAL CATH: CPT

## 2017-08-11 PROCEDURE — ZZZZZ: CPT

## 2017-08-11 PROCEDURE — 32601 THORACOSCOPY DIAGNOSTIC: CPT

## 2017-08-11 PROCEDURE — 71010: CPT | Mod: 26

## 2017-08-11 PROCEDURE — 99233 SBSQ HOSP IP/OBS HIGH 50: CPT

## 2017-08-11 RX ORDER — FENTANYL CITRATE 50 UG/ML
25 INJECTION INTRAVENOUS
Qty: 0 | Refills: 0 | Status: DISCONTINUED | OUTPATIENT
Start: 2017-08-11 | End: 2017-08-11

## 2017-08-11 RX ORDER — IRON SUCROSE 20 MG/ML
200 INJECTION, SOLUTION INTRAVENOUS
Qty: 0 | Refills: 0 | Status: DISCONTINUED | OUTPATIENT
Start: 2017-08-11 | End: 2017-08-16

## 2017-08-11 RX ORDER — ONDANSETRON 8 MG/1
4 TABLET, FILM COATED ORAL ONCE
Qty: 0 | Refills: 0 | Status: DISCONTINUED | OUTPATIENT
Start: 2017-08-11 | End: 2017-08-11

## 2017-08-11 RX ORDER — SODIUM CHLORIDE 9 MG/ML
1000 INJECTION INTRAMUSCULAR; INTRAVENOUS; SUBCUTANEOUS
Qty: 0 | Refills: 0 | Status: DISCONTINUED | OUTPATIENT
Start: 2017-08-11 | End: 2017-08-11

## 2017-08-11 RX ADMIN — MIDODRINE HYDROCHLORIDE 10 MILLIGRAM(S): 2.5 TABLET ORAL at 06:11

## 2017-08-11 RX ADMIN — Medication 1 APPLICATION(S): at 17:40

## 2017-08-11 RX ADMIN — HEPARIN SODIUM 5000 UNIT(S): 5000 INJECTION INTRAVENOUS; SUBCUTANEOUS at 06:11

## 2017-08-11 RX ADMIN — Medication 81 MILLIGRAM(S): at 17:40

## 2017-08-11 RX ADMIN — Medication 667 MILLIGRAM(S): at 17:40

## 2017-08-11 RX ADMIN — HEPARIN SODIUM 5000 UNIT(S): 5000 INJECTION INTRAVENOUS; SUBCUTANEOUS at 22:05

## 2017-08-11 RX ADMIN — ATORVASTATIN CALCIUM 40 MILLIGRAM(S): 80 TABLET, FILM COATED ORAL at 22:05

## 2017-08-11 RX ADMIN — Medication 200 MILLIGRAM(S): at 17:41

## 2017-08-11 RX ADMIN — MIDODRINE HYDROCHLORIDE 10 MILLIGRAM(S): 2.5 TABLET ORAL at 22:05

## 2017-08-11 RX ADMIN — Medication 100 MILLIGRAM(S): at 06:11

## 2017-08-11 RX ADMIN — IRON SUCROSE 110 MILLIGRAM(S): 20 INJECTION, SOLUTION INTRAVENOUS at 22:05

## 2017-08-11 RX ADMIN — MIDODRINE HYDROCHLORIDE 10 MILLIGRAM(S): 2.5 TABLET ORAL at 16:47

## 2017-08-11 RX ADMIN — Medication 200 MILLIGRAM(S): at 06:11

## 2017-08-11 RX ADMIN — PREGABALIN 1000 MICROGRAM(S): 225 CAPSULE ORAL at 17:41

## 2017-08-11 RX ADMIN — Medication 100 MILLIGRAM(S): at 17:40

## 2017-08-11 NOTE — PROGRESS NOTE ADULT - SUBJECTIVE AND OBJECTIVE BOX
seen for pleural effusion, awaiting OR     no acute complaints. no sob/cp.  ROS negative     MEDICATIONS  (STANDING):  aspirin enteric coated 81 milliGRAM(s) Oral daily  atorvastatin 40 milliGRAM(s) Oral at bedtime  ammonium lactate 12% Lotion 1 Application(s) Topical daily  calcium acetate 667 milliGRAM(s) Oral three times a day with meals  cyanocobalamin 1000 MICROGram(s) Oral daily  heparin  Injectable 5000 Unit(s) SubCutaneous every 8 hours  epoetin jaswinder Injectable 40554 Unit(s) IV Push <User Schedule>  midodrine 10 milliGRAM(s) Oral three times a day  docusate sodium 100 milliGRAM(s) Oral two times a day  iron sucrose IVPB 200 milliGRAM(s) IV Intermittent daily    MEDICATIONS  (PRN):  acetaminophen   Tablet 650 milliGRAM(s) Oral every 6 hours PRN Mild Pain (1 - 3)  guaiFENesin    Syrup 200 milliGRAM(s) Oral every 6 hours PRN Cough  oxyCODONE    5 mG/acetaminophen 325 mG 1 Tablet(s) Oral every 4 hours PRN Moderate Pain (4 - 6)  oxyCODONE    5 mG/acetaminophen 325 mG 2 Tablet(s) Oral every 6 hours PRN Severe Pain (7 - 10)  ondansetron Injectable 4 milliGRAM(s) IV Push every 6 hours PRN Nausea and/or Vomiting  ALBUTerol/ipratropium for Nebulization 3 milliLiter(s) Nebulizer every 6 hours PRN Shortness of Breath and/or Wheezing      Allergies    allopurinol (Rash)  codeine (Nausea; Vomiting)  penicillin (Rash)  spironolactone (Unknown)    Intolerances      Vital Signs Last 24 Hrs  T(C): 36.6 (11 Aug 2017 08:04), Max: 37.6 (10 Aug 2017 15:00)  T(F): 97.8 (11 Aug 2017 08:04), Max: 99.6 (10 Aug 2017 15:00)  HR: 82 (11 Aug 2017 08:04) (75 - 100)  BP: 92/48 (11 Aug 2017 08:04) (87/60 - 98/47)  BP(mean): --  RR: 16 (11 Aug 2017 08:04) (16 - 18)  SpO2: 98% (11 Aug 2017 08:04) (96% - 98%)    PHYSICAL EXAM:    GENERAL: NAD  CHEST/LUNG: dec bs right base, + right chest tube  HEART: irreg irreg rate and rhythm; S1 S2  ABDOMEN: Soft, Nontender, Nondistended; Bowel sounds present  EXTREMITIES:  trace edema cvi  NERVOUS SYSTEM:  Alert & Oriented X3 nonfocal    LABS:                        8.6    3.9   )-----------( 121      ( 11 Aug 2017 06:54 )             27.3     08-11    138  |  96<L>  |  29.0<H>  ----------------------------<  87  4.1   |  26.0  |  3.46<H>    Ca    8.5<L>      11 Aug 2017 06:54    TPro  5.6<L>  /  Alb  3.3  /  TBili  0.7  /  DBili  x   /  AST  32<H>  /  ALT  41<H>  /  AlkPhos  381<H>  08-10          CAPILLARY BLOOD GLUCOSE            RADIOLOGY & ADDITIONAL TESTS:

## 2017-08-11 NOTE — PROGRESS NOTE ADULT - SUBJECTIVE AND OBJECTIVE BOX
[facility]                                 [address]                                  [address]                                        [place], [place], [address]                                             [phone]        No new complaints.    Vital Signs Last 24 Hrs  T(F): 98.1 (11 Aug 2017 10:16), Max: 99.6 (10 Aug 2017 15:00)  HR: 72 (11 Aug 2017 10:20) (70 - 100)  BP: 84/50 (11 Aug 2017 10:20) (80/50 - 98/47)  RR: 16 (11 Aug 2017 10:16) (16 - 18)  SpO2: 98% (11 Aug 2017 08:04) (96% - 98%)    Awake and alert.  Pupils react.  Face symmetric.  Symmetric limb strength.     CT head reviewed: There is no acute pathology.   Chronic ischemic change and old infarcts seen.

## 2017-08-11 NOTE — PROGRESS NOTE ADULT - ASSESSMENT
The patient is a 76y Female with prior head injury and subarachnoid hemorrhage now resolved.     No specific neurologic contraindication for procedure.    No further specific neurologic recommendations. Will be available as needed.

## 2017-08-11 NOTE — PROGRESS NOTE ADULT - SUBJECTIVE AND OBJECTIVE BOX
Patient seen and examined    Feels better; OOB/Ch  awaiting Pleurex cath  no c/o CP SOB NV   No swelling feet    Patient without any significant change  no specific c/o    Vital Signs Last 24 Hrs  T(C): 36.6 (08-11-17 @ 17:30), Max: 37.4 (08-10-17 @ 21:18)  T(F): 97.9 (08-11-17 @ 17:30), Max: 99.3 (08-10-17 @ 21:18)  HR: 70 (08-11-17 @ 17:30) (70 - 83)  BP: 88/48 (08-11-17 @ 17:30) (80/45 - 95/49)  BP(mean): --  RR: 18 (08-11-17 @ 17:30) (13 - 20)  SpO2: 98% (08-11-17 @ 17:30) (97% - 100%)    Chest   clear; BS poor at R base; occ rales L base  CV   no murmur  Abd   soft, NT BS+  Extr   No edema  Neuro  grossly iintact motor    Patient overall stable  Labs and Meds reviewed    11 Aug 2017 06:54    138    |  96     |  29.0   ----------------------------<  87     4.1     |  26.0   |  3.46     Ca    8.5        11 Aug 2017 06:54    TPro  5.6    /  Alb  3.3    /  TBili  0.7    /  DBili  x      /  AST  32     /  ALT  41     /  AlkPhos  381    10 Aug 2017 12:44                          8.6    3.9   )-----------( 121      ( 11 Aug 2017 06:54 )             27.3         Needs PC/AVF- cleared by Neuro/cardiac/pulm  For pleurex  HD am

## 2017-08-12 ENCOUNTER — TRANSCRIPTION ENCOUNTER (OUTPATIENT)
Age: 77
End: 2017-08-12

## 2017-08-12 LAB
CULTURE RESULTS: SIGNIFICANT CHANGE UP
SPECIMEN SOURCE: SIGNIFICANT CHANGE UP

## 2017-08-12 PROCEDURE — 71010: CPT | Mod: 26

## 2017-08-12 PROCEDURE — 32550 INSERT PLEURAL CATH: CPT

## 2017-08-12 PROCEDURE — 32601 THORACOSCOPY DIAGNOSTIC: CPT

## 2017-08-12 PROCEDURE — 99233 SBSQ HOSP IP/OBS HIGH 50: CPT

## 2017-08-12 RX ORDER — ALBUMIN HUMAN 25 %
50 VIAL (ML) INTRAVENOUS
Qty: 0 | Refills: 0 | Status: DISCONTINUED | OUTPATIENT
Start: 2017-08-12 | End: 2017-08-16

## 2017-08-12 RX ORDER — ALBUMIN HUMAN 25 %
50 VIAL (ML) INTRAVENOUS ONCE
Qty: 0 | Refills: 0 | Status: COMPLETED | OUTPATIENT
Start: 2017-08-12 | End: 2017-08-12

## 2017-08-12 RX ADMIN — IRON SUCROSE 200 MILLIGRAM(S): 20 INJECTION, SOLUTION INTRAVENOUS at 13:15

## 2017-08-12 RX ADMIN — ATORVASTATIN CALCIUM 40 MILLIGRAM(S): 80 TABLET, FILM COATED ORAL at 21:30

## 2017-08-12 RX ADMIN — Medication 50 MILLILITER(S): at 12:46

## 2017-08-12 RX ADMIN — MIDODRINE HYDROCHLORIDE 10 MILLIGRAM(S): 2.5 TABLET ORAL at 21:30

## 2017-08-12 RX ADMIN — Medication 81 MILLIGRAM(S): at 11:49

## 2017-08-12 RX ADMIN — ERYTHROPOIETIN 10000 UNIT(S): 10000 INJECTION, SOLUTION INTRAVENOUS; SUBCUTANEOUS at 12:55

## 2017-08-12 RX ADMIN — PREGABALIN 1000 MICROGRAM(S): 225 CAPSULE ORAL at 11:49

## 2017-08-12 RX ADMIN — Medication 1 APPLICATION(S): at 11:49

## 2017-08-12 RX ADMIN — MIDODRINE HYDROCHLORIDE 10 MILLIGRAM(S): 2.5 TABLET ORAL at 06:07

## 2017-08-12 RX ADMIN — Medication 667 MILLIGRAM(S): at 11:49

## 2017-08-12 RX ADMIN — Medication 667 MILLIGRAM(S): at 09:10

## 2017-08-12 RX ADMIN — MIDODRINE HYDROCHLORIDE 10 MILLIGRAM(S): 2.5 TABLET ORAL at 13:07

## 2017-08-12 RX ADMIN — Medication 667 MILLIGRAM(S): at 17:15

## 2017-08-12 RX ADMIN — HEPARIN SODIUM 5000 UNIT(S): 5000 INJECTION INTRAVENOUS; SUBCUTANEOUS at 06:08

## 2017-08-12 RX ADMIN — HEPARIN SODIUM 5000 UNIT(S): 5000 INJECTION INTRAVENOUS; SUBCUTANEOUS at 17:15

## 2017-08-12 RX ADMIN — Medication 100 MILLIGRAM(S): at 06:07

## 2017-08-12 RX ADMIN — Medication 100 MILLIGRAM(S): at 17:15

## 2017-08-12 RX ADMIN — Medication 200 MILLIGRAM(S): at 18:52

## 2017-08-12 RX ADMIN — HEPARIN SODIUM 5000 UNIT(S): 5000 INJECTION INTRAVENOUS; SUBCUTANEOUS at 21:30

## 2017-08-12 NOTE — PROGRESS NOTE ADULT - SUBJECTIVE AND OBJECTIVE BOX
INTERVAL HPI/OVERNIGHT EVENTS: Patient doing well, we discussed at bedside the plan for the fistula, Dr. Boswell will talk with Dr. Bustos in regards to either do the fistula while the pleurix is getting done and if not do it on Wednesday . There is a possibility that this also can be done as an outpatient       MEDICATIONS  (STANDING):  aspirin enteric coated 81 milliGRAM(s) Oral daily  atorvastatin 40 milliGRAM(s) Oral at bedtime  ammonium lactate 12% Lotion 1 Application(s) Topical daily  calcium acetate 667 milliGRAM(s) Oral three times a day with meals  cyanocobalamin 1000 MICROGram(s) Oral daily  heparin  Injectable 5000 Unit(s) SubCutaneous every 8 hours  epoetin jaswinder Injectable 49040 Unit(s) IV Push <User Schedule>  midodrine 10 milliGRAM(s) Oral three times a day  docusate sodium 100 milliGRAM(s) Oral two times a day  iron sucrose IVPB 200 milliGRAM(s) IV Intermittent daily  iron sucrose Injectable 200 milliGRAM(s) IV Push <User Schedule>    MEDICATIONS  (PRN):  acetaminophen   Tablet 650 milliGRAM(s) Oral every 6 hours PRN Mild Pain (1 - 3)  guaiFENesin    Syrup 200 milliGRAM(s) Oral every 6 hours PRN Cough  oxyCODONE    5 mG/acetaminophen 325 mG 1 Tablet(s) Oral every 4 hours PRN Moderate Pain (4 - 6)  oxyCODONE    5 mG/acetaminophen 325 mG 2 Tablet(s) Oral every 6 hours PRN Severe Pain (7 - 10)  ondansetron Injectable 4 milliGRAM(s) IV Push every 6 hours PRN Nausea and/or Vomiting  ALBUTerol/ipratropium for Nebulization 3 milliLiter(s) Nebulizer every 6 hours PRN Shortness of Breath and/or Wheezing      Vital Signs Last 24 Hrs  T(C): 36.7 (11 Aug 2017 21:59), Max: 36.7 (11 Aug 2017 10:16)  T(F): 98 (11 Aug 2017 21:59), Max: 98.1 (11 Aug 2017 10:16)  HR: 73 (12 Aug 2017 05:41) (67 - 80)  BP: 73/44 (12 Aug 2017 05:41) (73/44 - 95/49)  BP(mean): --  RR: 18 (12 Aug 2017 05:41) (13 - 20)  SpO2: 99% (12 Aug 2017 05:41) (98% - 100%)    PHYSICAL EXAM:      Constitutional:    Eyes:    ENMT:    Neck:    Breasts:    Back:    Respiratory:    Cardiovascular:    Gastrointestinal:    Genitourinary:    Rectal:    Extremities:    Vascular:    Neurological:    Skin:    Lymph Nodes:    Musculoskeletal:    Psychiatric:        I&O's Detail    11 Aug 2017 07:01  -  12 Aug 2017 07:00  --------------------------------------------------------  IN:  Total IN: 0 mL    OUT:    Chest Tube: 1080 mL  Total OUT: 1080 mL    Total NET: -1080 mL          LABS:                        8.6    3.9   )-----------( 121      ( 11 Aug 2017 06:54 )             27.3     08-11    138  |  96<L>  |  29.0<H>  ----------------------------<  87  4.1   |  26.0  |  3.46<H>    Ca    8.5<L>      11 Aug 2017 06:54    TPro  5.6<L>  /  Alb  3.3  /  TBili  0.7  /  DBili  x   /  AST  32<H>  /  ALT  41<H>  /  AlkPhos  381<H>  08-10          RADIOLOGY & ADDITIONAL STUDIES: INTERVAL HPI/OVERNIGHT EVENTS: Patient doing well, we discussed at bedside the plan for the fistula, Dr. Boswell will talk with Dr. Bustos in regards to either do the fistula while the pleurix is getting done and if not do it on Wednesday . There is a possibility that this also can be done as an outpatient       MEDICATIONS  (STANDING):  aspirin enteric coated 81 milliGRAM(s) Oral daily  atorvastatin 40 milliGRAM(s) Oral at bedtime  ammonium lactate 12% Lotion 1 Application(s) Topical daily  calcium acetate 667 milliGRAM(s) Oral three times a day with meals  cyanocobalamin 1000 MICROGram(s) Oral daily  heparin  Injectable 5000 Unit(s) SubCutaneous every 8 hours  epoetin jaswinder Injectable 20162 Unit(s) IV Push <User Schedule>  midodrine 10 milliGRAM(s) Oral three times a day  docusate sodium 100 milliGRAM(s) Oral two times a day  iron sucrose IVPB 200 milliGRAM(s) IV Intermittent daily  iron sucrose Injectable 200 milliGRAM(s) IV Push <User Schedule>    MEDICATIONS  (PRN):  acetaminophen   Tablet 650 milliGRAM(s) Oral every 6 hours PRN Mild Pain (1 - 3)  guaiFENesin    Syrup 200 milliGRAM(s) Oral every 6 hours PRN Cough  oxyCODONE    5 mG/acetaminophen 325 mG 1 Tablet(s) Oral every 4 hours PRN Moderate Pain (4 - 6)  oxyCODONE    5 mG/acetaminophen 325 mG 2 Tablet(s) Oral every 6 hours PRN Severe Pain (7 - 10)  ondansetron Injectable 4 milliGRAM(s) IV Push every 6 hours PRN Nausea and/or Vomiting  ALBUTerol/ipratropium for Nebulization 3 milliLiter(s) Nebulizer every 6 hours PRN Shortness of Breath and/or Wheezing      Vital Signs Last 24 Hrs  T(C): 36.7 (11 Aug 2017 21:59), Max: 36.7 (11 Aug 2017 10:16)  T(F): 98 (11 Aug 2017 21:59), Max: 98.1 (11 Aug 2017 10:16)  HR: 73 (12 Aug 2017 05:41) (67 - 80)  BP: 73/44 (12 Aug 2017 05:41) (73/44 - 95/49)  BP(mean): --  RR: 18 (12 Aug 2017 05:41) (13 - 20)  SpO2: 99% (12 Aug 2017 05:41) (98% - 100%)    PHYSICAL EXAM:      Constitutional: IN good spirits    Respiratory: in no respiratory distress, supplemental O2, going for pleurix today with esthela     Cardiovascular: VS WNL    Gastrointestinal: tolerating a diet, abdomen benign    Extremities: moves all extremities     Vascular: discussed fistula plan at bedside with patient, likely wednesday     Neurological: A&Ox3    Skin: warm, dry and no rashes         I&O's Detail    11 Aug 2017 07:01  -  12 Aug 2017 07:00  --------------------------------------------------------  IN:  Total IN: 0 mL    OUT:    Chest Tube: 1080 mL  Total OUT: 1080 mL    Total NET: -1080 mL          LABS:                        8.6    3.9   )-----------( 121      ( 11 Aug 2017 06:54 )             27.3     08-11    138  |  96<L>  |  29.0<H>  ----------------------------<  87  4.1   |  26.0  |  3.46<H>    Ca    8.5<L>      11 Aug 2017 06:54    TPro  5.6<L>  /  Alb  3.3  /  TBili  0.7  /  DBili  x   /  AST  32<H>  /  ALT  41<H>  /  AlkPhos  381<H>  08-10          RADIOLOGY & ADDITIONAL STUDIES:

## 2017-08-12 NOTE — PROGRESS NOTE ADULT - SUBJECTIVE AND OBJECTIVE BOX
Patient was seen and evaluated on dialysis.   No c/o CP SOB NV  no F/C  no swelling    T(C): 36.3 (08-12-17 @ 16:54), Max: 36.7 (08-11-17 @ 21:59)  HR: 84 (08-12-17 @ 16:54) (67 - 84)  BP: 86/44 (08-12-17 @ 16:54) (73/44 - 90/40)  Wt(kg): --  PE ;  NAD  lungs - CTA  CV gr 1 murmer,  No gallop or rub  Abd : soft, NT BS +, No masses  Ext- No edema  Neuro : Grossly intact, moving extremities                             8.6    3.9   )-----------( 121      ( 11 Aug 2017 06:54 )             27.3        08-11    138  |  96<L>  |  29.0<H>  ----------------------------<  87  4.1   |  26.0  |  3.46<H>    Ca    8.5<L>      11 Aug 2017 06:54        MEDICATIONS  (STANDING):  acetaminophen   Tablet PRN  aspirin enteric coated  atorvastatin  ammonium lactate 12% Lotion  guaiFENesin    Syrup PRN  calcium acetate  cyanocobalamin  heparin  Injectable  oxyCODONE    5 mG/acetaminophen 325 mG PRN  oxyCODONE    5 mG/acetaminophen 325 mG PRN  ondansetron Injectable PRN  epoetin jaswinder Injectable  midodrine  docusate sodium  ALBUTerol/ipratropium for Nebulization PRN  iron sucrose Injectable  albumin human 25% IVPB PRN      Patient stable  Aida HD easily  Continue

## 2017-08-12 NOTE — PROGRESS NOTE ADULT - SUBJECTIVE AND OBJECTIVE BOX
Iseen for recurrent pleural effusion, ESRD on HD    no acute complaints.   feels well, wants AVF on this admit  ROS negative     MEDICATIONS  (STANDING):  aspirin enteric coated 81 milliGRAM(s) Oral daily  atorvastatin 40 milliGRAM(s) Oral at bedtime  ammonium lactate 12% Lotion 1 Application(s) Topical daily  calcium acetate 667 milliGRAM(s) Oral three times a day with meals  cyanocobalamin 1000 MICROGram(s) Oral daily  heparin  Injectable 5000 Unit(s) SubCutaneous every 8 hours  epoetin jaswinder Injectable 66065 Unit(s) IV Push <User Schedule>  midodrine 10 milliGRAM(s) Oral three times a day  docusate sodium 100 milliGRAM(s) Oral two times a day  iron sucrose IVPB 200 milliGRAM(s) IV Intermittent daily  iron sucrose Injectable 200 milliGRAM(s) IV Push <User Schedule>    MEDICATIONS  (PRN):  acetaminophen   Tablet 650 milliGRAM(s) Oral every 6 hours PRN Mild Pain (1 - 3)  guaiFENesin    Syrup 200 milliGRAM(s) Oral every 6 hours PRN Cough  oxyCODONE    5 mG/acetaminophen 325 mG 1 Tablet(s) Oral every 4 hours PRN Moderate Pain (4 - 6)  oxyCODONE    5 mG/acetaminophen 325 mG 2 Tablet(s) Oral every 6 hours PRN Severe Pain (7 - 10)  ondansetron Injectable 4 milliGRAM(s) IV Push every 6 hours PRN Nausea and/or Vomiting  ALBUTerol/ipratropium for Nebulization 3 milliLiter(s) Nebulizer every 6 hours PRN Shortness of Breath and/or Wheezing      Allergies    allopurinol (Rash)  codeine (Nausea; Vomiting)  penicillin (Rash)  spironolactone (Unknown)      Vital Signs Last 24 Hrs  T(C): 36.4 (12 Aug 2017 10:33), Max: 36.7 (11 Aug 2017 16:55)  T(F): 97.6 (12 Aug 2017 10:33), Max: 98.1 (11 Aug 2017 16:55)  HR: 69 (12 Aug 2017 10:33) (67 - 80)  BP: 90/40 (12 Aug 2017 10:33) (73/44 - 95/49)  BP(mean): --  RR: 18 (12 Aug 2017 10:33) (13 - 20)  SpO2: 98% (12 Aug 2017 10:33) (98% - 100%)    PHYSICAL EXAM:    GENERAL: NAD  CHEST/LUNG: ctab right pleurex   HEART: irreg irreg rate and rhythm; S1 S2  ABDOMEN: Soft, Nontender, Nondistended; Bowel sounds present  EXTREMITIES:  trace edema. cvi  NERVOUS SYSTEM:  Alert & Oriented X3, nonfocal    LABS:                        8.6    3.9   )-----------( 121      ( 11 Aug 2017 06:54 )             27.3     08-11    138  |  96<L>  |  29.0<H>  ----------------------------<  87  4.1   |  26.0  |  3.46<H>    Ca    8.5<L>      11 Aug 2017 06:54    TPro  5.6<L>  /  Alb  3.3  /  TBili  0.7  /  DBili  x   /  AST  32<H>  /  ALT  41<H>  /  AlkPhos  381<H>  08-10          CAPILLARY BLOOD GLUCOSE            RADIOLOGY & ADDITIONAL TESTS:

## 2017-08-13 PROCEDURE — 99233 SBSQ HOSP IP/OBS HIGH 50: CPT

## 2017-08-13 RX ORDER — POLYETHYLENE GLYCOL 3350 17 G/17G
17 POWDER, FOR SOLUTION ORAL DAILY
Qty: 0 | Refills: 0 | Status: DISCONTINUED | OUTPATIENT
Start: 2017-08-13 | End: 2017-08-16

## 2017-08-13 RX ORDER — ACETAMINOPHEN 500 MG
650 TABLET ORAL EVERY 6 HOURS
Qty: 0 | Refills: 0 | Status: DISCONTINUED | OUTPATIENT
Start: 2017-08-13 | End: 2017-08-13

## 2017-08-13 RX ADMIN — Medication 667 MILLIGRAM(S): at 12:23

## 2017-08-13 RX ADMIN — ATORVASTATIN CALCIUM 40 MILLIGRAM(S): 80 TABLET, FILM COATED ORAL at 21:25

## 2017-08-13 RX ADMIN — HEPARIN SODIUM 5000 UNIT(S): 5000 INJECTION INTRAVENOUS; SUBCUTANEOUS at 05:11

## 2017-08-13 RX ADMIN — MIDODRINE HYDROCHLORIDE 10 MILLIGRAM(S): 2.5 TABLET ORAL at 05:12

## 2017-08-13 RX ADMIN — Medication 200 MILLIGRAM(S): at 21:58

## 2017-08-13 RX ADMIN — Medication 667 MILLIGRAM(S): at 08:55

## 2017-08-13 RX ADMIN — Medication 100 MILLIGRAM(S): at 05:12

## 2017-08-13 RX ADMIN — Medication 200 MILLIGRAM(S): at 05:14

## 2017-08-13 RX ADMIN — Medication 667 MILLIGRAM(S): at 17:11

## 2017-08-13 RX ADMIN — Medication 1 APPLICATION(S): at 11:20

## 2017-08-13 RX ADMIN — Medication 81 MILLIGRAM(S): at 11:13

## 2017-08-13 RX ADMIN — MIDODRINE HYDROCHLORIDE 10 MILLIGRAM(S): 2.5 TABLET ORAL at 14:11

## 2017-08-13 RX ADMIN — POLYETHYLENE GLYCOL 3350 17 GRAM(S): 17 POWDER, FOR SOLUTION ORAL at 17:11

## 2017-08-13 RX ADMIN — HEPARIN SODIUM 5000 UNIT(S): 5000 INJECTION INTRAVENOUS; SUBCUTANEOUS at 14:14

## 2017-08-13 RX ADMIN — MIDODRINE HYDROCHLORIDE 10 MILLIGRAM(S): 2.5 TABLET ORAL at 21:25

## 2017-08-13 RX ADMIN — Medication 100 MILLIGRAM(S): at 17:11

## 2017-08-13 RX ADMIN — PREGABALIN 1000 MICROGRAM(S): 225 CAPSULE ORAL at 11:13

## 2017-08-13 RX ADMIN — HEPARIN SODIUM 5000 UNIT(S): 5000 INJECTION INTRAVENOUS; SUBCUTANEOUS at 21:25

## 2017-08-13 NOTE — PROGRESS NOTE ADULT - SUBJECTIVE AND OBJECTIVE BOX
seen for recurrent pleural effusion, ESRD    no acute complaints. some discomfort at pleurex site.  ROS negative     MEDICATIONS  (STANDING):  aspirin enteric coated 81 milliGRAM(s) Oral daily  atorvastatin 40 milliGRAM(s) Oral at bedtime  ammonium lactate 12% Lotion 1 Application(s) Topical daily  calcium acetate 667 milliGRAM(s) Oral three times a day with meals  cyanocobalamin 1000 MICROGram(s) Oral daily  heparin  Injectable 5000 Unit(s) SubCutaneous every 8 hours  epoetin jaswinder Injectable 60585 Unit(s) IV Push <User Schedule>  midodrine 10 milliGRAM(s) Oral three times a day  docusate sodium 100 milliGRAM(s) Oral two times a day  iron sucrose Injectable 200 milliGRAM(s) IV Push <User Schedule>  polyethylene glycol 3350 17 Gram(s) Oral daily    MEDICATIONS  (PRN):  acetaminophen   Tablet 650 milliGRAM(s) Oral every 6 hours PRN Mild Pain (1 - 3)  guaiFENesin    Syrup 200 milliGRAM(s) Oral every 6 hours PRN Cough  ondansetron Injectable 4 milliGRAM(s) IV Push every 6 hours PRN Nausea and/or Vomiting  ALBUTerol/ipratropium for Nebulization 3 milliLiter(s) Nebulizer every 6 hours PRN Shortness of Breath and/or Wheezing  albumin human 25% IVPB 50 milliLiter(s) IV Intermittent every 30 minutes PRN for SBP < 90      Allergies    allopurinol (Rash)  codeine (Nausea; Vomiting)  penicillin (Rash)  spironolactone (Unknown)    Vital Signs Last 24 Hrs  T(C): 36.8 (13 Aug 2017 10:08), Max: 36.8 (13 Aug 2017 10:08)  T(F): 98.3 (13 Aug 2017 10:08), Max: 98.3 (13 Aug 2017 10:08)  HR: 76 (13 Aug 2017 10:08) (71 - 84)  BP: 81/46 (13 Aug 2017 10:08) (71/34 - 86/46)  BP(mean): --  RR: 16 (13 Aug 2017 10:08) (16 - 18)  SpO2: 99% (13 Aug 2017 05:00) (96% - 100%)    PHYSICAL EXAM:    GENERAL: NAD  CHEST/LUNG: ctab  right pleurex with drainage  HEART: irreg irreg rate and rhythm; S1 S2  ABDOMEN: Soft, Nontender, Nondistended; Bowel sounds present  EXTREMITIES:  trace edema, CVI , right ant shin skin tear (well healing)  NERVOUS SYSTEM:  Alert & Oriented X3,  nonfocal    LABS:                CAPILLARY BLOOD GLUCOSE            RADIOLOGY & ADDITIONAL TESTS:

## 2017-08-13 NOTE — PROGRESS NOTE ADULT - SUBJECTIVE AND OBJECTIVE BOX
Patient seen and examined    Feels fine  no c/o CP SOB NV   No swelling feet      Vital Signs Last 24 Hrs  T(C): 36.8 (08-13-17 @ 21:21), Max: 36.8 (08-13-17 @ 10:08)  T(F): 98.3 (08-13-17 @ 21:21), Max: 98.3 (08-13-17 @ 10:08)  HR: 75 (08-13-17 @ 21:21) (72 - 76)  BP: 88/50 (08-13-17 @ 21:21) (80/42 - 103/58)  BP(mean): --  RR: 18 (08-13-17 @ 21:21) (16 - 18)  SpO2: 100% (08-13-17 @ 21:21) (98% - 100%)    Chest   clear  CV   no murmur  Abd   soft, NT BS+  Extr   No edema  Neuro  grossly iintact motor    75 yo F with severe pulmonary hypertension (long hx of valvular heart disease) afib-chronic (no a/c due to falls, SDH), aortic valve replacement/mitral and tricuspid annuloplasty/RV dysfunction/CRF on dialysis, chronic anemia and chronic transudative pleural effusion s/p Chest tube.  june 2017 SDH after trauma.  Presents for pleurex placement.  Currently awaiting AVF placement.  Not on AC d/t fall risk  for AVF on tuesday

## 2017-08-13 NOTE — PROGRESS NOTE ADULT - ASSESSMENT
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  june 2017 SDH after trauma.  Presents for pleurex placement.  Currently awaiting AVF placement.      >ESRD on HD - T - T - S  renal consulted , HD as per renal    >Hypotension chronic on Midodrine, noted to have lower than her baseline,  increase 10mg tid, monitor BP     > Pulmonary HTN -   off sildenafil due to hypotension    >Chronic diastolic chf - stable - on HD     >A fib not on coumadin due to fall risk and recent SAH    >chronic anemia due to ESRD - stable       >DVT PPX-  heparin

## 2017-08-14 PROCEDURE — 99233 SBSQ HOSP IP/OBS HIGH 50: CPT

## 2017-08-14 RX ADMIN — PREGABALIN 1000 MICROGRAM(S): 225 CAPSULE ORAL at 12:04

## 2017-08-14 RX ADMIN — MIDODRINE HYDROCHLORIDE 10 MILLIGRAM(S): 2.5 TABLET ORAL at 12:04

## 2017-08-14 RX ADMIN — Medication 100 MILLIGRAM(S): at 05:29

## 2017-08-14 RX ADMIN — HEPARIN SODIUM 5000 UNIT(S): 5000 INJECTION INTRAVENOUS; SUBCUTANEOUS at 05:29

## 2017-08-14 RX ADMIN — Medication 81 MILLIGRAM(S): at 12:04

## 2017-08-14 RX ADMIN — HEPARIN SODIUM 5000 UNIT(S): 5000 INJECTION INTRAVENOUS; SUBCUTANEOUS at 12:05

## 2017-08-14 RX ADMIN — Medication 667 MILLIGRAM(S): at 09:06

## 2017-08-14 RX ADMIN — Medication 1 APPLICATION(S): at 21:31

## 2017-08-14 RX ADMIN — MIDODRINE HYDROCHLORIDE 10 MILLIGRAM(S): 2.5 TABLET ORAL at 21:31

## 2017-08-14 RX ADMIN — Medication 667 MILLIGRAM(S): at 17:50

## 2017-08-14 RX ADMIN — MIDODRINE HYDROCHLORIDE 10 MILLIGRAM(S): 2.5 TABLET ORAL at 05:29

## 2017-08-14 RX ADMIN — Medication 667 MILLIGRAM(S): at 12:04

## 2017-08-14 RX ADMIN — HEPARIN SODIUM 5000 UNIT(S): 5000 INJECTION INTRAVENOUS; SUBCUTANEOUS at 21:31

## 2017-08-14 RX ADMIN — ATORVASTATIN CALCIUM 40 MILLIGRAM(S): 80 TABLET, FILM COATED ORAL at 21:31

## 2017-08-14 RX ADMIN — Medication 100 MILLIGRAM(S): at 17:50

## 2017-08-14 NOTE — PROGRESS NOTE ADULT - ASSESSMENT
75 yo F with severe pulmonary hypertension (long hx of valvular heart disease) afib-chronic (no a/c due to falls, SDH), aortic valve replacement/mitral and tricuspid annuloplasty/RV dysfunction/CRF on dialysis, chronic anemia and chronic transudative pleural effusion s/p Chest tube.  june 2017 SDH after trauma.  Presents for pleurex placement.  Currently awaiting AVF placement.

## 2017-08-14 NOTE — PROGRESS NOTE ADULT - SUBJECTIVE AND OBJECTIVE BOX
NEPHROLOGY INTERVAL HPI/OVERNIGHT EVENTS:    Examined    MEDICATIONS  (STANDING):  aspirin enteric coated 81 milliGRAM(s) Oral daily  atorvastatin 40 milliGRAM(s) Oral at bedtime  ammonium lactate 12% Lotion 1 Application(s) Topical daily  calcium acetate 667 milliGRAM(s) Oral three times a day with meals  cyanocobalamin 1000 MICROGram(s) Oral daily  heparin  Injectable 5000 Unit(s) SubCutaneous every 8 hours  epoetin jaswinder Injectable 91852 Unit(s) IV Push <User Schedule>  midodrine 10 milliGRAM(s) Oral three times a day  docusate sodium 100 milliGRAM(s) Oral two times a day  iron sucrose Injectable 200 milliGRAM(s) IV Push <User Schedule>  polyethylene glycol 3350 17 Gram(s) Oral daily    MEDICATIONS  (PRN):  acetaminophen   Tablet 650 milliGRAM(s) Oral every 6 hours PRN Mild Pain (1 - 3)  guaiFENesin    Syrup 200 milliGRAM(s) Oral every 6 hours PRN Cough  ondansetron Injectable 4 milliGRAM(s) IV Push every 6 hours PRN Nausea and/or Vomiting  ALBUTerol/ipratropium for Nebulization 3 milliLiter(s) Nebulizer every 6 hours PRN Shortness of Breath and/or Wheezing  albumin human 25% IVPB 50 milliLiter(s) IV Intermittent every 30 minutes PRN for SBP < 90      Allergies    allopurinol (Rash)  codeine (Nausea; Vomiting)  penicillin (Rash)  spironolactone (Unknown)    Intolerances        Vital Signs Last 24 Hrs  T(C): 36.1 (14 Aug 2017 11:19), Max: 36.8 (13 Aug 2017 21:21)  T(F): 97 (14 Aug 2017 11:19), Max: 98.3 (13 Aug 2017 21:21)  HR: 60 (14 Aug 2017 15:55) (60 - 75)  BP: 90/46 (14 Aug 2017 12:08) (84/44 - 103/58)  BP(mean): --  RR: 18 (14 Aug 2017 15:55) (16 - 20)  SpO2: 100% (14 Aug 2017 15:55) (98% - 100%)  Daily     Daily Weight in k.5 (14 Aug 2017 06:27)    PHYSICAL EXAM:        LABS:                      RADIOLOGY & ADDITIONAL TESTS: NEPHROLOGY INTERVAL HPI/OVERNIGHT EVENTS:    Examined earlier  Looks comfortable    MEDICATIONS  (STANDING):  aspirin enteric coated 81 milliGRAM(s) Oral daily  atorvastatin 40 milliGRAM(s) Oral at bedtime  ammonium lactate 12% Lotion 1 Application(s) Topical daily  calcium acetate 667 milliGRAM(s) Oral three times a day with meals  cyanocobalamin 1000 MICROGram(s) Oral daily  heparin  Injectable 5000 Unit(s) SubCutaneous every 8 hours  epoetin jaswinder Injectable 74671 Unit(s) IV Push <User Schedule>  midodrine 10 milliGRAM(s) Oral three times a day  docusate sodium 100 milliGRAM(s) Oral two times a day  iron sucrose Injectable 200 milliGRAM(s) IV Push <User Schedule>  polyethylene glycol 3350 17 Gram(s) Oral daily    MEDICATIONS  (PRN):  acetaminophen   Tablet 650 milliGRAM(s) Oral every 6 hours PRN Mild Pain (1 - 3)  guaiFENesin    Syrup 200 milliGRAM(s) Oral every 6 hours PRN Cough  ondansetron Injectable 4 milliGRAM(s) IV Push every 6 hours PRN Nausea and/or Vomiting  ALBUTerol/ipratropium for Nebulization 3 milliLiter(s) Nebulizer every 6 hours PRN Shortness of Breath and/or Wheezing  albumin human 25% IVPB 50 milliLiter(s) IV Intermittent every 30 minutes PRN for SBP < 90      Allergies    allopurinol (Rash)  codeine (Nausea; Vomiting)  penicillin (Rash)  spironolactone (Unknown)    Intolerances        Vital Signs Last 24 Hrs  T(C): 36.1 (14 Aug 2017 11:19), Max: 36.8 (13 Aug 2017 21:21)  T(F): 97 (14 Aug 2017 11:19), Max: 98.3 (13 Aug 2017 21:21)  HR: 60 (14 Aug 2017 15:55) (60 - 75)  BP: 90/46 (14 Aug 2017 12:08) (84/44 - 103/58)  BP(mean): --  RR: 18 (14 Aug 2017 15:55) (16 - 20)  SpO2: 100% (14 Aug 2017 15:55) (98% - 100%)  Daily     Daily Weight in k.5 (14 Aug 2017 06:27)    PHYSICAL EXAM:  GENERAL: appears chronically ill  HEAD:  Atraumatic, Normocephalic  EYES: EOMI  NECK: Supple, neck  veins full  NERVOUS SYSTEM:  Alert & Oriented X3  CHEST/LUNG: Dec BS R> L  HEART: Regular rate and rhythm; No murmurs  ABDOMEN: Soft, Nontender, Nondistended; Bowel sounds present  EXTREMITIES:  No edema      LABS:                      RADIOLOGY & ADDITIONAL TESTS:

## 2017-08-14 NOTE — PROGRESS NOTE ADULT - ASSESSMENT
ESRD on HD TTS HD tommorow  Pleural effusion s/p CT placement fluid transudative s/p VATS   Anemia 2/2 CKD ESPERANZA with HD TS 14% s/p  IV iron x 5 days  Electrolytes acceptable

## 2017-08-14 NOTE — PROGRESS NOTE ADULT - SUBJECTIVE AND OBJECTIVE BOX
INTERVAL HPI/OVERNIGHT EVENTS/SUBJECTIVE:  76yF planned for OR Wednesday for AVF creation. Pt seen and examined at bedside. No acute events overnight. Offers no complaints at this time. Denies CP, SOB, f/v, n/v/d, numbness/tingling.    ICU Vital Signs Last 24 Hrs  T(C): 36.1 (14 Aug 2017 11:19), Max: 36.8 (13 Aug 2017 21:21)  T(F): 97 (14 Aug 2017 11:19), Max: 98.3 (13 Aug 2017 21:21)  HR: 66 (14 Aug 2017 12:08) (64 - 75)  BP: 90/46 (14 Aug 2017 12:08) (84/44 - 103/58)  BP(mean): --  ABP: --  ABP(mean): --  RR: 20 (14 Aug 2017 12:08) (16 - 20)  SpO2: 100% (14 Aug 2017 12:08) (98% - 100%)          MEDICATIONS  (STANDING):  aspirin enteric coated 81 milliGRAM(s) Oral daily  atorvastatin 40 milliGRAM(s) Oral at bedtime  ammonium lactate 12% Lotion 1 Application(s) Topical daily  calcium acetate 667 milliGRAM(s) Oral three times a day with meals  cyanocobalamin 1000 MICROGram(s) Oral daily  heparin  Injectable 5000 Unit(s) SubCutaneous every 8 hours  epoetin jaswinder Injectable 70668 Unit(s) IV Push <User Schedule>  midodrine 10 milliGRAM(s) Oral three times a day  docusate sodium 100 milliGRAM(s) Oral two times a day  iron sucrose Injectable 200 milliGRAM(s) IV Push <User Schedule>  polyethylene glycol 3350 17 Gram(s) Oral daily    MEDICATIONS  (PRN):  acetaminophen   Tablet 650 milliGRAM(s) Oral every 6 hours PRN Mild Pain (1 - 3)  guaiFENesin    Syrup 200 milliGRAM(s) Oral every 6 hours PRN Cough  ondansetron Injectable 4 milliGRAM(s) IV Push every 6 hours PRN Nausea and/or Vomiting  ALBUTerol/ipratropium for Nebulization 3 milliLiter(s) Nebulizer every 6 hours PRN Shortness of Breath and/or Wheezing  albumin human 25% IVPB 50 milliLiter(s) IV Intermittent every 30 minutes PRN for SBP < 90    MISC:     PHYSICAL EXAM:  general: NAD, WDWN, resting comfortably.  Respiratory: non-labored, no accessory muscle use  Cardio: s1/s2        ASSESSMENT/PLAN:  76yFemale, planned for OR wednesday for AVF creation. Offers no complaints at this time.  -plan per primary team  -OR Wednesday for AVF creation  -will need pre-op, NPO after midnight Tuesday  -cardio and neuro cleared for surgery  -will discuss plan with attending INTERVAL HPI/OVERNIGHT EVENTS/SUBJECTIVE:  76yF planned for OR Wednesday for AVF creation. Pt seen and examined at bedside. No acute events overnight. Offers no complaints at this time. Denies CP, SOB, f/v, n/v/d, numbness/tingling.    ICU Vital Signs Last 24 Hrs  T(C): 36.1 (14 Aug 2017 11:19), Max: 36.8 (13 Aug 2017 21:21)  T(F): 97 (14 Aug 2017 11:19), Max: 98.3 (13 Aug 2017 21:21)  HR: 66 (14 Aug 2017 12:08) (64 - 75)  BP: 90/46 (14 Aug 2017 12:08) (84/44 - 103/58)  BP(mean): --  ABP: --  ABP(mean): --  RR: 20 (14 Aug 2017 12:08) (16 - 20)  SpO2: 100% (14 Aug 2017 12:08) (98% - 100%)          MEDICATIONS  (STANDING):  aspirin enteric coated 81 milliGRAM(s) Oral daily  atorvastatin 40 milliGRAM(s) Oral at bedtime  ammonium lactate 12% Lotion 1 Application(s) Topical daily  calcium acetate 667 milliGRAM(s) Oral three times a day with meals  cyanocobalamin 1000 MICROGram(s) Oral daily  heparin  Injectable 5000 Unit(s) SubCutaneous every 8 hours  epoetin jaswinder Injectable 00932 Unit(s) IV Push <User Schedule>  midodrine 10 milliGRAM(s) Oral three times a day  docusate sodium 100 milliGRAM(s) Oral two times a day  iron sucrose Injectable 200 milliGRAM(s) IV Push <User Schedule>  polyethylene glycol 3350 17 Gram(s) Oral daily    MEDICATIONS  (PRN):  acetaminophen   Tablet 650 milliGRAM(s) Oral every 6 hours PRN Mild Pain (1 - 3)  guaiFENesin    Syrup 200 milliGRAM(s) Oral every 6 hours PRN Cough  ondansetron Injectable 4 milliGRAM(s) IV Push every 6 hours PRN Nausea and/or Vomiting  ALBUTerol/ipratropium for Nebulization 3 milliLiter(s) Nebulizer every 6 hours PRN Shortness of Breath and/or Wheezing  albumin human 25% IVPB 50 milliLiter(s) IV Intermittent every 30 minutes PRN for SBP < 90    MISC:     PHYSICAL EXAM:  general: NAD, WDWN, resting comfortably.  Respiratory: non-labored, no accessory muscle use  Cardio: s1/s2        ASSESSMENT/PLAN:  76yFemale, planned for OR wednesday for AVF creation. Offers no complaints at this time.  -plan per primary team  -OR Wednesday for AVF creation  -will need pre-op, NPO after midnight Tuesday  -will discuss plan with attending

## 2017-08-14 NOTE — PROGRESS NOTE ADULT - SUBJECTIVE AND OBJECTIVE BOX
seen for HD, pleural effusion s/p pleurex    no acute complaints, feels well  ROS negative.    MEDICATIONS  (STANDING):  aspirin enteric coated 81 milliGRAM(s) Oral daily  atorvastatin 40 milliGRAM(s) Oral at bedtime  ammonium lactate 12% Lotion 1 Application(s) Topical daily  calcium acetate 667 milliGRAM(s) Oral three times a day with meals  cyanocobalamin 1000 MICROGram(s) Oral daily  heparin  Injectable 5000 Unit(s) SubCutaneous every 8 hours  epoetin jaswinder Injectable 32954 Unit(s) IV Push <User Schedule>  midodrine 10 milliGRAM(s) Oral three times a day  docusate sodium 100 milliGRAM(s) Oral two times a day  iron sucrose Injectable 200 milliGRAM(s) IV Push <User Schedule>  polyethylene glycol 3350 17 Gram(s) Oral daily    MEDICATIONS  (PRN):  acetaminophen   Tablet 650 milliGRAM(s) Oral every 6 hours PRN Mild Pain (1 - 3)  guaiFENesin    Syrup 200 milliGRAM(s) Oral every 6 hours PRN Cough  ondansetron Injectable 4 milliGRAM(s) IV Push every 6 hours PRN Nausea and/or Vomiting  ALBUTerol/ipratropium for Nebulization 3 milliLiter(s) Nebulizer every 6 hours PRN Shortness of Breath and/or Wheezing  albumin human 25% IVPB 50 milliLiter(s) IV Intermittent every 30 minutes PRN for SBP < 90      Allergies    allopurinol (Rash)  codeine (Nausea; Vomiting)  penicillin (Rash)  spironolactone (Unknown)    Vital Signs Last 24 Hrs  T(C): 36.1 (14 Aug 2017 11:19), Max: 36.8 (13 Aug 2017 21:21)  T(F): 97 (14 Aug 2017 11:19), Max: 98.3 (13 Aug 2017 21:21)  HR: 69 (14 Aug 2017 11:19) (64 - 75)  BP: 85/44 (14 Aug 2017 11:19) (84/44 - 103/58)  BP(mean): --  RR: 18 (14 Aug 2017 11:19) (16 - 18)  SpO2: 100% (14 Aug 2017 09:51) (98% - 100%)    PHYSICAL EXAM:    GENERAL: NAD  CHEST/LUNG: ctab  right pleurex draining   HEART: irreg irreg rate and rhythm; S1 S2  ABDOMEN: Soft, Nontender, Nondistended; Bowel sounds present  EXTREMITIES CVI  NERVOUS SYSTEM:  Alert & Oriented X3 nonfocal    LABS:                CAPILLARY BLOOD GLUCOSE            RADIOLOGY & ADDITIONAL TESTS:

## 2017-08-15 LAB
ANION GAP SERPL CALC-SCNC: 17 MMOL/L — SIGNIFICANT CHANGE UP (ref 5–17)
BUN SERPL-MCNC: 49 MG/DL — HIGH (ref 8–20)
CALCIUM SERPL-MCNC: 8.2 MG/DL — LOW (ref 8.6–10.2)
CHLORIDE SERPL-SCNC: 92 MMOL/L — LOW (ref 98–107)
CO2 SERPL-SCNC: 25 MMOL/L — SIGNIFICANT CHANGE UP (ref 22–29)
CREAT SERPL-MCNC: 5.04 MG/DL — HIGH (ref 0.5–1.3)
GLUCOSE SERPL-MCNC: 87 MG/DL — SIGNIFICANT CHANGE UP (ref 70–115)
HCT VFR BLD CALC: 29 % — LOW (ref 37–47)
HGB BLD-MCNC: 8.9 G/DL — LOW (ref 12–16)
MCHC RBC-ENTMCNC: 30.7 G/DL — LOW (ref 32–36)
MCHC RBC-ENTMCNC: 34.9 PG — HIGH (ref 27–31)
MCV RBC AUTO: 113.7 FL — HIGH (ref 81–99)
PLATELET # BLD AUTO: 131 K/UL — LOW (ref 150–400)
POTASSIUM SERPL-MCNC: 4.3 MMOL/L — SIGNIFICANT CHANGE UP (ref 3.5–5.3)
POTASSIUM SERPL-SCNC: 4.3 MMOL/L — SIGNIFICANT CHANGE UP (ref 3.5–5.3)
RBC # BLD: 2.55 M/UL — LOW (ref 4.4–5.2)
RBC # FLD: 17.3 % — HIGH (ref 11–15.6)
SODIUM SERPL-SCNC: 134 MMOL/L — LOW (ref 135–145)
WBC # BLD: 3.8 K/UL — LOW (ref 4.8–10.8)
WBC # FLD AUTO: 3.8 K/UL — LOW (ref 4.8–10.8)

## 2017-08-15 PROCEDURE — 99233 SBSQ HOSP IP/OBS HIGH 50: CPT

## 2017-08-15 RX ADMIN — MIDODRINE HYDROCHLORIDE 10 MILLIGRAM(S): 2.5 TABLET ORAL at 15:42

## 2017-08-15 RX ADMIN — ATORVASTATIN CALCIUM 40 MILLIGRAM(S): 80 TABLET, FILM COATED ORAL at 22:02

## 2017-08-15 RX ADMIN — HEPARIN SODIUM 5000 UNIT(S): 5000 INJECTION INTRAVENOUS; SUBCUTANEOUS at 15:41

## 2017-08-15 RX ADMIN — POLYETHYLENE GLYCOL 3350 17 GRAM(S): 17 POWDER, FOR SOLUTION ORAL at 12:55

## 2017-08-15 RX ADMIN — HEPARIN SODIUM 5000 UNIT(S): 5000 INJECTION INTRAVENOUS; SUBCUTANEOUS at 05:20

## 2017-08-15 RX ADMIN — Medication 200 MILLIGRAM(S): at 20:32

## 2017-08-15 RX ADMIN — IRON SUCROSE 200 MILLIGRAM(S): 20 INJECTION, SOLUTION INTRAVENOUS at 05:45

## 2017-08-15 RX ADMIN — ERYTHROPOIETIN 10000 UNIT(S): 10000 INJECTION, SOLUTION INTRAVENOUS; SUBCUTANEOUS at 09:57

## 2017-08-15 RX ADMIN — Medication 81 MILLIGRAM(S): at 12:56

## 2017-08-15 RX ADMIN — PREGABALIN 1000 MICROGRAM(S): 225 CAPSULE ORAL at 12:56

## 2017-08-15 RX ADMIN — MIDODRINE HYDROCHLORIDE 10 MILLIGRAM(S): 2.5 TABLET ORAL at 05:20

## 2017-08-15 RX ADMIN — Medication 100 MILLIGRAM(S): at 05:20

## 2017-08-15 RX ADMIN — HEPARIN SODIUM 5000 UNIT(S): 5000 INJECTION INTRAVENOUS; SUBCUTANEOUS at 22:02

## 2017-08-15 RX ADMIN — MIDODRINE HYDROCHLORIDE 10 MILLIGRAM(S): 2.5 TABLET ORAL at 22:02

## 2017-08-15 RX ADMIN — Medication 667 MILLIGRAM(S): at 18:15

## 2017-08-15 RX ADMIN — Medication 100 MILLIGRAM(S): at 18:15

## 2017-08-15 RX ADMIN — Medication 1 APPLICATION(S): at 18:15

## 2017-08-15 RX ADMIN — Medication 667 MILLIGRAM(S): at 12:56

## 2017-08-15 NOTE — PROGRESS NOTE ADULT - ASSESSMENT
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  june 2017 SDH after trauma.  Presents for pleurex placement.  Currently awaiting AVF placement.

## 2017-08-15 NOTE — PROGRESS NOTE ADULT - SUBJECTIVE AND OBJECTIVE BOX
NEPHROLOGY INTERVAL HPI/OVERNIGHT EVENTS:    Examined earlier  Looks comfortable  HD today    MEDICATIONS  (STANDING):  aspirin enteric coated 81 milliGRAM(s) Oral daily  atorvastatin 40 milliGRAM(s) Oral at bedtime  ammonium lactate 12% Lotion 1 Application(s) Topical daily  calcium acetate 667 milliGRAM(s) Oral three times a day with meals  cyanocobalamin 1000 MICROGram(s) Oral daily  heparin  Injectable 5000 Unit(s) SubCutaneous every 8 hours  epoetin jaswinder Injectable 72764 Unit(s) IV Push <User Schedule>  midodrine 10 milliGRAM(s) Oral three times a day  docusate sodium 100 milliGRAM(s) Oral two times a day  iron sucrose Injectable 200 milliGRAM(s) IV Push <User Schedule>  polyethylene glycol 3350 17 Gram(s) Oral daily    MEDICATIONS  (PRN):  acetaminophen   Tablet 650 milliGRAM(s) Oral every 6 hours PRN Mild Pain (1 - 3)  guaiFENesin    Syrup 200 milliGRAM(s) Oral every 6 hours PRN Cough  ondansetron Injectable 4 milliGRAM(s) IV Push every 6 hours PRN Nausea and/or Vomiting  ALBUTerol/ipratropium for Nebulization 3 milliLiter(s) Nebulizer every 6 hours PRN Shortness of Breath and/or Wheezing  albumin human 25% IVPB 50 milliLiter(s) IV Intermittent every 30 minutes PRN for SBP < 90      Allergies    allopurinol (Rash)  codeine (Nausea; Vomiting)  penicillin (Rash)  spironolactone (Unknown)    Intolerances        Vital Signs Last 24 Hrs  T(C): 37.2 (15 Aug 2017 15:00), Max: 37.2 (15 Aug 2017 15:00)  T(F): 98.9 (15 Aug 2017 15:00), Max: 98.9 (15 Aug 2017 15:00)  HR: 70 (15 Aug 2017 15:39) (66 - 77)  BP: 88/42 (15 Aug 2017 15:39) (84/48 - 98/51)  BP(mean): --  RR: 18 (15 Aug 2017 15:39) (17 - 18)  SpO2: 100% (15 Aug 2017 15:39) (98% - 100%)  Daily     Daily Weight in k.7 (15 Aug 2017 11:09)    PHYSICAL EXAM:  GENERAL: appears chronically ill  HEAD:  Atraumatic, Normocephalic  EYES: EOMI  NECK: Supple, neck  veins full  NERVOUS SYSTEM:  Alert & Oriented X3  CHEST/LUNG: Dec BS R> L  HEART: Regular rate and rhythm; No murmurs  ABDOMEN: Soft, Nontender, Nondistended; Bowel sounds present  EXTREMITIES:  No edema      LABS:                        8.9    3.8   )-----------( 131      ( 15 Aug 2017 08:18 )             29.0     08-15    134<L>  |  92<L>  |  49.0<H>  ----------------------------<  87  4.3   |  25.0  |  5.04<H>    Ca    8.2<L>      15 Aug 2017 08:18                  RADIOLOGY & ADDITIONAL TESTS:

## 2017-08-15 NOTE — PROGRESS NOTE ADULT - SUBJECTIVE AND OBJECTIVE BOX
seen for pleural effusion, HD    awaiting AVF  no acute complaints  ROS negative     MEDICATIONS  (STANDING):  aspirin enteric coated 81 milliGRAM(s) Oral daily  atorvastatin 40 milliGRAM(s) Oral at bedtime  ammonium lactate 12% Lotion 1 Application(s) Topical daily  calcium acetate 667 milliGRAM(s) Oral three times a day with meals  cyanocobalamin 1000 MICROGram(s) Oral daily  heparin  Injectable 5000 Unit(s) SubCutaneous every 8 hours  epoetin jaswinder Injectable 94136 Unit(s) IV Push <User Schedule>  midodrine 10 milliGRAM(s) Oral three times a day  docusate sodium 100 milliGRAM(s) Oral two times a day  iron sucrose Injectable 200 milliGRAM(s) IV Push <User Schedule>  polyethylene glycol 3350 17 Gram(s) Oral daily    MEDICATIONS  (PRN):  acetaminophen   Tablet 650 milliGRAM(s) Oral every 6 hours PRN Mild Pain (1 - 3)  guaiFENesin    Syrup 200 milliGRAM(s) Oral every 6 hours PRN Cough  ondansetron Injectable 4 milliGRAM(s) IV Push every 6 hours PRN Nausea and/or Vomiting  ALBUTerol/ipratropium for Nebulization 3 milliLiter(s) Nebulizer every 6 hours PRN Shortness of Breath and/or Wheezing  albumin human 25% IVPB 50 milliLiter(s) IV Intermittent every 30 minutes PRN for SBP < 90      Allergies    allopurinol (Rash)  codeine (Nausea; Vomiting)  penicillin (Rash)  spironolactone (Unknown)    Vital Signs Last 24 Hrs  T(C): 36.9 (15 Aug 2017 12:47), Max: 36.9 (15 Aug 2017 12:47)  T(F): 98.4 (15 Aug 2017 12:47), Max: 98.4 (15 Aug 2017 12:47)  HR: 70 (15 Aug 2017 15:39) (60 - 77)  BP: 88/42 (15 Aug 2017 15:39) (84/48 - 98/40)  BP(mean): --  RR: 18 (15 Aug 2017 15:39) (17 - 18)  SpO2: 100% (15 Aug 2017 15:39) (98% - 100%)    PHYSICAL EXAM:    GENERAL: NAD  CHEST/LUNG: ctab  right pleurex   HEART: irreg irreg rate and rhythm; S1 S2  ABDOMEN: Soft, Nontender, Nondistended; Bowel sounds present  EXTREMITIES:  cvi   NERVOUS SYSTEM:  Alert & Oriented X3, non focal    LABS:                        8.9    3.8   )-----------( 131      ( 15 Aug 2017 08:18 )             29.0     08-15    134<L>  |  92<L>  |  49.0<H>  ----------------------------<  87  4.3   |  25.0  |  5.04<H>    Ca    8.2<L>      15 Aug 2017 08:18            CAPILLARY BLOOD GLUCOSE            RADIOLOGY & ADDITIONAL TESTS:

## 2017-08-15 NOTE — PROGRESS NOTE ADULT - ASSESSMENT
ESRD on HD TTS HD today  Pleural effusion s/p CT placement fluid transudative s/p VATS   Anemia 2/2 CKD ESPERANZA with HD TS 14% s/p  IV iron x 5 days  Electrolytes acceptable

## 2017-08-16 ENCOUNTER — TRANSCRIPTION ENCOUNTER (OUTPATIENT)
Age: 77
End: 2017-08-16

## 2017-08-16 LAB
ANION GAP SERPL CALC-SCNC: 13 MMOL/L — SIGNIFICANT CHANGE UP (ref 5–17)
BLD GP AB SCN SERPL QL: ABNORMAL
BUN SERPL-MCNC: 28 MG/DL — HIGH (ref 8–20)
CALCIUM SERPL-MCNC: 8.2 MG/DL — LOW (ref 8.6–10.2)
CHLORIDE SERPL-SCNC: 97 MMOL/L — LOW (ref 98–107)
CO2 SERPL-SCNC: 26 MMOL/L — SIGNIFICANT CHANGE UP (ref 22–29)
CREAT SERPL-MCNC: 3.39 MG/DL — HIGH (ref 0.5–1.3)
GLUCOSE SERPL-MCNC: 88 MG/DL — SIGNIFICANT CHANGE UP (ref 70–115)
HCT VFR BLD CALC: 29.2 % — LOW (ref 37–47)
HGB BLD-MCNC: 9.3 G/DL — LOW (ref 12–16)
INR BLD: 1 RATIO — SIGNIFICANT CHANGE UP (ref 0.88–1.16)
MCHC RBC-ENTMCNC: 31.8 G/DL — LOW (ref 32–36)
MCHC RBC-ENTMCNC: 35.4 PG — HIGH (ref 27–31)
MCV RBC AUTO: 111 FL — HIGH (ref 81–99)
PLATELET # BLD AUTO: 114 K/UL — LOW (ref 150–400)
POTASSIUM SERPL-MCNC: 4.4 MMOL/L — SIGNIFICANT CHANGE UP (ref 3.5–5.3)
POTASSIUM SERPL-SCNC: 4.4 MMOL/L — SIGNIFICANT CHANGE UP (ref 3.5–5.3)
PROTHROM AB SERPL-ACNC: 11 SEC — SIGNIFICANT CHANGE UP (ref 9.8–12.7)
RBC # BLD: 2.63 M/UL — LOW (ref 4.4–5.2)
RBC # FLD: 17.8 % — HIGH (ref 11–15.6)
SODIUM SERPL-SCNC: 136 MMOL/L — SIGNIFICANT CHANGE UP (ref 135–145)
TYPE + AB SCN PNL BLD: SIGNIFICANT CHANGE UP
WBC # BLD: 3.2 K/UL — LOW (ref 4.8–10.8)
WBC # FLD AUTO: 3.2 K/UL — LOW (ref 4.8–10.8)

## 2017-08-16 PROCEDURE — 36821 AV FUSION DIRECT ANY SITE: CPT

## 2017-08-16 PROCEDURE — 99233 SBSQ HOSP IP/OBS HIGH 50: CPT

## 2017-08-16 RX ORDER — ACETAMINOPHEN 500 MG
650 TABLET ORAL EVERY 6 HOURS
Qty: 0 | Refills: 0 | Status: DISCONTINUED | OUTPATIENT
Start: 2017-08-16 | End: 2017-08-17

## 2017-08-16 RX ORDER — ATORVASTATIN CALCIUM 80 MG/1
40 TABLET, FILM COATED ORAL AT BEDTIME
Qty: 0 | Refills: 0 | Status: DISCONTINUED | OUTPATIENT
Start: 2017-08-16 | End: 2017-08-17

## 2017-08-16 RX ORDER — SODIUM CHLORIDE 9 MG/ML
1000 INJECTION INTRAMUSCULAR; INTRAVENOUS; SUBCUTANEOUS
Qty: 0 | Refills: 0 | Status: DISCONTINUED | OUTPATIENT
Start: 2017-08-16 | End: 2017-08-16

## 2017-08-16 RX ORDER — PREGABALIN 225 MG/1
1000 CAPSULE ORAL DAILY
Qty: 0 | Refills: 0 | Status: DISCONTINUED | OUTPATIENT
Start: 2017-08-16 | End: 2017-08-17

## 2017-08-16 RX ORDER — HYDROMORPHONE HYDROCHLORIDE 2 MG/ML
0.5 INJECTION INTRAMUSCULAR; INTRAVENOUS; SUBCUTANEOUS
Qty: 0 | Refills: 0 | Status: DISCONTINUED | OUTPATIENT
Start: 2017-08-16 | End: 2017-08-16

## 2017-08-16 RX ORDER — HEPARIN SODIUM 5000 [USP'U]/ML
5000 INJECTION INTRAVENOUS; SUBCUTANEOUS EVERY 8 HOURS
Qty: 0 | Refills: 0 | Status: DISCONTINUED | OUTPATIENT
Start: 2017-08-16 | End: 2017-08-17

## 2017-08-16 RX ORDER — ASPIRIN/CALCIUM CARB/MAGNESIUM 324 MG
81 TABLET ORAL DAILY
Qty: 0 | Refills: 0 | Status: DISCONTINUED | OUTPATIENT
Start: 2017-08-16 | End: 2017-08-17

## 2017-08-16 RX ORDER — ONDANSETRON 8 MG/1
4 TABLET, FILM COATED ORAL ONCE
Qty: 0 | Refills: 0 | Status: DISCONTINUED | OUTPATIENT
Start: 2017-08-16 | End: 2017-08-16

## 2017-08-16 RX ORDER — MIDODRINE HYDROCHLORIDE 2.5 MG/1
10 TABLET ORAL THREE TIMES A DAY
Qty: 0 | Refills: 0 | Status: DISCONTINUED | OUTPATIENT
Start: 2017-08-16 | End: 2017-08-17

## 2017-08-16 RX ORDER — CALCIUM ACETATE 667 MG
667 TABLET ORAL
Qty: 0 | Refills: 0 | Status: DISCONTINUED | OUTPATIENT
Start: 2017-08-16 | End: 2017-08-17

## 2017-08-16 RX ADMIN — MIDODRINE HYDROCHLORIDE 10 MILLIGRAM(S): 2.5 TABLET ORAL at 21:53

## 2017-08-16 RX ADMIN — Medication 650 MILLIGRAM(S): at 21:18

## 2017-08-16 RX ADMIN — MIDODRINE HYDROCHLORIDE 10 MILLIGRAM(S): 2.5 TABLET ORAL at 05:15

## 2017-08-16 RX ADMIN — Medication 100 MILLIGRAM(S): at 05:15

## 2017-08-16 RX ADMIN — ATORVASTATIN CALCIUM 40 MILLIGRAM(S): 80 TABLET, FILM COATED ORAL at 21:50

## 2017-08-16 RX ADMIN — HEPARIN SODIUM 5000 UNIT(S): 5000 INJECTION INTRAVENOUS; SUBCUTANEOUS at 21:53

## 2017-08-16 RX ADMIN — Medication 650 MILLIGRAM(S): at 21:33

## 2017-08-16 NOTE — BRIEF OPERATIVE NOTE - OPERATION/FINDINGS
Basilic vein and radial artery identified in right upper extremity with anastomosis of both vessels
900 cc pleural fluid removed

## 2017-08-16 NOTE — PROGRESS NOTE ADULT - PROBLEM SELECTOR PLAN 2
HD per renal, midodrine  patient requesting AVF placement--vascular to follow up  neuro and cardiac clearance for AVF
HD per renal, midodrine  vascular following for AVF placement: wednesday
HD per renal, midodrine  patient requesting AVF placement--vascular to follow up  requesting neuro and cardiac clearance for AVF

## 2017-08-16 NOTE — PROGRESS NOTE ADULT - PROBLEM SELECTOR PLAN 5
TSH and b12 wnl
check TSH and b12

## 2017-08-16 NOTE — BRIEF OPERATIVE NOTE - PRE-OP DX
End stage renal disease  08/16/2017    Active  Jovani Wu
Recurrent pleural effusion on right  08/11/2017    Active  Arslan Hebert

## 2017-08-16 NOTE — PROGRESS NOTE ADULT - ASSESSMENT
ESRD:   Pleural effusion s/p CT placement fluid transudative s/p VATS   - HD tomorrow  - continue Midodrine    Anemia:CKD   - ESPERANZA and Venofer at dialysis    RO: Phoslo

## 2017-08-16 NOTE — PROGRESS NOTE ADULT - PROBLEM SELECTOR PROBLEM 2
ESRD (end stage renal disease) on dialysis

## 2017-08-16 NOTE — PROGRESS NOTE ADULT - ATTENDING COMMENTS
Pt seen and evaluated. Has ESRD on HD via RIJ permcath  needs permanent access procedure  Hx of L chest pacemaker and thus preserving RUE for access  Prior vein mapping in office suggested suitable cephalic vein  Plan:  for OR tomorrow for RUE AVF. Will remap prior to incision and ensure adequacy and if not, would proceed with graft  Preop tonight
HD today, epogen
d/c planning post AVF if no complications
Agree with NP note above. Pt w ESRD on HD via RIJ permcath, needs AVF/AVG. Has hx of L sided pacemaker, RUE being preserved  Originally planned for July 2017, but deferred after pt had a fall and TBI  Now back in house with R pleural effusion s/p drainage  Plan:  Continue HD access via catheter  Will need RUE AVF/AVG. Can do while in house  Planned for pleurex catheter by Dr. Bustos. May try and coordinate timing with him. If not feasible, will tentatively plan for OR for Wednesday 8/16/2017

## 2017-08-16 NOTE — PROGRESS NOTE ADULT - PROBLEM SELECTOR PLAN 4
due to heart disease  duonebs prn

## 2017-08-16 NOTE — PROGRESS NOTE ADULT - NSHPATTENDINGPLANDISCUSS_GEN_ALL_CORE
patient
patient
patient, RN.
patient, RN.  d/w vascular surgery PA

## 2017-08-16 NOTE — PROGRESS NOTE ADULT - PROBLEM SELECTOR PROBLEM 3
CAD (coronary artery disease)

## 2017-08-16 NOTE — PROGRESS NOTE ADULT - PROBLEM SELECTOR PROBLEM 1
Pleural effusion
ESRD (end stage renal disease) on dialysis
ESRD (end stage renal disease) on dialysis
Pleural effusion
Pleural effusion

## 2017-08-16 NOTE — PROGRESS NOTE ADULT - SUBJECTIVE AND OBJECTIVE BOX
seen for pleural effusion, HD    awaiting AVF today  no acute complaints  ROS negative     MEDICATIONS  (STANDING):  aspirin enteric coated 81 milliGRAM(s) Oral daily  atorvastatin 40 milliGRAM(s) Oral at bedtime  ammonium lactate 12% Lotion 1 Application(s) Topical daily  calcium acetate 667 milliGRAM(s) Oral three times a day with meals  cyanocobalamin 1000 MICROGram(s) Oral daily  heparin  Injectable 5000 Unit(s) SubCutaneous every 8 hours  epoetin jaswinder Injectable 80800 Unit(s) IV Push <User Schedule>  midodrine 10 milliGRAM(s) Oral three times a day  docusate sodium 100 milliGRAM(s) Oral two times a day  iron sucrose Injectable 200 milliGRAM(s) IV Push <User Schedule>  polyethylene glycol 3350 17 Gram(s) Oral daily    MEDICATIONS  (PRN):  acetaminophen   Tablet 650 milliGRAM(s) Oral every 6 hours PRN Mild Pain (1 - 3)  guaiFENesin    Syrup 200 milliGRAM(s) Oral every 6 hours PRN Cough  ondansetron Injectable 4 milliGRAM(s) IV Push every 6 hours PRN Nausea and/or Vomiting  ALBUTerol/ipratropium for Nebulization 3 milliLiter(s) Nebulizer every 6 hours PRN Shortness of Breath and/or Wheezing  albumin human 25% IVPB 50 milliLiter(s) IV Intermittent every 30 minutes PRN for SBP < 90      Allergies    allopurinol (Rash)  codeine (Nausea; Vomiting)  penicillin (Rash)  spironolactone (Unknown)    Vital Signs Last 24 Hrs  T(C): 37.3 (16 Aug 2017 10:56), Max: 37.3 (16 Aug 2017 10:56)  T(F): 99.1 (16 Aug 2017 10:56), Max: 99.1 (16 Aug 2017 10:56)  HR: 77 (16 Aug 2017 10:56) (70 - 77)  BP: 94/48 (16 Aug 2017 10:56) (88/42 - 98/51)  BP(mean): 48 (16 Aug 2017 10:56) (48 - 48)  RR: 16 (16 Aug 2017 10:56) (16 - 18)  SpO2: 100% (16 Aug 2017 10:56) (98% - 100%)    PHYSICAL EXAM:    GENERAL: NAD  CHEST/LUNG: Ctab, right pleurex with drainage  HEART: irreg irreg rate and rhythm; S1 S2  ABDOMEN: Soft, Nontender, Nondistended; Bowel sounds present  EXTREMITIES:  CVI  NERVOUS SYSTEM:  Alert & Oriented X3, nonfocal    LABS:                        9.3    3.2   )-----------( 114      ( 16 Aug 2017 06:15 )             29.2     08-16    136  |  97<L>  |  28.0<H>  ----------------------------<  88  4.4   |  26.0  |  3.39<H>    Ca    8.2<L>      16 Aug 2017 06:15      PT/INR - ( 16 Aug 2017 06:15 )   PT: 11.0 sec;   INR: 1.00 ratio               CAPILLARY BLOOD GLUCOSE            RADIOLOGY & ADDITIONAL TESTS:

## 2017-08-16 NOTE — PROGRESS NOTE ADULT - PROBLEM SELECTOR PROBLEM 5
Macrocytic anemia

## 2017-08-16 NOTE — PROGRESS NOTE ADULT - PROBLEM SELECTOR PLAN 1
Will either get the fistula with pleurix or get it done on Wednesday. patient understands and agrees with plan
plan for pleurex per CT surgery today  c/w current chest tube management.
s/p pleurex on 8/11/17, cap on dc
Maintain chest tube.    May need pleurex catheter at some point as per Dr. Bustos.  Encourage CDBE/IS and increased mobilization as tolerated.  Daily CXR.   Percocet PRN for pain control  Discussed with CT surgery team and Dr. Bustso.
plan for pleurex per CT surgery  c/w current chest tube management.

## 2017-08-16 NOTE — PROGRESS NOTE ADULT - SUBJECTIVE AND OBJECTIVE BOX
NEPHROLOGY INTERVAL HPI/OVERNIGHT EVENTS:  pt clinically better  no cp, sob, n/v/d  appetite improved    MEDICATIONS  (STANDING):  aspirin enteric coated 81 milliGRAM(s) Oral daily  atorvastatin 40 milliGRAM(s) Oral at bedtime  ammonium lactate 12% Lotion 1 Application(s) Topical daily  calcium acetate 667 milliGRAM(s) Oral three times a day with meals  cyanocobalamin 1000 MICROGram(s) Oral daily  heparin  Injectable 5000 Unit(s) SubCutaneous every 8 hours  epoetin jaswinder Injectable 27439 Unit(s) IV Push <User Schedule>  midodrine 10 milliGRAM(s) Oral three times a day  docusate sodium 100 milliGRAM(s) Oral two times a day  iron sucrose Injectable 200 milliGRAM(s) IV Push <User Schedule>  polyethylene glycol 3350 17 Gram(s) Oral daily    MEDICATIONS  (PRN):  acetaminophen   Tablet 650 milliGRAM(s) Oral every 6 hours PRN Mild Pain (1 - 3)  guaiFENesin    Syrup 200 milliGRAM(s) Oral every 6 hours PRN Cough  ondansetron Injectable 4 milliGRAM(s) IV Push every 6 hours PRN Nausea and/or Vomiting  ALBUTerol/ipratropium for Nebulization 3 milliLiter(s) Nebulizer every 6 hours PRN Shortness of Breath and/or Wheezing  albumin human 25% IVPB 50 milliLiter(s) IV Intermittent every 30 minutes PRN for SBP < 90      Allergies    allopurinol (Rash)  codeine (Nausea; Vomiting)  penicillin (Rash)  spironolactone (Unknown)          Vital Signs Last 24 Hrs  T(C): 37.2 (16 Aug 2017 13:05), Max: 37.3 (16 Aug 2017 10:48)  T(F): 99 (16 Aug 2017 13:05), Max: 99.1 (16 Aug 2017 10:48)  HR: 76 (16 Aug 2017 13:05) (70 - 77)  BP: 98/41 (16 Aug 2017 13:05) (88/42 - 102/40)  BP(mean): 48 (16 Aug 2017 10:56) (48 - 48)  RR: 16 (16 Aug 2017 13:05) (16 - 18)  SpO2: 100% (16 Aug 2017 13:05) (98% - 100%)    PHYSICAL EXAM:  HEAD:  Atraumatic, Normocephalic  EYES: EOMI  NECK: Supple, +JVD  NERVOUS SYSTEM:  Alert & Oriented X3  CHEST/LUNG: Dec BS R> L; R chest tube in place  HEART: Regular rate and rhythm; No murmurs  ABDOMEN: Soft, Nontender, Nondistended; Bowel sounds present  EXTREMITIES:  No edema    LABS:                        9.3    3.2   )-----------( 114      ( 16 Aug 2017 06:15 )             29.2     08-16    136  |  97<L>  |  28.0<H>  ----------------------------<  88  4.4   |  26.0  |  3.39<H>    Ca    8.2<L>      16 Aug 2017 06:15      PT/INR - ( 16 Aug 2017 06:15 )   PT: 11.0 sec;   INR: 1.00 ratio                 RADIOLOGY & ADDITIONAL TESTS:

## 2017-08-16 NOTE — PROGRESS NOTE ADULT - PROBLEM SELECTOR PLAN 3
continue home meds

## 2017-08-16 NOTE — BRIEF OPERATIVE NOTE - PROCEDURE
Creation of arteriovenous fistula in upper extremity  08/16/2017    Active  NISSAOVE
Video-assisted thoracoscopic surgery (VATS)  08/11/2017  Right VATS, Pleurx Catheter Placement, removal of pigtail catheter  Active  [id number]

## 2017-08-16 NOTE — PROGRESS NOTE ADULT - ASSESSMENT
77yo female POD 0 for right upper extremity AV fistula creation  -Pain control PRN  -Diet: Regular  -Continue With primary team's plan

## 2017-08-16 NOTE — PROGRESS NOTE ADULT - SUBJECTIVE AND OBJECTIVE BOX
POST OP CHECK    POD 0: Creation of arteriovenous fistula in right upper extremity    SUBJECTIVE:  Pt seen and examined at bedside. No acute events following procedure this afternoon. She denies bleeding from wound, chest pain, shortness of breath, lightheadedness or paresthesia to right upper extremity. She is tolerating her diet with no complications and reports voiding. Only complaint       MEDICATIONS  (STANDING):  midodrine 10 milliGRAM(s) Oral three times a day  atorvastatin 40 milliGRAM(s) Oral at bedtime  heparin  Injectable 5000 Unit(s) SubCutaneous every 8 hours  aspirin enteric coated 81 milliGRAM(s) Oral daily  calcium acetate 667 milliGRAM(s) Oral three times a day with meals  cyanocobalamin 1000 MICROGram(s) Oral daily    MEDICATIONS  (PRN):  acetaminophen   Tablet. 650 milliGRAM(s) Oral every 6 hours PRN Mild Pain (1 - 3)      Vital Signs Last 24 Hrs  T(C): 36.6 (16 Aug 2017 21:20), Max: 37.3 (16 Aug 2017 10:48)  T(F): 97.8 (16 Aug 2017 21:20), Max: 99.1 (16 Aug 2017 10:48)  HR: 74 (16 Aug 2017 21:20) (65 - 77)  BP: 84/44 (16 Aug 2017 21:20) (84/40 - 102/40)  BP(mean): 48 (16 Aug 2017 10:56) (48 - 48)  RR: 16 (16 Aug 2017 21:20) (16 - 22)  SpO2: 99% (16 Aug 2017 21:20) (99% - 100%)    PE  Gen:  Pulm:  CV:  Abd:  :  Ext:  Vasc:  Neuro:      I&O's Detail    15 Aug 2017 07:01  -  16 Aug 2017 07:00  --------------------------------------------------------  IN:    Oral Fluid: 180 mL  Total IN: 180 mL    OUT:    Chest Tube: 360 mL    Other: 2000 mL  Total OUT: 2360 mL    Total NET: -2180 mL      16 Aug 2017 07:01  -  16 Aug 2017 22:11  --------------------------------------------------------  IN:  Total IN: 0 mL    OUT:    Chest Tube: 310 mL  Total OUT: 310 mL    Total NET: -310 mL          LABS:                        9.3    3.2   )-----------( 114      ( 16 Aug 2017 06:15 )             29.2     08-16    136  |  97<L>  |  28.0<H>  ----------------------------<  88  4.4   |  26.0  |  3.39<H>    Ca    8.2<L>      16 Aug 2017 06:15      PT/INR - ( 16 Aug 2017 06:15 )   PT: 11.0 sec;   INR: 1.00 ratio               RADIOLOGY & ADDITIONAL STUDIES: POST OP CHECK    POD 0: Creation of arteriovenous fistula in right upper extremity    SUBJECTIVE:  Pt seen and examined at bedside. No acute events following procedure this afternoon. She denies bleeding from wound, chest pain, shortness of breath, lightheadedness or paresthesia to right upper extremity. She is tolerating her diet with no complications and reports voiding. Only complaint       MEDICATIONS  (STANDING):  midodrine 10 milliGRAM(s) Oral three times a day  atorvastatin 40 milliGRAM(s) Oral at bedtime  heparin  Injectable 5000 Unit(s) SubCutaneous every 8 hours  aspirin enteric coated 81 milliGRAM(s) Oral daily  calcium acetate 667 milliGRAM(s) Oral three times a day with meals  cyanocobalamin 1000 MICROGram(s) Oral daily    MEDICATIONS  (PRN):  acetaminophen   Tablet. 650 milliGRAM(s) Oral every 6 hours PRN Mild Pain (1 - 3)      Vital Signs Last 24 Hrs  T(C): 36.6 (16 Aug 2017 21:20), Max: 37.3 (16 Aug 2017 10:48)  T(F): 97.8 (16 Aug 2017 21:20), Max: 99.1 (16 Aug 2017 10:48)  HR: 74 (16 Aug 2017 21:20) (65 - 77)  BP: 84/44 (16 Aug 2017 21:20) (84/40 - 102/40)  BP(mean): 48 (16 Aug 2017 10:56) (48 - 48)  RR: 16 (16 Aug 2017 21:20) (16 - 22)  SpO2: 99% (16 Aug 2017 21:20) (99% - 100%)    PE  Gen: No acute distress  Pulm: Clear to auscultation bilaterally  CV: S1/S2  Abd:Soft, nontender  Ext: Palpable and audible thrill to right upper extremity fistula site. Wound is clean dry, and intact.  Vasc: Radial Pulse 1+ bilaterally        I&O's Detail    15 Aug 2017 07:01  -  16 Aug 2017 07:00  --------------------------------------------------------  IN:    Oral Fluid: 180 mL  Total IN: 180 mL    OUT:    Chest Tube: 360 mL    Other: 2000 mL  Total OUT: 2360 mL    Total NET: -2180 mL      16 Aug 2017 07:01  -  16 Aug 2017 22:11  --------------------------------------------------------  IN:  Total IN: 0 mL    OUT:    Chest Tube: 310 mL  Total OUT: 310 mL    Total NET: -310 mL          LABS:                        9.3    3.2   )-----------( 114      ( 16 Aug 2017 06:15 )             29.2     08-16    136  |  97<L>  |  28.0<H>  ----------------------------<  88  4.4   |  26.0  |  3.39<H>    Ca    8.2<L>      16 Aug 2017 06:15      PT/INR - ( 16 Aug 2017 06:15 )   PT: 11.0 sec;   INR: 1.00 ratio               RADIOLOGY & ADDITIONAL STUDIES:

## 2017-08-17 ENCOUNTER — TRANSCRIPTION ENCOUNTER (OUTPATIENT)
Age: 77
End: 2017-08-17

## 2017-08-17 VITALS
OXYGEN SATURATION: 98 % | TEMPERATURE: 99 F | HEART RATE: 78 BPM | RESPIRATION RATE: 16 BRPM | SYSTOLIC BLOOD PRESSURE: 92 MMHG | DIASTOLIC BLOOD PRESSURE: 45 MMHG

## 2017-08-17 PROCEDURE — 99239 HOSP IP/OBS DSCHRG MGMT >30: CPT

## 2017-08-17 RX ORDER — MIDODRINE HYDROCHLORIDE 2.5 MG/1
1 TABLET ORAL
Qty: 0 | Refills: 0 | COMMUNITY
Start: 2017-08-17

## 2017-08-17 RX ADMIN — Medication 650 MILLIGRAM(S): at 11:40

## 2017-08-17 RX ADMIN — MIDODRINE HYDROCHLORIDE 10 MILLIGRAM(S): 2.5 TABLET ORAL at 05:26

## 2017-08-17 RX ADMIN — Medication 81 MILLIGRAM(S): at 13:04

## 2017-08-17 RX ADMIN — MIDODRINE HYDROCHLORIDE 10 MILLIGRAM(S): 2.5 TABLET ORAL at 13:04

## 2017-08-17 RX ADMIN — PREGABALIN 1000 MICROGRAM(S): 225 CAPSULE ORAL at 13:04

## 2017-08-17 RX ADMIN — Medication 667 MILLIGRAM(S): at 08:24

## 2017-08-17 RX ADMIN — Medication 650 MILLIGRAM(S): at 03:20

## 2017-08-17 RX ADMIN — HEPARIN SODIUM 5000 UNIT(S): 5000 INJECTION INTRAVENOUS; SUBCUTANEOUS at 13:04

## 2017-08-17 RX ADMIN — Medication 667 MILLIGRAM(S): at 13:04

## 2017-08-17 RX ADMIN — Medication 650 MILLIGRAM(S): at 12:03

## 2017-08-17 NOTE — DISCHARGE NOTE ADULT - MEDICATION SUMMARY - MEDICATIONS TO CHANGE
I will SWITCH the dose or number of times a day I take the medications listed below when I get home from the hospital:    midodrine 10 mg oral tablet  -- 1 tab(s) by mouth 2 times a day.  Hold if SBP>150

## 2017-08-17 NOTE — DISCHARGE NOTE ADULT - CARE PROVIDERS DIRECT ADDRESSES
,DirectAddress_Unknown,DirectAddress_Unknown,[email or web address]

## 2017-08-17 NOTE — DISCHARGE NOTE ADULT - PATIENT PORTAL LINK FT
“You can access the Follow[facility] Patient Portal, offered by [facility], by registering with the following website: [email or web address]”

## 2017-08-17 NOTE — PROGRESS NOTE ADULT - SUBJECTIVE AND OBJECTIVE BOX
76yF POD 1 R AVF. Pt seen and examined at dialysis this AM. c/o some soreness around incision, i explained this is normal post-op. Otherwise, she offers no complaints at this time.    Physical exam-  NAD, laying comfortably  CTAb/l  RRR  R arm AVF +palpable thrill, no edema, mild ecchymosis.    A/P:  -+palpable thrill, incision c/d/i. Please do not remove steris (they will fall off on their own)  -vascular will sign off, please have pt f/u with Dr. Curry in 2 weeks in his office

## 2017-08-17 NOTE — DISCHARGE NOTE ADULT - MEDICATION SUMMARY - MEDICATIONS TO STOP TAKING
I will STOP taking the medications listed below when I get home from the hospital:    heparin  -- 5000 unit(s) subcutaneously every 8 hours    guaiFENesin 100 mg/5 mL oral liquid  -- 10 milliliter(s) by mouth every 6 hours, As needed, Cough    lactulose 10 g/15 mL oral syrup  -- 15 milliliter(s) by mouth once a day, As needed, no bm > 2 days    sodium chloride 0.65% nasal spray  -- 1 application into nose 2 times a day

## 2017-08-17 NOTE — PROGRESS NOTE ADULT - PROVIDER SPECIALTY LIST ADULT
Anesthesia
CT Surgery
Hospitalist
Nephrology
Neurology
Thoracic Surgery
Vascular Surgery
Hospitalist
Vascular Surgery
Vascular Surgery

## 2017-08-17 NOTE — DISCHARGE NOTE ADULT - CARE PROVIDER_API CALL
Lashonda Hendrix), Cardiovascular Disease; Internal Medicine  [address] [place] [id number]  Phone: [phone]  Fax: [phone]    Ji Connolly (DO), Nephrology  [address] [place] [address]  Phone: [phone]  Fax: [phone]    Oumar Bustos (MD), Surgery; Thoracic Surgery  [address] [place] [address]  Phone: [phone]  Fax: [phone]    Nikita Curry), Vascular Surgery  [address] [address]  Phone: [phone]  Fax: [phone]

## 2017-08-17 NOTE — DISCHARGE NOTE ADULT - SECONDARY DIAGNOSIS.
ESRD (end stage renal disease) on dialysis Pulmonary hypertension Chronic atrial fibrillation AV fistula

## 2017-08-17 NOTE — DISCHARGE NOTE ADULT - CARE PLAN
Principal Discharge DX:	Pleural effusion on right  Goal:	resolved  Instructions for follow-up, activity and diet:	pleurex catheter placed  drain up to 1Liter 3 times a week.  follow up with primary care doctor and CT surgery if necessary  Secondary Diagnosis:	ESRD (end stage renal disease) on dialysis  Instructions for follow-up, activity and diet:	continue dialysis as scheduled  Secondary Diagnosis:	Chronic atrial fibrillation  Instructions for follow-up, activity and diet:	no anticoagulation given recent traumatic brain bleed.  outpatient follow up with cardiology  Secondary Diagnosis:	Pulmonary hypertension  Instructions for follow-up, activity and diet:	outpatient cardiology follow up  Secondary Diagnosis:	AV fistula  Instructions for follow-up, activity and diet:	placed on this admission.    Please do not remove steris (they will fall off on their own)  -vascular will sign off, please have pt f/u with Dr. Curry in 2 weeks in his office

## 2017-08-17 NOTE — PROGRESS NOTE ADULT - ASSESSMENT
ESRD:   Pleural effusion s/p CT placement fluid transudative s/p VATS   - HD today  - continue Midodrine    Anemia:CKD   - ESPERANZA and Venofer at dialysis  Hgb better     RO: Phoslo

## 2017-08-17 NOTE — DISCHARGE NOTE ADULT - HOSPITAL COURSE
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  june 2017 SDH after trauma.  Presents for pleurex placement after seen in Dr Whitehead office and noted to have worsening pleural effusion.  Pleurex placed and functional.  Underwent cardiac and neuro clearance for AV fistula placement by vascular surgery.  Patient stable for discharge to nursing home in stable condition.    VSS hypotensive on midodrine (chronic)  AAOx3, NAD no acute complaints ROS negative. CTAB soft abdomen, lower extremities with chronic venous insufficiency.   right chest permacath. right antecubital fossa +thrill + bruit.     d/c planning 35 min.

## 2017-08-17 NOTE — DISCHARGE NOTE ADULT - PLAN OF CARE
resolved pleurex catheter placed  drain up to 1Liter 3 times a week.  follow up with primary care doctor and CT surgery if necessary continue dialysis as scheduled no anticoagulation given recent traumatic brain bleed.  outpatient follow up with cardiology outpatient cardiology follow up placed on this admission.    Please do not remove steris (they will fall off on their own)  -vascular will sign off, please have pt f/u with Dr. Curry in 2 weeks in his office

## 2017-08-17 NOTE — PROGRESS NOTE ADULT - SUBJECTIVE AND OBJECTIVE BOX
NEPHROLOGY INTERVAL HPI/OVERNIGHT EVENTS:    Seen at HD   No new events   Feels better  Possibke d/c today     MEDICATIONS  (STANDING):  midodrine 10 milliGRAM(s) Oral three times a day  atorvastatin 40 milliGRAM(s) Oral at bedtime  heparin  Injectable 5000 Unit(s) SubCutaneous every 8 hours  aspirin enteric coated 81 milliGRAM(s) Oral daily  calcium acetate 667 milliGRAM(s) Oral three times a day with meals  cyanocobalamin 1000 MICROGram(s) Oral daily    MEDICATIONS  (PRN):  acetaminophen   Tablet. 650 milliGRAM(s) Oral every 6 hours PRN Mild Pain (1 - 3)      Allergies    allopurinol (Rash)  codeine (Nausea; Vomiting)  penicillin (Rash)  spironolactone (Unknown)    Intolerances            Vital Signs Last 24 Hrs  T(C): 36.4 (17 Aug 2017 08:20), Max: 37.2 (16 Aug 2017 13:05)  T(F): 97.6 (17 Aug 2017 08:20), Max: 99 (16 Aug 2017 13:05)  HR: 66 (17 Aug 2017 08:20) (65 - 76)  BP: 102/54 (17 Aug 2017 08:20) (79/50 - 102/54)  BP(mean): --  RR: 16 (17 Aug 2017 08:20) (16 - 22)  SpO2: 98% (17 Aug 2017 08:20) (98% - 100%)  Daily     Daily Weight in k.6 (17 Aug 2017 08:20)  I&O's Detail    16 Aug 2017 07:  -  17 Aug 2017 07:00  --------------------------------------------------------  IN:  Total IN: 0 mL    OUT:    Chest Tube: 310 mL  Total OUT: 310 mL    Total NET: -310 mL      17 Aug 2017 07:01  -  17 Aug 2017 11:35  --------------------------------------------------------  IN:  Total IN: 0 mL    OUT:    Voided: 50 mL  Total OUT: 50 mL    Total NET: -50 mL        I&O's Summary    16 Aug 2017 07:  -  17 Aug 2017 07:00  --------------------------------------------------------  IN: 0 mL / OUT: 310 mL / NET: -310 mL    17 Aug 2017 07:  -  17 Aug 2017 11:35  --------------------------------------------------------  IN: 0 mL / OUT: 50 mL / NET: -50 mL        PHYSICAL EXAM:    HEAD:  Atraumatic, Normocephalic  EYES: EOMI  NECK: Supple, +JVD  NERVOUS SYSTEM:  Alert & Oriented X3  CHEST/LUNG: improved aeration   HEART: Regular rate and rhythm; No murmurs  ABDOMEN: Soft, Nontender, Nondistended; Bowel sounds present  EXTREMITIES:  No edema  LABS:                        9.3    3.2   )-----------( 114      ( 16 Aug 2017 06:15 )             29.2     08-16    136  |  97<L>  |  28.0<H>  ----------------------------<  88  4.4   |  26.0  |  3.39<H>    Ca    8.2<L>      16 Aug 2017 06:15      PT/INR - ( 16 Aug 2017 06:15 )   PT: 11.0 sec;   INR: 1.00 ratio                     RADIOLOGY & ADDITIONAL TESTS:

## 2017-08-17 NOTE — DISCHARGE NOTE ADULT - MEDICATION SUMMARY - MEDICATIONS TO TAKE
I will START or STAY ON the medications listed below when I get home from the hospital:    aspirin 81 mg oral delayed release tablet  -- 1 tab(s) by mouth once a day  -- Indication: For CAD (coronary artery disease)    acetaminophen 325 mg oral tablet  -- 2 tab(s) by mouth every 6 hours, As needed, Mild Pain (1 - 3)  -- Indication: For Pain    gabapentin 300 mg oral capsule  -- 1 cap(s) by mouth every 8 hours, As Needed neuropathic pain  -- Indication: For neuropathic pain    atorvastatin 40 mg oral tablet  -- 1 tab(s) by mouth once a day (at bedtime)  -- Indication: For CAD (coronary artery disease)    DuoNeb 0.5 mg-2.5 mg/3 mL inhalation solution  -- 3 milliliter(s) inhaled every 6 hours, As Needed  -- Indication: For COPD (chronic obstructive pulmonary disease)    Lac-Hydrin 12% topical lotion  -- 1 application on skin once a day  -- Indication: For Moisturizer    epoetin jaswinder  --   T/Th/Sat with HD Per Renal  -- Indication: For Anemia    midodrine 10 mg oral tablet  -- 1 tab(s) by mouth 3 times a day  -- Indication: For Hypotension    calcium acetate 667 mg oral tablet  -- 1 cap(s) by mouth 3 times a day (with meals)  -- Indication: For supplement    multivitamin  --  NephroVite  -- Indication: For supplemetn    cyanocobalamin 1000 mcg oral tablet  -- 1 tab(s) by mouth once a day  -- Indication: For supplement    folic acid 1 mg oral tablet  -- 1 tab(s) by mouth once a day  -- Indication: For supplement

## 2017-08-22 ENCOUNTER — FORM ENCOUNTER (OUTPATIENT)
Age: 77
End: 2017-08-22

## 2017-08-23 ENCOUNTER — APPOINTMENT (OUTPATIENT)
Dept: THORACIC SURGERY | Facility: CLINIC | Age: 77
End: 2017-08-23
Payer: MEDICARE

## 2017-08-23 ENCOUNTER — OUTPATIENT (OUTPATIENT)
Dept: OUTPATIENT SERVICES | Facility: HOSPITAL | Age: 77
LOS: 1 days | End: 2017-08-23
Payer: MEDICARE

## 2017-08-23 VITALS
SYSTOLIC BLOOD PRESSURE: 96 MMHG | BODY MASS INDEX: 26.84 KG/M2 | WEIGHT: 167 LBS | RESPIRATION RATE: 16 BRPM | OXYGEN SATURATION: 96 % | DIASTOLIC BLOOD PRESSURE: 55 MMHG | HEIGHT: 66 IN | HEART RATE: 85 BPM

## 2017-08-23 DIAGNOSIS — Z90.89 ACQUIRED ABSENCE OF OTHER ORGANS: Chronic | ICD-10-CM

## 2017-08-23 DIAGNOSIS — J90 PLEURAL EFFUSION, NOT ELSEWHERE CLASSIFIED: ICD-10-CM

## 2017-08-23 DIAGNOSIS — Z95.2 PRESENCE OF PROSTHETIC HEART VALVE: Chronic | ICD-10-CM

## 2017-08-23 DIAGNOSIS — Z90.49 ACQUIRED ABSENCE OF OTHER SPECIFIED PARTS OF DIGESTIVE TRACT: Chronic | ICD-10-CM

## 2017-08-23 PROCEDURE — 71046 X-RAY EXAM CHEST 2 VIEWS: CPT

## 2017-08-23 PROCEDURE — 71020: CPT | Mod: 26

## 2017-08-23 PROCEDURE — 99213 OFFICE O/P EST LOW 20 MIN: CPT

## 2017-08-23 RX ORDER — ASPIRIN 81 MG/1
81 TABLET, CHEWABLE ORAL
Qty: 30 | Refills: 0 | Status: ACTIVE | COMMUNITY
Start: 2017-04-27

## 2017-08-26 LAB
CULTURE RESULTS: SIGNIFICANT CHANGE UP
SPECIMEN SOURCE: SIGNIFICANT CHANGE UP

## 2017-08-29 ENCOUNTER — OUTPATIENT (OUTPATIENT)
Dept: OUTPATIENT SERVICES | Facility: HOSPITAL | Age: 77
LOS: 1 days | End: 2017-08-29
Payer: MEDICARE

## 2017-08-29 ENCOUNTER — APPOINTMENT (OUTPATIENT)
Dept: THORACIC SURGERY | Facility: CLINIC | Age: 77
End: 2017-08-29
Payer: MEDICARE

## 2017-08-29 VITALS
RESPIRATION RATE: 16 BRPM | WEIGHT: 167 LBS | OXYGEN SATURATION: 93 % | DIASTOLIC BLOOD PRESSURE: 55 MMHG | BODY MASS INDEX: 26.84 KG/M2 | SYSTOLIC BLOOD PRESSURE: 104 MMHG | HEIGHT: 66 IN | HEART RATE: 67 BPM

## 2017-08-29 DIAGNOSIS — Z90.49 ACQUIRED ABSENCE OF OTHER SPECIFIED PARTS OF DIGESTIVE TRACT: Chronic | ICD-10-CM

## 2017-08-29 DIAGNOSIS — Z90.89 ACQUIRED ABSENCE OF OTHER ORGANS: Chronic | ICD-10-CM

## 2017-08-29 DIAGNOSIS — J90 PLEURAL EFFUSION, NOT ELSEWHERE CLASSIFIED: ICD-10-CM

## 2017-08-29 DIAGNOSIS — Z95.2 PRESENCE OF PROSTHETIC HEART VALVE: Chronic | ICD-10-CM

## 2017-08-29 PROCEDURE — 99214 OFFICE O/P EST MOD 30 MIN: CPT

## 2017-08-29 PROCEDURE — 71046 X-RAY EXAM CHEST 2 VIEWS: CPT

## 2017-08-29 PROCEDURE — 71020: CPT | Mod: 26

## 2017-09-07 ENCOUNTER — APPOINTMENT (OUTPATIENT)
Dept: VASCULAR SURGERY | Facility: CLINIC | Age: 77
End: 2017-09-07
Payer: MEDICARE

## 2017-09-07 VITALS
OXYGEN SATURATION: 93 % | SYSTOLIC BLOOD PRESSURE: 117 MMHG | HEART RATE: 80 BPM | TEMPERATURE: 97.8 F | RESPIRATION RATE: 16 BRPM | DIASTOLIC BLOOD PRESSURE: 68 MMHG

## 2017-09-07 LAB
CULTURE RESULTS: SIGNIFICANT CHANGE UP
SPECIMEN SOURCE: SIGNIFICANT CHANGE UP

## 2017-09-07 PROCEDURE — 99024 POSTOP FOLLOW-UP VISIT: CPT

## 2017-09-13 ENCOUNTER — APPOINTMENT (OUTPATIENT)
Dept: THORACIC SURGERY | Facility: CLINIC | Age: 77
End: 2017-09-13
Payer: MEDICARE

## 2017-09-13 ENCOUNTER — OUTPATIENT (OUTPATIENT)
Dept: OUTPATIENT SERVICES | Facility: HOSPITAL | Age: 77
LOS: 1 days | End: 2017-09-13
Payer: MEDICARE

## 2017-09-13 ENCOUNTER — APPOINTMENT (OUTPATIENT)
Dept: VASCULAR SURGERY | Facility: CLINIC | Age: 77
End: 2017-09-13
Payer: MEDICARE

## 2017-09-13 VITALS
HEART RATE: 79 BPM | WEIGHT: 164 LBS | SYSTOLIC BLOOD PRESSURE: 111 MMHG | RESPIRATION RATE: 16 BRPM | DIASTOLIC BLOOD PRESSURE: 68 MMHG | HEIGHT: 66 IN | OXYGEN SATURATION: 94 % | BODY MASS INDEX: 26.36 KG/M2

## 2017-09-13 DIAGNOSIS — Z95.2 PRESENCE OF PROSTHETIC HEART VALVE: Chronic | ICD-10-CM

## 2017-09-13 DIAGNOSIS — M79.604 PAIN IN RIGHT LEG: ICD-10-CM

## 2017-09-13 DIAGNOSIS — Z90.89 ACQUIRED ABSENCE OF OTHER ORGANS: Chronic | ICD-10-CM

## 2017-09-13 DIAGNOSIS — Z90.49 ACQUIRED ABSENCE OF OTHER SPECIFIED PARTS OF DIGESTIVE TRACT: Chronic | ICD-10-CM

## 2017-09-13 DIAGNOSIS — J90 PLEURAL EFFUSION, NOT ELSEWHERE CLASSIFIED: ICD-10-CM

## 2017-09-13 DIAGNOSIS — M79.605 PAIN IN RIGHT LEG: ICD-10-CM

## 2017-09-13 PROCEDURE — 99214 OFFICE O/P EST MOD 30 MIN: CPT

## 2017-09-13 PROCEDURE — 71020: CPT | Mod: 26

## 2017-09-13 PROCEDURE — 71046 X-RAY EXAM CHEST 2 VIEWS: CPT

## 2017-09-13 PROCEDURE — 93970 EXTREMITY STUDY: CPT

## 2017-09-13 RX ORDER — MIRTAZAPINE 15 MG/1
15 TABLET, FILM COATED ORAL
Refills: 0 | Status: ACTIVE | COMMUNITY

## 2017-09-27 LAB
CULTURE RESULTS: SIGNIFICANT CHANGE UP
SPECIMEN SOURCE: SIGNIFICANT CHANGE UP

## 2017-09-29 ENCOUNTER — APPOINTMENT (OUTPATIENT)
Dept: VASCULAR SURGERY | Facility: CLINIC | Age: 77
End: 2017-09-29
Payer: MEDICARE

## 2017-09-29 PROCEDURE — 93926 LOWER EXTREMITY STUDY: CPT

## 2017-09-29 PROCEDURE — 99024 POSTOP FOLLOW-UP VISIT: CPT

## 2017-10-09 ENCOUNTER — APPOINTMENT (OUTPATIENT)
Dept: VASCULAR SURGERY | Facility: CLINIC | Age: 77
End: 2017-10-09
Payer: MEDICARE

## 2017-10-09 VITALS
DIASTOLIC BLOOD PRESSURE: 61 MMHG | BODY MASS INDEX: 27.97 KG/M2 | OXYGEN SATURATION: 95 % | RESPIRATION RATE: 16 BRPM | TEMPERATURE: 97.3 F | HEIGHT: 66 IN | WEIGHT: 174 LBS | SYSTOLIC BLOOD PRESSURE: 96 MMHG | HEART RATE: 76 BPM

## 2017-10-09 DIAGNOSIS — Z99.2 DEPENDENCE ON RENAL DIALYSIS: ICD-10-CM

## 2017-10-09 DIAGNOSIS — N18.6 END STAGE RENAL DISEASE: ICD-10-CM

## 2017-10-09 DIAGNOSIS — Z99.2 END STAGE RENAL DISEASE: ICD-10-CM

## 2017-10-09 PROCEDURE — 36589 REMOVAL TUNNELED CV CATH: CPT | Mod: 78

## 2017-10-09 PROCEDURE — 99024 POSTOP FOLLOW-UP VISIT: CPT

## 2017-10-11 PROCEDURE — 84484 ASSAY OF TROPONIN QUANT: CPT

## 2017-10-11 PROCEDURE — 97530 THERAPEUTIC ACTIVITIES: CPT

## 2017-10-11 PROCEDURE — 99261: CPT

## 2017-10-11 PROCEDURE — 80202 ASSAY OF VANCOMYCIN: CPT

## 2017-10-11 PROCEDURE — 87340 HEPATITIS B SURFACE AG IA: CPT

## 2017-10-11 PROCEDURE — 86900 BLOOD TYPING SEROLOGIC ABO: CPT

## 2017-10-11 PROCEDURE — 99221 1ST HOSP IP/OBS SF/LOW 40: CPT

## 2017-10-11 PROCEDURE — 83880 ASSAY OF NATRIURETIC PEPTIDE: CPT

## 2017-10-11 PROCEDURE — 80048 BASIC METABOLIC PNL TOTAL CA: CPT

## 2017-10-11 PROCEDURE — 93306 TTE W/DOPPLER COMPLETE: CPT

## 2017-10-11 PROCEDURE — 86870 RBC ANTIBODY IDENTIFICATION: CPT

## 2017-10-11 PROCEDURE — 85610 PROTHROMBIN TIME: CPT

## 2017-10-11 PROCEDURE — 97110 THERAPEUTIC EXERCISES: CPT

## 2017-10-11 PROCEDURE — 94660 CPAP INITIATION&MGMT: CPT

## 2017-10-11 PROCEDURE — 94760 N-INVAS EAR/PLS OXIMETRY 1: CPT

## 2017-10-11 PROCEDURE — 82435 ASSAY OF BLOOD CHLORIDE: CPT

## 2017-10-11 PROCEDURE — 86850 RBC ANTIBODY SCREEN: CPT

## 2017-10-11 PROCEDURE — 82330 ASSAY OF CALCIUM: CPT

## 2017-10-11 PROCEDURE — 31720 CLEARANCE OF AIRWAYS: CPT

## 2017-10-11 PROCEDURE — 86880 COOMBS TEST DIRECT: CPT

## 2017-10-11 PROCEDURE — 99231 SBSQ HOSP IP/OBS SF/LOW 25: CPT

## 2017-10-11 PROCEDURE — 99285 EMERGENCY DEPT VISIT HI MDM: CPT

## 2017-10-11 PROCEDURE — P9047: CPT

## 2017-10-11 PROCEDURE — 86902 BLOOD TYPE ANTIGEN DONOR EA: CPT

## 2017-10-11 PROCEDURE — P9016: CPT

## 2017-10-11 PROCEDURE — 84100 ASSAY OF PHOSPHORUS: CPT

## 2017-10-11 PROCEDURE — P9040: CPT

## 2017-10-11 PROCEDURE — 36430 TRANSFUSION BLD/BLD COMPNT: CPT

## 2017-10-11 PROCEDURE — 94667 MNPJ CHEST WALL 1ST: CPT

## 2017-10-11 PROCEDURE — 86706 HEP B SURFACE ANTIBODY: CPT

## 2017-10-11 PROCEDURE — 93005 ELECTROCARDIOGRAM TRACING: CPT

## 2017-10-11 PROCEDURE — 86803 HEPATITIS C AB TEST: CPT

## 2017-10-11 PROCEDURE — 85576 BLOOD PLATELET AGGREGATION: CPT

## 2017-10-11 PROCEDURE — 84132 ASSAY OF SERUM POTASSIUM: CPT

## 2017-10-11 PROCEDURE — 99285 EMERGENCY DEPT VISIT HI MDM: CPT | Mod: 25

## 2017-10-11 PROCEDURE — 80076 HEPATIC FUNCTION PANEL: CPT

## 2017-10-11 PROCEDURE — 97116 GAIT TRAINING THERAPY: CPT

## 2017-10-11 PROCEDURE — 76705 ECHO EXAM OF ABDOMEN: CPT

## 2017-10-11 PROCEDURE — 86901 BLOOD TYPING SEROLOGIC RH(D): CPT

## 2017-10-11 PROCEDURE — 99283 EMERGENCY DEPT VISIT LOW MDM: CPT | Mod: 25

## 2017-10-11 PROCEDURE — 94640 AIRWAY INHALATION TREATMENT: CPT

## 2017-10-11 PROCEDURE — 97167 OT EVAL HIGH COMPLEX 60 MIN: CPT

## 2017-10-11 PROCEDURE — 84295 ASSAY OF SERUM SODIUM: CPT

## 2017-10-11 PROCEDURE — 94668 MNPJ CHEST WALL SBSQ: CPT

## 2017-10-11 PROCEDURE — 71250 CT THORAX DX C-: CPT

## 2017-10-11 PROCEDURE — 36600 WITHDRAWAL OF ARTERIAL BLOOD: CPT

## 2017-10-11 PROCEDURE — 80053 COMPREHEN METABOLIC PANEL: CPT

## 2017-10-11 PROCEDURE — 36415 COLL VENOUS BLD VENIPUNCTURE: CPT

## 2017-10-11 PROCEDURE — 84466 ASSAY OF TRANSFERRIN: CPT

## 2017-10-11 PROCEDURE — 87040 BLOOD CULTURE FOR BACTERIA: CPT

## 2017-10-11 PROCEDURE — 85730 THROMBOPLASTIN TIME PARTIAL: CPT

## 2017-10-11 PROCEDURE — 85027 COMPLETE CBC AUTOMATED: CPT

## 2017-10-11 PROCEDURE — 71045 X-RAY EXAM CHEST 1 VIEW: CPT

## 2017-10-11 PROCEDURE — 97163 PT EVAL HIGH COMPLEX 45 MIN: CPT

## 2017-10-11 PROCEDURE — 83550 IRON BINDING TEST: CPT

## 2017-10-11 PROCEDURE — 82947 ASSAY GLUCOSE BLOOD QUANT: CPT

## 2017-10-11 PROCEDURE — 86922 COMPATIBILITY TEST ANTIGLOB: CPT

## 2017-10-11 PROCEDURE — 82803 BLOOD GASES ANY COMBINATION: CPT

## 2017-10-11 PROCEDURE — 74176 CT ABD & PELVIS W/O CONTRAST: CPT

## 2017-10-11 PROCEDURE — 90715 TDAP VACCINE 7 YRS/> IM: CPT

## 2017-10-11 PROCEDURE — 85014 HEMATOCRIT: CPT

## 2017-10-11 PROCEDURE — 83605 ASSAY OF LACTIC ACID: CPT

## 2017-10-11 PROCEDURE — 86704 HEP B CORE ANTIBODY TOTAL: CPT

## 2017-10-11 PROCEDURE — 72125 CT NECK SPINE W/O DYE: CPT

## 2017-10-11 PROCEDURE — 83735 ASSAY OF MAGNESIUM: CPT

## 2017-10-11 PROCEDURE — 70450 CT HEAD/BRAIN W/O DYE: CPT

## 2017-10-11 PROCEDURE — 82728 ASSAY OF FERRITIN: CPT

## 2017-10-19 PROCEDURE — 83880 ASSAY OF NATRIURETIC PEPTIDE: CPT

## 2017-10-19 PROCEDURE — 70450 CT HEAD/BRAIN W/O DYE: CPT

## 2017-10-19 PROCEDURE — 85610 PROTHROMBIN TIME: CPT

## 2017-10-19 PROCEDURE — 71045 X-RAY EXAM CHEST 1 VIEW: CPT

## 2017-10-19 PROCEDURE — 94640 AIRWAY INHALATION TREATMENT: CPT

## 2017-10-19 PROCEDURE — 84443 ASSAY THYROID STIM HORMONE: CPT

## 2017-10-19 PROCEDURE — 87116 MYCOBACTERIA CULTURE: CPT

## 2017-10-19 PROCEDURE — 93005 ELECTROCARDIOGRAM TRACING: CPT

## 2017-10-19 PROCEDURE — 84466 ASSAY OF TRANSFERRIN: CPT

## 2017-10-19 PROCEDURE — 83615 LACTATE (LD) (LDH) ENZYME: CPT

## 2017-10-19 PROCEDURE — 87205 SMEAR GRAM STAIN: CPT

## 2017-10-19 PROCEDURE — 86880 COOMBS TEST DIRECT: CPT

## 2017-10-19 PROCEDURE — 87015 SPECIMEN INFECT AGNT CONCNTJ: CPT

## 2017-10-19 PROCEDURE — 86901 BLOOD TYPING SEROLOGIC RH(D): CPT

## 2017-10-19 PROCEDURE — 84157 ASSAY OF PROTEIN OTHER: CPT

## 2017-10-19 PROCEDURE — 86870 RBC ANTIBODY IDENTIFICATION: CPT

## 2017-10-19 PROCEDURE — 85730 THROMBOPLASTIN TIME PARTIAL: CPT

## 2017-10-19 PROCEDURE — 86850 RBC ANTIBODY SCREEN: CPT

## 2017-10-19 PROCEDURE — 82945 GLUCOSE OTHER FLUID: CPT

## 2017-10-19 PROCEDURE — 87206 SMEAR FLUORESCENT/ACID STAI: CPT

## 2017-10-19 PROCEDURE — 87075 CULTR BACTERIA EXCEPT BLOOD: CPT

## 2017-10-19 PROCEDURE — 88112 CYTOPATH CELL ENHANCE TECH: CPT

## 2017-10-19 PROCEDURE — 85027 COMPLETE CBC AUTOMATED: CPT

## 2017-10-19 PROCEDURE — P9047: CPT

## 2017-10-19 PROCEDURE — 80048 BASIC METABOLIC PNL TOTAL CA: CPT

## 2017-10-19 PROCEDURE — 99285 EMERGENCY DEPT VISIT HI MDM: CPT | Mod: 25

## 2017-10-19 PROCEDURE — 87102 FUNGUS ISOLATION CULTURE: CPT

## 2017-10-19 PROCEDURE — 71046 X-RAY EXAM CHEST 2 VIEWS: CPT

## 2017-10-19 PROCEDURE — 83986 ASSAY PH BODY FLUID NOS: CPT

## 2017-10-19 PROCEDURE — 88305 TISSUE EXAM BY PATHOLOGIST: CPT

## 2017-10-19 PROCEDURE — 87070 CULTURE OTHR SPECIMN AEROBIC: CPT

## 2017-10-19 PROCEDURE — 99261: CPT

## 2017-10-19 PROCEDURE — 36415 COLL VENOUS BLD VENIPUNCTURE: CPT

## 2017-10-19 PROCEDURE — 86900 BLOOD TYPING SEROLOGIC ABO: CPT

## 2017-10-19 PROCEDURE — 83550 IRON BINDING TEST: CPT

## 2017-10-19 PROCEDURE — 80053 COMPREHEN METABOLIC PANEL: CPT

## 2017-10-19 PROCEDURE — 89051 BODY FLUID CELL COUNT: CPT

## 2017-10-19 PROCEDURE — 82042 OTHER SOURCE ALBUMIN QUAN EA: CPT

## 2017-10-19 PROCEDURE — 82607 VITAMIN B-12: CPT

## 2017-10-27 ENCOUNTER — EMERGENCY (EMERGENCY)
Facility: HOSPITAL | Age: 77
LOS: 1 days | Discharge: HOME | End: 2017-10-27
Attending: EMERGENCY MEDICINE | Admitting: EMERGENCY MEDICINE
Payer: MEDICARE

## 2017-10-27 VITALS
TEMPERATURE: 98 F | SYSTOLIC BLOOD PRESSURE: 96 MMHG | DIASTOLIC BLOOD PRESSURE: 51 MMHG | RESPIRATION RATE: 20 BRPM | HEIGHT: 65 IN | HEART RATE: 83 BPM | WEIGHT: 173.94 LBS

## 2017-10-27 VITALS
HEART RATE: 73 BPM | DIASTOLIC BLOOD PRESSURE: 55 MMHG | SYSTOLIC BLOOD PRESSURE: 92 MMHG | RESPIRATION RATE: 13 BRPM | OXYGEN SATURATION: 92 %

## 2017-10-27 DIAGNOSIS — Z90.49 ACQUIRED ABSENCE OF OTHER SPECIFIED PARTS OF DIGESTIVE TRACT: Chronic | ICD-10-CM

## 2017-10-27 DIAGNOSIS — Z90.89 ACQUIRED ABSENCE OF OTHER ORGANS: Chronic | ICD-10-CM

## 2017-10-27 DIAGNOSIS — Z95.2 PRESENCE OF PROSTHETIC HEART VALVE: Chronic | ICD-10-CM

## 2017-10-27 DIAGNOSIS — I77.0 ARTERIOVENOUS FISTULA, ACQUIRED: Chronic | ICD-10-CM

## 2017-10-27 PROCEDURE — 87070 CULTURE OTHR SPECIMN AEROBIC: CPT

## 2017-10-27 PROCEDURE — 71046 X-RAY EXAM CHEST 2 VIEWS: CPT

## 2017-10-27 PROCEDURE — 99284 EMERGENCY DEPT VISIT MOD MDM: CPT | Mod: 25

## 2017-10-27 PROCEDURE — 93005 ELECTROCARDIOGRAM TRACING: CPT

## 2017-10-27 PROCEDURE — 99284 EMERGENCY DEPT VISIT MOD MDM: CPT

## 2017-10-27 PROCEDURE — 93010 ELECTROCARDIOGRAM REPORT: CPT

## 2017-10-27 PROCEDURE — 71020: CPT | Mod: 26

## 2017-10-27 RX ORDER — FLUCONAZOLE 150 MG/1
150 TABLET ORAL ONCE
Qty: 0 | Refills: 0 | Status: COMPLETED | OUTPATIENT
Start: 2017-10-27 | End: 2017-10-27

## 2017-10-27 RX ORDER — NYSTATIN/TRIAMCINOLONE ACET
1 OINTMENT (GRAM) TOPICAL
Qty: 1 | Refills: 0 | OUTPATIENT
Start: 2017-10-27 | End: 2017-11-06

## 2017-10-27 RX ORDER — SODIUM CHLORIDE 9 MG/ML
3 INJECTION INTRAMUSCULAR; INTRAVENOUS; SUBCUTANEOUS ONCE
Qty: 0 | Refills: 0 | Status: COMPLETED | OUTPATIENT
Start: 2017-10-27 | End: 2017-10-27

## 2017-10-27 RX ORDER — DIPHENHYDRAMINE HCL 50 MG
25 CAPSULE ORAL ONCE
Qty: 0 | Refills: 0 | Status: COMPLETED | OUTPATIENT
Start: 2017-10-27 | End: 2017-10-27

## 2017-10-27 RX ORDER — KETOROLAC TROMETHAMINE 30 MG/ML
30 SYRINGE (ML) INJECTION ONCE
Qty: 0 | Refills: 0 | Status: DISCONTINUED | OUTPATIENT
Start: 2017-10-27 | End: 2017-10-27

## 2017-10-27 RX ORDER — ACETAMINOPHEN 500 MG
650 TABLET ORAL ONCE
Qty: 0 | Refills: 0 | Status: COMPLETED | OUTPATIENT
Start: 2017-10-27 | End: 2017-10-27

## 2017-10-27 RX ORDER — LIDOCAINE 4 G/100G
10 CREAM TOPICAL
Qty: 1 | Refills: 0 | OUTPATIENT
Start: 2017-10-27 | End: 2017-10-30

## 2017-10-27 RX ADMIN — Medication 60 MILLIGRAM(S): at 14:20

## 2017-10-27 RX ADMIN — Medication 650 MILLIGRAM(S): at 12:45

## 2017-10-27 RX ADMIN — Medication 25 MILLIGRAM(S): at 12:45

## 2017-10-27 RX ADMIN — FLUCONAZOLE 150 MILLIGRAM(S): 150 TABLET ORAL at 14:20

## 2017-10-27 NOTE — ED PROVIDER NOTE - OBJECTIVE STATEMENT
This patient is a 77 year old woman who presents to the ER c/o pain in her perineum x 3 days.  She describes the pain as burning and intense.  She was seen by her PMD 3 days ago and was prescribed Keflex and a intravaginal fungal cream.  Patient is anuric secondary to ESRD.  She denies fever.  Although intake note reports some SOB and hypoxia.  Patient is 94-97% on room air when sitting upright.  She denies SOB.

## 2017-10-27 NOTE — ED STATDOCS - PROGRESS NOTE DETAILS
76 y/o F on dialysis (TSS) presents to ED c/o genital pain and rash onset a few weeks. Pt went to PMD 3 days ago because she had a rash on her genitals. Pt has severe pain; cream, Keflex (using for 2 days), cephalexin, oxycodone given are not helping the pain. Prior to this rash, pt was in the ICU and then rehab s/p a fall a couple of months ago. There were fluids in her lungs and she came back to [facility]. She was d/c a few weeks ago. She has hoarseness with slight cough and clear mucous. Allergic to penicillin. PMD: Maria Luisa  pt to present to main ED for further evaluation and workup.

## 2017-10-27 NOTE — ED PROVIDER NOTE - MEDICAL DECISION MAKING DETAILS
Vulvular candidiasis vs lichen planus.  Lidocaine jelly applied to skin surface, will prescribed steroids.  Patient was strongly encouraged to follow-up with GYN.

## 2017-10-27 NOTE — ED STATDOCS - PSH
AV fistula    Cystocele    H/O aortic valve replacement    H/O cataract    H/O mitral valve repair    H/O spinal fusion    H/O tricuspid valve annuloplasty    S/P appendectomy    S/P tonsillectomy

## 2017-10-27 NOTE — ED ADULT NURSE NOTE - OBJECTIVE STATEMENT
Assumed patient care at 1224.  Pt reports seeing PMD on tuesday who prescribed cephalexin,  vaginal cream and oxycodone.  She is c/o vaginal pain and swelling of right labia. Pale pink discharge. She denies pelvic pain.

## 2017-10-28 LAB
C TRACH RRNA SPEC QL NAA+PROBE: SIGNIFICANT CHANGE UP
N GONORRHOEA RRNA SPEC QL NAA+PROBE: SIGNIFICANT CHANGE UP
SPECIMEN SOURCE: SIGNIFICANT CHANGE UP

## 2017-10-29 LAB
CULTURE RESULTS: SIGNIFICANT CHANGE UP
SPECIMEN SOURCE: SIGNIFICANT CHANGE UP

## 2017-10-31 ENCOUNTER — INPATIENT (INPATIENT)
Facility: HOSPITAL | Age: 77
LOS: 5 days | DRG: 595 | End: 2017-11-06
Attending: INTERNAL MEDICINE | Admitting: HOSPITALIST
Payer: MEDICARE

## 2017-10-31 VITALS
WEIGHT: 188.94 LBS | SYSTOLIC BLOOD PRESSURE: 102 MMHG | HEART RATE: 80 BPM | DIASTOLIC BLOOD PRESSURE: 57 MMHG | HEIGHT: 65 IN | TEMPERATURE: 98 F | RESPIRATION RATE: 20 BRPM | OXYGEN SATURATION: 97 %

## 2017-10-31 DIAGNOSIS — F32.9 MAJOR DEPRESSIVE DISORDER, SINGLE EPISODE, UNSPECIFIED: ICD-10-CM

## 2017-10-31 DIAGNOSIS — E78.5 HYPERLIPIDEMIA, UNSPECIFIED: ICD-10-CM

## 2017-10-31 DIAGNOSIS — D64.9 ANEMIA, UNSPECIFIED: ICD-10-CM

## 2017-10-31 DIAGNOSIS — Z90.49 ACQUIRED ABSENCE OF OTHER SPECIFIED PARTS OF DIGESTIVE TRACT: Chronic | ICD-10-CM

## 2017-10-31 DIAGNOSIS — Z90.89 ACQUIRED ABSENCE OF OTHER ORGANS: Chronic | ICD-10-CM

## 2017-10-31 DIAGNOSIS — B01.89 OTHER VARICELLA COMPLICATIONS: ICD-10-CM

## 2017-10-31 DIAGNOSIS — N18.6 END STAGE RENAL DISEASE: ICD-10-CM

## 2017-10-31 DIAGNOSIS — Z95.2 PRESENCE OF PROSTHETIC HEART VALVE: Chronic | ICD-10-CM

## 2017-10-31 DIAGNOSIS — D72.829 ELEVATED WHITE BLOOD CELL COUNT, UNSPECIFIED: ICD-10-CM

## 2017-10-31 DIAGNOSIS — L03.90 CELLULITIS, UNSPECIFIED: ICD-10-CM

## 2017-10-31 DIAGNOSIS — Z29.9 ENCOUNTER FOR PROPHYLACTIC MEASURES, UNSPECIFIED: ICD-10-CM

## 2017-10-31 DIAGNOSIS — B02.9 ZOSTER WITHOUT COMPLICATIONS: ICD-10-CM

## 2017-10-31 DIAGNOSIS — I10 ESSENTIAL (PRIMARY) HYPERTENSION: ICD-10-CM

## 2017-10-31 DIAGNOSIS — I77.0 ARTERIOVENOUS FISTULA, ACQUIRED: Chronic | ICD-10-CM

## 2017-10-31 LAB
ALBUMIN SERPL ELPH-MCNC: 3.7 G/DL — SIGNIFICANT CHANGE UP (ref 3.3–5.2)
ALP SERPL-CCNC: 491 U/L — HIGH (ref 40–120)
ALT FLD-CCNC: 32 U/L — SIGNIFICANT CHANGE UP
ANION GAP SERPL CALC-SCNC: 25 MMOL/L — HIGH (ref 5–17)
ANISOCYTOSIS BLD QL: SIGNIFICANT CHANGE UP
AST SERPL-CCNC: <4 U/L — SIGNIFICANT CHANGE UP
BASOPHILS # BLD AUTO: 0 K/UL — SIGNIFICANT CHANGE UP (ref 0–0.2)
BILIRUB SERPL-MCNC: 1.1 MG/DL — SIGNIFICANT CHANGE UP (ref 0.4–2)
BUN SERPL-MCNC: 48 MG/DL — HIGH (ref 8–20)
CALCIUM SERPL-MCNC: 9 MG/DL — SIGNIFICANT CHANGE UP (ref 8.6–10.2)
CHLORIDE SERPL-SCNC: 85 MMOL/L — LOW (ref 98–107)
CO2 SERPL-SCNC: 25 MMOL/L — SIGNIFICANT CHANGE UP (ref 22–29)
CREAT SERPL-MCNC: 4.26 MG/DL — HIGH (ref 0.5–1.3)
DACRYOCYTES BLD QL SMEAR: SLIGHT — SIGNIFICANT CHANGE UP
EOSINOPHIL # BLD AUTO: 0 K/UL — SIGNIFICANT CHANGE UP (ref 0–0.5)
GLUCOSE SERPL-MCNC: 107 MG/DL — SIGNIFICANT CHANGE UP (ref 70–115)
HCT VFR BLD CALC: 28.4 % — LOW (ref 37–47)
HGB BLD-MCNC: 9.6 G/DL — LOW (ref 12–16)
LACTATE BLDV-MCNC: 2.7 MMOL/L — HIGH (ref 0.5–2)
LYMPHOCYTES # BLD AUTO: 0.7 K/UL — LOW (ref 1–4.8)
LYMPHOCYTES # BLD AUTO: 8 % — LOW (ref 20–55)
MACROCYTES BLD QL: SIGNIFICANT CHANGE UP
MCHC RBC-ENTMCNC: 33.8 G/DL — SIGNIFICANT CHANGE UP (ref 32–36)
MCHC RBC-ENTMCNC: 38.6 PG — HIGH (ref 27–31)
MCV RBC AUTO: 114.1 FL — HIGH (ref 81–99)
MONOCYTES # BLD AUTO: 0.7 K/UL — SIGNIFICANT CHANGE UP (ref 0–0.8)
MONOCYTES NFR BLD AUTO: 2 % — LOW (ref 3–10)
NEUTROPHILS # BLD AUTO: 9.9 K/UL — HIGH (ref 1.8–8)
NEUTROPHILS NFR BLD AUTO: 90 % — HIGH (ref 37–73)
OVALOCYTES BLD QL SMEAR: SLIGHT — SIGNIFICANT CHANGE UP
PLAT MORPH BLD: NORMAL — SIGNIFICANT CHANGE UP
PLATELET # BLD AUTO: 221 K/UL — SIGNIFICANT CHANGE UP (ref 150–400)
POIKILOCYTOSIS BLD QL AUTO: SLIGHT — SIGNIFICANT CHANGE UP
POLYCHROMASIA BLD QL SMEAR: SLIGHT — SIGNIFICANT CHANGE UP
POTASSIUM SERPL-MCNC: 4.5 MMOL/L — SIGNIFICANT CHANGE UP (ref 3.5–5.3)
POTASSIUM SERPL-SCNC: 4.5 MMOL/L — SIGNIFICANT CHANGE UP (ref 3.5–5.3)
PROT SERPL-MCNC: 6.6 G/DL — SIGNIFICANT CHANGE UP (ref 6.6–8.7)
RBC # BLD: 2.49 M/UL — LOW (ref 4.4–5.2)
RBC # FLD: 21.5 % — HIGH (ref 11–15.6)
RBC BLD AUTO: ABNORMAL
SODIUM SERPL-SCNC: 135 MMOL/L — SIGNIFICANT CHANGE UP (ref 135–145)
WBC # BLD: 11.4 K/UL — HIGH (ref 4.8–10.8)
WBC # FLD AUTO: 11.4 K/UL — HIGH (ref 4.8–10.8)

## 2017-10-31 PROCEDURE — 99285 EMERGENCY DEPT VISIT HI MDM: CPT

## 2017-10-31 PROCEDURE — 99223 1ST HOSP IP/OBS HIGH 75: CPT | Mod: GC

## 2017-10-31 PROCEDURE — 93010 ELECTROCARDIOGRAM REPORT: CPT

## 2017-10-31 RX ORDER — MORPHINE SULFATE 50 MG/1
2 CAPSULE, EXTENDED RELEASE ORAL ONCE
Qty: 0 | Refills: 0 | Status: DISCONTINUED | OUTPATIENT
Start: 2017-10-31 | End: 2017-10-31

## 2017-10-31 RX ORDER — MIDODRINE HYDROCHLORIDE 2.5 MG/1
10 TABLET ORAL THREE TIMES A DAY
Qty: 0 | Refills: 0 | Status: DISCONTINUED | OUTPATIENT
Start: 2017-10-31 | End: 2017-11-05

## 2017-10-31 RX ORDER — PANTOPRAZOLE SODIUM 20 MG/1
40 TABLET, DELAYED RELEASE ORAL
Qty: 0 | Refills: 0 | Status: DISCONTINUED | OUTPATIENT
Start: 2017-10-31 | End: 2017-11-03

## 2017-10-31 RX ORDER — GABAPENTIN 400 MG/1
300 CAPSULE ORAL ONCE
Qty: 0 | Refills: 0 | Status: COMPLETED | OUTPATIENT
Start: 2017-10-31 | End: 2017-10-31

## 2017-10-31 RX ORDER — ATORVASTATIN CALCIUM 80 MG/1
40 TABLET, FILM COATED ORAL AT BEDTIME
Qty: 0 | Refills: 0 | Status: DISCONTINUED | OUTPATIENT
Start: 2017-10-31 | End: 2017-11-05

## 2017-10-31 RX ORDER — GABAPENTIN 400 MG/1
100 CAPSULE ORAL AT BEDTIME
Qty: 0 | Refills: 0 | Status: DISCONTINUED | OUTPATIENT
Start: 2017-10-31 | End: 2017-10-31

## 2017-10-31 RX ORDER — VANCOMYCIN HCL 1 G
1800 VIAL (EA) INTRAVENOUS ONCE
Qty: 0 | Refills: 0 | Status: DISCONTINUED | OUTPATIENT
Start: 2017-10-31 | End: 2017-10-31

## 2017-10-31 RX ORDER — MIRTAZAPINE 45 MG/1
15 TABLET, ORALLY DISINTEGRATING ORAL AT BEDTIME
Qty: 0 | Refills: 0 | Status: DISCONTINUED | OUTPATIENT
Start: 2017-10-31 | End: 2017-11-03

## 2017-10-31 RX ORDER — NAPROXEN AND ESOMEPRAZOLE MAGNESIUM 375; 20 MG/1; MG/1
0 TABLET, DELAYED RELEASE ORAL
Qty: 0 | Refills: 0 | COMMUNITY

## 2017-10-31 RX ORDER — VANCOMYCIN HCL 1 G
1000 VIAL (EA) INTRAVENOUS EVERY 24 HOURS
Qty: 0 | Refills: 0 | Status: DISCONTINUED | OUTPATIENT
Start: 2017-10-31 | End: 2017-10-31

## 2017-10-31 RX ORDER — FERROUS SULFATE 325(65) MG
325 TABLET ORAL DAILY
Qty: 0 | Refills: 0 | Status: DISCONTINUED | OUTPATIENT
Start: 2017-10-31 | End: 2017-11-05

## 2017-10-31 RX ORDER — GABAPENTIN 400 MG/1
100 CAPSULE ORAL THREE TIMES A DAY
Qty: 0 | Refills: 0 | Status: DISCONTINUED | OUTPATIENT
Start: 2017-10-31 | End: 2017-11-01

## 2017-10-31 RX ORDER — HEPARIN SODIUM 5000 [USP'U]/ML
5000 INJECTION INTRAVENOUS; SUBCUTANEOUS EVERY 12 HOURS
Qty: 0 | Refills: 0 | Status: DISCONTINUED | OUTPATIENT
Start: 2017-10-31 | End: 2017-11-03

## 2017-10-31 RX ORDER — FOLIC ACID 0.8 MG
1 TABLET ORAL DAILY
Qty: 0 | Refills: 0 | Status: DISCONTINUED | OUTPATIENT
Start: 2017-10-31 | End: 2017-11-05

## 2017-10-31 RX ORDER — DARBEPOETIN ALFA IN POLYSORBAT 200MCG/0.4
0 PEN INJECTOR (ML) SUBCUTANEOUS
Qty: 0 | Refills: 0 | COMMUNITY

## 2017-10-31 RX ORDER — ACYCLOVIR SODIUM 500 MG
600 VIAL (EA) INTRAVENOUS ONCE
Qty: 0 | Refills: 0 | Status: COMPLETED | OUTPATIENT
Start: 2017-10-31 | End: 2017-10-31

## 2017-10-31 RX ORDER — OXYCODONE AND ACETAMINOPHEN 5; 325 MG/1; MG/1
1 TABLET ORAL EVERY 6 HOURS
Qty: 0 | Refills: 0 | Status: DISCONTINUED | OUTPATIENT
Start: 2017-10-31 | End: 2017-11-02

## 2017-10-31 RX ORDER — MIRTAZAPINE 45 MG/1
0 TABLET, ORALLY DISINTEGRATING ORAL
Qty: 0 | Refills: 0 | COMMUNITY

## 2017-10-31 RX ORDER — SODIUM CHLORIDE 9 MG/ML
3 INJECTION INTRAMUSCULAR; INTRAVENOUS; SUBCUTANEOUS ONCE
Qty: 0 | Refills: 0 | Status: COMPLETED | OUTPATIENT
Start: 2017-10-31 | End: 2017-10-31

## 2017-10-31 RX ORDER — SODIUM CHLORIDE 9 MG/ML
250 INJECTION INTRAMUSCULAR; INTRAVENOUS; SUBCUTANEOUS ONCE
Qty: 0 | Refills: 0 | Status: COMPLETED | OUTPATIENT
Start: 2017-10-31 | End: 2017-10-31

## 2017-10-31 RX ORDER — ACETAMINOPHEN 500 MG
650 TABLET ORAL EVERY 6 HOURS
Qty: 0 | Refills: 0 | Status: DISCONTINUED | OUTPATIENT
Start: 2017-10-31 | End: 2017-11-03

## 2017-10-31 RX ORDER — FERROUS SULFATE 325(65) MG
1 TABLET ORAL
Qty: 0 | Refills: 0 | COMMUNITY

## 2017-10-31 RX ORDER — MIRTAZAPINE 45 MG/1
1 TABLET, ORALLY DISINTEGRATING ORAL
Qty: 0 | Refills: 0 | COMMUNITY

## 2017-10-31 RX ORDER — VANCOMYCIN HCL 1 G
1000 VIAL (EA) INTRAVENOUS ONCE
Qty: 0 | Refills: 0 | Status: COMPLETED | OUTPATIENT
Start: 2017-10-31 | End: 2017-10-31

## 2017-10-31 RX ORDER — FERROUS SULFATE 325(65) MG
0 TABLET ORAL
Qty: 0 | Refills: 0 | COMMUNITY

## 2017-10-31 RX ADMIN — ATORVASTATIN CALCIUM 40 MILLIGRAM(S): 80 TABLET, FILM COATED ORAL at 22:25

## 2017-10-31 RX ADMIN — GABAPENTIN 300 MILLIGRAM(S): 400 CAPSULE ORAL at 19:54

## 2017-10-31 RX ADMIN — SODIUM CHLORIDE 3 MILLILITER(S): 9 INJECTION INTRAMUSCULAR; INTRAVENOUS; SUBCUTANEOUS at 19:13

## 2017-10-31 RX ADMIN — SODIUM CHLORIDE 250 MILLILITER(S): 9 INJECTION INTRAMUSCULAR; INTRAVENOUS; SUBCUTANEOUS at 22:22

## 2017-10-31 RX ADMIN — Medication 112 MILLIGRAM(S): at 19:50

## 2017-10-31 RX ADMIN — Medication 250 MILLIGRAM(S): at 19:50

## 2017-10-31 RX ADMIN — MORPHINE SULFATE 2 MILLIGRAM(S): 50 CAPSULE, EXTENDED RELEASE ORAL at 20:15

## 2017-10-31 RX ADMIN — MORPHINE SULFATE 2 MILLIGRAM(S): 50 CAPSULE, EXTENDED RELEASE ORAL at 19:50

## 2017-10-31 NOTE — ED PROVIDER NOTE - MEDICAL DECISION MAKING DETAILS
Clinical suspicion is bad shingles rash with superimposed cellulitis. Will give Abx, antivirals, pain control, labs, potentially admit for failure of outpatient therapy.

## 2017-10-31 NOTE — H&P ADULT - NSHPPHYSICALEXAM_GEN_ALL_CORE
Vital Signs Last 24 Hrs  T(C): 36.7 (31 Oct 2017 17:22), Max: 36.7 (31 Oct 2017 17:22)  T(F): 98 (31 Oct 2017 17:22), Max: 98 (31 Oct 2017 17:22)  HR: 80 (31 Oct 2017 17:22) (80 - 80)  BP: 102/57 (31 Oct 2017 17:22) (102/57 - 102/57)  RR: 20 (31 Oct 2017 17:22) (20 - 20)  SpO2: 97% (31 Oct 2017 17:22) (97% - 97%)     GENERAL: NAD, well-groomed, well-developed  HEAD:  Atraumatic, Normocephalic  EYES: EOMI, PERRLA, conjunctiva and sclera clear  ENMT: No tonsillar erythema, exudates, or enlargement; Moist mucous membranes, No lesions  NECK: Supple, No JVD, Normal thyroid  LUNG: Clear to auscultation bilaterally; No rales, rhonchi, wheezing, or rubs  HEART: Regular rate and rhythm; systolic  murmurs over mitral focus  , no rubs, or gallops  ABDOMEN: Soft, Nontender, distended; Bowel sounds present  EXTREMITIES:  2+ Peripheral Pulses, No clubbing, cyanosis, or edema  LYMPH: No lymphadenopathy noted  GYN: vesicular rash with blister over right labia majora and right inguinal area , erythema and swelling of external genitalia    NERVOUS SYSTEM:  Alert & Oriented X3, Good concentration; Motor Strength 5/5 B/L upper and lower extremities; CN II-XII grossly intact   SKIN: vesicular rash over external genitalia , right inguinal area and right side of her lower back Vital Signs Last 24 Hrs  T(C): 36.7 (31 Oct 2017 17:22), Max: 36.7 (31 Oct 2017 17:22)  T(F): 98 (31 Oct 2017 17:22), Max: 98 (31 Oct 2017 17:22)  HR: 80 (31 Oct 2017 17:22) (80 - 80)  BP: 102/57 (31 Oct 2017 17:22) (102/57 - 102/57)  RR: 20 (31 Oct 2017 17:22) (20 - 20)  SpO2: 97% (31 Oct 2017 17:22) (97% - 97%)     GENERAL: NAD, well-groomed, well-developed  HEAD:  Atraumatic, Normocephalic  EYES: EOMI, PERRLA, conjunctiva and sclera clear  ENMT: No tonsillar erythema, exudates, or enlargement; Moist mucous membranes, No lesions  NECK: Supple, No JVD, Normal thyroid  LUNG: Clear to auscultation bilaterally; No rales, rhonchi, wheezing, or rubs  HEART: Regular rate and rhythm; systolic  murmurs over mitral focus  , no rubs, or gallops  ABDOMEN: Soft, Nontender, distended; Bowel sounds present  EXTREMITIES:  2+ Peripheral Pulses, No clubbing, cyanosis, or edema  LYMPH: No lymphadenopathy noted  GYN: vesicular rash with blister over right labia majora and right inguinal area , erythema and swelling of external genitalia    NERVOUS SYSTEM:  Alert & Oriented X3, Good concentration; Motor Strength 5/5 B/L upper and lower extremities; CN II-XII grossly intact   SKIN: smooth papula over right labia majora , posterior thigh , right gluteus , right inguinal area and right side of her lower back Vital Signs Last 24 Hrs  T(C): 36.7 (31 Oct 2017 17:22), Max: 36.7 (31 Oct 2017 17:22)  T(F): 98 (31 Oct 2017 17:22), Max: 98 (31 Oct 2017 17:22)  HR: 80 (31 Oct 2017 17:22) (80 - 80)  BP: 102/57 (31 Oct 2017 17:22) (102/57 - 102/57)  RR: 20 (31 Oct 2017 17:22) (20 - 20)  SpO2: 97% (31 Oct 2017 17:22) (97% - 97%)     GENERAL: NAD, well-groomed, well-developed  HEAD:  Atraumatic, Normocephalic  EYES: EOMI, PERRLA, conjunctiva and sclera clear  ENMT: No tonsillar erythema, exudates, or enlargement; Moist mucous membranes, No lesions  NECK: Supple, No JVD, Normal thyroid  LUNG: Clear to auscultation bilaterally; No rales, rhonchi, wheezing, or rubs  HEART: Regular rate and rhythm; systolic  murmurs over mitral focus  , no rubs, or gallops  ABDOMEN: Soft, Nontender, distended; Bowel sounds present  EXTREMITIES:  2+ Peripheral Pulses, No clubbing, cyanosis, or edema  LYMPH: No lymphadenopathy noted  GYN: vesicular rash with blister over right labia majora and right inguinal area , erythema and swelling of external genitalia    NERVOUS SYSTEM:  Alert & Oriented X3, Good concentration; Motor Strength 5/5 B/L upper and lower extremities; CN II-XII grossly intact   SKIN: smooth papular rash with papulae   over right labia majora , posterior thigh , right gluteus , right inguinal area and right side of her lower back Vital Signs Last 24 Hrs  T(C): 36.7 (31 Oct 2017 17:22), Max: 36.7 (31 Oct 2017 17:22)  T(F): 98 (31 Oct 2017 17:22), Max: 98 (31 Oct 2017 17:22)  HR: 80 (31 Oct 2017 17:22) (80 - 80)  BP: 102/57 (31 Oct 2017 17:22) (102/57 - 102/57)  RR: 20 (31 Oct 2017 17:22) (20 - 20)  SpO2: 97% (31 Oct 2017 17:22) (97% - 97%)     GENERAL: NAD, well-groomed, well-developed  HEAD:  Atraumatic, Normocephalic  EYES: EOMI, PERRLA, conjunctiva and sclera clear  ENMT: No tonsillar erythema, exudates, or enlargement; Moist mucous membranes, No lesions  NECK: Supple, No JVD, Normal thyroid  LUNG: Clear to auscultation bilaterally; No rales, rhonchi, wheezing, or rubs  HEART: Regular rate and rhythm; systolic  murmurs over mitral focus  , no rubs, or gallops  ABDOMEN: Soft, Nontender, distended; Bowel sounds present  EXTREMITIES:  2+ Peripheral Pulses, No clubbing, cyanosis, or edema, smooth papular rash over posterior thigh and right gluteus   LYMPH: No lymphadenopathy noted  GYN: vesicular rash with blister over right labia majora and right inguinal area , erythema and swelling of external genitalia    NERVOUS SYSTEM:  Alert & Oriented X3, Good concentration; Motor Strength 5/5 B/L upper and lower extremities; CN II-XII grossly intact   SKIN: smooth papular rash with papulae  over right labia majora , posterior thigh , right gluteus , right inguinal area and right side of her lower back

## 2017-10-31 NOTE — H&P ADULT - PROBLEM SELECTOR PLAN 2
-most like 2/2 to varicella zoster infection   -CBC with leukocytosis, repeat cbc in the am  -Vancomycin 1 gr daily   -blood culture ordered

## 2017-10-31 NOTE — ED ADULT NURSE NOTE - OBJECTIVE STATEMENT
Assumed patient care at 1845.  Pt seen in ER this week for same complaint.  Right side labia swollen with redness and vesicular lesions.  Last dialysis today.  Pt states pain and symptoms are worse.

## 2017-10-31 NOTE — H&P ADULT - PROBLEM SELECTOR PLAN 7
will hold antihypertensive med for now blood pressure is low continue treatment with Remeron 15 mg daily

## 2017-10-31 NOTE — H&P ADULT - ASSESSMENT
Pt is a 78 yo F with PMH of Afib, ESRD on HD (TTF ), COPD, CAD, HTN, pulmonary HTN , gout , HLD and SAH with CC of vaginal discomfort associated with burning sensation, swelling, erythema, and vesicular rash over right labia majora more likely 2/2 to varicella zoster infection and cellulitis . Pt is a 76 yo F with PMH of Afib, ESRD on HD (TTF ), GERD, COPD, CAD, HTN, pulmonary HTN , gout , HLD and SAH with CC of vaginal discomfort associated with burning sensation, swelling, erythema, and vesicular rash over right labia majora more likely 2/2 to varicella zoster infection and cellulitis . Pt is a 76 yo F with PMH of Afib, ESRD on HD (TTF ), GERD, Depression, COPD, CAD, HTN, pulmonary HTN , gout , HLD and SAH with CC of vaginal discomfort associated with burning sensation, swelling, erythema, and vesicular rash over right labia majora more likely 2/2 to varicella zoster infection and cellulitis . Pt is a 78 yo F with PMH of Afib, ESRD on HD (TTF ), GERD, Depression, COPD, CAD, HTN, pulmonary HTN , gout , HLD and SAH with CC of vaginal discomfort associated with burning sensation, swelling, erythema, and vesicular rash over right labia majora more likely 2/2 to varicella zoster/herpes  and cellulitis . Pt is a 78 yo F with PMH of Afib( no in ac due to recurrent falls) , ESRD on HD (TTF ), GERD, falls ,  Depression, COPD, CAD, HTN, pulmonary HTN , gout , HLD and SAH with CC of vaginal discomfort associated with burning sensation, swelling, erythema, and vesicular rash over right labia majora more likely 2/2 to varicella zoster/herpes  and cellulitis . Pt is a 78 yo F with PMH of Afib( no in ac due to recurrent falls) , ESRD on HD (TTF ), GERD, falls ,  Depression, COPD, CAD, HTN, pulmonary HTN , gout , HLD and SAH with CC of vaginal discomfort associated with burning sensation, swelling, erythema, and smooth papular rash over right labia majora, right inguinal area, right gluteus and posterolateral thigh most  likely 2/2 to varicella zoster/herpes  with  cellulitis .

## 2017-10-31 NOTE — H&P ADULT - HISTORY OF PRESENT ILLNESS
Pt is a 78 yo F with PMH of Afib, ESRD on HD (TTF ), COPD, CAD, HTN, pulmonary HTN , gout , HLD and SAH who came to the ED with cc of vaginal discomfort. Pt states that her sx started one week ago, she was having vaginal pain , 10/10 , like burning sensation with no alleviating of exacerbating factor, associated with itching and some lesion vesicles over her right labia majora and right inguinal area, she went to her PMD who prescribe cephalexin for her and recommended to  come to the ED is she does not improve, last week pt came to the ED and she was prescribe steroid and nystatin cream but this did not help at all. Pt denies fever, no chills, no sick contacts, no recent travel, no sob, no chest pain , no headache, no dizziness Pt is a 76 yo F with PMH of Afib, ESRD on HD (TTF ), COPD, GERD,  CAD, HTN, pulmonary HTN , gout , HLD and SAH who came to the ED with cc of vaginal discomfort. Pt states that her sx started one week ago, she was having vaginal pain , 10/10 , like burning sensation with no alleviating of exacerbating factor, associated with itching and some lesion vesicles over her right labia majora and right inguinal area, she went to her PMD who prescribe cephalexin for her and recommended to  come to the ED is she does not improve, last week pt came to the ED and she was prescribe steroid and nystatin cream but this did not help at all. Pt denies fever, no chills, no sick contacts, no recent travel, no sob, no chest pain , no headache, no dizziness Pt is a 76 yo F with PMH of Afib, ESRD on HD (TTF ), COPD, GERD, Depression,   CAD, HTN, pulmonary HTN , gout , HLD and SAH who came to the ED with cc of vaginal discomfort. Pt states that her sx started one week ago, she was having vaginal pain , 10/10 , like burning sensation with no alleviating of exacerbating factor, associated with itching and some lesion vesicles over her right labia majora and right inguinal area, she went to her PMD who prescribe cephalexin for her and recommended to  come to the ED is she does not improve, last week pt came to the ED and she was prescribe steroid and nystatin cream but this did not help at all. Pt denies fever, no chills, no sick contacts, no recent travel, no sob, no chest pain , no headache, no dizziness Pt is a 78 yo F with PMH of Afib, ESRD on HD (TTF ), COPD, GERD, Depression,   CAD, HTN, pulmonary HTN , gout , HLD and SAH who came to the ED with cc of vaginal discomfort. Pt states that her sx started one week ago, she was having vaginal pain , 10/10 , like burning sensation with no alleviating of exacerbating factor, associated with itching and some lesion vesicles over her right labia majora, right inguinal , posterior thigh and right gluteus ,  she went to her PMD who prescribe cephalexin for her and recommended to  come to the ED is she does not improve, last week pt came to the ED and she was prescribe steroid and nystatin cream but this did not help at all. Pt denies fever, no chills, no sick contacts, no recent travel, no sob, no chest pain , no headache, no dizziness Pt is a 76 yo F with PMH of Afib( no in AC due to recurrent falls), ESRD on HD (TTF ), recurrent falls , COPD, GERD, Depression,   CAD, HTN, pulmonary HTN , gout , HLD and SAH who came to the ED with cc of vaginal discomfort. Pt states that her sx started one week ago, she was having vaginal pain , 10/10 , like burning sensation with no alleviating of exacerbating factor, associated with itching and some lesion vesicles over her right labia majora, right inguinal , posterior thigh and right gluteus ,  she went to her PMD who prescribe cephalexin for her and recommended to  come to the ED is she does not improve, last week pt came to the ED and she was prescribe steroid and nystatin cream but this did not help at all. Pt denies fever, no chills, no sick contacts, no recent travel, no sob, no chest pain , no headache, no dizziness . Her  have some lesion vesicular lesions over her right leg. Pt is a 76 yo F with PMH of Afib( no in AC due to recurrent falls), ESRD on HD (TTF ), recurrent falls , COPD, GERD, Depression,   CAD, HTN, pulmonary HTN , gout , HLD and SAH who came to the ED with cc of vaginal discomfort. Pt states that her sx started one week ago, she was having vaginal pain , 10/10 , like burning sensation with no alleviating of exacerbating factor, associated with itching and some lesion vesicles over her right labia majora, right inguinal , posterior thigh and right gluteus ,  she went to her PMD who prescribe cephalexin for her and recommended to  come to the ED is she does not improve, last week pt came to the ED and she was prescribe steroid and nystatin cream but this did not help at all. Pt denies fever, no chills, no sick contacts, no recent travel, no sob, no chest pain , no headache, no dizziness . Her  have some  vesicular lesions over her right leg.

## 2017-10-31 NOTE — H&P ADULT - NSHPREVIEWOFSYSTEMS_GEN_ALL_CORE
CONSTITUTIONAL: No fever, weight loss, or fatigue  EYES: No eye pain, visual disturbances, or discharge  ENMT:  No difficulty hearing, tinnitus, vertigo; No sinus or throat pain  NECK: No pain or stiffness  BREASTS: No pain, masses, or nipple discharge  RESPIRATORY: No cough, wheezing, chills or hemoptysis; No shortness of breath  CARDIOVASCULAR: No chest pain, palpitations, dizziness, or leg swelling  GASTROINTESTINAL: No abdominal or epigastric pain. No nausea, vomiting, or hematemesis; No diarrhea or constipation. No melena or hematochezia.  GENITOURINARY: pain like burning sensation over her right labia majora , vesicles over vaginal and right side of lumbar area,   NEUROLOGICAL: No headaches, memory loss, loss of strength, numbness, or tremors  SKIN: vesicular over right labia majora, right inguinal area and right side of her back   LYMPH NODES: No enlarged glands  ENDOCRINE: No heat or cold intolerance; No hair loss  MUSCULOSKELETAL: No joint pain or swelling; No muscle, back, or extremity pain  PSYCHIATRIC: No depression, anxiety, mood swings, or difficulty sleeping  HEME/LYMPH: No easy bruising, or bleeding gums CONSTITUTIONAL: No fever, weight loss, or fatigue  EYES: No eye pain, visual disturbances, or discharge  ENMT:  No difficulty hearing, tinnitus, vertigo; No sinus or throat pain  NECK: No pain or stiffness  BREASTS: No pain, masses, or nipple discharge  RESPIRATORY: No cough, wheezing, chills or hemoptysis; No shortness of breath  CARDIOVASCULAR: No chest pain, palpitations, dizziness, or leg swelling  GASTROINTESTINAL: No abdominal or epigastric pain. No nausea, vomiting, or hematemesis; No diarrhea or constipation. No melena or hematochezia.  GENITOURINARY: pain like burning sensation over her right labia majora , posterior thigh , right gluteus and  right side of lumbar area,   NEUROLOGICAL: No headaches, memory loss, loss of strength, numbness, or tremors  SKIN: vesicles over right labia majora , posterior thigh , right gluteus and  right side of lumbar area  LYMPH NODES: No enlarged glands  ENDOCRINE: No heat or cold intolerance; No hair loss  MUSCULOSKELETAL: No joint pain or swelling; No muscle, back, or extremity pain  PSYCHIATRIC: No depression, anxiety, mood swings, or difficulty sleeping  HEME/LYMPH: No easy bruising, or bleeding gums

## 2017-10-31 NOTE — ED ADULT TRIAGE NOTE - CHIEF COMPLAINT QUOTE
being treated for vaginal infection Friday here. also with fungal vs cellulitis getting worse. Home care RN sent in for IV meds

## 2017-10-31 NOTE — H&P ADULT - PROBLEM SELECTOR PLAN 1
Admit to Medicine service, any bed, under Dr Hobson   -contact isolation   -diet renal  , ambulate as tolerate , vital sings every 8 hrs  -HSV and varicella  PCR ordered   -acyclovir 10-15 mg q 8 hr   -gabapentin 100 mg PRN  q 8 hr  -morphine for 2 gr PRN q 8 hr Admit to Medicine service, any bed, under Dr Hobson   -contact isolation   -diet renal  , ambulate as tolerate , vital sings every 8 hrs  -HSV and varicella  PCR ordered   -acyclovir 10-15 mg q 8 hr   -gabapentin   -morphine for 2 gr PRN q 8 hr Admit to Medicine service, any bed, under Dr Hobson   -contact isolation   -diet renal  , ambulate as tolerate , vital sings every 8 hrs  -HSV and varicella  PCR ordered   -acyclovir   -gabapentin 100 mg q 8 hr  -percocet q 8 hr Admit to Medicine service, any bed, under Dr Hobson   -contact isolation   -diet renal  , ambulate as tolerate , vital sings every 8 hrs  -HSV and varicella  PCR ordered   -acyclovir   -gabapentin 100 mg q 8 hr  -percocet PRN q 8 hr Admit to Medicine service, any bed, under Dr Hobson   -airborne  isolation   -diet renal  , ambulate as tolerate , vital sings every 8 hrs  -HSV and varicella  PCR ordered   -acyclovir   -gabapentin 100 mg q 8 hr  -percocet PRN q 8 hr Admit to Medicine service, any bed, under Dr Hobson   -airborne  isolation   -diet renal  , ambulate as tolerate , vital sings every 8 hrs  -HSV and varicella  PCR ordered   -acyclovir daily   -gabapentin 100 mg q 8 hr  -percocet PRN q 8 hr Admit to Medicine service, any bed, under Dr Hobson   -Pt's rash is atypical , present over several dermatomas L1-2-3-4 and S2  -airborne  isolation   -diet renal  , falls precautions , vital sings every 8 hrs  -HSV and varicella  PCR ordered   -acyclovir daily   -gabapentin 100 mg daily   -percocet PRN q 8 hr

## 2017-10-31 NOTE — H&P ADULT - PROBLEM SELECTOR PLAN 8
heparin 500 mg q 12 hr heparin 5000 units q 12 hr pt in afib no in anticoagulation due to recurrent falls and bleeding, last June pt had SAH while taken anticoagulant   -hear rate = 79 bpm

## 2017-10-31 NOTE — H&P ADULT - NSHPLABSRESULTS_GEN_ALL_CORE
9.6    11.4  )-----------( 221      ( 31 Oct 2017 19:16 )             28.4     10-31    135  |  85<L>  |  48.0<H>  ----------------------------<  107  4.5   |  25.0  |  4.26<H>    Ca    9.0      31 Oct 2017 19:16    TPro  6.6  /  Alb  3.7  /  TBili  1.1  /  DBili  x   /  AST  <4  /  ALT  32  /  AlkPhos  491<H>  10-31    Culture - Genital (10.27.17 @ 13:06)    Specimen Source: .Genital Cervical Swab    Culture Results:   Moderate Routine vaginal girma  Chlamydia/GC Nucleic Acid Amplification (10.27.17 @ 21:19)    Source Amp: Cervical    Chlamydia Amplification Result: NotDetec:   with N. gonorrhoeae. If results are indeterminate, please submit a new  specimen.  This assay is not intended for the evaluation of suspected sexual abuse  or for other medico-legal reasons. The performance of this test has not  been evaluated in women <16 years of age. 9.6    11.4  )-----------( 221      ( 31 Oct 2017 19:16 )             28.4     10-31    135  |  85<L>  |  48.0<H>  ----------------------------<  107  4.5   |  25.0  |  4.26<H>    Ca    9.0      31 Oct 2017 19:16    TPro  6.6  /  Alb  3.7  /  TBili  1.1  /  DBili  x   /  AST  <4  /  ALT  32  /  AlkPhos  491<H>  10-31    Culture - Genital (10.27.17 @ 13:06)    Specimen Source: .Genital Cervical Swab    Culture Results:   Moderate Routine vaginal girma  Chlamydia/GC Nucleic Acid Amplification (10.27.17 @ 21:19)    Source Amp: Cervical    Chlamydia Amplification Result: NotDetec:

## 2017-10-31 NOTE — ED STATDOCS - PROGRESS NOTE DETAILS
77 year old female, on dialysis (last dialysis today), with daughter at bedside presenting to the ED complaining of vaginal pain. Pt's daughter states that the pt visited her PMD and was treated with Cephalexin and a vaginal cream. Her daughter states that the pt's pain worsened and was treated with Oxycodone. Pt's daughter states that the pt was seen here in the ED 4 days ago and treated with Nystatin and Prednisone. Upon examination in the intake room, pt was found to have ecchymosis to her left buttock. Will transfer to the main ED for further evaluation of failed outpatient cellulitis treatment.

## 2017-10-31 NOTE — H&P ADULT - PROBLEM SELECTOR PLAN 4
-most like 2/2 to anemia of chronic conditions  -will order iron studies TIBG- ferritin - iron -folic acid and vit B 12 level   -f/u cbc in the am -most like 2/2 to anemia of chronic conditions  -will order iron studies TIBG- ferritin - iron -folic acid and vit B 12 level   -f/u cbc in the am  -continue treatment with iron and acid folic -most like 2/2 to anemia of chronic conditions   -f/u cbc in the am  -continue treatment with iron and acid folic

## 2017-10-31 NOTE — ED PROVIDER NOTE - GENITOURINARY, MLM
Multiple Vesicular lesions, some of which are weeping clear fluid and others coalescing into various stages of broken blisters with ecchymosis and some non-blanching areas. All of the lesions are unilateral on the right, extending from the lower sacral area to the right buttock, right upper thigh, and right labia. Surroundings erythema and tenderness to said areas. Chaperone present on exam. Multiple Vesicular lesions, some of which are weeping clear fluid and others coalescing into various stages of broken blisters with ecchymosis and some non-blanching areas. All of the lesions are unilateral on the right, extending from the lower sacral area to the right buttock, right upper thigh, and right labia. Surroundings erythema and tenderness to said areas with some areas of weeping purulence. Chaperone present on exam.

## 2017-10-31 NOTE — ED PROVIDER NOTE - OBJECTIVE STATEMENT
A 77 year old female pt with a hx of multiple medical problems, ESRD, on dialyses presents to the ED c/o worsening pain, 10/10, burning to the right vaginal labial region extending to her upper thigh and to her buttock. Pt states that the pain has been progressive for approx. 1 week and has seen her PMD and been to the ER for the issue. She was treated with PO abx, antifungal medication, and PO steroids with little to no relief. The pain has now become intolerable. She has not had any fever or any other systemic symptoms.

## 2017-11-01 DIAGNOSIS — R39.89 OTHER SYMPTOMS AND SIGNS INVOLVING THE GENITOURINARY SYSTEM: ICD-10-CM

## 2017-11-01 DIAGNOSIS — I48.91 UNSPECIFIED ATRIAL FIBRILLATION: ICD-10-CM

## 2017-11-01 DIAGNOSIS — B01.89 OTHER VARICELLA COMPLICATIONS: ICD-10-CM

## 2017-11-01 DIAGNOSIS — F19.921 OTHER PSYCHOACTIVE SUBSTANCE USE, UNSPECIFIED WITH INTOXICATION WITH DELIRIUM: ICD-10-CM

## 2017-11-01 LAB
ALBUMIN SERPL ELPH-MCNC: 3.4 G/DL — SIGNIFICANT CHANGE UP (ref 3.3–5.2)
ALP SERPL-CCNC: 471 U/L — HIGH (ref 40–120)
ALT FLD-CCNC: 31 U/L — SIGNIFICANT CHANGE UP
ANION GAP SERPL CALC-SCNC: 24 MMOL/L — HIGH (ref 5–17)
ANISOCYTOSIS BLD QL: SIGNIFICANT CHANGE UP
AST SERPL-CCNC: 36 U/L — HIGH
BASOPHILS # BLD AUTO: 0 K/UL — SIGNIFICANT CHANGE UP (ref 0–0.2)
BASOPHILS NFR BLD AUTO: 0.1 % — SIGNIFICANT CHANGE UP (ref 0–2)
BILIRUB SERPL-MCNC: 1.7 MG/DL — SIGNIFICANT CHANGE UP (ref 0.4–2)
BUN SERPL-MCNC: 58 MG/DL — HIGH (ref 8–20)
CALCIUM SERPL-MCNC: 8.7 MG/DL — SIGNIFICANT CHANGE UP (ref 8.6–10.2)
CHLORIDE SERPL-SCNC: 87 MMOL/L — LOW (ref 98–107)
CO2 SERPL-SCNC: 24 MMOL/L — SIGNIFICANT CHANGE UP (ref 22–29)
CREAT SERPL-MCNC: 4.84 MG/DL — HIGH (ref 0.5–1.3)
DACRYOCYTES BLD QL SMEAR: SLIGHT — SIGNIFICANT CHANGE UP
EOSINOPHIL # BLD AUTO: 0.1 K/UL — SIGNIFICANT CHANGE UP (ref 0–0.5)
EOSINOPHIL NFR BLD AUTO: 1.1 % — SIGNIFICANT CHANGE UP (ref 0–6)
GLUCOSE SERPL-MCNC: 86 MG/DL — SIGNIFICANT CHANGE UP (ref 70–115)
HCT VFR BLD CALC: 28.3 % — LOW (ref 37–47)
HGB BLD-MCNC: 8.9 G/DL — LOW (ref 12–16)
HSV+VZV DNA SPEC QL NAA+PROBE: ABNORMAL
HYPOCHROMIA BLD QL: SLIGHT — SIGNIFICANT CHANGE UP
LYMPHOCYTES # BLD AUTO: 0.5 K/UL — LOW (ref 1–4.8)
LYMPHOCYTES # BLD AUTO: 5.6 % — LOW (ref 20–55)
MACROCYTES BLD QL: SIGNIFICANT CHANGE UP
MAGNESIUM SERPL-MCNC: 2.1 MG/DL — SIGNIFICANT CHANGE UP (ref 1.6–2.6)
MCHC RBC-ENTMCNC: 31.4 G/DL — LOW (ref 32–36)
MCHC RBC-ENTMCNC: 37.2 PG — HIGH (ref 27–31)
MCV RBC AUTO: 118.4 FL — HIGH (ref 81–99)
MONOCYTES # BLD AUTO: 0.2 K/UL — SIGNIFICANT CHANGE UP (ref 0–0.8)
MONOCYTES NFR BLD AUTO: 2.6 % — LOW (ref 3–10)
NEUTROPHILS # BLD AUTO: 8.1 K/UL — HIGH (ref 1.8–8)
NEUTROPHILS NFR BLD AUTO: 89.8 % — HIGH (ref 37–73)
NRBC # BLD: 3 /100 — HIGH (ref 0–0)
OVALOCYTES BLD QL SMEAR: SLIGHT — SIGNIFICANT CHANGE UP
PHOSPHATE SERPL-MCNC: 3.9 MG/DL — SIGNIFICANT CHANGE UP (ref 2.4–4.7)
PLAT MORPH BLD: NORMAL — SIGNIFICANT CHANGE UP
PLATELET # BLD AUTO: 158 K/UL — SIGNIFICANT CHANGE UP (ref 150–400)
POIKILOCYTOSIS BLD QL AUTO: SLIGHT — SIGNIFICANT CHANGE UP
POLYCHROMASIA BLD QL SMEAR: SLIGHT — SIGNIFICANT CHANGE UP
POTASSIUM SERPL-MCNC: 4.2 MMOL/L — SIGNIFICANT CHANGE UP (ref 3.5–5.3)
POTASSIUM SERPL-SCNC: 4.2 MMOL/L — SIGNIFICANT CHANGE UP (ref 3.5–5.3)
PROT SERPL-MCNC: 6.5 G/DL — LOW (ref 6.6–8.7)
RBC # BLD: 2.39 M/UL — LOW (ref 4.4–5.2)
RBC # FLD: 21.7 % — HIGH (ref 11–15.6)
RBC BLD AUTO: ABNORMAL
SODIUM SERPL-SCNC: 135 MMOL/L — SIGNIFICANT CHANGE UP (ref 135–145)
SPECIMEN SOURCE: SIGNIFICANT CHANGE UP
WBC # BLD: 9 K/UL — SIGNIFICANT CHANGE UP (ref 4.8–10.8)
WBC # FLD AUTO: 9 K/UL — SIGNIFICANT CHANGE UP (ref 4.8–10.8)

## 2017-11-01 PROCEDURE — 99233 SBSQ HOSP IP/OBS HIGH 50: CPT

## 2017-11-01 RX ORDER — CALCIUM ACETATE 667 MG
667 TABLET ORAL
Qty: 0 | Refills: 0 | Status: DISCONTINUED | OUTPATIENT
Start: 2017-11-01 | End: 2017-11-05

## 2017-11-01 RX ORDER — SODIUM CHLORIDE 9 MG/ML
250 INJECTION INTRAMUSCULAR; INTRAVENOUS; SUBCUTANEOUS ONCE
Qty: 0 | Refills: 0 | Status: COMPLETED | OUTPATIENT
Start: 2017-11-01 | End: 2017-11-01

## 2017-11-01 RX ORDER — ASPIRIN/CALCIUM CARB/MAGNESIUM 324 MG
81 TABLET ORAL DAILY
Qty: 0 | Refills: 0 | Status: DISCONTINUED | OUTPATIENT
Start: 2017-11-01 | End: 2017-11-03

## 2017-11-01 RX ORDER — SODIUM CHLORIDE 9 MG/ML
1000 INJECTION INTRAMUSCULAR; INTRAVENOUS; SUBCUTANEOUS
Qty: 0 | Refills: 0 | Status: DISCONTINUED | OUTPATIENT
Start: 2017-11-01 | End: 2017-11-02

## 2017-11-01 RX ORDER — GABAPENTIN 400 MG/1
100 CAPSULE ORAL AT BEDTIME
Qty: 0 | Refills: 0 | Status: DISCONTINUED | OUTPATIENT
Start: 2017-11-01 | End: 2017-11-01

## 2017-11-01 RX ORDER — ACYCLOVIR SODIUM 500 MG
430 VIAL (EA) INTRAVENOUS EVERY 24 HOURS
Qty: 0 | Refills: 0 | Status: DISCONTINUED | OUTPATIENT
Start: 2017-11-01 | End: 2017-11-01

## 2017-11-01 RX ORDER — IPRATROPIUM/ALBUTEROL SULFATE 18-103MCG
3 AEROSOL WITH ADAPTER (GRAM) INHALATION EVERY 6 HOURS
Qty: 0 | Refills: 0 | Status: DISCONTINUED | OUTPATIENT
Start: 2017-11-01 | End: 2017-11-05

## 2017-11-01 RX ORDER — MIDODRINE HYDROCHLORIDE 2.5 MG/1
5 TABLET ORAL ONCE
Qty: 0 | Refills: 0 | Status: COMPLETED | OUTPATIENT
Start: 2017-11-01 | End: 2017-11-01

## 2017-11-01 RX ORDER — MIRTAZAPINE 45 MG/1
15 TABLET, ORALLY DISINTEGRATING ORAL
Qty: 0 | Refills: 0 | COMMUNITY

## 2017-11-01 RX ORDER — ALBUTEROL 90 UG/1
2.5 AEROSOL, METERED ORAL
Qty: 0 | Refills: 0 | Status: DISCONTINUED | OUTPATIENT
Start: 2017-11-01 | End: 2017-11-01

## 2017-11-01 RX ORDER — CALAMINE AND ZINC OXIDE AND PHENOL 160; 10 MG/ML; MG/ML
1 LOTION TOPICAL
Qty: 0 | Refills: 0 | Status: DISCONTINUED | OUTPATIENT
Start: 2017-11-01 | End: 2017-11-05

## 2017-11-01 RX ADMIN — SODIUM CHLORIDE 70 MILLILITER(S): 9 INJECTION INTRAMUSCULAR; INTRAVENOUS; SUBCUTANEOUS at 19:03

## 2017-11-01 RX ADMIN — Medication 81 MILLIGRAM(S): at 13:57

## 2017-11-01 RX ADMIN — PANTOPRAZOLE SODIUM 40 MILLIGRAM(S): 20 TABLET, DELAYED RELEASE ORAL at 09:24

## 2017-11-01 RX ADMIN — Medication 1 MILLIGRAM(S): at 13:58

## 2017-11-01 RX ADMIN — MIDODRINE HYDROCHLORIDE 5 MILLIGRAM(S): 2.5 TABLET ORAL at 04:02

## 2017-11-01 RX ADMIN — Medication 667 MILLIGRAM(S): at 13:58

## 2017-11-01 RX ADMIN — OXYCODONE AND ACETAMINOPHEN 1 TABLET(S): 5; 325 TABLET ORAL at 00:54

## 2017-11-01 RX ADMIN — MIDODRINE HYDROCHLORIDE 10 MILLIGRAM(S): 2.5 TABLET ORAL at 23:46

## 2017-11-01 RX ADMIN — Medication 1 APPLICATION(S): at 18:47

## 2017-11-01 RX ADMIN — Medication 325 MILLIGRAM(S): at 13:58

## 2017-11-01 RX ADMIN — Medication 3 MILLILITER(S): at 05:21

## 2017-11-01 RX ADMIN — SODIUM CHLORIDE 1000 MILLILITER(S): 9 INJECTION INTRAMUSCULAR; INTRAVENOUS; SUBCUTANEOUS at 05:15

## 2017-11-01 RX ADMIN — HEPARIN SODIUM 5000 UNIT(S): 5000 INJECTION INTRAVENOUS; SUBCUTANEOUS at 05:15

## 2017-11-01 RX ADMIN — MIDODRINE HYDROCHLORIDE 10 MILLIGRAM(S): 2.5 TABLET ORAL at 05:15

## 2017-11-01 RX ADMIN — OXYCODONE AND ACETAMINOPHEN 1 TABLET(S): 5; 325 TABLET ORAL at 00:30

## 2017-11-01 RX ADMIN — ATORVASTATIN CALCIUM 40 MILLIGRAM(S): 80 TABLET, FILM COATED ORAL at 23:46

## 2017-11-01 RX ADMIN — MIDODRINE HYDROCHLORIDE 5 MILLIGRAM(S): 2.5 TABLET ORAL at 02:52

## 2017-11-01 RX ADMIN — Medication 667 MILLIGRAM(S): at 09:24

## 2017-11-01 RX ADMIN — Medication 667 MILLIGRAM(S): at 18:47

## 2017-11-01 RX ADMIN — MIRTAZAPINE 15 MILLIGRAM(S): 45 TABLET, ORALLY DISINTEGRATING ORAL at 23:47

## 2017-11-01 RX ADMIN — MIDODRINE HYDROCHLORIDE 10 MILLIGRAM(S): 2.5 TABLET ORAL at 13:58

## 2017-11-01 RX ADMIN — ALBUTEROL 2.5 MILLIGRAM(S): 90 AEROSOL, METERED ORAL at 09:02

## 2017-11-01 RX ADMIN — SODIUM CHLORIDE 1000 MILLILITER(S): 9 INJECTION INTRAMUSCULAR; INTRAVENOUS; SUBCUTANEOUS at 03:25

## 2017-11-01 NOTE — PROGRESS NOTE ADULT - PROBLEM SELECTOR PLAN 2
-Hallucinating, disoriented, confused  - most likely 2/2 IV acyclovir in a patient with ESRD  -Hold acyclovir   -Herpes encephalitis less likely given delirium started this AM and patient has had rash for past 1.5 weeks  -Derm consult  -ID consult

## 2017-11-01 NOTE — CONSULT NOTE ADULT - ASSESSMENT
78 yo F with PMH of multiple cardiac, pulmonary and renal comorbidities presenting with a confluence of large papular island along the RT groing, RT perineal wall and RT sided vaginal vault, origin unknown but suspect Varicella Zoster given dermatomal distribution at this time, cannot rule out the possible of a different etiology given immunocompromised state. Will continue to observe develop. Support treatment prophylactic treatment of Varicella and Herpes Zoster.

## 2017-11-01 NOTE — PROGRESS NOTE ADULT - SUBJECTIVE AND OBJECTIVE BOX
DUSTIN HERNANDEZ Patient is a 77y old  Female who presents with a chief complaint of vaginal discomfort (31 Oct 2017 21:23)     HPI:  Pt is a 78 yo F with PMH of Afib( no in AC due to recurrent falls), ESRD on HD (TTF ), recurrent falls , COPD, GERD, Depression,   CAD, HTN, pulmonary HTN , gout , HLD and SAH who came to the ED with cc of vaginal discomfort. Pt states that her sx started one week ago, she was having vaginal pain , 10/10 , like burning sensation with no alleviating of exacerbating factor, associated with itching and some lesion vesicles over her right labia majora, right inguinal , posterior thigh and right gluteus ,  she went to her PMD who prescribe cephalexin for her and recommended to  come to the ED is she does not improve, last week pt came to the ED and she was prescribe steroid and nystatin cream but this did not help at all. Pt denies fever, no chills, no sick contacts, no recent travel, no sob, no chest pain , no headache, no dizziness . Her  have some  vesicular lesions over her right leg. (31 Oct 2017 21:23)      HPI 11/1/17:    The patient was seen and examined at the bedside with her daughter and RN. Patient is in some distress, she is delirious, hallucinating and disoriented. Daughter states that this is not her baseline. Patient denies CP, SOB, nausea, vomiting, abdominal pain, fevers, or chills. She does complain of genital pain, buttock, and low back pain.        Height (cm): 165.1 (10-31 @ 17:22)  Weight (kg): 85.7 (10-31 @ 22:18)  BMI (kg/m2): 31.4 (10-31 @ 22:18)  BSA (m2): 1.93 (10-31 @ 22:18)I&O's Summary    Allergies    allopurinol (Rash)  codeine (Nausea; Vomiting)  penicillin (Rash)  spironolactone (Unknown)    Intolerances      HEALTH ISSUES - PROBLEM Dx:  Vulva finding: Vulva finding  Varicella with other complications: Varicella with other complications  Atrial fibrillation: Atrial fibrillation  Depression: Depression  Hypertension: Hypertension  Preventive measure: Preventive measure  Hyperlipidemia: Hyperlipidemia  ESRD (end stage renal disease) on dialysis: ESRD (end stage renal disease) on dialysis  Anemia: Anemia  Leukocytosis: Leukocytosis  Cellulitis: Cellulitis  Varicella-zoster infection: Varicella-zoster infection        PAST MEDICAL & SURGICAL HISTORY:  ESRD (end stage renal disease) on dialysis: MWF via [facility] Permacath  planned AVF creation  Sinusitis, unspecified chronicity, unspecified location  Gout  Gall stones  Pneumonia  Anemia  Pulmonary hypertension  COPD (chronic obstructive pulmonary disease)  CAD (coronary artery disease)  Atrial fibrillation  Hyperlipidemia  Hypertension  Spinal fusion failure, initial encounter  Tubal ligation status  Sinusitis  Spinal stenosis  AV fistula  S/P tonsillectomy  S/P appendectomy  H/O aortic valve replacement  H/O spinal fusion  H/O cataract  Cystocele  H/O tricuspid valve annuloplasty  H/O mitral valve repair          Vital Signs Last 24 Hrs  T(C): 36.7 (01 Nov 2017 11:28), Max: 37 (01 Nov 2017 07:35)  T(F): 98.1 (01 Nov 2017 11:28), Max: 98.6 (01 Nov 2017 07:35)  HR: 92 (01 Nov 2017 11:28) (73 - 92)  BP: 105/51 (01 Nov 2017 11:28) (75/40 - 105/51)  RR: 18 (01 Nov 2017 11:28) (18 - 22)  SpO2: 96% (01 Nov 2017 11:28) (92% - 100%)    PHYSICAL EXAM:    GENERAL: Some distress, disoriented, hallucinating, delirious   HEAD:  Atraumatic, Normocephalic  EYES: EOMI, PERRLA, conjunctiva and sclera clear  ENMT:  Moist mucous membranes,  No lesions  NERVOUS SYSTEM:  Alert & Oriented X2 (person, place),  Moves upper and lower extremities, 5/5 str bilateral extremities, appropriate sensation b/l  CHEST/LUNG: Rhonchi bilaterally  HEART: Regular rate and rhythm; No murmurs,   ABDOMEN: Soft, Nontender, Nondistended; Bowel sounds present  EXTREMITIES:  Peripheral Pulses, No  cyanosis, or edema  SKIN: violaceous, confluent, wart-like, papules covering the right labia, wrapping around the right glutes and extending into the lumbar region of the back; right labia swollen and hardened    acetaminophen   Tablet. 650 milliGRAM(s) Oral every 6 hours PRN  ALBUTerol/ipratropium for Nebulization 3 milliLiter(s) Nebulizer every 6 hours PRN  aspirin enteric coated 81 milliGRAM(s) Oral daily  atorvastatin 40 milliGRAM(s) Oral at bedtime  calcium acetate 667 milliGRAM(s) Oral three times a day with meals  diphenhydrAMINE 2%/zinc acetate 0.1% Cream 1 Application(s) Topical daily  ferrous    sulfate 325 milliGRAM(s) Oral daily  folic acid 1 milliGRAM(s) Oral daily  gabapentin 100 milliGRAM(s) Oral at bedtime  heparin  Injectable 5000 Unit(s) SubCutaneous every 12 hours  midodrine 10 milliGRAM(s) Oral three times a day  mirtazapine 15 milliGRAM(s) Oral at bedtime  oxyCODONE    5 mG/acetaminophen 325 mG 1 Tablet(s) Oral every 6 hours PRN  pantoprazole    Tablet 40 milliGRAM(s) Oral before breakfast      LABS:                          8.9    9.0   )-----------( 158      ( 01 Nov 2017 07:37 )             28.3     11-01    135  |  87<L>  |  58.0<H>  ----------------------------<  86  4.2   |  24.0  |  4.84<H>    Ca    8.7      01 Nov 2017 07:37  Phos  3.9     11-01  Mg     2.1     11-01    TPro  6.5<L>  /  Alb  3.4  /  TBili  1.7  /  DBili  x   /  AST  36<H>  /  ALT  31  /  AlkPhos  471<H>  11-01    LIVER FUNCTIONS - ( 01 Nov 2017 07:37 )  Alb: 3.4 g/dL / Pro: 6.5 g/dL / ALK PHOS: 471 U/L / ALT: 31 U/L / AST: 36 U/L / GGT: x             Consultant notes reviewed    Case discussed with consultant/provider/ family /patient

## 2017-11-01 NOTE — CONSULT NOTE ADULT - SUBJECTIVE AND OBJECTIVE BOX
Patient is a 77y old  Female who presents with a chief complaint of vaginal discomfort (31 Oct 2017 21:23)      HPI:  Pt is a 76 yo F with PMH of Afib( no in AC due to recurrent falls), ESRD on HD (TTF ), recurrent falls , COPD, GERD, Depression,   CAD, HTN, pulmonary HTN , gout , HLD and SAH who came to the ED with cc of vaginal discomfort. Pt states that her sx started one week ago, she was having vaginal pain , 10/10 , like burning sensation with no alleviating of exacerbating factor, associated with itching and some lesion vesicles over her right labia majora, right inguinal , posterior thigh and right gluteus ,  she went to her PMD who prescribe cephalexin for her and recommended to  come to the ED is she does not improve, last week pt came to the ED and she was prescribe steroid and nystatin cream but this did not help at all. Pt denies fever, no chills, no sick contacts, no recent travel, no sob, no chest pain , no headache, no dizziness . Her  have some  vesicular lesions over her right leg. (31 Oct 2017 21:23)    Interim noted . GYN consult noted . Chlam and GC negative . Developed some altered sensorium after receiving combination of IV Acyclovir, Neurontin and pain meds . Doing better now . Derm evaluation noted       PAST MEDICAL & SURGICAL HISTORY:  ESRD (end stage renal disease) on dialysis: MWF via [facility] Permacath  planned AVF creation  Sinusitis, unspecified chronicity, unspecified location  Gout  Gall stones  Pneumonia  Anemia  Pulmonary hypertension  COPD (chronic obstructive pulmonary disease)  CAD (coronary artery disease)  Atrial fibrillation  Hyperlipidemia  Hypertension  Spinal fusion failure, initial encounter  Tubal ligation status  Sinusitis  Spinal stenosis  AV fistula  S/P tonsillectomy  S/P appendectomy  H/O aortic valve replacement  H/O spinal fusion  H/O cataract  Cystocele  H/O tricuspid valve annuloplasty  H/O mitral valve repair      FAMILY HISTORY:  No pertinent family history in first degree relatives      Social History:    MEDICATIONS  (STANDING):  aspirin enteric coated 81 milliGRAM(s) Oral daily  atorvastatin 40 milliGRAM(s) Oral at bedtime  calcium acetate 667 milliGRAM(s) Oral three times a day with meals  diphenhydrAMINE 2%/zinc acetate 0.1% Cream 1 Application(s) Topical daily  ferrous    sulfate 325 milliGRAM(s) Oral daily  folic acid 1 milliGRAM(s) Oral daily  gabapentin 100 milliGRAM(s) Oral at bedtime  heparin  Injectable 5000 Unit(s) SubCutaneous every 12 hours  midodrine 10 milliGRAM(s) Oral three times a day  mirtazapine 15 milliGRAM(s) Oral at bedtime  pantoprazole    Tablet 40 milliGRAM(s) Oral before breakfast    MEDICATIONS  (PRN):  acetaminophen   Tablet. 650 milliGRAM(s) Oral every 6 hours PRN Mild Pain (1 - 3)  ALBUTerol/ipratropium for Nebulization 3 milliLiter(s) Nebulizer every 6 hours PRN Shortness of Breath  oxyCODONE    5 mG/acetaminophen 325 mG 1 Tablet(s) Oral every 6 hours PRN Moderate Pain (4 - 6)      Allergies    allopurinol (Rash)  codeine (Nausea; Vomiting)  penicillin (Rash)  spironolactone (Unknown)    Intolerances        Vital Signs Last 24 Hrs  T(C): 36.7 (01 Nov 2017 11:28), Max: 37 (01 Nov 2017 07:35)  T(F): 98.1 (01 Nov 2017 11:28), Max: 98.6 (01 Nov 2017 07:35)  HR: 92 (01 Nov 2017 11:28) (73 - 92)  BP: 105/51 (01 Nov 2017 11:28) (75/40 - 105/51)  BP(mean): --  RR: 18 (01 Nov 2017 11:28) (18 - 22)  SpO2: 96% (01 Nov 2017 11:28) (92% - 100%)  Daily Height in cm: 165.1 (31 Oct 2017 17:22)    Daily   I&O's Detail    I&O's Summary      PHYSICAL EXAM:    GENERAL: NAD,  HEAD:  Atraumatic, No facial edema   NECK: Supple, No JVD,   CHEST/LUNG: EAE , no wheeze   HEART: Regular rate and rhythm; No rub   ABDOMEN: Soft, Nontender, Nondistended;   EXTREMITIES:  decreased edema   SKIN: + Vesicular eruption from rectal area to right buttocks to right inguinal area and right vaginal area       LABS:                        8.9    9.0   )-----------( 158      ( 01 Nov 2017 07:37 )             28.3     11-01    135  |  87<L>  |  58.0<H>  ----------------------------<  86  4.2   |  24.0  |  4.84<H>    Ca    8.7      01 Nov 2017 07:37  Phos  3.9     11-01  Mg     2.1     11-01    TPro  6.5<L>  /  Alb  3.4  /  TBili  1.7  /  DBili  x   /  AST  36<H>  /  ALT  31  /  AlkPhos  471<H>  11-01        Magnesium, Serum: 2.1 mg/dL (11-01 @ 07:37)  Phosphorus Level, Serum: 3.9 mg/dL (11-01 @ 07:37)          RADIOLOGY & ADDITIONAL TESTS:

## 2017-11-01 NOTE — CONSULT NOTE ADULT - SUBJECTIVE AND OBJECTIVE BOX
GYNECOLOGIC ONCOLOGY CONSULT NOTE    76 yo with PMH of Afib( no in AC due to recurrent falls), ESRD on HD (TTF), recurrent falls, COPD, GERD, Depression, CAD, HTN, pulmonary HTN, gout, HLD and SAH who came to the ED with cc of vaginal discomfort. Pt states that her sx started 1.5wks ago, she was having vaginal pain, 10/10, like burning sensation with no alleviating of exacerbating factor, associated with itching on the vulva. She went to her PMD who prescribed Keflex for her and recommended to come to the ED is she does not improve.  Last week pt came to the ED and was prescribed steroids and nystatin cream but this did not help at all. Pt denies fever, no chills, no sick contacts, no recent travel, no sob, no chest pain , no headache, no dizziness.  History confirmed with pts daughter who reiterates initiation of discomfort about 1.5 weeks ago and poor gyn follow up for maintenance.      OB/GYN HISTORY:     Surgical History:    AV fistula  S/P tonsillectomy  S/P appendectomy  H/O aortic valve replacement  H/O spinal fusion  H/O cataract  Cystocele  H/O tricuspid valve annuloplasty  H/O mitral valve repair      Past Medical History:   ESRD (end stage renal disease) on dialysis  Sinusitis, unspecified chronicity, unspecified location  Gout  Gall stones  Pneumonia  Anemia  Pulmonary hypertension  COPD (chronic obstructive pulmonary disease)  CAD (coronary artery disease)  Atrial fibrillation  Hyperlipidemia  Hypertension  Spinal fusion failure, initial encounter  Tubal ligation status  Sinusitis  Spinal stenosis  Pneumonia due to organism  No pertinent past medical history      allopurinol (Rash)  codeine (Nausea; Vomiting)  penicillin (Rash)  spironolactone (Unknown)      acetaminophen   Tablet. 650 milliGRAM(s) Oral every 6 hours PRN  ALBUTerol/ipratropium for Nebulization 3 milliLiter(s) Nebulizer every 6 hours PRN  aspirin enteric coated 81 milliGRAM(s) Oral daily  atorvastatin 40 milliGRAM(s) Oral at bedtime  calcium acetate 667 milliGRAM(s) Oral three times a day with meals  ferrous    sulfate 325 milliGRAM(s) Oral daily  folic acid 1 milliGRAM(s) Oral daily  gabapentin 100 milliGRAM(s) Oral at bedtime  heparin  Injectable 5000 Unit(s) SubCutaneous every 12 hours  midodrine 10 milliGRAM(s) Oral three times a day  mirtazapine 15 milliGRAM(s) Oral at bedtime  oxyCODONE    5 mG/acetaminophen 325 mG 1 Tablet(s) Oral every 6 hours PRN  pantoprazole    Tablet 40 milliGRAM(s) Oral before breakfast      FAMILY HISTORY:  No pertinent family history in first degree relatives      Social History:     REVIEW OF SYSTEMS:    CONSTITUTIONAL: No fever, weight loss, or fatigue  EYES: No eye pain, visual disturbances, or discharge  ENMT:  No difficulty hearing, tinnitus, vertigo; No sinus or throat pain  NECK: No pain or stiffness  BREASTS: No pain, masses, or nipple discharge  RESPIRATORY: No cough, wheezing, chills or hemoptysis; No shortness of breath  CARDIOVASCULAR: No chest pain, palpitations, dizziness, or leg swelling  GASTROINTESTINAL: No abdominal or epigastric pain. No nausea, vomiting, or hematemesis; No diarrhea or constipation. No melena or hematochezia.  GENITOURINARY: No dysuria, frequency, hematuria, or incontinence  NEUROLOGICAL: No headaches, memory loss, loss of strength, numbness, or tremors  SKIN: No itching, burning, rashes, or lesions   LYMPH NODES: No enlarged glands  ENDOCRINE: No heat or cold intolerance; No hair loss  MUSCULOSKELETAL: No joint pain or swelling; No muscle, back, or extremity pain  PSYCHIATRIC: No depression, anxiety, mood swings, or difficulty sleeping  HEME/LYMPH: No easy bruising, or bleeding gums  ALLERY AND IMMUNOLOGIC: No hives or eczema      MEDICATIONS  (STANDING):  aspirin enteric coated 81 milliGRAM(s) Oral daily  atorvastatin 40 milliGRAM(s) Oral at bedtime  calcium acetate 667 milliGRAM(s) Oral three times a day with meals  ferrous    sulfate 325 milliGRAM(s) Oral daily  folic acid 1 milliGRAM(s) Oral daily  gabapentin 100 milliGRAM(s) Oral at bedtime  heparin  Injectable 5000 Unit(s) SubCutaneous every 12 hours  midodrine 10 milliGRAM(s) Oral three times a day  mirtazapine 15 milliGRAM(s) Oral at bedtime  pantoprazole    Tablet 40 milliGRAM(s) Oral before breakfast    MEDICATIONS  (PRN):  acetaminophen  Tablet. 650 milliGRAM(s) Oral every 6 hours PRN Mild Pain (1 - 3)  ALBUTerol/ipratropium for Nebulization 3 milliLiter(s) Nebulizer every 6 hours PRN Shortness of Breath  oxyCODONE  5 mG/acetaminophen 325 mG 1 Tablet(s) Oral every 6 hours PRN Moderate Pain (4 - 6)      OBJECTIVE FINDINGS:    Vital Signs Last 24 Hrs  T(C): 36.7 (01 Nov 2017 11:28), Max: 37 (01 Nov 2017 07:35)  T(F): 98.1 (01 Nov 2017 11:28), Max: 98.6 (01 Nov 2017 07:35)  HR: 92 (01 Nov 2017 11:28) (73 - 92)  BP: 105/51 (01 Nov 2017 11:28) (75/40 - 105/51)  RR: 18 (01 Nov 2017 11:28) (18 - 22)  SpO2: 96% (01 Nov 2017 11:28) (92% - 100%)    PHYSICAL EXAM:    GENERAL: NAD, well-developed  HEAD:  Atraumatic, Normocephalic  EYES: EOMI, PERRLA, conjunctiva and sclera clear  ENMT: No tonsillar erythema, exudates, or enlargement; Moist mucous membranes, Good dentition, No lesions  NECK: Supple, No JVD, Normal thyroid  NERVOUS SYSTEM:  Alert & Oriented X3, Good concentration; Motor Strength 5/5 B/L upper and lower extremities; DTRs 2+ intact and symmetric  CHEST/LUNG: Clear to percussion bilaterally; No rales, rhonchi, wheezing, or rubs  HEART: Regular rate and rhythm; No murmurs, rubs, or gallops  ABDOMEN: Soft, Nontender, Nondistended; Bowel sounds present, No rebound, No guarding  EXTREMITIES: 2+ Peripheral Pulses, No clubbing, cyanosis, or edema, Imelda's sign negative  LYMPH: No lymphadenopathy noted  SKIN: No rashes or lesions  PELVIC: violaceous, wart-like, lesions covering the right labia, wrapping around the right glutes and extending into the lumbar region of the back; right labia swollen and hardened  RECTAL: Deferred      LABS:                        8.9    9.0   )-----------( 158      ( 01 Nov 2017 07:37 )             28.3     11-01    135  |  87<L>  |  58.0<H>  ----------------------------<  86  4.2   |  24.0  |  4.84<H>    Ca    8.7      01 Nov 2017 07:37  Phos  3.9     11-01  Mg     2.1     11-01    TPro  6.5<L>  /  Alb  3.4  /  TBili  1.7  /  DBili  x   /  AST  36<H>  /  ALT  31  /  AlkPhos  471<H>  11-01          RADIOLOGY & ADDITIONAL STUDIES:

## 2017-11-01 NOTE — PROGRESS NOTE ADULT - ASSESSMENT
Pt is a 78 yo F with PMH of Afib( no in ac due to recurrent falls) , ESRD on HD (TTF ), GERD, falls ,  Depression, COPD, CAD, HTN, pulmonary HTN , gout , HLD and SAH with CC of vaginal discomfort associated with burning sensation, swelling, erythema, and smooth papular rash over right labia majora, right inguinal area, right gluteus and posterolateral thigh most likely from varicella zoster outbreak, however most lesions appear to have crusted over. Started on acyclovir overnight, received 600mg IV x1. This AM daughter noticed acute confusion and delirium. Most likely 2/2 acyclovir given patient also has ESRD. Acyclovir was stopped. Derm and ID consult requested. Appreciate GYN consult.

## 2017-11-01 NOTE — CONSULT NOTE ADULT - SUBJECTIVE AND OBJECTIVE BOX
76 yo F with PMH of Afib( no in AC due to recurrent falls), ESRD on HD (TTF ), recurrent falls , COPD, GERD, Depression,   CAD, HTN, pulmonary HTN , gout , HLD and SAH who came to the ED with cc of vaginal discomfort. Pt states that her sx started one week ago, she was having vaginal pain , 10/10 , like burning sensation with no alleviating of exacerbating factor, associated with itching and some lesion vesicles over her right labia majora, right inguinal , posterior thigh and right gluteus ,  she went to her PMD who prescribe cephalexin for her and recommended to come to the ED is she does not improve, last week pt came to the ED and she was prescribe steroid and nystatin cream but this did not help at all. Pt denies fever, no chills, no sick contacts, no recent travel, no sob, no chest pain , no headache, no dizziness . Her  have some lesion vesicular lesions over her right leg.     PMHX; see HPI  PSHX; cardiac stents, fistula   POBHX;   PGYNHX: no gyn surgeries, no STIs,  with 1 sexual partner   SOCIAL: negative x3   Allergies: allopurinol (Rash), codeine (Nausea; Vomiting), penicillin (Rash), spironolactone (Unknown)    Vital Signs Last 24 Hrs  T(C): 36.7 (31 Oct 2017 22:36), Max: 36.7 (31 Oct 2017 17:22)  T(F): 98 (31 Oct 2017 22:36), Max: 98 (31 Oct 2017 17:22)  HR: 75 (31 Oct 2017 22:36) (75 - 80)  BP: 92/48 (31 Oct 2017 22:36) (92/48 - 102/57)  RR: 20 (31 Oct 2017 22:36) (20 - 20)  SpO2: 95% (31 Oct 2017 22:36) (95% - 97%)Last Menstrual Period          PHYSICAL EXAM:  GENERAL: NAD after morphine IV  ABDOMEN: Soft, Nontender, Nondistended; Bowel sounds present  EXTREMITIES:  2+ Peripheral Pulses, No clubbing, cyanosis, or edema  PELVIC:        EXTERNAL GENITALIA: RT labial confluence of large purple papules, no discharge, no bleeding, and includes RT labial minora and clitoral lozada         VAGINAL: RT 1/3 of vaginal vault with flesh papules that look vesicular and look filled with yellow fluid (pus?), no bleeding lesions, no scabs, no masses,          CERVIX: closed, posterior, no masses, no copious discharge        UTERUS: small uterus, no masses, no CVT        ADNEXA: non-palpable, no masses       LABS:                        9.6    11.4  )-----------( 221      ( 31 Oct 2017 19:16 )             28.4     10-31    135  |  85<L>  |  48.0<H>  ----------------------------<  107  4.5   |  25.0  |  4.26<H>    Ca    9.0      31 Oct 2017 19:16    TPro  6.6  /  Alb  3.7  /  TBili  1.1  /  DBili  x   /  AST  <4  /  ALT  32  /  AlkPhos  491<H>  10-31              ASSESSMENT & PLAN

## 2017-11-01 NOTE — PROGRESS NOTE ADULT - PROBLEM SELECTOR PLAN 4
-continue HD 3 times per week ( TTS)  -nephrology evaluation appreciated  -renal diet , strict in and out , weight daily

## 2017-11-01 NOTE — PHARMACY COMMUNICATION NOTE - COMMENTS
Spoke with MD regarding dosing based on adjusted body weight for obese patients.  MD said that they already adjusted the dosing regimen for her renal function and wants to keep the dosing 5 mg/kg/dose (actual body weight) once daily.

## 2017-11-01 NOTE — CONSULT NOTE ADULT - SUBJECTIVE AND OBJECTIVE BOX
HPI:  Pt is a 78 yo F with PMH of Afib( no in AC due to recurrent falls), ESRD on HD (TTF ), recurrent falls , COPD, GERD, Depression,   CAD, HTN, pulmonary HTN , gout , HLD and SAH who came to the ED with cc of vaginal discomfort. Pt states that her sx started one week ago, she was having vaginal pain , 10/10 , like burning sensation with no alleviating of exacerbating factor, associated with itching and some lesion vesicles over her right labia majora, right inguinal , posterior thigh and right gluteus ,  she went to her PMD who prescribe cephalexin for her and recommended to  come to the ED is she does not improve, last week pt came to the ED and she was prescribe steroid and nystatin cream but this did not help at all. Pt denies fever, no chills, no sick contacts, no recent travel, no sob, no chest pain , no headache, no dizziness . Patient claims the area is very painful      PAST MEDICAL & SURGICAL HISTORY:  ESRD (end stage renal disease) on dialysis: MWF via [facility] Permacath  planned AVF creation  Sinusitis, unspecified chronicity, unspecified location  Gout  Gall stones  Pneumonia  Anemia  Pulmonary hypertension  COPD (chronic obstructive pulmonary disease)  CAD (coronary artery disease)  Atrial fibrillation  Hyperlipidemia  Hypertension  Spinal fusion failure, initial encounter  Tubal ligation status  Sinusitis  Spinal stenosis  AV fistula  S/P tonsillectomy  S/P appendectomy  H/O aortic valve replacement  H/O spinal fusion  H/O cataract  Cystocele  H/O tricuspid valve annuloplasty  H/O mitral valve repair            MEDICATIONS  (STANDING):  aspirin enteric coated 81 milliGRAM(s) Oral daily  atorvastatin 40 milliGRAM(s) Oral at bedtime  calcium acetate 667 milliGRAM(s) Oral three times a day with meals  ferrous    sulfate 325 milliGRAM(s) Oral daily  folic acid 1 milliGRAM(s) Oral daily  heparin  Injectable 5000 Unit(s) SubCutaneous every 12 hours  midodrine 10 milliGRAM(s) Oral three times a day  mirtazapine 15 milliGRAM(s) Oral at bedtime  pantoprazole    Tablet 40 milliGRAM(s) Oral before breakfast    MEDICATIONS  (PRN):  acetaminophen   Tablet. 650 milliGRAM(s) Oral every 6 hours PRN Mild Pain (1 - 3)  ALBUTerol/ipratropium for Nebulization 3 milliLiter(s) Nebulizer every 6 hours PRN Shortness of Breath  calamine Lotion 1 Application(s) Topical four times a day PRN Itching  oxyCODONE    5 mG/acetaminophen 325 mG 1 Tablet(s) Oral every 6 hours PRN Moderate Pain (4 - 6)      Allergies    allopurinol (Rash)  codeine (Nausea; Vomiting)  penicillin (Rash)  spironolactone (Unknown)    Intolerances        SOCIAL HISTORY:    FAMILY HISTORY:  No pertinent family history in first degree relatives      Vital Signs Last 24 Hrs  T(C): 36 (01 Nov 2017 15:10), Max: 37 (01 Nov 2017 07:35)  T(F): 96.8 (01 Nov 2017 15:10), Max: 98.6 (01 Nov 2017 07:35)  HR: 72 (01 Nov 2017 15:10) (72 - 92)  BP: 98/49 (01 Nov 2017 15:10) (75/40 - 105/51)  BP(mean): --  RR: 18 (01 Nov 2017 15:10) (18 - 22)  SpO2: 100% (01 Nov 2017 15:10) (92% - 100%)    PHYSICAL EXAM:    GENERAL: NAD, well-groomed, well-developed  EXAM: Tense vesicles over left labia and mons pubis and herpetiform vesicles on right buttock      LABS:                        8.9    9.0   )-----------( 158      ( 01 Nov 2017 07:37 )             28.3     11-01    135  |  87<L>  |  58.0<H>  ----------------------------<  86  4.2   |  24.0  |  4.84<H>    Ca    8.7      01 Nov 2017 07:37  Phos  3.9     11-01  Mg     2.1     11-01    TPro  6.5<L>  /  Alb  3.4  /  TBili  1.7  /  DBili  x   /  AST  36<H>  /  ALT  31  /  AlkPhos  471<H>  11-01          RADIOLOGY & ADDITIONAL STUDIES:      IMPRESSION    HERPES ZOSTER    Would consider acyclovir or valcyclovir per nephrology. This is not secondarily infected.    Pain could become significant, consider neurontin    Advise silvadene cream BID to rash

## 2017-11-01 NOTE — ED ADULT NURSE REASSESSMENT NOTE - NS ED NURSE REASSESS COMMENT FT1
@ 0315 pts vitals unstable. Dr. Hobson made aware of 75/40 BP. 250mL bolus administered. Despite midodrine and NS Bolus pt remains hypotensive. Dr. Hobson made aware and Resident coming to see pt.
Assumed pt care @ 4628 from ILYA Jansen. Resident @ bedside doing an exam right now will continue to monitor.
Dr. Herr aware of pts VS and that she is dizzy at this time. He is going to come see pt.
Dr. Lamar aware of lactate result 2.7
MD coates/ MARGARITO webster called in reference to hypotension. pt currently on fluid restriction. administered IVF 70cc/hr as per MD mcadams covering for MD coates at this time. receiving ILYA Whitfield on  5T made aware of hypotension and addressed patients blood pressure. daughter reports this is baseline for patient. due to circumstances attempt to stabilize BP.
Pt assisted to the toliet. Pt very unsteady on feet. Pt had large bowel movement. Pt assisted back to bed safely. Will continue to monitor.
Pt has coarse breath sounds. RT called for prn treatment. Will continue to monitor.
Pt is A&Ox 3 in 10/10 pain in her vaginal area. Residents made aware that pts daughter is requesting morphine for her. AVF to the right upper extremity. Will continue to monitor.
Pt remains alert and oriented X 3.  Oxygen saturation 95% on room air blood pressure 92/48, as per patient and daughter these vital signs are normal for her.  Pt states she has no pain just itchiness at this time.  Dr. Lara at bedside.  Awaiting admission orders and inpatient bed.
Pt resting comfortably in no pain at this time. Pt desating to 85% on room air and placed on 2L NC and went up to 96%. Dr. Joce gotti. Will continue to monitor.
Resident Carmenza @ bedside with me, pt given another 5mg of midodrine and placed in Trendelenburg. BP is coming up. Pt denies any SOB or pain. Will continue to monitor.
called report to 5t pt bed is in progress
pt became altered, asked RN where she was, BL upper extremities began to twitch and pt became delirious. and was seeing "junaid. MARGARITO Oseguera called to bedside to assess patient. airway intact, facial symmetry, perrla, + cough, afebrile, abd rounded, denies pain at this time. pt daughter at bedside states this is abnormal behavior similar to last time she has brain injury secondary to fall. no recent falls. UE and LE strength equal bilaterally.
pt comfortable in appearance. nos/s distress. denies pain. aox3 at this time. pt reacting well to lights being on in the room and no acyclovir at this time. no opiates provided on my shift.
patient aox4 comfortable in appearance. respirations clear equal and unlabored. heart sounds s1 s2  WDL, abdomen soft nontender + bowel sounds all 4 quadrants, moves all extremities. + pulse all 4 extremities. + sensation all 4 extremities. patient and family updated on plan of care. pt educated on contact/airborne precautions. patient educated on hourly rounding procedures and understands call bell system.

## 2017-11-01 NOTE — CONSULT NOTE ADULT - ASSESSMENT
ESRD - HD tomorrow     Anemia - CBC in am , Add Epogen with HD . High MCV --> Check B12 , folate and TFT's     Rash - suspect Zoster ? Recommend dermatology assessment    RO - Ca x phos OK

## 2017-11-01 NOTE — CONSULT NOTE ADULT - ASSESSMENT
76yo w/multiple medical problems presenting with multiple lesions on the vulva, blutes, and back 76yo w/multiple medical problems presenting with multiple lesions on the vulva, glutes, and back

## 2017-11-01 NOTE — ED ADULT NURSE REASSESSMENT NOTE - TEMPLATE LIST FOR HEAD TO TOE ASSESSMENT
General

## 2017-11-02 LAB
ALBUMIN SERPL ELPH-MCNC: 3.5 G/DL — SIGNIFICANT CHANGE UP (ref 3.3–5.2)
ALP SERPL-CCNC: 478 U/L — HIGH (ref 40–120)
ALT FLD-CCNC: 26 U/L — SIGNIFICANT CHANGE UP
ANION GAP SERPL CALC-SCNC: 25 MMOL/L — HIGH (ref 5–17)
ANION GAP SERPL CALC-SCNC: 25 MMOL/L — HIGH (ref 5–17)
ANISOCYTOSIS BLD QL: SLIGHT — SIGNIFICANT CHANGE UP
AST SERPL-CCNC: 34 U/L — HIGH
BASO STIPL BLD QL SMEAR: SLIGHT — SIGNIFICANT CHANGE UP
BASOPHILS # BLD AUTO: 0 K/UL — SIGNIFICANT CHANGE UP (ref 0–0.2)
BILIRUB SERPL-MCNC: 1.4 MG/DL — SIGNIFICANT CHANGE UP (ref 0.4–2)
BUN SERPL-MCNC: 70 MG/DL — HIGH (ref 8–20)
BUN SERPL-MCNC: 71 MG/DL — HIGH (ref 8–20)
CALCIUM SERPL-MCNC: 8.4 MG/DL — LOW (ref 8.6–10.2)
CALCIUM SERPL-MCNC: 8.6 MG/DL — SIGNIFICANT CHANGE UP (ref 8.6–10.2)
CHLORIDE SERPL-SCNC: 85 MMOL/L — LOW (ref 98–107)
CHLORIDE SERPL-SCNC: 88 MMOL/L — LOW (ref 98–107)
CO2 SERPL-SCNC: 20 MMOL/L — LOW (ref 22–29)
CO2 SERPL-SCNC: 22 MMOL/L — SIGNIFICANT CHANGE UP (ref 22–29)
CREAT SERPL-MCNC: 5.97 MG/DL — HIGH (ref 0.5–1.3)
CREAT SERPL-MCNC: 6.06 MG/DL — HIGH (ref 0.5–1.3)
ELLIPTOCYTES BLD QL SMEAR: SLIGHT — SIGNIFICANT CHANGE UP
EOSINOPHIL # BLD AUTO: 0.1 K/UL — SIGNIFICANT CHANGE UP (ref 0–0.5)
EOSINOPHIL NFR BLD AUTO: 2 % — SIGNIFICANT CHANGE UP (ref 0–5)
FOLATE SERPL-MCNC: >20 NG/ML — HIGH (ref 4–16)
GAS PNL BLDA: SIGNIFICANT CHANGE UP
GLUCOSE SERPL-MCNC: 101 MG/DL — SIGNIFICANT CHANGE UP (ref 70–115)
GLUCOSE SERPL-MCNC: 99 MG/DL — SIGNIFICANT CHANGE UP (ref 70–115)
HCT VFR BLD CALC: 25.9 % — LOW (ref 37–47)
HCT VFR BLD CALC: 27.2 % — LOW (ref 37–47)
HGB BLD-MCNC: 8.6 G/DL — LOW (ref 12–16)
HGB BLD-MCNC: 8.7 G/DL — LOW (ref 12–16)
HYPOCHROMIA BLD QL: SLIGHT — SIGNIFICANT CHANGE UP
LYMPHOCYTES # BLD AUTO: 0.5 K/UL — LOW (ref 1–4.8)
LYMPHOCYTES # BLD AUTO: 7 % — LOW (ref 20–55)
MACROCYTES BLD QL: SLIGHT — SIGNIFICANT CHANGE UP
MCHC RBC-ENTMCNC: 32 G/DL — SIGNIFICANT CHANGE UP (ref 32–36)
MCHC RBC-ENTMCNC: 33.2 G/DL — SIGNIFICANT CHANGE UP (ref 32–36)
MCHC RBC-ENTMCNC: 37.5 PG — HIGH (ref 27–31)
MCHC RBC-ENTMCNC: 38.2 PG — HIGH (ref 27–31)
MCV RBC AUTO: 115.1 FL — HIGH (ref 81–99)
MCV RBC AUTO: 117.2 FL — HIGH (ref 81–99)
MONOCYTES # BLD AUTO: 0.5 K/UL — SIGNIFICANT CHANGE UP (ref 0–0.8)
MONOCYTES NFR BLD AUTO: 8 % — SIGNIFICANT CHANGE UP (ref 3–10)
NEUTROPHILS # BLD AUTO: 6.3 K/UL — SIGNIFICANT CHANGE UP (ref 1.8–8)
NEUTROPHILS NFR BLD AUTO: 80 % — HIGH (ref 37–73)
NEUTS BAND # BLD: 3 % — SIGNIFICANT CHANGE UP (ref 0–8)
NRBC # BLD: 1 /100 — HIGH (ref 0–0)
OVALOCYTES BLD QL SMEAR: SLIGHT — SIGNIFICANT CHANGE UP
PLAT MORPH BLD: ABNORMAL
PLATELET # BLD AUTO: 135 K/UL — LOW (ref 150–400)
PLATELET # BLD AUTO: 136 K/UL — LOW (ref 150–400)
POIKILOCYTOSIS BLD QL AUTO: SLIGHT — SIGNIFICANT CHANGE UP
POLYCHROMASIA BLD QL SMEAR: SLIGHT — SIGNIFICANT CHANGE UP
POTASSIUM SERPL-MCNC: 4.7 MMOL/L — SIGNIFICANT CHANGE UP (ref 3.5–5.3)
POTASSIUM SERPL-MCNC: 4.9 MMOL/L — SIGNIFICANT CHANGE UP (ref 3.5–5.3)
POTASSIUM SERPL-SCNC: 4.7 MMOL/L — SIGNIFICANT CHANGE UP (ref 3.5–5.3)
POTASSIUM SERPL-SCNC: 4.9 MMOL/L — SIGNIFICANT CHANGE UP (ref 3.5–5.3)
PROCALCITONIN SERPL-MCNC: 8.44 NG/ML — HIGH (ref 0–0.04)
PROT SERPL-MCNC: 5.9 G/DL — LOW (ref 6.6–8.7)
RBC # BLD: 2.25 M/UL — LOW (ref 4.4–5.2)
RBC # BLD: 2.32 M/UL — LOW (ref 4.4–5.2)
RBC # FLD: 22 % — HIGH (ref 11–15.6)
RBC # FLD: 22.1 % — HIGH (ref 11–15.6)
RBC BLD AUTO: ABNORMAL
SCHISTOCYTES BLD QL AUTO: SLIGHT — SIGNIFICANT CHANGE UP
SODIUM SERPL-SCNC: 132 MMOL/L — LOW (ref 135–145)
SODIUM SERPL-SCNC: 133 MMOL/L — LOW (ref 135–145)
T4 FREE SERPL-MCNC: 0.7 NG/DL — LOW (ref 0.9–1.8)
TSH SERPL-MCNC: 6.42 UIU/ML — HIGH (ref 0.27–4.2)
VIT B12 SERPL-MCNC: >1500 PG/ML — HIGH (ref 180–914)
WBC # BLD: 6.6 K/UL — SIGNIFICANT CHANGE UP (ref 4.8–10.8)
WBC # BLD: 7.4 K/UL — SIGNIFICANT CHANGE UP (ref 4.8–10.8)
WBC # FLD AUTO: 6.6 K/UL — SIGNIFICANT CHANGE UP (ref 4.8–10.8)
WBC # FLD AUTO: 7.4 K/UL — SIGNIFICANT CHANGE UP (ref 4.8–10.8)

## 2017-11-02 PROCEDURE — 99233 SBSQ HOSP IP/OBS HIGH 50: CPT

## 2017-11-02 PROCEDURE — 99223 1ST HOSP IP/OBS HIGH 75: CPT

## 2017-11-02 PROCEDURE — 71010: CPT | Mod: 26

## 2017-11-02 PROCEDURE — 93010 ELECTROCARDIOGRAM REPORT: CPT

## 2017-11-02 RX ORDER — SODIUM CHLORIDE 9 MG/ML
500 INJECTION INTRAMUSCULAR; INTRAVENOUS; SUBCUTANEOUS ONCE
Qty: 0 | Refills: 0 | Status: DISCONTINUED | OUTPATIENT
Start: 2017-11-02 | End: 2017-11-02

## 2017-11-02 RX ORDER — GABAPENTIN 400 MG/1
100 CAPSULE ORAL
Qty: 0 | Refills: 0 | Status: DISCONTINUED | OUTPATIENT
Start: 2017-11-02 | End: 2017-11-02

## 2017-11-02 RX ORDER — GABAPENTIN 400 MG/1
100 CAPSULE ORAL THREE TIMES A DAY
Qty: 0 | Refills: 0 | Status: DISCONTINUED | OUTPATIENT
Start: 2017-11-02 | End: 2017-11-02

## 2017-11-02 RX ORDER — SODIUM CHLORIDE 9 MG/ML
1000 INJECTION INTRAMUSCULAR; INTRAVENOUS; SUBCUTANEOUS
Qty: 0 | Refills: 0 | Status: DISCONTINUED | OUTPATIENT
Start: 2017-11-02 | End: 2017-11-02

## 2017-11-02 RX ADMIN — PANTOPRAZOLE SODIUM 40 MILLIGRAM(S): 20 TABLET, DELAYED RELEASE ORAL at 06:31

## 2017-11-02 RX ADMIN — HEPARIN SODIUM 5000 UNIT(S): 5000 INJECTION INTRAVENOUS; SUBCUTANEOUS at 06:31

## 2017-11-02 RX ADMIN — HEPARIN SODIUM 5000 UNIT(S): 5000 INJECTION INTRAVENOUS; SUBCUTANEOUS at 18:35

## 2017-11-02 RX ADMIN — Medication 1 APPLICATION(S): at 18:35

## 2017-11-02 RX ADMIN — Medication 1 APPLICATION(S): at 06:31

## 2017-11-02 RX ADMIN — MIDODRINE HYDROCHLORIDE 10 MILLIGRAM(S): 2.5 TABLET ORAL at 06:31

## 2017-11-02 RX ADMIN — MIDODRINE HYDROCHLORIDE 10 MILLIGRAM(S): 2.5 TABLET ORAL at 14:16

## 2017-11-02 NOTE — CONSULT NOTE ADULT - SUBJECTIVE AND OBJECTIVE BOX
[facility] [place] BUTCH VELARDE MD FACP   LORA KERN MD  Diplomates American Board of Internal Medicine and Infecctious Diseases  [phone]  [id number] RAFFAELE [id number]      HPI:  Pt is a 76 yo F with PMH of Afib( no in AC due to recurrent falls), ESRD on HD (TTF ), recurrent falls , COPD, GERD, Depression,   CAD, HTN, pulmonary HTN , gout , HLD and SAH who came to the ED with cc of vaginal discomfort. Pt states that her sx started one week ago, she was having vaginal pain , 10/10 , like burning sensation with no alleviating of exacerbating factor, associated with itching and some lesion vesicles over her right labia majora, right inguinal , posterior thigh and right gluteus ,  she went to her PMD who prescribe cephalexin for her and recommended to  come to the ED is she does not improve, last week pt came to the ED and she was prescribe steroid and nystatin cream but this did not help at all. Pt denies fever, no chills, no sick contacts, no recent travel, no sob, no chest pain , no headache, no dizziness .   PT SEEN BY DERM  AND FELT TO BE ZOSTER AND GIVEN A DOSE OF IV ACYCLOVIR AS PER FAMILY SHE HAD CONFUSION WHICH THEY BELIEVE  IS RELATED TO ACYCLOVIR  ASKED TO EVALUATE FROM ID STANDPOINT TODAY      PAST MEDICAL & SURGICAL HISTORY:  ESRD (end stage renal disease) on dialysis: MWF via R SC Permacath  planned AVF creation  Sinusitis, unspecified chronicity, unspecified location  Gout  Gall stones  Pneumonia  Anemia  Pulmonary hypertension  COPD (chronic obstructive pulmonary disease)  CAD (coronary artery disease)    Atrial fibrillation  Hyperlipidemia  Hypertension  Spinal fusion failure, initial encounter  Tubal ligation status  Sinusitis  Spinal stenosis  AV fistula  S/P tonsillectomy  S/P appendectomy  H/O aortic valve replacement  H/O spinal fusion  H/O cataract  Cystocele  H/O tricuspid valve annuloplasty  H/O mitral valve repair      ANTIBIOTICS      Allergies    allopurinol (Rash)  codeine (Nausea; Vomiting)  penicillin (Rash)  spironolactone (Unknown)    Intolerances        SOCIAL HISTORY:    FAMILY HISTORY:  No pertinent family history in first degree relatives      Vital Signs Last 24 Hrs  T(C): 36.9 (02 Nov 2017 07:52), Max: 36.9 (02 Nov 2017 07:52)  T(F): 98.5 (02 Nov 2017 07:52), Max: 98.5 (02 Nov 2017 07:52)  HR: 93 (02 Nov 2017 07:52) (72 - 107)  BP: 93/51 (02 Nov 2017 07:52) (88/54 - 110/62)  BP(mean): --  RR: 18 (02 Nov 2017 07:52) (18 - 18)  SpO2: 96% (02 Nov 2017 07:52) (95% - 100%)  Drug Dosing Weight  Height (cm): 165.1 (31 Oct 2017 17:22)  Weight (kg): 85.7 (31 Oct 2017 22:18)  BMI (kg/m2): 31.4 (31 Oct 2017 22:18)  BSA (m2): 1.93 (31 Oct 2017 22:18)      REVIEW OF SYSTEMS:    CONSTITUTIONAL:  As per HPI.    HEENT:  Eyes:  No diplopia or blurred vision. ENT:  No earache, sore throat or runny nose.    CARDIOVASCULAR:  No pressure, squeezing, strangling, tightness, heaviness or aching about the chest, neck, axilla or epigastrium.    RESPIRATORY:  No cough, shortness of breath, PND or orthopnea.    GASTROINTESTINAL:  No nausea, vomiting or diarrhea.    GENITOURINARY:  No dysuria, frequency or urgency.    MUSCULOSKELETAL:  As per HPI.    SKIN:  No change in skin, hair or nails.    NEUROLOGIC:  No paresthesias, fasciculations, seizures or weakness.                  PHYSICAL EXAMINATION:    GENERAL: The patient is a well-developed, IN NAD CONFUSED    VITAL SIGNS: T(C): 36.9 (11-02-17 @ 07:52), Max: 36.9 (11-02-17 @ 07:52)  HR: 93 (11-02-17 @ 07:52) (72 - 107)  BP: 93/51 (11-02-17 @ 07:52) (88/54 - 110/62)  RR: 18 (11-02-17 @ 07:52) (18 - 18)  SpO2: 96% (11-02-17 @ 07:52) (95% - 100%)  Wt(kg): --    HEENT: Head is normocephalic and atraumatic.  ANICTERIC  NECK: Supple. No carotid bruits.  No lymphadenopathy or thyromegaly.    LUNGS:COARSE BREATH SOUNDS    HEART: Regular rate and rhythm without murmur.    ABDOMEN: Soft, nontender, and nondistended.  Positive bowel sounds.  No hepatosplenomegaly was noted. NO REBOUND NO GUARDING    EXTREMITIES: NO EDEMA NO ERYTHEMA    NEUROLOGIC: CONFUSED       SKIN: No ulceration or induration present. NO RASH        BLOOD CULTURES  Culture Results:   Moderate Routine vaginal girma (10-27 @ 13:06)       URINE CX          LABS:                        8.6    7.4   )-----------( 135      ( 02 Nov 2017 09:13 )             25.9     11-02    133<L>  |  88<L>  |  71.0<H>  ----------------------------<  99  4.9   |  20.0<L>  |  5.97<H>    Ca    8.4<L>      02 Nov 2017 09:13  Phos  3.9     11-01  Mg     2.1     11-01    TPro  5.9<L>  /  Alb  3.5  /  TBili  1.4  /  DBili  x   /  AST  34<H>  /  ALT  26  /  AlkPhos  478<H>  11-02          RADIOLOGY & ADDITIONAL STUDIES:      ASSESSMENT/PLAN  Pt is a 76 yo F with PMH of Afib( no in AC due to recurrent falls), ESRD on HD (TTF ), recurrent falls , COPD, GERD, Depression,   CAD, HTN, pulmonary HTN , gout , HLD and SAH who came to the ED with cc of vaginal discomfort. Pt states that her sx started one week ago, she was having vaginal pain , 10/10 , like burning sensation with no alleviating of exacerbating factor, associated with itching and some lesion vesicles over her right labia majora, right inguinal , posterior thigh and right gluteus ,  she went to her PMD who prescribe cephalexin for her and recommended to  come to the ED is she does not improve, last week pt came to the ED and she was prescribe steroid and nystatin cream but this did not help at all. Pt denies fever, no chills, no sick contacts, no recent travel, no sob, no chest pain , no headache, no dizziness .   PT SEEN BY DERM  AND FELT TO BE ZOSTER AND GIVEN A DOSE OF IV ACYCLOVIR AS PER FAMILY SHE HAD CONFUSION WHICH THEY BELIEVE  IS RELATED TO ACYCLOVIR  ON EXAM RASH IS C/W ZOSTER MOSTLY HEALED ON POSTERIOR THIGH  BUT IN GENITAL AREA STILL SOME VESICLES  I SPOKE TO DAUGHTER WHO IS CONCERNED THAT IV ACYCLOVIR WAS CONTRIBUTING TO HER MOTHERS MENTAL STATUS  WOULD  DEFER FURTHER IV ACYCLOVIR  IT APPEARS TO BE MOSTLY HEALED   WILL FOLLOW UP AND D/W HOSPITALIST  POSSIBLE ORAL VALTREX BUT WILL HOLD OFF FOR NOW                 LORA ANDRADE MD

## 2017-11-02 NOTE — PROGRESS NOTE ADULT - SUBJECTIVE AND OBJECTIVE BOX
Feels "tired"    Bp 93/60 in bed with nasal 02.  CVS clear tones with PC noted, no rub.  Lungs No wheezes, BS bilaterally   Ext. same with good bruit right arm.  Neuro lethargic but responsive  to verbal stimuli    Lab reviewed.

## 2017-11-02 NOTE — PROGRESS NOTE ADULT - ASSESSMENT
OT ACUTE Treatment Session    Pt seen on 4  nursing unit.                                                          Frequency Comments: M-F (IRP accepted, insurance auth pending)      Admitting complaint:: Abrasion of scalp, initial encounter [S00.01XA]  Skin tear of left elbow without complication, initial encounter [S51.012A]  Cerebrovascular accident (CVA), unspecified mechanism (CMS/[facility]) [I63.9]                                                                                         Precautions  Other Precautions: fall risk (05/21/17 1420)      ASSESSMENT:  Treatment today focused on LE dressing,  left attention, scanning and functional mobility. Pt completing LE dressing with supervision this date. Pt completing visual scanning activity with increased time, intermittent cues to locate objects in left visual field and full scanning. Pt requires intermittent re-direction due to confusion and re-assurance that wife will be able to locate his room.   Limitations at this time include weakness, cognitive deficits, balance and vision. Patient will benefit from further skilled OT for continued training with ADLs, functional mobility, left attention and cognition to help the patient meet goals.     RECOMMENDATIONS FOR DISCHARGE:  Recommendations for Discharge: OT: Intensive therapy program (05/22/17 0720)          PT/OT Mobility Equipment for Discharge: Pt with no devices; likely no needs; plans to continue to monitor (05/19/17 1425)  PT/OT ADL Equipment for Discharge: continue to monitor needs (05/22/17 0720)    ICU Mobility Assesment (PERME):         PLAN: Continue skilled OT, including the following Treatment Interventions: ADL retraining; retraining;Functional transfer training;Endurance training;Cognitive reorientation;Equipment eval/education;Patient/Family training;Neuro muscular reeducation;Compensatory technique education;Continued evaluation (05/21/17 1330)   Treatment Plan for Next Session: item retrieval  and self cares with focus on left swide inattention an d compensatory strategies           EDUCATION:   On this date, the patient was educated on vision, attention.    The response to education was: Needs reinforcement                                                    SUBJECTIVE:     Subjective: pt states \"I didn't sleep in this room last night\" (05/22/17 0720)  Subjective/Objective Comments: pt with signficant confusion this am, requires frequent re-direction. Pt concerned regarding wife being able to locate his room as pt perceives that he moved into different room (05/22/17 0720)    OBJECTIVE:Basic Lines: Capped IV;Telemetry (05/21/17 1330)  Safety Measures: Bed rails;Alarms (05/21/17 1330)    RN reported Garcia Fall Scale Score: 110       Last 24 hours of Functional Data     ADLs  Self Cares/ADL's  Footwear Assistance: Supervision;Set-up (05/22/17 0720)  Lower Body Dressing Deficit: Setup (05/22/17 0720)  Self Cares/ADL's Comments #1: pt completing LE dressing seated in recliner with set-up. ptdeclines further adls at this time (05/22/17 0720)    Household mobility  Household Mobility  Supine to Sit: Supervision (05/21/17 1330)  Sit to Supine: Supervision (05/21/17 1330)  Sit to Stand: Supervision (05/22/17 0720)  Stand to Sit: Supervision (05/22/17 0720)  Stand Pivot Transfers: Touching/Steadying Assistance (05/21/17 1330)  Toilet Transfers: Touching/Steadying Assistance (05/21/17 1330)  Sitting - Static: Independent (05/21/17 1330)  Sitting - Dynamic: Independent (05/21/17 1330)  Standing - Static: Touching/Steadying Assistance (05/22/17 0720)  Standing - Dynamic: Touching/Steadying Assistance (05/22/17 0720)  Household Mobility Comments #1: pt completing functional ambulation in room and lounge with light steadying assist at times. Pt requires min cues for left attention  (05/22/17 0720)    Home Management       Tolerance  OT Activity Tolerance  Activity Tolerance: 1:1 Activity to rest (05/22/17  0720)    Cognition  Communication/Cognition  Communication: Clear speech (05/21/17 1330)  Overall Cognitive Status: Impaired (05/22/17 0720)  Attention: Impaired sustained attention;Impaired divided attention (05/21/17 1330)  Executive Functions: Impaired initiation;Impaired self monitoring/self awareness (05/22/17 0720)  Memory: Decreased short term memory (05/22/17 0720)  Safety Judgement: Decreased awareness of need for assistance;Decreased awareness of need for safety (05/22/17 0720)  Awareness of Deficits: Not aware of deficits (05/21/17 1330)  Cognition Comments: scored 21/30 on SLUMS indicating mild cognitive deficits.  Pt is able to verbalize some of his deficits, however, fails to see how they ipact his safety at home ie left inattention. (05/21/17 1330)    Patient's Personal Goal: did not state (05/19/17 1450)    Therapy Goals:    Goals  Short Term Goals to Be Reviewed On: 05/29/17 (05/22/17 0720)  Short Term Goals Are The Same as Discharge Goals: No (05/22/17 0720)  Goal Agreement: Patient agrees with goals and treatment plan (05/22/17 0720)  Grooming Discharge Goal 1: Completes with Mod I (05/22/17 0720)  Grooming Discharge Goal Progress 1: Outcome not met, continue to monitor (05/22/17 0720)  Bathing Short Term Goal 1: Completes with Supervision (05/22/17 0720)  Bathing Discharge Goal 1: Completes with Mod I (05/22/17 0720)  Bathing Discharge Goal Progress 1: Outcome not met, continue to monitor (05/22/17 0720)  Dressing Short Term Goal 1: Completes with Supervision (05/22/17 0720)  Dressing Discharge Goal 1: Completes with Mod I (05/22/17 0720)  Dressing Discharge Goal Progress 1: Outcome not met, continue to monitor (05/22/17 0720)  Toileting Short Term Goal 1: Completes with Supervision (05/22/17 0720)  Toileting Discharge Goal 1: Completes with Mod I (05/22/17 0720)  Toileting Discharge Goal Progress 1: Outcome not met, continue to monitor (05/22/17 0720)  Home Setting Transfer Discharge Goal 1:  CRF 5, Rash  groin, low Bp . Pt has been receiving gabapentin, percocet. Completes with Mod I (05/22/17 0720)  Home Setting Transfer Discharge Goal Progress 1: Outcome not met, continue to monitor (05/22/17 0720)  Other Short Term Goal 1: met (05/22/17 0720)  Other Discharge Goal 1: Pt able to attend to L side during 100% of tasks (05/22/17 0720)  Other Discharge Goal Progress 1: Outcome not met, continue to monitor (05/22/17 0720)        Total Treatment Time:  OT Time Spent: 45 minutes (05/22/17 0720)    See OT flowsheet for full details regarding the OT therapy provided.

## 2017-11-02 NOTE — PROGRESS NOTE ADULT - PROBLEM SELECTOR PLAN 4
- continue HD 3 times per week ( TTS)  - BP was low this morning (near baseline).  Give IVF per renal. - continue HD 3 times per week ( TTS)  - BP was low this morning (near baseline).  s/p IVF.  Monitor BP.  Attempt HD later today.

## 2017-11-02 NOTE — PROVIDER CONTACT NOTE (OTHER) - ACTION/TREATMENT ORDERED:
No HD @ this time.Will bring the pt back later today as per Dr Lamar. Last vitals before sending pt back to floor, bp111/56 ,spo2 96 on 2l nasal canula,. Report given to primary RN Sherry.

## 2017-11-02 NOTE — CHART NOTE - NSCHARTNOTEFT_GEN_A_CORE
Called by RN to see pt who she felt was having respiratory difficulties.  Notable audible airway noises.  Pt not very responsive, but opening eyes and moving all extremities as she had today.  Day staff told RN that she had been coughing this afternoon, and chest PT administered to expectorate secretions with Good effect    REVIEW OF SYSTEMS:    CONSTITUTIONAL: No fever, weight loss, or fatigue  EYES: No eye pain, visual disturbances, or discharge  EENT:  No difficulty hearing, tinnitus, vertigo; No sinus or throat pain  NECK: No pain or stiffness  BREASTS: No pain, masses, or nipple discharge  RESPIRATORY: No cough, wheezing, chills or hemoptysis; No shortness of breath  CARDIOVASCULAR: No chest pain, palpitations, dizziness, or leg swelling  GASTROINTESTINAL: No abdominal or epigastric pain. No nausea, vomiting, or hematemesis; No diarrhea or constipation. No melena or hematochezia.  GENITOURINARY: No dysuria, frequency, hematuria, or incontinence  NEUROLOGICAL: No headaches, memory loss, loss of strength, numbness, or tremors  SKIN: No itching, burning, rashes, or lesions   LYMPH NODES: No enlarged glands  ENDOCRINE: No heat or cold intolerance; No hair loss  MUSCULOSKELETAL: No joint pain or swelling; No muscle, back, or extremity pain  PSYCHIATRIC: No depression, anxiety, mood swings, or difficulty sleeping  HEME/LYMPH: No easy bruising, or bleeding gums  ALLERGY AND IMMUNOLOGIC: No hives or eczema    PAST MEDICAL & SURGICAL HISTORY:  ESRD (end stage renal disease) on dialysis: MWF via R SC Permacath  planned AVF creation  Sinusitis, unspecified chronicity, unspecified location  Gout  Gall stones  Pneumonia  Anemia  Pulmonary hypertension  COPD (chronic obstructive pulmonary disease)  CAD (coronary artery disease)  Atrial fibrillation  Hyperlipidemia  Hypertension  Spinal fusion failure, initial encounter  Tubal ligation status  Sinusitis  Spinal stenosis  Pneumonia due to organism  No pertinent past medical history  AV fistula  S/P tonsillectomy  S/P appendectomy  H/O aortic valve replacement  H/O spinal fusion  H/O cataract  Cystocele  H/O tricuspid valve annuloplasty  H/O mitral valve repair  Patient is a 77y old  Female who presents with a chief complaint of vaginal discomfort (02 Nov 2017 00:37)  acetaminophen   Tablet. 650 milliGRAM(s) Oral every 6 hours PRN  ALBUTerol/ipratropium for Nebulization 3 milliLiter(s) Nebulizer every 6 hours PRN  aspirin enteric coated 81 milliGRAM(s) Oral daily  atorvastatin 40 milliGRAM(s) Oral at bedtime  calamine Lotion 1 Application(s) Topical four times a day PRN  calcium acetate 667 milliGRAM(s) Oral three times a day with meals  ferrous    sulfate 325 milliGRAM(s) Oral daily  folic acid 1 milliGRAM(s) Oral daily  heparin  Injectable 5000 Unit(s) SubCutaneous every 12 hours  midodrine 10 milliGRAM(s) Oral three times a day  mirtazapine 15 milliGRAM(s) Oral at bedtime  pantoprazole    Tablet 40 milliGRAM(s) Oral before breakfast  silver sulfADIAZINE 1% Cream 1 Application(s) Topical two times a day    on examination: BP 91/56 (MAP 67.7), R-20. P-64, Oxygen 98%on 2L/nc  Pt awake, not responding to examiner in NAD; noted audible airway noises with cough  Neck- no JVD  Thorax- midline anterior thorax surgical scar  Lungs- anterior chest has referred upper airway noises.  Posteriorly no wheezes, rales or rhonchi.  Good air exchange  Extremities- no cyanosis, clubbing or pitting edema note bilaterally; capillary refill <3 seconds    Impression:  1- airway secretions with poor self clearance by patient                     2- no evidence of fluid overload  Plan:  1- deep suctioning by RT and RN           2- continue to observe, re-evaluate prn

## 2017-11-02 NOTE — PROGRESS NOTE ADULT - ASSESSMENT
Pt is a 76 yo F with PMH of Afib( no in ac due to recurrent falls) , ESRD on HD (TTF ), GERD, falls ,  Depression, COPD, CAD, HTN, pulmonary HTN , gout , HLD and SAH with pain due to Zoster, complicated by acute delirium.      > Chronic Hypotension - continue Midodrine.  Monitor BP.  See below. Pt is a 76 yo F with PMH of Afib( no in ac due to recurrent falls) , ESRD on HD (TTF ), GERD, falls ,  Depression, COPD, CAD, HTN, pulmonary HTN , gout , HLD and SAH with pain due to Zoster, complicated by acute delirium.      > Chronic Hypotension - continue Midodrine.  Monitor BP.  Would hold off on additional IVF  See below.

## 2017-11-02 NOTE — CHART NOTE - NSCHARTNOTEFT_GEN_A_CORE
Rapid Response PGY 1 Note  Rapid response called on a 77y old  Female patient due to altered mental status, hypotension and hypoxia. Patients daughter at bedside who reported the patient was not her usual self. Patient was in the dialysis getting prepped for treatment when she desaturated down to 78% with a BP of 80/40 and temperature of 99F.  As per RN patient's baseline BP is around 90 systolic. Patient was put on nonrebreather which was changed to nasal cannula after reviewing ABG results. Patient was given 500cc bolus of NS and her BP valeri to 96/48. Patients dialysis is held and will be performed at a later time.  Dr. Lamar was present at bedside and agrees with the management.    Allergies    allopurinol (Rash)  codeine (Nausea; Vomiting)  penicillin (Rash)  spironolactone (Unknown)    Intolerances    PAST MEDICAL & SURGICAL HISTORY:  ESRD (end stage renal disease) on dialysis: MWF via [facility] Permacath  planned AVF creation  Sinusitis, unspecified chronicity, unspecified location  Gout  Gall stones  Pneumonia  Anemia  Pulmonary hypertension  COPD (chronic obstructive pulmonary disease)  CAD (coronary artery disease)  Atrial fibrillation  Hyperlipidemia  Hypertension  Spinal fusion failure, initial encounter  Tubal ligation status  Sinusitis  Spinal stenosis  AV fistula  S/P tonsillectomy  S/P appendectomy  H/O aortic valve replacement  H/O spinal fusion  H/O cataract  Cystocele  H/O tricuspid valve annuloplasty  H/O mitral valve repair      Vitals  T 98.9    BP 85/77  RR 16  O2 78%    GENERAL: The patient is awake with AMS  HEENT: Head is normocephalic and atraumatic. Mucous membranes are moist.   NECK: Supple.  LUNGS: b/l wheezing in anterior upper lung fields  HEART: Tachycardic, irregularly irregular,+S1/+S2, no murmurs, rubs, gallops  ABDOMEN: Soft, nontender, and nondistended, no rebound, guarding rigidity, bowel sounds in all 4 quadrants  NEUROLOGIC: Altered, not responding appropriately to questions, patient with eyes open staring into space                        8.7    6.6   )-----------( 136      ( 02 Nov 2017 06:11 )             27.2     11-02    132<L>  |  85<L>  |  70.0<H>  ----------------------------<  101  4.7   |  22.0  |  6.06<H>    Ca    8.6      02 Nov 2017 06:11  Phos  3.9     11-01  Mg     2.1     11-01    TPro  6.5<L>  /  Alb  3.4  /  TBili  1.7  /  DBili  x   /  AST  36<H>  /  ALT  31  /  AlkPhos  471<H>  11-01    ABG - ( 02 Nov 2017 08:47 )  pH: 7.35  /  pCO2: 42    /  pO2: 213   / HCO3: 22    / Base Excess: -2.3  /  SaO2: 100          LIVER FUNCTIONS - ( 01 Nov 2017 07:37 )  Alb: 3.4 g/dL / Pro: 6.5 g/dL / ALK PHOS: 471 U/L / ALT: 31 U/L / AST: 36 U/L / GGT: x                  Assessment- Rapid Response called for 77y year old Female for AMS, hypotension and desats down to 78% likely due to medication toxicity vs possible sepsis likely due to pneumonia vs unknown source    Plan-  Given 500cc of IV NS bolus  ABG  Blood culture, urine culture, UA  Narcotics held, Gabapentin held  Follow up lactate, CBC, CMP  Chest Xray to rule out pneumonia  Will treat as sepsis based on labs and symptoms  Dialysis delayed for later time  Attending Dr. Minesh hobson

## 2017-11-02 NOTE — CHART NOTE - NSCHARTNOTEFT_GEN_A_CORE
Called by RN, pt remains confused.    PHYSICAL EXAMINATION:  GENERAL: NAD, Alert, oriented to person, place.   CARDIOVASCULAR: irregular S1, S2.  LUNGS: coarse BS b/l.  .  NEURO: strength symmetric.      A/P   Metabolic Encephalopathy - persists.  Monitor neuro status.  Check CT brain to r/o CVA as pt with Afib.    ESRD - HD d/w nephrology, planned for am.  See progress notes.

## 2017-11-02 NOTE — PROGRESS NOTE ADULT - PROBLEM SELECTOR PLAN 2
- Metabolic encephalopathy due to multiple medical conditions.  Stopped opiates.  Pain appears to be controlled.  Conservative management.  PT evaluation.

## 2017-11-02 NOTE — PROGRESS NOTE ADULT - SUBJECTIVE AND OBJECTIVE BOX
Case d/w Dr. Stephenson.  Derm and ID following, so will hold off on further workup.  Please reconsult as necessary.

## 2017-11-02 NOTE — PROGRESS NOTE ADULT - SUBJECTIVE AND OBJECTIVE BOX
NEPHROLOGY INTERVAL HPI/OVERNIGHT EVENTS: pt presented to dialysis with low bp and  rapid response called. Pt awake with systolic Bp 85.Pt has 02 by mask.    MEDICATIONS  (STANDING):  aspirin enteric coated 81 milliGRAM(s) Oral daily  atorvastatin 40 milliGRAM(s) Oral at bedtime  calcium acetate 667 milliGRAM(s) Oral three times a day with meals  ferrous    sulfate 325 milliGRAM(s) Oral daily  folic acid 1 milliGRAM(s) Oral daily  gabapentin 100 milliGRAM(s) Oral three times a day  heparin  Injectable 5000 Unit(s) SubCutaneous every 12 hours  midodrine 10 milliGRAM(s) Oral three times a day  mirtazapine 15 milliGRAM(s) Oral at bedtime  pantoprazole    Tablet 40 milliGRAM(s) Oral before breakfast  silver sulfADIAZINE 1% Cream 1 Application(s) Topical two times a day  sodium chloride 0.9% Bolus 500 milliLiter(s) IV Bolus once  sodium chloride 0.9%. 1000 milliLiter(s) (70 mL/Hr) IV Continuous <Continuous>    MEDICATIONS  (PRN):  acetaminophen   Tablet. 650 milliGRAM(s) Oral every 6 hours PRN Mild Pain (1 - 3)  ALBUTerol/ipratropium for Nebulization 3 milliLiter(s) Nebulizer every 6 hours PRN Shortness of Breath  calamine Lotion 1 Application(s) Topical four times a day PRN Itching  oxyCODONE    5 mG/acetaminophen 325 mG 1 Tablet(s) Oral every 6 hours PRN Moderate Pain (4 - 6)      Allergies    allopurinol (Rash)  codeine (Nausea; Vomiting)  penicillin (Rash)  spironolactone (Unknown)    Intolerances        Vital Signs Last 24 Hrs  T(C): 36.9 (02 Nov 2017 07:52), Max: 36.9 (02 Nov 2017 07:52)  T(F): 98.5 (02 Nov 2017 07:52), Max: 98.5 (02 Nov 2017 07:52)  HR: 93 (02 Nov 2017 07:52) (72 - 107)  BP: 93/51 (02 Nov 2017 07:52) (88/54 - 110/62)  BP(mean): --  RR: 18 (02 Nov 2017 07:52) (18 - 18)  SpO2: 96% (02 Nov 2017 07:52) (95% - 100%)  Daily     Daily     PHYSICAL EXAM:    GENERAL: acutely ill  HEAD:  wnl  EYES:   ENMT:   NECK: veins full laying flat  NERVOUS SYSTEM: responsive to verbal stimuli   CHEST/LUNG: no wheezes  HEART: no rub murmur same  ABDOMEN: soft, slight distention  EXTREMITIES:  trace edema  LYMPH:   SKIN: no rash    LABS:                        8.7    6.6   )-----------( 136      ( 02 Nov 2017 06:11 )             27.2     11-02    132<L>  |  85<L>  |  70.0<H>  ----------------------------<  101  4.7   |  22.0  |  6.06<H>    Ca    8.6      02 Nov 2017 06:11  Phos  3.9     11-01  Mg     2.1     11-01    TPro  6.5<L>  /  Alb  3.4  /  TBili  1.7  /  DBili  x   /  AST  36<H>  /  ALT  31  /  AlkPhos  471<H>  11-01                RADIOLOGY & ADDITIONAL TESTS:

## 2017-11-02 NOTE — PROGRESS NOTE ADULT - SUBJECTIVE AND OBJECTIVE BOX
Patient: DUSTIN HERNANDEZ [id number] 77y Female                 Internal Medicine Hospitalist Progress Note - Dr. Cristopher Edge    Chief Complaint: Patient is a 77y old  Female who presents with a chief complaint of vaginal discomfort (02 Nov 2017 00:37)    HPI:  Pt is a 78 yo F with PMH of Afib( no in AC due to recurrent falls), ESRD on HD (TTF ), recurrent falls , COPD, GERD, Depression,   CAD, HTN, pulmonary HTN , gout , HLD and SAH who came to the ED with cc of vaginal discomfort, noted to have crusting vesicular lesions in R lower abdomen, R perineal area and R buttock.  She had not responded to cephalexin as outpatient.  Reported sharp pain, worsening, prompting presentation to ED.  Seen by dermatology, lesions felt to be c/w Zoster.  She was given Acyclovir, then developed episode of confusion.    Events noted.  Rapid response was called earlier today due to hypotension.  Pt denies specific complaints.  Daughter at bedside noted pt more confused.  No weakness / numbness.  Denies pain.  No additional complaints.    ____________________PHYSICAL EXAM:  Vitals reviewed as indicated below  GENERAL:  NAD Alert and Oriented to person, place, time.  Slightly confused.   HEENT: NCAT  CARDIOVASCULAR:  S1, S2  LUNGS: CTAB  ABDOMEN:  soft, (-) tenderness, (-) distension, (+) bowel sounds, (-) guarding, (-) rebound (-) rigidity  EXTREMITIES:  no cyanosis / clubbing / edema.   ____________________    BACKGROUND:  HEALTH ISSUES - PROBLEM Dx:  Delirium, drug-induced: Delirium, drug-induced  Vulva finding: Vulva finding  Varicella with other complications: Varicella with other complications  Atrial fibrillation: Atrial fibrillation  Depression: Depression  Hypertension: Hypertension  Preventive measure: Preventive measure  Hyperlipidemia: Hyperlipidemia  ESRD (end stage renal disease) on dialysis: ESRD (end stage renal disease) on dialysis  Anemia: Anemia  Leukocytosis: Leukocytosis  Cellulitis: Cellulitis  Varicella-zoster infection: Varicella-zoster infection        Allergies    allopurinol (Rash)  codeine (Nausea; Vomiting)  penicillin (Rash)  spironolactone (Unknown)    Intolerances      PAST MEDICAL & SURGICAL HISTORY:  ESRD (end stage renal disease) on dialysis: MWF via [facility] Permacath  planned AVF creation  Sinusitis, unspecified chronicity, unspecified location  Gout  Gall stones  Pneumonia  Anemia  Pulmonary hypertension  COPD (chronic obstructive pulmonary disease)  CAD (coronary artery disease)  Atrial fibrillation  Hyperlipidemia  Hypertension  Spinal fusion failure, initial encounter  Tubal ligation status  Sinusitis  Spinal stenosis  AV fistula  S/P tonsillectomy  S/P appendectomy  H/O aortic valve replacement  H/O spinal fusion  H/O cataract  Cystocele  H/O tricuspid valve annuloplasty  H/O mitral valve repair      VITALS:  Vital Signs Last 24 Hrs  T(C): 36.9 (02 Nov 2017 07:52), Max: 36.9 (02 Nov 2017 07:52)  T(F): 98.5 (02 Nov 2017 07:52), Max: 98.5 (02 Nov 2017 07:52)  HR: 93 (02 Nov 2017 07:52) (72 - 107)  BP: 93/51 (02 Nov 2017 07:52) (88/54 - 110/62)  BP(mean): --  RR: 18 (02 Nov 2017 07:52) (18 - 18)  SpO2: 96% (02 Nov 2017 07:52) (95% - 100%) Daily     Daily   CAPILLARY BLOOD GLUCOSE        I&O's Summary      LABS:                        8.6    7.4   )-----------( 135      ( 02 Nov 2017 09:13 )             25.9     11-02    132<L>  |  85<L>  |  70.0<H>  ----------------------------<  101  4.7   |  22.0  |  6.06<H>    Ca    8.6      02 Nov 2017 06:11  Phos  3.9     11-01  Mg     2.1     11-01    TPro  6.5<L>  /  Alb  3.4  /  TBili  1.7  /  DBili  x   /  AST  36<H>  /  ALT  31  /  AlkPhos  471<H>  11-01      LIVER FUNCTIONS - ( 01 Nov 2017 07:37 )  Alb: 3.4 g/dL / Pro: 6.5 g/dL / ALK PHOS: 471 U/L / ALT: 31 U/L / AST: 36 U/L / GGT: x                   MEDICATIONS:  MEDICATIONS  (STANDING):  aspirin enteric coated 81 milliGRAM(s) Oral daily  atorvastatin 40 milliGRAM(s) Oral at bedtime  calcium acetate 667 milliGRAM(s) Oral three times a day with meals  ferrous    sulfate 325 milliGRAM(s) Oral daily  folic acid 1 milliGRAM(s) Oral daily  heparin  Injectable 5000 Unit(s) SubCutaneous every 12 hours  midodrine 10 milliGRAM(s) Oral three times a day  mirtazapine 15 milliGRAM(s) Oral at bedtime  pantoprazole    Tablet 40 milliGRAM(s) Oral before breakfast  silver sulfADIAZINE 1% Cream 1 Application(s) Topical two times a day  sodium chloride 0.9%. 1000 milliLiter(s) (75 mL/Hr) IV Continuous <Continuous>    MEDICATIONS  (PRN):  acetaminophen   Tablet. 650 milliGRAM(s) Oral every 6 hours PRN Mild Pain (1 - 3)  ALBUTerol/ipratropium for Nebulization 3 milliLiter(s) Nebulizer every 6 hours PRN Shortness of Breath  calamine Lotion 1 Application(s) Topical four times a day PRN Itching

## 2017-11-03 DIAGNOSIS — R41.82 ALTERED MENTAL STATUS, UNSPECIFIED: ICD-10-CM

## 2017-11-03 DIAGNOSIS — G93.41 METABOLIC ENCEPHALOPATHY: ICD-10-CM

## 2017-11-03 LAB
ALBUMIN SERPL ELPH-MCNC: 3.6 G/DL — SIGNIFICANT CHANGE UP (ref 3.3–5.2)
ALBUMIN SERPL ELPH-MCNC: 3.7 G/DL — SIGNIFICANT CHANGE UP (ref 3.3–5.2)
ALP SERPL-CCNC: 511 U/L — HIGH (ref 40–120)
ALP SERPL-CCNC: 537 U/L — HIGH (ref 40–120)
ALT FLD-CCNC: 24 U/L — SIGNIFICANT CHANGE UP
ALT FLD-CCNC: 24 U/L — SIGNIFICANT CHANGE UP
AMMONIA BLD-MCNC: 81 UMOL/L — HIGH (ref 11–55)
ANION GAP SERPL CALC-SCNC: 29 MMOL/L — HIGH (ref 5–17)
ANION GAP SERPL CALC-SCNC: 31 MMOL/L — HIGH (ref 5–17)
APTT BLD: 34.4 SEC — SIGNIFICANT CHANGE UP (ref 27.5–37.4)
AST SERPL-CCNC: 27 U/L — SIGNIFICANT CHANGE UP
AST SERPL-CCNC: 32 U/L — HIGH
BILIRUB SERPL-MCNC: 1.4 MG/DL — SIGNIFICANT CHANGE UP (ref 0.4–2)
BILIRUB SERPL-MCNC: 1.6 MG/DL — SIGNIFICANT CHANGE UP (ref 0.4–2)
BUN SERPL-MCNC: 68 MG/DL — HIGH (ref 8–20)
BUN SERPL-MCNC: 84 MG/DL — HIGH (ref 8–20)
CALCIUM SERPL-MCNC: 8.7 MG/DL — SIGNIFICANT CHANGE UP (ref 8.6–10.2)
CALCIUM SERPL-MCNC: 8.8 MG/DL — SIGNIFICANT CHANGE UP (ref 8.6–10.2)
CHLORIDE SERPL-SCNC: 84 MMOL/L — LOW (ref 98–107)
CHLORIDE SERPL-SCNC: 88 MMOL/L — LOW (ref 98–107)
CO2 SERPL-SCNC: 18 MMOL/L — LOW (ref 22–29)
CO2 SERPL-SCNC: 19 MMOL/L — LOW (ref 22–29)
CREAT SERPL-MCNC: 6.05 MG/DL — HIGH (ref 0.5–1.3)
CREAT SERPL-MCNC: 7.15 MG/DL — HIGH (ref 0.5–1.3)
DIR ANTIGLOB POLYSPECIFIC INTERPRETATION: SIGNIFICANT CHANGE UP
GAS PNL BLDA: SIGNIFICANT CHANGE UP
GLUCOSE BLDC GLUCOMTR-MCNC: 101 MG/DL — HIGH (ref 70–99)
GLUCOSE SERPL-MCNC: 101 MG/DL — SIGNIFICANT CHANGE UP (ref 70–115)
GLUCOSE SERPL-MCNC: 94 MG/DL — SIGNIFICANT CHANGE UP (ref 70–115)
HCT VFR BLD CALC: 27.4 % — LOW (ref 37–47)
HCT VFR BLD CALC: 27.5 % — LOW (ref 37–47)
HGB BLD-MCNC: 8.8 G/DL — LOW (ref 12–16)
HGB BLD-MCNC: 8.8 G/DL — LOW (ref 12–16)
INR BLD: 1.13 RATIO — SIGNIFICANT CHANGE UP (ref 0.88–1.16)
LACTATE BLDV-MCNC: 3.6 MMOL/L — HIGH (ref 0.5–2)
MCHC RBC-ENTMCNC: 32 G/DL — SIGNIFICANT CHANGE UP (ref 32–36)
MCHC RBC-ENTMCNC: 32.1 G/DL — SIGNIFICANT CHANGE UP (ref 32–36)
MCHC RBC-ENTMCNC: 37.6 PG — HIGH (ref 27–31)
MCHC RBC-ENTMCNC: 37.6 PG — HIGH (ref 27–31)
MCV RBC AUTO: 117.1 FL — HIGH (ref 81–99)
MCV RBC AUTO: 117.5 FL — HIGH (ref 81–99)
PLATELET # BLD AUTO: 167 K/UL — SIGNIFICANT CHANGE UP (ref 150–400)
PLATELET # BLD AUTO: 184 K/UL — SIGNIFICANT CHANGE UP (ref 150–400)
POTASSIUM SERPL-MCNC: 4.8 MMOL/L — SIGNIFICANT CHANGE UP (ref 3.5–5.3)
POTASSIUM SERPL-MCNC: 5.2 MMOL/L — SIGNIFICANT CHANGE UP (ref 3.5–5.3)
POTASSIUM SERPL-SCNC: 4.8 MMOL/L — SIGNIFICANT CHANGE UP (ref 3.5–5.3)
POTASSIUM SERPL-SCNC: 5.2 MMOL/L — SIGNIFICANT CHANGE UP (ref 3.5–5.3)
PROT SERPL-MCNC: 6.2 G/DL — LOW (ref 6.6–8.7)
PROT SERPL-MCNC: 6.4 G/DL — LOW (ref 6.6–8.7)
PROTHROM AB SERPL-ACNC: 12.5 SEC — SIGNIFICANT CHANGE UP (ref 9.8–12.7)
RBC # BLD: 2.34 M/UL — LOW (ref 4.4–5.2)
RBC # BLD: 2.34 M/UL — LOW (ref 4.4–5.2)
RBC # FLD: 22.5 % — HIGH (ref 11–15.6)
RBC # FLD: 22.6 % — HIGH (ref 11–15.6)
SODIUM SERPL-SCNC: 133 MMOL/L — LOW (ref 135–145)
SODIUM SERPL-SCNC: 136 MMOL/L — SIGNIFICANT CHANGE UP (ref 135–145)
TYPE + AB SCN PNL BLD: SIGNIFICANT CHANGE UP
WBC # BLD: 12.4 K/UL — HIGH (ref 4.8–10.8)
WBC # BLD: 9.6 K/UL — SIGNIFICANT CHANGE UP (ref 4.8–10.8)
WBC # FLD AUTO: 12.4 K/UL — HIGH (ref 4.8–10.8)
WBC # FLD AUTO: 9.6 K/UL — SIGNIFICANT CHANGE UP (ref 4.8–10.8)

## 2017-11-03 PROCEDURE — 99291 CRITICAL CARE FIRST HOUR: CPT

## 2017-11-03 PROCEDURE — 99233 SBSQ HOSP IP/OBS HIGH 50: CPT

## 2017-11-03 PROCEDURE — 99232 SBSQ HOSP IP/OBS MODERATE 35: CPT

## 2017-11-03 PROCEDURE — 70450 CT HEAD/BRAIN W/O DYE: CPT | Mod: 26

## 2017-11-03 PROCEDURE — 99222 1ST HOSP IP/OBS MODERATE 55: CPT

## 2017-11-03 PROCEDURE — 70450 CT HEAD/BRAIN W/O DYE: CPT | Mod: 26,77

## 2017-11-03 PROCEDURE — 71010: CPT | Mod: 26

## 2017-11-03 RX ORDER — SODIUM CHLORIDE 9 MG/ML
500 INJECTION INTRAMUSCULAR; INTRAVENOUS; SUBCUTANEOUS ONCE
Qty: 0 | Refills: 0 | Status: COMPLETED | OUTPATIENT
Start: 2017-11-03 | End: 2017-11-03

## 2017-11-03 RX ORDER — ACETAMINOPHEN 500 MG
650 TABLET ORAL EVERY 6 HOURS
Qty: 0 | Refills: 0 | Status: DISCONTINUED | OUTPATIENT
Start: 2017-11-03 | End: 2017-11-05

## 2017-11-03 RX ORDER — CHLORHEXIDINE GLUCONATE 213 G/1000ML
15 SOLUTION TOPICAL
Qty: 0 | Refills: 0 | Status: DISCONTINUED | OUTPATIENT
Start: 2017-11-03 | End: 2017-11-04

## 2017-11-03 RX ORDER — HYDROMORPHONE HYDROCHLORIDE 2 MG/ML
0.5 INJECTION INTRAMUSCULAR; INTRAVENOUS; SUBCUTANEOUS ONCE
Qty: 0 | Refills: 0 | Status: DISCONTINUED | OUTPATIENT
Start: 2017-11-03 | End: 2017-11-03

## 2017-11-03 RX ORDER — PANTOPRAZOLE SODIUM 20 MG/1
40 TABLET, DELAYED RELEASE ORAL
Qty: 0 | Refills: 0 | Status: DISCONTINUED | OUTPATIENT
Start: 2017-11-03 | End: 2017-11-05

## 2017-11-03 RX ORDER — PHENYLEPHRINE HYDROCHLORIDE 10 MG/ML
0.2 INJECTION INTRAVENOUS
Qty: 80 | Refills: 0 | Status: DISCONTINUED | OUTPATIENT
Start: 2017-11-03 | End: 2017-11-04

## 2017-11-03 RX ORDER — AZTREONAM 2 G
500 VIAL (EA) INJECTION EVERY 12 HOURS
Qty: 0 | Refills: 0 | Status: DISCONTINUED | OUTPATIENT
Start: 2017-11-03 | End: 2017-11-05

## 2017-11-03 RX ORDER — ACYCLOVIR SODIUM 500 MG
430 VIAL (EA) INTRAVENOUS EVERY 24 HOURS
Qty: 0 | Refills: 0 | Status: DISCONTINUED | OUTPATIENT
Start: 2017-11-03 | End: 2017-11-04

## 2017-11-03 RX ORDER — VANCOMYCIN HCL 1 G
VIAL (EA) INTRAVENOUS
Qty: 0 | Refills: 0 | Status: DISCONTINUED | OUTPATIENT
Start: 2017-11-03 | End: 2017-11-03

## 2017-11-03 RX ORDER — AZTREONAM 2 G
VIAL (EA) INJECTION
Qty: 0 | Refills: 0 | Status: DISCONTINUED | OUTPATIENT
Start: 2017-11-03 | End: 2017-11-03

## 2017-11-03 RX ORDER — DESMOPRESSIN ACETATE 0.1 MG/1
30 TABLET ORAL ONCE
Qty: 0 | Refills: 0 | Status: COMPLETED | OUTPATIENT
Start: 2017-11-03 | End: 2017-11-03

## 2017-11-03 RX ORDER — ALBUMIN HUMAN 25 %
50 VIAL (ML) INTRAVENOUS
Qty: 0 | Refills: 0 | Status: COMPLETED | OUTPATIENT
Start: 2017-11-03 | End: 2017-11-03

## 2017-11-03 RX ORDER — DEXMEDETOMIDINE HYDROCHLORIDE IN 0.9% SODIUM CHLORIDE 4 UG/ML
0.3 INJECTION INTRAVENOUS
Qty: 200 | Refills: 0 | Status: DISCONTINUED | OUTPATIENT
Start: 2017-11-03 | End: 2017-11-04

## 2017-11-03 RX ORDER — VANCOMYCIN HCL 1 G
1000 VIAL (EA) INTRAVENOUS ONCE
Qty: 0 | Refills: 0 | Status: COMPLETED | OUTPATIENT
Start: 2017-11-03 | End: 2017-11-03

## 2017-11-03 RX ADMIN — HYDROMORPHONE HYDROCHLORIDE 0.5 MILLIGRAM(S): 2 INJECTION INTRAMUSCULAR; INTRAVENOUS; SUBCUTANEOUS at 14:30

## 2017-11-03 RX ADMIN — Medication 50 MILLIGRAM(S): at 23:09

## 2017-11-03 RX ADMIN — Medication 1 APPLICATION(S): at 06:00

## 2017-11-03 RX ADMIN — DEXMEDETOMIDINE HYDROCHLORIDE IN 0.9% SODIUM CHLORIDE 6.43 MICROGRAM(S)/KG/HR: 4 INJECTION INTRAVENOUS at 14:30

## 2017-11-03 RX ADMIN — PANTOPRAZOLE SODIUM 40 MILLIGRAM(S): 20 TABLET, DELAYED RELEASE ORAL at 18:40

## 2017-11-03 RX ADMIN — Medication 250 MILLIGRAM(S): at 23:08

## 2017-11-03 RX ADMIN — PHENYLEPHRINE HYDROCHLORIDE 6.43 MICROGRAM(S)/KG/MIN: 10 INJECTION INTRAVENOUS at 14:30

## 2017-11-03 RX ADMIN — DESMOPRESSIN ACETATE 230 MICROGRAM(S): 0.1 TABLET ORAL at 14:30

## 2017-11-03 RX ADMIN — HYDROMORPHONE HYDROCHLORIDE 0.5 MILLIGRAM(S): 2 INJECTION INTRAMUSCULAR; INTRAVENOUS; SUBCUTANEOUS at 14:45

## 2017-11-03 RX ADMIN — Medication 108.6 MILLIGRAM(S): at 23:09

## 2017-11-03 RX ADMIN — HEPARIN SODIUM 5000 UNIT(S): 5000 INJECTION INTRAVENOUS; SUBCUTANEOUS at 06:00

## 2017-11-03 RX ADMIN — SODIUM CHLORIDE 9999 MILLILITER(S): 9 INJECTION INTRAMUSCULAR; INTRAVENOUS; SUBCUTANEOUS at 14:32

## 2017-11-03 RX ADMIN — Medication 50 MILLILITER(S): at 18:36

## 2017-11-03 RX ADMIN — DEXMEDETOMIDINE HYDROCHLORIDE IN 0.9% SODIUM CHLORIDE 6.43 MICROGRAM(S)/KG/HR: 4 INJECTION INTRAVENOUS at 17:49

## 2017-11-03 RX ADMIN — Medication 1 APPLICATION(S): at 18:40

## 2017-11-03 RX ADMIN — Medication 50 MILLILITER(S): at 18:37

## 2017-11-03 RX ADMIN — CHLORHEXIDINE GLUCONATE 15 MILLILITER(S): 213 SOLUTION TOPICAL at 18:31

## 2017-11-03 NOTE — PROGRESS NOTE ADULT - ASSESSMENT
t is a 78 yo F with PMH of Afib( no in AC due to recurrent falls), ESRD on HD (TTF ), recurrent falls , COPD, GERD, Depression,   CAD, HTN, pulmonary HTN , gout , HLD and SAH who came to the ED with cc of vaginal discomfort. Pt states that her sx started one week ago, she was having vaginal pain , 10/10 , like burning sensation with no alleviating of exacerbating factor, associated with itching and some lesion vesicles over her right labia majora, right inguinal , posterior thigh and right gluteus ,  she went to her PMD who prescribe cephalexin for her and recommended to  come to the ED is she does not improve, last week pt came to the ED and she was prescribe steroid and nystatin cream but this did not help at all. Pt denies fever, no chills, no sick contacts, no recent travel, no sob, no chest pain , no headache, no dizziness .   PT SEEN BY DERM  AND FELT TO BE ZOSTER AND GIVEN A DOSE OF IV ACYCLOVIR AS PER FAMILY SHE HAD CONFUSION WHICH THEY BELIEVE  IS RELATED TO ACYCLOVIR  PT S/P DIALYSIS AND BLEEDING  AND INTUBATED IN ICU  CONCERN FOR POSSIBEL NEUROLOGIC COMPLICATION OF ZOSTER  IF NO CHANGE IN MENTAL STATUS  CONSIDER LP  WILL FOLLOW UP  D/W FAMILY AND ICU TEAM

## 2017-11-03 NOTE — PROGRESS NOTE ADULT - ASSESSMENT
Pt is a 78 yo F with PMH of Afib( no in ac due to recurrent falls) , ESRD on HD (TTF ), GERD, falls ,  Depression, COPD, CAD, HTN, pulmonary HTN , gout , HLD and SAH with pain due to Zoster, complicated by acute delirium.

## 2017-11-03 NOTE — PROGRESS NOTE ADULT - SUBJECTIVE AND OBJECTIVE BOX
NEPHROLOGY INTERVAL HPI/OVERNIGHT EVENTS: Pt in dialysis bed with lethargy but responsive to verbal stimuli .    MEDICATIONS  (STANDING):  aspirin enteric coated 81 milliGRAM(s) Oral daily  atorvastatin 40 milliGRAM(s) Oral at bedtime  calcium acetate 667 milliGRAM(s) Oral three times a day with meals  ferrous    sulfate 325 milliGRAM(s) Oral daily  folic acid 1 milliGRAM(s) Oral daily  heparin  Injectable 5000 Unit(s) SubCutaneous every 12 hours  midodrine 10 milliGRAM(s) Oral three times a day  mirtazapine 15 milliGRAM(s) Oral at bedtime  pantoprazole    Tablet 40 milliGRAM(s) Oral before breakfast  silver sulfADIAZINE 1% Cream 1 Application(s) Topical two times a day    MEDICATIONS  (PRN):  acetaminophen   Tablet. 650 milliGRAM(s) Oral every 6 hours PRN Mild Pain (1 - 3)  ALBUTerol/ipratropium for Nebulization 3 milliLiter(s) Nebulizer every 6 hours PRN Shortness of Breath  calamine Lotion 1 Application(s) Topical four times a day PRN Itching      Allergies    allopurinol (Rash)  codeine (Nausea; Vomiting)  penicillin (Rash)  spironolactone (Unknown)    Intolerances        Vital Signs Last 24 Hrs  T(C): 36.5 (2017 08:25), Max: 37.2 (2017 16:40)  T(F): 97.7 (2017 08:25), Max: 98.9 (2017 16:40)  HR: 86 (2017 08:25) (66 - 89)  BP: 100/26 (2017 08:25) (89/56 - 108/56)  BP(mean): --  RR: 18 (2017 08:25) (16 - 18)  SpO2: 94% (2017 08:25) (94% - 97%)  Daily     Daily Weight in k.2 (2017 08:25)    PHYSICAL EXAM:    GENERAL: appears acutely ill.  HEAD:  wnl  EYES:   ENMT:   NECK: veins not full  NERVOUS SYSTEM:  lethargic, eyes open at times  CHEST/LUNG: no wheezes , bs bilaterally  HEART: irr. rate, no rub peripheral  pulse 88  ABDOMEN: soft  EXTREMITIES:  right upper arm access with bruit but por arterial flow  LYMPH:   SKIN: perineal rash     LABS:                        8.8    9.6   )-----------( 167      ( 2017 06:21 )             27.5         133<L>  |  84<L>  |  84.0<H>  ----------------------------<  101  5.2   |  18.0<L>  |  7.15<H>    Ca    8.8      2017 06:21    TPro  6.4<L>  /  Alb  3.6  /  TBili  1.4  /  DBili  x   /  AST  32<H>  /  ALT  24  /  AlkPhos  537<H>  11          ABG - ( 2017 08:47 )  pH: 7.35  /  pCO2: 42    /  pO2: 213   / HCO3: 22    / Base Excess: -2.3  /  SaO2: 100                   RADIOLOGY & ADDITIONAL TESTS:

## 2017-11-03 NOTE — PROGRESS NOTE ADULT - SUBJECTIVE AND OBJECTIVE BOX
Patient: DUSTIN HERNANDEZ [id number] 77y Female                 Internal Medicine Hospitalist Progress Note - Dr. Cristopher Edge    Chief Complaint: Patient is a 77y old  Female who presents with a chief complaint of vaginal discomfort (02 Nov 2017 00:37)    HPI:  Pt is a 78 yo F with PMH of Afib( no in AC due to recurrent falls), ESRD on HD (TTF ), recurrent falls , COPD, GERD, Depression,   CAD, HTN, pulmonary HTN , gout , HLD and SAH who came to the ED with cc of vaginal discomfort, noted to have crusting vesicular lesions in R lower abdomen, R perineal area and R buttock.  She had not responded to cephalexin as outpatient.  Reported sharp pain, worsening, prompting presentation to ED.  Seen by dermatology, lesions felt to be c/w Zoster.  She was given Acyclovir x 1 dose.  Overnight, experienced some confusion.  Acyclovir was stopped.  Her confusion was initially felt to be multifactorial, due to morphine, oxycodone, renal failure and medications.  CT brain showed no acute infarct, "Left parietooccipital encephalomalacia and gliosis due to a chronic infarct. Chronic left cerebellar infarcts. Mild white matter microvascular ischemic changes."  AM 11/3 patient more confused, nonverbal.      Unable to offer complaints.  Moves 4 extremities, ? dyskinesia.  At HD.      ____________________PHYSICAL EXAM:  Vitals reviewed as indicated below  GENERAL:  NAD Awake, confused.  HEENT: NCAT  CARDIOVASCULAR:  S1, S2  LUNGS: CTAB  ABDOMEN:  soft, (-) tenderness, (-) distension, (+) bowel sounds, (-) guarding, (-) rebound (-) rigidity  EXTREMITIES:  no cyanosis / clubbing / edema.   NEURO: strength symmetric, dyskinetic movements.    ____________________    BACKGROUND:  HEALTH ISSUES - PROBLEM Dx:  Delirium, drug-induced: Delirium, drug-induced  Vulva finding: Vulva finding  Varicella with other complications: Varicella with other complications  Atrial fibrillation: Atrial fibrillation  Depression: Depression  Hypertension: Hypertension  Preventive measure: Preventive measure  Hyperlipidemia: Hyperlipidemia  ESRD (end stage renal disease) on dialysis: ESRD (end stage renal disease) on dialysis  Anemia: Anemia  Leukocytosis: Leukocytosis  Cellulitis: Cellulitis  Varicella-zoster infection: Varicella-zoster infection        Allergies    allopurinol (Rash)  codeine (Nausea; Vomiting)  penicillin (Rash)  spironolactone (Unknown)    Intolerances      PAST MEDICAL & SURGICAL HISTORY:  ESRD (end stage renal disease) on dialysis: MWF via R SC Permacath  planned AVF creation  Sinusitis, unspecified chronicity, unspecified location  Gout  Gall stones  Pneumonia  Anemia  Pulmonary hypertension  COPD (chronic obstructive pulmonary disease)  CAD (coronary artery disease)  Atrial fibrillation  Hyperlipidemia  Hypertension  Spinal fusion failure, initial encounter  Tubal ligation status  Sinusitis  Spinal stenosis  AV fistula  S/P tonsillectomy  S/P appendectomy  H/O aortic valve replacement  H/O spinal fusion  H/O cataract  Cystocele  H/O tricuspid valve annuloplasty  H/O mitral valve repair      VITALS:  Vital Signs Last 24 Hrs  T(C): 36.9 (02 Nov 2017 07:52), Max: 36.9 (02 Nov 2017 07:52)  T(F): 98.5 (02 Nov 2017 07:52), Max: 98.5 (02 Nov 2017 07:52)  HR: 93 (02 Nov 2017 07:52) (72 - 107)  BP: 93/51 (02 Nov 2017 07:52) (88/54 - 110/62)  BP(mean): --  RR: 18 (02 Nov 2017 07:52) (18 - 18)  SpO2: 96% (02 Nov 2017 07:52) (95% - 100%) Daily     Daily   CAPILLARY BLOOD GLUCOSE        I&O's Summary      LABS:                        8.6    7.4   )-----------( 135      ( 02 Nov 2017 09:13 )             25.9     11-02    132<L>  |  85<L>  |  70.0<H>  ----------------------------<  101  4.7   |  22.0  |  6.06<H>    Ca    8.6      02 Nov 2017 06:11  Phos  3.9     11-01  Mg     2.1     11-01    TPro  6.5<L>  /  Alb  3.4  /  TBili  1.7  /  DBili  x   /  AST  36<H>  /  ALT  31  /  AlkPhos  471<H>  11-01      LIVER FUNCTIONS - ( 01 Nov 2017 07:37 )  Alb: 3.4 g/dL / Pro: 6.5 g/dL / ALK PHOS: 471 U/L / ALT: 31 U/L / AST: 36 U/L / GGT: x                   MEDICATIONS:  MEDICATIONS  (STANDING):  aspirin enteric coated 81 milliGRAM(s) Oral daily  atorvastatin 40 milliGRAM(s) Oral at bedtime  calcium acetate 667 milliGRAM(s) Oral three times a day with meals  ferrous    sulfate 325 milliGRAM(s) Oral daily  folic acid 1 milliGRAM(s) Oral daily  heparin  Injectable 5000 Unit(s) SubCutaneous every 12 hours  midodrine 10 milliGRAM(s) Oral three times a day  mirtazapine 15 milliGRAM(s) Oral at bedtime  pantoprazole    Tablet 40 milliGRAM(s) Oral before breakfast  silver sulfADIAZINE 1% Cream 1 Application(s) Topical two times a day  sodium chloride 0.9%. 1000 milliLiter(s) (75 mL/Hr) IV Continuous <Continuous>    MEDICATIONS  (PRN):  acetaminophen   Tablet. 650 milliGRAM(s) Oral every 6 hours PRN Mild Pain (1 - 3)  ALBUTerol/ipratropium for Nebulization 3 milliLiter(s) Nebulizer every 6 hours PRN Shortness of Breath  calamine Lotion 1 Application(s) Topical four times a day PRN Itching

## 2017-11-03 NOTE — PROGRESS NOTE ADULT - ATTENDING COMMENTS
I discussed pt's declining condition with ID, Neurology, Critical Care and pt's daughter.  Care transferred to Critical Care service.      The hospitalist service will no longer follow.  Please call [phone] to reconsult.

## 2017-11-03 NOTE — PROGRESS NOTE ADULT - SUBJECTIVE AND OBJECTIVE BOX
MARYDUSTIN is a 77y Female with HPI:  OMAR manzano is a 76 yo F with PMH of Afib( no in AC due to recurrent falls), ESRD on HD (TTF ), recurrent falls , COPD, GERD, Depression,   CAD, HTN, pulmonary HTN , gout , HLD and SAH who came to the ED with cc of vaginal discomfort. Pt states that her sx started one week ago, she was having vaginal pain , 10/10 , like burning sensation with no alleviating of exacerbating factor, associated with itching and some lesion vesicles over her right labia majora, right inguinal , posterior thigh and right gluteus ,  she went to her PMD who prescribe cephalexin for her and recommended to  come to the ED is she does not improve, last week pt came to the ED and she was prescribe steroid and nystatin cream but this did not help at all. Pt denies fever, no chills, no sick contacts, no recent travel, no sob, no chest pain , no headache, no dizziness .   PT SEEN BY DERM  AND FELT TO BE ZOSTER AND GIVEN A DOSE OF IV ACYCLOVIR AS PER FAMILY SHE HAD CONFUSION WHICH THEY BELIEVE  IS RELATED TO ACYCLOVIR  P WITH CONTINUED CONFUSION TODAY  WENT TO DIALYISIS  AND THEN TO ICU INTUBATED WITH UPPER AIRWAY BLEEDING        Allergies:  allopurinol (Rash)  codeine (Nausea; Vomiting)  penicillin (Rash)  spironolactone (Unknown)      Medications:  acetaminophen   Tablet. 650 milliGRAM(s) Oral every 6 hours PRN  albumin human 25% IVPB 50 milliLiter(s) IV Intermittent every 1 hour  ALBUTerol/ipratropium for Nebulization 3 milliLiter(s) Nebulizer every 6 hours PRN  atorvastatin 40 milliGRAM(s) Oral at bedtime  calamine Lotion 1 Application(s) Topical four times a day PRN  calcium acetate 667 milliGRAM(s) Oral three times a day with meals  chlorhexidine 0.12% Liquid 15 milliLiter(s) Swish and Spit two times a day  dexmedetomidine Infusion 0.3 MICROgram(s)/kG/Hr IV Continuous <Continuous>  ferrous    sulfate 325 milliGRAM(s) Oral daily  folic acid 1 milliGRAM(s) Oral daily  midodrine 10 milliGRAM(s) Oral three times a day  pantoprazole  Injectable 40 milliGRAM(s) IV Push two times a day  phenylephrine    Infusion 0.2 MICROgram(s)/kG/Min IV Continuous <Continuous>  silver sulfADIAZINE 1% Cream 1 Application(s) Topical two times a day  sodium chloride 0.9% Bolus 500 milliLiter(s) IV Bolus once      ANTIBIOTICS:         Review of Systems: - Negative except as mentioned above     Physical Exam:  ICU Vital Signs Last 24 Hrs  T(C): 37.1 (03 Nov 2017 12:00), Max: 37.2 (02 Nov 2017 16:40)  T(F): 98.7 (03 Nov 2017 12:00), Max: 98.9 (02 Nov 2017 16:40)  HR: 101 (03 Nov 2017 14:03) (66 - 105)  BP: 108/66 (03 Nov 2017 12:00) (89/56 - 115/46)  BP(mean): 83 (03 Nov 2017 12:00) (83 - 83)  ABP: --  ABP(mean): --  RR: 85 (03 Nov 2017 12:00) (16 - 85)  SpO2: 98% (03 Nov 2017 14:03) (94% - 98%)    GEN: NAD, pleasant  HEENT: normocephalic and atraumatic. EOMI. GONZALO...  NECK: Supple. No carotid bruits.  No lymphadenopathy or thyromegaly.  LUNGS: Clear to auscultation.  HEART: Regular rate and rhythm without murmur.  ABDOMEN: Soft, nontender, and nondistended.  Positive bowel sounds.  No hepatosplenomegaly was noted.  NO REBOUND NO GUARDING  EXTREMITIES: Without any cyanosis, clubbing, rash, lesions or edema.  NEUROLOGIC: Cranial nerves II through XII are grossly intact.    SKIN: ZOSTER RASH IN SUPRAPUBIC AND LEFT PERINEAL AND POS THIGH      Labs: MARYDUSTIN is a 77y Female with HPI:  OMAR manzano is a 78 yo F with PMH of Afib( no in AC due to recurrent falls), ESRD on HD (TTF ), recurrent falls , COPD, GERD, Depression,   CAD, HTN, pulmonary HTN , gout , HLD and SAH who came to the ED with cc of vaginal discomfort. Pt states that her sx started one week ago, she was having vaginal pain , 10/10 , like burning sensation with no alleviating of exacerbating factor, associated with itching and some lesion vesicles over her right labia majora, right inguinal , posterior thigh and right gluteus ,  she went to her PMD who prescribe cephalexin for her and recommended to  come to the ED is she does not improve, last week pt came to the ED and she was prescribe steroid and nystatin cream but this did not help at all. Pt denies fever, no chills, no sick contacts, no recent travel, no sob, no chest pain , no headache, no dizziness .   PT SEEN BY DERM  AND FELT TO BE ZOSTER AND GIVEN A DOSE OF IV ACYCLOVIR AS PER FAMILY SHE HAD CONFUSION WHICH THEY BELIEVE  IS RELATED TO ACYCLOVIR  P WITH CONTINUED CONFUSION TODAY  WENT TO DIALYISIS  AND THEN TO ICU INTUBATED WITH UPPER AIRWAY BLEEDING        Allergies:  allopurinol (Rash)  codeine (Nausea; Vomiting)  penicillin (Rash)  spironolactone (Unknown)      Medications:  acetaminophen   Tablet. 650 milliGRAM(s) Oral every 6 hours PRN  albumin human 25% IVPB 50 milliLiter(s) IV Intermittent every 1 hour  ALBUTerol/ipratropium for Nebulization 3 milliLiter(s) Nebulizer every 6 hours PRN  atorvastatin 40 milliGRAM(s) Oral at bedtime  calamine Lotion 1 Application(s) Topical four times a day PRN  calcium acetate 667 milliGRAM(s) Oral three times a day with meals  chlorhexidine 0.12% Liquid 15 milliLiter(s) Swish and Spit two times a day  dexmedetomidine Infusion 0.3 MICROgram(s)/kG/Hr IV Continuous <Continuous>  ferrous    sulfate 325 milliGRAM(s) Oral daily  folic acid 1 milliGRAM(s) Oral daily  midodrine 10 milliGRAM(s) Oral three times a day  pantoprazole  Injectable 40 milliGRAM(s) IV Push two times a day  phenylephrine    Infusion 0.2 MICROgram(s)/kG/Min IV Continuous <Continuous>  silver sulfADIAZINE 1% Cream 1 Application(s) Topical two times a day  sodium chloride 0.9% Bolus 500 milliLiter(s) IV Bolus once      ANTIBIOTICS:         Review of Systems: - Negative except as mentioned above     Physical Exam:  ICU Vital Signs Last 24 Hrs  T(C): 37.1 (03 Nov 2017 12:00), Max: 37.2 (02 Nov 2017 16:40)  T(F): 98.7 (03 Nov 2017 12:00), Max: 98.9 (02 Nov 2017 16:40)  HR: 101 (03 Nov 2017 14:03) (66 - 105)  BP: 108/66 (03 Nov 2017 12:00) (89/56 - 115/46)  BP(mean): 83 (03 Nov 2017 12:00) (83 - 83)  ABP: --  ABP(mean): --  RR: 85 (03 Nov 2017 12:00) (16 - 85)  SpO2: 98% (03 Nov 2017 14:03) (94% - 98%)    GEN: intubated sedated  HEENT: normocephalic and atraumatic. EOMI. GONZALO...  NECK: Supple. No carotid bruits.  No lymphadenopathy or thyromegaly.  LUNGS: Clear to auscultation.  HEART: Regular rate and rhythm without murmur.  ABDOMEN: Soft, nontender, and nondistended.  Positive bowel sounds.  No hepatosplenomegaly was noted.  NO REBOUND NO GUARDING  EXTREMITIES: Without any cyanosis, clubbing, rash, lesions or edema.  NEUROLOGIC: intubated     SKIN: ZOSTER RASH IN SUPRAPUBIC AND LEFT PERINEAL AND POS THIGH      Labs:

## 2017-11-03 NOTE — CONSULT NOTE ADULT - SUBJECTIVE AND OBJECTIVE BOX
Pt is a 78 yo F with PMH of Afib( no in AC due to recurrent falls), ESRD on HD (TTF ), recurrent falls , COPD, GERD, Depression,   CAD, HTN, pulmonary HTN , gout , HLD and SAH who came to the ED with cc of vaginal discomfort. Pt states that her sx started one week ago, she was having vaginal pain , 10/10 , like burning sensation with no alleviating of exacerbating factor, associated with itching and some lesion vesicles over her right labia majora, right inguinal , posterior thigh and right gluteus.  Pt was found to have Zoster, she was treated with one dose of Acyclovir and one dose of morphine yesterday and had AMS which family blamed on medications and insisted they were stopped.  Today as per Dr Lamar pt remains altered and is not at her baseline, per Dr Lamar she is normally alert and oriented and pleasant so he is asking me to evaluated pt.  Pt found in HD suite undergoing HD.  She has her eyes open and is staring off and having repetitive tongue movement sticking her tongue in/out.  She answers yes to her name but otherwise is non-verbal and not following commands.  I have recommended stopping HD now, getting a STAT head CT, moving pt to ICU, may need lumbar puncture, will need EEG to r/o seizures.  Patient is a 77y old  Female who presents with a chief complaint of vaginal discomfort (02 Nov 2017 00:37)      BRIEF HOSPITAL COURSE: as above    Events last 24 hours: as above    PAST MEDICAL & SURGICAL HISTORY:  ESRD (end stage renal disease) on dialysis: MWF via [facility] Permacath  planned AVF creation  Sinusitis, unspecified chronicity, unspecified location  Gout  Gall stones  Pneumonia  Anemia  Pulmonary hypertension  COPD (chronic obstructive pulmonary disease)  CAD (coronary artery disease)  Atrial fibrillation  Hyperlipidemia  Hypertension  Spinal fusion failure, initial encounter  Tubal ligation status  Sinusitis  Spinal stenosis  AV fistula  S/P tonsillectomy  S/P appendectomy  H/O aortic valve replacement  H/O spinal fusion  H/O cataract  Cystocele  H/O tricuspid valve annuloplasty  H/O mitral valve repair      Review of Systems:  Pt unable to answer questions in ROS, altered mental state      Medications:    midodrine 10 milliGRAM(s) Oral three times a day    ALBUTerol/ipratropium for Nebulization 3 milliLiter(s) Nebulizer every 6 hours PRN    acetaminophen   Tablet. 650 milliGRAM(s) Oral every 6 hours PRN  mirtazapine 15 milliGRAM(s) Oral at bedtime      aspirin enteric coated 81 milliGRAM(s) Oral daily  heparin  Injectable 5000 Unit(s) SubCutaneous every 12 hours    pantoprazole    Tablet 40 milliGRAM(s) Oral before breakfast      atorvastatin 40 milliGRAM(s) Oral at bedtime    calcium acetate 667 milliGRAM(s) Oral three times a day with meals  ferrous    sulfate 325 milliGRAM(s) Oral daily  folic acid 1 milliGRAM(s) Oral daily      calamine Lotion 1 Application(s) Topical four times a day PRN  silver sulfADIAZINE 1% Cream 1 Application(s) Topical two times a day            ICU Vital Signs Last 24 Hrs  T(C): 37 (03 Nov 2017 10:00), Max: 37.2 (02 Nov 2017 16:40)  T(F): 98.6 (03 Nov 2017 10:00), Max: 98.9 (02 Nov 2017 16:40)  HR: 93 (03 Nov 2017 10:00) (66 - 93)  BP: 115/46 (03 Nov 2017 10:00) (89/56 - 115/46)  BP(mean): --  ABP: --  ABP(mean): --  RR: 18 (03 Nov 2017 10:00) (16 - 18)  SpO2: 98% (03 Nov 2017 10:00) (94% - 98%)      ABG - ( 03 Nov 2017 09:58 )  pH: 7.32  /  pCO2: 49    /  pO2: 90    / HCO3: 24    / Base Excess: -1.1  /  SaO2: 97              LABS:                        8.8    9.6   )-----------( 167      ( 03 Nov 2017 06:21 )             27.5     11-03    133<L>  |  84<L>  |  84.0<H>  ----------------------------<  101  5.2   |  18.0<L>  |  7.15<H>    Ca    8.8      03 Nov 2017 06:21    TPro  6.4<L>  /  Alb  3.6  /  TBili  1.4  /  DBili  x   /  AST  32<H>  /  ALT  24  /  AlkPhos  537<H>  11-03          CAPILLARY BLOOD GLUCOSE      POCT Blood Glucose.: 101 mg/dL (03 Nov 2017 09:54) now        CULTURES:  Culture Results:   No growth at 48 hours (10-31 @ 19:55)  Culture Results:   No growth at 48 hours (10-31 @ 19:23)  Culture Results:   Moderate Routine vaginal girma (10-27 @ 13:06)      Physical Examination:    General: Awake but altered, blank stare, not following commands, only verbalization is "yes"    HEENT: Pupils equal, reactive to light.  Symmetric.    PULM: Course with scattered rhonci/rales bilaterally, no significant sputum production    CVS: Regular rate and rhythm, no murmurs, rubs, or gallops    ABD: Soft, nondistended, nontender, normoactive bowel sounds, no masses    EXT: Positive edema, nontender    SKIN: Warm and well perfused, no rashes noted.    RADIOLOGY: images reviewed    CRITICAL CARE TIME SPENT: 45 min evaluating pt and films and records and at pt bedside

## 2017-11-03 NOTE — PROGRESS NOTE ADULT - PROBLEM SELECTOR PLAN 8
heparin 5000 units q 12 hr for DVT prophylaxis

## 2017-11-03 NOTE — PROGRESS NOTE ADULT - PROBLEM SELECTOR PLAN 2
-Rash on right vulva, right buttock, and right lumbar area, most likely zoster outbreak.  Dermatology evaluation appreciated.  ? Role for Acyclovir discussed with ID, pt's daughter and Critical Care.      -HSV and varicella  PCR ordered   -hold acyclovir 2/2 acute delirium   -hold gabapentin 2/2 acute delirium   -percocet for moderate pain, however tylenol should be used first  -Derm consult  -ID consult

## 2017-11-03 NOTE — CONSULT NOTE ADULT - SUBJECTIVE AND OBJECTIVE BOX
[facility]                                        Neurology at [place]                                  [facility] [address]                                      [address]                                           [place], [place], [address]                                                [phone]    HISTORY:    The patient is a 77y Female admitted 10/31/17. She has a history of atrial fibrillation and was not on anticoagulation due to recurrent falls. She has prior stroke.     She had developed pain in the right inguinal region associated with some rash/vesicles thought to be zoster.   She was given steroid and nystatin cream and ultimately presented to the ER where she was given acyclovir, gabapentin, and percocet. She became confused and acyclovir was stopped.   She is now having staring episodes and has been less responsive and neurologic evaluation is called.     The patient is currently seen in the dialysis suite where she is awaiting HD.     PAST MEDICAL & SURGICAL HISTORY:  ESRD (end stage renal disease) on dialysis: MWF via R [facility] Permacath  planned AVF creation  Sinusitis, unspecified chronicity, unspecified location  Gout  Gall stones  Pneumonia  Anemia  Pulmonary hypertension  COPD (chronic obstructive pulmonary disease)  CAD (coronary artery disease)  Atrial fibrillation  Hyperlipidemia  Hypertension  Spinal fusion failure, initial encounter  Tubal ligation status  Sinusitis  Spinal stenosis  AV fistula  S/P tonsillectomy  S/P appendectomy  H/O aortic valve replacement  H/O spinal fusion  H/O cataract  Cystocele  H/O tricuspid valve annuloplasty  H/O mitral valve repair/      MEDICATIONS  (STANDING):  aspirin enteric coated 81 milliGRAM(s) Oral daily  atorvastatin 40 milliGRAM(s) Oral at bedtime  calcium acetate 667 milliGRAM(s) Oral three times a day with meals  ferrous    sulfate 325 milliGRAM(s) Oral daily  folic acid 1 milliGRAM(s) Oral daily  heparin  Injectable 5000 Unit(s) SubCutaneous every 12 hours  midodrine 10 milliGRAM(s) Oral three times a day  mirtazapine 15 milliGRAM(s) Oral at bedtime  pantoprazole    Tablet 40 milliGRAM(s) Oral before breakfast  silver sulfADIAZINE 1% Cream 1 Application(s) Topical two times a day    MEDICATIONS  (PRN):  acetaminophen   Tablet. 650 milliGRAM(s) Oral every 6 hours PRN Mild Pain (1 - 3)  ALBUTerol/ipratropium for Nebulization 3 milliLiter(s) Nebulizer every 6 hours PRN Shortness of Breath  calamine Lotion 1 Application(s) Topical four times a day PRN Itching      Allergies    allopurinol (Rash)  codeine (Nausea; Vomiting)  penicillin (Rash)  spironolactone (Unknown)    SOCIAL HISTORY:  Non smoker.     FAMILY HISTORY:  No pertinent family history in first degree relatives      ROS:  Unobtainable due to patient's condition.     Exam:  Vital Signs Last 24 Hrs  T(F): 98.6 (03 Nov 2017 10:00), Max: 98.9 (02 Nov 2017 16:40)  HR: 93 (03 Nov 2017 10:00) (66 - 93)  BP: 115/46 (03 Nov 2017 10:00) (89/56 - 115/46)  RR: 18 (03 Nov 2017 10:00) (16 - 18)  SpO2: 98% (03 Nov 2017 10:00) (94% - 98%)  General: NAD.   Carotid bruits absent.     Mental status: The patient is Awake but non verbal and not following instructions.     Cranial nerves: There is no papilledema. Pupils react Symmetrically to light. She blinks to threat bilaterally. She tracks with gaze. Facial musculature is symmetric. Palate elevates symmetrically. Tongue is midline.    Motor/Sensory: There is normal bulk and tone.  Symmetric limb movement to stimuli.     Reflexes: 1+ throughout and plantar responses are flexor.    Cerebellar: Cannot be tested.     LABS:                         8.8    9.6   )-----------( 167                  27.5       11-03    133<L>  |  84<L>  |  84.0<H>  ----------------------------<  101  5.2   |  18.0<L>  |  7.15<H>    Ca    8.8      03 Nov 2017 06:21    TPro  6.4<L>  /  Alb  3.6  /  TBili  1.4  /  DBili  x   /  AST  32<H>  /  ALT  24  /  AlkPhos  537<H>  11-03    RADIOLOGY & ADDITIONAL STUDIES:  CT head reviewed: There is no acute pathology. There is left parieto-occipital encephalomalacia consistent with old infarct. There are also old left cerebellar infarcts.

## 2017-11-03 NOTE — CONSULT NOTE ADULT - ASSESSMENT
The patient is a 77y Female with increased confusion which may be due to metabolic encephalopathy.   I will obtain EEG once dialysis completed.   (She is being transferred to ICU).     If EEG negative and patient no better after HD would consider LP to assess CSF.

## 2017-11-03 NOTE — CONSULT NOTE ADULT - PROBLEM SELECTOR RECOMMENDATION 7
Neurology called by Dr Lamar I spoke to Dr Goins as well and he will make arrangements for pt to have EEG after head CT  F/U results

## 2017-11-03 NOTE — PROGRESS NOTE ADULT - SUBJECTIVE AND OBJECTIVE BOX
S) None.      O) Pt in bed in MICU about to have EEG. Systolic Bp 105; Eyes open, confused, poorly verbal. CVS no rub noted.    Lab: repeat CAT of Head reviewed - no sign of bleed or acute process - no change from prior.  Chem & cbc reviewed.

## 2017-11-03 NOTE — PROCEDURE NOTE - PROCEDURE
<<-----Click on this checkbox to enter Procedure Central line placement  11/03/2017    Active  [id number]

## 2017-11-04 DIAGNOSIS — N18.6 END STAGE RENAL DISEASE: ICD-10-CM

## 2017-11-04 DIAGNOSIS — B02.8 ZOSTER WITH OTHER COMPLICATIONS: ICD-10-CM

## 2017-11-04 DIAGNOSIS — J96.01 ACUTE RESPIRATORY FAILURE WITH HYPOXIA: ICD-10-CM

## 2017-11-04 LAB
ALBUMIN SERPL ELPH-MCNC: 3.4 G/DL — SIGNIFICANT CHANGE UP (ref 3.3–5.2)
ALP SERPL-CCNC: 413 U/L — HIGH (ref 40–120)
ALT FLD-CCNC: 20 U/L — SIGNIFICANT CHANGE UP
ANION GAP SERPL CALC-SCNC: 19 MMOL/L — HIGH (ref 5–17)
ANION GAP SERPL CALC-SCNC: 19 MMOL/L — HIGH (ref 5–17)
AST SERPL-CCNC: 26 U/L — SIGNIFICANT CHANGE UP
BILIRUB SERPL-MCNC: 2.1 MG/DL — HIGH (ref 0.4–2)
BUN SERPL-MCNC: 33 MG/DL — HIGH (ref 8–20)
BUN SERPL-MCNC: 35 MG/DL — HIGH (ref 8–20)
CALCIUM SERPL-MCNC: 8.7 MG/DL — SIGNIFICANT CHANGE UP (ref 8.6–10.2)
CALCIUM SERPL-MCNC: 8.7 MG/DL — SIGNIFICANT CHANGE UP (ref 8.6–10.2)
CHLORIDE SERPL-SCNC: 90 MMOL/L — LOW (ref 98–107)
CHLORIDE SERPL-SCNC: 91 MMOL/L — LOW (ref 98–107)
CO2 SERPL-SCNC: 27 MMOL/L — SIGNIFICANT CHANGE UP (ref 22–29)
CO2 SERPL-SCNC: 28 MMOL/L — SIGNIFICANT CHANGE UP (ref 22–29)
CREAT SERPL-MCNC: 3.73 MG/DL — HIGH (ref 0.5–1.3)
CREAT SERPL-MCNC: 3.88 MG/DL — HIGH (ref 0.5–1.3)
GLUCOSE SERPL-MCNC: 88 MG/DL — SIGNIFICANT CHANGE UP (ref 70–115)
GLUCOSE SERPL-MCNC: 98 MG/DL — SIGNIFICANT CHANGE UP (ref 70–115)
HCT VFR BLD CALC: 22.8 % — LOW (ref 37–47)
HCT VFR BLD CALC: 23.4 % — LOW (ref 37–47)
HGB BLD-MCNC: 7.4 G/DL — LOW (ref 12–16)
HGB BLD-MCNC: 7.4 G/DL — LOW (ref 12–16)
INR BLD: 1.12 RATIO — SIGNIFICANT CHANGE UP (ref 0.88–1.16)
LACTATE SERPL-SCNC: 1.2 MMOL/L — SIGNIFICANT CHANGE UP (ref 0.5–2)
MAGNESIUM SERPL-MCNC: 2.1 MG/DL — SIGNIFICANT CHANGE UP (ref 1.6–2.6)
MCHC RBC-ENTMCNC: 31.6 G/DL — LOW (ref 32–36)
MCHC RBC-ENTMCNC: 32.5 G/DL — SIGNIFICANT CHANGE UP (ref 32–36)
MCHC RBC-ENTMCNC: 37.9 PG — HIGH (ref 27–31)
MCHC RBC-ENTMCNC: 38.1 PG — HIGH (ref 27–31)
MCV RBC AUTO: 117.5 FL — HIGH (ref 81–99)
MCV RBC AUTO: 120 FL — HIGH (ref 81–99)
PHOSPHATE SERPL-MCNC: 3.9 MG/DL — SIGNIFICANT CHANGE UP (ref 2.4–4.7)
PLATELET # BLD AUTO: 110 K/UL — LOW (ref 150–400)
PLATELET # BLD AUTO: 116 K/UL — LOW (ref 150–400)
POTASSIUM SERPL-MCNC: 3.6 MMOL/L — SIGNIFICANT CHANGE UP (ref 3.5–5.3)
POTASSIUM SERPL-MCNC: 3.7 MMOL/L — SIGNIFICANT CHANGE UP (ref 3.5–5.3)
POTASSIUM SERPL-SCNC: 3.6 MMOL/L — SIGNIFICANT CHANGE UP (ref 3.5–5.3)
POTASSIUM SERPL-SCNC: 3.7 MMOL/L — SIGNIFICANT CHANGE UP (ref 3.5–5.3)
PROT SERPL-MCNC: 5.9 G/DL — LOW (ref 6.6–8.7)
PROTHROM AB SERPL-ACNC: 12.3 SEC — SIGNIFICANT CHANGE UP (ref 9.8–12.7)
RBC # BLD: 1.94 M/UL — LOW (ref 4.4–5.2)
RBC # BLD: 1.95 M/UL — LOW (ref 4.4–5.2)
RBC # FLD: 21.8 % — HIGH (ref 11–15.6)
RBC # FLD: 22.8 % — HIGH (ref 11–15.6)
SODIUM SERPL-SCNC: 136 MMOL/L — SIGNIFICANT CHANGE UP (ref 135–145)
SODIUM SERPL-SCNC: 138 MMOL/L — SIGNIFICANT CHANGE UP (ref 135–145)
VANCOMYCIN TROUGH SERPL-MCNC: 22 UG/ML — HIGH (ref 10–20)
WBC # BLD: 6.3 K/UL — SIGNIFICANT CHANGE UP (ref 4.8–10.8)
WBC # BLD: 7.2 K/UL — SIGNIFICANT CHANGE UP (ref 4.8–10.8)
WBC # FLD AUTO: 6.3 K/UL — SIGNIFICANT CHANGE UP (ref 4.8–10.8)
WBC # FLD AUTO: 7.2 K/UL — SIGNIFICANT CHANGE UP (ref 4.8–10.8)

## 2017-11-04 PROCEDURE — 99233 SBSQ HOSP IP/OBS HIGH 50: CPT

## 2017-11-04 PROCEDURE — 72131 CT LUMBAR SPINE W/O DYE: CPT | Mod: 26

## 2017-11-04 PROCEDURE — 99291 CRITICAL CARE FIRST HOUR: CPT

## 2017-11-04 PROCEDURE — 99231 SBSQ HOSP IP/OBS SF/LOW 25: CPT

## 2017-11-04 RX ORDER — DIPHENHYDRAMINE HYDROCHLORIDE AND LIDOCAINE HYDROCHLORIDE AND ALUMINUM HYDROXIDE AND MAGNESIUM HYDRO
30 KIT THREE TIMES A DAY
Qty: 0 | Refills: 0 | Status: DISCONTINUED | OUTPATIENT
Start: 2017-11-04 | End: 2017-11-05

## 2017-11-04 RX ORDER — ALBUMIN HUMAN 25 %
100 VIAL (ML) INTRAVENOUS ONCE
Qty: 0 | Refills: 0 | Status: COMPLETED | OUTPATIENT
Start: 2017-11-04 | End: 2017-11-04

## 2017-11-04 RX ADMIN — Medication 1 APPLICATION(S): at 06:10

## 2017-11-04 RX ADMIN — Medication 1 APPLICATION(S): at 18:48

## 2017-11-04 RX ADMIN — Medication 50 MILLILITER(S): at 18:17

## 2017-11-04 RX ADMIN — MIDODRINE HYDROCHLORIDE 10 MILLIGRAM(S): 2.5 TABLET ORAL at 14:11

## 2017-11-04 RX ADMIN — CHLORHEXIDINE GLUCONATE 15 MILLILITER(S): 213 SOLUTION TOPICAL at 05:47

## 2017-11-04 RX ADMIN — Medication 50 MILLIGRAM(S): at 06:52

## 2017-11-04 RX ADMIN — Medication 50 MILLIGRAM(S): at 18:50

## 2017-11-04 RX ADMIN — PANTOPRAZOLE SODIUM 40 MILLIGRAM(S): 20 TABLET, DELAYED RELEASE ORAL at 05:48

## 2017-11-04 RX ADMIN — DIPHENHYDRAMINE HYDROCHLORIDE AND LIDOCAINE HYDROCHLORIDE AND ALUMINUM HYDROXIDE AND MAGNESIUM HYDRO 30 MILLILITER(S): KIT at 14:10

## 2017-11-04 RX ADMIN — PANTOPRAZOLE SODIUM 40 MILLIGRAM(S): 20 TABLET, DELAYED RELEASE ORAL at 18:49

## 2017-11-04 RX ADMIN — DIPHENHYDRAMINE HYDROCHLORIDE AND LIDOCAINE HYDROCHLORIDE AND ALUMINUM HYDROXIDE AND MAGNESIUM HYDRO 30 MILLILITER(S): KIT at 21:26

## 2017-11-04 NOTE — PROGRESS NOTE ADULT - PROBLEM SELECTOR PLAN 2
mutlifactorial - improving   likely combination of acute hospitalization with delirium,, delayed dialysis, delayed drug clearance

## 2017-11-04 NOTE — PROGRESS NOTE ADULT - ASSESSMENT
77 YOF with ESRD, Herpes Zoster now with AMS/encephalopathy, respiratory failure possible aspiration

## 2017-11-04 NOTE — PROGRESS NOTE ADULT - SUBJECTIVE AND OBJECTIVE BOX
Discussed with ICU team   Will arrange session of HD today to facilitate blood transfusion   See orders

## 2017-11-04 NOTE — PROGRESS NOTE ADULT - SUBJECTIVE AND OBJECTIVE BOX
NEPHROLOGY INTERVAL HPI/OVERNIGHT EVENTS:    Events noted   Incomplete HD yesterday   Intubated and on vent   Meds noted   ID follow up noted   + pressor   LP attempted last evening     MEDICATIONS  (STANDING):  acyclovir IVPB 430 milliGRAM(s) IV Intermittent every 24 hours  atorvastatin 40 milliGRAM(s) Oral at bedtime  aztreonam  IVPB 500 milliGRAM(s) IV Intermittent every 12 hours  calcium acetate 667 milliGRAM(s) Oral three times a day with meals  chlorhexidine 0.12% Liquid 15 milliLiter(s) Swish and Spit two times a day  dexmedetomidine Infusion 0.3 MICROgram(s)/kG/Hr (6.428 mL/Hr) IV Continuous <Continuous>  ferrous    sulfate 325 milliGRAM(s) Oral daily  folic acid 1 milliGRAM(s) Oral daily  midodrine 10 milliGRAM(s) Oral three times a day  pantoprazole  Injectable 40 milliGRAM(s) IV Push two times a day  phenylephrine    Infusion 0.2 MICROgram(s)/kG/Min (6.428 mL/Hr) IV Continuous <Continuous>  silver sulfADIAZINE 1% Cream 1 Application(s) Topical two times a day    MEDICATIONS  (PRN):  acetaminophen    Suspension 650 milliGRAM(s) Oral every 6 hours PRN For Temp greater than 38 C (100.4 F)  ALBUTerol/ipratropium for Nebulization 3 milliLiter(s) Nebulizer every 6 hours PRN Shortness of Breath  calamine Lotion 1 Application(s) Topical four times a day PRN Itching      Allergies    allopurinol (Rash)  codeine (Nausea; Vomiting)  penicillin (Rash)  spironolactone (Unknown)    Intolerances            Vital Signs Last 24 Hrs  T(C): 36.9 (2017 04:23), Max: 37.1 (2017 12:00)  T(F): 98.4 (2017 04:23), Max: 98.7 (2017 12:00)  HR: 81 (2017 06:31) (51 - 105)  BP: 108/54 (2017 07:00) (82/35 - 130/58)  BP(mean): 78 (2017 07:00) (50 - 84)  RR: 14 (2017 07:00) (14 - 85)  SpO2: 95% (2017 07:00) (81% - 100%)  Daily     Daily Weight in k.9 (2017 06:00)  I&O's Detail    2017 07:01  -  2017 07:00  --------------------------------------------------------  IN:    dexmedetomidine Infusion: 36 mL    Other: 250 mL    Other: 600 mL    phenylephrine   Infusion: 294 mL    Solution: 100 mL    Solution: 100 mL    Solution: 250 mL  Total IN: 1630 mL    OUT:    Nasoenteral Tube: 450 mL    Other: 1380 mL  Total OUT: 1830 mL    Total NET: -200 mL        I&O's Summary    2017 07:01  -  2017 07:00  --------------------------------------------------------  IN: 1630 mL / OUT: 1830 mL / NET: -200 mL        PHYSICAL EXAM:    GENERAL: orally intubated and sedated   HEAD: Intubated   NECK: Supple, Min JVD  CHEST/LUNG: EAE, Few basilar crackles , no wheeze   HEART: Regular rate and rhythm; No rub   ABDOMEN: Soft, Nontender,   EXTREMITIES:  RUE access with thrill         LABS:                        7.4    7.2   )-----------( 116      ( 2017 07:06 )             23.4     11-04    136  |  90<L>  |  33.0<H>  ----------------------------<  88  3.6   |  27.0  |  3.73<H>    Ca    8.7      2017 07:06    TPro  5.9<L>  /  Alb  3.4  /  TBili  2.1<H>  /  DBili  x   /  AST  26  /  ALT  20  /  AlkPhos  413<H>  11-04    PT/INR - ( 2017 14:35 )   PT: 12.5 sec;   INR: 1.13 ratio         PTT - ( 2017 14:35 )  PTT:34.4 sec      ABG - ( 2017 09:58 )  pH: 7.32  /  pCO2: 49    /  pO2: 90    / HCO3: 24    / Base Excess: -1.1  /  SaO2: 97               EXAM:  CHEST SINGLE VIEW FRONTAL                          PROCEDURE DATE:  2017          INTERPRETATION:  Portable chest radiograph        CLINICAL INFORMATION: Status post intubation. Respiratory failure.  TECHNIQUE:  Portable  AP view of the chest was obtained.    COMPARISON: No previous examinations are available for review.    FINDINGS:    ET tube tip above tracheal bifurcation.  NG tube tip beyond GE junction.  Status post cardiac valve replacement.      The lungs  show right lower lobe linear M opacities/infiltrates is   similar to prior examination dated 2017. Left lung clear.         The  heart is enlarged in transverse diameter. No hilar mass. Trachea   midline.        Visualized osseous structures are intact.        IMPRESSION:   Cardiac megaly.  Right lower lobe linear opacities/infiltrates.  NG tube tip beyond GE junction.  . Endotracheal tube tip above tracheal bifurcation.  .                  RADIOLOGY & ADDITIONAL TESTS:

## 2017-11-04 NOTE — PROGRESS NOTE ADULT - PROBLEM SELECTOR PLAN 4
mental status has not returned to baseline despite being more awake and able to spontaneously breathe will still consider EEG

## 2017-11-04 NOTE — PROGRESS NOTE ADULT - ASSESSMENT
t is a 78 yo F with PMH of Afib( no in AC due to recurrent falls), ESRD on HD (TTF ), recurrent falls , COPD, GERD, Depression,   CAD, HTN, pulmonary HTN , gout , HLD and SAH who came to the ED with cc of vaginal discomfort. Pt states that her sx started one week ago, she was having vaginal pain , 10/10 , like burning sensation with no alleviating of exacerbating factor, associated with itching and some lesion vesicles over her right labia majora, right inguinal , posterior thigh and right gluteus ,  she went to her PMD who prescribe cephalexin for her and recommended to  come to the ED is she does not improve, last week pt came to the ED and she was prescribe steroid and nystatin cream but this did not help at all. Pt denies fever, no chills, no sick contacts, no recent travel, no sob, no chest pain , no headache, no dizziness .   PT SEEN BY DERM  AND FELT TO BE ZOSTER AND GIVEN A DOSE OF IV ACYCLOVIR AS PER FAMILY SHE HAD CONFUSION WHICH THEY BELIEVE  IS RELATED TO ACYCLOVIR  PT S/P DIALYSIS AND BLEEDING  EXTUBATED IN ICU    MENTAL STATUS SOMEWHAT INMPROVED EYES OPEN FOLLOW COMMANDS  CONSIDER LP IF DOESN'T RETURN TO BASELINE  WILL FOLLOW UP  D/W FAMILY AND ICU TEAM

## 2017-11-04 NOTE — PROGRESS NOTE ADULT - SUBJECTIVE AND OBJECTIVE BOX
MARYDUSTIN is a 77y Female with HPI:  OMAR manzano is a 78 yo F with PMH of Afib( no in AC due to recurrent falls), ESRD on HD (TTF ), recurrent falls , COPD, GERD, Depression,   CAD, HTN, pulmonary HTN , gout , HLD and SAH who came to the ED with cc of vaginal discomfort. Pt states that her sx started one week ago, she was having vaginal pain , 10/10 , like burning sensation with no alleviating of exacerbating factor, associated with itching and some lesion vesicles over her right labia majora, right inguinal , posterior thigh and right gluteus ,  she went to her PMD who prescribe cephalexin for her and recommended to  come to the ED is she does not improve, last week pt came to the ED and she was prescribe steroid and nystatin cream but this did not help at all. Pt denies fever, no chills, no sick contacts, no recent travel, no sob, no chest pain , no headache, no dizziness .   PT SEEN BY DERM  AND FELT TO BE ZOSTER AND GIVEN A DOSE OF IV ACYCLOVIR AS PER FAMILY SHE HAD CONFUSION WHICH THEY BELIEVE  IS RELATED TO ACYCLOVIR  P WITH CONTINUED CONFUSION TODAY  WENT TO DIALYISIS  AND THEN TO ICU INTUBATED WITH UPPER AIRWAY BLEEDING  NOW EXTUBATED        Allergies:  allopurinol (Rash)  codeine (Nausea; Vomiting)  penicillin (Rash)  spironolactone (Unknown)      Medications:  acetaminophen   Tablet. 650 milliGRAM(s) Oral every 6 hours PRN  albumin human 25% IVPB 50 milliLiter(s) IV Intermittent every 1 hour  ALBUTerol/ipratropium for Nebulization 3 milliLiter(s) Nebulizer every 6 hours PRN  atorvastatin 40 milliGRAM(s) Oral at bedtime  calamine Lotion 1 Application(s) Topical four times a day PRN  calcium acetate 667 milliGRAM(s) Oral three times a day with meals  chlorhexidine 0.12% Liquid 15 milliLiter(s) Swish and Spit two times a day  dexmedetomidine Infusion 0.3 MICROgram(s)/kG/Hr IV Continuous <Continuous>  ferrous    sulfate 325 milliGRAM(s) Oral daily  folic acid 1 milliGRAM(s) Oral daily  midodrine 10 milliGRAM(s) Oral three times a day  pantoprazole  Injectable 40 milliGRAM(s) IV Push two times a day  phenylephrine    Infusion 0.2 MICROgram(s)/kG/Min IV Continuous <Continuous>  silver sulfADIAZINE 1% Cream 1 Application(s) Topical two times a day  sodium chloride 0.9% Bolus 500 milliLiter(s) IV Bolus once      ANTIBIOTICS:         Review of Systems: - Negative except as mentioned above     Physical Exam:   Vital Signs Last 24 Hrs  T(C): 35.8 (04 Nov 2017 12:01), Max: 36.9 (03 Nov 2017 20:03)  T(F): 96.5 (04 Nov 2017 12:01), Max: 98.4 (03 Nov 2017 20:03)  HR: 90 (04 Nov 2017 15:00) (51 - 96)  BP: 116/53 (04 Nov 2017 15:00) (82/35 - 130/58)  BP(mean): 75 (04 Nov 2017 15:00) (50 - 84)  RR: 40 (04 Nov 2017 15:00) (14 - 92)  SpO2: 87% (04 Nov 2017 15:00) (87% - 100%)    GEN:  AWAKE   HEENT: normocephalic and atraumatic. EOMI. GONZALO...  NECK: Supple. No carotid bruits.  No lymphadenopathy or thyromegaly.  LUNGS: Clear to auscultation.  HEART: Regular rate and rhythm without murmur.  ABDOMEN: Soft, nontender, and nondistended.  Positive bowel sounds.  No hepatosplenomegaly was noted.  NO REBOUND NO GUARDING  EXTREMITIES: Without any cyanosis, clubbing, rash, lesions or edema.  NEUROLOGIC:  EYES OPEN FOLLOW  COMMANDS  SKIN: ZOSTER RASH IN SUPRAPUBIC AND LEFT PERINEAL AND POS THIGH      Labs:

## 2017-11-04 NOTE — PROGRESS NOTE ADULT - ASSESSMENT
ESRD - Partial HD yesterday   Labs from today noted   Potassium stable   Friable hemodynamics   No pressing need for acute HD today    Anemia - Continue epogen   Transfuse as needed     Resp failure - Intubated for airway protection   CCM follow up noted     Suspect zoster , cellulitis   meds as per ID   LP attempted yesterday

## 2017-11-04 NOTE — PROGRESS NOTE ADULT - ATTENDING COMMENTS
Pt remains critically ill and high risk for reintubation and deterioration with AMS. Daughter Estephanie updated extensively at bedside today.   60min total attending CC time spent reviewing chart, treating/stabilizing patient, speaking to consultants and family.

## 2017-11-04 NOTE — PROGRESS NOTE ADULT - ASSESSMENT
The patient is a 77y Female with probable toxic-metabolic encephalopathy.    For HD this afternoon.     If does not return to baseline mental status can do EEG Monday.     Discussed with Family at bedside.

## 2017-11-04 NOTE — PROGRESS NOTE ADULT - SUBJECTIVE AND OBJECTIVE BOX
[facility]                                        [facility] [place]                                 [address]                                  [address]                                        [place], [place], [address]                                             [phone]        Events of yesterday noted.   Patient was intubated due to bleeding in oral cavity and potential airway obstruction.     LP attempted by ICU team unsuccessful.    Now extubated.     Vital Signs Last 24 Hrs  T(F): 98.1 (04 Nov 2017 08:00), Max: 98.4 (03 Nov 2017 20:03)  HR: 95 (04 Nov 2017 10:00) (51 - 103)  BP: 117/54 (04 Nov 2017 10:00) (82/35 - 130/58)  BP(mean): 78 (04 Nov 2017 10:00) (50 - 84)  RR: 80 (04 Nov 2017 10:00) (14 - 92)  SpO2: 97% (04 Nov 2017 10:00) (90% - 100%)    No neck stiffness.    More alert today.   Follows simple instructions.   Pupils react.  Face symmetric.  Symmetric limb movement to command.

## 2017-11-05 LAB
ALBUMIN SERPL ELPH-MCNC: 3.5 G/DL — SIGNIFICANT CHANGE UP (ref 3.3–5.2)
ALP SERPL-CCNC: 385 U/L — HIGH (ref 40–120)
ALT FLD-CCNC: 19 U/L — SIGNIFICANT CHANGE UP
ANION GAP SERPL CALC-SCNC: 22 MMOL/L — HIGH (ref 5–17)
AST SERPL-CCNC: 26 U/L — SIGNIFICANT CHANGE UP
BILIRUB SERPL-MCNC: 2.8 MG/DL — HIGH (ref 0.4–2)
BUN SERPL-MCNC: 26 MG/DL — HIGH (ref 8–20)
CALCIUM SERPL-MCNC: 8.7 MG/DL — SIGNIFICANT CHANGE UP (ref 8.6–10.2)
CHLORIDE SERPL-SCNC: 92 MMOL/L — LOW (ref 98–107)
CO2 SERPL-SCNC: 24 MMOL/L — SIGNIFICANT CHANGE UP (ref 22–29)
CREAT SERPL-MCNC: 3.28 MG/DL — HIGH (ref 0.5–1.3)
CULTURE RESULTS: SIGNIFICANT CHANGE UP
CULTURE RESULTS: SIGNIFICANT CHANGE UP
GLUCOSE SERPL-MCNC: 93 MG/DL — SIGNIFICANT CHANGE UP (ref 70–115)
HCT VFR BLD CALC: 25.8 % — LOW (ref 37–47)
HGB BLD-MCNC: 8 G/DL — LOW (ref 12–16)
MCHC RBC-ENTMCNC: 31 G/DL — LOW (ref 32–36)
MCHC RBC-ENTMCNC: 37.2 PG — HIGH (ref 27–31)
MCV RBC AUTO: 120 FL — HIGH (ref 81–99)
PLATELET # BLD AUTO: 99 K/UL — LOW (ref 150–400)
POTASSIUM SERPL-MCNC: 3.7 MMOL/L — SIGNIFICANT CHANGE UP (ref 3.5–5.3)
POTASSIUM SERPL-SCNC: 3.7 MMOL/L — SIGNIFICANT CHANGE UP (ref 3.5–5.3)
PROT SERPL-MCNC: 5.9 G/DL — LOW (ref 6.6–8.7)
RBC # BLD: 2.15 M/UL — LOW (ref 4.4–5.2)
RBC # FLD: 24.3 % — HIGH (ref 11–15.6)
SODIUM SERPL-SCNC: 138 MMOL/L — SIGNIFICANT CHANGE UP (ref 135–145)
SPECIMEN SOURCE: SIGNIFICANT CHANGE UP
SPECIMEN SOURCE: SIGNIFICANT CHANGE UP
VANCOMYCIN TROUGH SERPL-MCNC: 13 UG/ML — SIGNIFICANT CHANGE UP (ref 10–20)
WBC # BLD: 9.7 K/UL — SIGNIFICANT CHANGE UP (ref 4.8–10.8)
WBC # FLD AUTO: 9.7 K/UL — SIGNIFICANT CHANGE UP (ref 4.8–10.8)

## 2017-11-05 PROCEDURE — 99291 CRITICAL CARE FIRST HOUR: CPT

## 2017-11-05 PROCEDURE — 71010: CPT | Mod: 26

## 2017-11-05 PROCEDURE — 99231 SBSQ HOSP IP/OBS SF/LOW 25: CPT

## 2017-11-05 PROCEDURE — 99233 SBSQ HOSP IP/OBS HIGH 50: CPT

## 2017-11-05 RX ORDER — DESMOPRESSIN ACETATE 0.1 MG/1
30 TABLET ORAL ONCE
Qty: 0 | Refills: 0 | Status: COMPLETED | OUTPATIENT
Start: 2017-11-05 | End: 2017-11-05

## 2017-11-05 RX ORDER — CHLORHEXIDINE GLUCONATE 213 G/1000ML
15 SOLUTION TOPICAL
Qty: 0 | Refills: 0 | Status: DISCONTINUED | OUTPATIENT
Start: 2017-11-05 | End: 2017-11-05

## 2017-11-05 RX ORDER — PANTOPRAZOLE SODIUM 20 MG/1
40 TABLET, DELAYED RELEASE ORAL
Qty: 0 | Refills: 0 | Status: DISCONTINUED | OUTPATIENT
Start: 2017-11-05 | End: 2017-11-05

## 2017-11-05 RX ORDER — DEXMEDETOMIDINE HYDROCHLORIDE IN 0.9% SODIUM CHLORIDE 4 UG/ML
0.5 INJECTION INTRAVENOUS
Qty: 200 | Refills: 0 | Status: DISCONTINUED | OUTPATIENT
Start: 2017-11-05 | End: 2017-11-06

## 2017-11-05 RX ORDER — NOREPINEPHRINE BITARTRATE/D5W 8 MG/250ML
0.02 PLASTIC BAG, INJECTION (ML) INTRAVENOUS
Qty: 8 | Refills: 0 | Status: DISCONTINUED | OUTPATIENT
Start: 2017-11-05 | End: 2017-11-05

## 2017-11-05 RX ORDER — PROPOFOL 10 MG/ML
10 INJECTION, EMULSION INTRAVENOUS
Qty: 1000 | Refills: 0 | Status: DISCONTINUED | OUTPATIENT
Start: 2017-11-05 | End: 2017-11-05

## 2017-11-05 RX ORDER — DEXMEDETOMIDINE HYDROCHLORIDE IN 0.9% SODIUM CHLORIDE 4 UG/ML
1 INJECTION INTRAVENOUS
Qty: 200 | Refills: 0 | Status: DISCONTINUED | OUTPATIENT
Start: 2017-11-05 | End: 2017-11-05

## 2017-11-05 RX ORDER — SCOPALAMINE 1 MG/3D
1.5 PATCH, EXTENDED RELEASE TRANSDERMAL
Qty: 0 | Refills: 0 | Status: DISCONTINUED | OUTPATIENT
Start: 2017-11-05 | End: 2017-11-06

## 2017-11-05 RX ORDER — PANTOPRAZOLE SODIUM 20 MG/1
40 TABLET, DELAYED RELEASE ORAL DAILY
Qty: 0 | Refills: 0 | Status: DISCONTINUED | OUTPATIENT
Start: 2017-11-05 | End: 2017-11-06

## 2017-11-05 RX ORDER — FENTANYL CITRATE 50 UG/ML
1 INJECTION INTRAVENOUS
Qty: 5000 | Refills: 0 | Status: DISCONTINUED | OUTPATIENT
Start: 2017-11-05 | End: 2017-11-06

## 2017-11-05 RX ORDER — FENTANYL CITRATE 50 UG/ML
50 INJECTION INTRAVENOUS ONCE
Qty: 0 | Refills: 0 | Status: DISCONTINUED | OUTPATIENT
Start: 2017-11-05 | End: 2017-11-05

## 2017-11-05 RX ORDER — SODIUM CHLORIDE 9 MG/ML
500 INJECTION, SOLUTION INTRAVENOUS ONCE
Qty: 0 | Refills: 0 | Status: COMPLETED | OUTPATIENT
Start: 2017-11-05 | End: 2017-11-05

## 2017-11-05 RX ORDER — FENTANYL CITRATE 50 UG/ML
50 INJECTION INTRAVENOUS
Qty: 0 | Refills: 0 | Status: DISCONTINUED | OUTPATIENT
Start: 2017-11-05 | End: 2017-11-05

## 2017-11-05 RX ADMIN — Medication 3.21 MICROGRAM(S)/KG/MIN: at 12:24

## 2017-11-05 RX ADMIN — FENTANYL CITRATE 50 MICROGRAM(S): 50 INJECTION INTRAVENOUS at 11:50

## 2017-11-05 RX ADMIN — DESMOPRESSIN ACETATE 230 MICROGRAM(S): 0.1 TABLET ORAL at 09:14

## 2017-11-05 RX ADMIN — SCOPALAMINE 1.5 MILLIGRAM(S): 1 PATCH, EXTENDED RELEASE TRANSDERMAL at 14:13

## 2017-11-05 RX ADMIN — FENTANYL CITRATE 50 MICROGRAM(S): 50 INJECTION INTRAVENOUS at 11:40

## 2017-11-05 RX ADMIN — FENTANYL CITRATE 50 MICROGRAM(S): 50 INJECTION INTRAVENOUS at 09:49

## 2017-11-05 RX ADMIN — Medication 1 MILLIGRAM(S): at 14:11

## 2017-11-05 RX ADMIN — Medication 50 MILLIGRAM(S): at 05:55

## 2017-11-05 RX ADMIN — FENTANYL CITRATE 50 MICROGRAM(S): 50 INJECTION INTRAVENOUS at 10:00

## 2017-11-05 RX ADMIN — DEXMEDETOMIDINE HYDROCHLORIDE IN 0.9% SODIUM CHLORIDE 10.71 MICROGRAM(S)/KG/HR: 4 INJECTION INTRAVENOUS at 12:24

## 2017-11-05 RX ADMIN — FENTANYL CITRATE 50 MICROGRAM(S): 50 INJECTION INTRAVENOUS at 10:38

## 2017-11-05 RX ADMIN — SODIUM CHLORIDE 1000 MILLILITER(S): 9 INJECTION, SOLUTION INTRAVENOUS at 10:38

## 2017-11-05 RX ADMIN — DIPHENHYDRAMINE HYDROCHLORIDE AND LIDOCAINE HYDROCHLORIDE AND ALUMINUM HYDROXIDE AND MAGNESIUM HYDRO 30 MILLILITER(S): KIT at 05:55

## 2017-11-05 RX ADMIN — FENTANYL CITRATE 4.29 MICROGRAM(S)/KG/HR: 50 INJECTION INTRAVENOUS at 12:24

## 2017-11-05 RX ADMIN — DEXMEDETOMIDINE HYDROCHLORIDE IN 0.9% SODIUM CHLORIDE 10.71 MICROGRAM(S)/KG/HR: 4 INJECTION INTRAVENOUS at 20:59

## 2017-11-05 RX ADMIN — PANTOPRAZOLE SODIUM 40 MILLIGRAM(S): 20 TABLET, DELAYED RELEASE ORAL at 05:55

## 2017-11-05 RX ADMIN — FENTANYL CITRATE 50 MICROGRAM(S): 50 INJECTION INTRAVENOUS at 10:45

## 2017-11-05 RX ADMIN — DEXMEDETOMIDINE HYDROCHLORIDE IN 0.9% SODIUM CHLORIDE 10.71 MICROGRAM(S)/KG/HR: 4 INJECTION INTRAVENOUS at 17:53

## 2017-11-05 RX ADMIN — DEXMEDETOMIDINE HYDROCHLORIDE IN 0.9% SODIUM CHLORIDE 10.71 MICROGRAM(S)/KG/HR: 4 INJECTION INTRAVENOUS at 10:38

## 2017-11-05 RX ADMIN — PROPOFOL 5.14 MICROGRAM(S)/KG/MIN: 10 INJECTION, EMULSION INTRAVENOUS at 09:45

## 2017-11-05 RX ADMIN — Medication 1 APPLICATION(S): at 05:55

## 2017-11-05 RX ADMIN — DEXMEDETOMIDINE HYDROCHLORIDE IN 0.9% SODIUM CHLORIDE 10.71 MICROGRAM(S)/KG/HR: 4 INJECTION INTRAVENOUS at 14:44

## 2017-11-05 NOTE — PROGRESS NOTE ADULT - SUBJECTIVE AND OBJECTIVE BOX
Patient is a 77y old  Female who presents with a chief complaint of vaginal discomfort (02 Nov 2017 00:37)      BRIEF HOSPITAL COURSE: 78 yo F with PMH of Afib( no in AC due to recurrent falls), ESRD on HD (TTF ), recurrent falls , COPD, GERD, Depression,   CAD, HTN, pulmonary HTN , gout , HLD and SAH who came to the ED with cc of vaginal discomfort. Pt states that her sx started one week ago, she was having vaginal pain , 10/10 , like burning sensation with no alleviating of exacerbating factor, associated with itching and some lesion vesicles over her right labia majora, right inguinal , posterior thigh and right gluteus.  Pt was found to have Zoster, she was treated with one dose of Acyclovir and one dose of morphine yesterday and had AMS which family blamed on medications and insisted they were stopped. Pt was altered with her eyes open and is staring off and having repetitive tongue movement sticking her tongue in/out.  She answers yes to her name but was otherwise  non-verbal and not following commands.  Pt was moved to MICU and intubated for airway protection after having oropharyngeal bleeding and hypoxia. EEG was not performed. LP was attempted but unsuccessful due to spinal fusion. Was weaned extubated 11/4/17.     Events last 24 hours: found with oropharyngeal bleeding this morning with clots in back of throat and respiratory distress/hypoxia- intubated     PAST MEDICAL & SURGICAL HISTORY:  ESRD (end stage renal disease) on dialysis: MWF via R SC Permacath  planned AVF creation  Sinusitis, unspecified chronicity, unspecified location  Gout  Gall stones  Pneumonia  Anemia  Pulmonary hypertension  COPD (chronic obstructive pulmonary disease)  CAD (coronary artery disease)  Atrial fibrillation  Hyperlipidemia  Hypertension  Spinal fusion failure, initial encounter  Tubal ligation status  Sinusitis  Spinal stenosis  AV fistula  S/P tonsillectomy  S/P appendectomy  H/O aortic valve replacement  H/O spinal fusion  H/O cataract  Cystocele  H/O tricuspid valve annuloplasty  H/O mitral valve repair      Review of Systems:  Cannot be obtained as pt is intubated.     Medications:  aztreonam  IVPB 500 milliGRAM(s) IV Intermittent every 12 hours    midodrine 10 milliGRAM(s) Oral three times a day    ALBUTerol/ipratropium for Nebulization 3 milliLiter(s) Nebulizer every 6 hours PRN    acetaminophen    Suspension 650 milliGRAM(s) Oral every 6 hours PRN  dexmedetomidine Infusion 0.5 MICROgram(s)/kG/Hr IV Continuous <Continuous>  fentaNYL    Injectable 50 MICROGram(s) IV Push every 1 hour PRN        pantoprazole    Tablet 40 milliGRAM(s) Oral before breakfast  pantoprazole  Injectable 40 milliGRAM(s) IV Push two times a day      atorvastatin 40 milliGRAM(s) Oral at bedtime    calcium acetate 667 milliGRAM(s) Oral three times a day with meals  ferrous    sulfate 325 milliGRAM(s) Oral daily  folic acid 1 milliGRAM(s) Oral daily      calamine Lotion 1 Application(s) Topical four times a day PRN  chlorhexidine 0.12% Liquid 15 milliLiter(s) Swish and Spit two times a day  silver sulfADIAZINE 1% Cream 1 Application(s) Topical two times a day        Mode: AC/ CMV (Assist Control/ Continuous Mandatory Ventilation)  RR (machine): 12  TV (machine): 350  FiO2: 50  PEEP: 5  MAP: 10  PIP: 22      ICU Vital Signs Last 24 Hrs  T(C): 37 (05 Nov 2017 08:00), Max: 37 (05 Nov 2017 08:00)  T(F): 98.6 (05 Nov 2017 08:00), Max: 98.6 (05 Nov 2017 08:00)  HR: 99 (05 Nov 2017 11:00) (76 - 110)  BP: 101/56 (05 Nov 2017 11:00) (84/54 - 133/58)  BP(mean): 74 (05 Nov 2017 11:00) (53 - 92)  ABP: --  ABP(mean): --  RR: 33 (05 Nov 2017 11:00) (17 - 59)  SpO2: 99% (05 Nov 2017 11:00) (87% - 100%)          I&O's Detail    04 Nov 2017 08:01  -  05 Nov 2017 07:00  --------------------------------------------------------  IN:    Other: 1000 mL  Total IN: 1000 mL    OUT:    Other: 2300 mL  Total OUT: 2300 mL    Total NET: -1300 mL      05 Nov 2017 07:01  -  05 Nov 2017 11:28  --------------------------------------------------------  IN:    dexmedetomidine Infusion: 8 mL    propofol Infusion: 5 mL    Solution: 50 mL    Solution: 500 mL  Total IN: 563 mL    OUT:  Total OUT: 0 mL    Total NET: 563 mL            LABS:                        8.0    9.7   )-----------( 99       ( 05 Nov 2017 07:18 )             25.8     11-05    138  |  92<L>  |  26.0<H>  ----------------------------<  93  3.7   |  24.0  |  3.28<H>    Ca    8.7      05 Nov 2017 07:18  Phos  3.9     11-04  Mg     2.1     11-04    TPro  5.9<L>  /  Alb  3.5  /  TBili  2.8<H>  /  DBili  x   /  AST  26  /  ALT  19  /  AlkPhos  385<H>  11-05          CAPILLARY BLOOD GLUCOSE        PT/INR - ( 04 Nov 2017 07:32 )   PT: 12.3 sec;   INR: 1.12 ratio         PTT - ( 03 Nov 2017 14:35 )  PTT:34.4 sec    CULTURES:  Culture Results:   No growth at 48 hours (11-02 @ 10:03)  Culture Results:   No growth at 48 hours (11-02 @ 10:03)  Culture Results:   No growth at 48 hours (10-31 @ 19:55)  Culture Results:   No growth at 48 hours (10-31 @ 19:23)      Physical Examination:    General: No acute distress.      HEENT: 3mm Pupils equal, reactive to light.  Symmetric. nasal tampons inplace, clot and blood in back of pharynx coming from posterior nasopharynx    PULM: Course rales to auscultation bilaterally, bloody secretions in ETT    CVS: Regular rate and rhythm, no murmurs, rubs, or gallops    ABD: Soft, nondistended, nontender, normoactive bowel sounds, no masses    EXT: No edema, nontender    SKIN: ecchymotic, DTI on sacrum    NEURO: Arousable    RADIOLOGY:     CRITICAL CARE TIME SPENT: *** Patient is a 77y old  Female who presents with a chief complaint of vaginal discomfort (02 Nov 2017 00:37)      BRIEF HOSPITAL COURSE: 78 yo F with PMH of Afib( no in AC due to recurrent falls), ESRD on HD (TTF ), recurrent falls , COPD, GERD, Depression,   CAD, HTN, pulmonary HTN , gout , HLD and SAH who came to the ED with cc of vaginal discomfort. Pt states that her sx started one week ago, she was having vaginal pain , 10/10 , like burning sensation with no alleviating of exacerbating factor, associated with itching and some lesion vesicles over her right labia majora, right inguinal , posterior thigh and right gluteus.  Pt was found to have Zoster, she was treated with one dose of Acyclovir and one dose of morphine yesterday and had AMS which family blamed on medications and insisted they were stopped. Pt was altered with her eyes open and is staring off and having repetitive tongue movement sticking her tongue in/out.  She answers yes to her name but was otherwise  non-verbal and not following commands.  Pt was moved to MICU and intubated for airway protection after having oropharyngeal bleeding and hypoxia. EEG was not performed. LP was attempted but unsuccessful due to spinal fusion. Was weaned extubated 11/4/17.     Events last 24 hours: found with oropharyngeal bleeding this morning with clots in back of throat and respiratory distress/hypoxia- intubated     PAST MEDICAL & SURGICAL HISTORY:  ESRD (end stage renal disease) on dialysis: MWF via R SC Permacath  planned AVF creation  Sinusitis, unspecified chronicity, unspecified location  Gout  Gall stones  Pneumonia  Anemia  Pulmonary hypertension  COPD (chronic obstructive pulmonary disease)  CAD (coronary artery disease)  Atrial fibrillation  Hyperlipidemia  Hypertension  Spinal fusion failure, initial encounter  Tubal ligation status  Sinusitis  Spinal stenosis  AV fistula  S/P tonsillectomy  S/P appendectomy  H/O aortic valve replacement  H/O spinal fusion  H/O cataract  Cystocele  H/O tricuspid valve annuloplasty  H/O mitral valve repair      Review of Systems:  Cannot be obtained as pt is intubated.     Medications:  aztreonam  IVPB 500 milliGRAM(s) IV Intermittent every 12 hours    midodrine 10 milliGRAM(s) Oral three times a day    ALBUTerol/ipratropium for Nebulization 3 milliLiter(s) Nebulizer every 6 hours PRN    acetaminophen    Suspension 650 milliGRAM(s) Oral every 6 hours PRN  dexmedetomidine Infusion 0.5 MICROgram(s)/kG/Hr IV Continuous <Continuous>  fentaNYL    Injectable 50 MICROGram(s) IV Push every 1 hour PRN        pantoprazole    Tablet 40 milliGRAM(s) Oral before breakfast  pantoprazole  Injectable 40 milliGRAM(s) IV Push two times a day      atorvastatin 40 milliGRAM(s) Oral at bedtime    calcium acetate 667 milliGRAM(s) Oral three times a day with meals  ferrous    sulfate 325 milliGRAM(s) Oral daily  folic acid 1 milliGRAM(s) Oral daily      calamine Lotion 1 Application(s) Topical four times a day PRN  chlorhexidine 0.12% Liquid 15 milliLiter(s) Swish and Spit two times a day  silver sulfADIAZINE 1% Cream 1 Application(s) Topical two times a day        Mode: AC/ CMV (Assist Control/ Continuous Mandatory Ventilation)  RR (machine): 12  TV (machine): 350  FiO2: 50  PEEP: 5  MAP: 10  PIP: 22      ICU Vital Signs Last 24 Hrs  T(C): 37 (05 Nov 2017 08:00), Max: 37 (05 Nov 2017 08:00)  T(F): 98.6 (05 Nov 2017 08:00), Max: 98.6 (05 Nov 2017 08:00)  HR: 99 (05 Nov 2017 11:00) (76 - 110)  BP: 101/56 (05 Nov 2017 11:00) (84/54 - 133/58)  BP(mean): 74 (05 Nov 2017 11:00) (53 - 92)  ABP: --  ABP(mean): --  RR: 33 (05 Nov 2017 11:00) (17 - 59)  SpO2: 99% (05 Nov 2017 11:00) (87% - 100%)          I&O's Detail    04 Nov 2017 08:01  -  05 Nov 2017 07:00  --------------------------------------------------------  IN:    Other: 1000 mL  Total IN: 1000 mL    OUT:    Other: 2300 mL  Total OUT: 2300 mL    Total NET: -1300 mL      05 Nov 2017 07:01  -  05 Nov 2017 11:28  --------------------------------------------------------  IN:    dexmedetomidine Infusion: 8 mL    propofol Infusion: 5 mL    Solution: 50 mL    Solution: 500 mL  Total IN: 563 mL    OUT:  Total OUT: 0 mL    Total NET: 563 mL            LABS:                        8.0    9.7   )-----------( 99       ( 05 Nov 2017 07:18 )             25.8     11-05    138  |  92<L>  |  26.0<H>  ----------------------------<  93  3.7   |  24.0  |  3.28<H>    Ca    8.7      05 Nov 2017 07:18  Phos  3.9     11-04  Mg     2.1     11-04    TPro  5.9<L>  /  Alb  3.5  /  TBili  2.8<H>  /  DBili  x   /  AST  26  /  ALT  19  /  AlkPhos  385<H>  11-05          CAPILLARY BLOOD GLUCOSE        PT/INR - ( 04 Nov 2017 07:32 )   PT: 12.3 sec;   INR: 1.12 ratio         PTT - ( 03 Nov 2017 14:35 )  PTT:34.4 sec    CULTURES:  Culture Results:   No growth at 48 hours (11-02 @ 10:03)  Culture Results:   No growth at 48 hours (11-02 @ 10:03)  Culture Results:   No growth at 48 hours (10-31 @ 19:55)  Culture Results:   No growth at 48 hours (10-31 @ 19:23)      Physical Examination:    General: No acute distress.      HEENT: 3mm Pupils equal, reactive to light.  Symmetric. nasal tampons in place, clot and blood in back of pharynx coming from posterior nasopharynx    PULM: Course rales to auscultation bilaterally, bloody secretions in ETT    CVS: Regular rate and rhythm, no murmurs, rubs, or gallops    ABD: Soft, nondistended, nontender, normoactive bowel sounds, no masses    EXT: No edema, nontender    SKIN: ecchymotic, DTI on sacrum    NEURO: Arousable    RADIOLOGY:     CRITICAL CARE TIME SPENT: >75min

## 2017-11-05 NOTE — PROGRESS NOTE ADULT - PROBLEM SELECTOR PLAN 5
-continue treatment with atorvastatin 40 mg daily
dialyzed late yesterday, labs improved  will do another treatment today with blood transfusion
dialyzed late yesterday, labs improved  will do another treatment today with blood transfusion

## 2017-11-05 NOTE — CONSULT NOTE ADULT - ASSESSMENT
77 YOF with ESRD, Herpes Zoster now with AMS/encephalopathy, respiratory failure, likely aspiration PNA.    Plan:   as discussed with Dr Bustos, though pt is critically ill, pt oxygenating well on 50% FiO2 and PEEP 5. Peak and plateau pressures acceptable. No pleural effusion or collapse/ATX contributing to condition. No thoracic surgical intervention needed at this time. Pt now DNR and transitioned to comfort care measures only. Will sign off. Please reconsult if conditon/situation changes.

## 2017-11-05 NOTE — DIETITIAN INITIAL EVALUATION ADULT. - PROBLEM SELECTOR PLAN 1
Admit to Medicine service, any bed, under Dr Hobson   -Pt's rash is atypical , present over several dermatomas L1-2-3-4 and S2  -airborne  isolation   -diet renal  , falls precautions , vital sings every 8 hrs  -HSV and varicella  PCR ordered   -acyclovir daily   -gabapentin 100 mg daily   -percocet PRN q 8 hr

## 2017-11-05 NOTE — PROGRESS NOTE ADULT - PROBLEM SELECTOR PROBLEM 1
Acute respiratory failure with hypoxia
Acute respiratory failure with hypoxia
Varicella-zoster infection
Metabolic encephalopathy
Varicella-zoster infection

## 2017-11-05 NOTE — DIETITIAN INITIAL EVALUATION ADULT. - PROBLEM SELECTOR PLAN 5
-continue HD 3 times per week ( TTS)  -nephrology evaluation  -renal diet , strict in and out , weight daily

## 2017-11-05 NOTE — DIETITIAN INITIAL EVALUATION ADULT. - NS AS NUTRI INTERV ENTERAL NUTRITION
If unable to extubate, consider enteral feeds: Nepro 1.8cal at 20ml/hr, increase by 10ml/hr every 8 hours to goal rate 60ml/hr x 20 hours (1200ml/daily) to provide 2100kcal and 97 grams protein daily.

## 2017-11-05 NOTE — PROGRESS NOTE ADULT - SUBJECTIVE AND OBJECTIVE BOX
MARYDUSTIN is a 77y Female with HPI:  OMAR manzano is a 78 yo F with PMH of Afib( no in AC due to recurrent falls), ESRD on HD (TTF ), recurrent falls , COPD, GERD, Depression,   CAD, HTN, pulmonary HTN , gout , HLD and SAH who came to the ED with cc of vaginal discomfort. Pt states that her sx started one week ago, she was having vaginal pain , 10/10 , like burning sensation with no alleviating of exacerbating factor, associated with itching and some lesion vesicles over her right labia majora, right inguinal , posterior thigh and right gluteus ,  she went to her PMD who prescribe cephalexin for her and recommended to  come to the ED is she does not improve, last week pt came to the ED and she was prescribe steroid and nystatin cream but this did not help at all. Pt denies fever, no chills, no sick contacts, no recent travel, no sob, no chest pain , no headache, no dizziness .   PT SEEN BY DERM  AND FELT TO BE ZOSTER AND GIVEN A DOSE OF IV ACYCLOVIR AS PER FAMILY SHE HAD CONFUSION WHICH THEY BELIEVE  IS RELATED TO ACYCLOVIR  P WITH CONTINUED CONFUSION TODAY  WENT TO DIALYISIS  AND THEN TO ICU INTUBATED WITH UPPER AIRWAY BLEEDING   EXTUBATED  AND NOW REINTUBATED AND SEDATED      Allergies:  allopurinol (Rash)  codeine (Nausea; Vomiting)  penicillin (Rash)  spironolactone (Unknown)      Medications:  acetaminophen   Tablet. 650 milliGRAM(s) Oral every 6 hours PRN  albumin human 25% IVPB 50 milliLiter(s) IV Intermittent every 1 hour  ALBUTerol/ipratropium for Nebulization 3 milliLiter(s) Nebulizer every 6 hours PRN  atorvastatin 40 milliGRAM(s) Oral at bedtime  calamine Lotion 1 Application(s) Topical four times a day PRN  calcium acetate 667 milliGRAM(s) Oral three times a day with meals  chlorhexidine 0.12% Liquid 15 milliLiter(s) Swish and Spit two times a day  dexmedetomidine Infusion 0.3 MICROgram(s)/kG/Hr IV Continuous <Continuous>  ferrous    sulfate 325 milliGRAM(s) Oral daily  folic acid 1 milliGRAM(s) Oral daily  midodrine 10 milliGRAM(s) Oral three times a day  pantoprazole  Injectable 40 milliGRAM(s) IV Push two times a day  phenylephrine    Infusion 0.2 MICROgram(s)/kG/Min IV Continuous <Continuous>  silver sulfADIAZINE 1% Cream 1 Application(s) Topical two times a day  sodium chloride 0.9% Bolus 500 milliLiter(s) IV Bolus once      ANTIBIOTICS:    MERREM     Review of Systems: - Negative except as mentioned above     Physical Exam:   Vital Signs Last 24 Hrs   Vital Signs Last 24 Hrs  T(C): 36.8 (05 Nov 2017 12:00), Max: 37 (05 Nov 2017 08:00)  T(F): 98.2 (05 Nov 2017 12:00), Max: 98.6 (05 Nov 2017 08:00)  HR: 70 (05 Nov 2017 14:00) (70 - 110)  BP: 86/51 (05 Nov 2017 14:00) (80/45 - 133/58)  BP(mean): 66 (05 Nov 2017 14:00) (53 - 92)  RR: 15 (05 Nov 2017 14:00) (15 - 48)  SpO2: 100% (05 Nov 2017 14:00) (87% - 100%)    GEN:  INTUBATED SEDATED  HEENT: normocephalic and atraumatic. EOMI. GONZALO...  NECK: Supple. No carotid bruits.  No lymphadenopathy or thyromegaly.  LUNGS: Clear to auscultation.  HEART: Regular rate and rhythm without murmur.  ABDOMEN: Soft, nontender, and nondistended.  Positive bowel sounds.  No hepatosplenomegaly was noted.  NO REBOUND NO GUARDING  EXTREMITIES: Without any cyanosis, clubbing, rash, lesions or edema.  NEUROLOGIC: INTUBATED SEDATED  SKIN: ZOSTER RASH IN SUPRAPUBIC AND LEFT PERINEAL AND POS THIGH      Labs: 11-05    138  |  92<L>  |  26.0<H>  ----------------------------<  93  3.7   |  24.0  |  3.28<H>    Ca    8.7      05 Nov 2017 07:18  Phos  3.9     11-04  Mg     2.1     11-04    TPro  5.9<L>  /  Alb  3.5  /  TBili  2.8<H>  /  DBili  x   /  AST  26  /  ALT  19  /  AlkPhos  385<H>  11-05

## 2017-11-05 NOTE — PROCEDURE NOTE - ADDITIONAL PROCEDURE DETAILS
There was obvious blood in the posterior phaynx and aspirated blood in the airway. Blood was suctioned from the  ETT after placement,

## 2017-11-05 NOTE — PROCEDURE NOTE - NSTRACHPOSTINTU_RESP_A_CORE
Breath sounds equal/Chest excursion noted/Positive end tidal Co2 noted/Appropriate capnography/Breath sounds bilateral/Chest X-Ray

## 2017-11-05 NOTE — PROGRESS NOTE ADULT - SUBJECTIVE AND OBJECTIVE BOX
Patient is a 77y old  Female who presents with a chief complaint of vaginal discomfort (02 Nov 2017 00:37)      BRIEF HOSPITAL COURSE: 76 yo F with PMH of Afib( no in AC due to recurrent falls), ESRD on HD (TTF ), recurrent falls , COPD, GERD, Depression,   CAD, HTN, pulmonary HTN , gout , HLD and SAH who came to the ED with cc of vaginal discomfort. Pt states that her sx started one week ago, she was having vaginal pain , 10/10 , like burning sensation with no alleviating of exacerbating factor, associated with itching and some lesion vesicles over her right labia majora, right inguinal , posterior thigh and right gluteus.  Pt was found to have Zoster, she was treated with one dose of Acyclovir and one dose of morphine yesterday and had AMS which family blamed on medications and insisted they were stopped. Pt was altered with her eyes open and is staring off and having repetitive tongue movement sticking her tongue in/out.  She answers yes to her name but was otherwise  non-verbal and not following commands.  Pt was moved to MICU and intubated for airway protection after having oropharyngeal bleeding and hypoxia. EEG was not performed. LP was attempted but unsuccessful due to spinal fusion. Was weaned extubated 11/4/17    Events last 24 hours: ***    PAST MEDICAL & SURGICAL HISTORY:  ESRD (end stage renal disease) on dialysis: MWF via [facility] Permacath  planned AVF creation  Sinusitis, unspecified chronicity, unspecified location  Gout  Gall stones  Pneumonia  Anemia  Pulmonary hypertension  COPD (chronic obstructive pulmonary disease)  CAD (coronary artery disease)  Atrial fibrillation  Hyperlipidemia  Hypertension  Spinal fusion failure, initial encounter  Tubal ligation status  Sinusitis  Spinal stenosis  AV fistula  S/P tonsillectomy  S/P appendectomy  H/O aortic valve replacement  H/O spinal fusion  H/O cataract  Cystocele  H/O tricuspid valve annuloplasty  H/O mitral valve repair      Review of Systems:  CONSTITUTIONAL: No fever, chills, or fatigue  EYES: No eye pain, visual disturbances, or discharge  ENMT:  No difficulty hearing, tinnitus, vertigo; No sinus or throat pain  NECK: No pain or stiffness  RESPIRATORY: No cough, wheezing, chills or hemoptysis; No shortness of breath  CARDIOVASCULAR: No chest pain, palpitations, dizziness, or leg swelling  GASTROINTESTINAL: No abdominal or epigastric pain. No nausea, vomiting, or hematemesis; No diarrhea or constipation. No melena or hematochezia.  GENITOURINARY: No dysuria, frequency, hematuria, or incontinence  NEUROLOGICAL: No headaches, memory loss, loss of strength, numbness, or tremors  SKIN: No itching, burning, rashes, or lesions   MUSCULOSKELETAL: No joint pain or swelling; No muscle, back, or extremity pain  PSYCHIATRIC: No depression, anxiety, mood swings, or difficulty sleeping      Medications:        dexmedetomidine Infusion 0.5 MICROgram(s)/kG/Hr IV Continuous <Continuous>  fentaNYL   Infusion 1 MICROgram(s)/kG/Hr IV Continuous <Continuous>  LORazepam   Injectable 1 milliGRAM(s) IV Push every 2 hours PRN  scopolamine   Patch 1.5 milliGRAM(s) Transdermal every 3 days                        Mode: AC/ CMV (Assist Control/ Continuous Mandatory Ventilation)  RR (machine): 12  TV (machine): 350  FiO2: 50  PEEP: 5  MAP: 9  PIP: 23      ICU Vital Signs Last 24 Hrs  T(C): 36.7 (05 Nov 2017 16:00), Max: 37 (05 Nov 2017 08:00)  T(F): 98 (05 Nov 2017 16:00), Max: 98.6 (05 Nov 2017 08:00)  HR: 69 (05 Nov 2017 16:01) (68 - 110)  BP: 78/40 (05 Nov 2017 16:00) (76/41 - 133/58)  BP(mean): 53 (05 Nov 2017 16:00) (51 - 92)  ABP: --  ABP(mean): --  RR: 15 (05 Nov 2017 16:00) (15 - 48)  SpO2: 100% (05 Nov 2017 16:01) (87% - 100%)          I&O's Detail    04 Nov 2017 08:01  -  05 Nov 2017 07:00  --------------------------------------------------------  IN:    Other: 1000 mL  Total IN: 1000 mL    OUT:    Other: 2300 mL  Total OUT: 2300 mL    Total NET: -1300 mL      05 Nov 2017 07:01  -  05 Nov 2017 17:22  --------------------------------------------------------  IN:    dexmedetomidine Infusion: 110 mL    fentaNYL  Infusion: 38.7 mL    norepinephrine Infusion: 12.8 mL    propofol Infusion: 5 mL    Solution: 50 mL    Solution: 500 mL  Total IN: 716.5 mL    OUT:  Total OUT: 0 mL    Total NET: 716.5 mL            LABS:                        8.0    9.7   )-----------( 99       ( 05 Nov 2017 07:18 )             25.8     11-05    138  |  92<L>  |  26.0<H>  ----------------------------<  93  3.7   |  24.0  |  3.28<H>    Ca    8.7      05 Nov 2017 07:18  Phos  3.9     11-04  Mg     2.1     11-04    TPro  5.9<L>  /  Alb  3.5  /  TBili  2.8<H>  /  DBili  x   /  AST  26  /  ALT  19  /  AlkPhos  385<H>  11-05          CAPILLARY BLOOD GLUCOSE        PT/INR - ( 04 Nov 2017 07:32 )   PT: 12.3 sec;   INR: 1.12 ratio             CULTURES:  Culture Results:   No growth at 48 hours (11-02-17 @ 10:03)  Culture Results:   No growth at 48 hours (11-02-17 @ 10:03)  Culture Results:   No growth at 48 hours (10-31-17 @ 19:55)  Culture Results:   No growth at 48 hours (10-31-17 @ 19:23)      Physical Examination:    General: Intubated sedated on full vent support,     HEENT: Pupils equal, reactive to light.  Symmetric. Nasal packing in both nares, with Blood emanating from around them     PULM: + Rhonchi in R mid to Lower lung fields, no significant sputum production    CVS: Irregular rate and rhythm, no murmurs, rubs, or gallops    ABD: Soft, nondistended, nontender, normoactive bowel sounds, no masses    EXT: 1+ edema, nontender    SKIN: Cool hands and feet peripherally    RADIOLOGY: CXR: IMPRESSION:   Right lower lobe/middle lobe airspace consolidations..            CRITICAL CARE TIME SPENT: 40 mins

## 2017-11-05 NOTE — PROGRESS NOTE ADULT - PROBLEM SELECTOR PROBLEM 4
ESRD (end stage renal disease) on dialysis
R/O Seizure
R/O Seizure

## 2017-11-05 NOTE — PROGRESS NOTE ADULT - PROBLEM SELECTOR PROBLEM 7
Atrial fibrillation
Atrial fibrillation
Anemia in chronic kidney disease, on chronic dialysis
Anemia in chronic kidney disease, on chronic dialysis
Atrial fibrillation

## 2017-11-05 NOTE — PROGRESS NOTE ADULT - SUBJECTIVE AND OBJECTIVE BOX
[facility]                                        Neurology at [place]                                 [facility]                                  [address]                                        [place], [place], [address]                                             [phone]        Events noted.   Now back on mechanical ventilator. Sedated.     Vital Signs Last 24 Hrs  T(F): 98.6 (05 Nov 2017 08:00), Max: 98.6 (05 Nov 2017 08:00)  HR: 90 (05 Nov 2017 11:30) (76 - 110)  BP: 97/75 (05 Nov 2017 11:30) (84/54 - 133/58)  BP(mean): 81 (05 Nov 2017 11:30) (53 - 92)  RR: 37 (05 Nov 2017 11:30) (17 - 59)  SpO2: 100% (05 Nov 2017 11:30) (87% - 100%)    Unresponsive to voice.   Pupils react.  Face grossly symmetric.  Minimal movement to stimuli.

## 2017-11-05 NOTE — CONSULT NOTE ADULT - PROBLEM SELECTOR PROBLEM 5
ESRD (end stage renal disease) on dialysis
R/O Cerebrovascular accident (CVA), unspecified mechanism

## 2017-11-05 NOTE — PROGRESS NOTE ADULT - SUBJECTIVE AND OBJECTIVE BOX
NEPHROLOGY INTERVAL HPI/OVERNIGHT EVENTS:    S/P HD last night to facilitate blood transfusion , (-) 1.3 Kg  Extubated and awake   Off Acyclovir     MEDICATIONS  (STANDING):  atorvastatin 40 milliGRAM(s) Oral at bedtime  aztreonam  IVPB 500 milliGRAM(s) IV Intermittent every 12 hours  calcium acetate 667 milliGRAM(s) Oral three times a day with meals  ferrous    sulfate 325 milliGRAM(s) Oral daily  FIRST- Mouthwash  BLM 30 milliLiter(s) Swish and Spit three times a day  folic acid 1 milliGRAM(s) Oral daily  midodrine 10 milliGRAM(s) Oral three times a day  pantoprazole  Injectable 40 milliGRAM(s) IV Push two times a day  silver sulfADIAZINE 1% Cream 1 Application(s) Topical two times a day    MEDICATIONS  (PRN):  acetaminophen    Suspension 650 milliGRAM(s) Oral every 6 hours PRN For Temp greater than 38 C (100.4 F)  ALBUTerol/ipratropium for Nebulization 3 milliLiter(s) Nebulizer every 6 hours PRN Shortness of Breath  calamine Lotion 1 Application(s) Topical four times a day PRN Itching      Allergies    allopurinol (Rash)  codeine (Nausea; Vomiting)  penicillin (Rash)  spironolactone (Unknown)    Intolerances          Vital Signs Last 24 Hrs  T(C): 36.8 (2017 04:50), Max: 36.9 (2017 20:00)  T(F): 98.3 (2017 04:50), Max: 98.5 (2017 20:00)  HR: 102 (2017 08:00) (76 - 110)  BP: 103/50 (2017 08:00) (94/50 - 133/58)  BP(mean): 71 (2017 08:00) (53 - 89)  RR: 29 (2017 08:00) (17 - 92)  SpO2: 93% (2017 07:00) (87% - 100%)  Daily     Daily Weight in k.7 (2017 04:50)  I&O's Detail    2017 08:01  -  2017 07:00  --------------------------------------------------------  IN:    Other: 1000 mL  Total IN: 1000 mL    OUT:    Other: 2300 mL  Total OUT: 2300 mL    Total NET: -1300 mL        I&O's Summary    2017 08:01  -  2017 07:00  --------------------------------------------------------  IN: 1000 mL / OUT: 2300 mL / NET: -1300 mL        PHYSICAL EXAM:    GENERAL: extubated and awake    HEAD: No edema   NECK: Supple, Min JVD  CHEST/LUNG: EAE, Few basilar crackles , no wheeze   HEART: Regular rate and rhythm; No rub   ABDOMEN: Soft, Nontender,   EXTREMITIES:  RUE access with thrill     LABS:                        8.0    9.7   )-----------( 99       ( 2017 07:18 )             25.8     11-05    138  |  92<L>  |  26.0<H>  ----------------------------<  93  3.7   |  24.0  |  3.28<H>    Ca    8.7      2017 07:18  Phos  3.9     11-04  Mg     2.1     11-04    TPro  5.9<L>  /  Alb  3.5  /  TBili  2.8<H>  /  DBili  x   /  AST  26  /  ALT  19  /  AlkPhos  385<H>  11-05    PT/INR - ( 2017 07:32 )   PT: 12.3 sec;   INR: 1.12 ratio         PTT - ( 2017 14:35 )  PTT:34.4 sec      ABG - ( 2017 09:58 )  pH: 7.32  /  pCO2: 49    /  pO2: 90    / HCO3: 24    / Base Excess: -1.1  /  SaO2: 97                    RADIOLOGY & ADDITIONAL TESTS:

## 2017-11-05 NOTE — PROGRESS NOTE ADULT - PROBLEM SELECTOR PLAN 2
mutlifactorial - improving   likely combination of acute hospitalization with delirium,, delayed dialysis, delayed drug clearance pt reintubated today now on precedex and fentanyl  mental status had improved yesterday and is more likely a type of delirium and not meningitis or encephalitis

## 2017-11-05 NOTE — DIETITIAN INITIAL EVALUATION ADULT. - PROBLEM SELECTOR PLAN 8
pt in afib no in anticoagulation due to recurrent falls and bleeding, last June pt had SAH while taken anticoagulant   -hear rate = 79 bpm

## 2017-11-05 NOTE — PROGRESS NOTE ADULT - PROBLEM SELECTOR PROBLEM 5
Hyperlipidemia
Hyperlipidemia
ESRD (end stage renal disease) on dialysis
ESRD (end stage renal disease) on dialysis
Hyperlipidemia

## 2017-11-05 NOTE — DIETITIAN INITIAL EVALUATION ADULT. - PROBLEM SELECTOR PLAN 4
-most like 2/2 to anemia of chronic conditions   -f/u cbc in the am  -continue treatment with iron and acid folic

## 2017-11-05 NOTE — PROGRESS NOTE ADULT - ASSESSMENT
The patient is a 77y Female with multiple medical issues now back on mechanical ventilator.     Will reattempt EEG tomorrow.     D/w daughter at bedside.

## 2017-11-05 NOTE — PROGRESS NOTE ADULT - PROBLEM SELECTOR PLAN 1
-Rash on right vulva, right buttock, and right lumbar area, most likely zoster outbreak  -Contact precautions  -HSV and varicella  PCR ordered   -hold acyclovir 2/2 acute delirium   -hold gabapentin 2/2 acute delirium   -percocet for moderate pain, however tylenol should be used first  -Derm consult  -ID consult
-Rash on right vulva, right buttock, and right lumbar area, most likely zoster outbreak.  Dermatology evaluation appreciated.  ? Role for Acyclovir as rash is over 1 week old, now crusting.  Discussed with ID.      -HSV and varicella  PCR ordered   -hold acyclovir 2/2 acute delirium   -hold gabapentin 2/2 acute delirium   -percocet for moderate pain, however tylenol should be used first  -Derm consult  -ID consult
REintubated today for bleeding   aggressive pulm toilet  chest PT
weaned and extubated on rounds today   aggressive pulm toilet  chest PT
Mental status worsening.  Likely multifactorial.  At HD now.  Discussed condition with pt's daughter, Neurology, ID, critical care.  At this point, pt to be transferred to ICU.  If mental status does not improve, may need LP.

## 2017-11-05 NOTE — PROGRESS NOTE ADULT - ASSESSMENT
ESRD - Next HD tomorrow     Anemia - Continue epogen , S/p Transfusion     Encephelopathy - Neuro follow up noted . Acyclovir and pain meds held    RO - CA x phos OK     Zoster / Cellulitis - ABx as per ID

## 2017-11-05 NOTE — PROGRESS NOTE ADULT - PROBLEM SELECTOR PROBLEM 2
Delirium, drug-induced
Delirium, drug-induced
Metabolic encephalopathy
Metabolic encephalopathy
Varicella-zoster infection

## 2017-11-05 NOTE — PROGRESS NOTE ADULT - PROBLEM SELECTOR PROBLEM 6
Depression
Herpes zoster with other complication
Herpes zoster with other complication

## 2017-11-05 NOTE — PROGRESS NOTE ADULT - PROBLEM SELECTOR PLAN 7
pt in afib no anticoagulation due to recurrent falls and bleeding, last June pt had SAH while taken anticoagulant   Rate controlled.
pt in afib no anticoagulation due to recurrent falls and bleeding, last June pt had SAH while taken anticoagulant   -hear rate controlled
had some bleeding from oropharynx again today DDAVP given- nose packed ENT to consult  H/H responded to 1 unit yesterday
had some bleeding from oropharynx yesterday which subsided w DDAVP   h/h low will transfuse one unit during HD
pt in afib no anticoagulation due to recurrent falls and bleeding, last June pt had SAH while taken anticoagulant   Rate controlled.

## 2017-11-05 NOTE — PROGRESS NOTE ADULT - PROBLEM SELECTOR PLAN 6
continue treatment with Remeron 15 mg daily
skin at sacrum w DTI and zoster lesion  low probability of zoster encephalitis
skin at sacrum w DTI and zoster lesion  low probability of zoster encephalitis

## 2017-11-05 NOTE — PROGRESS NOTE ADULT - PROBLEM SELECTOR PLAN 3
-most like 2/2 to anemia of chronic condition.  H/H stable.  Monitor CBC
-most like 2/2 to anemia of chronic conditions   --follow CBC  -check B12 and folate levels  -Continue supplemental iron and folate
-most like 2/2 to anemia of chronic condition.  Monitor CBC.    -Continue supplemental iron and folate
unable to obtain LP due to spinal fusion ( attempted) IR was contacted to try but was unavailable   low likelihood of zoster meningitis/encephalitis due to rapid recovery and lack of disseminated lesions - only 1 dermatome distribution  agree with de-escalation of abx per ID, no further acyclovir warranted  skin lesions are in later stage
unable to obtain LP due to spinal fusion ( attempted) IR was contacted to try but was unavailable   low likelihood of zoster meningitis/encephalitis due to rapid recovery and lack of disseminated lesions - only 1 dermatome distribution  agree with de-escalation of abx per ID, no further acyclovir warranted  skin lesions are in later stage

## 2017-11-05 NOTE — PROGRESS NOTE ADULT - ASSESSMENT
Impression/Plan     Assessment and Plan:   · Assessment		  77 YOF with ESRD, Herpes Zoster now with AMS/encephalopathy, respiratory failure possible aspiration      1)  Acute respiratory failure with hypoxia related to Aspiration of upper airway bleeding .  Plan: now reintubated today for bleeding   and inability to protect her airway.       2) Acute Blood loss anemia  which appearsd to be related to epistaxis/Nasal pharyngeal bleeding. Nares now packed and ENT consulted        3)   Metabolic encephalopathy.  reintubated today now on precedex and fentanyl  mental status had improved yesterday and is more likely a type of delirium and not meningitis or encephalitis. mutlifactorial - improving   likely combination of acute hospitalization with delirium,, delayed dialysis, delayed drug clearance       4) R/O Meningitis.  Plan: unable to obtain LP due to spinal fusion ( attempted) IR was contacted to try but was unavailable   low likelihood of zoster meningitis/encephalitis due to rapid recovery and lack of disseminated lesions  de-escalation of abx per ID, no further acyclovir warranted  skin lesions are in later stage.       Plan:  mental status has not returned to baseline despite being more awake and able to spontaneously breathe will still consider EEG 5)  ESRD (end stage renal disease) on dialysis.  Plan: dialyzed late yesterday,      6) Herpes zoster with other complication. Plan: skin at sacrum w DTI and zoster lesion  low probability of zoster encephalitis.      7) Anemia in chronic kidney disease, on chronic dialysis.  Plan: had some severe  bleeding from oropharynx again today DDAVP given    I had an extensive conversation with the patient's daughter Milagro, who has been given power to make decisions, by her father, the patient's . We reviewed a incomplete MOLST form from months ago and then discussed her mother and her over all condition. The daughter has clearly expressed that she is mostly concerned that her mother be comfortable and not suffer any pain. She recognizes that her condition has severely declined over the past several months. She believes that under [facility] current circumstances, that her mother would not want to be kept alive artificially by machines and would not want to have CPR, knowing that it would not help her or cure any of the underlying conditions that have led her to this point. At this point she wants her mother to be comfortable and allow her to pass away peacefully. We have discussed not removing the endotracheal tube as it would potentially lead to increased discomfort and airway management problems related to the bleeding. She is comfortable with keeping her on the ventilator with comfort measures only and removing the pressors. The patient will be comfort measures only at this point. A DNR is now in place. Palliative care will be asked to see the patient tomorrow.

## 2017-11-05 NOTE — PROCEDURE NOTE - NSPROCDETAILS_GEN_ALL_CORE
patient pre-oxygenated, tube inserted, placement confirmed
sterile dressing applied/ultrasound guidance/lumen(s) aspirated and flushed/sterile technique, catheter placed/guidewire recovered

## 2017-11-05 NOTE — PROGRESS NOTE ADULT - ASSESSMENT
t is a 76 yo F with PMH of Afib( no in AC due to recurrent falls), ESRD on HD (TTF ), recurrent falls , COPD, GERD, Depression,   CAD, HTN, pulmonary HTN , gout , HLD and SAH who came to the ED with cc of vaginal discomfort. Pt states that her sx started one week ago, she was having vaginal pain , 10/10 , like burning sensation with no alleviating of exacerbating factor, associated with itching and some lesion vesicles over her right labia majora, right inguinal , posterior thigh and right gluteus ,  she went to her PMD who prescribe cephalexin for her and recommended to  come to the ED is she does not improve, last week pt came to the ED and she was prescribe steroid and nystatin cream but this did not help at all. Pt denies fever, no chills, no sick contacts, no recent travel, no sob, no chest pain , no headache, no dizziness .   PT SEEN BY DERM  AND FELT TO BE ZOSTER AND GIVEN A DOSE OF IV ACYCLOVIR AS PER FAMILY SHE HAD CONFUSION WHICH THEY BELIEVE  IS RELATED TO ACYCLOVIR  PT S/P DIALYSIS AND BLEEDING  EXTUBATED IN ICU AND OW REINTUBATED EARLIER TODAY     POSSIBLE ASPIRATION CXR WITH NEW FINDING  WILL D/C AZACTAM AND RX MERREM FOR BROAD COVERAGE  D/W FAMILY AND ICU TEAM

## 2017-11-05 NOTE — CONSULT NOTE ADULT - SUBJECTIVE AND OBJECTIVE BOX
Reason for consult: pt seen previously by Dr Bustos (he inserted a pleurx back in August 2017), family requesting thoracic input to see if anything we can offer patient to improve outcome.     Brief Hospital Course: 78 yo F with PMH of Afib( no in AC due to recurrent falls), ESRD on HD (TTF ), recurrent falls , COPD, GERD, Depression,   CAD, HTN, pulmonary HTN , gout , HLD and SAH who came to the ED with cc of vaginal discomfort. Pt states that her sx started one week ago, she was having vaginal pain , 10/10 , like burning sensation with no alleviating of exacerbating factor, associated with itching and some lesion vesicles over her right labia majora, right inguinal , posterior thigh and right gluteus.  Pt was found to have Zoster, she was treated with one dose of Acyclovir and one dose of morphine yesterday and had AMS which family blamed on medications and insisted they were stopped. Pt was altered with her eyes open and is staring off and having repetitive tongue movement sticking her tongue in/out.  She answers yes to her name but was otherwise  non-verbal and not following commands.  Pt was moved to MICU and intubated for airway protection after having oropharyngeal bleeding and hypoxia. EEG was not performed. LP was attempted but unsuccessful due to spinal fusion. Was weaned extubated 11/4/17.     Events last 24 hours: found with oropharyngeal bleeding this morning with clots in back of throat and respiratory distress/hypoxia- intubated       Patient is a 77y old  Female who presents with a chief complaint of vaginal discomfort (02 Nov 2017 00:37)    HPI:  Pt is a 78 yo F with PMH of Afib( no in AC due to recurrent falls), ESRD on HD (TTF ), recurrent falls , COPD, GERD, Depression,   CAD, HTN, pulmonary HTN , gout , HLD and SAH who came to the ED with cc of vaginal discomfort. Pt states that her sx started one week ago, she was having vaginal pain , 10/10 , like burning sensation with no alleviating of exacerbating factor, associated with itching and some lesion vesicles over her right labia majora, right inguinal , posterior thigh and right gluteus ,  she went to her PMD who prescribe cephalexin for her and recommended to  come to the ED is she does not improve, last week pt came to the ED and she was prescribe steroid and nystatin cream but this did not help at all. Pt denies fever, no chills, no sick contacts, no recent travel, no sob, no chest pain , no headache, no dizziness . Her  have some  vesicular lesions over her right leg. (31 Oct 2017 21:23)    PAST MEDICAL & SURGICAL HISTORY:  ESRD (end stage renal disease) on dialysis: MWF via [facility] Permacath  planned AVF creation  Sinusitis, unspecified chronicity, unspecified location  Gout  Gall stones  Pneumonia  Anemia  Pulmonary hypertension  COPD (chronic obstructive pulmonary disease)  CAD (coronary artery disease)  Atrial fibrillation  Hyperlipidemia  Hypertension  Spinal fusion failure, initial encounter  Tubal ligation status  Sinusitis  Spinal stenosis  AV fistula  S/P tonsillectomy  S/P appendectomy  H/O aortic valve replacement  H/O spinal fusion  H/O cataract  Cystocele  H/O tricuspid valve annuloplasty  H/O mitral valve repair    FAMILY HISTORY:  No pertinent family history in first degree relatives      Vitals   ICU Vital Signs Last 24 Hrs  T(C): 36.8 (05 Nov 2017 12:00), Max: 37 (05 Nov 2017 08:00)  T(F): 98.2 (05 Nov 2017 12:00), Max: 98.6 (05 Nov 2017 08:00)  HR: 69 (05 Nov 2017 15:00) (69 - 110)  BP: 76/41 (05 Nov 2017 15:00) (76/41 - 133/58)  BP(mean): 51 (05 Nov 2017 15:00) (51 - 92)  RR: 17 (05 Nov 2017 15:00) (15 - 48)  SpO2: 100% (05 Nov 2017 15:00) (87% - 100%)      I&O's Summary    04 Nov 2017 08:01  -  05 Nov 2017 07:00  --------------------------------------------------------  IN: 1000 mL / OUT: 2300 mL / NET: -1300 mL    05 Nov 2017 07:01  -  05 Nov 2017 15:21  --------------------------------------------------------  IN: 669.3 mL / OUT: 0 mL / NET: 669.3 mL        LABS  11-05    138  |  92<L>  |  26.0<H>  ----------------------------<  93  3.7   |  24.0  |  3.28<H>    Ca    8.7      05 Nov 2017 07:18  Phos  3.9     11-04  Mg     2.1     11-04    TPro  5.9<L>  /  Alb  3.5  /  TBili  2.8<H>  /  DBili  x   /  AST  26  /  ALT  19  /  AlkPhos  385<H>  11-05               8.0    9.7   )-----------( 99       ( 05 Nov 2017 07:18 )             25.8     PT/INR - ( 04 Nov 2017 07:32 )   PT: 12.3 sec;   INR: 1.12 ratio      VENT SETTINGS   Mode: AC/ CMV (Assist Control/ Continuous Mandatory Ventilation)  RR (machine): 12  TV (machine): 350  FiO2: 50  PEEP: 5  MAP: 9  PIP: 23    < from: Xray Chest 1 View AP/PA. (11.05.17 @ 11:23) >  COMPARISON: No previous examinations are available for review.  FINDINGS: ET tube tip above tracheal bifurcation.  NG tube tip beyond GE junction.  Right IJ catheter tip in SVC.  The lungs  a right lower lobe, right middle lobe airspace consolidations obscuring right cardiac border.       The  heart is enlarged in transverse diameter. No hilar mass. Trachea midline.       2. Status post cardiac valve replacement. Visualized osseous structures are intact.  IMPRESSION:   Right lower lobe/middle lobe airspace consolidations..        < end of copied text >      MEDICATIONS  (STANDING):  dexmedetomidine Infusion 0.5 MICROgram(s)/kG/Hr (10.713 mL/Hr) IV Continuous <Continuous>  fentaNYL   Infusion 1 MICROgram(s)/kG/Hr (4.285 mL/Hr) IV Continuous <Continuous>  scopolamine   Patch 1.5 milliGRAM(s) Transdermal every 3 days    MEDICATIONS  (PRN):  LORazepam   Injectable 1 milliGRAM(s) IV Push every 2 hours PRN Agitation/anxiety      Allergies:  allopurinol (Rash)  codeine (Nausea; Vomiting)  penicillin (Rash)  spironolactone (Unknown)      REVIEW OF SYSTEMS:  unable to offer, intubated      Physical Exam:   Constitutional: intubated, chewing on ETT  HEENT: PERRL, bilat nasal packing in place  Respiratory: Breath Sounds equal & clear bilaterally to auscultation, no accessory muscle use noted, blood tinged secretions noted in suction tubing  Cardiovascular: Regular rate, regular rhythm, normal S1, S2  Gastrointestinal: Soft, non-tender, non distended, normal bowel sounds, obese  Extremities: no edema  Neurological: arousable but not following commands  Skin: scattered ecchymosis      Critical care time spent: __40__minutes (reviewing chart including labs and imaging/results, discussions with interdisciplinary team, discussing goals of care/advanced directives, counseling patient and/or family, non-inclusive of procedures)    Case including assessment/plan of care discussed with Dr Bustos.

## 2017-11-05 NOTE — PROGRESS NOTE ADULT - ATTENDING COMMENTS
Pt remains critically ill and high risk for reintubation and deterioration with AMS. Daughter Estephanie updated extensively at bedside today.   60min total attending CC time spent reviewing chart, treating/stabilizing patient, speaking to consultants and family. Pt remains critically ill, conversation held with daughter Estephanie regarding the seriousness of her mother's condition, she understands- she is tearful, she had filled a MOLST with her mother back a few months ago when she was in [facility]. That form was not in [facility] but was retrieved and found to be not completed but had CPR attempt checked off. Further conversation held with [facility] PA and daughter who then gave DNR. Palliative consulted.

## 2017-11-06 DIAGNOSIS — Z51.5 ENCOUNTER FOR PALLIATIVE CARE: ICD-10-CM

## 2017-11-06 DIAGNOSIS — J39.2 OTHER DISEASES OF PHARYNX: ICD-10-CM

## 2017-11-06 PROCEDURE — 99231 SBSQ HOSP IP/OBS SF/LOW 25: CPT

## 2017-11-06 PROCEDURE — 99291 CRITICAL CARE FIRST HOUR: CPT

## 2017-11-06 PROCEDURE — 99222 1ST HOSP IP/OBS MODERATE 55: CPT

## 2017-11-06 RX ORDER — FENTANYL CITRATE 50 UG/ML
4 INJECTION INTRAVENOUS
Qty: 5000 | Refills: 0 | Status: DISCONTINUED | OUTPATIENT
Start: 2017-11-06 | End: 2017-11-06

## 2017-11-06 RX ADMIN — DEXMEDETOMIDINE HYDROCHLORIDE IN 0.9% SODIUM CHLORIDE 10.71 MICROGRAM(S)/KG/HR: 4 INJECTION INTRAVENOUS at 04:28

## 2017-11-06 RX ADMIN — PANTOPRAZOLE SODIUM 40 MILLIGRAM(S): 20 TABLET, DELAYED RELEASE ORAL at 12:28

## 2017-11-06 RX ADMIN — DEXMEDETOMIDINE HYDROCHLORIDE IN 0.9% SODIUM CHLORIDE 10.71 MICROGRAM(S)/KG/HR: 4 INJECTION INTRAVENOUS at 13:05

## 2017-11-06 RX ADMIN — Medication 4 MILLIGRAM(S): at 14:54

## 2017-11-06 RX ADMIN — DEXMEDETOMIDINE HYDROCHLORIDE IN 0.9% SODIUM CHLORIDE 10.71 MICROGRAM(S)/KG/HR: 4 INJECTION INTRAVENOUS at 01:07

## 2017-11-06 RX ADMIN — DEXMEDETOMIDINE HYDROCHLORIDE IN 0.9% SODIUM CHLORIDE 10.71 MICROGRAM(S)/KG/HR: 4 INJECTION INTRAVENOUS at 07:58

## 2017-11-06 NOTE — CONSULT NOTE ADULT - PROBLEM SELECTOR RECOMMENDATION 4
as above
- very poor prognosis, daughter and  have left for a little bit. Will discuss with daughter when she comes back regarding removal of vent support, can leave ET tube in place, but can disconnect the ventilator to allow a peaceful death. comfort care.

## 2017-11-06 NOTE — PROGRESS NOTE ADULT - PROVIDER SPECIALTY LIST ADULT
Critical Care
ENT
Gyn Onc
Hospitalist
Infectious Disease
Infectious Disease
Nephrology
Neurology
Infectious Disease
Hospitalist
Hospitalist

## 2017-11-06 NOTE — PROGRESS NOTE ADULT - ASSESSMENT
77 yof with afib, acute respiratory failure secondary to aspiration, anemia, ESRD now on comfort measures    - plan for withdrawal ventilator today given comfort medications as needed

## 2017-11-06 NOTE — CONSULT NOTE ADULT - PROBLEM SELECTOR RECOMMENDATION 2
Received one dose of Acyclovir, may need to restart after HD
- no further dialysis, patient on comfort care only.

## 2017-11-06 NOTE — CONSULT NOTE ADULT - SUBJECTIVE AND OBJECTIVE BOX
HPI:    PERTINENT PMH REVIEWED: Yes No    PAST MEDICAL & SURGICAL HISTORY:  ESRD (end stage renal disease) on dialysis: MWF via [facility] Permacath  planned AVF creation  Sinusitis, unspecified chronicity, unspecified location  Gout  Gall stones  Pneumonia  Anemia  Pulmonary hypertension  COPD (chronic obstructive pulmonary disease)  CAD (coronary artery disease)  Atrial fibrillation  Hyperlipidemia  Hypertension  Spinal fusion failure, initial encounter  Tubal ligation status  Sinusitis  Spinal stenosis  AV fistula  S/P tonsillectomy  S/P appendectomy  H/O aortic valve replacement  H/O spinal fusion  H/O cataract  Cystocele  H/O tricuspid valve annuloplasty  H/O mitral valve repair      SOCIAL HISTORY:                                     Admitted from:  home  SNF  [facility]     Surrogate/HCP/Guardian: Phone#:    FAMILY HISTORY:  No pertinent family history in first degree relatives      Baseline ADLs (prior to admission):  Independent/ Dependent      Allergies    allopurinol (Rash)  codeine (Nausea; Vomiting)  penicillin (Rash)  spironolactone (Unknown)    Intolerances        Present Symptoms:     Dyspnea: 0 1 2 3   Nausea/Vomiting: Yes No  Anxiety:  Yes No  Depression: Yes No  Fatigue: Yes No  Loss of appetite: Yes No    Pain:             Character-            Duration-            Effect-            Factors-            Frequency-            Location-            Severity-    Review of Systems: Reviewed                     Negative:                     Positive:  Unable to obtain due to poor mentation   All others negative    MEDICATIONS  (STANDING):  dexmedetomidine Infusion 0.5 MICROgram(s)/kG/Hr (10.713 mL/Hr) IV Continuous <Continuous>  fentaNYL   Infusion 1 MICROgram(s)/kG/Hr (4.285 mL/Hr) IV Continuous <Continuous>  pantoprazole  Injectable 40 milliGRAM(s) IV Push daily  scopolamine   Patch 1.5 milliGRAM(s) Transdermal every 3 days    MEDICATIONS  (PRN):  LORazepam   Injectable 1 milliGRAM(s) IV Push every 2 hours PRN Agitation/anxiety      PHYSICAL EXAM:    Vital Signs Last 24 Hrs  T(C): 37.2 (06 Nov 2017 08:00), Max: 37.2 (06 Nov 2017 08:00)  T(F): 99 (06 Nov 2017 08:00), Max: 99 (06 Nov 2017 08:00)  HR: 56 (06 Nov 2017 08:00) (30 - 108)  BP: 68/30 (06 Nov 2017 08:00) (65/36 - 124/59)  BP(mean): 42 (06 Nov 2017 08:00) (42 - 85)  RR: 19 (06 Nov 2017 08:00) (11 - 37)  SpO2: 100% (06 Nov 2017 08:00) (99% - 100%)    General: alert  oriented x ____ lethargic agitated                  cachexia  nonverbal  coma    Karnofsky:  %    HEENT: normal  dry mouth  ET tube/trach    Lungs: comfortable tachypnea/labored breathing  excessive secretions    CV: normal  tachycardia    GI: normal  distended  tender  no BS               PEG/NG/OG tube  constipation  last BM:     : normal  incontinent  oliguria/anuria  grullon    MSK: normal  weakness  edema             ambulatory  bedbound/wheelchair bound    Skin: normal  pressure ulcers- Stage_____  no rash    LABS:                        8.0    9.7   )-----------( 99       ( 05 Nov 2017 07:18 )             25.8     11-05    138  |  92<L>  |  26.0<H>  ----------------------------<  93  3.7   |  24.0  |  3.28<H>    Ca    8.7      05 Nov 2017 07:18    TPro  5.9<L>  /  Alb  3.5  /  TBili  2.8<H>  /  DBili  x   /  AST  26  /  ALT  19  /  AlkPhos  385<H>  11-05        I&O's Summary    05 Nov 2017 07:01  -  06 Nov 2017 07:00  --------------------------------------------------------  IN: 1070.5 mL / OUT: 300 mL / NET: 770.5 mL    06 Nov 2017 07:01  -  06 Nov 2017 10:54  --------------------------------------------------------  IN: 68.8 mL / OUT: 0 mL / NET: 68.8 mL        RADIOLOGY & ADDITIONAL STUDIES:    ADVANCE DIRECTIVES:   DNR YES NO  Completed on:                     SY  YES NO   Completed on:  Living Will  YES NO   Completed on: HPI: 77F with PMH as listed admitted 10/31 with      PERTINENT PMH REVIEWED: Yes     PAST MEDICAL & SURGICAL HISTORY:  ESRD (end stage renal disease) on dialysis: MWF via R SC Permacath  planned AVF creation  Sinusitis, unspecified chronicity, unspecified location  Gout  Gall stones  Pneumonia  Anemia  Pulmonary hypertension  COPD (chronic obstructive pulmonary disease)  CAD (coronary artery disease)  Atrial fibrillation  Hyperlipidemia  Hypertension  Spinal fusion failure, initial encounter  Tubal ligation status  Sinusitis  Spinal stenosis  AV fistula  S/P tonsillectomy  S/P appendectomy  H/O aortic valve replacement  H/O spinal fusion  H/O cataract  Cystocele  H/O tricuspid valve annuloplasty  H/O mitral valve repair    SOCIAL HISTORY:  non smoker, no EtOH                                    Admitted from:  home       Surrogate: Kristopher Spouse and daughter Estephanie.     FAMILY HISTORY:  No pertinent family history in first degree relatives      Baseline ADLs (prior to admission):  Independent/ Dependent      Allergies    allopurinol (Rash)  codeine (Nausea; Vomiting)  penicillin (Rash)  spironolactone (Unknown)    Intolerances        Present Symptoms:     Dyspnea: 0 1 2 3   Nausea/Vomiting: Yes No  Anxiety:  Yes No  Depression: Yes No  Fatigue: Yes No  Loss of appetite: Yes No    Pain:             Character-            Duration-            Effect-            Factors-            Frequency-            Location-            Severity-    Review of Systems: Reviewed                     Negative:                     Positive:  Unable to obtain due to poor mentation   All others negative    MEDICATIONS  (STANDING):  dexmedetomidine Infusion 0.5 MICROgram(s)/kG/Hr (10.713 mL/Hr) IV Continuous <Continuous>  fentaNYL   Infusion 1 MICROgram(s)/kG/Hr (4.285 mL/Hr) IV Continuous <Continuous>  pantoprazole  Injectable 40 milliGRAM(s) IV Push daily  scopolamine   Patch 1.5 milliGRAM(s) Transdermal every 3 days    MEDICATIONS  (PRN):  LORazepam   Injectable 1 milliGRAM(s) IV Push every 2 hours PRN Agitation/anxiety      PHYSICAL EXAM:    Vital Signs Last 24 Hrs  T(C): 37.2 (06 Nov 2017 08:00), Max: 37.2 (06 Nov 2017 08:00)  T(F): 99 (06 Nov 2017 08:00), Max: 99 (06 Nov 2017 08:00)  HR: 56 (06 Nov 2017 08:00) (30 - 108)  BP: 68/30 (06 Nov 2017 08:00) (65/36 - 124/59)  BP(mean): 42 (06 Nov 2017 08:00) (42 - 85)  RR: 19 (06 Nov 2017 08:00) (11 - 37)  SpO2: 100% (06 Nov 2017 08:00) (99% - 100%)    General: alert  oriented x ____ lethargic agitated                  cachexia  nonverbal  coma    Karnofsky:  %    HEENT: normal  dry mouth  ET tube/trach    Lungs: comfortable tachypnea/labored breathing  excessive secretions    CV: normal  tachycardia    GI: normal  distended  tender  no BS               PEG/NG/OG tube  constipation  last BM:     : normal  incontinent  oliguria/anuria  grullon    MSK: normal  weakness  edema             ambulatory  bedbound/wheelchair bound    Skin: normal  pressure ulcers- Stage_____  no rash    LABS:                        8.0    9.7   )-----------( 99       ( 05 Nov 2017 07:18 )             25.8     11-05    138  |  92<L>  |  26.0<H>  ----------------------------<  93  3.7   |  24.0  |  3.28<H>    Ca    8.7      05 Nov 2017 07:18    TPro  5.9<L>  /  Alb  3.5  /  TBili  2.8<H>  /  DBili  x   /  AST  26  /  ALT  19  /  AlkPhos  385<H>  11-05        I&O's Summary    05 Nov 2017 07:01  -  06 Nov 2017 07:00  --------------------------------------------------------  IN: 1070.5 mL / OUT: 300 mL / NET: 770.5 mL    06 Nov 2017 07:01  -  06 Nov 2017 10:54  --------------------------------------------------------  IN: 68.8 mL / OUT: 0 mL / NET: 68.8 mL        RADIOLOGY & ADDITIONAL STUDIES:    ADVANCE DIRECTIVES:   DNR YES NO  Completed on:                     MOLST  YES NO   Completed on:  Living Will  YES NO   Completed on: HPI: 77F with PMH as listed admitted 10/31 with vaginal discomfort, found to have zoster, treated. Course complicated by altered mental status, hypotension, transferred to MICU, subsequently had large episode of bleeding by mouth, requiring intubation. Extubated 11/4, again developed oropharyngeal bleeding, hypoxic respiratory failure requiring reintubation. She is now on comfort care. remains on vent.     PERTINENT PMH REVIEWED: Yes     PAST MEDICAL & SURGICAL HISTORY:  ESRD (end stage renal disease) on dialysis: MWF via R SC Permacath  planned AVF creation  Sinusitis, unspecified chronicity, unspecified location  Gout  Gall stones  Pneumonia  Anemia  Pulmonary hypertension  COPD (chronic obstructive pulmonary disease)  CAD (coronary artery disease)  Atrial fibrillation  Hyperlipidemia  Hypertension  Spinal fusion failure, initial encounter   Tubal ligation status  Sinusitis  Spinal stenosis  AV fistula  S/P tonsillectomy  S/P appendectomy  H/O aortic valve replacement  H/O spinal fusion  H/O cataract  Cystocele  H/O tricuspid valve annuloplasty  H/O mitral valve repair    SOCIAL HISTORY:  non smoker, no EtOH                                    Admitted from:  home       Surrogate: Kristopher Spouse and daughter Estephanie.     FAMILY HISTORY:  No pertinent family history in first degree relatives    Baseline ADLs (prior to admission):  Dependent     Allergies    allopurinol (Rash)  codeine (Nausea; Vomiting)  penicillin (Rash)  spironolactone (Unknown)    Present Symptoms:     Dyspnea: 0   Nausea/Vomiting: No  Anxiety:  No  Depression: No  Fatigue: Yes   Loss of appetite: unable     Pain: none             Character-            Duration-            Effect-            Factors-            Frequency-            Location-            Severity-    Review of Systems: Reviewed                 Unable to obtain due to poor mentation   All others negative    MEDICATIONS  (STANDING):  dexmedetomidine Infusion 0.5 MICROgram(s)/kG/Hr (10.713 mL/Hr) IV Continuous <Continuous>  fentaNYL   Infusion 1 MICROgram(s)/kG/Hr (4.285 mL/Hr) IV Continuous <Continuous>  pantoprazole  Injectable 40 milliGRAM(s) IV Push daily  scopolamine   Patch 1.5 milliGRAM(s) Transdermal every 3 days    MEDICATIONS  (PRN):  LORazepam   Injectable 1 milliGRAM(s) IV Push every 2 hours PRN Agitation/anxiety    PHYSICAL EXAM:    Vital Signs Last 24 Hrs  T(C): 37.2 (06 Nov 2017 08:00), Max: 37.2 (06 Nov 2017 08:00)  T(F): 99 (06 Nov 2017 08:00), Max: 99 (06 Nov 2017 08:00)  HR: 56 (06 Nov 2017 08:00) (30 - 108)  BP: 68/30 (06 Nov 2017 08:00) (65/36 - 124/59)  BP(mean): 42 (06 Nov 2017 08:00) (42 - 85)  RR: 19 (06 Nov 2017 08:00) (11 - 37)  SpO2: 100% (06 Nov 2017 08:00) (99% - 100%)    General: alert  oriented x ____ lethargic agitated                  cachexia  nonverbal  coma    Karnofsky:  %    HEENT: normal  dry mouth  ET tube/trach    Lungs: comfortable tachypnea/labored breathing  excessive secretions    CV: normal  tachycardia    GI: normal  distended  tender  no BS               PEG/NG/OG tube  constipation  last BM:     : normal  incontinent  oliguria/anuria  grullon    MSK: normal  weakness  edema             ambulatory  bedbound/wheelchair bound    Skin: normal  pressure ulcers- Stage_____  no rash    LABS:                        8.0    9.7   )-----------( 99       ( 05 Nov 2017 07:18 )             25.8     11-05    138  |  92<L>  |  26.0<H>  ----------------------------<  93  3.7   |  24.0  |  3.28<H>    Ca    8.7      05 Nov 2017 07:18    TPro  5.9<L>  /  Alb  3.5  /  TBili  2.8<H>  /  DBili  x   /  AST  26  /  ALT  19  /  AlkPhos  385<H>  11-05    I&O's Summary    05 Nov 2017 07:01  -  06 Nov 2017 07:00  --------------------------------------------------------  IN: 1070.5 mL / OUT: 300 mL / NET: 770.5 mL    06 Nov 2017 07:01  -  06 Nov 2017 10:54  --------------------------------------------------------  IN: 68.8 mL / OUT: 0 mL / NET: 68.8 mL    RADIOLOGY & ADDITIONAL STUDIES:    < from: CT Lumbar Spine No Cont (11.04.17 @ 02:20) >    No metallic hardware is seen.    ADVANCE DIRECTIVES: DNR/I HPI: 77F with PMH as listed admitted 10/31 with vaginal discomfort, found to have zoster, treated. Course complicated by altered mental status, hypotension, transferred to MICU, subsequently had large episode of bleeding by mouth, requiring intubation. Extubated 11/4, again developed oropharyngeal bleeding, hypoxic respiratory failure requiring reintubation. She is now on comfort care. remains on vent.     PERTINENT PMH REVIEWED: Yes     PAST MEDICAL & SURGICAL HISTORY:  ESRD (end stage renal disease) on dialysis: MWF via R SC Permacath  planned AVF creation  Sinusitis, unspecified chronicity, unspecified location  Gout  Gall stones  Pneumonia  Anemia  Pulmonary hypertension  COPD (chronic obstructive pulmonary disease)  CAD (coronary artery disease)  Atrial fibrillation  Hyperlipidemia  Hypertension  Spinal fusion failure, initial encounter   Tubal ligation status  Sinusitis  Spinal stenosis  AV fistula  S/P tonsillectomy  S/P appendectomy  H/O aortic valve replacement  H/O spinal fusion  H/O cataract  Cystocele  H/O tricuspid valve annuloplasty  H/O mitral valve repair    SOCIAL HISTORY:  non smoker, no EtOH                                    Admitted from:  home       Surrogate: Kristopher Spouse and daughter Estephanie.     FAMILY HISTORY:  No pertinent family history in first degree relatives    Baseline ADLs (prior to admission):  Dependent     Allergies    allopurinol (Rash)  codeine (Nausea; Vomiting)  penicillin (Rash)  spironolactone (Unknown)    Present Symptoms:     Dyspnea: 0   Nausea/Vomiting: No  Anxiety:  No  Depression: No  Fatigue: Yes   Loss of appetite: unable     Pain: none             Character-            Duration-            Effect-            Factors-            Frequency-            Location-            Severity-    Review of Systems: Reviewed                 Unable to obtain due to poor mentation   All others negative    MEDICATIONS  (STANDING):  dexmedetomidine Infusion 0.5 MICROgram(s)/kG/Hr (10.713 mL/Hr) IV Continuous <Continuous>  fentaNYL   Infusion 1 MICROgram(s)/kG/Hr (4.285 mL/Hr) IV Continuous <Continuous>  pantoprazole  Injectable 40 milliGRAM(s) IV Push daily  scopolamine   Patch 1.5 milliGRAM(s) Transdermal every 3 days    MEDICATIONS  (PRN):  LORazepam   Injectable 1 milliGRAM(s) IV Push every 2 hours PRN Agitation/anxiety    PHYSICAL EXAM:    Vital Signs Last 24 Hrs  T(C): 37.2 (06 Nov 2017 08:00), Max: 37.2 (06 Nov 2017 08:00)  T(F): 99 (06 Nov 2017 08:00), Max: 99 (06 Nov 2017 08:00)  HR: 56 (06 Nov 2017 08:00) (30 - 108)  BP: 68/30 (06 Nov 2017 08:00) (65/36 - 124/59)  BP(mean): 42 (06 Nov 2017 08:00) (42 - 85)  RR: 19 (06 Nov 2017 08:00) (11 - 37)  SpO2: 100% (06 Nov 2017 08:00) (99% - 100%)    General:  lethargic     Karnofsky:  10%    HEENT: ET tube    Lungs: comfortable    CV: normal      GI: normal      : grullon    MSK: weakness      Skin: no rash    LABS:                        8.0    9.7   )-----------( 99       ( 05 Nov 2017 07:18 )             25.8     11-05    138  |  92<L>  |  26.0<H>  ----------------------------<  93  3.7   |  24.0  |  3.28<H>    Ca    8.7      05 Nov 2017 07:18    TPro  5.9<L>  /  Alb  3.5  /  TBili  2.8<H>  /  DBili  x   /  AST  26  /  ALT  19  /  AlkPhos  385<H>  11-05    I&O's Summary    05 Nov 2017 07:01  -  06 Nov 2017 07:00  --------------------------------------------------------  IN: 1070.5 mL / OUT: 300 mL / NET: 770.5 mL    06 Nov 2017 07:01  -  06 Nov 2017 10:54  --------------------------------------------------------  IN: 68.8 mL / OUT: 0 mL / NET: 68.8 mL    RADIOLOGY & ADDITIONAL STUDIES:    < from: CT Lumbar Spine No Cont (11.04.17 @ 02:20) >    No metallic hardware is seen.    ADVANCE DIRECTIVES: DNR/I

## 2017-11-06 NOTE — PROGRESS NOTE ADULT - ASSESSMENT
The patient is a 77y Female with multiple medical issues now back on mechanical ventilator.     Agree with plan for comfort care.     No further specific neurologic recommendations. Will be available as needed.

## 2017-11-06 NOTE — CONSULT NOTE ADULT - PROBLEM SELECTOR RECOMMENDATION 9
See above
Unclear source possibly metabolic encephalopathy related to medications  but will need to r/o new stroke, seizure and possibly meningits/herpes encephalitis
Case discussed with Dr. Stephenson who is recommending biopsy of lesion.   Gyn onc team will perform biospy tomorrow 11/2/17. Will follow.
-reintubated.

## 2017-11-06 NOTE — CONSULT NOTE ADULT - PROBLEM SELECTOR PROBLEM 1
Acute respiratory failure with hypoxia
Altered mental status, unspecified altered mental status type
Varicella with other complications
Vulva finding
Acute respiratory failure with hypoxia

## 2017-11-06 NOTE — PROGRESS NOTE ADULT - SUBJECTIVE AND OBJECTIVE BOX
Patient is a 77y old  Female who presents with a chief complaint of vaginal discomfort (02 Nov 2017 00:37)      BRIEF HOSPITAL COURSE: 78 yo F with PMH of Afib( no in AC due to recurrent falls), ESRD on HD (TTF ), recurrent falls , COPD, GERD, Depression,   CAD, HTN, pulmonary HTN , gout , HLD and SAH who came to the ED with cc of vaginal discomfort. Pt states that her sx started one week ago, she was having vaginal pain , 10/10 , like burning sensation with no alleviating of exacerbating factor, associated with itching and some lesion vesicles over her right labia majora, right inguinal , posterior thigh and right gluteus.  Pt was found to have Zoster, she was treated with one dose of Acyclovir and one dose of morphine yesterday and had AMS which family blamed on medications and insisted they were stopped. Pt was altered with her eyes open and is staring off and having repetitive tongue movement sticking her tongue in/out.  She answers yes to her name but was otherwise  non-verbal and not following commands.  Pt was moved to MICU and intubated for airway protection after having oropharyngeal bleeding and hypoxia. EEG was not performed. LP was attempted but unsuccessful due to spinal fusion. Was weaned extubated 11/4/17    Events last 24 hours: Re-intubated yesterday, made comfort care yesterday, talked with daughter today along with palliative care daughter would like to withdraw ventilator today    PAST MEDICAL & SURGICAL HISTORY:  ESRD (end stage renal disease) on dialysis: MWF via [facility] Permacath  planned AVF creation  Sinusitis, unspecified chronicity, unspecified location  Gout  Gall stones  Pneumonia  Anemia  Pulmonary hypertension  COPD (chronic obstructive pulmonary disease)  CAD (coronary artery disease)  Atrial fibrillation  Hyperlipidemia  Hypertension  Spinal fusion failure, initial encounter  Tubal ligation status  Sinusitis  Spinal stenosis  AV fistula  S/P tonsillectomy  S/P appendectomy  H/O aortic valve replacement  H/O spinal fusion  H/O cataract  Cystocele  H/O tricuspid valve annuloplasty  H/O mitral valve repair      Review of Systems:  unable to obtain    Medications:        dexmedetomidine Infusion 0.5 MICROgram(s)/kG/Hr IV Continuous <Continuous>  fentaNYL   Infusion 4 MICROgram(s)/kG/Hr IV Continuous <Continuous>  LORazepam   Injectable 1 milliGRAM(s) IV Push every 2 hours PRN  scopolamine   Patch 1.5 milliGRAM(s) Transdermal every 3 days                        Mode: AC/ CMV (Assist Control/ Continuous Mandatory Ventilation)  RR (machine): 12  TV (machine): 350  FiO2: 50  PEEP: 5  MAP: 9  PIP: 31      ICU Vital Signs Last 24 Hrs  T(C): 37.3 (06 Nov 2017 12:00), Max: 37.3 (06 Nov 2017 12:00)  T(F): 99.1 (06 Nov 2017 12:00), Max: 99.1 (06 Nov 2017 12:00)  HR: 0 (06 Nov 2017 15:12) (0 - 71)  BP: 0/0 (06 Nov 2017 15:12) (0/0 - 89/48)  BP(mean): 46 (06 Nov 2017 15:00) (42 - 65)  ABP: --  ABP(mean): --  RR: 0 (06 Nov 2017 15:12) (0 - 29)  SpO2: 86% (06 Nov 2017 15:00) (86% - 100%)          I&O's Detail    05 Nov 2017 07:01  -  06 Nov 2017 07:00  --------------------------------------------------------  IN:    dexmedetomidine Infusion: 335 mL    fentaNYL  Infusion: 167.7 mL    norepinephrine Infusion: 12.8 mL    propofol Infusion: 5 mL    Solution: 50 mL    Solution: 500 mL  Total IN: 1070.5 mL    OUT:    Nasoenteral Tube: 300 mL  Total OUT: 300 mL    Total NET: 770.5 mL      06 Nov 2017 07:01  -  06 Nov 2017 16:27  --------------------------------------------------------  IN:    dexmedetomidine Infusion: 60.2 mL    fentaNYL  Infusion: 60.2 mL  Total IN: 120.4 mL    OUT:  Total OUT: 0 mL    Total NET: 120.4 mL            LABS:                        8.0    9.7   )-----------( 99       ( 05 Nov 2017 07:18 )             25.8     11-05    138  |  92<L>  |  26.0<H>  ----------------------------<  93  3.7   |  24.0  |  3.28<H>    Ca    8.7      05 Nov 2017 07:18    TPro  5.9<L>  /  Alb  3.5  /  TBili  2.8<H>  /  DBili  x   /  AST  26  /  ALT  19  /  AlkPhos  385<H>  11-05          CAPILLARY BLOOD GLUCOSE            CULTURES:  Culture Results:   No growth at 48 hours (11-02-17 @ 10:03)  Culture Results:   No growth at 48 hours (11-02-17 @ 10:03)  Culture Results:   No growth at 5 days. (10-31-17 @ 19:55)  Culture Results:   No growth at 5 days. (10-31-17 @ 19:23)      Physical Examination:    General: intubated on fentanyl drip    HEENT: Pupils equal, reactive to light.  Symmetric.    PULM: diminished    CVS: bradycardic    ABD: Soft, nondistended, nontender, normoactive bowel sounds, no masses    EXT: No edema, nontender    SKIN: Warm and well perfused, no rashes noted.        CRITICAL CARE TIME SPENT: 40

## 2017-11-06 NOTE — CHART NOTE - NSCHARTNOTEFT_GEN_A_CORE
Called to bedside by nurse patient pulseless, PEA on monitor, no spontaneous respiration, pupils fixed and dilated. Family aware

## 2017-11-06 NOTE — CONSULT NOTE ADULT - ATTENDING COMMENTS
Thank you for the opportunity to assist with the care of this patient.   [facility] [phone].
Pt seen and examined CCM PA, agree with above assessment and plan. Pt is 77 with multiple medical problems brought to ICU for AMS and hypotension, shortly after arrival pt found to be bleeding from oropharynx with low oxygen saturation necessitating intubation. Central line was placed and pressors started. Pt's daughter Estephanie was notified by phone of all events. EEG and LP planned as well as HD if able to tolerate. DDAVP given as well as PPI IV for bleeding which has subsided. After much discussion with daughter she consented to LP and allowed for antibiotics to be given. Azactm, Vanco and acyclovir ordered, HD to be completed then LP, pt has lumbar surgery >40 years ago which may make it technically difficult to obtain. Pt is critically ill on vent and pressors. >75min total attending CC time spent reviewing chart, treating/stabilizing patient, speaking to consultants and family.

## 2017-11-06 NOTE — CONSULT NOTE ADULT - PROBLEM SELECTOR RECOMMENDATION 3
Will stop current HD session to move pt to CT head first and then to ICU urgently for AMS/worsening MS  Once pt stable in ICU can complete HD later in afternoon or esperanza spoke to Dr Lamar about arranging this
- ENT has packed nares. no further bleeding episodes while patient is intubated.

## 2017-11-06 NOTE — CONSULT NOTE ADULT - ASSESSMENT
77F with ESRD, zoster, respiratory failure, large oropharyngeal bleeding reintubated, now on comfort care end of life care.

## 2017-11-06 NOTE — PROGRESS NOTE ADULT - SUBJECTIVE AND OBJECTIVE BOX
[facility]                                        [facility] [place]                                 [facility]                                  [address]                                        [place], [place], [address]                                             [phone]        Remains on mechanical ventilator.     Now DNR ordered. Family wishes for comfort care and Palliative Care team     Vital Signs Last 24 Hrs  T(F): 99 (06 Nov 2017 08:00), Max: 99 (06 Nov 2017 08:00)  HR: 56 (06 Nov 2017 08:00) (30 - 108)  BP: 68/30 (06 Nov 2017 08:00) (65/36 - 124/59)  BP(mean): 42 (06 Nov 2017 08:00) (42 - 85)  RR: 19 (06 Nov 2017 08:00) (11 - 29)  SpO2: 100% (06 Nov 2017 08:00) (99% - 100%)    Unresponsive to voice.   Pupils sluggish.   Face grossly symmetric.  No movement to stimuli.

## 2017-11-08 LAB
CULTURE RESULTS: SIGNIFICANT CHANGE UP
CULTURE RESULTS: SIGNIFICANT CHANGE UP
SPECIMEN SOURCE: SIGNIFICANT CHANGE UP
SPECIMEN SOURCE: SIGNIFICANT CHANGE UP

## 2017-12-19 PROCEDURE — 99221 1ST HOSP IP/OBS SF/LOW 40: CPT

## 2017-12-19 PROCEDURE — 87529 HSV DNA AMP PROBE: CPT

## 2017-12-19 PROCEDURE — 99231 SBSQ HOSP IP/OBS SF/LOW 25: CPT

## 2017-12-19 PROCEDURE — 86870 RBC ANTIBODY IDENTIFICATION: CPT

## 2017-12-19 PROCEDURE — 84100 ASSAY OF PHOSPHORUS: CPT

## 2017-12-19 PROCEDURE — 86900 BLOOD TYPING SEROLOGIC ABO: CPT

## 2017-12-19 PROCEDURE — 80053 COMPREHEN METABOLIC PANEL: CPT

## 2017-12-19 PROCEDURE — 85730 THROMBOPLASTIN TIME PARTIAL: CPT

## 2017-12-19 PROCEDURE — 82140 ASSAY OF AMMONIA: CPT

## 2017-12-19 PROCEDURE — 85014 HEMATOCRIT: CPT

## 2017-12-19 PROCEDURE — 70450 CT HEAD/BRAIN W/O DYE: CPT

## 2017-12-19 PROCEDURE — 86901 BLOOD TYPING SEROLOGIC RH(D): CPT

## 2017-12-19 PROCEDURE — 36600 WITHDRAWAL OF ARTERIAL BLOOD: CPT

## 2017-12-19 PROCEDURE — 87040 BLOOD CULTURE FOR BACTERIA: CPT

## 2017-12-19 PROCEDURE — 84132 ASSAY OF SERUM POTASSIUM: CPT

## 2017-12-19 PROCEDURE — 82962 GLUCOSE BLOOD TEST: CPT

## 2017-12-19 PROCEDURE — 86905 BLOOD TYPING RBC ANTIGENS: CPT

## 2017-12-19 PROCEDURE — 94760 N-INVAS EAR/PLS OXIMETRY 1: CPT

## 2017-12-19 PROCEDURE — 82607 VITAMIN B-12: CPT

## 2017-12-19 PROCEDURE — 71045 X-RAY EXAM CHEST 1 VIEW: CPT

## 2017-12-19 PROCEDURE — 84145 PROCALCITONIN (PCT): CPT

## 2017-12-19 PROCEDURE — 96375 TX/PRO/DX INJ NEW DRUG ADDON: CPT

## 2017-12-19 PROCEDURE — 84295 ASSAY OF SERUM SODIUM: CPT

## 2017-12-19 PROCEDURE — P9016: CPT

## 2017-12-19 PROCEDURE — 94640 AIRWAY INHALATION TREATMENT: CPT

## 2017-12-19 PROCEDURE — 80048 BASIC METABOLIC PNL TOTAL CA: CPT

## 2017-12-19 PROCEDURE — 99285 EMERGENCY DEPT VISIT HI MDM: CPT | Mod: 25

## 2017-12-19 PROCEDURE — 87798 DETECT AGENT NOS DNA AMP: CPT

## 2017-12-19 PROCEDURE — 36430 TRANSFUSION BLD/BLD COMPNT: CPT

## 2017-12-19 PROCEDURE — 83605 ASSAY OF LACTIC ACID: CPT

## 2017-12-19 PROCEDURE — 80202 ASSAY OF VANCOMYCIN: CPT

## 2017-12-19 PROCEDURE — 82330 ASSAY OF CALCIUM: CPT

## 2017-12-19 PROCEDURE — 86880 COOMBS TEST DIRECT: CPT

## 2017-12-19 PROCEDURE — 82435 ASSAY OF BLOOD CHLORIDE: CPT

## 2017-12-19 PROCEDURE — 85610 PROTHROMBIN TIME: CPT

## 2017-12-19 PROCEDURE — 94002 VENT MGMT INPAT INIT DAY: CPT

## 2017-12-19 PROCEDURE — 82746 ASSAY OF FOLIC ACID SERUM: CPT

## 2017-12-19 PROCEDURE — 36415 COLL VENOUS BLD VENIPUNCTURE: CPT

## 2017-12-19 PROCEDURE — 86850 RBC ANTIBODY SCREEN: CPT

## 2017-12-19 PROCEDURE — 99261: CPT

## 2017-12-19 PROCEDURE — 84443 ASSAY THYROID STIM HORMONE: CPT

## 2017-12-19 PROCEDURE — 82947 ASSAY GLUCOSE BLOOD QUANT: CPT

## 2017-12-19 PROCEDURE — 84439 ASSAY OF FREE THYROXINE: CPT

## 2017-12-19 PROCEDURE — 93005 ELECTROCARDIOGRAM TRACING: CPT

## 2017-12-19 PROCEDURE — 86922 COMPATIBILITY TEST ANTIGLOB: CPT

## 2017-12-19 PROCEDURE — P9047: CPT

## 2017-12-19 PROCEDURE — 85027 COMPLETE CBC AUTOMATED: CPT

## 2017-12-19 PROCEDURE — 96374 THER/PROPH/DIAG INJ IV PUSH: CPT

## 2017-12-19 PROCEDURE — 72131 CT LUMBAR SPINE W/O DYE: CPT

## 2017-12-19 PROCEDURE — 83735 ASSAY OF MAGNESIUM: CPT

## 2017-12-19 PROCEDURE — 94003 VENT MGMT INPAT SUBQ DAY: CPT

## 2017-12-19 PROCEDURE — 82803 BLOOD GASES ANY COMBINATION: CPT

## 2018-01-11 ENCOUNTER — APPOINTMENT (OUTPATIENT)
Dept: VASCULAR SURGERY | Facility: CLINIC | Age: 78
End: 2018-01-11

## 2018-01-24 NOTE — PROGRESS NOTE ADULT - SUBJECTIVE AND OBJECTIVE BOX
Routed to Dr. Mack. /MONICA Salazar     NEPHROLOGY INTERVAL HPI/OVERNIGHT EVENTS:  pt clinically stable when seen earlier  no acute distress  tolerated HD yesterday    MEDICATIONS  (STANDING):  aspirin enteric coated 81 milliGRAM(s) Oral daily  calcium acetate 667 milliGRAM(s) Oral three times a day with meals  cyanocobalamin 1000 MICROGram(s) Oral daily  gabapentin 300 milliGRAM(s) Oral every 8 hours  epoetin jaswinder Injectable 39252 Unit(s) IV Push <User Schedule>  atorvastatin 40 milliGRAM(s) Oral at bedtime  folic acid 1 milliGRAM(s) Oral daily  Nephro-toi 1 Tablet(s) Oral daily  ammonium lactate 12% Lotion 1 Application(s) Topical daily  ALBUTerol/ipratropium for Nebulization 3 milliLiter(s) Nebulizer every 6 hours  midodrine 10 milliGRAM(s) Oral two times a day  heparin  Injectable 5000 Unit(s) SubCutaneous every 8 hours    MEDICATIONS  (PRN):  acetaminophen   Tablet. 650 milliGRAM(s) Oral every 6 hours PRN Mild Pain (1 - 3)  guaiFENesin    Syrup 200 milliGRAM(s) Oral every 6 hours PRN Cough  lactulose Syrup 10 Gram(s) Oral daily PRN no bm > 2 days  sodium chloride 0.65% Nasal 1 Spray(s) Both Nostrils three times a day PRN nasal congestion/discomfort from O2      Allergies    allopurinol (Rash)  codeine (Nausea; Vomiting)  penicillin (Rash)      Vital Signs Last 24 Hrs  T(C): 36.1 (21 Jul 2017 05:39), Max: 36.8 (20 Jul 2017 20:36)  T(F): 96.9 (21 Jul 2017 05:39), Max: 98.2 (20 Jul 2017 20:36)  HR: 94 (21 Jul 2017 09:10) (75 - 94)  BP: 97/58 (21 Jul 2017 05:39) (91/55 - 114/46)  BP(mean): --  RR: 18 (21 Jul 2017 05:39) (18 - 20)  SpO2: 96% (21 Jul 2017 09:10) (92% - 97%)    PHYSICAL EXAM:  NAD  lungs - CTA  CV: no rub  Abd : soft, NT BS +, No masses  Ext- No edema  Neuro : AAO x 3 and nonfocal      LABS:                        9.2    6.0   )-----------( 126      ( 20 Jul 2017 12:41 )             29.4     07-20    134<L>  |  92<L>  |  78.0<H>  ----------------------------<  94  5.4<H>   |  27.0  |  4.11<H>    Ca    8.8      20 Jul 2017 12:41              RADIOLOGY & ADDITIONAL TESTS:

## 2018-05-21 NOTE — PHYSICAL THERAPY INITIAL EVALUATION ADULT - IMPAIRMENTS CONTRIBUTING TO GAIT DEVIATIONS, PT EVAL
Patient coming in for a 6 month follow up and requesting to have labs drawn prior to appointment. Pended a cmp, lipid, glyco and a MAR.     Please sign if you agree with labs or please advise. Thank you    decreased endurance/impaired balance

## 2018-07-14 NOTE — PROGRESS NOTE ADULT - ASSESSMENT
ESRD on HD TTS schedule next HD tommorow  will use 3 K bath and repeat labs estephania w HD  Anemia 2/2 CKD cont EPO w HD  BP on low side will be cautious w UF removal  Pulm f/u noted recent thoracentesis transudative O2 sat ok  Am labs Breathing improved after duo nebs. Pending chest XR and labs.

## 2018-09-27 NOTE — DISCHARGE NOTE ADULT - NS AS DC FU CFH LV FUNCTION AFTER DELIVERY
Nerissa Pardo is a 23 year old female presenting with concerns of acne to her face that has worsened over the last 4-6 months. Pt reports she has tried clindamycin, differin and over the counter treatments with little relief. Pt reports differin offered some relief but did not clear it up all the way.    Denies known Latex allergy or symptoms of Latex sensitivity.  Medications reviewed and updated.  Tobacco history reviewed.     Patient would like communication of their results via:        Cell Phone:   Telephone Information:   Mobile [phone]     Okay to leave a message containing results? Yes       yes

## 2019-06-03 NOTE — ED PROVIDER NOTE - CONSTITUTIONAL, MLM
normal... Well appearing, well nourished, awake, alert, oriented to person, place, time/situation and in no apparent distress. done

## 2019-06-11 NOTE — ASU PATIENT PROFILE, ADULT - CAREGIVER
Filled out [facility] [place] form and attach immunization. Place form in Dr. Orta to sign folder.  Maria Luisa Kirk     No

## 2019-10-22 NOTE — PROGRESS NOTE ADULT - SUBJECTIVE AND OBJECTIVE BOX
Jaime Wiley is a 1 month old female who was brought in for her  Well Baby (enfamil neuropro) visit. Subjective     History was provided by mother  HPI:   Patient presents for:  Patient presents with:   Well Baby: enfamil neuropro    Has been gassy [place] [facility]                                   [address] [place] [address]                                                      PHONE: [phone]                                                         FAX: [phone]  [email or web address]  ---------------------------------------------------------------------------------------------------------------------------------    Overnight events/patient complaints: less dyspneic with dialysis, tolerating well      allopurinol (Rash)  codeine (Nausea; Vomiting)  penicillin (Rash)  spironolactone (Unknown)    MEDICATIONS  (STANDING):  sildenafil (REVATIO) 20milliGRAM(s) Oral three times a day  atorvastatin 40milliGRAM(s) Oral at bedtime  calcium acetate 1334milliGRAM(s) Oral three times a day with meals  folic acid 1milliGRAM(s) Oral daily  lactobacillus acidophilus 1Tablet(s) Oral daily  polyethylene glycol 3350 17Gram(s) Oral daily  midodrine 2.5milliGRAM(s) Oral two times a day  sodium chloride 0.65% Nasal 1Spray(s) Both Nostrils three times a day  epoetin jaswinder Injectable 20958Ltjy(s) IV Push <User Schedule>  ATENolol  Tablet 12.5milliGRAM(s) Oral daily    MEDICATIONS  (PRN):  albumin human 25% IVPB 50milliLiter(s) IV Intermittent every 1 hour PRN for sbp < 100  ALBUTerol    0.083% 2.5milliGRAM(s) Nebulizer every 6 hours PRN Bronchospasm      Vital Signs Last 24 Hrs  T(C): 36.9, Max: 37.2 (0420 @ 21:56)  T(F): 98.4, Max: 99 (04-20 @ 21:56)  HR: 68 (65 - 77)  BP: 90/51 (83/45 - 113/51)  BP(mean): --  RR: 18 (17 - 18)  SpO2: 98% (95% - 100%)  Daily     Daily Weight in k.7 (2017 14:25)  ICU Vital Signs Last 24 Hrs  DUSTIN HERNANDEZ  I&O's Detail    I & Os for current day (as of 2017 08:15)  =============================================  IN:    Total IN: 0 ml  ---------------------------------------------  OUT:    Other: 2100 ml    Total OUT: 2100 ml  ---------------------------------------------  Total NET: -2100 ml    I&O's Summary    I & Os for current day (as of 2017 08:15)  =============================================  IN: 0 ml / OUT: 2100 ml / NET: -2100 ml    Drug Dosing Weight  DUSTIN HERNANDEZ      PHYSICAL EXAM:  General: Appears well developed, well nourished alert and cooperative.  HEENT: Head; normocephalic, atraumatic.  Eyes: Pupils reactive, cornea wnl.  Neck: Supple, no nodes adenopathy, no NVD or carotid bruit or thyromegaly.  CARDIOVASCULAR: Normal S1 and S2, No murmur, rub, gallop or lift.   LUNGS: rales on right rhonchi or wheeze. .  ABDOMEN: Soft, nontender without mass or organomegaly. bowel sounds normoactive.  EXTREMITIES: No clubbing, cyanosis or edema. Distal pulses wnl.   SKIN: warm and dry with normal turgor.  NEURO: Alert/oriented x 3/normal motor exam. No pathologic reflexes.    PSYCH: normal affect.        LABS:                        9.1    3.4   )-----------( 64       ( 2017 08:03 )             29.2     04-20    135  |  97<L>  |  41.0<H>  ----------------------------<  144<H>  4.2   |  27.0  |  2.74<H>    Ca    8.5<L>      2017 08:03      DUSTINANKUR HERNANDEZ            RADIOLOGY & ADDITIONAL STUDIES:    INTERPRETATION OF TELEMETRY (personally reviewed):    ECG:Diagnosis Line a-fib  Right bundle branch block  Possible Inferior infarct ,age undetermined  NSST&T abnl  Abnormal ECG      ECHO:Summary:   1. Left ventricular ejection fraction, by visual estimation, is 55 to   60%.   2. Normal global left ventricular systolic function.   3. Spectral Doppler shows restrictive pattern of left ventricular   myocardial filling (Grade III diastolic dysfunction). Elevated left   atrial and left ventricular end-diastolic pressures.   4. Normal left ventricular internal cavity size.   5. Severely enlarged right ventricle. Severely reduced RV systolic   function.   6. Severely enlarged left atrium.   7. Severely dilated right atrium.   8. Status-post mitral annular ring insertion.   9. Thickening and calcification of the anterior and posterior mitral   valve leaflets.  10. Mitral valve mean gradient is 4.4 mmHg consistent with mild mitral   stenosis.  11. Mild to moderate mitral valve regurgitation.  12. Bioprosthesis in the aortic position demonstrating normal function.  13. Mild aortic regurgitation.  14. Status post tricuspid valve repair.  15. Moderate-severe tricuspid regurgitation.  16. Moderate pulmonic valve regurgitation.  17. Estimated pulmonary artery systolic pressure is 56.8 mmHg assuming a   right atrial pressure of 15 mmHg, which is consistent with moderate   pulmonary hypertension.  18. There is no evidence of pericardial effusion.     MD Clara Electronically signed on 2017 at 9:29:19 AM          *** Final *** demand    Elimination:  no concerns     Sleep:  wakes for feeding    Development:  2 MONTH DEVELOPMENT:   lifts head and begins to push up prone    coos and vocalizes    smiles responsively    grasps    turns head to sound    fixes and follows, tracks past VAC/TOX  -     INADM ANY ROUTE ADDL VAC/TOX  -     DTAP, HEPB, AND IPV  -     PNEUMOCOCCAL VACC, 13 CLAIRE IM  -     HIB, PRP-OMP, CONJUGATE, 3 DOSE SCHED  -     ROTAVIRUS VACCINE      Reinforced healthy diet, lifestyle, and exercise.     Immunizations discuss

## 2019-10-23 NOTE — ED PROVIDER NOTE - FAMILY HISTORY
Pt received flu blok @ 1205pm  Pt waited 15 mins, pt tolerated vaccine   Mother  Still living? Unknown  CAD (coronary artery disease), Age at diagnosis: Age Unknown

## 2019-11-19 NOTE — ED ADULT NURSE NOTE - EENT WDL
Received call from [facility] stating that the pathology report states melanoma is on left leg. But confirmed with patient in fact on right leg.        Eyes with no visual disturbances.  Ears clean and dry and no hearing difficulties. Nose with pink mucosa and no drainage.  Mouth mucous membranes moist and pink.  No tenderness or swelling to throat or neck.

## 2019-12-21 NOTE — PROGRESS NOTE ADULT - PROBLEM/PLAN-4
Cough-  Suspect viral versus bacterial versus other lung etiology (patient recently found to have a lung mass-being followed by pulmonary at [facility])  Patient with crackles to the right upper lung-not sure if this is new or old?   I am hesitant to order another x-ray as patient has been having serial CAT scans-would like to avoid additional radiation if possible  Cold symptoms for over 1 week with no improvement  We will give short antibiotic course  Push fluids  Saline nasal spray as needed for nasal drip    Follow-up with medical provider if new or worsening symptoms  Patient verbalizes understanding
DISPLAY PLAN FREE TEXT

## 2020-02-21 NOTE — ED PROVIDER NOTE - SEVERITY
31.39 kg/m²     General Exam:  General Appearance: No acute distress, Well nourished     Neuro: Alert and oriented to person, place and time  Psych: Normal affect   Lymph Node: Not performed    Cutaneous Exam: Performed as documented in clinic note below. Total skin excluding undergarment areas, which includes the head/face, neck, both arms, chest, back, abdomen, both legs, digits and/or nails, was examined. Pertinent Physical Exam Findings:  Physical Exam  Skin:            Comments: Actinic damage of the face, neck, trunk and upper and lower extremities    Scattered tan to brown homogenous macules and thin papules with regular borders on the face, trunk and upper and lower extremities           Medical Necessity of Exam Performed:   Distribution of patient concerns    Additional Diagnostic Testing performed during exam: Not performed ,  Not performed    ASSESSMENT:   Diagnosis Orders   1. Neoplasm of uncertain behavior of skin     2. Actinic skin damage     3. Multiple nevi         Plan of Action is as Follows:  Assessment 1. Neoplasm of uncertain behavior of skin  Shave Biopsy: After verbal consent including the risks of bleeding infection and scar and cleaning with alcohol the lesion on the left hand was anesthetized with (LMX AND/OR 1% lidocaine with epinephrine) and was removed with a dermablade. Hemostasis was achieved with aluminum chloride and Vaseline and a bandage were applied. 2. Actinic skin damage  Discussed sunscreen and sun protection - recommend SPF 30 or greater sunscreen applied every 2-3 hours, sun protective clothing and avoidance of peak sun. 3. Multiple nevi  - Clinically and Dermatoscopically Benign on Exam Today  - Reviewed ABCDE of moles and melanoma  - Discussed sunscreen and sun protection - recommend SPF 30 or greater sunscreen applied every 2-3 hours, sun protective clothing and avoidance of peak sun.           Photo surveillance performed: Yes    Follow-up: PRN    This note was created with the assistance of aspeech-recognition program.  Although the intention is to generate a document that actually reflects thecontent of the visit, no guarantees can be provided that every mistake has been identified and corrected by editing.     Electronically signed by Kalpesh Mcghee MD on 2/21/20 at 1:48 PM PAIN SCALE 5 OF 10.

## 2020-06-21 NOTE — OCCUPATIONAL THERAPY INITIAL EVALUATION ADULT - THERAPY FREQUENCY, OT EVAL
HPI: I saw Luigi Zurita in my office today in cardiovascular evaluation regarding her dilated cardiomyopathy with severe left ventricular dysfunction to discuss her recent echocardiogram. Ms. Jane Ko is a pleasant 26-year-old  female with history of presentation in March 2017 to [facility] with acute on chronic systolic heart failure. She subsequently was treated and during that hospitalization was found to have an echocardiogram showing an ejection fraction of 15%.  She also had a pharmacologic myocardial perfusion defect at that time which demonstrated an ejection fraction estimated at 25% without reversible or fixed defects.  She was placed on Coreg, lisinopril, Aldactone, and Lasix and had a LifeVest placed on discharge from the hospital. Roe Monteiro has done quite well clinically, but her repeat echocardiogram completed on July 18, 2017 though better than her echo back in March 2017 still demonstrated a reduced ejection fraction in the 30% range.       She subsequently had a dual-chamber AICD placed on September 1, 2017 and has done reasonably well since that time, although she comes in today and relates that she really has not been feeling well for a few months.       She is actually tried to get disability, but has been unable to qualify for disability has had to go back to work which means that she does not qualify for the ERPLY anymore. Roe Monteiro tells me that she has been so short of breath and fatigued recently that she ultimately had to go on leave from work and she has been trying to qualify for disability but has been unable to do so. I should note that in reviewing her laboratory tests from back in October 12, 2018 her TSH low <0.01 suggesting that her hyperthyroidism for which she is on Tapazole is not being inadequately controlled. She did not have a free T3 at that time.     She comes in today and relates that she is having chest pain and palpitations and she
remains on Tapazole but only 10 mg daily. She relates that she is having shortness of breath and peripheral edema and is she has been feeling poorly. Encounter Diagnoses   Name Primary?  Shortness of breath on exertion     Hyperthyroidism inadequately controlled medically     Sinus tachycardia     Acute on chronic systolic congestive heart failure (HCC) in the past without overt heart failure currently Yes    Cardiomyopathy, unspecified type ([address]     AICD (automatic cardioverter/defibrillator) present        Discussion: This lady is having a mild sinus tachycardia and has borderline hypotension with blood pressure of 90/60. She is feeling poorly because of her hyperthyroidism is not controlled. Her Tapazole is been increased from 10 mg a day to 10 mg twice a day but I do not think that this is an adequate dose. I am going to get a TSH now and were going to try to get in touch with the endocrinologist who saw her recently who is named patient cannot remember and hopefully get him to increase her Tapazole further and to more aggressively get her Graves' disease and medical remission. Since her blood pressure is so low with a heart rate elevated there is really not much more we can do from a medical vantage except for treating her hyperthyroidism which we will attempt to do in a timely fashion. PCP: Leena Farrar NP      Past Medical History:   Diagnosis Date    Asthma     Cardiac echocardiogram 03/22/2017    LVE. EF 15% (prev 50% on study of 12/10/14). Severe diffuse hypk. Indeterminate diastolic fx.  RVE. RVSP at least 44 mmHg. Mod LAE.  BISMARK. Mild-mod MR.  Cardiac nuclear imaging test 03/24/2017    High risk. Somewhat patchy uptake. No evidence of ischemia or infarction. No RWMA. Severe LVE. EF 25%. Neg EKG on pharm stress test.    Cardiovascular LLE venous duplex 03/06/2017    Left leg:  No DVT. Pulsatile flow.     Congestive heart failure (HCC)     Heart failure
([facility] [address]     Hypercholesterolemia     Hypertension     Hyperthyroidism        Past Surgical History:   Procedure Laterality Date    BREAST SURGERY PROCEDURE UNLISTED      redfuction    HX GYN      hysterectomy  btl    HX MYOMECTOMY       fibroid tumo removed    HX ORTHOPAEDIC  March 2012 ORIF left fibula         Current Outpatient Medications:     atorvastatin (LIPITOR) 80 mg tablet, Take 1 Tab by mouth daily. Pt. to take at night, Disp: 30 Tab, Rfl: 6    furosemide (LASIX) 80 mg tablet, Take 1 Tab by mouth daily. , Disp: 30 Tab, Rfl: 6    lisinopril (PRINIVIL, ZESTRIL) 10 mg tablet, Take 1 Tab by mouth daily. , Disp: 30 Tab, Rfl: 6    spironolactone (ALDACTONE) 25 mg tablet, Take 1 Tab by mouth daily. , Disp: 30 Tab, Rfl: 6    methylPREDNISolone (MEDROL DOSEPACK) 4 mg tablet, Per dose pack instructions, Disp: 1 Dose Pack, Rfl: 0    HYDROcodone-acetaminophen (NORCO) 5-325 mg per tablet, Take 1 Tab by mouth every eight (8) hours as needed for Pain. Max Daily Amount: 3 Tabs., Disp: 21 Tab, Rfl: 0    potassium chloride (K-DUR, KLOR-CON) 20 mEq tablet, Take 1 Tab by mouth daily. , Disp: 30 Tab, Rfl: 1    ondansetron (ZOFRAN ODT) 4 mg disintegrating tablet, Take 1 Tab by mouth every eight (8) hours as needed for Nausea., Disp: 30 Tab, Rfl: 0    rivaroxaban (XARELTO) 20 mg tab tablet, Take 1 Tab by mouth daily. , Disp: 30 Tab, Rfl: 11    methIMAzole (TAPAZOLE) 10 mg tablet, Take 1 Tab by mouth daily. , Disp: 30 Tab, Rfl: 0    carvedilol (COREG) 25 mg tablet, Take 1 Tab by mouth two (2) times daily (with meals). , Disp: 60 Tab, Rfl: 6    indapamide (LOZOL) 2.5 mg tablet, Take 1 Tab by mouth daily. To take in AM., Disp: 30 Tab, Rfl: 6    nitroglycerin (NITROSTAT) 0.4 mg SL tablet, 1 Tab by SubLINGual route every five (5) minutes as needed for Chest Pain., Disp: 1 Bottle, Rfl: 1    albuterol (ACCUNEB) 1.25 mg/3 mL nebu, Take 3 mL by inhalation every four (4) hours as needed (wheezing). , Disp: 25 Each, Rfl: 0  
aspirin 81 mg chewable tablet, Take 81 mg by mouth daily. , Disp: , Rfl:         Allergies   Allergen Reactions    Percocet [Oxycodone-Acetaminophen] Unknown (comments)     Not allergic. Wrong entry. Social History :  Social History     Tobacco Use    Smoking status: Former Smoker     Packs/day: 0.00     Years: 18.00     Pack years: 0.00     Last attempt to quit: 2017     Years since quittin.0    Smokeless tobacco: Never Used   Substance Use Topics    Alcohol use: Yes     Comment: socially         Family History: family history includes Diabetes in her maternal grandmother; Heart Disease in her maternal grandmother; Hypertension in her maternal grandmother and mother; No Known Problems in her brother, brother, brother, father, sister, sister, and sister. Review of Systems:      Constitutional: Positive for chills and fever. Negative for diaphoresis, malaise/fatigue and weight loss. Respiratory: Positive for cough, hemoptysis and shortness of breath. Negative for sputum production and wheezing. Cardiovascular: Positive for chest pain, palpitations, orthopnea and leg swelling. Negative for claudication and PND. Gastrointestinal: Positive for abdominal pain and diarrhea. Negative for blood in stool, constipation, heartburn, melena, nausea and vomiting. Musculoskeletal: Positive for joint pain and myalgias. Negative for back pain, falls and neck pain. Neurological: Negative. Negative for dizziness, tingling, weakness and headaches. Physical Exam:    The patient is a cooperative, alert, well developed, well nourished 51 y.o. morbidly obese -American female who is in no acute distress at the time of the examination. Visit Vitals  BP 90/60   Pulse (!) 111   Ht 5' 4\" (1.626 m)   Wt 108.4 kg (239 lb)   SpO2 99%   BMI 41.02 kg/m²       HEENT: Conjuctiva white, mucosa moist, no pallor or cyanosis. NECK: Supple without masses or tenderness. She does have a thyroid goiter.  There
was no jugular venous distention. Carotid are full bilaterally without bruits. CHEST: Symmetrical with good excursion. LUNGS: Clear to auscultation in all fields. HEART: The apex is not displaced. There were no lifts, thrills or heaves. There is a normal S1 and S2 without appreciable murmurs, rubs, clicks, or gallops auscultated. ABDOMEN: Soft without masses, tenderness or organomegaly. EXTREMITIES: Full peripheral pulses without peripheral edema. Review of Data: See PMH and Cardiology and Imaging sections for cardiac testing  Lab Results   Component Value Date/Time    Cholesterol, total 114 01/26/2019 09:09 AM    HDL Cholesterol 33 (L) 01/26/2019 09:09 AM    LDL, calculated 62 01/26/2019 09:09 AM    Triglyceride 94 01/26/2019 09:09 AM    CHOL/HDL Ratio 3.0 03/21/2017 03:24 PM       Results for orders placed or performed in visit on 03/28/19   AMB POC EKG ROUTINE W/ 12 LEADS, INTER & REP     Status: None    Narrative    Sinus tachycardia rate of 111. Poor R wave progression across the precordium which may be a normal variant but could reflect past anterior wall damage. This EKG is significantly faster than the EKG of February 6, 2019 when the heart rate was 74. Lamont Daily D.O., F.A.C.C. [facility]  [facility] [place]  [address]    PLEASE NOTE:  This document has been produced using voice recognition software. Unrecognized errors in transcription may be present.
No
as able
follow as able

## 2020-10-12 NOTE — H&P ADULT - NSHPOUTPATIENTPROVIDERS_GEN_ALL_CORE
----- Message from Marylin Reid sent at 10/9/2020  5:01 PM CDT -----  Regarding: Nurse call back  Pt needs a nurse to call her back concerning a previous conversation.    Pt can be advised at [phone]    Thank You     PMD: Dr. Alexandre   Cardio [facility]  Renal: Dr. Reinoso PMD: Dr. Zarate    Cardio [facility]  Renal: Dr. Reinoso

## 2021-02-22 NOTE — PATIENT PROFILE ADULT. - FUNCTIONAL SCREEN CURRENT LEVEL: TOILETING, MLM
Gestational Diabetes Follow-up    Subjective/Objective:    Kelsea Adams was called for a scheduled BG review. Last date of communication was: 2/15/2021.    Gestational diabetes is being managed with medications    Taking diabetes medications:   yes:     Diabetes Medication(s)     Insulin       insulin isophane human (HUMULIN N PEN) 100 UNIT/ML injection    15 units before bed + 2 unit prime = 17 units TDD (Expiration Date: 12/31/21 RXBIN # - [id number]      N # - [id number]      Group # [id number])          Estimated Date of Delivery: Jul 4, 2021    Blood Glucose/Ketone Log:    Date Ketones Fasting Post Breakfast Post Lunch Post Supper   2/16 2/17 2/18  81 - - -   2/19  80      2/20  81      2/21  80      2/22  84          Assessment:    Fasting blood glucoses: 100% in target.    Kelsea is still recovering from gall bladder surgery 2 weeks ago, and is not eating much. For that reason, she hasn't been checking BG after meals, which I can understand. She does prefer to use [employer] moving forward, so I did send another request for her to activate it via text.       Plan/Response:  No changes in the patient's current treatment plan. I did tell her if she wakes up with readings <80 x2, she can reduce NPH to 13 units.  Follow-up in 1 week.    EKVIN Mix CDE  Time Spent: 6:22 minutes    Any diabetes medication dose changes were made via the CDE Protocol and Collaborative Practice Agreement with the patient's OB/GYN provider. A copy of this encounter was shared with the provider.      
(4) completely dependent

## 2021-03-06 NOTE — PROGRESS NOTE ADULT - PROBLEM SELECTOR PROBLEM 7
Coronary artery disease involving native coronary artery of native heart without angina pectoris
Coronary artery disease involving native coronary artery of native heart without angina pectoris
Coagulopathy
Coronary artery disease involving native coronary artery of native heart without angina pectoris
There are no Wet Read(s) to document.

## 2021-05-06 NOTE — CONSULT NOTE ADULT - CONSULT REQUESTED DATE/TIME
12-Apr-2017 The patient has been re-examined and I agree with the above assessment or I updated with my findings.

## 2021-08-20 NOTE — ED ADULT TRIAGE NOTE - ACCOMPANIED BY
MA called patient unable to reach. LVM to call office to further discuss short term Disability leave dates.     Immediate family member

## 2021-09-14 NOTE — ED ADULT NURSE NOTE - CAS TRG GENERAL AIRWAY, MLM
Chief Complaint   Patient presents with     Labs Only     labs drawn via port by RN in lab, saline and heparin locked, vitals completed        Patent

## 2022-01-11 NOTE — H&P ADULT - HISTORY OF PRESENT ILLNESS
The patient denies any current symptoms of chest pain palpitations  Of I have reviewed his most recent consultation notes with Cardiology  Patient's symptoms of shortness of breath or not believed to be cardiac in nature    It is recommended that he continue current therapy to maintain stability of his cardiac status including his low-dose aspirin 81 mg daily low-cholesterol dealt diet and continuation of his Zetia and Crestor for cholesterol and triglyceride management 77 yo f with hx of bovine valve replacement, afib on coumadin, chronic kidney insufficiency, s/p fall from standing 3 days ago. Per patient, she slipped, and as she was bracing her fall went to grab the kitchen counter and missed, causing some skin tears to her LUE. She's also had a nose bleed for 2-3 weeks, now more frequent. She had been in the ED on Sunday, got an XR which showed no fracture and was sent home. She comes today due to worsening epistaxis and bleeding from her skin tears. c/o SOB, headache/dizziness. Denies hitting head or LOC.

## 2022-04-15 NOTE — H&P ADULT - BACK
[de-identified] : unable to perform due to telephonics format [de-identified] : 1/4/22 MRI (my read): severe stenosis central and lateral recess at L4-5, severe neuroforaminal stenosis bilaterally\par \par 1/4/22 CT (my read): L4-5 grade 2 spondylolisthesis with vacuum phenomenon at L4-5 disc, severe disc height loss, pars are thin though appear intact, severe stenosis seen again at L4-5 level in the canal and in the foramen\par \par 1/4/22 quantitative CT lumbar spine (my read): average score of 86.7 which correlates to T score of -3.14, the  range is indicative of osteopenia as per QCT though the T score given suggested that she may be borderline osteoporotic\par \par 1/3/22 lumbar flexion extension (my read): grade 2 spondylolisthesis at L4-5, severe disc height loss, does not appear to be any significant translation between flexion extension though the patient is not seem to be changing her spine shape much in these images\par \par 1/3/22 XR full spine (my read): mild scoliosis, well aligned globally, grade 2 spondylolisthesis of L4-5 with severe height loss, overall well maintained lordosis and well maintained global sagittal alignment, other discs appear normal. \par  detailed exam

## 2022-04-28 NOTE — PROGRESS NOTE ADULT - SUBJECTIVE AND OBJECTIVE BOX
The ECG shows a sinus rhythm. ECG rate  = 88 bpm.  [facility]                                   [address] [place] [address]                                                      PHONE: [phone]                                                         FAX: [phone]  [email or web address]  ---------------------------------------------------------------------------------------------------------------------------------    Overnight events/patient complaints:  Pt feels well with slight cough, about to receive dialysis    allopurinol (Rash)  codeine (Nausea; Vomiting)  penicillin (Rash)  spironolactone (Unknown)    MEDICATIONS  (STANDING):  lidocaine   Patch 2Patch Transdermal daily  ALBUTerol/ipratropium for Nebulization 3milliLiter(s) Nebulizer every 6 hours  folic acid 1milliGRAM(s) Oral daily  atorvastatin 40milliGRAM(s) Oral at bedtime  midodrine 5milliGRAM(s) Oral two times a day  epoetin jaswinder Injectable 68038Bzob(s) IV Push <User Schedule>  docusate sodium 100milliGRAM(s) Oral three times a day  senna 2Tablet(s) Oral at bedtime  aspirin  chewable 81milliGRAM(s) Oral daily  heparin  Injectable 5000Unit(s) SubCutaneous every 8 hours  gabapentin 300milliGRAM(s) Oral three times a day  cefepime  IVPB  IV Intermittent   cefepime  IVPB 500milliGRAM(s) IV Intermittent every 24 hours  ALBUTerol    90 MICROgram(s) HFA Inhaler 1Puff(s) Inhalation every 4 hours  melatonin 3milliGRAM(s) Oral at bedtime    MEDICATIONS  (PRN):  sodium chloride 0.65% Nasal 1Spray(s) Both Nostrils every 6 hours PRN Nasal Congestion  acetaminophen   Tablet. 650milliGRAM(s) Oral every 6 hours PRN Mild Pain (1 - 3)  ALBUTerol    0.083% 2.5milliGRAM(s) Nebulizer every 6 hours PRN Shortness of Breath and/or Wheezing      Vital Signs Last 24 Hrs  T(C): 36.5, Max: 36.6 (06-21 @ 16:00)  T(F): 97.7, Max: 97.8 (06-21 @ 16:00)  HR: 74 (72 - 85)  BP: 104/55 (83/50 - 115/55)  BP(mean): 75 (63 - 80)  RR: 23 (16 - 28)  SpO2: 97% (92% - 99%)  ICU Vital Signs Last 24 Hrs  DUSTIN HERNANDEZ  I&O's Detail    I & Os for current day (as of 22 Jun 2017 08:49)  =============================================  IN:    Solution: 250 ml    Solution: 50 ml    Total IN: 300 ml  ---------------------------------------------  OUT:    Voided: 1 ml    Total OUT: 1 ml  ---------------------------------------------  Total NET: 299 ml    Drug Dosing Weight  DUSTIN HERNANDEZ      PHYSICAL EXAM:  General: Appears well developed, well nourished alert and cooperative.  HEENT: Head; normocephalic, atraumatic.  Eyes: Pupils reactive, cornea wnl.  Neck: Supple, no nodes adenopathy, no NVD or carotid bruit or thyromegaly.  CARDIOVASCULAR: Normal S1 and S2, No murmur, rub, gallop or lift.   LUNGS: coarse BS Bl  ABDOMEN: Soft, nontender without mass or organomegaly. bowel sounds normoactive.  EXTREMITIES: No clubbing, cyanosis or edema. Distal pulses wnl. chronic stasis changes  SKIN: warm and dry with normal turgor.  NEURO: Alert/oriented x 3/normal motor exam. No pathologic reflexes.    PSYCH: normal affect.        LABS:                        7.6    8.1   )-----------( 165      ( 22 Jun 2017 06:01 )             24.0     06-22    136  |  93<L>  |  66.0<H>  ----------------------------<  121<H>  4.9   |  24.0  |  5.10<H>    Ca    8.7      22 Jun 2017 06:01  Phos  5.4     06-22  Mg     2.2     06-22      DUSTIN HERNANDEZ            RADIOLOGY & ADDITIONAL STUDIES:    INTERPRETATION OF TELEMETRY (personally reviewed): no events         ECHO: Summary:   1. Left ventricular ejection fraction, by visual estimation, is 60 to   65%.   2. Spectral Doppler shows restrictive pattern of left ventricular   myocardial filling (Grade III diastolic dysfunction).   3. RV failure. Severely dilated right ventricle with severely reduced   systolic function.   4. Right ventricular volume and pressure overload.   5. Status post mitral valve repair with residual mild to moderate mitral   regurgitation. Elevated transmitral gradients (mean 6.7 mmHg at a HR of   81 bpm).   6. S/P tricuspid valve repair with moderate to severe tricuspid   regurgitation.   7. Normally functioning aortic prosthesis.   8. Moderate pulmonic valve regurgitation.   9. Estimated pulmonary artery systolic pressure is 88.4 mmHg assuming a   right atrial pressure of 15 mmHg, which is consistent with severe   pulmonary hypertension.  10. Trace pericardial effusion.  11. Right sided pleural effusion.  12. ** Compared to TTE 4/11/2017, pulmonary pressures are higher.     Kristin Yoder DO Electronically signed on 6/20/2017 at 11:44:12 AM         ASSESSMENT AND PLAN:  In summary, DUSTIN HERNANDEZ is an 76y Female with past medical history significant for ESRD on HD, Bio AVR (Mg 22mmHg), SAH, afib not an AC candidate, now with volume overload.    1) Agree with fluid removal with HD  2) BP controlled

## 2022-07-28 NOTE — PROGRESS NOTE ADULT - SUBJECTIVE AND OBJECTIVE BOX
Option 4 Recommend sx at a hospital due to her past history with being put under anesthesia. Patient seen and examined    Feels sl better  still gets LINDSAY  no c/o CP SOB at rest less, no  NV   Has swelling feet    Vital Signs Last 24 Hrs  T(C): 36.4, Max: 36.8 (04-17 @ 22:22)  T(F): 97.6, Max: 98.2 (04-17 @ 22:22)  HR: 71 (71 - 82)  BP: 89/55 (86/44 - 89/55)  BP(mean): --  RR: 20 (20 - 20)  SpO2: 92% (92% - 92%)  Awake/alert; NAD  chest Clear  No JVD  No murmer  abd soft, NT BS +  1-2 + edema linh thighs      17 Apr 2017 07:45    134    |  95     |  54.0   ----------------------------<  124    4.3     |  26.0   |  3.77     Ca    7.6        17 Apr 2017 07:45  Phos  3.4       17 Apr 2017 07:45                            7.6    3.9   )-----------( 48       ( 17 Apr 2017 07:45 )             23.8       Impression  patient stable  check labs am on HD, take off fluid as nora  may need PRBC    Continue same treatment

## 2022-12-01 NOTE — PHYSICAL THERAPY INITIAL EVALUATION ADULT - ASR WT BEARING STATUS EVAL
What Type Of Note Output Would You Prefer (Optional)?: Bullet Format Have Your Spot(S) Been Treated In The Past?: has not been treated Hpi Title: Evaluation of a Skin Lesion no weight-bearing restrictions

## 2023-01-28 NOTE — H&P ADULT - PROBLEM SELECTOR PROBLEM 10
Normal rate, regular rhythm.  Heart sounds S1, S2.  No murmurs, rubs or gallops.
Chronic obstructive pulmonary disease, unspecified COPD type

## 2023-02-14 NOTE — DOWNTIME INTERRUPTION NOTE - TIME SYSTEM UNAVAILABLE
09-Aug-2017 15:00 Pt care and plan discussed and reviewed with PA. Plan as outlined above edited by me to reflect our discussion. Advanced care planning/advanced directives discussed with patient/family. DNR status including forceful chest compressions to attempt to restart the heart, ventilator support/artificial breathing, electric shock, artificial nutrition, health care proxy, Molst form all discussed with pt. Pt care and plan discussed and reviewed with PA. Plan as outlined above edited by me to reflect our discussion. Advanced care planning/advanced directives discussed with patient/family. DNR status including forceful chest compressions to attempt to restart the heart, ventilator support/artificial breathing, electric shock, artificial nutrition, health care proxy, Molst form all discussed with pt. Pt wishes to consider. Pt care and plan discussed and reviewed with PA. Plan as outlined above edited by me to reflect our discussion. Advanced care planning/advanced directives discussed with patient/family. DNR status including forceful chest compressions to attempt to restart the heart, ventilator support/artificial breathing, electric shock, artificial nutrition, health care proxy, Molst form all discussed with pt. Pt wishes to consider.

## 2023-08-21 NOTE — CONSULT NOTE ADULT - ATTENDING COMMENTS
COUNSELING:    Face to face meeting to discuss Advanced Care Planning - Time Spent ______ Minutes.  See goals of care note.    More than 50% time spent in counseling and coordinating care. _40_____ Minutes.     Thank you for the opportunity to assist with the care of this patient.   [place] [facility] [phone]. Improved

## 2023-09-09 NOTE — ED PROVIDER NOTE - OBJECTIVE STATEMENT
5yr11m old Male with past medical history ADHD who presents to the ED for 2 days of fever to Tmax of 104F measured by forehead thermometer.  Mother and father at bedside.  States that no one is sick with similar symptoms at home.  States that they have been giving him Tylenol and Motrin at home with last dose at 530 this morning for fever of 103F.  Endorses unchanged p.o. intake and output.  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  Otherwise denies any other complaints at this time. This patient is a 76 year old woman hx of HTN, ESRD on dialysis who presents to the ED with head wound and bruising s/p mechanical fall.  Patient denies LOC.  Patient c/o pain at site of head injury.  She denies chest pain, SOB, and abdominal pain.

## 2023-10-16 NOTE — ED ADULT NURSE NOTE - NS TRANSFER PATIENT BELONGINGS
[facility]  1949   Chief Complaint   Patient presents with    Knee Pain     lt        HISTORY OF PRESENT ILLNESS  Abimael Mason is a 75 y.o. female who presents today for evaluation of left knee pain. Pain is a 1/10. Pain has been present since 10/10/23. 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 She was seen by ED on 10/12/23 and was started on Robaxin 500 mg Q8h and Tramadol 50 mg Q6h. She notes extreme difficulty with standing, walking, and bending her knee. Bending her knee was so painful that she could not put her socks on. She notes that her pain has improved since yesterday. She is moving better, but she is still walking with a limp. She has difficulty descending stairs.      Has tried following treatments: Injections:No; Brace:No; Therapy:No; Cane/Crutch:No      No Known Allergies     Past Medical History:   Diagnosis Date    AR (allergic rhinitis)     Cholelithiasis     Depression     Elevated cholesterol     Headache(784.0)     Hearing loss     Hiatal hernia     Menopausal state     Osteopenia     Pulmonary nodules 07/2017    recheck in 1 year 7/2018    S/P colonoscopy 2008      Social History       Tobacco History       Smoking Status  Former Quit Date  7/26/1982 Smoking Tobacco Type  Cigarettes quit in 7/26/1982      Smokeless Tobacco Use  Never              Alcohol History       Alcohol Use Status  Yes              Drug Use       Drug Use Status  No              Sexual Activity       Sexually Active  Not Asked                   Past Surgical History:   Procedure Laterality Date    BREAST SURGERY      HYSTERECTOMY, VAGINAL      TUBAL LIGATION        Family History   Problem Relation Age of Onset    Heart Disease Mother 80    Cancer Father     Heart Disease Father 61    Hypertension Mother      Current Outpatient Medications   Medication Sig    methocarbamol (ROBAXIN) 500 MG tablet Take 1 tablet by mouth every 8
Clothing

## 2023-12-21 NOTE — H&P PST ADULT - PROBLEM SELECTOR PLAN 1
Additional Information   Negative: Is this related to a work injury?   Negative: Is this related to an MVA?   Negative: Are you currently recieving homecare services?    Background - Initial Assessment  Clinical complaint: ED visit on 12/20 due to B/L Lower Back Pain. No hx of back pain. Patient states this pain started 3 days ago. It radiates to her upper back, shoulders, arms and also to her buttocks, hips and legs. Numbness and tingling in both arms and both legs. NKI. Not seeing a Dr for this pain. Patient states pain is constant, worse with movement and certain position especially , standing or sitting. Patient described pain as sharp, pressure and intense pain in tailbone.  Date of onset: 3 days ago  Frequency of pain: constant  Quality of pain: numb, intense, pressure, sharp, and tingling    Protocols used: [facility] Protocol     right upper extremity arteriovenous fistula, possible graft.

## 2023-12-24 NOTE — ED PROVIDER NOTE - CONTEXT
Patient is alert & oriented x4. Pt is sit-to-stand, x2 assist. Breath sounds are clear bilateral. On room air. Normal sinus rhythm. BLE pain reported. Skin dry and intact. Bed in low position and call light within reach. Reviewed plan of care and patient verbalizes understanding.        Problem: PAIN - ADULT  Goal: Verbalizes/displays adequate comfort level or patient's stated pain goal  Description: INTERVENTIONS:  - Encourage pt to monitor pain and request assistance  - Assess pain using appropriate pain scale  - Administer analgesics based on type and severity of pain and evaluate response  - Implement non-pharmacological measures as appropriate and evaluate response  - Consider cultural and social influences on pain and pain management  - Manage/alleviate anxiety  - Utilize distraction and/or relaxation techniques  - Monitor for opioid side effects  - Notify MD/LIP if interventions unsuccessful or patient reports new pain  - Anticipate increased pain with activity and pre-medicate as appropriate  Outcome: Progressing     Problem: SAFETY ADULT - FALL  Goal: Free from fall injury  Description: INTERVENTIONS:  - Assess pt frequently for physical needs  - Identify cognitive and physical deficits and behaviors that affect risk of falls.  - Dwight fall precautions as indicated by assessment.  - Educate pt/family on patient safety including physical limitations  - Instruct pt to call for assistance with activity based on assessment  - Modify environment to reduce risk of injury  - Provide assistive devices as appropriate  - Consider OT/PT consult to assist with strengthening/mobility  - Encourage toileting schedule  Outcome: Progressing     Problem: RESPIRATORY - ADULT  Goal: Achieves optimal ventilation and oxygenation  Description: INTERVENTIONS:  - Assess for changes in respiratory status  - Assess for changes in mentation and behavior  - Position to facilitate oxygenation and minimize respiratory effort  -  Oxygen supplementation based on oxygen saturation or ABGs  - Provide Smoking Cessation handout, if applicable  - Encourage broncho-pulmonary hygiene including cough, deep breathe, Incentive Spirometry  - Assess the need for suctioning and perform as needed  - Assess and instruct to report SOB or any respiratory difficulty  - Respiratory Therapy support as indicated  - Manage/alleviate anxiety  - Monitor for signs/symptoms of CO2 retention  Outcome: Progressing     Problem: METABOLIC/FLUID AND ELECTROLYTES - ADULT  Goal: Electrolytes maintained within normal limits  Description: INTERVENTIONS:  - Monitor labs and rhythm and assess patient for signs and symptoms of electrolyte imbalances  - Administer electrolyte replacement as ordered  - Monitor response to electrolyte replacements, including rhythm and repeat lab results as appropriate  - Fluid restriction as ordered  - Instruct patient on fluid and nutrition restrictions as appropriate  Outcome: Progressing  Goal: Hemodynamic stability and optimal renal function maintained  Description: INTERVENTIONS:  - Monitor labs and assess for signs and symptoms of volume excess or deficit  - Monitor intake, output and patient weight  - Monitor urine specific gravity, serum osmolarity and serum sodium as indicated or ordered  - Monitor response to interventions for patient's volume status, including labs, urine output, blood pressure (other measures as available)  - Encourage oral intake as appropriate  - Instruct patient on fluid and nutrition restrictions as appropriate  Outcome: Progressing      unknown

## 2024-01-10 NOTE — ED ADULT NURSE NOTE - AS SC BRADEN ACTIVITY
Patient called in requesting pain medication, informed her that Dr. Prakash would not be prescribing any pain medication and she should follow up with pain management. Patient disconnected the call   (3) walks occasionally

## 2024-02-06 NOTE — ED ADULT NURSE NOTE - CARDIO WDL
Please advise billing concerns    Thank you Normal rate, regular rhythm, normal S1, S2 heart sounds heard.

## 2024-04-22 NOTE — ED ADULT NURSE NOTE - CAS EDP DISCH DISPOSITION ADMI
[TextBox_4] : Ms. CASTRO is a 39 year old female originally from [facility] with a history of COVID-19(2022), Thyroid cancer s/p thyroidectomy (2015), HLD, irregular menstrual cycle, low vitamin D, LPRD, pre-diabetes, post-nasal drip, chronic cough/sinus, abnormal CT of the chest (module), consistent with intra-parenchymal lymph node presenting to the office today for an initial pulmonary evaluation. Her chief complaint is -she notes she has had a chronic cough for 10 years -she notes dealing with constipation -she notes increased appetite and bloating -she notes SOTO with stairs -she notes chronic post-nasal drip  -she notes s/p septoplasty, her cough and post-nasal drip have not improved -she was put on Allegra by her ENT which has not improved her symptoms -she notes increased weight since new thyroid medication -she notes snoring -she notes waking up fatigued -she notes she had a polyp removal last week -she notes nothing makes her chronic cough better -she notes balance is stable -she notes only Synthroid and cholesterol medications are her only     -she denies any headaches, nausea, emesis, fever, chills, sweats, chest pain, chest pressure, coughing, wheezing, palpitations, diarrhea, vertigo, dysphagia, heartburn, reflux, itchy eyes, itchy ears, leg swelling, leg pain, arthralgias, myalgias, or sour taste in the mouth.
Telemetry

## 2024-05-02 NOTE — GOALS OF CARE CONVERSATION - PERSONAL ADVANCE DIRECTIVE - DOES PATIENT HAVE A SURROGATE
HPI: Patient is a 53 year old female who presents with left sinus pressure and headache for the past 6 days, now with right ear pain and tooth pain.  Patient states that she typically gets sinus infections in the fall.  She started to develop allergy-like symptoms and has been taking her allergy medication and Nasacort.  She did do a Elva pot but states that that seems to have made problems worse.  Today she was noticing a lot of right cheek and dental pain as well as right ear pain.  She denies any fevers or chills.  She is tolerating food and fluid without issue.          ROS as noted per HPI        Patient Active Problem List   Diagnosis    Ductal hyperplasia of breast    Pain of right hip joint    Trochanteric bursitis of right hip        PAST MEDICAL HX:    No known problems                                             PAST SURGICAL HX:    BREAST LUMPECTOMY                                             CARPAL TUNNEL RELEASE                                         KNEE SCOPE,DIAGNOSTIC                                         Social History     Tobacco Use    Smoking status: Never    Smokeless tobacco: Never   Vaping Use    Vaping status: never used   Substance Use Topics    Alcohol use: Yes     Comment: Social        Current Outpatient Medications   Medication Sig    amoxicillin-clavulanate (AUGMENTIN) 875-125 MG per tablet Take 1 tablet by mouth in the morning and 1 tablet in the evening. Do all this for 10 days.    meloxicam (MOBIC) 15 MG tablet Take 15 mg by mouth daily.    rosuvastatin (CRESTOR) 20 MG tablet Take 0.5 tablets by mouth daily.    Ascorbic Acid (VITAMIN C PO)     VITAMIN D PO     ZINC OXIDE PO     Cyanocobalamin (VITAMIN B 12 PO)     cetirizine (ZYRTEC) 10 MG tablet Take 10 mg by mouth daily.     No current facility-administered medications for this visit.         Vitals:    05/02/24 1501   BP: 129/80   Pulse: 66   Resp: 14   Temp: 97.5 °F (36.4 °C)   TempSrc: Temporal   SpO2: 98%   PainSc: 8     PainLoc: Head         Physical Exam  Constitutional:       General: She is not in acute distress.     Appearance: Normal appearance.   HENT:      Head: Normocephalic and atraumatic.      Right Ear: Ear canal and external ear normal. Tympanic membrane is erythematous and bulging.      Left Ear: Tympanic membrane, ear canal and external ear normal.      Nose: Congestion present.      Comments: Right maxillary sinus tenderness     Mouth/Throat:      Mouth: Mucous membranes are moist.      Pharynx: Oropharynx is clear.   Eyes:      Conjunctiva/sclera: Conjunctivae normal.   Cardiovascular:      Rate and Rhythm: Normal rate.   Pulmonary:      Effort: Pulmonary effort is normal. No respiratory distress.   Musculoskeletal:         General: Normal range of motion.      Cervical back: Normal range of motion and neck supple.   Lymphadenopathy:      Cervical: No cervical adenopathy.   Skin:     General: Skin is warm and dry.   Neurological:      General: No focal deficit present.      Mental Status: She is alert.   Psychiatric:         Mood and Affect: Mood normal.                MDM:Based on my history, physical exam, and diagnostic evaluation, the patient appears to have symptoms consistent with sinusitis as well as acute otitis media.  There is no mastoid tenderness or evidence of spreading infection. They have a normal heart rate, normal oxygen saturation, a normal respiratory pattern and sinus tenderness on physical exam. The pt appears to be in no distress, appears well-hydrated and is ambulating in the immediate care without difficulty. They will be discharged with instructions to go to the ER if difficulty breathing, not tolerating oral food or fluids, any respiratory distress, or new symptoms. I encouraged follow-up with the primary care physician for repeat exam in 24-48 hours.     Differential diagnosis include URI, acute otitis media, acute otitis externa, pharyngitis, sinusitis.  I personally reviewed the  patients old records.    Case Discussed with Dr. Dimas.    ED Diagnoses       Diagnosis Comment Associated Orders       Final diagnoses    Acute non-recurrent maxillary sinusitis -- AMOXICILLIN-POT CLAVULANATE 875-125 MG PO TABS      Non-recurrent acute suppurative otitis media of right ear without spontaneous rupture of tympanic membrane -- AMOXICILLIN-POT CLAVULANATE 875-125 MG PO TABS               New Prescriptions    AMOXICILLIN-CLAVULANATE (AUGMENTIN) 875-125 MG PER TABLET    Take 1 tablet by mouth in the morning and 1 tablet in the evening. Do all this for 10 days.           Disposition: Home         No

## 2024-07-11 NOTE — ASU PATIENT PROFILE, ADULT - AS SC BRADEN MOBILITY
[facility]  Weight Management Program      Karla Connell is a 53 y.o. female referred to the [facility] by Tiffani Quezada (3/9/23) for clinical pharmacy and specialty pharmacy management of Wegovy for weight management. Karla Connell has previously tried walking, eating healthy, and lifestyle changes for weight loss.  Current weight loss efforts include walking 2-4 times a week 30-45 minutes and Wegovy 2.4mg. Patient had her thyroid removed in 2007. Patient reports that she has lost ~100 lbs over the years by walking.    Patient is currently on Wegovy 2.4 mg weekly. Patient denies any lumps/swelling/hoarseness in neck/throat or abdominal pain.  Pateint denies any missed doses or trouble giving the injection. Patient does report that she has started increasing frequency to about 2 weeks and has been tolerating the injection well.  Patient reports tolerating medication well with only a little nausea that goes away quickly. She also reports a little constipation but that she Miralax daily and it helps.  Patient reports that she saw Dr. Pozo recently regarding low heart rate and cardiology is holding off on a pacemaker for now. She states that she will eventually need one.     Patient reports that she is doing well with her water intake goal and that she is still walking ~1 hour 4-5x/week on the treadmill. Patient's water intake varies, but she does drink throughout the day. She estimates 70-74 ounces daily.    Patient reports a goal weight of 150 lbs.    Relevant Past Medical History and Co-morbidities  Past Medical History:   Diagnosis Date    Anxiety     Arthritis     Back problem     Bladder disorder     Bradycardia     Bulging lumbar disc     Disease of thyroid gland     Headache     Hx of migraines     Neuromuscular disorder      Social History     Socioeconomic History    Marital status: Single   Tobacco Use    Smoking status: Former     Current packs/day: 0.00     Average  packs/day: 2.0 packs/day for 20.0 years (40.0 ttl pk-yrs)     Types: Cigarettes     Start date: 1995     Quit date: 2015     Years since quittin.8    Smokeless tobacco: Never   Vaping Use    Vaping status: Former   Substance and Sexual Activity    Alcohol use: Never    Drug use: Never    Sexual activity: Yes     Partners: Male     Birth control/protection: Other, None       Allergies  Patient has no known allergies.    Current Medication List    Current Outpatient Medications:     buPROPion SR (Wellbutrin SR) 200 MG 12 hr tablet, Take 1 tablet by mouth twice daily, Disp: 180 tablet, Rfl: 2    cyclobenzaprine (FLEXERIL) 5 MG tablet, Take 1 tablet by mouth 3 times a day., Disp: 90 tablet, Rfl: 3    cyclobenzaprine (FLEXERIL) 5 MG tablet, Take 1 tablet by mouth 3 times a day., Disp: 90 tablet, Rfl: 3    estrogen, conjugated,-medroxyprogesterone (Prempro) 0.3-1.5 MG per tablet, Take 1 tablet by mouth daily., Disp: 28 tablet, Rfl: 5    gabapentin (Neurontin) 600 MG tablet, Take 1 tablet by mouth 3 times a day., Disp: 90 tablet, Rfl: 2    hydrOXYzine (ATARAX) 50 MG tablet, Take 1 tablet by mouth twice daily, Disp: 180 tablet, Rfl: 2    levothyroxine (Synthroid) 88 MCG tablet, Take 1 tablet by mouth Daily., Disp: 90 tablet, Rfl: 0    montelukast (SINGULAIR) 10 MG tablet, TAKE 1 TABLET(S) BY MOUTH EACH EVENING, Disp: 90 tablet, Rfl: 2    oxybutynin (DITROPAN) 5 MG tablet, Take 1 tablet(s) by mouth 2 times daily, Disp: 180 tablet, Rfl: 2    Semaglutide-Weight Management (Wegovy) 2.4 MG/0.75ML solution auto-injector, Inject 2.4 mg under the skin into the appropriate area as directed 1 (One) Time Per Week., Disp: 3 mL, Rfl: 0    SUMAtriptan (Imitrex) 25 MG tablet, Take 1 tablet by mouth as needed for migraine, may repeat every 2 hours as needed., Disp: 27 tablet, Rfl: 2    Drug Interactions  Wegovy + Prempro -  may diminish the therapeutic effect of Antidiabetic Agents.  Wegovy + Levothyroxine- Wegovy can  "increase serum concentration of Levothyroxine    Relevant Laboratory Values  Lab Results   Component Value Date    CHOL 174 06/27/2024    TRIG 104 06/27/2024    HDL 74 (H) 06/27/2024    LDL 82 06/27/2024     There is no height or weight on file to calculate BMI.    Vaccinations:   Patient recommended to keep up with routine vaccinations.     Goals of Therapy  Clinical Goals or Therapeutic Targets: 10% weight loss goal      Date 11/11/22 12/9/22 1/5/23 1/19/23 2/9/23 3/9/23 4/6/23 5/4/23 6/1/23   Weight (lb) 206 lbs 204.4 lbs 197.8 lb 196.2 lb 194.8 lb 191.2lb 190.2lb 184.4 lb 181.8 lb   BMI kg/ 32.75 32.5 31.45 31.19 30.97 30.39 30.27 29.32 28.9   Waist Circumference (in)  45 in 47 in 46\"   46.5\"  *changing to Wegovy when PA goes through 45\" 42\" 42\" 41\" 41\"     Date 6/30/23 7/28/23 8/31/23 9/28/23 10/26/23 11/20/23 12/18/23 1/15/24 2/13/24 3/12/24   Weight (lb) 177.2lb 173.6 lb 168.8 lb 168.2 lb 165.4 lb 164 lb 162.6 lb 160.4 lb 164.4 lb 163.6 lb   BMI kg/ 28.17 27.60 26.84 26.74 26.30 26.07 25.85 25.50 26.14 26.01   Waist Circumference (in)  40 in 42.8\" 41\" 41.5\" 41\" 42\" 39\" 41.5\" 42\" 39.75\"     Date 4/9/24 5/7/24 6/4/24 7/11/24   Weight (lb) 162.2 lb 162.8 lb 162 lb 163.4 lb   BMI kg/ 26.18 26.28 26.15 26.37   Waist Circumference (in)  41.5\" 41\" 40\" 39.5\"       Medication Assessment & Plan    Patient's current BMI is 26.15, which is considered overweight. She has lost 44.6 lbs overall. Congratulated patient on her success thus far!    Will re-order Wegovy 2.4 mg SC weekly.     Discussed lifestyle modifications, including diet and exercise.     Worked with patient to create SMART Goal(s):  pt will continue to work towards these goals.   1. Increase water intake to 80 oz of water a day   2. Continue to walk 45 minutes 4 times a week    Due to patient having [employer] insurance, we will send in 90 day fill today. She is aware she will continue follow ups until medication is completed.    Will follow-up with patient in " 4 weeks, or sooner if needed.     Vielka Way, PharmD  7/11/2024  13:55 EDT   Yes (3) slightly limited

## 2024-08-03 NOTE — H&P ADULT - NSHPLANGLIMITEDENGLISH_GEN_A_CORE
Problem: PAIN - ADULT  Goal: Verbalizes/displays adequate comfort level or baseline comfort level  Description: Interventions:  - Encourage patient to monitor pain and request assistance  - Assess pain using appropriate pain scale  - Administer analgesics based on type and severity of pain and evaluate response  - Implement non-pharmacological measures as appropriate and evaluate response  - Consider cultural and social influences on pain and pain management  - Notify physician/advanced practitioner if interventions unsuccessful or patient reports new pain  Outcome: Progressing     Problem: INFECTION - ADULT  Goal: Absence or prevention of progression during hospitalization  Description: INTERVENTIONS:  - Assess and monitor for signs and symptoms of infection  - Monitor lab/diagnostic results  - Monitor all insertion sites, i.e. indwelling lines, tubes, and drains  - Monitor endotracheal if appropriate and nasal secretions for changes in amount and color  - [place] appropriate cooling/warming therapies per order  - Administer medications as ordered  - Instruct and encourage patient and family to use good hand hygiene technique  - Identify and instruct in appropriate isolation precautions for identified infection/condition  Outcome: Progressing     Problem: SAFETY ADULT  Goal: Patient will remain free of falls  Description: INTERVENTIONS:  - Educate patient/family on patient safety including physical limitations  - Instruct patient to call for assistance with activity   - Consult OT/PT to assist with strengthening/mobility   - Keep Call bell within reach  - Keep bed low and locked with side rails adjusted as appropriate  - Keep care items and personal belongings within reach  - Initiate and maintain comfort rounds  - Make Fall Risk Sign visible to staff  - Offer Toileting every  Hours, in advance of need  - Initiate/Maintain alarm  - Obtain necessary fall risk management equipment:   - Apply yellow socks and  bracelet for high fall risk patients  - Consider moving patient to room near nurses station  Outcome: Progressing  Goal: Maintain or return to baseline ADL function  Description: INTERVENTIONS:  -  Assess patient's ability to carry out ADLs; assess patient's baseline for ADL function and identify physical deficits which impact ability to perform ADLs (bathing, care of mouth/teeth, toileting, grooming, dressing, etc.)  - Assess/evaluate cause of self-care deficits   - Assess range of motion  - Assess patient's mobility; develop plan if impaired  - Assess patient's need for assistive devices and provide as appropriate  - Encourage maximum independence but intervene and supervise when necessary  - Involve family in performance of ADLs  - Assess for home care needs following discharge   - Consider OT consult to assist with ADL evaluation and planning for discharge  - Provide patient education as appropriate  Outcome: Progressing  Goal: Maintains/Returns to pre admission functional level  Description: INTERVENTIONS:  - Perform AM-PAC 6 Click Basic Mobility/ Daily Activity assessment daily.  - Set and communicate daily mobility goal to care team and patient/family/caregiver.   - Collaborate with rehabilitation services on mobility goals if consulted  - Perform Range of Motion  times a day.  - Reposition patient every  hours.  - Dangle pa times a day  - Stand patient  times a day  - Ambulate patient  times a day  - Out of bed to chair  times a day   - Out of bed for meals  times a day  - Out of bed for toileting  - Record patient progress and toleration of activity level   Outcome: Progressing     Problem: DISCHARGE PLANNING  Goal: Discharge to home or other facility with appropriate resources  Description: INTERVENTIONS:  - Identify barriers to discharge w/patient and caregiver  - Arrange for needed discharge resources and transportation as appropriate  - Identify discharge learning needs (meds, wound care, etc.)  -  Arrange for interpretive services to assist at discharge as needed  - Refer to Case Management Department for coordinating discharge planning if the patient needs post-hospital services based on physician/advanced practitioner order or complex needs related to functional status, cognitive ability, or social support system  Outcome: Progressing      No

## 2024-10-02 NOTE — ED ADULT NURSE NOTE - NSSISCREENINGQ3_ED_A_ED
"Patient pounding on bedside table and moaning loudly, provider at bedside, patient states " I want something for pain now" declines to answer main questions, update to CN   "
"Patient states left lower jaw/dental pain for " years" reports worse x1 day, aleve today   "
Patient identifiers verified and correct for Ms Kraft  C/C: Dental pain SEE NN  APPEARANCE: awake and alert in NAD. PAIN  10/10  SKIN: warm, dry and intact. No breakdown or bruising.  MUSCULOSKELETAL: Patient moving all extremities spontaneously, no obvious swelling or deformities noted. Ambulates independently.  RESPIRATORY: Denies shortness of breath.Respirations unlabored.   CARDIAC: Denies CP, 2+ distal pulses; no peripheral edema  ABDOMEN: S/ND/NT, Denies nausea  : voids spontaneously, denies difficulty  Neurologic: AAO x 4; follows commands equal strength in all extremities; denies numbness/tingling. Denies dizziness Denies new weakness, reports left lower jaw dental pain   
Patient to go to [facility] for UPT and medication, update to patient regarding POC  
Pt on 15 min shot timer.   
No

## 2025-01-04 NOTE — PROGRESS NOTE ADULT - SUBJECTIVE AND OBJECTIVE BOX
PT here with increased fetal movement, no complaints of Ucs and pt states she is anemic, short cervix, previous  x4 and diabetic diet controlled.    Toma CHAIDEZM informed of pt concerns and discussed R NST with provider, provider states to talk to Moy when MD comes to unit   Patient is a 77y old  Female who presents with a chief complaint of vaginal discomfort (02 Nov 2017 00:37)      BRIEF HOSPITAL COURSE:  78 yo F with PMH of Afib( no in AC due to recurrent falls), ESRD on HD (TTF ), recurrent falls , COPD, GERD, Depression,   CAD, HTN, pulmonary HTN , gout , HLD and SAH who came to the ED with cc of vaginal discomfort. Pt states that her sx started one week ago, she was having vaginal pain , 10/10 , like burning sensation with no alleviating of exacerbating factor, associated with itching and some lesion vesicles over her right labia majora, right inguinal , posterior thigh and right gluteus.  Pt was found to have Zoster, she was treated with one dose of Acyclovir and one dose of morphine yesterday and had AMS which family blamed on medications and insisted they were stopped. Pt was altered with her eyes open and is staring off and having repetitive tongue movement sticking her tongue in/out.  She answers yes to her name but was otherwise  non-verbal and not following commands.  Pt was moved to MICU and intubated for airway protection after having oropharyngeal bleeding and hypoxia. EEG was not performed. LP was attempted but unsuccessful due to spinal fusion.     Events last 24 hours: pt awake, following commands, successful SBT= extubated on rounds this morning     PAST MEDICAL & SURGICAL HISTORY:  ESRD (end stage renal disease) on dialysis: MWF via [facility] Permacath  planned AVF creation  Sinusitis, unspecified chronicity, unspecified location  Gout  Gall stones  Pneumonia  Anemia  Pulmonary hypertension  COPD (chronic obstructive pulmonary disease)  CAD (coronary artery disease)  Atrial fibrillation  Hyperlipidemia  Hypertension  Spinal fusion failure, initial encounter  Tubal ligation status  Sinusitis  Spinal stenosis  AV fistula  S/P tonsillectomy  S/P appendectomy  H/O aortic valve replacement  H/O spinal fusion  H/O cataract  Cystocele  H/O tricuspid valve annuloplasty  H/O mitral valve repair      Review of Systems:  pt cannot attend to entire ROS questioning      Medications:  aztreonam  IVPB 500 milliGRAM(s) IV Intermittent every 12 hours    midodrine 10 milliGRAM(s) Oral three times a day    ALBUTerol/ipratropium for Nebulization 3 milliLiter(s) Nebulizer every 6 hours PRN    acetaminophen    Suspension 650 milliGRAM(s) Oral every 6 hours PRN        pantoprazole  Injectable 40 milliGRAM(s) IV Push two times a day      atorvastatin 40 milliGRAM(s) Oral at bedtime    albumin human 25% IVPB 100 milliLiter(s) IV Intermittent once  calcium acetate 667 milliGRAM(s) Oral three times a day with meals  ferrous    sulfate 325 milliGRAM(s) Oral daily  folic acid 1 milliGRAM(s) Oral daily      calamine Lotion 1 Application(s) Topical four times a day PRN  FIRST- Mouthwash  BLM 30 milliLiter(s) Swish and Spit three times a day  silver sulfADIAZINE 1% Cream 1 Application(s) Topical two times a day        Mode: CPAP with PS  FiO2: 30  PEEP: 5  PS: 5  MAP: 7      ICU Vital Signs Last 24 Hrs  T(C): 35.8 (04 Nov 2017 12:01), Max: 36.9 (03 Nov 2017 20:03)  T(F): 96.5 (04 Nov 2017 12:01), Max: 98.4 (03 Nov 2017 20:03)  HR: 90 (04 Nov 2017 15:00) (51 - 96)  BP: 116/53 (04 Nov 2017 15:00) (82/35 - 130/58)  BP(mean): 75 (04 Nov 2017 15:00) (50 - 84)  ABP: --  ABP(mean): --  RR: 40 (04 Nov 2017 15:00) (14 - 92)  SpO2: 87% (04 Nov 2017 15:00) (87% - 100%)      ABG - ( 03 Nov 2017 09:58 )  pH: 7.32  /  pCO2: 49    /  pO2: 90    / HCO3: 24    / Base Excess: -1.1  /  SaO2: 97                  I&O's Detail    03 Nov 2017 07:01  -  04 Nov 2017 07:00  --------------------------------------------------------  IN:    dexmedetomidine Infusion: 36 mL    Other: 250 mL    Other: 600 mL    phenylephrine   Infusion: 294 mL    Solution: 100 mL    Solution: 100 mL    Solution: 250 mL  Total IN: 1630 mL    OUT:    Nasoenteral Tube: 450 mL    Other: 1380 mL  Total OUT: 1830 mL    Total NET: -200 mL            LABS:                        7.4    6.3   )-----------( 110      ( 04 Nov 2017 07:32 )             22.8     11-04    138  |  91<L>  |  35.0<H>  ----------------------------<  98  3.7   |  28.0  |  3.88<H>    Ca    8.7      04 Nov 2017 07:32  Phos  3.9     11-04  Mg     2.1     11-04    TPro  5.9<L>  /  Alb  3.4  /  TBili  2.1<H>  /  DBili  x   /  AST  26  /  ALT  20  /  AlkPhos  413<H>  11-04          CAPILLARY BLOOD GLUCOSE      POCT Blood Glucose.: 101 mg/dL (03 Nov 2017 09:54)    PT/INR - ( 04 Nov 2017 07:32 )   PT: 12.3 sec;   INR: 1.12 ratio         PTT - ( 03 Nov 2017 14:35 )  PTT:34.4 sec    CULTURES:  Culture Results:   No growth at 48 hours (11-02 @ 10:03)  Culture Results:   No growth at 48 hours (11-02 @ 10:03)  Culture Results:   No growth at 48 hours (10-31 @ 19:55)  Culture Results:   No growth at 48 hours (10-31 @ 19:23)      Physical Examination:    General: No acute distress.      HEENT: Pupils equal,4mm reactive to light.  Symmetric.    PULM: Course upper airway transmitted noise, mod thick blood tinged sputum    CVS: irregular rate and rhythm, no murmurs, rubs, or gallops    ABD: Soft, nondistended, nontender, normoactive bowel sounds, no masses    EXT: No edema, nontender    SKIN: Warm and well perfused, scattered ecchymosis throughout trunk and extremities, + thrill on RUE    NEURO: Alert,  nonfocal, answers simple yes/no questions and follows simple commands but has periods of staring off     RADIOLOGY:   < from: CT Lumbar Spine No Cont (11.04.17 @ 02:20) >    INTERPRETATION:  Clinical indication: Fever. Prelumbar puncture imaging   required to evaluate for location of spinal hardware.    Technique: Noncontrast axial CTimages of the lumbar spine were acquired.   Coronal and sagittal reconstructions as well as 3-D reconstructions   images are provided.    Findings: No metallic hardware is seen within the lumbar spine. Calcified   discs noted at T11-12. Mild multilevel degenerative disc space narrowing   and osteophyte formation is noted. Grade 1 5-mm anterolisthesis of L3 on   L4 noted. Laminectomy changes noted at L5-S1. Bony productive changes are   noted at this level. No lytic or blastic abnormalities are seen. No   paraspinal soft tissue or fluid collections are noted.    Incidental note is made of trace right pleural effusion and trace   abdominal ascites. Enteric tube tip in the gastric fundus.    Impression:    No metallic hardware is seen.    < end of copied text >    < from: Xray Chest 1 View AP/PA. (11.03.17 @ 16:09) >    FINDINGS: Right IJ catheter tip in SVC.  The  heart is enlarged in transverse diameter. No hilar mass. Trachea   midline.       .  Status post cardiac valve replacement.    NG tube tip beyond GE junction.  .    The lungs  are clear.  No pleural abnormality is seen.           Visualized osseous structures are intact.        IMPRESSION:   Catheters in place. Lungs clear..          < end of copied text >  CRITICAL CARE TIME SPENT: 60min

## 2025-03-11 NOTE — OCCUPATIONAL THERAPY INITIAL EVALUATION ADULT - PHYSICAL ASSIST/NONPHYSICAL ASSIST: SUPINE/SIT, REHAB EVAL
I would like for her to start Jardiance 10mg once daily. I will send this into the pharmacy. Please make sure she has a follow up in the next 4 weeks on this. Thanks!   1 person assist